# Patient Record
Sex: MALE | Race: WHITE | NOT HISPANIC OR LATINO | Employment: OTHER | URBAN - METROPOLITAN AREA
[De-identification: names, ages, dates, MRNs, and addresses within clinical notes are randomized per-mention and may not be internally consistent; named-entity substitution may affect disease eponyms.]

---

## 2017-01-26 ENCOUNTER — HOSPITAL ENCOUNTER (EMERGENCY)
Facility: MEDICAL CENTER | Age: 32
End: 2017-01-26
Attending: EMERGENCY MEDICINE
Payer: MEDICARE

## 2017-01-26 ENCOUNTER — APPOINTMENT (OUTPATIENT)
Dept: RADIOLOGY | Facility: MEDICAL CENTER | Age: 32
End: 2017-01-26
Attending: EMERGENCY MEDICINE
Payer: MEDICARE

## 2017-01-26 VITALS
TEMPERATURE: 97.3 F | WEIGHT: 150 LBS | BODY MASS INDEX: 22.81 KG/M2 | OXYGEN SATURATION: 95 % | HEART RATE: 106 BPM | SYSTOLIC BLOOD PRESSURE: 111 MMHG | DIASTOLIC BLOOD PRESSURE: 56 MMHG | RESPIRATION RATE: 14 BRPM

## 2017-01-26 DIAGNOSIS — R41.82 ALTERED MENTAL STATUS, UNSPECIFIED ALTERED MENTAL STATUS TYPE: ICD-10-CM

## 2017-01-26 DIAGNOSIS — F10.929 ALCOHOL INTOXICATION, WITH UNSPECIFIED COMPLICATION (HCC): ICD-10-CM

## 2017-01-26 DIAGNOSIS — D72.829 LEUKOCYTOSIS, UNSPECIFIED TYPE: ICD-10-CM

## 2017-01-26 LAB
ALBUMIN SERPL BCP-MCNC: 4.7 G/DL (ref 3.2–4.9)
ALBUMIN/GLOB SERPL: 1.4 G/DL
ALP SERPL-CCNC: 92 U/L (ref 30–99)
ALT SERPL-CCNC: 16 U/L (ref 2–50)
ANION GAP SERPL CALC-SCNC: 14 MMOL/L (ref 0–11.9)
APPEARANCE UR: CLEAR
APTT PPP: 30.5 SEC (ref 24.7–36)
AST SERPL-CCNC: 30 U/L (ref 12–45)
BASOPHILS # BLD AUTO: 0.2 % (ref 0–1.8)
BASOPHILS # BLD: 0.05 K/UL (ref 0–0.12)
BILIRUB SERPL-MCNC: 0.5 MG/DL (ref 0.1–1.5)
BILIRUB UR QL STRIP.AUTO: NEGATIVE
BUN SERPL-MCNC: 20 MG/DL (ref 8–22)
CALCIUM SERPL-MCNC: 9.3 MG/DL (ref 8.5–10.5)
CHLORIDE SERPL-SCNC: 105 MMOL/L (ref 96–112)
CO2 SERPL-SCNC: 21 MMOL/L (ref 20–33)
COLOR UR: ABNORMAL
CREAT SERPL-MCNC: 1.06 MG/DL (ref 0.5–1.4)
EOSINOPHIL # BLD AUTO: 0.03 K/UL (ref 0–0.51)
EOSINOPHIL NFR BLD: 0.1 % (ref 0–6.9)
ERYTHROCYTE [DISTWIDTH] IN BLOOD BY AUTOMATED COUNT: 40.7 FL (ref 35.9–50)
ETHANOL BLD-MCNC: 0.21 G/DL
GFR SERPL CREATININE-BSD FRML MDRD: >60 ML/MIN/1.73 M 2
GLOBULIN SER CALC-MCNC: 3.4 G/DL (ref 1.9–3.5)
GLUCOSE BLD-MCNC: 50 MG/DL (ref 65–99)
GLUCOSE SERPL-MCNC: 77 MG/DL (ref 65–99)
GLUCOSE UR STRIP.AUTO-MCNC: >1000 MG/DL
HCT VFR BLD AUTO: 50.8 % (ref 42–52)
HGB BLD-MCNC: 16.7 G/DL (ref 14–18)
IMM GRANULOCYTES # BLD AUTO: 0.12 K/UL (ref 0–0.11)
IMM GRANULOCYTES NFR BLD AUTO: 0.6 % (ref 0–0.9)
INR PPP: 0.99 (ref 0.87–1.13)
KETONES UR STRIP.AUTO-MCNC: 40 MG/DL
LEUKOCYTE ESTERASE UR QL STRIP.AUTO: NEGATIVE
LYMPHOCYTES # BLD AUTO: 2.07 K/UL (ref 1–4.8)
LYMPHOCYTES NFR BLD: 10 % (ref 22–41)
MCH RBC QN AUTO: 29.9 PG (ref 27–33)
MCHC RBC AUTO-ENTMCNC: 32.9 G/DL (ref 33.7–35.3)
MCV RBC AUTO: 91 FL (ref 81.4–97.8)
MICRO URNS: ABNORMAL
MONOCYTES # BLD AUTO: 0.89 K/UL (ref 0–0.85)
MONOCYTES NFR BLD AUTO: 4.3 % (ref 0–13.4)
NEUTROPHILS # BLD AUTO: 17.58 K/UL (ref 1.82–7.42)
NEUTROPHILS NFR BLD: 84.8 % (ref 44–72)
NITRITE UR QL STRIP.AUTO: NEGATIVE
NRBC # BLD AUTO: 0 K/UL
NRBC BLD AUTO-RTO: 0 /100 WBC
PH UR STRIP.AUTO: 5 [PH]
PLATELET # BLD AUTO: 291 K/UL (ref 164–446)
PMV BLD AUTO: 11.4 FL (ref 9–12.9)
POTASSIUM SERPL-SCNC: 4.4 MMOL/L (ref 3.6–5.5)
PROT SERPL-MCNC: 8.1 G/DL (ref 6–8.2)
PROT UR QL STRIP: NEGATIVE MG/DL
PROTHROMBIN TIME: 13.4 SEC (ref 12–14.6)
RBC # BLD AUTO: 5.58 M/UL (ref 4.7–6.1)
RBC UR QL AUTO: NEGATIVE
SODIUM SERPL-SCNC: 140 MMOL/L (ref 135–145)
SP GR UR STRIP.AUTO: 1.02
WBC # BLD AUTO: 20.7 K/UL (ref 4.8–10.8)

## 2017-01-26 PROCEDURE — 700101 HCHG RX REV CODE 250: Performed by: EMERGENCY MEDICINE

## 2017-01-26 PROCEDURE — 80307 DRUG TEST PRSMV CHEM ANLYZR: CPT

## 2017-01-26 PROCEDURE — 99284 EMERGENCY DEPT VISIT MOD MDM: CPT

## 2017-01-26 PROCEDURE — 85610 PROTHROMBIN TIME: CPT

## 2017-01-26 PROCEDURE — 71010 DX-CHEST-PORTABLE (1 VIEW): CPT

## 2017-01-26 PROCEDURE — 80053 COMPREHEN METABOLIC PANEL: CPT

## 2017-01-26 PROCEDURE — 96365 THER/PROPH/DIAG IV INF INIT: CPT

## 2017-01-26 PROCEDURE — 82962 GLUCOSE BLOOD TEST: CPT

## 2017-01-26 PROCEDURE — 85025 COMPLETE CBC W/AUTO DIFF WBC: CPT

## 2017-01-26 PROCEDURE — 81003 URINALYSIS AUTO W/O SCOPE: CPT | Mod: 59

## 2017-01-26 PROCEDURE — 36415 COLL VENOUS BLD VENIPUNCTURE: CPT

## 2017-01-26 PROCEDURE — 85730 THROMBOPLASTIN TIME PARTIAL: CPT

## 2017-01-26 PROCEDURE — 99285 EMERGENCY DEPT VISIT HI MDM: CPT

## 2017-01-26 PROCEDURE — 96366 THER/PROPH/DIAG IV INF ADDON: CPT

## 2017-01-26 PROCEDURE — 96375 TX/PRO/DX INJ NEW DRUG ADDON: CPT

## 2017-01-26 RX ORDER — DEXTROSE MONOHYDRATE 25 G/50ML
50 INJECTION, SOLUTION INTRAVENOUS ONCE
Status: COMPLETED | OUTPATIENT
Start: 2017-01-26 | End: 2017-01-26

## 2017-01-26 RX ADMIN — DEXTROSE MONOHYDRATE 50 ML: 500 INJECTION PARENTERAL at 08:48

## 2017-01-26 RX ADMIN — THIAMINE HYDROCHLORIDE 1000 ML: 100 INJECTION, SOLUTION INTRAMUSCULAR; INTRAVENOUS at 08:48

## 2017-01-26 ASSESSMENT — LIFESTYLE VARIABLES
HAVE PEOPLE ANNOYED YOU BY CRITICIZING YOUR DRINKING: YES
TOTAL SCORE: 4
TOTAL SCORE: 4
EVER HAD A DRINK FIRST THING IN THE MORNING TO STEADY YOUR NERVES TO GET RID OF A HANGOVER: YES
EVER FELT BAD OR GUILTY ABOUT YOUR DRINKING: YES
HAVE YOU EVER FELT YOU SHOULD CUT DOWN ON YOUR DRINKING: YES
CONSUMPTION TOTAL: INCOMPLETE
TOTAL SCORE: 4
DO YOU DRINK ALCOHOL: YES
DOES PATIENT WANT TO STOP DRINKING: NO

## 2017-01-26 NOTE — ED NOTES
All discharge instructions given to pt . Pt verbalized understanding of all discharge instructions. All lines removed prior to discharge. All questions answered. ERP aware of tachycardia prior to discharge. Per ERP ok for D/C.

## 2017-01-26 NOTE — ED NOTES
ERP at bedside.    Blood sugar= 50.  ERP aware.  IV established, blood drawn & sent to lab.  Pt medicated per Md's orders.

## 2017-01-26 NOTE — DISCHARGE INSTRUCTIONS
"Alcohol Intoxication  Alcohol intoxication occurs when the amount of alcohol that a person has consumed impairs his or her ability to mentally and physically function. Alcohol directly impairs the normal chemical activity of the brain. Drinking large amounts of alcohol can lead to changes in mental function and behavior, and it can cause many physical effects that can be harmful.   Alcohol intoxication can range in severity from mild to very severe. Various factors can affect the level of intoxication that occurs, such as the person's age, gender, weight, frequency of alcohol consumption, and the presence of other medical conditions (such as diabetes, seizures, or heart conditions). Dangerous levels of alcohol intoxication may occur when people drink large amounts of alcohol in a short period (binge drinking). Alcohol can also be especially dangerous when combined with certain prescription medicines or \"recreational\" drugs.  SIGNS AND SYMPTOMS  Some common signs and symptoms of mild alcohol intoxication include:  · Loss of coordination.  · Changes in mood and behavior.  · Impaired judgment.  · Slurred speech.  As alcohol intoxication progresses to more severe levels, other signs and symptoms will appear. These may include:  · Vomiting.  · Confusion and impaired memory.  · Slowed breathing.  · Seizures.  · Loss of consciousness.  DIAGNOSIS   Your health care provider will take a medical history and perform a physical exam. You will be asked about the amount and type of alcohol you have consumed. Blood tests will be done to measure the concentration of alcohol in your blood. In many places, your blood alcohol level must be lower than 80 mg/dL (0.08%) to legally drive. However, many dangerous effects of alcohol can occur at much lower levels.   TREATMENT   People with alcohol intoxication often do not require treatment. Most of the effects of alcohol intoxication are temporary, and they go away as the alcohol naturally " leaves the body. Your health care provider will monitor your condition until you are stable enough to go home. Fluids are sometimes given through an IV access tube to help prevent dehydration.   HOME CARE INSTRUCTIONS  · Do not drive after drinking alcohol.  · Stay hydrated. Drink enough water and fluids to keep your urine clear or pale yellow. Avoid caffeine.    · Only take over-the-counter or prescription medicines as directed by your health care provider.    SEEK MEDICAL CARE IF:   · You have persistent vomiting.    · You do not feel better after a few days.  · You have frequent alcohol intoxication. Your health care provider can help determine if you should see a substance use treatment counselor.  SEEK IMMEDIATE MEDICAL CARE IF:   · You become shaky or tremble when you try to stop drinking.    · You shake uncontrollably (seizure).    · You throw up (vomit) blood. This may be bright red or may look like black coffee grounds.    · You have blood in your stool. This may be bright red or may appear as a black, tarry, bad smelling stool.    · You become lightheaded or faint.    MAKE SURE YOU:   · Understand these instructions.  · Will watch your condition.  · Will get help right away if you are not doing well or get worse.     This information is not intended to replace advice given to you by your health care provider. Make sure you discuss any questions you have with your health care provider.     Document Released: 09/27/2006 Document Revised: 08/20/2014 Document Reviewed: 05/23/2014  [employer] Interactive Patient Education ©2016 [employer]    Alcohol Problems  Most adults who drink alcohol drink in moderation (not a lot) are at low risk for developing problems related to their drinking. However, all drinkers, including low-risk drinkers, should know about the health risks connected with drinking alcohol.  RECOMMENDATIONS FOR LOW-RISK DRINKING   Drink in moderation. Moderate drinking is defined as follows:    · Men - no more than 2 drinks per day.  · Nonpregnant women - no more than 1 drink per day.  · Over age 65 - no more than 1 drink per day.  A standard drink is 12 grams of pure alcohol, which is equal to a 12 ounce bottle of beer or wine cooler, a 5 ounce glass of wine, or 1.5 ounces of distilled spirits (such as whiskey, compa, vodka, or rum).   ABSTAIN FROM (DO NOT DRINK) ALCOHOL:  · When pregnant or considering pregnancy.  · When taking a medication that interacts with alcohol.  · If you are alcohol dependent.  · A medical condition that prohibits drinking alcohol (such as ulcer, liver disease, or heart disease).  DISCUSS WITH YOUR CAREGIVER:  · If you are at risk for coronary heart disease, discuss the potential benefits and risks of alcohol use: Light to moderate drinking is associated with lower rates of coronary heart disease in certain populations (for example, men over age 45 and postmenopausal women). Infrequent or nondrinkers are advised not to begin light to moderate drinking to reduce the risk of coronary heart disease so as to avoid creating an alcohol-related problem. Similar protective effects can likely be gained through proper diet and exercise.  · Women and the elderly have smaller amounts of body water than men. As a result women and the elderly achieve a higher blood alcohol concentration after drinking the same amount of alcohol.  · Exposing a fetus to alcohol can cause a broad range of birth defects referred to as Fetal Alcohol Syndrome (FAS) or Alcohol-Related Birth Defects (ARBD). Although FAS/ARBD is connected with excessive alcohol consumption during pregnancy, studies also have reported neurobehavioral problems in infants born to mothers reporting drinking an average of 1 drink per day during pregnancy.  · Heavier drinking (the consumption of more than 4 drinks per occasion by men and more than 3 drinks per occasion by women) impairs learning (cognitive) and psychomotor functions and  increases the risk of alcohol-related problems, including accidents and injuries.  CAGE QUESTIONS:   · Have you ever felt that you should Cut down on your drinking?  · Have people Annoyed you by criticizing your drinking?  · Have you ever felt bad or Guilty about your drinking?  · Have you ever had a drink first thing in the morning to steady your nerves or get rid of a hangover (Eye opener)?  If you answered positively to any of these questions: You may be at risk for alcohol-related problems if alcohol consumption is:   · Men: Greater than 14 drinks per week or more than 4 drinks per occasion.  · Women: Greater than 7 drinks per week or more than 3 drinks per occasion.  Do you or your family have a medical history of alcohol-related problems, such as:  · Blackouts.  · Sexual dysfunction.  · Depression.  · Trauma.  · Liver dysfunction.  · Sleep disorders.  · Hypertension.  · Chronic abdominal pain.  · Has your drinking ever caused you problems, such as problems with your family, problems with your work (or school) performance, or accidents/injuries?  · Do you have a compulsion to drink or a preoccupation with drinking?  · Do you have poor control or are you unable to stop drinking once you have started?  · Do you have to drink to avoid withdrawal symptoms?  · Do you have problems with withdrawal such as tremors, nausea, sweats, or mood disturbances?  · Does it take more alcohol than in the past to get you high?  · Do you feel a strong urge to drink?  · Do you change your plans so that you can have a drink?  · Do you ever drink in the morning to relieve the shakes or a hangover?  If you have answered a number of the previous questions positively, it may be time for you to talk to your caregivers, family, and friends and see if they think you have a problem. Alcoholism is a chemical dependency that keeps getting worse and will eventually destroy your health and relationships. Many alcoholics end up dead,  impoverished, or in group home. This is often the end result of all chemical dependency.  · Do not be discouraged if you are not ready to take action immediately.  · Decisions to change behavior often involve up and down desires to change and feeling like you cannot decide.  · Try to think more seriously about your drinking behavior.  · Think of the reasons to quit.  WHERE TO GO FOR ADDITIONAL INFORMATION   · The National [place] on Alcohol Abuse and Alcoholism (NIAAA)  www.niaaa.nih.gov  · National Alliance on Alcoholism and Drug Dependence (NCADD)  www.ncadd.org  · American Society of Addiction Medicine (ASAM)  www.asam.org   Document Released: 12/18/2006 Document Revised: 03/11/2013 Document Reviewed: 08/05/2009  [employer]® Patient Information ©2014 [employer].    Alcohol Abuse and Nutrition  Alcohol abuse is any pattern of alcohol consumption that harms your health, relationships, or work. Alcohol abuse can affect how your body breaks down and absorbs nutrients from food by causing your liver to work abnormally. Additionally, many people who abuse alcohol do not eat enough carbohydrates, protein, fat, vitamins, and minerals. This can cause poor nutrition (malnutrition) and a lack of nutrients (nutrient deficiencies), which can lead to further complications.  Nutrients that are commonly lacking (deficient) among people who abuse alcohol include:  · Vitamins.  ¨ Vitamin A. This is stored in your liver. It is important for your vision, metabolism, and ability to fight off infections (immunity).  ¨ B vitamins. These include vitamins such as folate, thiamin, and niacin. These are important in new cell growth and maintenance.  ¨ Vitamin C. This plays an important role in iron absorption, wound healing, and immunity.  ¨ Vitamin D. This is produced by your liver, but you can also get vitamin D from food. Vitamin D is necessary for your body to absorb and use calcium.  · Minerals.  ¨ Calcium. This is important for your  bones and your heart and blood vessel (cardiovascular) function.  ¨ Iron. This is important for blood, muscle, and nervous system functioning.  ¨ Magnesium. This plays an important role in muscle and nerve function, and it helps to control blood sugar and blood pressure.  ¨ Zinc. This is important for the normal function of your nervous system and digestive system (gastrointestinal tract).  Nutrition is an essential component of therapy for alcohol abuse. Your health care provider or dietitian will work with you to design a plan that can help restore nutrients to your body and prevent potential complications.  WHAT IS MY PLAN?  Your dietitian may develop a specific diet plan that is based on your condition and any other complications you may have. A diet plan will commonly include:  · A balanced diet.  ¨ Grains: 6-8 oz per day.  ¨ Vegetables: 2-3 cups per day.  ¨ Fruits: 1-2 cups per day.  ¨ Meat and other protein: 5-6 oz per day.  ¨ Dairy: 2-3 cups per day.  · Vitamin and mineral supplements.  WHAT DO I NEED TO KNOW ABOUT ALCOHOL AND NUTRITION?  · Consume foods that are high in antioxidants, such as grapes, berries, nuts, green tea, and dark green and orange vegetables. This can help to counteract some of the stress that is placed on your liver by consuming alcohol.  · Avoid food and drinks that are high in fat and sugar. Foods such as sugared soft drinks, salty snack foods, and candy contain empty calories. This means that they lack important nutrients such as protein, fiber, and vitamins.  · Eat frequent meals and snacks. Try to eat 5-6 small meals each day.  · Eat a variety of fresh fruits and vegetables each day. This will help you get plenty of water, fiber, and vitamins in your diet.  · Drink plenty of water and other clear fluids. Try to drink at least 48-64 oz (1.5-2 L) of water per day.  · If you are a vegetarian, eat a variety of protein-rich foods. Pair whole grains with plant-based proteins at meals and  snacks to obtain the greatest nutrient benefit from your food. For example, eat rice with beans, put peanut butter on whole-grain toast, or eat oatmeal with sunflower seeds.  · Soak beans and whole grains overnight before cooking. This can help your body to absorb the nutrients more easily.  · Include foods fortified with vitamins and minerals in your diet. Commonly fortified foods include milk, orange juice, cereal, and bread.  · If you are malnourished, your dietitian may recommend a high-protein, high-calorie diet. This may include:  ¨ 2,000-3,000 calories (kilocalories) per day.  ¨  grams of protein per day.  · Your health care provider may recommend a complete nutritional supplement beverage. This can help to restore calories, protein, and vitamins to your body. Depending on your condition, you may be advised to consume this instead of or in addition to meals.  · Limit your intake of caffeine. Replace drinks like coffee and black tea with decaffeinated coffee and herbal tea.  · Eat a variety of foods that are high in omega fatty acids. These include fish, nuts and seeds, and soybeans. These foods may help your liver to recover and may also stabilize your mood.  · Certain medicines may cause changes in your appetite, taste, and weight. Work with your health care provider and dietitian to make any adjustments to your medicines and diet plan.  · Include other healthy lifestyle choices in your daily routine.  ¨ Be physically active.  ¨ Get enough sleep.  ¨ Spend time doing activities that you enjoy.  · If you are unable to take in enough food and calories by mouth, your health care provider may recommend a feeding tube. This is a tube that passes through your nose and throat, directly into your stomach. Nutritional supplement beverages can be given to you through the feeding tube to help you get the nutrients you need.  · Take vitamin or mineral supplements as recommended by your health care provider.  WHAT  FOODS CAN I EAT?  Grains  Enriched pasta. Enriched rice. Fortified whole-grain bread. Fortified whole-grain cereal. Barley. Brown rice. Quinoa. Millet.  Vegetables  All fresh, frozen, and canned vegetables. Spinach. Kale. Artichoke. Carrots. Winter squash and pumpkin. Sweet potatoes. Broccoli. Cabbage. Cucumbers. Tomatoes. Sweet peppers. Green beans. Peas. Corn.  Fruits  All fresh and frozen fruits. Berries. Grapes. Harley. Papaya. Guava. Cherries. Apples. Bananas. Peaches. Plums. Pineapple. Watermelon. Cantaloupe. Oranges. Avocado.  Meats and Other Protein Sources  Beef liver. Lean beef. Pork. Fresh and canned chicken. Fresh fish. Oysters. Sardines. Canned tuna. Shrimp. Eggs with yolks. Nuts and seeds. Peanut butter. Beans and lentils. Soybeans. Tofu.  Dairy  Whole, low-fat, and nonfat milk. Whole, low-fat, and nonfat yogurt. Cottage cheese. Sour cream. Hard and soft cheeses.  Beverages  Water. Herbal tea. Decaffeinated coffee. Decaffeinated green tea. 100% fruit juice. 100% vegetable juice. Instant breakfast shakes.  Condiments  Ketchup. Mayonnaise. Mustard. Salad dressing. Barbecue sauce.  Sweets and Desserts  Sugar-free ice cream. Sugar-free pudding. Sugar-free gelatin.  Fats and Oils  Butter. Vegetable oil, flaxseed oil, olive oil, and walnut oil.  Other  Complete nutrition shakes. Protein bars. Sugar-free gum.  The items listed above may not be a complete list of recommended foods or beverages. Contact your dietitian for more options.  WHAT FOODS ARE NOT RECOMMENDED?  Grains  Sugar-sweetened breakfast cereals. Flavored instant oatmeal. Fried breads.  Vegetables  Breaded or deep-fried vegetables.  Fruits  Dried fruit with added sugar. Candied fruit. Canned fruit in syrup.  Meats and Other Protein Sources  Breaded or deep-fried meats.  Dairy  Flavored milks. Fried cheese curds or fried cheese sticks.  Beverages  Alcohol. Sugar-sweetened soft drinks. Sugar-sweetened tea. Caffeinated coffee and  tea.  Condiments  Sugar. Honey. Agave nectar. Molasses.  Sweets and Desserts  Chocolate. Cake. Cookies. Candy.  Other  Potato chips. Pretzels. Salted nuts. Candied nuts.  The items listed above may not be a complete list of foods and beverages to avoid. Contact your dietitian for more information.     This information is not intended to replace advice given to you by your health care provider. Make sure you discuss any questions you have with your health care provider.     Document Released: 10/12/2006 Document Revised: 01/08/2016 Document Reviewed: 07/21/2015  [employer] Interactive Patient Education ©2016 [employer]

## 2017-01-26 NOTE — ED AVS SNAPSHOT
After Visit Summary                                                                                                                Gopal Ceja   MRN: [id number]    Department:  [facility], Emergency Dept   Date of Visit:  1/26/2017            [facility], Emergency Dept    [address] [facility] [address]    Phone:  [phone]      You were seen by     Guy G Gansert, M.D.      Your Diagnosis Was     Altered mental status, unspecified altered mental status type     R41.82       These are the medications you received during your hospitalization from 01/26/2017 0800 to 01/26/2017 1235     Date/Time Order Dose Route Action    01/26/2017 0848 er detox iv 1000 mL (D5LR + thiamine 100 mg + folic acid 1 mg + magnesium 1 g) infusion 1,000 mL Intravenous New Bag    01/26/2017 0848 dextrose 50% (D50W) injection 50 mL 50 mL Intravenous New Bag      Follow-up Information     1. Follow up with [facility].    Contact information    [address] [place] [id number] [phone]      Medication Information     Review all of your home medications and newly ordered medications with your primary doctor and/or pharmacist as soon as possible. Follow medication instructions as directed by your doctor and/or pharmacist.     Please keep your complete medication list with you and share with your physician. Update the information when medications are discontinued, doses are changed, or new medications (including over-the-counter products) are added; and carry medication information at all times in the event of emergency situations.               Medication List      ASK your doctor about these medications        Instructions    citalopram 10 MG tablet   Commonly known as:  CELEXA    Take 30 mg by mouth every day.   Dose:  30 mg       diazepam 5 MG Tabs   Commonly known as:  VALIUM    Take 5 mg by mouth at bedtime as needed.   Dose:  5 mg       * erythromycin 5 MG/GM Oint    Place  1 Application in both eyes 2 times a day.   Dose:  1 Application       * erythromycin 5 MG/GM Oint    Apply to both eyes 3 times daily       gabapentin 300 MG Caps   Commonly known as:  NEURONTIN    Take 1 Cap by mouth 3 times a day.   Dose:  300 mg       ibuprofen 600 MG Tabs   Commonly known as:  MOTRIN    Take 1 Tab by mouth every 8 hours as needed.   Dose:  600 mg       loratadine 10 MG Tabs   Commonly known as:  CLARITIN    Take 1 Tab by mouth every day.   Dose:  10 mg       methylphenidate 5 MG Tabs   Commonly known as:  RITALIN    Take 10 mg by mouth 3 times a day.   Dose:  10 mg       * Notice:  This list has 2 medication(s) that are the same as other medications prescribed for you. Read the directions carefully, and ask your doctor or other care provider to review them with you.            Procedures and tests performed during your visit     ACCU-CHEK GLUCOSE    APTT    CBC WITH DIFFERENTIAL    COMP METABOLIC PANEL    DIAGNOSTIC ALCOHOL    DX-CHEST-PORTABLE (1 VIEW)    ESTIMATED GFR    PROTHROMBIN TIME    URINALYSIS        Discharge Instructions       Alcohol Intoxication  Alcohol intoxication occurs when the amount of alcohol that a person has consumed impairs his or her ability to mentally and physically function. Alcohol directly impairs the normal chemical activity of the brain. Drinking large amounts of alcohol can lead to changes in mental function and behavior, and it can cause many physical effects that can be harmful.   Alcohol intoxication can range in severity from mild to very severe. Various factors can affect the level of intoxication that occurs, such as the person's age, gender, weight, frequency of alcohol consumption, and the presence of other medical conditions (such as diabetes, seizures, or heart conditions). Dangerous levels of alcohol intoxication may occur when people drink large amounts of alcohol in a short period (binge drinking). Alcohol can also be especially dangerous when  "combined with certain prescription medicines or \"recreational\" drugs.  SIGNS AND SYMPTOMS  Some common signs and symptoms of mild alcohol intoxication include:  · Loss of coordination.  · Changes in mood and behavior.  · Impaired judgment.  · Slurred speech.  As alcohol intoxication progresses to more severe levels, other signs and symptoms will appear. These may include:  · Vomiting.  · Confusion and impaired memory.  · Slowed breathing.  · Seizures.  · Loss of consciousness.  DIAGNOSIS   Your health care provider will take a medical history and perform a physical exam. You will be asked about the amount and type of alcohol you have consumed. Blood tests will be done to measure the concentration of alcohol in your blood. In many places, your blood alcohol level must be lower than 80 mg/dL (0.08%) to legally drive. However, many dangerous effects of alcohol can occur at much lower levels.   TREATMENT   People with alcohol intoxication often do not require treatment. Most of the effects of alcohol intoxication are temporary, and they go away as the alcohol naturally leaves the body. Your health care provider will monitor your condition until you are stable enough to go home. Fluids are sometimes given through an IV access tube to help prevent dehydration.   HOME CARE INSTRUCTIONS  · Do not drive after drinking alcohol.  · Stay hydrated. Drink enough water and fluids to keep your urine clear or pale yellow. Avoid caffeine.    · Only take over-the-counter or prescription medicines as directed by your health care provider.    SEEK MEDICAL CARE IF:   · You have persistent vomiting.    · You do not feel better after a few days.  · You have frequent alcohol intoxication. Your health care provider can help determine if you should see a substance use treatment counselor.  SEEK IMMEDIATE MEDICAL CARE IF:   · You become shaky or tremble when you try to stop drinking.    · You shake uncontrollably (seizure).    · You throw up " (vomit) blood. This may be bright red or may look like black coffee grounds.    · You have blood in your stool. This may be bright red or may appear as a black, tarry, bad smelling stool.    · You become lightheaded or faint.    MAKE SURE YOU:   · Understand these instructions.  · Will watch your condition.  · Will get help right away if you are not doing well or get worse.     This information is not intended to replace advice given to you by your health care provider. Make sure you discuss any questions you have with your health care provider.     Document Released: 09/27/2006 Document Revised: 08/20/2014 Document Reviewed: 05/23/2014  [employer] Interactive Patient Education ©2016 [employer] Inc.    Alcohol Problems  Most adults who drink alcohol drink in moderation (not a lot) are at low risk for developing problems related to their drinking. However, all drinkers, including low-risk drinkers, should know about the health risks connected with drinking alcohol.  RECOMMENDATIONS FOR LOW-RISK DRINKING   Drink in moderation. Moderate drinking is defined as follows:   · Men - no more than 2 drinks per day.  · Nonpregnant women - no more than 1 drink per day.  · Over age 65 - no more than 1 drink per day.  A standard drink is 12 grams of pure alcohol, which is equal to a 12 ounce bottle of beer or wine cooler, a 5 ounce glass of wine, or 1.5 ounces of distilled spirits (such as whiskey, compa, vodka, or rum).   ABSTAIN FROM (DO NOT DRINK) ALCOHOL:  · When pregnant or considering pregnancy.  · When taking a medication that interacts with alcohol.  · If you are alcohol dependent.  · A medical condition that prohibits drinking alcohol (such as ulcer, liver disease, or heart disease).  DISCUSS WITH YOUR CAREGIVER:  · If you are at risk for coronary heart disease, discuss the potential benefits and risks of alcohol use: Light to moderate drinking is associated with lower rates of coronary heart disease in certain populations  (for example, men over age 45 and postmenopausal women). Infrequent or nondrinkers are advised not to begin light to moderate drinking to reduce the risk of coronary heart disease so as to avoid creating an alcohol-related problem. Similar protective effects can likely be gained through proper diet and exercise.  · Women and the elderly have smaller amounts of body water than men. As a result women and the elderly achieve a higher blood alcohol concentration after drinking the same amount of alcohol.  · Exposing a fetus to alcohol can cause a broad range of birth defects referred to as Fetal Alcohol Syndrome (FAS) or Alcohol-Related Birth Defects (ARBD). Although FAS/ARBD is connected with excessive alcohol consumption during pregnancy, studies also have reported neurobehavioral problems in infants born to mothers reporting drinking an average of 1 drink per day during pregnancy.  · Heavier drinking (the consumption of more than 4 drinks per occasion by men and more than 3 drinks per occasion by women) impairs learning (cognitive) and psychomotor functions and increases the risk of alcohol-related problems, including accidents and injuries.  CAGE QUESTIONS:   · Have you ever felt that you should Cut down on your drinking?  · Have people Annoyed you by criticizing your drinking?  · Have you ever felt bad or Guilty about your drinking?  · Have you ever had a drink first thing in the morning to steady your nerves or get rid of a hangover (Eye opener)?  If you answered positively to any of these questions: You may be at risk for alcohol-related problems if alcohol consumption is:   · Men: Greater than 14 drinks per week or more than 4 drinks per occasion.  · Women: Greater than 7 drinks per week or more than 3 drinks per occasion.  Do you or your family have a medical history of alcohol-related problems, such as:  · Blackouts.  · Sexual dysfunction.  · Depression.  · Trauma.  · Liver dysfunction.  · Sleep  disorders.  · Hypertension.  · Chronic abdominal pain.  · Has your drinking ever caused you problems, such as problems with your family, problems with your work (or school) performance, or accidents/injuries?  · Do you have a compulsion to drink or a preoccupation with drinking?  · Do you have poor control or are you unable to stop drinking once you have started?  · Do you have to drink to avoid withdrawal symptoms?  · Do you have problems with withdrawal such as tremors, nausea, sweats, or mood disturbances?  · Does it take more alcohol than in the past to get you high?  · Do you feel a strong urge to drink?  · Do you change your plans so that you can have a drink?  · Do you ever drink in the morning to relieve the shakes or a hangover?  If you have answered a number of the previous questions positively, it may be time for you to talk to your caregivers, family, and friends and see if they think you have a problem. Alcoholism is a chemical dependency that keeps getting worse and will eventually destroy your health and relationships. Many alcoholics end up dead, impoverished, or in [facility]. This is often the end result of all chemical dependency.  · Do not be discouraged if you are not ready to take action immediately.  · Decisions to change behavior often involve up and down desires to change and feeling like you cannot decide.  · Try to think more seriously about your drinking behavior.  · Think of the reasons to quit.  WHERE TO GO FOR ADDITIONAL INFORMATION   · The National [place] on Alcohol Abuse and Alcoholism (NIAAA)  www.niaaa.nih.gov  · National [place] on Alcoholism and Drug Dependence (NCADD)  www.ncadd.org  · American Society of Addiction Medicine (ASAM)  www.asam.org   Document Released: 12/18/2006 Document Revised: 03/11/2013 Document Reviewed: 08/05/2009  [employer]® Patient Information ©2014 [employer].    Alcohol Abuse and Nutrition  Alcohol abuse is any pattern of alcohol consumption that harms  your health, relationships, or work. Alcohol abuse can affect how your body breaks down and absorbs nutrients from food by causing your liver to work abnormally. Additionally, many people who abuse alcohol do not eat enough carbohydrates, protein, fat, vitamins, and minerals. This can cause poor nutrition (malnutrition) and a lack of nutrients (nutrient deficiencies), which can lead to further complications.  Nutrients that are commonly lacking (deficient) among people who abuse alcohol include:  · Vitamins.  ¨ Vitamin A. This is stored in your liver. It is important for your vision, metabolism, and ability to fight off infections (immunity).  ¨ B vitamins. These include vitamins such as folate, thiamin, and niacin. These are important in new cell growth and maintenance.  ¨ Vitamin C. This plays an important role in iron absorption, wound healing, and immunity.  ¨ Vitamin D. This is produced by your liver, but you can also get vitamin D from food. Vitamin D is necessary for your body to absorb and use calcium.  · Minerals.  ¨ Calcium. This is important for your bones and your heart and blood vessel (cardiovascular) function.  ¨ Iron. This is important for blood, muscle, and nervous system functioning.  ¨ Magnesium. This plays an important role in muscle and nerve function, and it helps to control blood sugar and blood pressure.  ¨ Zinc. This is important for the normal function of your nervous system and digestive system (gastrointestinal tract).  Nutrition is an essential component of therapy for alcohol abuse. Your health care provider or dietitian will work with you to design a plan that can help restore nutrients to your body and prevent potential complications.  WHAT IS MY PLAN?  Your dietitian may develop a specific diet plan that is based on your condition and any other complications you may have. A diet plan will commonly include:  · A balanced diet.  ¨ Grains: 6-8 oz per day.  ¨ Vegetables: 2-3 cups per  day.  ¨ Fruits: 1-2 cups per day.  ¨ Meat and other protein: 5-6 oz per day.  ¨ Dairy: 2-3 cups per day.  · Vitamin and mineral supplements.  WHAT DO I NEED TO KNOW ABOUT ALCOHOL AND NUTRITION?  · Consume foods that are high in antioxidants, such as grapes, berries, nuts, green tea, and dark green and orange vegetables. This can help to counteract some of the stress that is placed on your liver by consuming alcohol.  · Avoid food and drinks that are high in fat and sugar. Foods such as sugared soft drinks, salty snack foods, and candy contain empty calories. This means that they lack important nutrients such as protein, fiber, and vitamins.  · Eat frequent meals and snacks. Try to eat 5-6 small meals each day.  · Eat a variety of fresh fruits and vegetables each day. This will help you get plenty of water, fiber, and vitamins in your diet.  · Drink plenty of water and other clear fluids. Try to drink at least 48-64 oz (1.5-2 L) of water per day.  · If you are a vegetarian, eat a variety of protein-rich foods. Pair whole grains with plant-based proteins at meals and snacks to obtain the greatest nutrient benefit from your food. For example, eat rice with beans, put peanut butter on whole-grain toast, or eat oatmeal with sunflower seeds.  · Soak beans and whole grains overnight before cooking. This can help your body to absorb the nutrients more easily.  · Include foods fortified with vitamins and minerals in your diet. Commonly fortified foods include milk, orange juice, cereal, and bread.  · If you are malnourished, your dietitian may recommend a high-protein, high-calorie diet. This may include:  ¨ 2,000-3,000 calories (kilocalories) per day.  ¨  grams of protein per day.  · Your health care provider may recommend a complete nutritional supplement beverage. This can help to restore calories, protein, and vitamins to your body. Depending on your condition, you may be advised to consume this instead of or in  addition to meals.  · Limit your intake of caffeine. Replace drinks like coffee and black tea with decaffeinated coffee and herbal tea.  · Eat a variety of foods that are high in omega fatty acids. These include fish, nuts and seeds, and soybeans. These foods may help your liver to recover and may also stabilize your mood.  · Certain medicines may cause changes in your appetite, taste, and weight. Work with your health care provider and dietitian to make any adjustments to your medicines and diet plan.  · Include other healthy lifestyle choices in your daily routine.  ¨ Be physically active.  ¨ Get enough sleep.  ¨ Spend time doing activities that you enjoy.  · If you are unable to take in enough food and calories by mouth, your health care provider may recommend a feeding tube. This is a tube that passes through your nose and throat, directly into your stomach. Nutritional supplement beverages can be given to you through the feeding tube to help you get the nutrients you need.  · Take vitamin or mineral supplements as recommended by your health care provider.  WHAT FOODS CAN I EAT?  Grains  Enriched pasta. Enriched rice. Fortified whole-grain bread. Fortified whole-grain cereal. Barley. Brown rice. Quinoa. Millet.  Vegetables  All fresh, frozen, and canned vegetables. Spinach. Kale. Artichoke. Carrots. Winter squash and pumpkin. Sweet potatoes. Broccoli. Cabbage. Cucumbers. Tomatoes. Sweet peppers. Green beans. Peas. Corn.  Fruits  All fresh and frozen fruits. Berries. Grapes. Harley. Papaya. Guava. Cherries. Apples. Bananas. Peaches. Plums. Pineapple. Watermelon. Cantaloupe. Oranges. Avocado.  Meats and Other Protein Sources  Beef liver. Lean beef. Pork. Fresh and canned chicken. Fresh fish. Oysters. Sardines. Canned tuna. Shrimp. Eggs with yolks. Nuts and seeds. Peanut butter. Beans and lentils. Soybeans. Tofu.  Dairy  Whole, low-fat, and nonfat milk. Whole, low-fat, and nonfat yogurt. Cottage cheese. Sour cream.  Hard and soft cheeses.  Beverages  Water. Herbal tea. Decaffeinated coffee. Decaffeinated green tea. 100% fruit juice. 100% vegetable juice. Instant breakfast shakes.  Condiments  Ketchup. Mayonnaise. Mustard. Salad dressing. Barbecue sauce.  Sweets and Desserts  Sugar-free ice cream. Sugar-free pudding. Sugar-free gelatin.  Fats and Oils  Butter. Vegetable oil, flaxseed oil, olive oil, and walnut oil.  Other  Complete nutrition shakes. Protein bars. Sugar-free gum.  The items listed above may not be a complete list of recommended foods or beverages. Contact your dietitian for more options.  WHAT FOODS ARE NOT RECOMMENDED?  Grains  Sugar-sweetened breakfast cereals. Flavored instant oatmeal. Fried breads.  Vegetables  Breaded or deep-fried vegetables.  Fruits  Dried fruit with added sugar. Candied fruit. Canned fruit in syrup.  Meats and Other Protein Sources  Breaded or deep-fried meats.  Dairy  Flavored milks. Fried cheese curds or fried cheese sticks.  Beverages  Alcohol. Sugar-sweetened soft drinks. Sugar-sweetened tea. Caffeinated coffee and tea.  Condiments  Sugar. Honey. Agave nectar. Molasses.  Sweets and Desserts  Chocolate. Cake. Cookies. Candy.  Other  Potato chips. Pretzels. Salted nuts. Candied nuts.  The items listed above may not be a complete list of foods and beverages to avoid. Contact your dietitian for more information.     This information is not intended to replace advice given to you by your health care provider. Make sure you discuss any questions you have with your health care provider.     Document Released: 10/12/2006 Document Revised: 01/08/2016 Document Reviewed: 07/21/2015  [employer] Interactive Patient Education ©2016 [employer]            Patient Information     Patient Information    Following emergency treatment: all patient requiring follow-up care must return either to a private physician or a clinic if your condition worsens before you are able to obtain further medical  attention, please return to the emergency room.     Billing Information    At [facility], we work to make the billing process streamlined for our patients.  Our Representatives are here to answer any questions you may have regarding your hospital bill.  If you have insurance coverage and have supplied your insurance information to us, we will submit a claim to your insurer on your behalf.  Should you have any questions regarding your bill, we can be reached online or by phone as follows:  Online: You are able pay your bills online or live chat with our representatives about any billing questions you may have. We are here to help Monday - Friday from 8:00am to 7:30pm and 9:00am - 12:00pm on Saturdays.  Please visit [email or web address]  for more information.   Phone:  [phone] or [phone]    Please note that your emergency physician, surgeon, pathologist, radiologist, anesthesiologist, and other specialists are not employed by [facility] and will therefore bill separately for their services.  Please contact them directly for any questions concerning their bills at the numbers below:     Emergency Physician Services:  [phone]  [facility]:  [phone]  [facility]:  [phone]  [facility]:  [phone]    1. Your final bill may vary from the amount quoted upon discharge if all procedures are not complete at that time, or if your doctor has additional procedures of which we are not aware. You will receive an additional bill if you return to the Emergency Department at [facility] for suture removal regardless of the facility of which the sutures were placed.     2. Please arrange for settlement of this account at the emergency registration.    3. All self-pay accounts are due in full at the time of treatment.  If you are unable to meet this obligation then payment is expected within 4-5 days.     4. If you have had  radiology studies (CT, X-ray, Ultrasound, MRI), you have received a preliminary result during your emergency department visit. Please contact the radiology department [phone] to receive a copy of your final result. Please discuss the Final result with your primary physician or with the follow up physician provided.     Crisis Hotline:  [place] Crisis Hotline:  5-528-YOGTMSM or 1-355.107.9557  [place] Crisis Hotline:    1-435.873.2872 or 247-475-4666         ED Discharge Follow Up Questions    1. In order to provide you with very good care, we would like to follow up with a phone call in the next few days.  May we have your permission to contact you?     YES /  NO    2. What is the best phone number to call you? (       )_____-__________    3. What is the best time to call you?      Morning  /  Afternoon  /  Evening                   Patient Signature:  ____________________________________________________________    Date:  ____________________________________________________________

## 2017-01-26 NOTE — ED AVS SNAPSHOT
1/26/2017          Gopal [facility]   [place] [address]    Dear Gopal:    [facility] wants to ensure your discharge home is safe and you or your loved ones have had all your questions answered regarding your care after you leave the hospital.    You may receive a telephone call within two days of your discharge.  This call is to make certain you understand your discharge instructions as well as ensure we provided you with the best care possible during your stay with us.     The call will only last approximately 3-5 minutes and will be done by a nurse.    Once again, we want to ensure your discharge home is safe and that you have a clear understanding of any next steps in your care.  If you have any questions or concerns, please do not hesitate to contact us, we are here for you.  Thank you for choosing [facility] for your healthcare needs.    Sincerely,    Neal Lam    [facility]

## 2017-01-26 NOTE — ED NOTES
"Pt devaughn KIMBROUGH from hotel room with c/c alcohol intoxication- pt admits to drinking \"heavily\" this morning.  Pt states multiple times- \"I am hungry, you guys need to feed me now.\"  A&ox4.  Chart up for ERP evaluation.   "

## 2017-01-26 NOTE — ED PROVIDER NOTES
"ED Provider Note    CHIEF COMPLAINT  Chief Complaint   Patient presents with   • Alcohol Intoxication     admits to drinking \"heavily\" this AM   • Other     \"I am hungry\"       HPI  Gopal Ceja is a 31 y.o. male who presents for evaluation of altered mental status.  Patient was brought in by EMS from a hotel room where he was found obtunded.  The patient has been seen 36 times in the department for alcohol and alcohol related conditions.  The patient admits to imbibing alcohol.  In the prehospital setting the patient's blood sugar was 50, and he was given oral glucose and paramedics.  Femur department the patient moans but will awaken with stimuli.  He just indicates he would like something to eat.  He currently denies: Fever, cardiorespiratory symptoms, gastrointestinal symptoms.  No history of recent head trauma.  No other acute symptomatology or complaints.    REVIEW OF SYSTEMS  See HPI for further details.  No history of: Hypertension, diabetes, thyroid dysfunction, cardiopulmonary disorders, gastrointestinal disorders.  All other systems negative.    PAST MEDICAL HISTORY  Past Medical History   Diagnosis Date   • Ectrodactyly-ectodermal dysplasia-clefting syndrome    • Acute anxiety    • ETOH abuse    • Psychiatric disorder      anxiety/panic disorder   • Bipolar affective    • ADHD (attention deficit hyperactivity disorder)        FAMILY HISTORY  No family history on file.    SOCIAL HISTORY  Past history of tobacco use; positive alcohol abuse;    SURGICAL HISTORY  Past Surgical History   Procedure Laterality Date   • Other orthopedic surgery       hands bilaterally r/t EEDC syndrome       CURRENT MEDICATIONS  Home Medications     Reviewed by Gail Camara R.N. (Registered Nurse) on 01/26/17 at 0805  Med List Status: Unable to Obtain    Medication Last Dose Status    citalopram (CELEXA) 10 MG tablet few weeks ago Active    diazepam (VALIUM) 5 MG Tab few weeks ago Active    erythromycin 5 MG/GM Ointment " 6/13/2016 Active    erythromycin 5 MG/GM Ointment  Active    gabapentin (NEURONTIN) 300 MG Cap few weeks ago Active    ibuprofen (MOTRIN) 600 MG Tab few weeks ago Active    loratadine (CLARITIN) 10 MG Tab few weeks ago Active    methylphenidate (RITALIN) 5 MG Tab few weeks ago Active                ALLERGIES  No Known Allergies    PHYSICAL EXAM  VITAL SIGNS: /56 mmHg  Pulse 77  Temp(Src) 36.3 °C (97.3 °F)  Resp 20  Wt 68.04 kg (150 lb)  SpO2 98%   Constitutional: A 31-year-old male, lethargic, responds painful stimuli and will speak, oriented ×2  HENT: Normocephalic, Atraumatic, Nares:Clear, Oropharynx: moist, well hydrated, posterior pharynx:clear   Eyes: PERRL, EOMI, Conjunctiva normal, No discharge.   Neck: Normal range of motion, No tenderness, Supple, No stridor.   Lymphatic: No lymphadenopathy noted.   Cardiovascular: Regular rate and rhythm without mumurs, gallups, rubs   Thorax & Lungs: Normal Equal breath sounds, No respiratory distress, No wheezing, no stridor, no rales. No chest tenderness.   Abdomen: Soft, nontender, nondistended, no organomegaly, positive bowel sounds normal in quality. No guarding or rebound.  Skin: Good skin turgor, pink, warm, dry. No rashes, petechiae, purpura. Normal capillary refill.   Back: No tenderness, No CVA tenderness.   Extremities: Intact distal pulses, No edema, No tenderness, No cyanosis,  Vascular: Pulses are 2+, symmetric in the upper and lower extremities.  Musculoskeletal: The ptient has deformities to his hands and feet; No tenderness to palpation or major deformities noted.   Neurologic: Lethargic with slurred speech, oriented x 2,  No gross focal deficits noted.      RADIOLOGY/PROCEDURES  DX-CHEST-PORTABLE (1 VIEW)   Final Result      No acute cardiopulmonary process is seen.          COURSE & MEDICAL DECISION MAKING  Pertinent Labs & Imaging studies reviewed. (See chart for details)  1.  Monitor  2.  IV: Detox  3.  D50 W1 amp IV    Laboratory studies:  CBC shows white count 20.7, 84% neutrophils, 10% lymphs size, 4% monocytes, immature granulocytes 0.12, hemoglobin 16.7, hematocrit 50.8; coags normal; diagnostic alcohol 0.21; CMP within normal; urinalysis positive for glucose and ketones otherwise negative;    Discussion: At this time, the patient presents for evaluation of altered mental status.  Patient's findings are consistent with alcohol intoxication.  The patient was noted to have elevated white blood cell count of 20,000.  I performed evaluation looking for any occult infection and his chest x-ray and urinalysis are negative.  I reexamined several times the patient has a benign abdominal exam.  At this time, the patient meets criteria for discharge.  I have discussed the findings treatment plan with the patient.  He is not motivated to terminate his alcohol use.  He was instructed he should consider going to AA.  He was instructed to follow-up with primary care also.  She was discharged in stable condition.    FINAL IMPRESSION  1. Altered mental status, unspecified altered mental status type    2. Alcohol intoxication, with unspecified complication (CMS-HCC)    3. Leukocytosis, unspecified type        PLAN  1.  Appropriate discharge instructions given  2.  Follow up primary care  3.  Recheck if any fever change or worsening symptoms or any disconcerting symptoms he is not experiencing at this time;    Electronically signed by: Guy G Gansert, 1/26/2017 8:10 AM

## 2017-01-26 NOTE — ED AVS SNAPSHOT
Zubie Access Code: [email or web address]  Expires: 2/25/2017 12:35 PM    Zubie  A secure, online tool to manage your health information     [employer]’s Zubie® is a secure, online tool that connects you to your personalized health information from the privacy of your home -- day or night - making it very easy for you to manage your healthcare. Once the activation process is completed, you can even access your medical information using the Zubie erwin, which is available for free in the Apple Erwin store or Google Play store.     Zubie provides the following levels of access (as shown below):   My Chart Features   [facility] Primary Care Doctor [facility]  Specialists [facility]  Urgent  Care Non-[facility]  Primary Care  Doctor   Email your healthcare team securely and privately 24/7 X X X X   Manage appointments: schedule your next appointment; view details of past/upcoming appointments X      Request prescription refills. X      View recent personal medical records, including lab and immunizations X X X X   View health record, including health history, allergies, medications X X X X   Read reports about your outpatient visits, procedures, consult and ER notes X X X X   See your discharge summary, which is a recap of your hospital and/or ER visit that includes your diagnosis, lab results, and care plan. X X       How to register for Zubie:  1. Go to  [email or web address].  2. Click on the Sign Up Now box, which takes you to the New Member Sign Up page. You will need to provide the following information:  a. Enter your Zubie Access Code exactly as it appears at the top of this page. (You will not need to use this code after you’ve completed the sign-up process. If you do not sign up before the expiration date, you must request a new code.)   b. Enter your date of birth.   c. Enter your home email address.   d. Click Submit, and follow the next screen’s instructions.  3. Create a Zubie ID. This will be your [employer]  login ID and cannot be changed, so think of one that is secure and easy to remember.  4. Create a [employer] password. You can change your password at any time.  5. Enter your Password Reset Question and Answer. This can be used at a later time if you forget your password.   6. Enter your e-mail address. This allows you to receive e-mail notifications when new information is available in The Skimm.  7. Click Sign Up. You can now view your health information.    For assistance activating your [employer]imm account, call [phone]

## 2017-01-28 ENCOUNTER — HOSPITAL ENCOUNTER (EMERGENCY)
Facility: MEDICAL CENTER | Age: 32
End: 2017-01-28
Attending: EMERGENCY MEDICINE
Payer: MEDICARE

## 2017-01-28 VITALS
SYSTOLIC BLOOD PRESSURE: 127 MMHG | HEART RATE: 98 BPM | RESPIRATION RATE: 16 BRPM | HEIGHT: 68 IN | OXYGEN SATURATION: 94 % | TEMPERATURE: 97.8 F | DIASTOLIC BLOOD PRESSURE: 95 MMHG

## 2017-01-28 DIAGNOSIS — F10.10 CHRONIC ALCOHOL ABUSE: ICD-10-CM

## 2017-01-28 DIAGNOSIS — H53.9 VISUAL DISTURBANCE: ICD-10-CM

## 2017-01-28 LAB — POC BREATHALIZER: 0.02 PERCENT (ref 0–0.01)

## 2017-01-28 PROCEDURE — 99284 EMERGENCY DEPT VISIT MOD MDM: CPT

## 2017-01-28 PROCEDURE — 302970 POC BREATHALIZER

## 2017-01-28 PROCEDURE — 700102 HCHG RX REV CODE 250 W/ 637 OVERRIDE(OP): Performed by: EMERGENCY MEDICINE

## 2017-01-28 PROCEDURE — A9270 NON-COVERED ITEM OR SERVICE: HCPCS | Performed by: EMERGENCY MEDICINE

## 2017-01-28 RX ORDER — LORAZEPAM 1 MG/1
1 TABLET ORAL ONCE
Status: COMPLETED | OUTPATIENT
Start: 2017-01-28 | End: 2017-01-28

## 2017-01-28 RX ADMIN — LORAZEPAM 1 MG: 1 TABLET ORAL at 19:00

## 2017-01-28 ASSESSMENT — PAIN SCALES - GENERAL
PAINLEVEL_OUTOF10: 0
PAINLEVEL_OUTOF10: 0

## 2017-01-28 NOTE — ED AVS SNAPSHOT
After Visit Summary                                                                                                                Gopal Ceja   MRN: [id number]    Department:  [facility], Emergency Dept   Date of Visit:  1/28/2017            [facility], Emergency Dept    [address] [address]    Phone:  [phone]      You were seen by     Brayden Hurst M.D.      Your Diagnosis Was     Chronic alcohol abuse     F10.10       These are the medications you received during your hospitalization from 01/28/2017 1654 to 01/28/2017 1948     Date/Time Order Dose Route Action    01/28/2017 1900 lorazepam (ATIVAN) tablet 1 mg 1 mg Oral Given      Follow-up Information     1. Follow up with Pcp Pt States None In 2 days.    Specialty:  Family Medicine        2. Follow up with [facility], Emergency Dept.    Specialty:  Emergency Medicine    Why:  As needed, If symptoms worsen    Contact information    [address] [place] [id number] [phone]      Medication Information     Review all of your home medications and newly ordered medications with your primary doctor and/or pharmacist as soon as possible. Follow medication instructions as directed by your doctor and/or pharmacist.     Please keep your complete medication list with you and share with your physician. Update the information when medications are discontinued, doses are changed, or new medications (including over-the-counter products) are added; and carry medication information at all times in the event of emergency situations.               Medication List      ASK your doctor about these medications        Instructions    citalopram 10 MG tablet   Commonly known as:  CELEXA    Take 30 mg by mouth every day.   Dose:  30 mg       diazepam 5 MG Tabs   Commonly known as:  VALIUM    Take 5 mg by mouth at bedtime as needed.   Dose:  5 mg       * erythromycin 5 MG/GM Oint    Place 1  Application in both eyes 2 times a day.   Dose:  1 Application       * erythromycin 5 MG/GM Oint    Apply to both eyes 3 times daily       gabapentin 300 MG Caps   Commonly known as:  NEURONTIN    Take 1 Cap by mouth 3 times a day.   Dose:  300 mg       ibuprofen 600 MG Tabs   Commonly known as:  MOTRIN    Take 1 Tab by mouth every 8 hours as needed.   Dose:  600 mg       loratadine 10 MG Tabs   Commonly known as:  CLARITIN    Take 1 Tab by mouth every day.   Dose:  10 mg       methylphenidate 5 MG Tabs   Commonly known as:  RITALIN    Take 10 mg by mouth 3 times a day.   Dose:  10 mg       * Notice:  This list has 2 medication(s) that are the same as other medications prescribed for you. Read the directions carefully, and ask your doctor or other care provider to review them with you.            Procedures and tests performed during your visit     PO CHALLENGE    POC BREATHALIZER        Discharge Instructions       Alcohol Use Disorder  Alcohol use disorder is a mental disorder. It is not a one-time incident of heavy drinking. Alcohol use disorder is the excessive and uncontrollable use of alcohol over time that leads to problems with functioning in one or more areas of daily living. People with this disorder risk harming themselves and others when they drink to excess. Alcohol use disorder also can cause other mental disorders, such as mood and anxiety disorders, and serious physical problems. People with alcohol use disorder often misuse other drugs.   Alcohol use disorder is common and widespread. Some people with this disorder drink alcohol to cope with or escape from negative life events. Others drink to relieve chronic pain or symptoms of mental illness. People with a family history of alcohol use disorder are at higher risk of losing control and using alcohol to excess.   Drinking too much alcohol can cause injury, accidents, and health problems. One drink can be too much when you  are:  · Working.  · Pregnant or breastfeeding.  · Taking medicines. Ask your doctor.  · Driving or planning to drive.  SYMPTOMS   Signs and symptoms of alcohol use disorder may include the following:   · Consumption of alcohol in larger amounts or over a longer period of time than intended.  · Multiple unsuccessful attempts to cut down or control alcohol use.    · A great deal of time spent obtaining alcohol, using alcohol, or recovering from the effects of alcohol (hangover).  · A strong desire or urge to use alcohol (cravings).    · Continued use of alcohol despite problems at work, school, or home because of alcohol use.    · Continued use of alcohol despite problems in relationships because of alcohol use.  · Continued use of alcohol in situations when it is physically hazardous, such as driving a car.  · Continued use of alcohol despite awareness of a physical or psychological problem that is likely related to alcohol use. Physical problems related to alcohol use can involve the brain, heart, liver, stomach, and intestines. Psychological problems related to alcohol use include intoxication, depression, anxiety, psychosis, delirium, and dementia.    · The need for increased amounts of alcohol to achieve the same desired effect, or a decreased effect from the consumption of the same amount of alcohol (tolerance).  · Withdrawal symptoms upon reducing or stopping alcohol use, or alcohol use to reduce or avoid withdrawal symptoms. Withdrawal symptoms include:  · Racing heart.  · Hand tremor.  · Difficulty sleeping.  · Nausea.  · Vomiting.  · Hallucinations.  · Restlessness.  · Seizures.  DIAGNOSIS  Alcohol use disorder is diagnosed through an assessment by your health care provider. Your health care provider may start by asking three or four questions to screen for excessive or problematic alcohol use. To confirm a diagnosis of alcohol use disorder, at least two symptoms must be present within a 12-month period. The  severity of alcohol use disorder depends on the number of symptoms:  · Mild--two or three.  · Moderate--four or five.  · Severe--six or more.  Your health care provider may perform a physical exam or use results from lab tests to see if you have physical problems resulting from alcohol use. Your health care provider may refer you to a mental health professional for evaluation.  TREATMENT   Some people with alcohol use disorder are able to reduce their alcohol use to low-risk levels. Some people with alcohol use disorder need to quit drinking alcohol. When necessary, mental health professionals with specialized training in substance use treatment can help. Your health care provider can help you decide how severe your alcohol use disorder is and what type of treatment you need. The following forms of treatment are available:   · Detoxification. Detoxification involves the use of prescription medicines to prevent alcohol withdrawal symptoms in the first week after quitting. This is important for people with a history of symptoms of withdrawal and for heavy drinkers who are likely to have withdrawal symptoms. Alcohol withdrawal can be dangerous and, in severe cases, cause death. Detoxification is usually provided in a hospital or in-patient substance use treatment facility.  · Counseling or talk therapy. Talk therapy is provided by substance use treatment counselors. It addresses the reasons people use alcohol and ways to keep them from drinking again. The goals of talk therapy are to help people with alcohol use disorder find healthy activities and ways to cope with life stress, to identify and avoid triggers for alcohol use, and to handle cravings, which can cause relapse.  · Medicines. Different medicines can help treat alcohol use disorder through the following actions:  ¨ Decrease alcohol cravings.  ¨ Decrease the positive reward response felt from alcohol use.  ¨ Produce an uncomfortable physical reaction when  "alcohol is used (aversion therapy).  · Support groups. Support groups are run by people who have quit drinking. They provide emotional support, advice, and guidance.  These forms of treatment are often combined. Some people with alcohol use disorder benefit from intensive combination treatment provided by specialized substance use treatment centers. Both inpatient and outpatient treatment programs are available.     This information is not intended to replace advice given to you by your health care provider. Make sure you discuss any questions you have with your health care provider.     Document Released: 01/25/2006 Document Revised: 01/08/2016 Document Reviewed: 03/27/2014  [employer] Interactive Patient Education ©2016 [employer]      Visual Disturbances  You have had a disturbance in your vision. This may be caused by various conditions, such as:  · Migraines. Migraine headaches are often preceded by a disturbance in vision. Blind spots or light flashes are followed by a headache. This type of visual disturbance is temporary. It does not damage the eye.  · Glaucoma. This is caused by increased pressure in the eye. Symptoms include haziness, blurred vision, or seeing rainbow colored circles when looking at bright lights. Partial or complete visual loss can occur. You may or may not experience eye pain. Visual loss may be gradual or sudden and is irreversible. Glaucoma is the leading cause of blindness.  · Retina problems. Vision will be reduced if the retina becomes detached or if there is a circulation problem as with diabetes, high blood pressure, or a mini-stroke. Symptoms include seeing \"floaters,\" flashes of light, or shadows, as if a curtain has fallen over your eye.  · Optic nerve problems. The main nerve in your eye can be damaged by redness, soreness, and swelling (inflammation), poor circulation, drugs, and toxins.  It is very important to have a complete exam done by a specialist to determine the " exact cause of your eye problem. The specialist may recommend medicines or surgery, depending on the cause of the problem. This can help prevent further loss of vision or reduce the risk of having a stroke. Contact the caregiver to whom you have been referred and arrange for follow-up care right away.  SEEK IMMEDIATE MEDICAL CARE IF:   · Your vision gets worse.  · You develop severe headaches.  · You have any weakness or numbness in the face, arms, or legs.  · You have any trouble speaking or walking.     This information is not intended to replace advice given to you by your health care provider. Make sure you discuss any questions you have with your health care provider.     Document Released: 01/25/2006 Document Revised: 03/11/2013 Document Reviewed: 05/27/2015  [employer] Interactive Patient Education ©2016 [employer]            Patient Information     Patient Information    Following emergency treatment: all patient requiring follow-up care must return either to a private physician or a clinic if your condition worsens before you are able to obtain further medical attention, please return to the emergency room.     Billing Information    At [facility], we work to make the billing process streamlined for our patients.  Our Representatives are here to answer any questions you may have regarding your hospital bill.  If you have insurance coverage and have supplied your insurance information to us, we will submit a claim to your insurer on your behalf.  Should you have any questions regarding your bill, we can be reached online or by phone as follows:  Online: You are able pay your bills online or live chat with our representatives about any billing questions you may have. We are here to help Monday - Friday from 8:00am to 7:30pm and 9:00am - 12:00pm on Saturdays.  Please visit [email or web address]  for more information.   Phone:  [phone] or [phone]    Please note  that your emergency physician, surgeon, pathologist, radiologist, anesthesiologist, and other specialists are not employed by [facility] and will therefore bill separately for their services.  Please contact them directly for any questions concerning their bills at the numbers below:     Emergency Physician Services:  [phone]  [facility]:  [phone]  [facility]:  [phone]  [facility]:  [phone]    1. Your final bill may vary from the amount quoted upon discharge if all procedures are not complete at that time, or if your doctor has additional procedures of which we are not aware. You will receive an additional bill if you return to the Emergency Department at [facility] for suture removal regardless of the facility of which the sutures were placed.     2. Please arrange for settlement of this account at the emergency registration.    3. All self-pay accounts are due in full at the time of treatment.  If you are unable to meet this obligation then payment is expected within 4-5 days.     4. If you have had radiology studies (CT, X-ray, Ultrasound, MRI), you have received a preliminary result during your emergency department visit. Please contact the radiology department [phone] to receive a copy of your final result. Please discuss the Final result with your primary physician or with the follow up physician provided.     Crisis Hotline:  [place] Crisis Hotline:  2-785-SQWGZLP or 1-182.114.7868  [place] Crisis Hotline:    1-873.397.1989 or 622-342-3648         ED Discharge Follow Up Questions    1. In order to provide you with very good care, we would like to follow up with a phone call in the next few days.  May we have your permission to contact you?     YES /  NO    2. What is the best phone number to call you? (       )_____-__________    3. What is the best time to call you?      Morning  /  Afternoon  /  Evening                   Patient  Signature:  ____________________________________________________________    Date:  ____________________________________________________________

## 2017-01-28 NOTE — ED AVS SNAPSHOT
1/28/2017          Gopal Ceja  [address] [facility] [address]    Dear Gopal:    [facility] wants to ensure your discharge home is safe and you or your loved ones have had all your questions answered regarding your care after you leave the hospital.    You may receive a telephone call within two days of your discharge.  This call is to make certain you understand your discharge instructions as well as ensure we provided you with the best care possible during your stay with us.     The call will only last approximately 3-5 minutes and will be done by a nurse.    Once again, we want to ensure your discharge home is safe and that you have a clear understanding of any next steps in your care.  If you have any questions or concerns, please do not hesitate to contact us, we are here for you.  Thank you for choosing [facility] for your healthcare needs.    Sincerely,    Neal Lam    [facility]
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
## 2017-01-29 NOTE — ED NOTES
Discharge order received. Discharged instruction given. Discharged paperwork signed and dated and attached to chart. Sent to lobby.

## 2017-01-29 NOTE — DISCHARGE INSTRUCTIONS
Alcohol Use Disorder  Alcohol use disorder is a mental disorder. It is not a one-time incident of heavy drinking. Alcohol use disorder is the excessive and uncontrollable use of alcohol over time that leads to problems with functioning in one or more areas of daily living. People with this disorder risk harming themselves and others when they drink to excess. Alcohol use disorder also can cause other mental disorders, such as mood and anxiety disorders, and serious physical problems. People with alcohol use disorder often misuse other drugs.   Alcohol use disorder is common and widespread. Some people with this disorder drink alcohol to cope with or escape from negative life events. Others drink to relieve chronic pain or symptoms of mental illness. People with a family history of alcohol use disorder are at higher risk of losing control and using alcohol to excess.   Drinking too much alcohol can cause injury, accidents, and health problems. One drink can be too much when you are:  · Working.  · Pregnant or breastfeeding.  · Taking medicines. Ask your doctor.  · Driving or planning to drive.  SYMPTOMS   Signs and symptoms of alcohol use disorder may include the following:   · Consumption of alcohol in larger amounts or over a longer period of time than intended.  · Multiple unsuccessful attempts to cut down or control alcohol use.    · A great deal of time spent obtaining alcohol, using alcohol, or recovering from the effects of alcohol (hangover).  · A strong desire or urge to use alcohol (cravings).    · Continued use of alcohol despite problems at work, school, or home because of alcohol use.    · Continued use of alcohol despite problems in relationships because of alcohol use.  · Continued use of alcohol in situations when it is physically hazardous, such as driving a car.  · Continued use of alcohol despite awareness of a physical or psychological problem that is likely related to alcohol use. Physical  problems related to alcohol use can involve the brain, heart, liver, stomach, and intestines. Psychological problems related to alcohol use include intoxication, depression, anxiety, psychosis, delirium, and dementia.    · The need for increased amounts of alcohol to achieve the same desired effect, or a decreased effect from the consumption of the same amount of alcohol (tolerance).  · Withdrawal symptoms upon reducing or stopping alcohol use, or alcohol use to reduce or avoid withdrawal symptoms. Withdrawal symptoms include:  · Racing heart.  · Hand tremor.  · Difficulty sleeping.  · Nausea.  · Vomiting.  · Hallucinations.  · Restlessness.  · Seizures.  DIAGNOSIS  Alcohol use disorder is diagnosed through an assessment by your health care provider. Your health care provider may start by asking three or four questions to screen for excessive or problematic alcohol use. To confirm a diagnosis of alcohol use disorder, at least two symptoms must be present within a 12-month period. The severity of alcohol use disorder depends on the number of symptoms:  · Mild--two or three.  · Moderate--four or five.  · Severe--six or more.  Your health care provider may perform a physical exam or use results from lab tests to see if you have physical problems resulting from alcohol use. Your health care provider may refer you to a mental health professional for evaluation.  TREATMENT   Some people with alcohol use disorder are able to reduce their alcohol use to low-risk levels. Some people with alcohol use disorder need to quit drinking alcohol. When necessary, mental health professionals with specialized training in substance use treatment can help. Your health care provider can help you decide how severe your alcohol use disorder is and what type of treatment you need. The following forms of treatment are available:   · Detoxification. Detoxification involves the use of prescription medicines to prevent alcohol withdrawal  symptoms in the first week after quitting. This is important for people with a history of symptoms of withdrawal and for heavy drinkers who are likely to have withdrawal symptoms. Alcohol withdrawal can be dangerous and, in severe cases, cause death. Detoxification is usually provided in a hospital or in-patient substance use treatment facility.  · Counseling or talk therapy. Talk therapy is provided by substance use treatment counselors. It addresses the reasons people use alcohol and ways to keep them from drinking again. The goals of talk therapy are to help people with alcohol use disorder find healthy activities and ways to cope with life stress, to identify and avoid triggers for alcohol use, and to handle cravings, which can cause relapse.  · Medicines. Different medicines can help treat alcohol use disorder through the following actions:  ¨ Decrease alcohol cravings.  ¨ Decrease the positive reward response felt from alcohol use.  ¨ Produce an uncomfortable physical reaction when alcohol is used (aversion therapy).  · Support groups. Support groups are run by people who have quit drinking. They provide emotional support, advice, and guidance.  These forms of treatment are often combined. Some people with alcohol use disorder benefit from intensive combination treatment provided by specialized substance use treatment centers. Both inpatient and outpatient treatment programs are available.     This information is not intended to replace advice given to you by your health care provider. Make sure you discuss any questions you have with your health care provider.     Document Released: 01/25/2006 Document Revised: 01/08/2016 Document Reviewed: 03/27/2014  [employer] Interactive Patient Education ©2016 [employer]      Visual Disturbances  You have had a disturbance in your vision. This may be caused by various conditions, such as:  · Migraines. Migraine headaches are often preceded by a disturbance in vision.  "Blind spots or light flashes are followed by a headache. This type of visual disturbance is temporary. It does not damage the eye.  · Glaucoma. This is caused by increased pressure in the eye. Symptoms include haziness, blurred vision, or seeing rainbow colored circles when looking at bright lights. Partial or complete visual loss can occur. You may or may not experience eye pain. Visual loss may be gradual or sudden and is irreversible. Glaucoma is the leading cause of blindness.  · Retina problems. Vision will be reduced if the retina becomes detached or if there is a circulation problem as with diabetes, high blood pressure, or a mini-stroke. Symptoms include seeing \"floaters,\" flashes of light, or shadows, as if a curtain has fallen over your eye.  · Optic nerve problems. The main nerve in your eye can be damaged by redness, soreness, and swelling (inflammation), poor circulation, drugs, and toxins.  It is very important to have a complete exam done by a specialist to determine the exact cause of your eye problem. The specialist may recommend medicines or surgery, depending on the cause of the problem. This can help prevent further loss of vision or reduce the risk of having a stroke. Contact the caregiver to whom you have been referred and arrange for follow-up care right away.  SEEK IMMEDIATE MEDICAL CARE IF:   · Your vision gets worse.  · You develop severe headaches.  · You have any weakness or numbness in the face, arms, or legs.  · You have any trouble speaking or walking.     This information is not intended to replace advice given to you by your health care provider. Make sure you discuss any questions you have with your health care provider.     Document Released: 01/25/2006 Document Revised: 03/11/2013 Document Reviewed: 05/27/2015  [employer] Interactive Patient Education ©2016 [employer]    "

## 2017-01-29 NOTE — ED NOTES
Pt. Woke up about 1-2 hours ago and is seeing white spots in right eye. Pt. Had large amount of ETOH today. Daily drinker
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
## 2017-02-06 ENCOUNTER — HOSPITAL ENCOUNTER (EMERGENCY)
Facility: MEDICAL CENTER | Age: 32
End: 2017-02-07
Attending: EMERGENCY MEDICINE
Payer: MEDICARE

## 2017-02-06 DIAGNOSIS — F10.29 ALCOHOL DEPENDENCE WITH UNSPECIFIED ALCOHOL-INDUCED DISORDER (HCC): ICD-10-CM

## 2017-02-06 PROCEDURE — 99285 EMERGENCY DEPT VISIT HI MDM: CPT

## 2017-02-06 ASSESSMENT — LIFESTYLE VARIABLES
AVERAGE NUMBER OF DAYS PER WEEK YOU HAVE A DRINK CONTAINING ALCOHOL: 5
TOTAL SCORE: 1
DO YOU DRINK ALCOHOL: YES
TOTAL SCORE: 1
CONSUMPTION TOTAL: POSITIVE
HAVE PEOPLE ANNOYED YOU BY CRITICIZING YOUR DRINKING: NO
TOTAL SCORE: 1
EVER FELT BAD OR GUILTY ABOUT YOUR DRINKING: NO
EVER HAD A DRINK FIRST THING IN THE MORNING TO STEADY YOUR NERVES TO GET RID OF A HANGOVER: NO
HAVE YOU EVER FELT YOU SHOULD CUT DOWN ON YOUR DRINKING: YES
ON A TYPICAL DAY WHEN YOU DRINK ALCOHOL HOW MANY DRINKS DO YOU HAVE: 6
HOW MANY TIMES IN THE PAST YEAR HAVE YOU HAD 5 OR MORE DRINKS IN A DAY: 10

## 2017-02-06 ASSESSMENT — PAIN SCALES - GENERAL
PAINLEVEL_OUTOF10: 0
PAINLEVEL_OUTOF10: 0

## 2017-02-06 NOTE — ED AVS SNAPSHOT
SolarGreen Access Code: [email or web address]  Expires: 2/25/2017 12:35 PM    SolarGreen  A secure, online tool to manage your health information     [employer]’s SolarGreen® is a secure, online tool that connects you to your personalized health information from the privacy of your home -- day or night - making it very easy for you to manage your healthcare. Once the activation process is completed, you can even access your medical information using the SolarGreen erwin, which is available for free in the [employer] Erwin store or Google Play store.     SolarGreen provides the following levels of access (as shown below):   My Chart Features   [facility] Primary Care Doctor [facility]  Specialists [facility]  Urgent  Care Non-[facility]  Primary Care  Doctor   Email your healthcare team securely and privately 24/7 X X X X   Manage appointments: schedule your next appointment; view details of past/upcoming appointments X      Request prescription refills. X      View recent personal medical records, including lab and immunizations X X X X   View health record, including health history, allergies, medications X X X X   Read reports about your outpatient visits, procedures, consult and ER notes X X X X   See your discharge summary, which is a recap of your hospital and/or ER visit that includes your diagnosis, lab results, and care plan. X X       How to register for SolarGreen:  1. Go to  [email or web address].  2. Click on the Sign Up Now box, which takes you to the New Member Sign Up page. You will need to provide the following information:  a. Enter your SolarGreen Access Code exactly as it appears at the top of this page. (You will not need to use this code after you’ve completed the sign-up process. If you do not sign up before the expiration date, you must request a new code.)   b. Enter your date of birth.   c. Enter your home email address.   d. Click Submit, and follow the next screen’s instructions.  3. Create a SolarGreen ID. This will be your [employer]  login ID and cannot be changed, so think of one that is secure and easy to remember.  4. Create a [employer] password. You can change your password at any time.  5. Enter your Password Reset Question and Answer. This can be used at a later time if you forget your password.   6. Enter your e-mail address. This allows you to receive e-mail notifications when new information is available in [employer].  7. Click Sign Up. You can now view your health information.    For assistance activating your [employer] account, call [phone]

## 2017-02-06 NOTE — ED AVS SNAPSHOT
Home Care Instructions                                                                                                                Gopal Ceja   MRN: [id number]    Department:  [facility], Emergency Dept   Date of Visit:  2/6/2017            [facility], Emergency Dept    [address] [place] [id number]    Phone:  [phone]      You were seen by     1. Adiel Matthews M.D.    2. Naeem Santana M.D.    3. Tolu Crandall M.D.      Your Diagnosis Was     Alcohol dependence with unspecified alcohol-induced disorder (CMS-HCC)     F10.29       These are the medications you received during your hospitalization from 02/06/2017 2309 to 02/07/2017 1437     Date/Time Order Dose Route Action    02/07/2017 1311 diazepam (VALIUM) tablet 5 mg 5 mg Oral Given    02/07/2017 1445 chlordiazepoxide (LIBRIUM) capsule 75 mg 75 mg Oral Given      Follow-up Information     1. Follow up with [facility], Emergency Dept.    Specialty:  Emergency Medicine    Why:  As needed    Contact information    [address] [place] [id number] [phone]      Medication Information     Review all of your home medications and newly ordered medications with your primary doctor and/or pharmacist as soon as possible. Follow medication instructions as directed by your doctor and/or pharmacist.     Please keep your complete medication list with you and share with your physician. Update the information when medications are discontinued, doses are changed, or new medications (including over-the-counter products) are added; and carry medication information at all times in the event of emergency situations.               Medication List      Notice     You have not been prescribed any medications.            Procedures and tests performed during your visit     Procedure/Test Number of Times Performed    Life-Skills consult (when BA is below .08) 1    POC BREATHALIZER 5    URINE DRUG SCREEN (TRIAGE) 1        Discharge Instructions       Alcohol Use Disorder  Alcohol use disorder is a mental disorder. It is not a one-time incident of heavy drinking. Alcohol use disorder is the excessive and uncontrollable use of alcohol over time that leads to problems with functioning in one or more areas of daily living. People with this disorder risk harming themselves and others when they drink to excess. Alcohol use disorder also can cause other mental disorders, such as mood and anxiety disorders, and serious physical problems. People with alcohol use disorder often misuse other drugs.   Alcohol use disorder is common and widespread. Some people with this disorder drink alcohol to cope with or escape from negative life events. Others drink to relieve chronic pain or symptoms of mental illness. People with a family history of alcohol use disorder are at higher risk of losing control and using alcohol to excess.   Drinking too much alcohol can cause injury, accidents, and health problems. One drink can be too much when you are:  · Working.  · Pregnant or breastfeeding.  · Taking medicines. Ask your doctor.  · Driving or planning to drive.  SYMPTOMS   Signs and symptoms of alcohol use disorder may include the following:   · Consumption of alcohol in larger amounts or over a longer period of time than intended.  · Multiple unsuccessful attempts to cut down or control alcohol use.    · A great deal of time spent obtaining alcohol, using alcohol, or recovering from the effects of alcohol (hangover).  · A strong desire or urge to use alcohol (cravings).    · Continued use of alcohol despite problems at work, school, or home because of alcohol use.    · Continued use of alcohol despite problems in relationships because of alcohol use.  · Continued use of alcohol in situations when it is physically hazardous, such as driving a car.  · Continued use of alcohol despite awareness of a physical or  psychological problem that is likely related to alcohol use. Physical problems related to alcohol use can involve the brain, heart, liver, stomach, and intestines. Psychological problems related to alcohol use include intoxication, depression, anxiety, psychosis, delirium, and dementia.    · The need for increased amounts of alcohol to achieve the same desired effect, or a decreased effect from the consumption of the same amount of alcohol (tolerance).  · Withdrawal symptoms upon reducing or stopping alcohol use, or alcohol use to reduce or avoid withdrawal symptoms. Withdrawal symptoms include:  ¨ Racing heart.  ¨ Hand tremor.  ¨ Difficulty sleeping.  ¨ Nausea.  ¨ Vomiting.  ¨ Hallucinations.  ¨ Restlessness.  ¨ Seizures.  DIAGNOSIS  Alcohol use disorder is diagnosed through an assessment by your health care provider. Your health care provider may start by asking three or four questions to screen for excessive or problematic alcohol use. To confirm a diagnosis of alcohol use disorder, at least two symptoms must be present within a 12-month period. The severity of alcohol use disorder depends on the number of symptoms:  · Mild--two or three.  · Moderate--four or five.  · Severe--six or more.  Your health care provider may perform a physical exam or use results from lab tests to see if you have physical problems resulting from alcohol use. Your health care provider may refer you to a mental health professional for evaluation.  TREATMENT   Some people with alcohol use disorder are able to reduce their alcohol use to low-risk levels. Some people with alcohol use disorder need to quit drinking alcohol. When necessary, mental health professionals with specialized training in substance use treatment can help. Your health care provider can help you decide how severe your alcohol use disorder is and what type of treatment you need. The following forms of treatment are available:   · Detoxification. Detoxification involves  the use of prescription medicines to prevent alcohol withdrawal symptoms in the first week after quitting. This is important for people with a history of symptoms of withdrawal and for heavy drinkers who are likely to have withdrawal symptoms. Alcohol withdrawal can be dangerous and, in severe cases, cause death. Detoxification is usually provided in a hospital or in-patient substance use treatment facility.  · Counseling or talk therapy. Talk therapy is provided by substance use treatment counselors. It addresses the reasons people use alcohol and ways to keep them from drinking again. The goals of talk therapy are to help people with alcohol use disorder find healthy activities and ways to cope with life stress, to identify and avoid triggers for alcohol use, and to handle cravings, which can cause relapse.  · Medicines. Different medicines can help treat alcohol use disorder through the following actions:  ¨ Decrease alcohol cravings.  ¨ Decrease the positive reward response felt from alcohol use.  ¨ Produce an uncomfortable physical reaction when alcohol is used (aversion therapy).  · Support groups. Support groups are run by people who have quit drinking. They provide emotional support, advice, and guidance.  These forms of treatment are often combined. Some people with alcohol use disorder benefit from intensive combination treatment provided by specialized substance use treatment centers. Both inpatient and outpatient treatment programs are available.     This information is not intended to replace advice given to you by your health care provider. Make sure you discuss any questions you have with your health care provider.     Document Released: 01/25/2006 Document Revised: 01/08/2016 Document Reviewed: 03/27/2014  Elsevier Interactive Patient Education ©2016 [employer]            Patient Information     Patient Information    Following emergency treatment: all patient requiring follow-up care must return  either to a private physician or a clinic if your condition worsens before you are able to obtain further medical attention, please return to the emergency room.     Billing Information    At [facility], we work to make the billing process streamlined for our patients.  Our Representatives are here to answer any questions you may have regarding your hospital bill.  If you have insurance coverage and have supplied your insurance information to us, we will submit a claim to your insurer on your behalf.  Should you have any questions regarding your bill, we can be reached online or by phone as follows:  Online: You are able pay your bills online or live chat with our representatives about any billing questions you may have. We are here to help Monday - Friday from 8:00am to 7:30pm and 9:00am - 12:00pm on Saturdays.  Please visit [email or web address]  for more information.   Phone:  [phone] or [phone]    Please note that your emergency physician, surgeon, pathologist, radiologist, anesthesiologist, and other specialists are not employed by [facility] and will therefore bill separately for their services.  Please contact them directly for any questions concerning their bills at the numbers below:     Emergency Physician Services:  [phone]  [facility]:  [phone]  [facility]:  [phone]  [facility]:  [phone]    1. Your final bill may vary from the amount quoted upon discharge if all procedures are not complete at that time, or if your doctor has additional procedures of which we are not aware. You will receive an additional bill if you return to the Emergency Department at [facility] for suture removal regardless of the facility of which the sutures were placed.     2. Please arrange for settlement of this account at the emergency registration.    3. All self-pay accounts are due in full at the time of  treatment.  If you are unable to meet this obligation then payment is expected within 4-5 days.     4. If you have had radiology studies (CT, X-ray, Ultrasound, MRI), you have received a preliminary result during your emergency department visit. Please contact the radiology department [phone] to receive a copy of your final result. Please discuss the Final result with your primary physician or with the follow up physician provided.     Crisis Hotline:  Lupus Crisis Hotline:  6-995-USFNPYY or 1-339.489.4357  [place] Crisis Hotline:    1-403.599.9981 or 792-826-7642         ED Discharge Follow Up Questions    1. In order to provide you with very good care, we would like to follow up with a phone call in the next few days.  May we have your permission to contact you?     YES /  NO    2. What is the best phone number to call you? (       )_____-__________    3. What is the best time to call you?      Morning  /  Afternoon  /  Evening                   Patient Signature:  ____________________________________________________________    Date:  ____________________________________________________________

## 2017-02-06 NOTE — ED AVS SNAPSHOT
2/7/2017          Gopal Ceja  [address] [place] [address]    Dear Gopal:    [facility] wants to ensure your discharge home is safe and you or your loved ones have had all your questions answered regarding your care after you leave the hospital.    You may receive a telephone call within two days of your discharge.  This call is to make certain you understand your discharge instructions as well as ensure we provided you with the best care possible during your stay with us.     The call will only last approximately 3-5 minutes and will be done by a nurse.    Once again, we want to ensure your discharge home is safe and that you have a clear understanding of any next steps in your care.  If you have any questions or concerns, please do not hesitate to contact us, we are here for you.  Thank you for choosing [facility] for your healthcare needs.    Sincerely,    Neal Lam    [facility]

## 2017-02-07 VITALS
BODY MASS INDEX: 22.73 KG/M2 | SYSTOLIC BLOOD PRESSURE: 135 MMHG | DIASTOLIC BLOOD PRESSURE: 82 MMHG | RESPIRATION RATE: 18 BRPM | OXYGEN SATURATION: 95 % | HEIGHT: 68 IN | TEMPERATURE: 98.3 F | WEIGHT: 150 LBS | HEART RATE: 95 BPM

## 2017-02-07 LAB
AMPHET UR QL SCN: NEGATIVE
BARBITURATES UR QL SCN: NEGATIVE
BENZODIAZ UR QL SCN: NEGATIVE
BZE UR QL SCN: NEGATIVE
CANNABINOIDS UR QL SCN: NEGATIVE
MDMA UR QL SCN: NEGATIVE
METHADONE UR QL SCN: NEGATIVE
OPIATES UR QL SCN: NEGATIVE
OXYCODONE UR QL SCN: NEGATIVE
PCP UR QL SCN: NEGATIVE
POC BREATHALIZER: 0.04 PERCENT (ref 0–0.01)
POC BREATHALIZER: 0.15 PERCENT (ref 0–0.01)
POC BREATHALIZER: 0.22 PERCENT (ref 0–0.01)
POC BREATHALIZER: 0.34 PERCENT (ref 0–0.01)
POC BREATHALIZER: 0.36 PERCENT (ref 0–0.01)
PROPOXYPH UR QL SCN: NEGATIVE

## 2017-02-07 PROCEDURE — A9270 NON-COVERED ITEM OR SERVICE: HCPCS | Performed by: EMERGENCY MEDICINE

## 2017-02-07 PROCEDURE — 302970 POC BREATHALIZER: Performed by: EMERGENCY MEDICINE

## 2017-02-07 PROCEDURE — 700102 HCHG RX REV CODE 250 W/ 637 OVERRIDE(OP): Performed by: EMERGENCY MEDICINE

## 2017-02-07 PROCEDURE — 90791 PSYCH DIAGNOSTIC EVALUATION: CPT

## 2017-02-07 PROCEDURE — 80307 DRUG TEST PRSMV CHEM ANLYZR: CPT

## 2017-02-07 RX ORDER — CHLORDIAZEPOXIDE HYDROCHLORIDE 25 MG/1
75 CAPSULE, GELATIN COATED ORAL ONCE
Status: COMPLETED | OUTPATIENT
Start: 2017-02-07 | End: 2017-02-07

## 2017-02-07 RX ORDER — DIAZEPAM 5 MG/1
5 TABLET ORAL ONCE
Status: COMPLETED | OUTPATIENT
Start: 2017-02-07 | End: 2017-02-07

## 2017-02-07 RX ADMIN — CHLORDIAZEPOXIDE HYDROCHLORIDE 75 MG: 25 CAPSULE ORAL at 14:45

## 2017-02-07 RX ADMIN — DIAZEPAM 5 MG: 5 TABLET ORAL at 13:11

## 2017-02-07 ASSESSMENT — PAIN SCALES - GENERAL: PAINLEVEL_OUTOF10: 0

## 2017-02-07 NOTE — ED PROVIDER NOTES
ED Provider Note    Addendum note: Patient has sobered and has been reevaluated, he is not suicidal. Being appropriate for discharge by the life skills staff. He is concerned about alcohol withdrawal, I will give him a dose of Librium here in the emergency department which will buy him time to get home and begin drinking again.

## 2017-02-07 NOTE — ED NOTES
Report received from Alivia SPENCE, assumed care of patient.  White board updated, POC discussed. Bed in lowest position.

## 2017-02-07 NOTE — DISCHARGE INSTRUCTIONS
Alcohol Use Disorder  Alcohol use disorder is a mental disorder. It is not a one-time incident of heavy drinking. Alcohol use disorder is the excessive and uncontrollable use of alcohol over time that leads to problems with functioning in one or more areas of daily living. People with this disorder risk harming themselves and others when they drink to excess. Alcohol use disorder also can cause other mental disorders, such as mood and anxiety disorders, and serious physical problems. People with alcohol use disorder often misuse other drugs.   Alcohol use disorder is common and widespread. Some people with this disorder drink alcohol to cope with or escape from negative life events. Others drink to relieve chronic pain or symptoms of mental illness. People with a family history of alcohol use disorder are at higher risk of losing control and using alcohol to excess.   Drinking too much alcohol can cause injury, accidents, and health problems. One drink can be too much when you are:  · Working.  · Pregnant or breastfeeding.  · Taking medicines. Ask your doctor.  · Driving or planning to drive.  SYMPTOMS   Signs and symptoms of alcohol use disorder may include the following:   · Consumption of alcohol in larger amounts or over a longer period of time than intended.  · Multiple unsuccessful attempts to cut down or control alcohol use.    · A great deal of time spent obtaining alcohol, using alcohol, or recovering from the effects of alcohol (hangover).  · A strong desire or urge to use alcohol (cravings).    · Continued use of alcohol despite problems at work, school, or home because of alcohol use.    · Continued use of alcohol despite problems in relationships because of alcohol use.  · Continued use of alcohol in situations when it is physically hazardous, such as driving a car.  · Continued use of alcohol despite awareness of a physical or psychological problem that is likely related to alcohol use. Physical  problems related to alcohol use can involve the brain, heart, liver, stomach, and intestines. Psychological problems related to alcohol use include intoxication, depression, anxiety, psychosis, delirium, and dementia.    · The need for increased amounts of alcohol to achieve the same desired effect, or a decreased effect from the consumption of the same amount of alcohol (tolerance).  · Withdrawal symptoms upon reducing or stopping alcohol use, or alcohol use to reduce or avoid withdrawal symptoms. Withdrawal symptoms include:  ¨ Racing heart.  ¨ Hand tremor.  ¨ Difficulty sleeping.  ¨ Nausea.  ¨ Vomiting.  ¨ Hallucinations.  ¨ Restlessness.  ¨ Seizures.  DIAGNOSIS  Alcohol use disorder is diagnosed through an assessment by your health care provider. Your health care provider may start by asking three or four questions to screen for excessive or problematic alcohol use. To confirm a diagnosis of alcohol use disorder, at least two symptoms must be present within a 12-month period. The severity of alcohol use disorder depends on the number of symptoms:  · Mild--two or three.  · Moderate--four or five.  · Severe--six or more.  Your health care provider may perform a physical exam or use results from lab tests to see if you have physical problems resulting from alcohol use. Your health care provider may refer you to a mental health professional for evaluation.  TREATMENT   Some people with alcohol use disorder are able to reduce their alcohol use to low-risk levels. Some people with alcohol use disorder need to quit drinking alcohol. When necessary, mental health professionals with specialized training in substance use treatment can help. Your health care provider can help you decide how severe your alcohol use disorder is and what type of treatment you need. The following forms of treatment are available:   · Detoxification. Detoxification involves the use of prescription medicines to prevent alcohol withdrawal  symptoms in the first week after quitting. This is important for people with a history of symptoms of withdrawal and for heavy drinkers who are likely to have withdrawal symptoms. Alcohol withdrawal can be dangerous and, in severe cases, cause death. Detoxification is usually provided in a hospital or in-patient substance use treatment facility.  · Counseling or talk therapy. Talk therapy is provided by substance use treatment counselors. It addresses the reasons people use alcohol and ways to keep them from drinking again. The goals of talk therapy are to help people with alcohol use disorder find healthy activities and ways to cope with life stress, to identify and avoid triggers for alcohol use, and to handle cravings, which can cause relapse.  · Medicines. Different medicines can help treat alcohol use disorder through the following actions:  ¨ Decrease alcohol cravings.  ¨ Decrease the positive reward response felt from alcohol use.  ¨ Produce an uncomfortable physical reaction when alcohol is used (aversion therapy).  · Support groups. Support groups are run by people who have quit drinking. They provide emotional support, advice, and guidance.  These forms of treatment are often combined. Some people with alcohol use disorder benefit from intensive combination treatment provided by specialized substance use treatment centers. Both inpatient and outpatient treatment programs are available.     This information is not intended to replace advice given to you by your health care provider. Make sure you discuss any questions you have with your health care provider.     Document Released: 01/25/2006 Document Revised: 01/08/2016 Document Reviewed: 03/27/2014  [employer] Interactive Patient Education ©2016 [employer]

## 2017-02-07 NOTE — ED NOTES
"Pt states \" I feel like I've been hit by a train, I feel like crap.\" Pt requests medication \" to calm down.\"  BUE tremors noted. ERP notified.   "

## 2017-02-07 NOTE — ED PROVIDER NOTES
ED Provider Note    Scribed for Adiel Matthews M.D. by Jean Leigh. 2/6/2017, 11:37 PM.    Primary care provider: Pt States None  Means of arrival: Ambulance  History obtained from: Patient  History limited by: None    CHIEF COMPLAINT  Chief Complaint   Patient presents with   • Alcohol Intoxication     hx ETOH abuse, admits to drinking 6 beers today   • Suicidal Ideation     hx SI, plans to jump off a building        HPI  Gopal Ceja is a 31 y.o. Male brought in by ambulance to the Emergency Department with alcohol intoxication and suicidal ideation. Patient reports drinking 6 beers (high gravity multiple liquor)  today and has a history of alcohol abuse. He states he grandfather and daughter passed away recently and he reported to EMS he wants to jump off a building. He denies homicidal ideation or hallucinations. Patient denies chest pain, headache, weakness, or other symptoms.    REVIEW OF SYSTEMS  Pertinent positives include alcohol intoxication, suicidal ideation. Pertinent negatives include no chest pain, headache, weakness, homicidal ideation, hallucinations. Otherwise ten systems reviewed and otherwise negative.     PAST MEDICAL HISTORY   has a past medical history of Ectrodactyly-ectodermal dysplasia-clefting syndrome; Acute anxiety; ETOH abuse; Psychiatric disorder; Bipolar affective (CMS-HCC); ADHD (attention deficit hyperactivity disorder); ADHD (attention deficit hyperactivity disorder); and Depression.    SURGICAL HISTORY   has past surgical history that includes other orthopedic surgery.    SOCIAL HISTORY  Social History   Substance Use Topics   • Smoking status: Former Smoker -- 0.25 packs/day     Types: Cigarettes   • Smokeless tobacco: Never Used   • Alcohol Use: Yes      Comment: 8 earthquakes 10% 24 oz malt liquor/day      History   Drug Use No       FAMILY HISTORY  Non-Contributory    CURRENT MEDICATIONS  Home Medications     Reviewed by Alivia Hua R.N. (Registered Nurse) on  "02/06/17 at 2323  Med List Status: Complete    Medication Last Dose Status    citalopram (CELEXA) 10 MG tablet 1/6/2017 Active    diazepam (VALIUM) 5 MG Tab 1/6/2017 Active    methylphenidate (RITALIN) 5 MG Tab 1/6/2017 Active                ALLERGIES  No Known Allergies    PHYSICAL EXAM  VITAL SIGNS: /84 mmHg  Pulse 122  Temp(Src) 36.8 °C (98.2 °F)  Resp 22  Ht 1.727 m (5' 8\")  Wt 68.04 kg (150 lb)  BMI 22.81 kg/m2  Constitutional: Alert and oriented x 3, minimal distress  HEENT: Atraumatic normocephalic, pupils are equal round reactive to light extraocular movements are intact. Chronic conjunctivitis. The nares is clear, external ears are normal, mouth shows moist mucous membranes  Neck: Supple, no JVD no tracheal deviation  Cardiovascular: Regular rate and rhythm no murmur rub or gallop 2+ pulses peripherally x4  Thorax & Lungs: No respiratory distress, no wheezes rales or rhonchi, No chest tenderness.   GI: Soft nontender nondistended positive bowel sounds, no peritoneal signs  Skin: Warm dry no acute rash or lesion  Musculoskeletal: Ectrodactyly. Moving all extremities  Neurologic: Cranial nerves III through XII are grossly intact, no sensory deficit, no cerebellar dysfunction   Psychiatric: Intoxicated      DIAGNOSTIC STUDIES / PROCEDURES  LABS  Results for orders placed or performed during the hospital encounter of 02/06/17   URINE DRUG SCREEN (TRIAGE)   Result Value Ref Range    Amphetamines Urine Negative Negative    Barbiturates Negative Negative    Benzodiazepines Negative Negative    Cocaine Metabolite Negative Negative    Methadone Negative Negative    Ecstasy Negative Negative    Opiates Negative Negative    Oxycodone Negative Negative    Phencyclidine -Pcp Negative Negative    Propoxyphene Negative Negative    Cannabinoid Metab Negative Negative   POC BREATHALIZER   Result Value Ref Range    POC Breathalizer 0.36 (A) 0.00 - 0.01 Percent   POC BREATHALIZER   Result Value Ref Range    POC " "Breathalizer 0.34 (A) 0.00 - 0.01 Percent      All labs reviewed by me.    COURSE & MEDICAL DECISION MAKING  Pertinent Labs & Imaging studies reviewed. (See chart for details)    11:37 PM - Patient seen and examined at bedside. I explained to the patient I will have Life Skills consult. Ordered POC breathalizer, urine drug screen to evaluate his symptoms.     Medical Decision Making: Patient presents with suicidal ideation severely intoxicated. Patient has ongoing history of the same is very well-known to the emergency department here. Patient will be observed until more clinically sober we will re evaluate his opinion of suicidal ideation at that point. This is been discussed with oncoming physician johnie elliott his care is transferred at shift change. Oncoming physician will reevaluate and disposition appropriately    /83 mmHg  Pulse 120  Temp(Src) 36.4 °C (97.5 °F) (Temporal)  Resp 18  Ht 1.727 m (5' 8\")  Wt 68.04 kg (150 lb)  BMI 22.81 kg/m2  SpO2 94%      FINAL IMPRESSION  1. Alcohol dependence with unspecified alcohol-induced disorder (CMS-HCC)     2. Depression  3. Suicidal ideation      This dictation has been created using voice recognition software and/or scribes. The accuracy of the dictation is limited by the abilities of the software and the expertise of the scribes. I expect there may be some errors of grammar and possibly content. I made every attempt to manually correct the errors within my dictation. However, errors related to voice recognition software and/or scribes may still exist and should be interpreted within the appropriate context.     I, Jean Leigh (Scribe), am scribing for, and in the presence of, Adiel Matthews M.D..    Electronically signed by: Jean Leigh (Scribe), 2/6/2017    IAdiel M.D. personally performed the services described in this documentation, as scribed by Jean Leigh in my presence, and it is both accurate and " complete.    The note accurately reflects work and decisions made by me.  Adiel Matthews  2/7/2017  6:59 AM
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
## 2017-02-07 NOTE — ED NOTES
"Med rec updated and complete  Allergies reviewed  Pt states \"No prescription medications, OTC's, or vitamins\".  Pt states \"No antibiotics in the last 30 days\".  Pt states \"I have not taken any of my medication for over a month or longer.  Pt took:   CITALOPRAM 10MG QDAY  DIAZEPAM 5MG HS PRN  RITALIN 5MG 3 times a day  "

## 2017-02-07 NOTE — ED NOTES
Pt sleeping on gurney, respirations even and unlabored, NAD noted.     Sitter has unobstructed view of patient at all times.

## 2017-02-07 NOTE — ED NOTES
[place]:  Chief Complaint   Patient presents with   • Alcohol Intoxication     hx ETOH abuse, admits to drinking 6 beers today   • Suicidal Ideation     hx SI, plans to jump off a building      Pt is tearful and anxious.      Room stripped of all dangerous items. Pt in hospital gown and personal items removed, placed in blue bag x1, labeled and in ER holding area. Legal hold placed in chart. No self harm discussed with pt, states will not harm self here.     Sitter has unobstructed view of patient at all times.      Pt changed into gown, chart up for ERP.

## 2017-02-10 ENCOUNTER — HOSPITAL ENCOUNTER (EMERGENCY)
Facility: MEDICAL CENTER | Age: 32
End: 2017-02-10
Attending: EMERGENCY MEDICINE
Payer: MEDICARE

## 2017-02-10 VITALS
TEMPERATURE: 96.8 F | WEIGHT: 150 LBS | HEIGHT: 68 IN | DIASTOLIC BLOOD PRESSURE: 95 MMHG | BODY MASS INDEX: 22.73 KG/M2 | OXYGEN SATURATION: 97 % | SYSTOLIC BLOOD PRESSURE: 134 MMHG | RESPIRATION RATE: 14 BRPM | HEART RATE: 93 BPM

## 2017-02-10 DIAGNOSIS — F10.920 ACUTE ALCOHOL INTOXICATION, UNCOMPLICATED (HCC): ICD-10-CM

## 2017-02-10 PROCEDURE — 99284 EMERGENCY DEPT VISIT MOD MDM: CPT

## 2017-02-10 NOTE — DISCHARGE INSTRUCTIONS
"Alcohol Intoxication  Alcohol intoxication occurs when the amount of alcohol that a person has consumed impairs his or her ability to mentally and physically function. Alcohol directly impairs the normal chemical activity of the brain. Drinking large amounts of alcohol can lead to changes in mental function and behavior, and it can cause many physical effects that can be harmful.   Alcohol intoxication can range in severity from mild to very severe. Various factors can affect the level of intoxication that occurs, such as the person's age, gender, weight, frequency of alcohol consumption, and the presence of other medical conditions (such as diabetes, seizures, or heart conditions). Dangerous levels of alcohol intoxication may occur when people drink large amounts of alcohol in a short period (binge drinking). Alcohol can also be especially dangerous when combined with certain prescription medicines or \"recreational\" drugs.  SIGNS AND SYMPTOMS  Some common signs and symptoms of mild alcohol intoxication include:  · Loss of coordination.  · Changes in mood and behavior.  · Impaired judgment.  · Slurred speech.  As alcohol intoxication progresses to more severe levels, other signs and symptoms will appear. These may include:  · Vomiting.  · Confusion and impaired memory.  · Slowed breathing.  · Seizures.  · Loss of consciousness.  DIAGNOSIS   Your health care provider will take a medical history and perform a physical exam. You will be asked about the amount and type of alcohol you have consumed. Blood tests will be done to measure the concentration of alcohol in your blood. In many places, your blood alcohol level must be lower than 80 mg/dL (0.08%) to legally drive. However, many dangerous effects of alcohol can occur at much lower levels.   TREATMENT   People with alcohol intoxication often do not require treatment. Most of the effects of alcohol intoxication are temporary, and they go away as the alcohol naturally " leaves the body. Your health care provider will monitor your condition until you are stable enough to go home. Fluids are sometimes given through an IV access tube to help prevent dehydration.   HOME CARE INSTRUCTIONS  · Do not drive after drinking alcohol.  · Stay hydrated. Drink enough water and fluids to keep your urine clear or pale yellow. Avoid caffeine.    · Only take over-the-counter or prescription medicines as directed by your health care provider.    SEEK MEDICAL CARE IF:   · You have persistent vomiting.    · You do not feel better after a few days.  · You have frequent alcohol intoxication. Your health care provider can help determine if you should see a substance use treatment counselor.  SEEK IMMEDIATE MEDICAL CARE IF:   · You become shaky or tremble when you try to stop drinking.    · You shake uncontrollably (seizure).    · You throw up (vomit) blood. This may be bright red or may look like black coffee grounds.    · You have blood in your stool. This may be bright red or may appear as a black, tarry, bad smelling stool.    · You become lightheaded or faint.    MAKE SURE YOU:   · Understand these instructions.  · Will watch your condition.  · Will get help right away if you are not doing well or get worse.     This information is not intended to replace advice given to you by your health care provider. Make sure you discuss any questions you have with your health care provider.     Document Released: 09/27/2006 Document Revised: 08/20/2014 Document Reviewed: 05/23/2014  [employer] Patient Education ©2016 [employer]

## 2017-02-10 NOTE — ED NOTES
Report from JORDY Yang.  Patient resting in bed with eyes closed, visible rise and fall of chest, VSS, NAD
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
## 2017-02-10 NOTE — ED AVS SNAPSHOT
2/10/2017          Gopal Ceja  [address]  [place] [address]    Dear Gopal:    [facility] wants to ensure your discharge home is safe and you or your loved ones have had all your questions answered regarding your care after you leave the hospital.    You may receive a telephone call within two days of your discharge.  This call is to make certain you understand your discharge instructions as well as ensure we provided you with the best care possible during your stay with us.     The call will only last approximately 3-5 minutes and will be done by a nurse.    Once again, we want to ensure your discharge home is safe and that you have a clear understanding of any next steps in your care.  If you have any questions or concerns, please do not hesitate to contact us, we are here for you.  Thank you for choosing [facility] for your healthcare needs.    Sincerely,    Neal Lam    [facility]

## 2017-02-10 NOTE — ED AVS SNAPSHOT
Home Care Instructions                                                                                                                Gopal Ceja   MRN: [id number]    Department:  [facility], Emergency Dept   Date of Visit:  2/10/2017            [facility], Emergency Dept    [address]    [place] [address]    Phone:  [phone]      You were seen by     Ruslan Long M.D.      Your Diagnosis Was     Acute alcohol intoxication, uncomplicated (CMS-[facility])     F10.120       Follow-up Information     1. Go to [facility].      Medication Information     Review all of your home medications and newly ordered medications with your primary doctor and/or pharmacist as soon as possible. Follow medication instructions as directed by your doctor and/or pharmacist.     Please keep your complete medication list with you and share with your physician. Update the information when medications are discontinued, doses are changed, or new medications (including over-the-counter products) are added; and carry medication information at all times in the event of emergency situations.               Medication List      Notice     You have not been prescribed any medications.              Discharge Instructions       Alcohol Intoxication  Alcohol intoxication occurs when the amount of alcohol that a person has consumed impairs his or her ability to mentally and physically function. Alcohol directly impairs the normal chemical activity of the brain. Drinking large amounts of alcohol can lead to changes in mental function and behavior, and it can cause many physical effects that can be harmful.   Alcohol intoxication can range in severity from mild to very severe. Various factors can affect the level of intoxication that occurs, such as the person's age, gender, weight, frequency of alcohol consumption, and the presence of other medical conditions (such as diabetes, seizures, or heart conditions).  "Dangerous levels of alcohol intoxication may occur when people drink large amounts of alcohol in a short period (binge drinking). Alcohol can also be especially dangerous when combined with certain prescription medicines or \"recreational\" drugs.  SIGNS AND SYMPTOMS  Some common signs and symptoms of mild alcohol intoxication include:  · Loss of coordination.  · Changes in mood and behavior.  · Impaired judgment.  · Slurred speech.  As alcohol intoxication progresses to more severe levels, other signs and symptoms will appear. These may include:  · Vomiting.  · Confusion and impaired memory.  · Slowed breathing.  · Seizures.  · Loss of consciousness.  DIAGNOSIS   Your health care provider will take a medical history and perform a physical exam. You will be asked about the amount and type of alcohol you have consumed. Blood tests will be done to measure the concentration of alcohol in your blood. In many places, your blood alcohol level must be lower than 80 mg/dL (0.08%) to legally drive. However, many dangerous effects of alcohol can occur at much lower levels.   TREATMENT   People with alcohol intoxication often do not require treatment. Most of the effects of alcohol intoxication are temporary, and they go away as the alcohol naturally leaves the body. Your health care provider will monitor your condition until you are stable enough to go home. Fluids are sometimes given through an IV access tube to help prevent dehydration.   HOME CARE INSTRUCTIONS  · Do not drive after drinking alcohol.  · Stay hydrated. Drink enough water and fluids to keep your urine clear or pale yellow. Avoid caffeine.    · Only take over-the-counter or prescription medicines as directed by your health care provider.    SEEK MEDICAL CARE IF:   · You have persistent vomiting.    · You do not feel better after a few days.  · You have frequent alcohol intoxication. Your health care provider can help determine if you should see a substance use " treatment counselor.  SEEK IMMEDIATE MEDICAL CARE IF:   · You become shaky or tremble when you try to stop drinking.    · You shake uncontrollably (seizure).    · You throw up (vomit) blood. This may be bright red or may look like black coffee grounds.    · You have blood in your stool. This may be bright red or may appear as a black, tarry, bad smelling stool.    · You become lightheaded or faint.    MAKE SURE YOU:   · Understand these instructions.  · Will watch your condition.  · Will get help right away if you are not doing well or get worse.     This information is not intended to replace advice given to you by your health care provider. Make sure you discuss any questions you have with your health care provider.     Document Released: 09/27/2006 Document Revised: 08/20/2014 Document Reviewed: 05/23/2014  Prysm Interactive Patient Education ©2016 [employer]            Patient Information     Patient Information    Following emergency treatment: all patient requiring follow-up care must return either to a private physician or a clinic if your condition worsens before you are able to obtain further medical attention, please return to the emergency room.     Billing Information    At [facility], we work to make the billing process streamlined for our patients.  Our Representatives are here to answer any questions you may have regarding your hospital bill.  If you have insurance coverage and have supplied your insurance information to us, we will submit a claim to your insurer on your behalf.  Should you have any questions regarding your bill, we can be reached online or by phone as follows:  Online: You are able pay your bills online or live chat with our representatives about any billing questions you may have. We are here to help Monday - Friday from 8:00am to 7:30pm and 9:00am - 12:00pm on Saturdays.  Please visit [email or web address]  for more information.   Phone:   [phone] or [phone]    Please note that your emergency physician, surgeon, pathologist, radiologist, anesthesiologist, and other specialists are not employed by [facility] and will therefore bill separately for their services.  Please contact them directly for any questions concerning their bills at the numbers below:     Emergency Physician Services:  [phone]  [facility]:  [phone]  [facility]:  [phone]  [facility]:  [phone]    1. Your final bill may vary from the amount quoted upon discharge if all procedures are not complete at that time, or if your doctor has additional procedures of which we are not aware. You will receive an additional bill if you return to the Emergency Department at [facility] for suture removal regardless of the facility of which the sutures were placed.     2. Please arrange for settlement of this account at the emergency registration.    3. All self-pay accounts are due in full at the time of treatment.  If you are unable to meet this obligation then payment is expected within 4-5 days.     4. If you have had radiology studies (CT, X-ray, Ultrasound, MRI), you have received a preliminary result during your emergency department visit. Please contact the radiology department [phone] to receive a copy of your final result. Please discuss the Final result with your primary physician or with the follow up physician provided.     Crisis Hotline:  [place] Crisis Hotline:  4-684-VMYTJEX or 1-262.815.4673  [place] Crisis Hotline:    1-556.663.1545 or 853-084-3060         ED Discharge Follow Up Questions    1. In order to provide you with very good care, we would like to follow up with a phone call in the next few days.  May we have your permission to contact you?     YES /  NO    2. What is the best phone number to call you? (       )_____-__________    3. What is the best time to call you?      Morning  /   Afternoon  /  Evening                   Patient Signature:  ____________________________________________________________    Date:  ____________________________________________________________

## 2017-02-10 NOTE — ED NOTES
Patient given discharge instructions and follow up information, verbalized understanding, ambulatory out of ED w/steady gait.

## 2017-02-10 NOTE — ED AVS SNAPSHOT
Okeyko Access Code: [email or web address]  Expires: 2/25/2017 12:35 PM    Okeyko  A secure, online tool to manage your health information     [employer]’s Okeyko® is a secure, online tool that connects you to your personalized health information from the privacy of your home -- day or night - making it very easy for you to manage your healthcare. Once the activation process is completed, you can even access your medical information using the Okeyko erwin, which is available for free in the Apple Erwin store or Google Play store.     Okeyko provides the following levels of access (as shown below):   My Chart Features   [facility] Primary Care Doctor [facility]  Specialists [facility]  Urgent  Care Non-[facility]  Primary Care  Doctor   Email your healthcare team securely and privately 24/7 X X X X   Manage appointments: schedule your next appointment; view details of past/upcoming appointments X      Request prescription refills. X      View recent personal medical records, including lab and immunizations X X X X   View health record, including health history, allergies, medications X X X X   Read reports about your outpatient visits, procedures, consult and ER notes X X X X   See your discharge summary, which is a recap of your hospital and/or ER visit that includes your diagnosis, lab results, and care plan. X X       How to register for Okeyko:  1. Go to  [email or web address].  2. Click on the Sign Up Now box, which takes you to the New Member Sign Up page. You will need to provide the following information:  a. Enter your Okeyko Access Code exactly as it appears at the top of this page. (You will not need to use this code after you’ve completed the sign-up process. If you do not sign up before the expiration date, you must request a new code.)   b. Enter your date of birth.   c. Enter your home email address.   d. Click Submit, and follow the next screen’s instructions.  3. Create a Okeyko ID. This will be your allyDVMt  login ID and cannot be changed, so think of one that is secure and easy to remember.  4. Create a CheckiO password. You can change your password at any time.  5. Enter your Password Reset Question and Answer. This can be used at a later time if you forget your password.   6. Enter your e-mail address. This allows you to receive e-mail notifications when new information is available in CheckiO.  7. Click Sign Up. You can now view your health information.    For assistance activating your CheckiO account, call [phone]
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
## 2017-02-16 ENCOUNTER — HOSPITAL ENCOUNTER (EMERGENCY)
Facility: MEDICAL CENTER | Age: 32
End: 2017-02-17
Attending: EMERGENCY MEDICINE
Payer: MEDICARE

## 2017-02-16 ENCOUNTER — HOSPITAL ENCOUNTER (EMERGENCY)
Facility: MEDICAL CENTER | Age: 32
End: 2017-02-16
Attending: EMERGENCY MEDICINE
Payer: MEDICARE

## 2017-02-16 VITALS
SYSTOLIC BLOOD PRESSURE: 124 MMHG | TEMPERATURE: 99.7 F | HEIGHT: 68 IN | BODY MASS INDEX: 23.95 KG/M2 | WEIGHT: 158 LBS | HEART RATE: 130 BPM | DIASTOLIC BLOOD PRESSURE: 103 MMHG | RESPIRATION RATE: 18 BRPM

## 2017-02-16 VITALS
WEIGHT: 157.85 LBS | RESPIRATION RATE: 18 BRPM | TEMPERATURE: 100.2 F | OXYGEN SATURATION: 95 % | DIASTOLIC BLOOD PRESSURE: 96 MMHG | BODY MASS INDEX: 24.01 KG/M2 | HEART RATE: 125 BPM | SYSTOLIC BLOOD PRESSURE: 140 MMHG

## 2017-02-16 DIAGNOSIS — H10.33 ACUTE CONJUNCTIVITIS OF BOTH EYES, UNSPECIFIED ACUTE CONJUNCTIVITIS TYPE: ICD-10-CM

## 2017-02-16 DIAGNOSIS — F10.920 ALCOHOL INTOXICATION, UNCOMPLICATED (HCC): ICD-10-CM

## 2017-02-16 PROCEDURE — 302449 STATCHG TRIAGE ONLY (STATISTIC)

## 2017-02-16 PROCEDURE — 99284 EMERGENCY DEPT VISIT MOD MDM: CPT

## 2017-02-16 PROCEDURE — 700101 HCHG RX REV CODE 250: Performed by: EMERGENCY MEDICINE

## 2017-02-16 RX ORDER — ERYTHROMYCIN 5 MG/G
OINTMENT OPHTHALMIC ONCE
Status: COMPLETED | OUTPATIENT
Start: 2017-02-16 | End: 2017-02-16

## 2017-02-16 RX ADMIN — ERYTHROMYCIN: 5 OINTMENT OPHTHALMIC at 02:33

## 2017-02-16 ASSESSMENT — LIFESTYLE VARIABLES
TOTAL SCORE: 4
ON A TYPICAL DAY WHEN YOU DRINK ALCOHOL HOW MANY DRINKS DO YOU HAVE: 4
DOES PATIENT WANT TO STOP DRINKING: YES
DO YOU DRINK ALCOHOL: YES
HAVE PEOPLE ANNOYED YOU BY CRITICIZING YOUR DRINKING: YES
DOES PATIENT WANT TO TALK TO SOMEONE ABOUT QUITTING: YES
REASON UNABLE TO ASSESS: INTOXICATED
HAVE YOU EVER FELT YOU SHOULD CUT DOWN ON YOUR DRINKING: YES
DOES PATIENT WANT TO STOP DRINKING: YES
AVERAGE NUMBER OF DAYS PER WEEK YOU HAVE A DRINK CONTAINING ALCOHOL: 365
TOTAL SCORE: 4
EVER FELT BAD OR GUILTY ABOUT YOUR DRINKING: YES
DO YOU DRINK ALCOHOL: YES
CONSUMPTION TOTAL: POSITIVE
TOTAL SCORE: 4
TOTAL SCORE: 4
HAVE YOU EVER FELT YOU SHOULD CUT DOWN ON YOUR DRINKING: YES
EVER FELT BAD OR GUILTY ABOUT YOUR DRINKING: YES
HAVE PEOPLE ANNOYED YOU BY CRITICIZING YOUR DRINKING: YES
TOTAL SCORE: 4
EVER HAD A DRINK FIRST THING IN THE MORNING TO STEADY YOUR NERVES TO GET RID OF A HANGOVER: YES
CONSUMPTION TOTAL: INCOMPLETE
TOTAL SCORE: 4
HOW MANY TIMES IN THE PAST YEAR HAVE YOU HAD 5 OR MORE DRINKS IN A DAY: 100
DOES PATIENT WANT TO TALK TO SOMEONE ABOUT QUITTING: YES
EVER HAD A DRINK FIRST THING IN THE MORNING TO STEADY YOUR NERVES TO GET RID OF A HANGOVER: YES

## 2017-02-16 ASSESSMENT — PAIN SCALES - GENERAL
PAINLEVEL_OUTOF10: 0
PAINLEVEL_OUTOF10: 0

## 2017-02-16 NOTE — ED AVS SNAPSHOT
" Home Care Instructions                                                                                                                Gopal Ceja   MRN: [id number]    Department:  [facility], Emergency Dept   Date of Visit:  2/16/2017            [facility], Emergency Dept    [address]    [place] [address]    Phone:  [phone]      You were seen by     Neal Osorio M.D.      Your Diagnosis Was     Acute conjunctivitis of both eyes, unspecified acute conjunctivitis type     H10.33       Follow-up Information     1. Follow up with [facility].    Contact information    [address]  [place] [address] [phone]        Medication Information     Review all of your home medications and newly ordered medications with your primary doctor and/or pharmacist as soon as possible. Follow medication instructions as directed by your doctor and/or pharmacist.     Please keep your complete medication list with you and share with your physician. Update the information when medications are discontinued, doses are changed, or new medications (including over-the-counter products) are added; and carry medication information at all times in the event of emergency situations.               Medication List      Notice     You have not been prescribed any medications.              Discharge Instructions       Conjunctivitis  Conjunctivitis is commonly called \"pink eye.\" Conjunctivitis can be caused by bacterial or viral infection, allergies, or injuries. There is usually redness of the lining of the eye, itching, discomfort, and sometimes discharge. There may be deposits of matter along the eyelids. A viral infection usually causes a watery discharge, while a bacterial infection causes a yellowish, thick discharge. Pink eye is very contagious and spreads by direct contact.  You may be given antibiotic eyedrops as part of your treatment. Before using your eye medicine, remove all " drainage from the eye by washing gently with warm water and cotton balls. Continue to use the medication until you have awakened 2 mornings in a row without discharge from the eye. Do not rub your eye. This increases the irritation and helps spread infection. Use separate towels from other household members. Wash your hands with soap and water before and after touching your eyes. Use cold compresses to reduce pain and sunglasses to relieve irritation from light. Do not wear contact lenses or wear eye makeup until the infection is gone.  SEEK MEDICAL CARE IF:   · Your symptoms are not better after 3 days of treatment.  · You have increased pain or trouble seeing.  · The outer eyelids become very red or swollen.  Document Released: 01/25/2006 Document Revised: 03/11/2013 Document Reviewed: 12/18/2006  ExitCare® Patient Information ©2014 [employer].            Patient Information     Patient Information    Following emergency treatment: all patient requiring follow-up care must return either to a private physician or a clinic if your condition worsens before you are able to obtain further medical attention, please return to the emergency room.     Billing Information    At [facility], we work to make the billing process streamlined for our patients.  Our Representatives are here to answer any questions you may have regarding your hospital bill.  If you have insurance coverage and have supplied your insurance information to us, we will submit a claim to your insurer on your behalf.  Should you have any questions regarding your bill, we can be reached online or by phone as follows:  Online: You are able pay your bills online or live chat with our representatives about any billing questions you may have. We are here to help Monday - Friday from 8:00am to 7:30pm and 9:00am - 12:00pm on Saturdays.  Please visit [email or web address]  for more information.   Phone:  [phone] or  [phone]    Please note that your emergency physician, surgeon, pathologist, radiologist, anesthesiologist, and other specialists are not employed by [facility] and will therefore bill separately for their services.  Please contact them directly for any questions concerning their bills at the numbers below:     Emergency Physician Services:  [phone]  [facility]:  [phone]  [facility]:  [phone]  [facility]:  [phone]    1. Your final bill may vary from the amount quoted upon discharge if all procedures are not complete at that time, or if your doctor has additional procedures of which we are not aware. You will receive an additional bill if you return to the Emergency Department at [facility] for suture removal regardless of the facility of which the sutures were placed.     2. Please arrange for settlement of this account at the emergency registration.    3. All self-pay accounts are due in full at the time of treatment.  If you are unable to meet this obligation then payment is expected within 4-5 days.     4. If you have had radiology studies (CT, X-ray, Ultrasound, MRI), you have received a preliminary result during your emergency department visit. Please contact the radiology department [phone] to receive a copy of your final result. Please discuss the Final result with your primary physician or with the follow up physician provided.     Crisis Hotline:  [place] Crisis Hotline:  4-450-YNJRGDM or 1-145.665.4446  [place] Crisis Hotline:    1-325.208.3715 or 707-632-6149         ED Discharge Follow Up Questions    1. In order to provide you with very good care, we would like to follow up with a phone call in the next few days.  May we have your permission to contact you?     YES /  NO    2. What is the best phone number to call you? (       )_____-__________    3. What is the best time to call you?      Morning  /  Afternoon  /   Evening                   Patient Signature:  ____________________________________________________________    Date:  ____________________________________________________________

## 2017-02-16 NOTE — ED NOTES
Chief Complaint   Patient presents with   • Depression   • Alcohol Intoxication     Pt roomed and changed into gown. Pt states he is depressed and detoxing. Denies SI/HI, CP/SOB. VSS with exception of tachycardia. Given warm blankets, bed in low position, call light within reach. Chart up for ERP.

## 2017-02-16 NOTE — ED AVS SNAPSHOT
Med ePad Access Code: [email or web address]  Expires: 2/25/2017 12:35 PM    Med ePad  A secure, online tool to manage your health information     [employer]’s Med ePad® is a secure, online tool that connects you to your personalized health information from the privacy of your home -- day or night - making it very easy for you to manage your healthcare. Once the activation process is completed, you can even access your medical information using the Med ePad erwin, which is available for free in the Apple Erwin store or Google Play store.     Med ePad provides the following levels of access (as shown below):   My Chart Features   [facility] Primary Care Doctor [facility]  Specialists [facility]  Urgent  Care Non-[facility]  Primary Care  Doctor   Email your healthcare team securely and privately 24/7 X X X X   Manage appointments: schedule your next appointment; view details of past/upcoming appointments X      Request prescription refills. X      View recent personal medical records, including lab and immunizations X X X X   View health record, including health history, allergies, medications X X X X   Read reports about your outpatient visits, procedures, consult and ER notes X X X X   See your discharge summary, which is a recap of your hospital and/or ER visit that includes your diagnosis, lab results, and care plan. X X       How to register for Med ePad:  1. Go to  [email or web address].  2. Click on the Sign Up Now box, which takes you to the New Member Sign Up page. You will need to provide the following information:  a. Enter your Med ePad Access Code exactly as it appears at the top of this page. (You will not need to use this code after you’ve completed the sign-up process. If you do not sign up before the expiration date, you must request a new code.)   b. Enter your date of birth.   c. Enter your home email address.   d. Click Submit, and follow the next screen’s instructions.  3. Create a Med ePad ID. This will be your [employer]  login ID and cannot be changed, so think of one that is secure and easy to remember.  4. Create a Enomaly password. You can change your password at any time.  5. Enter your Password Reset Question and Answer. This can be used at a later time if you forget your password.   6. Enter your e-mail address. This allows you to receive e-mail notifications when new information is available in Enomaly.  7. Click Sign Up. You can now view your health information.    For assistance activating your Enomaly account, call [phone]

## 2017-02-16 NOTE — DISCHARGE INSTRUCTIONS
"Conjunctivitis  Conjunctivitis is commonly called \"pink eye.\" Conjunctivitis can be caused by bacterial or viral infection, allergies, or injuries. There is usually redness of the lining of the eye, itching, discomfort, and sometimes discharge. There may be deposits of matter along the eyelids. A viral infection usually causes a watery discharge, while a bacterial infection causes a yellowish, thick discharge. Pink eye is very contagious and spreads by direct contact.  You may be given antibiotic eyedrops as part of your treatment. Before using your eye medicine, remove all drainage from the eye by washing gently with warm water and cotton balls. Continue to use the medication until you have awakened 2 mornings in a row without discharge from the eye. Do not rub your eye. This increases the irritation and helps spread infection. Use separate towels from other household members. Wash your hands with soap and water before and after touching your eyes. Use cold compresses to reduce pain and sunglasses to relieve irritation from light. Do not wear contact lenses or wear eye makeup until the infection is gone.  SEEK MEDICAL CARE IF:   · Your symptoms are not better after 3 days of treatment.  · You have increased pain or trouble seeing.  · The outer eyelids become very red or swollen.  Document Released: 01/25/2006 Document Revised: 03/11/2013 Document Reviewed: 12/18/2006  [employer]® Patient Information ©2014 [employer].    "
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
## 2017-02-16 NOTE — ED AVS SNAPSHOT
2/16/2017          Gopal Ceja  [address]  [place] [address]    Dear Gopal:    [facility] wants to ensure your discharge home is safe and you or your loved ones have had all your questions answered regarding your care after you leave the hospital.    You may receive a telephone call within two days of your discharge.  This call is to make certain you understand your discharge instructions as well as ensure we provided you with the best care possible during your stay with us.     The call will only last approximately 3-5 minutes and will be done by a nurse.    Once again, we want to ensure your discharge home is safe and that you have a clear understanding of any next steps in your care.  If you have any questions or concerns, please do not hesitate to contact us, we are here for you.  Thank you for choosing [facility] for your healthcare needs.    Sincerely,    Neal Lam    [facility]

## 2017-02-17 NOTE — ED NOTES
Patient left AMA. Discussed the dangers of leaving prior to Physician clearance up to and including death. Patient ambulatory on discharge steady gait, AAOx4.

## 2017-02-17 NOTE — ED NOTES
Chief Complaint   Patient presents with   • Detox     Patient states he is here to detox from ETOH.   Patient BIB EMS for above. AAOx4

## 2017-02-17 NOTE — ED PROVIDER NOTES
Patient left AMA prior to being evaluated.     Scribed for No att. providers found by May Sequeira. 2/17/2017  12:19 AM  The note accurately reflects work and decisions made by me.  Adiel Matthews  2/17/2017  7:06 AM

## 2017-02-17 NOTE — DISCHARGE PLANNING
Pt requesting detox. Pt reported to SW that he was kicked out of [facility] and cannot go, and stated that he has no Medicare days for [facility]. SW informed pt that those are the only detox resources in the area. Pt declined all other interventions and left AMA.

## 2017-02-22 ENCOUNTER — HOSPITAL ENCOUNTER (EMERGENCY)
Facility: MEDICAL CENTER | Age: 32
End: 2017-02-22
Attending: EMERGENCY MEDICINE
Payer: MEDICARE

## 2017-02-22 VITALS
DIASTOLIC BLOOD PRESSURE: 98 MMHG | WEIGHT: 158.07 LBS | RESPIRATION RATE: 18 BRPM | OXYGEN SATURATION: 95 % | TEMPERATURE: 96.5 F | BODY MASS INDEX: 24.04 KG/M2 | SYSTOLIC BLOOD PRESSURE: 148 MMHG | HEART RATE: 96 BPM

## 2017-02-22 DIAGNOSIS — F10.288 ALCOHOL DEPENDENCE WITH OTHER ALCOHOL-INDUCED DISORDER (HCC): ICD-10-CM

## 2017-02-22 LAB
ALBUMIN SERPL BCP-MCNC: 3.9 G/DL (ref 3.2–4.9)
ALBUMIN/GLOB SERPL: 0.9 G/DL
ALP SERPL-CCNC: 141 U/L (ref 30–99)
ALT SERPL-CCNC: 57 U/L (ref 2–50)
AMPHET UR QL SCN: NEGATIVE
ANION GAP SERPL CALC-SCNC: 15 MMOL/L (ref 0–11.9)
AST SERPL-CCNC: 103 U/L (ref 12–45)
BARBITURATES UR QL SCN: NEGATIVE
BASOPHILS # BLD AUTO: 0.6 % (ref 0–1.8)
BASOPHILS # BLD: 0.05 K/UL (ref 0–0.12)
BENZODIAZ UR QL SCN: POSITIVE
BILIRUB SERPL-MCNC: 0.9 MG/DL (ref 0.1–1.5)
BUN SERPL-MCNC: 7 MG/DL (ref 8–22)
BZE UR QL SCN: NEGATIVE
CALCIUM SERPL-MCNC: 9.3 MG/DL (ref 8.5–10.5)
CANNABINOIDS UR QL SCN: NEGATIVE
CHLORIDE SERPL-SCNC: 92 MMOL/L (ref 96–112)
CO2 SERPL-SCNC: 24 MMOL/L (ref 20–33)
CREAT SERPL-MCNC: 0.79 MG/DL (ref 0.5–1.4)
EOSINOPHIL # BLD AUTO: 0.01 K/UL (ref 0–0.51)
EOSINOPHIL NFR BLD: 0.1 % (ref 0–6.9)
ERYTHROCYTE [DISTWIDTH] IN BLOOD BY AUTOMATED COUNT: 45.7 FL (ref 35.9–50)
ETHANOL BLD-MCNC: 0.06 G/DL
GFR SERPL CREATININE-BSD FRML MDRD: >60 ML/MIN/1.73 M 2
GLOBULIN SER CALC-MCNC: 4.3 G/DL (ref 1.9–3.5)
GLUCOSE BLD-MCNC: 57 MG/DL (ref 65–99)
GLUCOSE BLD-MCNC: 78 MG/DL (ref 65–99)
GLUCOSE SERPL-MCNC: 70 MG/DL (ref 65–99)
HCT VFR BLD AUTO: 41.8 % (ref 42–52)
HGB BLD-MCNC: 14.1 G/DL (ref 14–18)
IMM GRANULOCYTES # BLD AUTO: 0.03 K/UL (ref 0–0.11)
IMM GRANULOCYTES NFR BLD AUTO: 0.3 % (ref 0–0.9)
LIPASE SERPL-CCNC: 56 U/L (ref 11–82)
LYMPHOCYTES # BLD AUTO: 1.05 K/UL (ref 1–4.8)
LYMPHOCYTES NFR BLD: 11.7 % (ref 22–41)
MCH RBC QN AUTO: 30.7 PG (ref 27–33)
MCHC RBC AUTO-ENTMCNC: 33.7 G/DL (ref 33.7–35.3)
MCV RBC AUTO: 91.1 FL (ref 81.4–97.8)
MDMA UR QL SCN: NEGATIVE
METHADONE UR QL SCN: NEGATIVE
MONOCYTES # BLD AUTO: 1.14 K/UL (ref 0–0.85)
MONOCYTES NFR BLD AUTO: 12.7 % (ref 0–13.4)
NEUTROPHILS # BLD AUTO: 6.73 K/UL (ref 1.82–7.42)
NEUTROPHILS NFR BLD: 74.6 % (ref 44–72)
NRBC # BLD AUTO: 0 K/UL
NRBC BLD AUTO-RTO: 0 /100 WBC
OPIATES UR QL SCN: NEGATIVE
OXYCODONE UR QL SCN: NEGATIVE
PCP UR QL SCN: NEGATIVE
PLATELET # BLD AUTO: 115 K/UL (ref 164–446)
PMV BLD AUTO: 10 FL (ref 9–12.9)
POTASSIUM SERPL-SCNC: 3.7 MMOL/L (ref 3.6–5.5)
PROPOXYPH UR QL SCN: NEGATIVE
PROT SERPL-MCNC: 8.2 G/DL (ref 6–8.2)
RBC # BLD AUTO: 4.59 M/UL (ref 4.7–6.1)
SODIUM SERPL-SCNC: 131 MMOL/L (ref 135–145)
WBC # BLD AUTO: 9 K/UL (ref 4.8–10.8)

## 2017-02-22 PROCEDURE — 96376 TX/PRO/DX INJ SAME DRUG ADON: CPT

## 2017-02-22 PROCEDURE — 96375 TX/PRO/DX INJ NEW DRUG ADDON: CPT

## 2017-02-22 PROCEDURE — 80307 DRUG TEST PRSMV CHEM ANLYZR: CPT

## 2017-02-22 PROCEDURE — 80053 COMPREHEN METABOLIC PANEL: CPT

## 2017-02-22 PROCEDURE — 85025 COMPLETE CBC W/AUTO DIFF WBC: CPT

## 2017-02-22 PROCEDURE — 99285 EMERGENCY DEPT VISIT HI MDM: CPT

## 2017-02-22 PROCEDURE — 96374 THER/PROPH/DIAG INJ IV PUSH: CPT

## 2017-02-22 PROCEDURE — 700105 HCHG RX REV CODE 258: Performed by: EMERGENCY MEDICINE

## 2017-02-22 PROCEDURE — 82962 GLUCOSE BLOOD TEST: CPT

## 2017-02-22 PROCEDURE — 96361 HYDRATE IV INFUSION ADD-ON: CPT

## 2017-02-22 PROCEDURE — 83690 ASSAY OF LIPASE: CPT

## 2017-02-22 PROCEDURE — 700111 HCHG RX REV CODE 636 W/ 250 OVERRIDE (IP): Performed by: EMERGENCY MEDICINE

## 2017-02-22 RX ORDER — SODIUM CHLORIDE 9 MG/ML
1000 INJECTION, SOLUTION INTRAVENOUS ONCE
Status: COMPLETED | OUTPATIENT
Start: 2017-02-22 | End: 2017-02-22

## 2017-02-22 RX ORDER — ONDANSETRON 2 MG/ML
4 INJECTION INTRAMUSCULAR; INTRAVENOUS ONCE
Status: COMPLETED | OUTPATIENT
Start: 2017-02-22 | End: 2017-02-22

## 2017-02-22 RX ORDER — ONDANSETRON 2 MG/ML
4 INJECTION INTRAMUSCULAR; INTRAVENOUS ONCE
Status: DISCONTINUED | OUTPATIENT
Start: 2017-02-22 | End: 2017-02-22

## 2017-02-22 RX ORDER — ONDANSETRON 2 MG/ML
4 INJECTION INTRAMUSCULAR; INTRAVENOUS EVERY 4 HOURS PRN
Status: DISCONTINUED | OUTPATIENT
Start: 2017-02-22 | End: 2017-02-22

## 2017-02-22 RX ORDER — LORAZEPAM 2 MG/ML
1 INJECTION INTRAMUSCULAR ONCE
Status: COMPLETED | OUTPATIENT
Start: 2017-02-22 | End: 2017-02-22

## 2017-02-22 RX ORDER — ONDANSETRON 4 MG/1
4 TABLET, FILM COATED ORAL EVERY 8 HOURS PRN
Qty: 10 TAB | Refills: 0 | Status: SHIPPED | OUTPATIENT
Start: 2017-02-22 | End: 2017-04-05

## 2017-02-22 RX ORDER — SODIUM CHLORIDE 9 MG/ML
2000 INJECTION, SOLUTION INTRAVENOUS ONCE
Status: COMPLETED | OUTPATIENT
Start: 2017-02-22 | End: 2017-02-22

## 2017-02-22 RX ADMIN — ONDANSETRON 4 MG: 2 INJECTION, SOLUTION INTRAMUSCULAR; INTRAVENOUS at 10:45

## 2017-02-22 RX ADMIN — ONDANSETRON 4 MG: 2 INJECTION, SOLUTION INTRAMUSCULAR; INTRAVENOUS at 08:50

## 2017-02-22 RX ADMIN — SODIUM CHLORIDE 1000 ML: 9 INJECTION, SOLUTION INTRAVENOUS at 12:00

## 2017-02-22 RX ADMIN — LORAZEPAM 1 MG: 2 INJECTION INTRAMUSCULAR; INTRAVENOUS at 10:45

## 2017-02-22 RX ADMIN — SODIUM CHLORIDE 2000 ML: 9 INJECTION, SOLUTION INTRAVENOUS at 08:51

## 2017-02-22 RX ADMIN — LORAZEPAM 1 MG: 2 INJECTION INTRAMUSCULAR; INTRAVENOUS at 12:39

## 2017-02-22 RX ADMIN — LORAZEPAM 1 MG: 2 INJECTION INTRAMUSCULAR; INTRAVENOUS at 08:50

## 2017-02-22 ASSESSMENT — LIFESTYLE VARIABLES
DO YOU DRINK ALCOHOL: YES
HAVE YOU EVER FELT YOU SHOULD CUT DOWN ON YOUR DRINKING: YES
EVER FELT BAD OR GUILTY ABOUT YOUR DRINKING: YES
DOES PATIENT WANT TO TALK TO SOMEONE ABOUT QUITTING: YES
TOTAL SCORE: 4
EVER HAD A DRINK FIRST THING IN THE MORNING TO STEADY YOUR NERVES TO GET RID OF A HANGOVER: YES
CONSUMPTION TOTAL: INCOMPLETE
TOTAL SCORE: 4
HAVE PEOPLE ANNOYED YOU BY CRITICIZING YOUR DRINKING: YES
TOTAL SCORE: 4
DOES PATIENT WANT TO STOP DRINKING: YES

## 2017-02-22 NOTE — ED PROVIDER NOTES
"ED Provider Note    Scribed for Garrison Emerson M.D. by Val Perez. 2/22/2017  8:31 AM    Primary Care Provider: Pcp Pt States None  Means of arrival: Walk-in  History limited by: None    CHIEF COMPLAINT  Chief Complaint   Patient presents with   • Detox     Patient states that he is here to detox from ETOH.       HPI  Gopal Ceja is a 31 y.o. Male with a history of alcohol abuse who presents to the ED complaining of detox related symptoms. The patient reports that yesterday morning was his last drink, but he regularly drinks eight earthquakes a day. He has associated visual and auditory hallucinations onset yesterday. He sees \"little spirals,\" with associated blurred vision in addition to auditory hallucinations of random sirens. Patient has associated chest pain, abdominal pain, dry heaves, and painful rashes. He denies diarrhea or depression. Patient also denies IV drug use.       REVIEW OF SYSTEMS  CARDIOVASCULAR: Positive chest pain  EYES: Positive altered and blurred vision  GI:  Positive abdominal pain and dry heaves. Denies any diarrhea  SKIN:  Positive painful rashes  PSYCHIATRIC:  Denies depression.  All other systems are negative. C      PAST MEDICAL HISTORY  Past Medical History   Diagnosis Date   • Ectrodactyly-ectodermal dysplasia-clefting syndrome    • Acute anxiety    • ETOH abuse    • Psychiatric disorder      anxiety/panic disorder   • Bipolar affective (CMS-HCC)    • ADHD (attention deficit hyperactivity disorder)    • ADHD (attention deficit hyperactivity disorder)    • Depression        FAMILY HISTORY  History reviewed. No pertinent family history.    SOCIAL HISTORY   reports that he has quit smoking. His smoking use included Cigarettes. He smoked 0.25 packs per day. He has never used smokeless tobacco. He reports that he drinks alcohol. He reports that he does not use illicit drugs.    SURGICAL HISTORY  Past Surgical History   Procedure Laterality Date   • Other orthopedic surgery       " hands bilaterally r/t EEDC syndrome       CURRENT MEDICATIONS  No current facility-administered medications on file prior to encounter.     No current outpatient prescriptions on file prior to encounter.       ALLERGIES  No Known Allergies    PHYSICAL EXAM  VITAL SIGNS: /102 mmHg  Pulse 122  Temp(Src) 35.8 °C (96.5 °F)  Resp 20  Wt 71.7 kg (158 lb 1.1 oz)  SpO2 95%     Constitutional: Patient is awake and alert. No acute respiratory distress. Well developed, Well nourished  HENT: Normocephalic, Atraumatic, Bilateral external ears normal, Oropharynx pink moist with no exudates, Nose patent.  Eyes: PERRLA, EOMI, Sclera and conjunctiva injected, No discharge.   Neck:  Supple no nuchal rigidity, no thyromegaly or mass. Non-tender  Lymphatic: No supraclavicular  lymph nodes.   Cardiovascular: Heart is tachycardic with regular rhythm no murmur, rub or thrill.   Thorax & Lungs: Chest is symmetrical, with good breath sounds. No wheezing or crackles. No respiratory distress, No chest tenderness.   Abdomen: Soft, No tenderness no hepatosplenomegaly there is no guarding or rebound, No masses, No pulsatile masses. Bowel sounds are present.  Skin: Right thigh and suprapubic scabs, not infected Warm, Dry, no petechia, purpura, or rash.   Back: Non tender with palpation, No CVA tenderness.   Extremities: Scab on right thigh, not infected. Birth defect deformities to hands. No edema. Non tender.   Musculoskeletal: Good range of motion to wrists, elbows, shoulders, hips, knees, and ankles. Pulses 2+ radially and femorally. No gross deformities noted.   Neurologic: tremulous in bilateral arms and legs  Alert & oriented to person, time, and place.  Strength is 5 over 5 and symmetric in bilateral upper and lower extremities.  Sensory is intact to light touch to face, arms, and legs.  DTRs are symmetrical in biceps brachioradialis, patella and Achilles.   Psychiatric: No depression      LABS  Results for orders placed or  performed during the hospital encounter of 02/22/17   CBC WITH DIFFERENTIAL   Result Value Ref Range    WBC 9.0 4.8 - 10.8 K/uL    RBC 4.59 (L) 4.70 - 6.10 M/uL    Hemoglobin 14.1 14.0 - 18.0 g/dL    Hematocrit 41.8 (L) 42.0 - 52.0 %    MCV 91.1 81.4 - 97.8 fL    MCH 30.7 27.0 - 33.0 pg    MCHC 33.7 33.7 - 35.3 g/dL    RDW 45.7 35.9 - 50.0 fL    Platelet Count 115 (L) 164 - 446 K/uL    MPV 10.0 9.0 - 12.9 fL    Neutrophils-Polys 74.60 (H) 44.00 - 72.00 %    Lymphocytes 11.70 (L) 22.00 - 41.00 %    Monocytes 12.70 0.00 - 13.40 %    Eosinophils 0.10 0.00 - 6.90 %    Basophils 0.60 0.00 - 1.80 %    Immature Granulocytes 0.30 0.00 - 0.90 %    Nucleated RBC 0.00 /100 WBC    Neutrophils (Absolute) 6.73 1.82 - 7.42 K/uL    Lymphs (Absolute) 1.05 1.00 - 4.80 K/uL    Monos (Absolute) 1.14 (H) 0.00 - 0.85 K/uL    Eos (Absolute) 0.01 0.00 - 0.51 K/uL    Baso (Absolute) 0.05 0.00 - 0.12 K/uL    Immature Granulocytes (abs) 0.03 0.00 - 0.11 K/uL    NRBC (Absolute) 0.00 K/uL   COMP METABOLIC PANEL   Result Value Ref Range    Sodium 131 (L) 135 - 145 mmol/L    Potassium 3.7 3.6 - 5.5 mmol/L    Chloride 92 (L) 96 - 112 mmol/L    Co2 24 20 - 33 mmol/L    Anion Gap 15.0 (H) 0.0 - 11.9    Glucose 70 65 - 99 mg/dL    Bun 7 (L) 8 - 22 mg/dL    Creatinine 0.79 0.50 - 1.40 mg/dL    Calcium 9.3 8.5 - 10.5 mg/dL    AST(SGOT) 103 (H) 12 - 45 U/L    ALT(SGPT) 57 (H) 2 - 50 U/L    Alkaline Phosphatase 141 (H) 30 - 99 U/L    Total Bilirubin 0.9 0.1 - 1.5 mg/dL    Albumin 3.9 3.2 - 4.9 g/dL    Total Protein 8.2 6.0 - 8.2 g/dL    Globulin 4.3 (H) 1.9 - 3.5 g/dL    A-G Ratio 0.9 g/dL   LIPASE   Result Value Ref Range    Lipase 56 11 - 82 U/L   URINE DRUG SCREEN (TRIAGE)   Result Value Ref Range    Amphetamines Urine Negative Negative    Barbiturates Negative Negative    Benzodiazepines Positive (A) Negative    Cocaine Metabolite Negative Negative    Methadone Negative Negative    Ecstasy Negative Negative    Opiates Negative Negative    Oxycodone  Negative Negative    Phencyclidine -Pcp Negative Negative    Propoxyphene Negative Negative    Cannabinoid Metab Negative Negative   DIAGNOSTIC ALCOHOL   Result Value Ref Range    Diagnostic Alcohol 0.06 (H) 0.00 g/dL   ESTIMATED GFR   Result Value Ref Range    GFR If African American >60 >60 mL/min/1.73 m 2    GFR If Non African American >60 >60 mL/min/1.73 m 2   ACCU-CHEK GLUCOSE   Result Value Ref Range    Glucose - Accu-Ck 57 (L) 65 - 99 mg/dL   ACCU-CHEK GLUCOSE   Result Value Ref Range    Glucose - Accu-Ck 78 65 - 99 mg/dL       All labs reviewed by me.    RADIOLOGY/PROCEDURES  No orders to display     The radiologist's interpretations of all radiological studies have been reviewed by me.     COURSE & MEDICAL DECISION MAKING  Pertinent Labs & Imaging studies reviewed. (See chart for details)    8:31 AM - Patient seen and examined at bedside. Ordered Accu-check glucose, diagnostic alcohol, estimated GFR, CBC with differential, CMP, Lipase, and urine drug screen.  Patient will be medicated with Ativan injection 1 mg, NS IV 1L, and Zofran injection 4 mg for his symptoms.     10:21 AM The patient has a blood glucose of 53 and I agreed with the nurse to give the patient some fruit juice.    10:43 AM Patient was reevaluated at bedside. Patient is still shaking. I ordered additional Ativan.    12:40 AM I ordered and additional gram of Ativan for the patient.    12:40 PM 12:41 PM - Re-examined; The patient is resting in bed. I discussed his above findings and plans for discharge with a prescription for Zofran 4 mg tablet every 8 hours. He was given a referral to [facility] and instructed to return to the ED if his symptoms worsen. Patient understands and agrees. His vitals prior to discharge are: /98 mmHg  Pulse 96  Temp(Src) 35.8 °C (96.5 °F)  Resp 18  Wt 71.7 kg (158 lb 1.1 oz)  SpO2 95%        Decision Making  The patient is well-known to me. He is an alcoholic. He is drinking again. He comes  in now stating is not to drink since yesterday. He very tremulous and shaky. He said he would body saw some objects that weren't there. After we gave him IV fluids and some Ativan is resting much more comfortably. He does not want a referral to any alcohol program states they won't see him anymore. He states he would like to go home now after IV fluids and anti-medics and Ativan. I explained that I will not give any Ativan as an outpatient he understands this. Again currently offers no complaints at all but he would like to go home to follow-up as an outpatient. He is not suicidal.    I have signed into and reviewed the patient's prescription monitoring program data prior to prescribing a scheduled drug. The patient will return for new or worsening symptoms and is stable at the time of discharge.    The patient is referred to a primary physician for blood pressure management, diabetic screening, and for all other preventative health concerns.    DISPOSITION:  Patient will be discharged home in stable condition.    FOLLOW UP:  [address] [address]  [phone]  In 2 days        OUTPATIENT MEDICATIONS:  Discharge Medication List as of 2/22/2017  1:19 PM      START taking these medications    Details   ondansetron (ZOFRAN) 4 MG Tab tablet 4 mg, EVERY 8 HOURS PRN Starting 2/22/2017, Until Discontinued, Disp-10 Tab, R-0, Oral                 FINAL IMPRESSION  1. Alcohol dependence with other alcohol-induced disorder (CMS-HCC)      2. Dehydration      PLAN  1. Stop drinking alcohol  2. Follow-up with the [facility] tomorrow  3. Follow up with the [facility] tomorrow  4.. Return to the emergency department for increased pains, fevers, vomiting or change in condition.     Val FELIPE), am scribing for, and in the presence of, Garrison Emerson M.D..    Electronically signed by: Val Sandoval), 2/22/2017    Garrison FELIPE M.D. personally performed the  services described in this documentation, as scribed by Val Perez in my presence, and it is both accurate and complete.    The note accurately reflects work and decisions made by me.  Garrison Emerson  2/22/2017  4:06 PM

## 2017-02-22 NOTE — ED NOTES
Discharge instructions given to patient, prescriptions provided, a verbal understanding of all instructions was stated. IV removed, cathlon intact, site without s/s of infection. Pt preferred to walk out. VSS,  all belongings accounted for.

## 2017-02-22 NOTE — ED AVS SNAPSHOT
[employer] Access Code: [email or web address]  Expires: 2/25/2017 12:35 PM    Your email address is not on file at [facility].  Email Addresses are required for you to sign up for [employer], please contact [phone] to verify your personal information and to provide your email address prior to attempting to register for [employer].    Gopal Ceja  [address] [place] [address], [place] [address]    [employer]  A secure, online tool to manage your health information     [facility]’s [employer]® is a secure, online tool that connects you to your personalized health information from the privacy of your home -- day or night - making it very easy for you to manage your healthcare. Once the activation process is completed, you can even access your medical information using the [employer] erwin, which is available for free in the [employer] Erwin store or Google Play store.     To learn more about [employer], visit [email or web address]    There are two levels of access available (as shown below):   My Chart Features  [facility] Primary Care Doctor [facility]  Specialists [facility]  Urgent  Care Non-[facility] Primary Care Doctor   Email your healthcare team securely and privately 24/7 X X X    Manage appointments: schedule your next appointment; view details of past/upcoming appointments X      Request prescription refills. X      View recent personal medical records, including lab and immunizations X X X X   View health record, including health history, allergies, medications X X X X   Read reports about your outpatient visits, procedures, consult and ER notes X X X X   See your discharge summary, which is a recap of your hospital and/or ER visit that includes your diagnosis, lab results, and care plan X X  X     How to register for [employer]:  Once your e-mail address has been verified, follow the following steps to sign up for [employer].     1. Go to  [email or web address]  2. Click on the Sign Up Now box, which takes you to the New Member Sign Up page. You will need to  provide the following information:  a. Enter your [employer] Access Code exactly as it appears at the top of this page. (You will not need to use this code after you’ve completed the sign-up process. If you do not sign up before the expiration date, you must request a new code.)   b. Enter your date of birth.   c. Enter your home email address.   d. Click Submit, and follow the next screen’s instructions.  3. Create a [employer] ID. This will be your [employer] login ID and cannot be changed, so think of one that is secure and easy to remember.  4. Create a [employer] password. You can change your password at any time.  5. Enter your Password Reset Question and Answer. This can be used at a later time if you forget your password.   6. Enter your e-mail address. This allows you to receive e-mail notifications when new information is available in [employer].  7. Click Sign Up. You can now view your health information.    For assistance activating your [employer] account, call [phone]

## 2017-02-22 NOTE — DISCHARGE INSTRUCTIONS
Alcohol Use Disorder  Alcohol use disorder is a mental disorder. It is not a one-time incident of heavy drinking. Alcohol use disorder is the excessive and uncontrollable use of alcohol over time that leads to problems with functioning in one or more areas of daily living. People with this disorder risk harming themselves and others when they drink to excess. Alcohol use disorder also can cause other mental disorders, such as mood and anxiety disorders, and serious physical problems. People with alcohol use disorder often misuse other drugs.   Alcohol use disorder is common and widespread. Some people with this disorder drink alcohol to cope with or escape from negative life events. Others drink to relieve chronic pain or symptoms of mental illness. People with a family history of alcohol use disorder are at higher risk of losing control and using alcohol to excess.   Drinking too much alcohol can cause injury, accidents, and health problems. One drink can be too much when you are:  · Working.  · Pregnant or breastfeeding.  · Taking medicines. Ask your doctor.  · Driving or planning to drive.  SYMPTOMS   Signs and symptoms of alcohol use disorder may include the following:   · Consumption of alcohol in larger amounts or over a longer period of time than intended.  · Multiple unsuccessful attempts to cut down or control alcohol use.    · A great deal of time spent obtaining alcohol, using alcohol, or recovering from the effects of alcohol (hangover).  · A strong desire or urge to use alcohol (cravings).    · Continued use of alcohol despite problems at work, school, or home because of alcohol use.    · Continued use of alcohol despite problems in relationships because of alcohol use.  · Continued use of alcohol in situations when it is physically hazardous, such as driving a car.  · Continued use of alcohol despite awareness of a physical or psychological problem that is likely related to alcohol use. Physical  problems related to alcohol use can involve the brain, heart, liver, stomach, and intestines. Psychological problems related to alcohol use include intoxication, depression, anxiety, psychosis, delirium, and dementia.    · The need for increased amounts of alcohol to achieve the same desired effect, or a decreased effect from the consumption of the same amount of alcohol (tolerance).  · Withdrawal symptoms upon reducing or stopping alcohol use, or alcohol use to reduce or avoid withdrawal symptoms. Withdrawal symptoms include:  · Racing heart.  · Hand tremor.  · Difficulty sleeping.  · Nausea.  · Vomiting.  · Hallucinations.  · Restlessness.  · Seizures.  DIAGNOSIS  Alcohol use disorder is diagnosed through an assessment by your health care provider. Your health care provider may start by asking three or four questions to screen for excessive or problematic alcohol use. To confirm a diagnosis of alcohol use disorder, at least two symptoms must be present within a 12-month period. The severity of alcohol use disorder depends on the number of symptoms:  · Mild--two or three.  · Moderate--four or five.  · Severe--six or more.  Your health care provider may perform a physical exam or use results from lab tests to see if you have physical problems resulting from alcohol use. Your health care provider may refer you to a mental health professional for evaluation.  TREATMENT   Some people with alcohol use disorder are able to reduce their alcohol use to low-risk levels. Some people with alcohol use disorder need to quit drinking alcohol. When necessary, mental health professionals with specialized training in substance use treatment can help. Your health care provider can help you decide how severe your alcohol use disorder is and what type of treatment you need. The following forms of treatment are available:   · Detoxification. Detoxification involves the use of prescription medicines to prevent alcohol withdrawal  symptoms in the first week after quitting. This is important for people with a history of symptoms of withdrawal and for heavy drinkers who are likely to have withdrawal symptoms. Alcohol withdrawal can be dangerous and, in severe cases, cause death. Detoxification is usually provided in a hospital or in-patient substance use treatment facility.  · Counseling or talk therapy. Talk therapy is provided by substance use treatment counselors. It addresses the reasons people use alcohol and ways to keep them from drinking again. The goals of talk therapy are to help people with alcohol use disorder find healthy activities and ways to cope with life stress, to identify and avoid triggers for alcohol use, and to handle cravings, which can cause relapse.  · Medicines. Different medicines can help treat alcohol use disorder through the following actions:  · Decrease alcohol cravings.  · Decrease the positive reward response felt from alcohol use.  · Produce an uncomfortable physical reaction when alcohol is used (aversion therapy).  · Support groups. Support groups are run by people who have quit drinking. They provide emotional support, advice, and guidance.  These forms of treatment are often combined. Some people with alcohol use disorder benefit from intensive combination treatment provided by specialized substance use treatment centers. Both inpatient and outpatient treatment programs are available.     This information is not intended to replace advice given to you by your health care provider. Make sure you discuss any questions you have with your health care provider.     Document Released: 01/25/2006 Document Revised: 01/08/2016 Document Reviewed: 03/27/2014  [employer] Interactive Patient Education ©2016 [employer]    Chemical Dependency  Chemical dependency is an addiction to drugs or alcohol. It is characterized by the repeated behavior of seeking out and using drugs and alcohol despite harmful consequences to  the health and safety of ones self and others.   RISK FACTORS  There are certain situations or behaviors that increase a person's risk for chemical dependency. These include:  · A family history of chemical dependency.  · A history of mental health issues, including depression and anxiety.  · A home environment where drugs and alcohol are easily available to you.  · Drug or alcohol use at a young age.  SYMPTOMS   The following symptoms can indicate chemical dependency:  · Inability to limit the use of drugs or alcohol.  · Nausea, sweating, shakiness, and anxiety that occurs when alcohol or drugs are not being used.  · An increase in amount of drugs or alcohol that is necessary to get drunk or high.  People who experience these symptoms can assess their use of drugs and alcohol by asking themselves the following questions:  · Have you been told by friends or family that they are worried about your use of alcohol or drugs?  · Do friends and family ever tell you about things you did while drinking alcohol or using drugs that you do not remember?  · Do you lie about using alcohol or drugs or about the amounts you use?  · Do you have difficulty completing daily tasks unless you use alcohol or drugs?  · Is the level of your work or school performance lower because of your drug or alcohol use?  · Do you get sick from using drugs or alcohol but keep using anyway?  · Do you feel uncomfortable in social situations unless you use alcohol or drugs?  · Do you use drugs or alcohol to help forget problems?   An answer of yes to any of these questions may indicate chemical dependency. Professional evaluation is suggested.     This information is not intended to replace advice given to you by your health care provider. Make sure you discuss any questions you have with your health care provider.     Document Released: 12/12/2002 Document Revised: 03/11/2013 Document Reviewed: 02/23/2012  [employer] Interactive Patient Education ©2016  "[employer]  How Much is Too Much Alcohol?  Drinking too much alcohol can cause injury, accidents, and health problems. These types of problems can include:   · Car crashes.  · Falls.  · Family fighting (domestic violence).  · Drowning.  · Fights.  · Injuries.  · Burns.  · Damage to certain organs.  · Having a baby with birth defects.  ONE DRINK CAN BE TOO MUCH WHEN YOU ARE:  · Working.  · Pregnant or breastfeeding.  · Taking medicines. Ask your doctor.  · Driving or planning to drive.  WHAT IS A STANDARD DRINK?   · 1 regular beer (12 ounces or 360 milliliters).  · 1 glass of wine (5 ounces or 150 milliliters).  · 1 shot of liquor (1.5 ounces or 45 milliliters).  BLOOD ALCOHOL LEVELS   · .00 A person is sober.  · .03 A person has no trouble keeping balance, talking, or seeing right, but a \"buzz\" may be felt.  · .05 A person feels \"buzzed\" and relaxed.  · .08 or .10  A person is drunk. He or she has trouble talking, seeing right, and keeping his or her balance.  · .15 A person loses body control and may pass out (blackout).  · .20 A person has trouble walking (staggering) and throws up (vomits).  · .30 A person will pass out (unconscious).  · .40+ A person will be in a coma. Death is possible.  If you or someone you know has a drinking problem, get help from a doctor.   Document Released: 10/14/2010 Document Revised: 03/11/2013 Document Reviewed: 10/14/2010  "[employer]® Patient Information ©2014 [facility].    "

## 2017-02-22 NOTE — ED AVS SNAPSHOT
Home Care Instructions                                                                                                                Gopal Ceja   MRN: [id number]    Department:  [facility], Emergency Dept   Date of Visit:  2/22/2017            [facility], Emergency Dept    [address] [address]    Phone:  [phone]      You were seen by     Garrison Emerson M.D.      Your Diagnosis Was     Alcohol dependence with other alcohol-induced disorder (CMS-HCC)     F10.288       These are the medications you received during your hospitalization from 02/22/2017 0625 to 02/22/2017 1319     Date/Time Order Dose Route Action    02/22/2017 0851 NS infusion 2,000 mL 2,000 mL Intravenous New Bag    02/22/2017 0850 ondansetron (ZOFRAN) syringe/vial injection 4 mg 4 mg Intravenous Given    02/22/2017 0850 lorazepam (ATIVAN) injection 1 mg 1 mg Intravenous Given    02/22/2017 1045 lorazepam (ATIVAN) injection 1 mg 1 mg Intravenous Given    02/22/2017 1045 ondansetron (ZOFRAN) syringe/vial injection 4 mg 4 mg Intravenous Given    02/22/2017 1200 NS infusion 1,000 mL 1,000 mL Intravenous New Bag    02/22/2017 1239 lorazepam (ATIVAN) injection 1 mg 1 mg Intravenous Given      Follow-up Information     1. Follow up with [facility] In 2 days.    Contact information    [address] [place] [id number] [phone]      Medication Information     Review all of your home medications and newly ordered medications with your primary doctor and/or pharmacist as soon as possible. Follow medication instructions as directed by your doctor and/or pharmacist.     Please keep your complete medication list with you and share with your physician. Update the information when medications are discontinued, doses are changed, or new medications (including over-the-counter products) are added; and carry medication information at all times in the event of emergency situations.               Medication List      START taking these medications        Instructions    ondansetron 4 MG Tabs tablet   Commonly known as:  ZOFRAN    Take 1 Tab by mouth every 8 hours as needed.   Dose:  4 mg               Procedures and tests performed during your visit     Procedure/Test Number of Times Performed    ACCU-CHEK GLUCOSE 2    CBC WITH DIFFERENTIAL 1    COMP METABOLIC PANEL 1    DIAGNOSTIC ALCOHOL 1    ESTIMATED GFR 1    LIPASE 1    URINE DRUG SCREEN (TRIAGE) 1        Discharge Instructions       Alcohol Use Disorder  Alcohol use disorder is a mental disorder. It is not a one-time incident of heavy drinking. Alcohol use disorder is the excessive and uncontrollable use of alcohol over time that leads to problems with functioning in one or more areas of daily living. People with this disorder risk harming themselves and others when they drink to excess. Alcohol use disorder also can cause other mental disorders, such as mood and anxiety disorders, and serious physical problems. People with alcohol use disorder often misuse other drugs.   Alcohol use disorder is common and widespread. Some people with this disorder drink alcohol to cope with or escape from negative life events. Others drink to relieve chronic pain or symptoms of mental illness. People with a family history of alcohol use disorder are at higher risk of losing control and using alcohol to excess.   Drinking too much alcohol can cause injury, accidents, and health problems. One drink can be too much when you are:  · Working.  · Pregnant or breastfeeding.  · Taking medicines. Ask your doctor.  · Driving or planning to drive.  SYMPTOMS   Signs and symptoms of alcohol use disorder may include the following:   · Consumption of alcohol in larger amounts or over a longer period of time than intended.  · Multiple unsuccessful attempts to cut down or control alcohol use.    · A great deal of time spent obtaining alcohol, using alcohol, or recovering from  the effects of alcohol (hangover).  · A strong desire or urge to use alcohol (cravings).    · Continued use of alcohol despite problems at work, school, or home because of alcohol use.    · Continued use of alcohol despite problems in relationships because of alcohol use.  · Continued use of alcohol in situations when it is physically hazardous, such as driving a car.  · Continued use of alcohol despite awareness of a physical or psychological problem that is likely related to alcohol use. Physical problems related to alcohol use can involve the brain, heart, liver, stomach, and intestines. Psychological problems related to alcohol use include intoxication, depression, anxiety, psychosis, delirium, and dementia.    · The need for increased amounts of alcohol to achieve the same desired effect, or a decreased effect from the consumption of the same amount of alcohol (tolerance).  · Withdrawal symptoms upon reducing or stopping alcohol use, or alcohol use to reduce or avoid withdrawal symptoms. Withdrawal symptoms include:  · Racing heart.  · Hand tremor.  · Difficulty sleeping.  · Nausea.  · Vomiting.  · Hallucinations.  · Restlessness.  · Seizures.  DIAGNOSIS  Alcohol use disorder is diagnosed through an assessment by your health care provider. Your health care provider may start by asking three or four questions to screen for excessive or problematic alcohol use. To confirm a diagnosis of alcohol use disorder, at least two symptoms must be present within a 12-month period. The severity of alcohol use disorder depends on the number of symptoms:  · Mild--two or three.  · Moderate--four or five.  · Severe--six or more.  Your health care provider may perform a physical exam or use results from lab tests to see if you have physical problems resulting from alcohol use. Your health care provider may refer you to a mental health professional for evaluation.  TREATMENT   Some people with alcohol use disorder are able to  reduce their alcohol use to low-risk levels. Some people with alcohol use disorder need to quit drinking alcohol. When necessary, mental health professionals with specialized training in substance use treatment can help. Your health care provider can help you decide how severe your alcohol use disorder is and what type of treatment you need. The following forms of treatment are available:   · Detoxification. Detoxification involves the use of prescription medicines to prevent alcohol withdrawal symptoms in the first week after quitting. This is important for people with a history of symptoms of withdrawal and for heavy drinkers who are likely to have withdrawal symptoms. Alcohol withdrawal can be dangerous and, in severe cases, cause death. Detoxification is usually provided in a hospital or in-patient substance use treatment facility.  · Counseling or talk therapy. Talk therapy is provided by substance use treatment counselors. It addresses the reasons people use alcohol and ways to keep them from drinking again. The goals of talk therapy are to help people with alcohol use disorder find healthy activities and ways to cope with life stress, to identify and avoid triggers for alcohol use, and to handle cravings, which can cause relapse.  · Medicines. Different medicines can help treat alcohol use disorder through the following actions:  · Decrease alcohol cravings.  · Decrease the positive reward response felt from alcohol use.  · Produce an uncomfortable physical reaction when alcohol is used (aversion therapy).  · Support groups. Support groups are run by people who have quit drinking. They provide emotional support, advice, and guidance.  These forms of treatment are often combined. Some people with alcohol use disorder benefit from intensive combination treatment provided by specialized substance use treatment centers. Both inpatient and outpatient treatment programs are available.     This information is not  intended to replace advice given to you by your health care provider. Make sure you discuss any questions you have with your health care provider.     Document Released: 01/25/2006 Document Revised: 01/08/2016 Document Reviewed: 03/27/2014  [employer] Interactive Patient Education ©2016 [employer]    Chemical Dependency  Chemical dependency is an addiction to drugs or alcohol. It is characterized by the repeated behavior of seeking out and using drugs and alcohol despite harmful consequences to the health and safety of ones self and others.   RISK FACTORS  There are certain situations or behaviors that increase a person's risk for chemical dependency. These include:  · A family history of chemical dependency.  · A history of mental health issues, including depression and anxiety.  · A home environment where drugs and alcohol are easily available to you.  · Drug or alcohol use at a young age.  SYMPTOMS   The following symptoms can indicate chemical dependency:  · Inability to limit the use of drugs or alcohol.  · Nausea, sweating, shakiness, and anxiety that occurs when alcohol or drugs are not being used.  · An increase in amount of drugs or alcohol that is necessary to get drunk or high.  People who experience these symptoms can assess their use of drugs and alcohol by asking themselves the following questions:  · Have you been told by friends or family that they are worried about your use of alcohol or drugs?  · Do friends and family ever tell you about things you did while drinking alcohol or using drugs that you do not remember?  · Do you lie about using alcohol or drugs or about the amounts you use?  · Do you have difficulty completing daily tasks unless you use alcohol or drugs?  · Is the level of your work or school performance lower because of your drug or alcohol use?  · Do you get sick from using drugs or alcohol but keep using anyway?  · Do you feel uncomfortable in social situations unless you use  "alcohol or drugs?  · Do you use drugs or alcohol to help forget problems?   An answer of yes to any of these questions may indicate chemical dependency. Professional evaluation is suggested.     This information is not intended to replace advice given to you by your health care provider. Make sure you discuss any questions you have with your health care provider.     Document Released: 12/12/2002 Document Revised: 03/11/2013 Document Reviewed: 02/23/2012  [employer] Interactive Patient Education ©2016 [employer]  How Much is Too Much Alcohol?  Drinking too much alcohol can cause injury, accidents, and health problems. These types of problems can include:   · Car crashes.  · Falls.  · Family fighting (domestic violence).  · Drowning.  · Fights.  · Injuries.  · Burns.  · Damage to certain organs.  · Having a baby with birth defects.  ONE DRINK CAN BE TOO MUCH WHEN YOU ARE:  · Working.  · Pregnant or breastfeeding.  · Taking medicines. Ask your doctor.  · Driving or planning to drive.  WHAT IS A STANDARD DRINK?   · 1 regular beer (12 ounces or 360 milliliters).  · 1 glass of wine (5 ounces or 150 milliliters).  · 1 shot of liquor (1.5 ounces or 45 milliliters).  BLOOD ALCOHOL LEVELS   · .00 A person is sober.  · .03 A person has no trouble keeping balance, talking, or seeing right, but a \"buzz\" may be felt.  · .05 A person feels \"buzzed\" and relaxed.  · .08 or .10  A person is drunk. He or she has trouble talking, seeing right, and keeping his or her balance.  · .15 A person loses body control and may pass out (blackout).  · .20 A person has trouble walking (staggering) and throws up (vomits).  · .30 A person will pass out (unconscious).  · .40+ A person will be in a coma. Death is possible.  If you or someone you know has a drinking problem, get help from a doctor.   Document Released: 10/14/2010 Document Revised: 03/11/2013 Document Reviewed: 10/14/2010  [employer]® Patient Information ©2014 [employer].            "   Patient Information     Patient Information    Following emergency treatment: all patient requiring follow-up care must return either to a private physician or a clinic if your condition worsens before you are able to obtain further medical attention, please return to the emergency room.     Billing Information    At [facility], we work to make the billing process streamlined for our patients.  Our Representatives are here to answer any questions you may have regarding your hospital bill.  If you have insurance coverage and have supplied your insurance information to us, we will submit a claim to your insurer on your behalf.  Should you have any questions regarding your bill, we can be reached online or by phone as follows:  Online: You are able pay your bills online or live chat with our representatives about any billing questions you may have. We are here to help Monday - Friday from 8:00am to 7:30pm and 9:00am - 12:00pm on Saturdays.  Please visit [email or web address]  for more information.   Phone:  [phone] or [phone]    Please note that your emergency physician, surgeon, pathologist, radiologist, anesthesiologist, and other specialists are not employed by [facility] and will therefore bill separately for their services.  Please contact them directly for any questions concerning their bills at the numbers below:     Emergency Physician Services:  [phone]  [facility]:  [phone]  [facility]:  [phone]  [facility]:  [phone]    1. Your final bill may vary from the amount quoted upon discharge if all procedures are not complete at that time, or if your doctor has additional procedures of which we are not aware. You will receive an additional bill if you return to the Emergency Department at [facility] for suture removal regardless of the facility of which the sutures were placed.     2. Please  arrange for settlement of this account at the emergency registration.    3. All self-pay accounts are due in full at the time of treatment.  If you are unable to meet this obligation then payment is expected within 4-5 days.     4. If you have had radiology studies (CT, X-ray, Ultrasound, MRI), you have received a preliminary result during your emergency department visit. Please contact the radiology department [phone] to receive a copy of your final result. Please discuss the Final result with your primary physician or with the follow up physician provided.     Crisis Hotline:  [place] Crisis Hotline:  4-478-EAGVIUU or 1-290.962.5509  [place] Crisis Hotline:    1-674.772.9398 or 948-721-1508         ED Discharge Follow Up Questions    1. In order to provide you with very good care, we would like to follow up with a phone call in the next few days.  May we have your permission to contact you?     YES /  NO    2. What is the best phone number to call you? (       )_____-__________    3. What is the best time to call you?      Morning  /  Afternoon  /  Evening                   Patient Signature:  ____________________________________________________________    Date:  ____________________________________________________________

## 2017-02-22 NOTE — ED NOTES
Chief Complaint   Patient presents with   • Detox     Patient states that he is here to detox from ETOH.   Pt ambulatory to triage with above complaint. Pt returned to lobby, educated on triage process, and to inform staff of any changes or concerns.   Patient states that his last drink was yesterday morning.

## 2017-02-22 NOTE — ED NOTES
Pt. Ambulated to bathroom with steady gait. Pt. Instructed on and given supplies to obtain a urine sample.

## 2017-02-22 NOTE — ED NOTES
Pt assisted to restroom. Pt shaking. Patient thinks his blood sugar is really low. Rn into room to perform Accucheck

## 2017-02-22 NOTE — ED AVS SNAPSHOT
2/22/2017          Gopal Ceja  [address] [place] [id number]    Dear Gopal:    [facility] wants to ensure your discharge home is safe and you or your loved ones have had all your questions answered regarding your care after you leave the hospital.    You may receive a telephone call within two days of your discharge.  This call is to make certain you understand your discharge instructions as well as ensure we provided you with the best care possible during your stay with us.     The call will only last approximately 3-5 minutes and will be done by a nurse.    Once again, we want to ensure your discharge home is safe and that you have a clear understanding of any next steps in your care.  If you have any questions or concerns, please do not hesitate to contact us, we are here for you.  Thank you for choosing [facility] for your healthcare needs.    Sincerely,    Neal Lam    [facility]

## 2017-02-22 NOTE — ED NOTES
Pt. Provided tv dinner and additional fluids hung per MAR and Dr. Emerson. Pt. To be discharged after fluids have finished.

## 2017-03-27 ENCOUNTER — HOSPITAL ENCOUNTER (EMERGENCY)
Dept: HOSPITAL 8 - ED | Age: 32
Discharge: HOME | End: 2017-03-27
Payer: MEDICARE

## 2017-03-27 VITALS — DIASTOLIC BLOOD PRESSURE: 88 MMHG | SYSTOLIC BLOOD PRESSURE: 136 MMHG

## 2017-03-27 VITALS — HEIGHT: 68 IN | BODY MASS INDEX: 20.95 KG/M2 | WEIGHT: 138.23 LBS
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
## 2017-03-27 DIAGNOSIS — S91.112A: Primary | ICD-10-CM
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
## 2017-03-27 PROCEDURE — 12002 RPR S/N/AX/GEN/TRNK2.6-7.5CM: CPT

## 2017-03-27 PROCEDURE — 99284 EMERGENCY DEPT VISIT MOD MDM: CPT

## 2017-03-27 PROCEDURE — 90715 TDAP VACCINE 7 YRS/> IM: CPT

## 2017-03-27 PROCEDURE — 90471 IMMUNIZATION ADMIN: CPT

## 2017-03-27 PROCEDURE — 73630 X-RAY EXAM OF FOOT: CPT

## 2017-04-03 ENCOUNTER — HOSPITAL ENCOUNTER (EMERGENCY)
Dept: HOSPITAL 8 - ED | Age: 32
LOS: 1 days | Discharge: HOME | End: 2017-04-04
Payer: MEDICARE

## 2017-04-03 VITALS — HEIGHT: 66 IN | BODY MASS INDEX: 21.26 KG/M2 | WEIGHT: 132.28 LBS

## 2017-04-03 VITALS — DIASTOLIC BLOOD PRESSURE: 95 MMHG | SYSTOLIC BLOOD PRESSURE: 165 MMHG

## 2017-04-03 DIAGNOSIS — H10.33: ICD-10-CM

## 2017-04-03 DIAGNOSIS — F10.20: Primary | ICD-10-CM

## 2017-04-03 PROCEDURE — 99283 EMERGENCY DEPT VISIT LOW MDM: CPT

## 2017-04-04 ENCOUNTER — HOSPITAL ENCOUNTER (EMERGENCY)
Facility: MEDICAL CENTER | Age: 32
End: 2017-04-04
Attending: EMERGENCY MEDICINE
Payer: MEDICARE

## 2017-04-04 VITALS — BODY MASS INDEX: 22.73 KG/M2 | HEIGHT: 68 IN | WEIGHT: 150 LBS

## 2017-04-04 VITALS
HEART RATE: 124 BPM | RESPIRATION RATE: 26 BRPM | DIASTOLIC BLOOD PRESSURE: 99 MMHG | OXYGEN SATURATION: 94 % | HEIGHT: 68 IN | BODY MASS INDEX: 23.95 KG/M2 | SYSTOLIC BLOOD PRESSURE: 146 MMHG | WEIGHT: 158 LBS | TEMPERATURE: 98.9 F

## 2017-04-04 DIAGNOSIS — H10.9 CONJUNCTIVITIS OF BOTH EYES, UNSPECIFIED CONJUNCTIVITIS TYPE: ICD-10-CM

## 2017-04-04 DIAGNOSIS — R44.3 HALLUCINATION: ICD-10-CM

## 2017-04-04 DIAGNOSIS — F10.920 ALCOHOL INTOXICATION, UNCOMPLICATED (HCC): ICD-10-CM

## 2017-04-04 PROCEDURE — 99283 EMERGENCY DEPT VISIT LOW MDM: CPT

## 2017-04-04 PROCEDURE — 99284 EMERGENCY DEPT VISIT MOD MDM: CPT

## 2017-04-04 RX ORDER — THIAMINE MONONITRATE (VIT B1) 100 MG
100 TABLET ORAL ONCE
Status: DISCONTINUED | OUTPATIENT
Start: 2017-04-04 | End: 2017-04-05 | Stop reason: HOSPADM

## 2017-04-04 RX ORDER — SODIUM CHLORIDE 9 MG/ML
1000 INJECTION, SOLUTION INTRAVENOUS ONCE
Status: DISCONTINUED | OUTPATIENT
Start: 2017-04-04 | End: 2017-04-05 | Stop reason: HOSPADM

## 2017-04-04 RX ORDER — LORAZEPAM 2 MG/ML
1 INJECTION INTRAMUSCULAR ONCE
Status: DISCONTINUED | OUTPATIENT
Start: 2017-04-04 | End: 2017-04-05 | Stop reason: HOSPADM

## 2017-04-04 RX ORDER — FOLIC ACID 1 MG/1
1 TABLET ORAL
Status: DISCONTINUED | OUTPATIENT
Start: 2017-04-04 | End: 2017-04-05 | Stop reason: HOSPADM

## 2017-04-04 ASSESSMENT — LIFESTYLE VARIABLES: REASON UNABLE TO ASSESS: REFUSED TO ANSWER

## 2017-04-04 NOTE — DISCHARGE INSTRUCTIONS
"Placed the erythromycin ointment and bilateral eyes 3 times a day for 5 days.    Conjunctivitis  Conjunctivitis is commonly called \"pink eye.\" Conjunctivitis can be caused by bacterial or viral infection, allergies, or injuries. There is usually redness of the lining of the eye, itching, discomfort, and sometimes discharge. There may be deposits of matter along the eyelids. A viral infection usually causes a watery discharge, while a bacterial infection causes a yellowish, thick discharge. Pink eye is very contagious and spreads by direct contact.  You may be given antibiotic eyedrops as part of your treatment. Before using your eye medicine, remove all drainage from the eye by washing gently with warm water and cotton balls. Continue to use the medication until you have awakened 2 mornings in a row without discharge from the eye. Do not rub your eye. This increases the irritation and helps spread infection. Use separate towels from other household members. Wash your hands with soap and water before and after touching your eyes. Use cold compresses to reduce pain and sunglasses to relieve irritation from light. Do not wear contact lenses or wear eye makeup until the infection is gone.  SEEK MEDICAL CARE IF:   · Your symptoms are not better after 3 days of treatment.  · You have increased pain or trouble seeing.  · The outer eyelids become very red or swollen.  Document Released: 01/25/2006 Document Revised: 03/11/2013 Document Reviewed: 12/18/2006  ExitCare® Patient Information ©2014 [employer].      Alcohol Abuse and Nutrition  Alcohol abuse is any pattern of alcohol consumption that harms your health, relationships, or work. Alcohol abuse can affect how your body breaks down and absorbs nutrients from food by causing your liver to work abnormally. Additionally, many people who abuse alcohol do not eat enough carbohydrates, protein, fat, vitamins, and minerals. This can cause poor nutrition (malnutrition) and a " lack of nutrients (nutrient deficiencies), which can lead to further complications.  Nutrients that are commonly lacking (deficient) among people who abuse alcohol include:  · Vitamins.  · Vitamin A. This is stored in your liver. It is important for your vision, metabolism, and ability to fight off infections (immunity).  · B vitamins. These include vitamins such as folate, thiamin, and niacin. These are important in new cell growth and maintenance.  · Vitamin C. This plays an important role in iron absorption, wound healing, and immunity.  · Vitamin D. This is produced by your liver, but you can also get vitamin D from food. Vitamin D is necessary for your body to absorb and use calcium.  · Minerals.  · Calcium. This is important for your bones and your heart and blood vessel (cardiovascular) function.  · Iron. This is important for blood, muscle, and nervous system functioning.  · Magnesium. This plays an important role in muscle and nerve function, and it helps to control blood sugar and blood pressure.  · Zinc. This is important for the normal function of your nervous system and digestive system (gastrointestinal tract).  Nutrition is an essential component of therapy for alcohol abuse. Your health care provider or dietitian will work with you to design a plan that can help restore nutrients to your body and prevent potential complications.  WHAT IS MY PLAN?  Your dietitian may develop a specific diet plan that is based on your condition and any other complications you may have. A diet plan will commonly include:  · A balanced diet.  ¨ Grains: 6-8 oz per day.  ¨ Vegetables: 2-3 cups per day.  ¨ Fruits: 1-2 cups per day.  ¨ Meat and other protein: 5-6 oz per day.  ¨ Dairy: 2-3 cups per day.  · Vitamin and mineral supplements.  WHAT DO I NEED TO KNOW ABOUT ALCOHOL AND NUTRITION?  · Consume foods that are high in antioxidants, such as grapes, berries, nuts, green tea, and dark green and orange vegetables. This can  help to counteract some of the stress that is placed on your liver by consuming alcohol.  · Avoid food and drinks that are high in fat and sugar. Foods such as sugared soft drinks, salty snack foods, and candy contain empty calories. This means that they lack important nutrients such as protein, fiber, and vitamins.  · Eat frequent meals and snacks. Try to eat 5-6 small meals each day.  · Eat a variety of fresh fruits and vegetables each day. This will help you get plenty of water, fiber, and vitamins in your diet.  · Drink plenty of water and other clear fluids. Try to drink at least 48-64 oz (1.5-2 L) of water per day.  · If you are a vegetarian, eat a variety of protein-rich foods. Pair whole grains with plant-based proteins at meals and snacks to obtain the greatest nutrient benefit from your food. For example, eat rice with beans, put peanut butter on whole-grain toast, or eat oatmeal with sunflower seeds.  · Soak beans and whole grains overnight before cooking. This can help your body to absorb the nutrients more easily.  · Include foods fortified with vitamins and minerals in your diet. Commonly fortified foods include milk, orange juice, cereal, and bread.  · If you are malnourished, your dietitian may recommend a high-protein, high-calorie diet. This may include:  ¨ 2,000-3,000 calories (kilocalories) per day.  ¨  grams of protein per day.  · Your health care provider may recommend a complete nutritional supplement beverage. This can help to restore calories, protein, and vitamins to your body. Depending on your condition, you may be advised to consume this instead of or in addition to meals.  · Limit your intake of caffeine. Replace drinks like coffee and black tea with decaffeinated coffee and herbal tea.  · Eat a variety of foods that are high in omega fatty acids. These include fish, nuts and seeds, and soybeans. These foods may help your liver to recover and may also stabilize your  mood.  · Certain medicines may cause changes in your appetite, taste, and weight. Work with your health care provider and dietitian to make any adjustments to your medicines and diet plan.  · Include other healthy lifestyle choices in your daily routine.  ¨ Be physically active.  ¨ Get enough sleep.  ¨ Spend time doing activities that you enjoy.  · If you are unable to take in enough food and calories by mouth, your health care provider may recommend a feeding tube. This is a tube that passes through your nose and throat, directly into your stomach. Nutritional supplement beverages can be given to you through the feeding tube to help you get the nutrients you need.  · Take vitamin or mineral supplements as recommended by your health care provider.  WHAT FOODS CAN I EAT?  Grains  Enriched pasta. Enriched rice. Fortified whole-grain bread. Fortified whole-grain cereal. Barley. Brown rice. Quinoa. Millet.  Vegetables  All fresh, frozen, and canned vegetables. Spinach. Kale. Artichoke. Carrots. Winter squash and pumpkin. Sweet potatoes. Broccoli. Cabbage. Cucumbers. Tomatoes. Sweet peppers. Green beans. Peas. Corn.  Fruits  All fresh and frozen fruits. Berries. Grapes. [place]. Papaya. Guava. Cherries. Apples. Bananas. Peaches. Plums. Pineapple. Watermelon. Cantaloupe. Oranges. Avocado.  Meats and Other Protein Sources  Beef liver. Lean beef. Pork. Fresh and canned chicken. Fresh fish. Oysters. Sardines. Canned tuna. Shrimp. Eggs with yolks. Nuts and seeds. Peanut butter. Beans and lentils. Soybeans. Tofu.  Dairy  Whole, low-fat, and nonfat milk. Whole, low-fat, and nonfat yogurt. Cottage cheese. Sour cream. Hard and soft cheeses.  Beverages  Water. Herbal tea. Decaffeinated coffee. Decaffeinated green tea. 100% fruit juice. 100% vegetable juice. Instant breakfast shakes.  Condiments  Ketchup. Mayonnaise. Mustard. Salad dressing. Barbecue sauce.  Sweets and Desserts  Sugar-free ice cream. Sugar-free pudding. Sugar-free  gelatin.  Fats and Oils  Butter. Vegetable oil, flaxseed oil, olive oil, and walnut oil.  Other  Complete nutrition shakes. Protein bars. Sugar-free gum.  The items listed above may not be a complete list of recommended foods or beverages. Contact your dietitian for more options.  WHAT FOODS ARE NOT RECOMMENDED?  Grains  Sugar-sweetened breakfast cereals. Flavored instant oatmeal. Fried breads.  Vegetables  Breaded or deep-fried vegetables.  Fruits  Dried fruit with added sugar. Candied fruit. Canned fruit in syrup.  Meats and Other Protein Sources  Breaded or deep-fried meats.  Dairy  Flavored milks. Fried cheese curds or fried cheese sticks.  Beverages  Alcohol. Sugar-sweetened soft drinks. Sugar-sweetened tea. Caffeinated coffee and tea.  Condiments  Sugar. Honey. Agave nectar. Molasses.  Sweets and Desserts  Chocolate. Cake. Cookies. Candy.  Other  Potato chips. Pretzels. Salted nuts. Candied nuts.  The items listed above may not be a complete list of foods and beverages to avoid. Contact your dietitian for more information.     This information is not intended to replace advice given to you by your health care provider. Make sure you discuss any questions you have with your health care provider.     Document Released: 10/12/2006 Document Revised: 01/08/2016 Document Reviewed: 07/21/2015  [employer] Interactive Patient Education ©2016 [employer]

## 2017-04-04 NOTE — ED PROVIDER NOTES
ED Provider Note    Scribed for Michael Owusu D.O. by Chin Mccall. 4/4/2017  2:55 PM    Primary care provider: Pcp Pt States None  Means of arrival: Ambulance  History obtained from: Patient  History limited by: None    CHIEF COMPLAINT  Chief Complaint   Patient presents with   • Detox     Pt reports he is here for ETOH detox and that he has been having hallucinations   • Hallucinations       HPI  Gopal Ceja is a 31 y.o. male who presents to the Emergency Department stating he's been drinking alcohol earlier today and may be having hallucinations. The patient denies suicidal or homicidal ideations, didn't denies wanting to have rehab. When I saw him, he stated he wanted to leave the hospital. He denies fever, shakes, chills, sweats. He does complain of slight discharge from bilateral eyes no visual changes.    REVIEW OF SYSTEMS  Pertinent positives include discharge from bilateral eyes, slight red eyes. Pertinent negatives include no visual changes.  All other systems reviewed and negative.    PAST MEDICAL HISTORY  Past Medical History   Diagnosis Date   • Ectrodactyly-ectodermal dysplasia-clefting syndrome    • Acute anxiety    • ETOH abuse    • Psychiatric disorder      anxiety/panic disorder   • Bipolar affective (CMS-HCC)    • ADHD (attention deficit hyperactivity disorder)    • ADHD (attention deficit hyperactivity disorder)    • Depression        SURGICAL HISTORY  Past Surgical History   Procedure Laterality Date   • Other orthopedic surgery       hands bilaterally r/t EEDC syndrome        SOCIAL HISTORY  Social History   Substance Use Topics   • Smoking status: Former Smoker -- 0.25 packs/day     Types: Cigarettes   • Smokeless tobacco: Never Used   • Alcohol Use: Yes      Comment: 8 earthquakes 10% 24 oz malt liquor/day      History   Drug Use No       FAMILY HISTORY  None noted    CURRENT MEDICATIONS  Home Medications     **Home medications have not yet been reviewed for this encounter**  "         ALLERGIES  No Known Allergies    PHYSICAL EXAM  VITAL SIGNS: /99 mmHg  Pulse 124  Temp(Src) 37.2 °C (98.9 °F)  Resp 26  Ht 1.727 m (5' 8\")  Wt 71.668 kg (158 lb)  BMI 24.03 kg/m2  SpO2 94%    Nursing notes and vitals reviewed.  Constitutional: Well developed, Well nourished, No acute distress, Non-toxic appearance.   Eyes: PERRLA, EOMI, scleral injection bilaterally, slight matting in the medial canthus bilaterally, no evidence of corneal abrasion or ulceration  Cardiovascular: Slightly tachycardic Normal rhythm, No murmurs, No rubs, No gallops.   Thorax & Lungs: No respiratory distress, No rales, No rhonchi, No wheezing, No chest tenderness.   Abdomen: Bowel sounds normal, Soft, No tenderness, No guarding, No rebound, No masses, No pulsatile masses.   Skin: Warm, Dry, No erythema, No rash.   Musculoskeletal: The patient does have ectrodactyly of the bilateral upper extremities   Neurologic: Alert & oriented x 3, he can tell me the day of the week, the month, the president of [place], where he is what time it is, Normal motor function, Normal sensory function, No focal deficits noted.  Psychiatric: Slightly anxious, denies suicidal or homicidal ideation      COURSE & MEDICAL DECISION MAKING  Pertinent Labs & Imaging studies reviewed. (See chart for details)    2:55 PM - Patient seen and examined at bedside.     This is a 31-year-old male with alcohol intoxication and conjunctivitis. He has the capacity to make a decision as he is talking in full sentences. He states that he drank earlier today but now does not want to be reevaluated or helped. He does have evidence of conjunctivitis and has received erythromycin ointment. The patient left against medical advice.    The patient will return for new or worsening symptoms and is stable at the time of discharge.    The patient is referred to a primary physician for blood pressure management, diabetic screening, and for all other preventative " health concerns.    DISPOSITION:  Patient will be discharged home in stable condition.    FOLLOW UP:  [facility], Emergency Dept  [address] [id number] [phone]    for reevaluation    [address] [address]  [phone]  In 3 days      Garrison Hathaway M.D.  [address] [place] [address]  [phone]    Schedule an appointment as soon as possible for a visit        OUTPATIENT MEDICATIONS:  Discharge Medication List as of 4/4/2017  3:03 PM         The patient received erythromycin ointment and instructions to place in bilateral eyes 3 times daily for 5 days.     The patient is leaving against medical advice.  I discussed with the patient the risks of leaving without receiving appropriate care to include permanent disability or death.  I have discussed possible alternatives.  The patient is not intoxicated.  The patient is a capable decision maker and understands the risks and benefits of their decision.  While I certainly disagree with the patient's decision, I respect the patient's autonomy and will not keep them here against their will.  They understand that their decision to leave can be reversed at any time and they can return to us at any time for any reason at all.  I have given the patient discharge instructions, follow-up instructions. The patient would not wait for me to discuss his follow-up. The patient left AWOL.    The patient will return for new or worsening symptoms and is stable at the time of discharge.    The patient is referred to a primary physician for blood pressure management, diabetic screening, and for all other preventative health concerns.    DISPOSITION:  Patient will be discharged home in stable condition.    FOLLOW UP:  [facility], Emergency Dept  [address] [address]  [phone]    for reevaluation    [address]  [place] [address]  [phone]  In 3 days      Garrison Hathaway M.D.  [address] [place] [address]  [phone]    Schedule an appointment as soon as possible for a visit        OUTPATIENT MEDICATIONS:  Discharge Medication List as of 4/4/2017  3:03 PM              FINAL IMPRESSION  1. Conjunctivitis of both eyes, unspecified conjunctivitis type    2. Alcohol intoxication, uncomplicated (CMS-[facility])          IChin (Scribe), am scribing for, and in the presence of, Michael Owusu D.O    Electronically signed by: Chin Mccall (Scribe), 4/4/2017    IMichael D.O. personally performed the services described in this documentation, as scribed by Chin Mccall in my presence, and it is both accurate and complete.    The note accurately reflects work and decisions made by me.  Michael Owusu  4/4/2017  7:34 PM

## 2017-04-05 ENCOUNTER — HOSPITAL ENCOUNTER (EMERGENCY)
Facility: MEDICAL CENTER | Age: 32
End: 2017-04-05
Attending: EMERGENCY MEDICINE
Payer: MEDICARE

## 2017-04-05 ENCOUNTER — HOSPITAL ENCOUNTER (EMERGENCY)
Dept: HOSPITAL 8 - ED | Age: 32
Discharge: LEFT BEFORE DISCHARGE | End: 2017-04-05
Payer: MEDICARE

## 2017-04-05 VITALS — WEIGHT: 143.3 LBS | BODY MASS INDEX: 20.52 KG/M2 | HEIGHT: 70 IN

## 2017-04-05 VITALS
SYSTOLIC BLOOD PRESSURE: 143 MMHG | OXYGEN SATURATION: 95 % | WEIGHT: 149.91 LBS | TEMPERATURE: 98.2 F | HEART RATE: 129 BPM | BODY MASS INDEX: 22.8 KG/M2 | RESPIRATION RATE: 17 BRPM | DIASTOLIC BLOOD PRESSURE: 90 MMHG

## 2017-04-05 VITALS — DIASTOLIC BLOOD PRESSURE: 56 MMHG | SYSTOLIC BLOOD PRESSURE: 106 MMHG

## 2017-04-05 DIAGNOSIS — F10.220: Primary | ICD-10-CM

## 2017-04-05 DIAGNOSIS — F10.239 ALCOHOL DEPENDENCE WITH WITHDRAWAL WITH COMPLICATION (HCC): ICD-10-CM

## 2017-04-05 DIAGNOSIS — F32.9: ICD-10-CM

## 2017-04-05 LAB
APAP SERPL-MCNC: < 2 MCG/ML (ref 10–30)
BUN SERPL-MCNC: 12 MG/DL (ref 7–18)
HGB BLD-MCNC: 15.2 G/DL (ref 13.7–18)

## 2017-04-05 PROCEDURE — 99285 EMERGENCY DEPT VISIT HI MDM: CPT

## 2017-04-05 PROCEDURE — 96375 TX/PRO/DX INJ NEW DRUG ADDON: CPT

## 2017-04-05 PROCEDURE — 85025 COMPLETE CBC W/AUTO DIFF WBC: CPT

## 2017-04-05 PROCEDURE — 96374 THER/PROPH/DIAG INJ IV PUSH: CPT

## 2017-04-05 PROCEDURE — A9270 NON-COVERED ITEM OR SERVICE: HCPCS | Performed by: EMERGENCY MEDICINE

## 2017-04-05 PROCEDURE — 80048 BASIC METABOLIC PNL TOTAL CA: CPT

## 2017-04-05 PROCEDURE — 700102 HCHG RX REV CODE 250 W/ 637 OVERRIDE(OP): Performed by: EMERGENCY MEDICINE

## 2017-04-05 PROCEDURE — 96372 THER/PROPH/DIAG INJ SC/IM: CPT

## 2017-04-05 PROCEDURE — 80329 ANALGESICS NON-OPIOID 1 OR 2: CPT

## 2017-04-05 PROCEDURE — 99284 EMERGENCY DEPT VISIT MOD MDM: CPT

## 2017-04-05 PROCEDURE — 93005 ELECTROCARDIOGRAM TRACING: CPT

## 2017-04-05 PROCEDURE — G0480 DRUG TEST DEF 1-7 CLASSES: HCPCS

## 2017-04-05 PROCEDURE — 96361 HYDRATE IV INFUSION ADD-ON: CPT

## 2017-04-05 PROCEDURE — 36415 COLL VENOUS BLD VENIPUNCTURE: CPT

## 2017-04-05 PROCEDURE — 82040 ASSAY OF SERUM ALBUMIN: CPT

## 2017-04-05 PROCEDURE — 80307 DRUG TEST PRSMV CHEM ANLYZR: CPT

## 2017-04-05 RX ORDER — LORAZEPAM 1 MG/1
2 TABLET ORAL ONCE
Status: COMPLETED | OUTPATIENT
Start: 2017-04-05 | End: 2017-04-05

## 2017-04-05 RX ADMIN — LORAZEPAM 2 MG: 1 TABLET ORAL at 19:13

## 2017-04-05 NOTE — ED NOTES
Pt sitting outside of nurse's station.  Pt AOx4.  Pt states he wants to leave the hospital.  Pt ambulated with steady gait out of ED.  ERP notified.  AMA form placed with chart.

## 2017-04-05 NOTE — ED NOTES
Pt brought in by KAYLAN.  Per Kaylan, pt was found sitting on a curb.  Pt states he is having hallucinations.  Pt states that he is seeing random things and hearing sirens.  Pt denies SI/HI at this time.  Pt ambulatory to room with steady gait.

## 2017-04-05 NOTE — ED PROVIDER NOTES
"ED Provider Note    Scribed for Dandre Hamm M.D. by Josefa Spence. 4/4/2017, 10:21 PM.    Primary care provider: Pcp Pt States None  Means of arrival: [facility]  History obtained from: Patient  History limited by: None    CHIEF COMPLAINT  Chief Complaint   Patient presents with   • Hallucinations     Pt states \"I am seeing random things and hearing sirens\"       HPI  Gopal Ceja is a 31 y.o. male with a past medical history of bipolar disorder who presents to the Emergency Department for hallucinations. Patient reports \"I am seeing random things and hearing sirens.\" He states seeing \"mostly cloths that turn into faces and it's making me panic.\" Patient reports he last stopped drinking this morning because he ran out of money. Per patient, \"my whole body hurts.\" He states he is trying to detox and confirms that he hallucinates with withdrawals. Denies SI/HI.     REVIEW OF SYSTEMS  Pertinent positives include hallucinations. Pertinent negatives include no SI, HI.  All other systems reviewed and negative.    PAST MEDICAL HISTORY   has a past medical history of Ectrodactyly-ectodermal dysplasia-clefting syndrome; Acute anxiety; ETOH abuse; Psychiatric disorder; Bipolar affective (CMS-HCC); ADHD (attention deficit hyperactivity disorder); ADHD (attention deficit hyperactivity disorder); and Depression.    SURGICAL HISTORY   has past surgical history that includes other orthopedic surgery.    SOCIAL HISTORY  Social History   Substance Use Topics   • Smoking status: Former Smoker -- 0.25 packs/day     Types: Cigarettes   • Smokeless tobacco: Never Used   • Alcohol Use: Yes      Comment: 8 earthquakes 10% 24 oz malt liquor/day      History   Drug Use No       FAMILY HISTORY  No family history on file.    CURRENT MEDICATIONS  Home Medications     Reviewed by Glenroy Ward R.N. (Registered Nurse) on 04/04/17 at 2220  Med List Status: Not Addressed    Medication Last Dose Status    ondansetron (ZOFRAN) 4 MG Tab tablet  " "Active                ALLERGIES  No Known Allergies    PHYSICAL EXAM  VITAL SIGNS: Ht 1.727 m (5' 8\")  Wt 68.04 kg (150 lb)  BMI 22.81 kg/m2    Constitutional: Well developed, Well nourished, No acute distress, Non-toxic appearance.   HENT: Normocephalic, Atraumatic, TMs normal, mucous membranes moist, no erythema, exudates, swelling, or masses, nares patent  Eyes: Conjunctivitis bilaterally with color drainage. nonicteric  Neck: Supple, no meningismus  Lymphatic: No lymphadenopathy noted.   Cardiovascular: Regular rate and rhythm, no gallops rubs or murmurs  Lungs: Clear bilaterally   Abdomen: Bowel sounds normal, Soft, No tenderness  Skin: Warm, Dry, no rash  Back: No tenderness, No CVA tenderness.   Genitalia: Deferred  Rectal: Deferred  Extremities: No edema  Neurologic: Appears tremulous however coherent. Alert, appropriate, follows commands, moving all extremities, normal speech   Psychiatric: Affect normal       DIAGNOSTIC STUDIES / PROCEDURES    LABS    All labs reviewed by me.    COURSE & MEDICAL DECISION MAKING  Nursing notes, VS, PMSFHx reviewed in chart.     10:21 PM Patient seen and examined at bedside. Patient will be given IV fluids and Ativan and I will continue to observe. The patient presents with hallucinations and recurrent alcohol use and the differential diagnosis includes but is not limited to alcohol intoxication, withdrawals, psychosis. Ordered for CBC with differential, CMP to evaluate. Patient was treated with IV fluids for his symptoms.     10:31 PM - I reviewed the patients past medical records which show that he was last see in the ED today around 2:00PM for hallucinations. He left AMA 8 hours ago.     10:37 PM - Patient would like to be discharged AMA. The patient is leaving against medical advice.  I discussed with the patient the risks of leaving without receiving appropriate care to include permanent disability or death.  I have discussed possible alternatives.  The patient is not " intoxicated.  The patient is a capable decision maker and understands the risks and benefits of their decision.  While I certainly disagree with the patient's decision, I respect the patient's autonomy and will not keep them here against their will.  They understand that their decision to leave can be reversed at any time and they can return to us at any time for any reason at all.     Decision Making:  This is a 31 y.o. year old male who presents with hallucinations. He was intoxicated earlier today but ran out of alcohol. He was actually registered and seen in the emergency room earlier today and left against medical advice. He again left against medical advice prior to waiting for the physician to discuss this with him. He was coherent when I initially evaluated him, alert and oriented and following commands.    The patient will return for new or worsening symptoms and is stable at the time of discharge.    The patient is referred to a primary physician for blood pressure management, diabetic screening, and for all other preventative health concerns.    DISPOSITION:  Patient will be discharged home in stable condition.    FOLLOW UP:  No follow-up provider specified.-not given as the patient left prior to this    OUTPATIENT MEDICATIONS:  New Prescriptions    No medications on file           FINAL IMPRESSION  No diagnosis found.   Hallucinations   Josefa FELIPE (Janee), am scribing for, and in the presence of, Dandre Hamm M.D..    Electronically signed by: Josefa Spence (Janee), 4/4/2017    Dandre FELIPE M.D. personally performed the services described in this documentation, as scribed by Josefa Spence in my presence, and it is both accurate and complete.    The note accurately reflects work and decisions made by me.  Dandre Hamm  4/5/2017  12:39 AM

## 2017-04-06 ENCOUNTER — HOSPITAL ENCOUNTER (INPATIENT)
Dept: HOSPITAL 8 - ED | Age: 32
LOS: 3 days | Discharge: HOME | DRG: 895 | End: 2017-04-09
Payer: MEDICARE

## 2017-04-06 VITALS — BODY MASS INDEX: 16.37 KG/M2 | WEIGHT: 108.03 LBS | HEIGHT: 68 IN

## 2017-04-06 DIAGNOSIS — F90.9: ICD-10-CM

## 2017-04-06 DIAGNOSIS — E87.6: ICD-10-CM

## 2017-04-06 DIAGNOSIS — H10.9: ICD-10-CM

## 2017-04-06 DIAGNOSIS — K70.10: ICD-10-CM

## 2017-04-06 DIAGNOSIS — R74.0: ICD-10-CM

## 2017-04-06 DIAGNOSIS — F32.9: ICD-10-CM

## 2017-04-06 DIAGNOSIS — D69.59: ICD-10-CM

## 2017-04-06 DIAGNOSIS — F41.0: ICD-10-CM

## 2017-04-06 DIAGNOSIS — D72.829: ICD-10-CM

## 2017-04-06 DIAGNOSIS — Z71.41: ICD-10-CM

## 2017-04-06 DIAGNOSIS — F10.239: Primary | ICD-10-CM

## 2017-04-06 DIAGNOSIS — E87.1: ICD-10-CM

## 2017-04-06 LAB
AST SERPL-CCNC: 272 U/L (ref 15–37)
BUN SERPL-MCNC: 5 MG/DL (ref 7–18)

## 2017-04-06 PROCEDURE — 83735 ASSAY OF MAGNESIUM: CPT

## 2017-04-06 PROCEDURE — 80307 DRUG TEST PRSMV CHEM ANLYZR: CPT

## 2017-04-06 PROCEDURE — 96375 TX/PRO/DX INJ NEW DRUG ADDON: CPT

## 2017-04-06 PROCEDURE — 96374 THER/PROPH/DIAG INJ IV PUSH: CPT

## 2017-04-06 PROCEDURE — 36415 COLL VENOUS BLD VENIPUNCTURE: CPT

## 2017-04-06 PROCEDURE — 85025 COMPLETE CBC W/AUTO DIFF WBC: CPT

## 2017-04-06 PROCEDURE — 87324 CLOSTRIDIUM AG IA: CPT

## 2017-04-06 PROCEDURE — HZ2ZZZZ DETOXIFICATION SERVICES FOR SUBSTANCE ABUSE TREATMENT: ICD-10-PCS

## 2017-04-06 PROCEDURE — 80048 BASIC METABOLIC PNL TOTAL CA: CPT

## 2017-04-06 PROCEDURE — 96372 THER/PROPH/DIAG INJ SC/IM: CPT

## 2017-04-06 PROCEDURE — 93005 ELECTROCARDIOGRAM TRACING: CPT

## 2017-04-06 PROCEDURE — 84100 ASSAY OF PHOSPHORUS: CPT

## 2017-04-06 PROCEDURE — 82040 ASSAY OF SERUM ALBUMIN: CPT

## 2017-04-06 PROCEDURE — 80329 ANALGESICS NON-OPIOID 1 OR 2: CPT

## 2017-04-06 PROCEDURE — 82140 ASSAY OF AMMONIA: CPT

## 2017-04-06 PROCEDURE — 96361 HYDRATE IV INFUSION ADD-ON: CPT

## 2017-04-06 PROCEDURE — G0480 DRUG TEST DEF 1-7 CLASSES: HCPCS

## 2017-04-06 PROCEDURE — 80053 COMPREHEN METABOLIC PANEL: CPT

## 2017-04-06 PROCEDURE — HZ34ZZZ INDIVIDUAL COUNSELING FOR SUBSTANCE ABUSE TREATMENT, INTERPERSONAL: ICD-10-PCS

## 2017-04-06 RX ADMIN — LORAZEPAM PRN MG: 2 INJECTION INTRAMUSCULAR; INTRAVENOUS at 23:29

## 2017-04-06 RX ADMIN — SODIUM CHLORIDE AND POTASSIUM CHLORIDE SCH MLS/HR: .9; .15 SOLUTION INTRAVENOUS at 23:28

## 2017-04-06 RX ADMIN — POTASSIUM CHLORIDE SCH MLS/HR: 2 INJECTION, SOLUTION, CONCENTRATE INTRAVENOUS at 23:34

## 2017-04-06 RX ADMIN — LORAZEPAM PRN MG: 2 INJECTION INTRAMUSCULAR; INTRAVENOUS at 19:50

## 2017-04-06 NOTE — CONSULTS
This writer walked outside the ER entrance to look for the patient.  Apparently he decided to leave instead of waiting to hear from [facility].  He was told he would not get a taxi voucher to go home.

## 2017-04-06 NOTE — CONSULTS
Patient has been in this ER 10 since January 1, 2017.  Transfer and referred to [facility] and [facility] each time but he continues to drink.  Per ambulance staff he was discharged from [facility] with a BA of 0.50 today.  Made a call to [facility].  Due to his behavior he is no longer welcome there.  Made a call to [facility], Peter is checking to see they have a bed for him.  Waiting for a call.

## 2017-04-06 NOTE — ED PROVIDER NOTES
"ER Provider Note     Scribed for Sebas Andres, * by Phyllis Glez. 4/5/2017, 6:49 PM.    Primary Care Provider: Patient states none   Means of Arrival: Ambulance    History obtained from: Patient  History limited by: None      CHIEF COMPLAINT   Chief Complaint   Patient presents with   • Detox     patient wants to detox from ETOH.     HPI   Gopal Ceja is a 31 y.o. who presents to the emergency department by ambulance for detoxing from alcohol. Per patient he states that he has been trying to get sober for months but has been relapsing and drank \"a lot of alcohol today.\" He was discharged from [facility] for the same today and had a blood alcohol level of 0.5. He reports that he is hallucinating and  Currently \"sees ants on the floor.\" He denies any recent fevers, cough, shortness of breath, nausea, vomiting.     REVIEW OF SYSTEMS     General: Detoxing from alcohol, alcohol use. No fevers  Eyes: No eye discharge. No eye pain.  Pulmonary: No shortness of breath or cough.  GI: No nausea or vomiting.  Psychiatric: hallucinating   All other systems reviewed and are negative    PAST MEDICAL HISTORY  Past Medical History   Diagnosis Date   • Ectrodactyly-ectodermal dysplasia-clefting syndrome    • Acute anxiety    • ETOH abuse    • Psychiatric disorder      anxiety/panic disorder   • Bipolar affective (CMS-HCC)    • ADHD (attention deficit hyperactivity disorder)    • ADHD (attention deficit hyperactivity disorder)    • Depression      SURGICAL HISTORY  Past Surgical History   Procedure Laterality Date   • Other orthopedic surgery       hands bilaterally r/t EEDC syndrome     SOCIAL HISTORY  Social History   Substance Use Topics   • Smoking status: Former Smoker -- 0.25 packs/day     Types: Cigarettes   • Smokeless tobacco: Never Used   • Alcohol Use: Yes      Comment: 8 earthquakes 10% 24 oz malt liquor/day     CURRENT MEDICATIONS  No current facility-administered medications on file prior to " encounter.     No current outpatient prescriptions on file prior to encounter.     ALLERGIES   No Known Allergies    PHYSICAL EXAM   Vital Signs: /90 mmHg  Pulse 129  Temp(Src) 36.8 °C (98.2 °F)  Resp 17  Wt 68 kg (149 lb 14.6 oz)  SpO2 95%      Constitutional: Well developed, Well nourished, Non-toxic appearance.   Psychiatric: slightly upset.  HENT:  Oropharynx: no exudate no erythema  Eyes: PERRLA, EOMI,  No discharge. Injected sclera   Lymphatic: No cervical lymphadenopathy noted.   Cardiovascular: Normal heart rate, Normal rhythm, No murmurs, Negative Homans, no pedal edema, good equal pedal pulses.  Pulmonary: Lungs are clear to auscultation bilaterally. No wheezes rales or rhonchi  Abdomen: Bowel sounds normal, soft nondistended and nontender. No rebound and no guarding .  Skin: Warm, Dry, No erythema, No rash.   : No CVA tenderness.   Neurologic: Alert & oriented, moves all extremities normally. Good sensation to light touch on all extremities. No tongue fasciculations     COURSE & MEDICAL DECISION MAKING   Nursing notes, VS, PMSFSHx reviewed in chart     6:49 PM - Patient was evaluated. The patient will be medicated with 2 mg Ativan PO for his symptoms. [facility] is consulting on the patient.     7:49 PM- Before I was able to recheck on the patient he eloped.     Patient is known to me multiple times for alcohol withdrawal his alcohol levels elevated but probably somewhat low for him no tongue fasciculation no signs of seizure but he was given 2 mg of Ativan to prevent symptoms of detox while we are waiting for transfer to recovery facility. Unfortunately the patient left. He was ambulatory did not appear altered. I felt comfortable with this. Patient unfortunately has a very chronic problem despite a long discussion by me and the alert teen she decided to leave.    DISPOSITION:  Patient eloped from the emergency department.     FINAL IMPRESSION   1. Alcohol dependence with withdrawal with  complication (CMS-HCC)       I, Phyllis Glez (Scribe), am scribing for, and in the presence of, Sebas Andres, *.    Electronically signed by: Phyllis Glez (Scribe), 4/5/2017    ISebas, * personally performed the services described in this documentation, as scribed by Phyllis Glez in my presence, and it is both accurate and complete.    The note accurately reflects work and decisions made by me.  Sebas Andres  4/6/2017  12:10 AM

## 2017-04-06 NOTE — ED NOTES
.Gopal Ceja  .  Chief Complaint   Patient presents with   • Detox     patient wants to detox from ETOH.     Patient DESTINI KIMBROUGH from [facility] with above complaint. Patient recently discharge from [facility] for the same today.     Patient to triage 2.  Life skills at bedside.

## 2017-04-07 VITALS — DIASTOLIC BLOOD PRESSURE: 88 MMHG | SYSTOLIC BLOOD PRESSURE: 139 MMHG

## 2017-04-07 VITALS — SYSTOLIC BLOOD PRESSURE: 150 MMHG | DIASTOLIC BLOOD PRESSURE: 102 MMHG

## 2017-04-07 VITALS — DIASTOLIC BLOOD PRESSURE: 93 MMHG | SYSTOLIC BLOOD PRESSURE: 139 MMHG

## 2017-04-07 VITALS — DIASTOLIC BLOOD PRESSURE: 97 MMHG | SYSTOLIC BLOOD PRESSURE: 145 MMHG

## 2017-04-07 VITALS — SYSTOLIC BLOOD PRESSURE: 143 MMHG | DIASTOLIC BLOOD PRESSURE: 82 MMHG

## 2017-04-07 LAB
AST SERPL-CCNC: 211 U/L (ref 15–37)
BUN SERPL-MCNC: 4 MG/DL (ref 7–18)

## 2017-04-07 RX ADMIN — POTASSIUM CHLORIDE SCH MLS/HR: 2 INJECTION, SOLUTION, CONCENTRATE INTRAVENOUS at 23:25

## 2017-04-07 RX ADMIN — LORAZEPAM PRN MG: 2 INJECTION INTRAMUSCULAR; INTRAVENOUS at 11:19

## 2017-04-07 RX ADMIN — BACLOFEN SCH MG: 10 TABLET ORAL at 21:07

## 2017-04-07 RX ADMIN — ONDANSETRON PRN MG: 2 INJECTION, SOLUTION INTRAMUSCULAR; INTRAVENOUS at 01:33

## 2017-04-07 RX ADMIN — CIPROFLOXACIN HYDROCHLORIDE SCH DROP: 3 SOLUTION/ DROPS OPHTHALMIC at 21:08

## 2017-04-07 RX ADMIN — LORAZEPAM PRN MG: 2 INJECTION INTRAMUSCULAR; INTRAVENOUS at 01:33

## 2017-04-07 RX ADMIN — BACLOFEN SCH MG: 10 TABLET ORAL at 00:19

## 2017-04-07 RX ADMIN — CIPROFLOXACIN HYDROCHLORIDE SCH DROP: 3 SOLUTION/ DROPS OPHTHALMIC at 13:12

## 2017-04-07 RX ADMIN — CIPROFLOXACIN HYDROCHLORIDE SCH DROP: 3 SOLUTION/ DROPS OPHTHALMIC at 17:25

## 2017-04-07 RX ADMIN — SODIUM CHLORIDE AND POTASSIUM CHLORIDE SCH MLS/HR: .9; .15 SOLUTION INTRAVENOUS at 17:24

## 2017-04-07 RX ADMIN — CIPROFLOXACIN HYDROCHLORIDE SCH DROP: 3 SOLUTION/ DROPS OPHTHALMIC at 10:33

## 2017-04-07 RX ADMIN — SODIUM CHLORIDE AND POTASSIUM CHLORIDE SCH MLS/HR: .9; .15 SOLUTION INTRAVENOUS at 21:50

## 2017-04-07 RX ADMIN — BACLOFEN SCH MG: 10 TABLET ORAL at 10:33

## 2017-04-07 RX ADMIN — SODIUM CHLORIDE AND POTASSIUM CHLORIDE SCH MLS/HR: .9; .15 SOLUTION INTRAVENOUS at 11:31

## 2017-04-07 RX ADMIN — LORAZEPAM PRN MG: 2 INJECTION INTRAMUSCULAR; INTRAVENOUS at 17:25

## 2017-04-08 VITALS — SYSTOLIC BLOOD PRESSURE: 129 MMHG | DIASTOLIC BLOOD PRESSURE: 91 MMHG

## 2017-04-08 VITALS — SYSTOLIC BLOOD PRESSURE: 143 MMHG | DIASTOLIC BLOOD PRESSURE: 98 MMHG

## 2017-04-08 VITALS — DIASTOLIC BLOOD PRESSURE: 101 MMHG | SYSTOLIC BLOOD PRESSURE: 142 MMHG

## 2017-04-08 VITALS — DIASTOLIC BLOOD PRESSURE: 92 MMHG | SYSTOLIC BLOOD PRESSURE: 160 MMHG

## 2017-04-08 LAB
AST SERPL-CCNC: 180 U/L (ref 15–37)
BUN SERPL-MCNC: 5 MG/DL (ref 7–18)

## 2017-04-08 RX ADMIN — CIPROFLOXACIN HYDROCHLORIDE SCH DROP: 3 SOLUTION/ DROPS OPHTHALMIC at 16:27

## 2017-04-08 RX ADMIN — SODIUM CHLORIDE AND POTASSIUM CHLORIDE SCH MLS/HR: .9; .15 SOLUTION INTRAVENOUS at 06:02

## 2017-04-08 RX ADMIN — ONDANSETRON PRN MG: 2 INJECTION, SOLUTION INTRAMUSCULAR; INTRAVENOUS at 10:26

## 2017-04-08 RX ADMIN — CIPROFLOXACIN HYDROCHLORIDE SCH DROP: 3 SOLUTION/ DROPS OPHTHALMIC at 20:25

## 2017-04-08 RX ADMIN — CIPROFLOXACIN HYDROCHLORIDE SCH DROP: 3 SOLUTION/ DROPS OPHTHALMIC at 10:31

## 2017-04-08 RX ADMIN — SODIUM CHLORIDE AND POTASSIUM CHLORIDE SCH MLS/HR: .9; .15 SOLUTION INTRAVENOUS at 11:10

## 2017-04-08 RX ADMIN — LORAZEPAM PRN MG: 2 INJECTION INTRAMUSCULAR; INTRAVENOUS at 14:09

## 2017-04-08 RX ADMIN — CIPROFLOXACIN HYDROCHLORIDE SCH DROP: 3 SOLUTION/ DROPS OPHTHALMIC at 06:02

## 2017-04-08 RX ADMIN — BACLOFEN SCH MG: 10 TABLET ORAL at 20:25

## 2017-04-08 RX ADMIN — BACLOFEN SCH MG: 10 TABLET ORAL at 09:08

## 2017-04-09 VITALS — SYSTOLIC BLOOD PRESSURE: 134 MMHG | DIASTOLIC BLOOD PRESSURE: 89 MMHG

## 2017-04-09 VITALS — SYSTOLIC BLOOD PRESSURE: 134 MMHG | DIASTOLIC BLOOD PRESSURE: 73 MMHG

## 2017-04-09 RX ADMIN — CIPROFLOXACIN HYDROCHLORIDE SCH DROP: 3 SOLUTION/ DROPS OPHTHALMIC at 14:58

## 2017-04-09 RX ADMIN — BACLOFEN SCH MG: 10 TABLET ORAL at 09:49

## 2017-04-09 RX ADMIN — CIPROFLOXACIN HYDROCHLORIDE SCH DROP: 3 SOLUTION/ DROPS OPHTHALMIC at 06:56

## 2017-04-12 ENCOUNTER — HOSPITAL ENCOUNTER (EMERGENCY)
Facility: MEDICAL CENTER | Age: 32
End: 2017-04-12
Attending: EMERGENCY MEDICINE
Payer: MEDICARE

## 2017-04-12 VITALS
WEIGHT: 150 LBS | HEART RATE: 98 BPM | HEIGHT: 68 IN | SYSTOLIC BLOOD PRESSURE: 120 MMHG | BODY MASS INDEX: 22.73 KG/M2 | DIASTOLIC BLOOD PRESSURE: 78 MMHG | TEMPERATURE: 97.6 F | RESPIRATION RATE: 16 BRPM

## 2017-04-12 DIAGNOSIS — F10.929 ALCOHOL INTOXICATION, WITH UNSPECIFIED COMPLICATION (HCC): ICD-10-CM

## 2017-04-12 PROCEDURE — 99284 EMERGENCY DEPT VISIT MOD MDM: CPT

## 2017-04-12 ASSESSMENT — ENCOUNTER SYMPTOMS
FEVER: 0
CHILLS: 0

## 2017-04-12 ASSESSMENT — PAIN SCALES - GENERAL: PAINLEVEL_OUTOF10: 0

## 2017-04-12 ASSESSMENT — LIFESTYLE VARIABLES: SUBSTANCE_ABUSE: 1

## 2017-04-12 NOTE — DISCHARGE PLANNING
Medical Social Work    Referral: [facility] and [facility] Contact    Intervention: MSW received a call from bedside RN that ERP is requesting detox inpatient possibilities for pt.  MSW contacted Velma at [facility] who states that pt MAY NOT return to their facility.  MSW contacted Destiny at [facility] who also states that pt MAY NOT return to their facility for detox.  MSW updated bedside RN who will speak with ERP.      Plan: Pt to D/C home once medically cleared; pt also does not qualify for assistance from  due to previous assistance.

## 2017-04-12 NOTE — ED PROVIDER NOTES
ED Provider Note    Scribed for Glenroy Li M.D. by Mily Jimenez. 4/12/2017, 1:08 AM.    Primary care provider: Pcp Pt States None  Means of arrival: [facility]  History obtained from: Patient  History limited by: None    CHIEF COMPLAINT  Chief Complaint   Patient presents with   • Detox       HPI  Gopal Ceja is a 31 y.o. male who presents to the Emergency Department brought in by [facility] for detox. Per nursing notes, patient was sleeping close to where someone with a warrant was arrested, so the police called [facility]. Patient states he needs help with detox and would like to return to a program for assistance. He reports alcohol use. Patient denies drug use, fever, or chills. Patient did not plan to come to the hospital if he didn't get involved with this situation with the police.    Review of chart shows the patient has been here 3 times this month already. He was last seen on the 5th for alcohol dependence with withdrawal and signed out AMA. He also signed out AMA for detox on the 4th. In February he was seen 5 times for alcohol intoxication and detox. Twice in January. He was seen 6 times in September for alcohol related problems. 6 times in August. 18 visits in the last 8 months of 2016 for alcohol-related problems.    REVIEW OF SYSTEMS  Review of Systems   Constitutional: Negative for fever and chills.   Psychiatric/Behavioral: Positive for substance abuse (alcohol).        Positive detox.    E     PAST MEDICAL HISTORY   has a past medical history of Ectrodactyly-ectodermal dysplasia-clefting syndrome; Acute anxiety; ETOH abuse; Psychiatric disorder; Bipolar affective (CMS-HCC); ADHD (attention deficit hyperactivity disorder); ADHD (attention deficit hyperactivity disorder); and Depression.    SURGICAL HISTORY   has past surgical history that includes other orthopedic surgery.    SOCIAL HISTORY  Social History   Substance Use Topics   • Smoking status: Former Smoker -- 0.25 packs/day     Types: Cigarettes   •  "Smokeless tobacco: Never Used   • Alcohol Use: Yes      Comment: 8 earthquakes 10% 24 oz malt liquor/day      History   Drug Use No       FAMILY HISTORY  History reviewed. No pertinent family history.    CURRENT MEDICATIONS  Home Medications     Reviewed by Corry Barajas R.N. (Registered Nurse) on 04/12/17 at 0056  Med List Status: <None>    Medication Last Dose Status          Patient Torey Taking any Medications                        ALLERGIES  No Known Allergies    PHYSICAL EXAM  VITAL SIGNS: /88 mmHg  Pulse 114  Temp(Src) 36.4 °C (97.6 °F)  Resp 18  Ht 1.727 m (5' 7.99\")  Wt 68.04 kg (150 lb)  BMI 22.81 kg/m2    Constitutional: Alert and oriented x3. Non-toxic appearance. Able to pick glove up off ground.   HENT: Normocephalic, atraumatic, ears normal bilaterally, normal TMs, posterior pharynx clear with no exudate  Eyes: Conjunctiva normal, No discharge.   Neck: Supple, normal ROM, no adenopathy  Cardiovascular: Normal heart rate, Normal rhythm, No murmurs, No rubs, No gallops.   Thorax & Lungs: Normal breath sounds, No respiratory distress, No wheezing, No chest tenderness.   Abdomen: Soft, No tenderness, No masses, No pulsatile masses.   Skin: Warm, Dry, No erythema, No rash.   Extremities: Intact distal pulses, No edema, No tenderness, No cyanosis, No clubbing.   Musculoskeletal: Normal ROM. Hand deformity noted.   Neurologic: Alert & oriented x 3, Normal motor function, No focal deficits noted.    COURSE & MEDICAL DECISION MAKING  Nursing notes, VS, PMSFHx reviewed in chart.    Review of chart shows the patient has been here 3 times this month already. He was last seen on the 5th for alcohol dependence with withdrawal and signed out AMA. He also signed out AMA for detox on the 4th. In February he was seen 5 times for alcohol intoxication and detox. Twice in January. He was seen 6 times in September for alcohol related problems. 6 times in August. 18 visits in the last 8 months of 2016 for " alcohol-related problems.    1:08 AM - Patient seen and examined at bedside.    The patient will return for new or worsening symptoms and is stable at the time of discharge.    The patient is referred to a primary physician for blood pressure management, diabetic screening, and for all other preventative health concerns.    The patient is able to  a glove off the floor without difficulty. No detox center in town will taken back as he has been there multiple times and has not been able to show any progress. No need for acute care hospitalization at this time. Patient discharged.    Patient has little or no insight into his ongoing alcohol problem.    DISPOSITION:  Patient will be discharged home in stable condition.    FOLLOW UP:  No follow-up provider specified.    OUTPATIENT MEDICATIONS:  New Prescriptions    No medications on file         FINAL IMPRESSION  1. Alcohol intoxication, with unspecified complication (CMS-HCC)           Miyl FELIPE (Montezibmary kay), am scribing for, and in the presence of, Glenroy Li M.D..    Electronically signed by: Mily Jimenez (Montezibe), 4/12/2017    Glenroy FELIPE M.D. personally performed the services described in this documentation, as scribed by Mily Jimenez in my presence, and it is both accurate and complete.    The note accurately reflects work and decisions made by me.  Glenroy Li  4/12/2017  1:57 AM

## 2017-04-12 NOTE — ED AVS SNAPSHOT
4/12/2017          Gopal Ceja  [address] [place] [id number]    Dear Gopal:    [facility] wants to ensure your discharge home is safe and you or your loved ones have had all your questions answered regarding your care after you leave the hospital.    You may receive a telephone call within two days of your discharge.  This call is to make certain you understand your discharge instructions as well as ensure we provided you with the best care possible during your stay with us.     The call will only last approximately 3-5 minutes and will be done by a nurse.    Once again, we want to ensure your discharge home is safe and that you have a clear understanding of any next steps in your care.  If you have any questions or concerns, please do not hesitate to contact us, we are here for you.  Thank you for choosing [facility] for your healthcare needs.    Sincerely,    Neal Lam    [facility]

## 2017-04-12 NOTE — ED AVS SNAPSHOT
Home Care Instructions                                                                                                                Gopal Ceja   MRN: [id number]    Department:  [facility], Emergency Dept   Date of Visit:  4/12/2017            [facility], Emergency Dept    [address]    [place] [address]    Phone:  [phone]      You were seen by     Glenroy Li M.D.      Your Diagnosis Was     Alcohol intoxication, with unspecified complication (CMS-HCC)     F10.129       Medication Information     Review all of your home medications and newly ordered medications with your primary doctor and/or pharmacist as soon as possible. Follow medication instructions as directed by your doctor and/or pharmacist.     Please keep your complete medication list with you and share with your physician. Update the information when medications are discontinued, doses are changed, or new medications (including over-the-counter products) are added; and carry medication information at all times in the event of emergency situations.               Medication List      Notice     You have not been prescribed any medications.            Procedures and tests performed during your visit     NURSING COMMUNICATION        Discharge Instructions       Alcohol Abuse and Nutrition  Alcohol abuse is any pattern of alcohol consumption that harms your health, relationships, or work. Alcohol abuse can affect how your body breaks down and absorbs nutrients from food by causing your liver to work abnormally. Additionally, many people who abuse alcohol do not eat enough carbohydrates, protein, fat, vitamins, and minerals. This can cause poor nutrition (malnutrition) and a lack of nutrients (nutrient deficiencies), which can lead to further complications.  Nutrients that are commonly lacking (deficient) among people who abuse alcohol include:  · Vitamins.  ¨ Vitamin A. This is stored in your liver.  It is important for your vision, metabolism, and ability to fight off infections (immunity).  ¨ B vitamins. These include vitamins such as folate, thiamin, and niacin. These are important in new cell growth and maintenance.  ¨ Vitamin C. This plays an important role in iron absorption, wound healing, and immunity.  ¨ Vitamin D. This is produced by your liver, but you can also get vitamin D from food. Vitamin D is necessary for your body to absorb and use calcium.  · Minerals.  ¨ Calcium. This is important for your bones and your heart and blood vessel (cardiovascular) function.  ¨ Iron. This is important for blood, muscle, and nervous system functioning.  ¨ Magnesium. This plays an important role in muscle and nerve function, and it helps to control blood sugar and blood pressure.  ¨ Zinc. This is important for the normal function of your nervous system and digestive system (gastrointestinal tract).  Nutrition is an essential component of therapy for alcohol abuse. Your health care provider or dietitian will work with you to design a plan that can help restore nutrients to your body and prevent potential complications.  WHAT IS MY PLAN?  Your dietitian may develop a specific diet plan that is based on your condition and any other complications you may have. A diet plan will commonly include:  · A balanced diet.  ¨ Grains: 6-8 oz per day.  ¨ Vegetables: 2-3 cups per day.  ¨ Fruits: 1-2 cups per day.  ¨ Meat and other protein: 5-6 oz per day.  ¨ Dairy: 2-3 cups per day.  · Vitamin and mineral supplements.  WHAT DO I NEED TO KNOW ABOUT ALCOHOL AND NUTRITION?  · Consume foods that are high in antioxidants, such as grapes, berries, nuts, green tea, and dark green and orange vegetables. This can help to counteract some of the stress that is placed on your liver by consuming alcohol.  · Avoid food and drinks that are high in fat and sugar. Foods such as sugared soft drinks, salty snack foods, and candy contain empty  calories. This means that they lack important nutrients such as protein, fiber, and vitamins.  · Eat frequent meals and snacks. Try to eat 5-6 small meals each day.  · Eat a variety of fresh fruits and vegetables each day. This will help you get plenty of water, fiber, and vitamins in your diet.  · Drink plenty of water and other clear fluids. Try to drink at least 48-64 oz (1.5-2 L) of water per day.  · If you are a vegetarian, eat a variety of protein-rich foods. Pair whole grains with plant-based proteins at meals and snacks to obtain the greatest nutrient benefit from your food. For example, eat rice with beans, put peanut butter on whole-grain toast, or eat oatmeal with sunflower seeds.  · Soak beans and whole grains overnight before cooking. This can help your body to absorb the nutrients more easily.  · Include foods fortified with vitamins and minerals in your diet. Commonly fortified foods include milk, orange juice, cereal, and bread.  · If you are malnourished, your dietitian may recommend a high-protein, high-calorie diet. This may include:  ¨ 2,000-3,000 calories (kilocalories) per day.  ¨  grams of protein per day.  · Your health care provider may recommend a complete nutritional supplement beverage. This can help to restore calories, protein, and vitamins to your body. Depending on your condition, you may be advised to consume this instead of or in addition to meals.  · Limit your intake of caffeine. Replace drinks like coffee and black tea with decaffeinated coffee and herbal tea.  · Eat a variety of foods that are high in omega fatty acids. These include fish, nuts and seeds, and soybeans. These foods may help your liver to recover and may also stabilize your mood.  · Certain medicines may cause changes in your appetite, taste, and weight. Work with your health care provider and dietitian to make any adjustments to your medicines and diet plan.  · Include other healthy lifestyle choices in  your daily routine.  ¨ Be physically active.  ¨ Get enough sleep.  ¨ Spend time doing activities that you enjoy.  · If you are unable to take in enough food and calories by mouth, your health care provider may recommend a feeding tube. This is a tube that passes through your nose and throat, directly into your stomach. Nutritional supplement beverages can be given to you through the feeding tube to help you get the nutrients you need.  · Take vitamin or mineral supplements as recommended by your health care provider.  WHAT FOODS CAN I EAT?  Grains  Enriched pasta. Enriched rice. Fortified whole-grain bread. Fortified whole-grain cereal. Barley. Brown rice. Quinoa. Millet.  Vegetables  All fresh, frozen, and canned vegetables. Spinach. Kale. Artichoke. Carrots. Winter squash and pumpkin. Sweet potatoes. Broccoli. Cabbage. Cucumbers. Tomatoes. Sweet peppers. Green beans. Peas. Corn.  Fruits  All fresh and frozen fruits. Berries. Grapes. [place]. Papaya. Guava. Cherries. Apples. Bananas. Peaches. Plums. Pineapple. Watermelon. Cantaloupe. Oranges. Avocado.  Meats and Other Protein Sources  Beef liver. Lean beef. Pork. Fresh and canned chicken. Fresh fish. Oysters. Sardines. Canned tuna. Shrimp. Eggs with yolks. Nuts and seeds. Peanut butter. Beans and lentils. Soybeans. Tofu.  Dairy  Whole, low-fat, and nonfat milk. Whole, low-fat, and nonfat yogurt. Cottage cheese. Sour cream. Hard and soft cheeses.  Beverages  Water. Herbal tea. Decaffeinated coffee. Decaffeinated green tea. 100% fruit juice. 100% vegetable juice. Instant breakfast shakes.  Condiments  Ketchup. Mayonnaise. Mustard. Salad dressing. Barbecue sauce.  Sweets and Desserts  Sugar-free ice cream. Sugar-free pudding. Sugar-free gelatin.  Fats and Oils  Butter. Vegetable oil, flaxseed oil, olive oil, and walnut oil.  Other  Complete nutrition shakes. Protein bars. Sugar-free gum.  The items listed above may not be a complete list of recommended foods or  beverages. Contact your dietitian for more options.  WHAT FOODS ARE NOT RECOMMENDED?  Grains  Sugar-sweetened breakfast cereals. Flavored instant oatmeal. Fried breads.  Vegetables  Breaded or deep-fried vegetables.  Fruits  Dried fruit with added sugar. Candied fruit. Canned fruit in syrup.  Meats and Other Protein Sources  Breaded or deep-fried meats.  Dairy  Flavored milks. Fried cheese curds or fried cheese sticks.  Beverages  Alcohol. Sugar-sweetened soft drinks. Sugar-sweetened tea. Caffeinated coffee and tea.  Condiments  Sugar. Honey. Agave nectar. Molasses.  Sweets and Desserts  Chocolate. Cake. Cookies. Candy.  Other  Potato chips. Pretzels. Salted nuts. Candied nuts.  The items listed above may not be a complete list of foods and beverages to avoid. Contact your dietitian for more information.     This information is not intended to replace advice given to you by your health care provider. Make sure you discuss any questions you have with your health care provider.     Document Released: 10/12/2006 Document Revised: 01/08/2016 Document Reviewed: 07/21/2015  [employer] Interactive Patient Education ©2016 [employer]            Patient Information     Patient Information    Following emergency treatment: all patient requiring follow-up care must return either to a private physician or a clinic if your condition worsens before you are able to obtain further medical attention, please return to the emergency room.     Billing Information    At [facility], we work to make the billing process streamlined for our patients.  Our Representatives are here to answer any questions you may have regarding your hospital bill.  If you have insurance coverage and have supplied your insurance information to us, we will submit a claim to your insurer on your behalf.  Should you have any questions regarding your bill, we can be reached online or by phone as follows:  Online: You are able pay your bills online or live chat  with our representatives about any billing questions you may have. We are here to help Monday - Friday from 8:00am to 7:30pm and 9:00am - 12:00pm on Saturdays.  Please visit [email or web address]  for more information.   Phone:  [phone] or [phone]    Please note that your emergency physician, surgeon, pathologist, radiologist, anesthesiologist, and other specialists are not employed by [facility] and will therefore bill separately for their services.  Please contact them directly for any questions concerning their bills at the numbers below:     Emer[facility] Physician Services:  [phone]  [facility]:  [phone]  [facility]:  [phone]  [facility]:  [phone]    1. Your final bill may vary from the amount quoted upon discharge if all procedures are not complete at that time, or if your doctor has additional procedures of which we are not aware. You will receive an additional bill if you return to the Emergency Department at [facility] for suture removal regardless of the facility of which the sutures were placed.     2. Please arrange for settlement of this account at the emergency registration.    3. All self-pay accounts are due in full at the time of treatment.  If you are unable to meet this obligation then payment is expected within 4-5 days.     4. If you have had radiology studies (CT, X-ray, Ultrasound, MRI), you have received a preliminary result during your emergency department visit. Please contact the radiology department [phone] to receive a copy of your final result. Please discuss the Final result with your primary physician or with the follow up physician provided.     Crisis Hotline:  [place] Crisis Hotline:  0-573-BXORKRN or 1-390.149.7933  [place] Crisis Hotline:    1-986.710.1370 or 269-654-8437         ED Discharge Follow Up Questions    1. In order to provide you with very good care,  we would like to follow up with a phone call in the next few days.  May we have your permission to contact you?     YES /  NO    2. What is the best phone number to call you? (       )_____-__________    3. What is the best time to call you?      Morning  /  Afternoon  /  Evening                   Patient Signature:  ____________________________________________________________    Date:  ____________________________________________________________

## 2017-04-12 NOTE — DISCHARGE INSTRUCTIONS
Alcohol Abuse and Nutrition  Alcohol abuse is any pattern of alcohol consumption that harms your health, relationships, or work. Alcohol abuse can affect how your body breaks down and absorbs nutrients from food by causing your liver to work abnormally. Additionally, many people who abuse alcohol do not eat enough carbohydrates, protein, fat, vitamins, and minerals. This can cause poor nutrition (malnutrition) and a lack of nutrients (nutrient deficiencies), which can lead to further complications.  Nutrients that are commonly lacking (deficient) among people who abuse alcohol include:  · Vitamins.  ¨ Vitamin A. This is stored in your liver. It is important for your vision, metabolism, and ability to fight off infections (immunity).  ¨ B vitamins. These include vitamins such as folate, thiamin, and niacin. These are important in new cell growth and maintenance.  ¨ Vitamin C. This plays an important role in iron absorption, wound healing, and immunity.  ¨ Vitamin D. This is produced by your liver, but you can also get vitamin D from food. Vitamin D is necessary for your body to absorb and use calcium.  · Minerals.  ¨ Calcium. This is important for your bones and your heart and blood vessel (cardiovascular) function.  ¨ Iron. This is important for blood, muscle, and nervous system functioning.  ¨ Magnesium. This plays an important role in muscle and nerve function, and it helps to control blood sugar and blood pressure.  ¨ Zinc. This is important for the normal function of your nervous system and digestive system (gastrointestinal tract).  Nutrition is an essential component of therapy for alcohol abuse. Your health care provider or dietitian will work with you to design a plan that can help restore nutrients to your body and prevent potential complications.  WHAT IS MY PLAN?  Your dietitian may develop a specific diet plan that is based on your condition and any other complications you may have. A diet plan will  commonly include:  · A balanced diet.  ¨ Grains: 6-8 oz per day.  ¨ Vegetables: 2-3 cups per day.  ¨ Fruits: 1-2 cups per day.  ¨ Meat and other protein: 5-6 oz per day.  ¨ Dairy: 2-3 cups per day.  · Vitamin and mineral supplements.  WHAT DO I NEED TO KNOW ABOUT ALCOHOL AND NUTRITION?  · Consume foods that are high in antioxidants, such as grapes, berries, nuts, green tea, and dark green and orange vegetables. This can help to counteract some of the stress that is placed on your liver by consuming alcohol.  · Avoid food and drinks that are high in fat and sugar. Foods such as sugared soft drinks, salty snack foods, and candy contain empty calories. This means that they lack important nutrients such as protein, fiber, and vitamins.  · Eat frequent meals and snacks. Try to eat 5-6 small meals each day.  · Eat a variety of fresh fruits and vegetables each day. This will help you get plenty of water, fiber, and vitamins in your diet.  · Drink plenty of water and other clear fluids. Try to drink at least 48-64 oz (1.5-2 L) of water per day.  · If you are a vegetarian, eat a variety of protein-rich foods. Pair whole grains with plant-based proteins at meals and snacks to obtain the greatest nutrient benefit from your food. For example, eat rice with beans, put peanut butter on whole-grain toast, or eat oatmeal with sunflower seeds.  · Soak beans and whole grains overnight before cooking. This can help your body to absorb the nutrients more easily.  · Include foods fortified with vitamins and minerals in your diet. Commonly fortified foods include milk, orange juice, cereal, and bread.  · If you are malnourished, your dietitian may recommend a high-protein, high-calorie diet. This may include:  ¨ 2,000-3,000 calories (kilocalories) per day.  ¨  grams of protein per day.  · Your health care provider may recommend a complete nutritional supplement beverage. This can help to restore calories, protein, and vitamins to  your body. Depending on your condition, you may be advised to consume this instead of or in addition to meals.  · Limit your intake of caffeine. Replace drinks like coffee and black tea with decaffeinated coffee and herbal tea.  · Eat a variety of foods that are high in omega fatty acids. These include fish, nuts and seeds, and soybeans. These foods may help your liver to recover and may also stabilize your mood.  · Certain medicines may cause changes in your appetite, taste, and weight. Work with your health care provider and dietitian to make any adjustments to your medicines and diet plan.  · Include other healthy lifestyle choices in your daily routine.  ¨ Be physically active.  ¨ Get enough sleep.  ¨ Spend time doing activities that you enjoy.  · If you are unable to take in enough food and calories by mouth, your health care provider may recommend a feeding tube. This is a tube that passes through your nose and throat, directly into your stomach. Nutritional supplement beverages can be given to you through the feeding tube to help you get the nutrients you need.  · Take vitamin or mineral supplements as recommended by your health care provider.  WHAT FOODS CAN I EAT?  Grains  Enriched pasta. Enriched rice. Fortified whole-grain bread. Fortified whole-grain cereal. Barley. Brown rice. Quinoa. Millet.  Vegetables  All fresh, frozen, and canned vegetables. Spinach. Kale. Artichoke. Carrots. Winter squash and pumpkin. Sweet potatoes. Broccoli. Cabbage. Cucumbers. Tomatoes. Sweet peppers. Green beans. Peas. Corn.  Fruits  All fresh and frozen fruits. Berries. Grapes. [place]. Papaya. Guava. Cherries. Apples. Bananas. Peaches. Plums. Pineapple. Watermelon. Cantaloupe. Oranges. Avocado.  Meats and Other Protein Sources  Beef liver. Lean beef. Pork. Fresh and canned chicken. Fresh fish. Oysters. Sardines. Canned tuna. Shrimp. Eggs with yolks. Nuts and seeds. Peanut butter. Beans and lentils. Soybeans.  Tofu.  Dairy  Whole, low-fat, and nonfat milk. Whole, low-fat, and nonfat yogurt. Cottage cheese. Sour cream. Hard and soft cheeses.  Beverages  Water. Herbal tea. Decaffeinated coffee. Decaffeinated green tea. 100% fruit juice. 100% vegetable juice. Instant breakfast shakes.  Condiments  Ketchup. Mayonnaise. Mustard. Salad dressing. Barbecue sauce.  Sweets and Desserts  Sugar-free ice cream. Sugar-free pudding. Sugar-free gelatin.  Fats and Oils  Butter. Vegetable oil, flaxseed oil, olive oil, and walnut oil.  Other  Complete nutrition shakes. Protein bars. Sugar-free gum.  The items listed above may not be a complete list of recommended foods or beverages. Contact your dietitian for more options.  WHAT FOODS ARE NOT RECOMMENDED?  Grains  Sugar-sweetened breakfast cereals. Flavored instant oatmeal. Fried breads.  Vegetables  Breaded or deep-fried vegetables.  Fruits  Dried fruit with added sugar. Candied fruit. Canned fruit in syrup.  Meats and Other Protein Sources  Breaded or deep-fried meats.  Dairy  Flavored milks. Fried cheese curds or fried cheese sticks.  Beverages  Alcohol. Sugar-sweetened soft drinks. Sugar-sweetened tea. Caffeinated coffee and tea.  Condiments  Sugar. Honey. Agave nectar. Molasses.  Sweets and Desserts  Chocolate. Cake. Cookies. Candy.  Other  Potato chips. Pretzels. Salted nuts. Candied nuts.  The items listed above may not be a complete list of foods and beverages to avoid. Contact your dietitian for more information.     This information is not intended to replace advice given to you by your health care provider. Make sure you discuss any questions you have with your health care provider.     Document Released: 10/12/2006 Document Revised: 01/08/2016 Document Reviewed: 07/21/2015  [email or web address] Interactive Patient Education ©2016 [employer]

## 2017-04-12 NOTE — ED NOTES
"Pt BIB [facility] stating \"I need help with detox, I didn't do any drugs\", [facility] state that [place] PD was serving a warrant on someone and just happened to be close to where pt was sleeping and pt woke so PD called [facility] to bring pt to ER.  Pt will not stay in room, has come out 3 times before triage complete and laid down on floor in hallway.  "

## 2017-04-12 NOTE — ED AVS SNAPSHOT
[employer] Access Code: [email or web address]  Expires: 5/12/2017  1:55 AM    Your email address is not on file at [facility].  Email Addresses are required for you to sign up for [employer], please contact [phone] to verify your personal information and to provide your email address prior to attempting to register for [employer].    Gopal Ceja  [address] [place] [address]    [employer]  A secure, online tool to manage your health information     [facility]’s [employer]® is a secure, online tool that connects you to your personalized health information from the privacy of your home -- day or night - making it very easy for you to manage your healthcare. Once the activation process is completed, you can even access your medical information using the [employer] erwin, which is available for free in the [employer] Erwin store or Google Play store.     To learn more about [employer], visit [email or web address]    There are two levels of access available (as shown below):   My Chart Features  [facility] Primary Care Doctor [facility]  Specialists [facility]  Urgent  Care Non-[facility] Primary Care Doctor   Email your healthcare team securely and privately 24/7 X X X    Manage appointments: schedule your next appointment; view details of past/upcoming appointments X      Request prescription refills. X      View recent personal medical records, including lab and immunizations X X X X   View health record, including health history, allergies, medications X X X X   Read reports about your outpatient visits, procedures, consult and ER notes X X X X   See your discharge summary, which is a recap of your hospital and/or ER visit that includes your diagnosis, lab results, and care plan X X  X     How to register for [employer]:  Once your e-mail address has been verified, follow the following steps to sign up for [employer].     1. Go to  [email or web address]  2. Click on the Sign Up Now box, which takes you to the New Member Sign Up page. You will need to  provide the following information:  a. Enter your [employer] Access Code exactly as it appears at the top of this page. (You will not need to use this code after you’ve completed the sign-up process. If you do not sign up before the expiration date, you must request a new code.)   b. Enter your date of birth.   c. Enter your home email address.   d. Click Submit, and follow the next screen’s instructions.  3. Create a [employer] ID. This will be your [employer] login ID and cannot be changed, so think of one that is secure and easy to remember.  4. Create a [employer] password. You can change your password at any time.  5. Enter your Password Reset Question and Answer. This can be used at a later time if you forget your password.   6. Enter your e-mail address. This allows you to receive e-mail notifications when new information is available in [employer].  7. Click Sign Up. You can now view your health information.    For assistance activating your [employer] account, call [phone]

## 2017-04-12 NOTE — ED NOTES
SW notified regarding detox, have been advised that both [facility] and [place] [facility] decline to accept pt anymore,  notified.

## 2017-04-12 NOTE — ED NOTES
Pt refused to get up when d/c, pt refused to sign d/c, security called and pt placed in wheel chair and escorted to lobby.

## 2017-04-15 ENCOUNTER — HOSPITAL ENCOUNTER (EMERGENCY)
Dept: HOSPITAL 8 - ED | Age: 32
Discharge: HOME | End: 2017-04-15
Payer: MEDICARE

## 2017-04-15 VITALS — WEIGHT: 148.37 LBS | BODY MASS INDEX: 22.49 KG/M2 | HEIGHT: 68 IN

## 2017-04-15 VITALS — SYSTOLIC BLOOD PRESSURE: 156 MMHG | DIASTOLIC BLOOD PRESSURE: 107 MMHG

## 2017-04-15 DIAGNOSIS — F17.210: ICD-10-CM

## 2017-04-15 DIAGNOSIS — F10.229: Primary | ICD-10-CM

## 2017-04-15 DIAGNOSIS — F41.1: ICD-10-CM

## 2017-04-15 PROCEDURE — 99282 EMERGENCY DEPT VISIT SF MDM: CPT

## 2017-04-21 ENCOUNTER — HOSPITAL ENCOUNTER (EMERGENCY)
Dept: HOSPITAL 8 - ED | Age: 32
Discharge: HOME | End: 2017-04-21
Payer: MEDICARE

## 2017-04-21 VITALS — SYSTOLIC BLOOD PRESSURE: 134 MMHG | DIASTOLIC BLOOD PRESSURE: 99 MMHG

## 2017-04-21 VITALS — WEIGHT: 158.73 LBS | HEIGHT: 68 IN | BODY MASS INDEX: 24.06 KG/M2

## 2017-04-21 DIAGNOSIS — F15.10: ICD-10-CM

## 2017-04-21 DIAGNOSIS — F10.229: Primary | ICD-10-CM

## 2017-04-21 LAB — BUN SERPL-MCNC: 6 MG/DL (ref 7–18)

## 2017-04-21 PROCEDURE — 80307 DRUG TEST PRSMV CHEM ANLYZR: CPT

## 2017-04-21 PROCEDURE — 99284 EMERGENCY DEPT VISIT MOD MDM: CPT

## 2017-04-21 PROCEDURE — 80048 BASIC METABOLIC PNL TOTAL CA: CPT

## 2017-04-21 PROCEDURE — 36415 COLL VENOUS BLD VENIPUNCTURE: CPT

## 2017-04-21 PROCEDURE — 82040 ASSAY OF SERUM ALBUMIN: CPT

## 2017-04-26 ENCOUNTER — HOSPITAL ENCOUNTER (EMERGENCY)
Dept: HOSPITAL 8 - ED | Age: 32
Discharge: HOME | End: 2017-04-26
Payer: MEDICARE

## 2017-04-26 VITALS — SYSTOLIC BLOOD PRESSURE: 146 MMHG | DIASTOLIC BLOOD PRESSURE: 98 MMHG

## 2017-04-26 VITALS — WEIGHT: 142.2 LBS | BODY MASS INDEX: 21.55 KG/M2 | HEIGHT: 68 IN

## 2017-04-26 DIAGNOSIS — F41.1: ICD-10-CM

## 2017-04-26 DIAGNOSIS — F17.210: ICD-10-CM

## 2017-04-26 DIAGNOSIS — F90.9: ICD-10-CM

## 2017-04-26 DIAGNOSIS — F10.20: Primary | ICD-10-CM
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
## 2017-04-26 PROCEDURE — 99283 EMERGENCY DEPT VISIT LOW MDM: CPT

## 2017-05-01 ENCOUNTER — HOSPITAL ENCOUNTER (EMERGENCY)
Facility: MEDICAL CENTER | Age: 32
End: 2017-05-02
Attending: EMERGENCY MEDICINE
Payer: MEDICARE

## 2017-05-01 DIAGNOSIS — F10.920 ALCOHOL INTOXICATION, UNCOMPLICATED (HCC): ICD-10-CM

## 2017-05-01 PROCEDURE — 99284 EMERGENCY DEPT VISIT MOD MDM: CPT

## 2017-05-01 PROCEDURE — A9270 NON-COVERED ITEM OR SERVICE: HCPCS | Performed by: EMERGENCY MEDICINE

## 2017-05-01 PROCEDURE — 700102 HCHG RX REV CODE 250 W/ 637 OVERRIDE(OP): Performed by: EMERGENCY MEDICINE

## 2017-05-01 RX ORDER — LORAZEPAM 1 MG/1
1 TABLET ORAL ONCE
Status: COMPLETED | OUTPATIENT
Start: 2017-05-01 | End: 2017-05-01

## 2017-05-01 RX ADMIN — LORAZEPAM 1 MG: 1 TABLET ORAL at 20:30

## 2017-05-01 ASSESSMENT — LIFESTYLE VARIABLES
AVERAGE NUMBER OF DAYS PER WEEK YOU HAVE A DRINK CONTAINING ALCOHOL: 10
ON A TYPICAL DAY WHEN YOU DRINK ALCOHOL HOW MANY DRINKS DO YOU HAVE: 10
DO YOU DRINK ALCOHOL: YES
HAVE YOU EVER FELT YOU SHOULD CUT DOWN ON YOUR DRINKING: NO
EVER HAD A DRINK FIRST THING IN THE MORNING TO STEADY YOUR NERVES TO GET RID OF A HANGOVER: YES
EVER FELT BAD OR GUILTY ABOUT YOUR DRINKING: NO
TOTAL SCORE: 2
DOES PATIENT WANT TO STOP DRINKING: NO
TOTAL SCORE: 2
TOTAL SCORE: 2
HAVE PEOPLE ANNOYED YOU BY CRITICIZING YOUR DRINKING: YES
HOW MANY TIMES IN THE PAST YEAR HAVE YOU HAD 5 OR MORE DRINKS IN A DAY: 300
CONSUMPTION TOTAL: POSITIVE

## 2017-05-01 NOTE — ED AVS SNAPSHOT
Home Care Instructions                                                                                                                Gopal Ceja   MRN: [id number]    Department:  [facility], Emergency Dept   Date of Visit:  5/1/2017            [facility], Emergency Dept    [address]    [place] [address]    Phone:  [phone]      You were seen by     Sebas Andres M.D.      Your Diagnosis Was     Alcohol intoxication, uncomplicated (CMS-HCC)     F10.120       These are the medications you received during your hospitalization from 05/01/2017 2012 to 05/02/2017 0435     Date/Time Order Dose Route Action    05/01/2017 2030 lorazepam (ATIVAN) tablet 1 mg 1 mg Oral Given    05/02/2017 0017 diazepam (VALIUM) tablet 10 mg 10 mg Oral Given    05/02/2017 0018 erythromycin ophthalmic ointment   Both Eyes Given    05/02/2017 0307 diazepam (VALIUM) tablet 10 mg 10 mg Oral Given    05/02/2017 0041 diazepam (VALIUM) tablet 10 mg 10 mg Oral Given      Follow-up Information     1. Follow up with [facility].    Why:  As needed    Contact information    [facility]  [place] [id number] [phone]        Medication Information     Review all of your home medications and newly ordered medications with your primary doctor and/or pharmacist as soon as possible. Follow medication instructions as directed by your doctor and/or pharmacist.     Please keep your complete medication list with you and share with your physician. Update the information when medications are discontinued, doses are changed, or new medications (including over-the-counter products) are added; and carry medication information at all times in the event of emergency situations.               Medication List      Notice     You have not been prescribed any medications.            Procedures and tests performed during your visit     NURSING COMMUNICATION        Discharge Instructions        "  Alcohol Intoxication  Alcohol intoxication occurs when the amount of alcohol that a person has consumed impairs his or her ability to mentally and physically function. Alcohol directly impairs the normal chemical activity of the brain. Drinking large amounts of alcohol can lead to changes in mental function and behavior, and it can cause many physical effects that can be harmful.   Alcohol intoxication can range in severity from mild to very severe. Various factors can affect the level of intoxication that occurs, such as the person's age, gender, weight, frequency of alcohol consumption, and the presence of other medical conditions (such as diabetes, seizures, or heart conditions). Dangerous levels of alcohol intoxication may occur when people drink large amounts of alcohol in a short period (binge drinking). Alcohol can also be especially dangerous when combined with certain prescription medicines or \"recreational\" drugs.  SIGNS AND SYMPTOMS  Some common signs and symptoms of mild alcohol intoxication include:  · Loss of coordination.  · Changes in mood and behavior.  · Impaired judgment.  · Slurred speech.  As alcohol intoxication progresses to more severe levels, other signs and symptoms will appear. These may include:  · Vomiting.  · Confusion and impaired memory.  · Slowed breathing.  · Seizures.  · Loss of consciousness.  DIAGNOSIS   Your health care provider will take a medical history and perform a physical exam. You will be asked about the amount and type of alcohol you have consumed. Blood tests will be done to measure the concentration of alcohol in your blood. In many places, your blood alcohol level must be lower than 80 mg/dL (0.08%) to legally drive. However, many dangerous effects of alcohol can occur at much lower levels.   TREATMENT   People with alcohol intoxication often do not require treatment. Most of the effects of alcohol intoxication are temporary, and they go away as the alcohol " naturally leaves the body. Your health care provider will monitor your condition until you are stable enough to go home. Fluids are sometimes given through an IV access tube to help prevent dehydration.   HOME CARE INSTRUCTIONS  · Do not drive after drinking alcohol.  · Stay hydrated. Drink enough water and fluids to keep your urine clear or pale yellow. Avoid caffeine.    · Only take over-the-counter or prescription medicines as directed by your health care provider.    SEEK MEDICAL CARE IF:   · You have persistent vomiting.    · You do not feel better after a few days.  · You have frequent alcohol intoxication. Your health care provider can help determine if you should see a substance use treatment counselor.  SEEK IMMEDIATE MEDICAL CARE IF:   · You become shaky or tremble when you try to stop drinking.    · You shake uncontrollably (seizure).    · You throw up (vomit) blood. This may be bright red or may look like black coffee grounds.    · You have blood in your stool. This may be bright red or may appear as a black, tarry, bad smelling stool.    · You become lightheaded or faint.    MAKE SURE YOU:   · Understand these instructions.  · Will watch your condition.  · Will get help right away if you are not doing well or get worse.     This information is not intended to replace advice given to you by your health care provider. Make sure you discuss any questions you have with your health care provider.     Document Released: 09/27/2006 Document Revised: 08/20/2014 Document Reviewed: 05/23/2014  Elsevier Interactive Patient Education ©2016 [employer]            Patient Information     Patient Information    Following emergency treatment: all patient requiring follow-up care must return either to a private physician or a clinic if your condition worsens before you are able to obtain further medical attention, please return to the emergency room.     Billing Information    At [facility], we work to make the  billing process streamlined for our patients.  Our Representatives are here to answer any questions you may have regarding your hospital bill.  If you have insurance coverage and have supplied your insurance information to us, we will submit a claim to your insurer on your behalf.  Should you have any questions regarding your bill, we can be reached online or by phone as follows:  Online: You are able pay your bills online or live chat with our representatives about any billing questions you may have. We are here to help Monday - Friday from 8:00am to 7:30pm and 9:00am - 12:00pm on Saturdays.  Please visit [email or web address]  for more information.   Phone:  [phone] or [phone]    Please note that your emergency physician, surgeon, pathologist, radiologist, anesthesiologist, and other specialists are not employed by [facility] and will therefore bill separately for their services.  Please contact them directly for any questions concerning their bills at the numbers below:     Emergency Physician Services:  [phone]  [facility]:  [phone]  [facility]:  [phone]  [facility]:  [phone]    1. Your final bill may vary from the amount quoted upon discharge if all procedures are not complete at that time, or if your doctor has additional procedures of which we are not aware. You will receive an additional bill if you return to the Emergency Department at [facility] for suture removal regardless of the facility of which the sutures were placed.     2. Please arrange for settlement of this account at the emergency registration.    3. All self-pay accounts are due in full at the time of treatment.  If you are unable to meet this obligation then payment is expected within 4-5 days.     4. If you have had radiology studies (CT, X-ray, Ultrasound, MRI), you have received a preliminary result during your emergency  department visit. Please contact the radiology department [phone] to receive a copy of your final result. Please discuss the Final result with your primary physician or with the follow up physician provided.     Crisis Hotline:  [place] Crisis Hotline:  1-217-JZUPVSL or 1-567.483.7750  [place] Crisis Hotline:    1-649.477.1090 or 213-801-1902         ED Discharge Follow Up Questions    1. In order to provide you with very good care, we would like to follow up with a phone call in the next few days.  May we have your permission to contact you?     YES /  NO    2. What is the best phone number to call you? (       )_____-__________    3. What is the best time to call you?      Morning  /  Afternoon  /  Evening                   Patient Signature:  ____________________________________________________________    Date:  ____________________________________________________________

## 2017-05-01 NOTE — ED AVS SNAPSHOT
5/2/2017    Gopal Ceja  [address] [place] [id number]    Dear Gopal:    [facility] wants to ensure your discharge home is safe and you or your loved ones have had all of your questions answered regarding your care after you leave the hospital.    Below is a list of resources and contact information should you have any questions regarding your hospital stay, follow-up instructions, or active medical symptoms.    Questions or Concerns Regarding… Contact   Medical Questions Related to Your Discharge  (7 days a week, 8am-5pm) Contact a Nurse Care Coordinator   [phone]   Medical Questions Not Related to Your Discharge  (24 hours a day / 7 days a week)  Contact the Nurse Health Line   [phone]    Medications or Discharge Instructions Refer to your discharge packet   or contact your [facility] Primary Care Provider   [phone]   Follow-up Appointment(s) Schedule your appointment via [employer]   or contact Scheduling [phone]   Billing Review your statement via [employer]  or contact Billing [phone]   Medical Records Review your records via [employer]   or contact Medical Records [phone]     You may receive a telephone call within two days of discharge. This call is to make certain you understand your discharge instructions and have the opportunity to have any questions answered. You can also easily access your medical information, test results and upcoming appointments via the [employer] free online health management tool. You can learn more and sign up at [employer]. For assistance setting up your [employer] account, please call [phone].    Once again, we want to ensure your discharge home is safe and that you have a clear understanding of any next steps in your care. If you have any questions or concerns, please do not hesitate to contact us, we are here for you. Thank you for choosing [facility] for your healthcare needs.    Sincerely,    Your [facility] Healthcare Team

## 2017-05-01 NOTE — ED AVS SNAPSHOT
emids Access Code: [email or web address]  Expires: 5/12/2017  1:55 AM    Your email address is not on file at [facility].  Email Addresses are required for you to sign up for emids, please contact [phone] to verify your personal information and to provide your email address prior to attempting to register for emids.    Gopal Ceja  [address] [place] [address]    emids  A secure, online tool to manage your health information     [facility]’s emids® is a secure, online tool that connects you to your personalized health information from the privacy of your home -- day or night - making it very easy for you to manage your healthcare. Once the activation process is completed, you can even access your medical information using the emids erwin, which is available for free in the [employer] Erwin store or Google Play store.     To learn more about emids, visit [email or web address]    There are two levels of access available (as shown below):   My Chart Features  [facility] Primary Care Doctor [facility]  Specialists [facility]  Urgent  Care Non-[facility] Primary Care Doctor   Email your healthcare team securely and privately 24/7 X X X    Manage appointments: schedule your next appointment; view details of past/upcoming appointments X      Request prescription refills. X      View recent personal medical records, including lab and immunizations X X X X   View health record, including health history, allergies, medications X X X X   Read reports about your outpatient visits, procedures, consult and ER notes X X X X   See your discharge summary, which is a recap of your hospital and/or ER visit that includes your diagnosis, lab results, and care plan X X  X     How to register for [employer]:  Once your e-mail address has been verified, follow the following steps to sign up for emids.     1. Go to  [email or web address]  2. Click on the Sign Up Now box, which takes you to the New Member Sign Up page. You will need to  provide the following information:  a. Enter your [employer] Access Code exactly as it appears at the top of this page. (You will not need to use this code after you’ve completed the sign-up process. If you do not sign up before the expiration date, you must request a new code.)   b. Enter your date of birth.   c. Enter your home email address.   d. Click Submit, and follow the next screen’s instructions.  3. Create a MAYKOR ID. This will be your MAYKOR login ID and cannot be changed, so think of one that is secure and easy to remember.  4. Create a MAYKOR password. You can change your password at any time.  5. Enter your Password Reset Question and Answer. This can be used at a later time if you forget your password.   6. Enter your e-mail address. This allows you to receive e-mail notifications when new information is available in MAYKOR.  7. Click Sign Up. You can now view your health information.    For assistance activating your [employer] account, call [phone]

## 2017-05-02 VITALS
OXYGEN SATURATION: 94 % | TEMPERATURE: 99 F | RESPIRATION RATE: 18 BRPM | SYSTOLIC BLOOD PRESSURE: 115 MMHG | WEIGHT: 150 LBS | HEART RATE: 118 BPM | BODY MASS INDEX: 22.81 KG/M2 | DIASTOLIC BLOOD PRESSURE: 80 MMHG

## 2017-05-02 PROCEDURE — A9270 NON-COVERED ITEM OR SERVICE: HCPCS | Performed by: EMERGENCY MEDICINE

## 2017-05-02 PROCEDURE — 700101 HCHG RX REV CODE 250: Performed by: EMERGENCY MEDICINE

## 2017-05-02 PROCEDURE — 700102 HCHG RX REV CODE 250 W/ 637 OVERRIDE(OP): Performed by: EMERGENCY MEDICINE

## 2017-05-02 RX ORDER — DIAZEPAM 5 MG/1
10 TABLET ORAL
Status: DISCONTINUED | OUTPATIENT
Start: 2017-05-02 | End: 2017-05-02 | Stop reason: HOSPADM

## 2017-05-02 RX ORDER — DIAZEPAM 5 MG/1
10 TABLET ORAL ONCE
Status: COMPLETED | OUTPATIENT
Start: 2017-05-02 | End: 2017-05-02

## 2017-05-02 RX ORDER — ERYTHROMYCIN 5 MG/G
OINTMENT OPHTHALMIC ONCE
Status: COMPLETED | OUTPATIENT
Start: 2017-05-02 | End: 2017-05-02

## 2017-05-02 RX ADMIN — ERYTHROMYCIN: 5 OINTMENT OPHTHALMIC at 00:18

## 2017-05-02 RX ADMIN — DIAZEPAM 10 MG: 5 TABLET ORAL at 03:07

## 2017-05-02 RX ADMIN — DIAZEPAM 10 MG: 5 TABLET ORAL at 00:17

## 2017-05-02 RX ADMIN — DIAZEPAM 10 MG: 5 TABLET ORAL at 00:41

## 2017-05-02 NOTE — ED PROVIDER NOTES
ED Provider Note    CHIEF COMPLAINT  Chief Complaint   Patient presents with   • Alcohol Intoxication   • Hallucinations       HPI  Gopal Ceja is a 31 y.o. male who presents with a chief complaint of alcohol intoxication. In addition complaining of hallucinations. This all started today. He states that he drank somewhat she started hallucinating. He gives no specific hallucinations. Nothing makes it better apparently drinking the worse he has no suicidal or homicidal ideation.    Patient is known to the staff very well and noted to me comes by EMS. Vital alcohol intoxication and recovery patient states that he is no longer welcome at [facility].    REVIEW OF SYSTEMS  Reason of the review of systems are in my opinion not reliable as a patient is intoxicated in addition has malingering as he would like to come to the ER.    PAST MEDICAL HISTORY  Past Medical History   Diagnosis Date   • Ectrodactyly-ectodermal dysplasia-clefting syndrome    • Acute anxiety    • ETOH abuse    • Psychiatric disorder      anxiety/panic disorder   • Bipolar affective (CMS-[facility])    • ADHD (attention deficit hyperactivity disorder)    • ADHD (attention deficit hyperactivity disorder)    • Depression        FAMILY HISTORY  No family history on file.    SOCIAL HISTORY  Social History     Social History   • Marital Status: Single     Spouse Name: N/A   • Number of Children: N/A   • Years of Education: N/A     Social History Main Topics   • Smoking status: Former Smoker -- 0.25 packs/day     Types: Cigarettes   • Smokeless tobacco: Never Used   • Alcohol Use: Yes      Comment: 8 earthquakes 10% 24 oz malt liquor/day   • Drug Use: No   • Sexual Activity: Not on file     Other Topics Concern   • Not on file     Social History Narrative    ** Merged History Encounter **            SURGICAL HISTORY  Past Surgical History   Procedure Laterality Date   • Other orthopedic surgery       hands bilaterally r/t EEDC syndrome       CURRENT  MEDICATIONS  Home Medications     **Home medications have not yet been reviewed for this encounter**           ALLERGIES  No Known Allergies    PHYSICAL EXAM  VITAL SIGNS: /94 mmHg  Pulse 131  Temp(Src) 37.6 °C (99.7 °F)  Resp 18  Wt 68.04 kg (150 lb)  SpO2 91%  Constitutional: Disheveled slightly intoxicated  HENT: Normocephalic, Atraumatic, Bilateral external ears normal, Oropharynx moist, No oral exudates, Nose normal.   Eyes: Slightly swollen left eyelid minimal discharge   Musculoskeletal: Neck has normal range of motion, No tenderness, Supple.   Lymphatic: No cervical lymphadenopathy noted.   Cardiovascular: Normal heart rate, Normal rhythm, No murmurs, No rubs, No gallops.   Thorax & Lungs: Normal breath sounds, No respiratory distress, No wheezing, No chest tenderness.   Abdomen: Nondistended nontender  Skin: Warm, Dry, No erythema, No rash.   : No CVA tenderness.   Psychiatric: Calm, not anxious  Neurologic: Alert & oriented, moves all extremities equally    RADIOLOGY/PROCEDURES  Patient received Ativan by mouth in edition sandwich and a blanket he's been sleeping comfortably    COURSE & MEDICAL DECISION MAKING  Pertinent Labs & Imaging studies reviewed. (See chart for details)  None to me for frequent ER visits. At this point, I do not believe the patient is hallucinations of so it secondary to alcohol intoxication and he will require time to detox. He received Ativan he's been sleeping comfortably. Rechecked him at approximately 11:00 and 11:54 PM. Sinus R for further evaluation. Therefore, we didn't have transportation back to his home therefore wait till the morning of discharge when the buses are running.    12:38 AM  he was given erythromycin for his conjunctivitis in addition, is now giving telemetry grams of Valium every 2 hours as needed for withdrawal. This patient has very bad alcoholism we'll go ahead and give him aggressive Valium treatment. At this point we'll wait and see and we  does not morning.    FINAL IMPRESSION  1. Alcohol intoxication  2.   3.      Electronically signed by: Sebas Andres, 5/1/2017 8:26 PM

## 2017-05-02 NOTE — ED NOTES
Pt brought in by EMS, states that he has been drinking today and is now hallucinating. Per EMS, pt has changed his chief complaint several times since they picked him up. Pt able to walk in under own power and able to move onto stretcher under own power. Pt asking for sandwich and drink, not given at this time.

## 2017-05-02 NOTE — ED NOTES
Pt given verbal and written discharge instructions with two bus passes. Pt was given a bus schedule and shown where the bus stop is. Ambulated out on his own with steady gait.

## 2017-05-02 NOTE — DISCHARGE INSTRUCTIONS
"Alcohol Intoxication  Alcohol intoxication occurs when the amount of alcohol that a person has consumed impairs his or her ability to mentally and physically function. Alcohol directly impairs the normal chemical activity of the brain. Drinking large amounts of alcohol can lead to changes in mental function and behavior, and it can cause many physical effects that can be harmful.   Alcohol intoxication can range in severity from mild to very severe. Various factors can affect the level of intoxication that occurs, such as the person's age, gender, weight, frequency of alcohol consumption, and the presence of other medical conditions (such as diabetes, seizures, or heart conditions). Dangerous levels of alcohol intoxication may occur when people drink large amounts of alcohol in a short period (binge drinking). Alcohol can also be especially dangerous when combined with certain prescription medicines or \"recreational\" drugs.  SIGNS AND SYMPTOMS  Some common signs and symptoms of mild alcohol intoxication include:  · Loss of coordination.  · Changes in mood and behavior.  · Impaired judgment.  · Slurred speech.  As alcohol intoxication progresses to more severe levels, other signs and symptoms will appear. These may include:  · Vomiting.  · Confusion and impaired memory.  · Slowed breathing.  · Seizures.  · Loss of consciousness.  DIAGNOSIS   Your health care provider will take a medical history and perform a physical exam. You will be asked about the amount and type of alcohol you have consumed. Blood tests will be done to measure the concentration of alcohol in your blood. In many places, your blood alcohol level must be lower than 80 mg/dL (0.08%) to legally drive. However, many dangerous effects of alcohol can occur at much lower levels.   TREATMENT   People with alcohol intoxication often do not require treatment. Most of the effects of alcohol intoxication are temporary, and they go away as the alcohol naturally " leaves the body. Your health care provider will monitor your condition until you are stable enough to go home. Fluids are sometimes given through an IV access tube to help prevent dehydration.   HOME CARE INSTRUCTIONS  · Do not drive after drinking alcohol.  · Stay hydrated. Drink enough water and fluids to keep your urine clear or pale yellow. Avoid caffeine.    · Only take over-the-counter or prescription medicines as directed by your health care provider.    SEEK MEDICAL CARE IF:   · You have persistent vomiting.    · You do not feel better after a few days.  · You have frequent alcohol intoxication. Your health care provider can help determine if you should see a substance use treatment counselor.  SEEK IMMEDIATE MEDICAL CARE IF:   · You become shaky or tremble when you try to stop drinking.    · You shake uncontrollably (seizure).    · You throw up (vomit) blood. This may be bright red or may look like black coffee grounds.    · You have blood in your stool. This may be bright red or may appear as a black, tarry, bad smelling stool.    · You become lightheaded or faint.    MAKE SURE YOU:   · Understand these instructions.  · Will watch your condition.  · Will get help right away if you are not doing well or get worse.     This information is not intended to replace advice given to you by your health care provider. Make sure you discuss any questions you have with your health care provider.     Document Released: 09/27/2006 Document Revised: 08/20/2014 Document Reviewed: 05/23/2014  [employer] Interactive Patient Education ©2016 [employer]

## 2017-05-04 ENCOUNTER — HOSPITAL ENCOUNTER (EMERGENCY)
Dept: HOSPITAL 8 - ED | Age: 32
Discharge: LEFT BEFORE DISCHARGE | End: 2017-05-04
Payer: MEDICARE

## 2017-05-04 VITALS — BODY MASS INDEX: 21.22 KG/M2 | HEIGHT: 68 IN | WEIGHT: 139.99 LBS

## 2017-05-04 VITALS — SYSTOLIC BLOOD PRESSURE: 139 MMHG | DIASTOLIC BLOOD PRESSURE: 98 MMHG

## 2017-05-04 DIAGNOSIS — F17.210: ICD-10-CM

## 2017-05-04 DIAGNOSIS — F10.120: ICD-10-CM

## 2017-05-04 DIAGNOSIS — R07.89: Primary | ICD-10-CM

## 2017-05-04 PROCEDURE — 71020: CPT

## 2017-05-04 PROCEDURE — 84484 ASSAY OF TROPONIN QUANT: CPT

## 2017-05-04 PROCEDURE — 36415 COLL VENOUS BLD VENIPUNCTURE: CPT

## 2017-05-04 PROCEDURE — 99285 EMERGENCY DEPT VISIT HI MDM: CPT

## 2017-05-04 PROCEDURE — 93005 ELECTROCARDIOGRAM TRACING: CPT

## 2017-05-07 ENCOUNTER — HOSPITAL ENCOUNTER (INPATIENT)
Facility: MEDICAL CENTER | Age: 32
LOS: 3 days | DRG: 897 | End: 2017-05-10
Attending: EMERGENCY MEDICINE | Admitting: INTERNAL MEDICINE
Payer: MEDICARE

## 2017-05-07 ENCOUNTER — RESOLUTE PROFESSIONAL BILLING HOSPITAL PROF FEE (OUTPATIENT)
Dept: HOSPITALIST | Facility: MEDICAL CENTER | Age: 32
End: 2017-05-07
Payer: MEDICARE

## 2017-05-07 DIAGNOSIS — E83.51 HYPOCALCEMIA: ICD-10-CM

## 2017-05-07 DIAGNOSIS — R00.0 TACHYCARDIA: ICD-10-CM

## 2017-05-07 DIAGNOSIS — E83.42 HYPOMAGNESEMIA: ICD-10-CM

## 2017-05-07 DIAGNOSIS — F10.239 ALCOHOL DEPENDENCE WITH WITHDRAWAL WITH COMPLICATION (HCC): ICD-10-CM

## 2017-05-07 DIAGNOSIS — F19.10 POLYSUBSTANCE ABUSE (HCC): ICD-10-CM

## 2017-05-07 DIAGNOSIS — E87.6 HYPOKALEMIA: ICD-10-CM

## 2017-05-07 PROBLEM — F15.20 METHAMPHETAMINE ADDICTION (HCC): Status: ACTIVE | Noted: 2017-05-07

## 2017-05-07 LAB
ALBUMIN SERPL BCP-MCNC: 3.3 G/DL (ref 3.2–4.9)
ALBUMIN/GLOB SERPL: 1.1 G/DL
ALP SERPL-CCNC: 75 U/L (ref 30–99)
ALT SERPL-CCNC: 46 U/L (ref 2–50)
AMMONIA PLAS-SCNC: 27 UMOL/L (ref 11–45)
AMPHET UR QL SCN: POSITIVE
ANION GAP SERPL CALC-SCNC: 10 MMOL/L (ref 0–11.9)
APTT PPP: 26.7 SEC (ref 24.7–36)
AST SERPL-CCNC: 74 U/L (ref 12–45)
BARBITURATES UR QL SCN: NEGATIVE
BASOPHILS # BLD AUTO: 0.4 % (ref 0–1.8)
BASOPHILS # BLD: 0.02 K/UL (ref 0–0.12)
BENZODIAZ UR QL SCN: NEGATIVE
BILIRUB SERPL-MCNC: 0.7 MG/DL (ref 0.1–1.5)
BUN SERPL-MCNC: 4 MG/DL (ref 8–22)
BZE UR QL SCN: NEGATIVE
CALCIUM SERPL-MCNC: 6.7 MG/DL (ref 8.5–10.5)
CANNABINOIDS UR QL SCN: NEGATIVE
CHLORIDE SERPL-SCNC: 105 MMOL/L (ref 96–112)
CO2 SERPL-SCNC: 21 MMOL/L (ref 20–33)
CREAT SERPL-MCNC: 0.47 MG/DL (ref 0.5–1.4)
EKG IMPRESSION: NORMAL
EOSINOPHIL # BLD AUTO: 0.01 K/UL (ref 0–0.51)
EOSINOPHIL NFR BLD: 0.2 % (ref 0–6.9)
ERYTHROCYTE [DISTWIDTH] IN BLOOD BY AUTOMATED COUNT: 47.8 FL (ref 35.9–50)
ETHANOL BLD-MCNC: 0.14 G/DL
GFR SERPL CREATININE-BSD FRML MDRD: >60 ML/MIN/1.73 M 2
GLOBULIN SER CALC-MCNC: 2.9 G/DL (ref 1.9–3.5)
GLUCOSE SERPL-MCNC: 76 MG/DL (ref 65–99)
HCT VFR BLD AUTO: 33.6 % (ref 42–52)
HGB BLD-MCNC: 11.3 G/DL (ref 14–18)
IMM GRANULOCYTES # BLD AUTO: 0.01 K/UL (ref 0–0.11)
IMM GRANULOCYTES NFR BLD AUTO: 0.2 % (ref 0–0.9)
INR PPP: 0.95 (ref 0.87–1.13)
LYMPHOCYTES # BLD AUTO: 0.93 K/UL (ref 1–4.8)
LYMPHOCYTES NFR BLD: 16.7 % (ref 22–41)
MAGNESIUM SERPL-MCNC: 1.3 MG/DL (ref 1.5–2.5)
MCH RBC QN AUTO: 32.1 PG (ref 27–33)
MCHC RBC AUTO-ENTMCNC: 33.6 G/DL (ref 33.7–35.3)
MCV RBC AUTO: 95.5 FL (ref 81.4–97.8)
MDMA UR QL SCN: NEGATIVE
METHADONE UR QL SCN: NEGATIVE
MONOCYTES # BLD AUTO: 0.7 K/UL (ref 0–0.85)
MONOCYTES NFR BLD AUTO: 12.5 % (ref 0–13.4)
NEUTROPHILS # BLD AUTO: 3.91 K/UL (ref 1.82–7.42)
NEUTROPHILS NFR BLD: 70 % (ref 44–72)
NRBC # BLD AUTO: 0 K/UL
NRBC BLD AUTO-RTO: 0 /100 WBC
OPIATES UR QL SCN: NEGATIVE
OXYCODONE UR QL SCN: NEGATIVE
PCP UR QL SCN: NEGATIVE
PHOSPHATE SERPL-MCNC: 2.1 MG/DL (ref 2.5–4.5)
PLATELET # BLD AUTO: 97 K/UL (ref 164–446)
PMV BLD AUTO: 11.3 FL (ref 9–12.9)
POC BREATHALIZER: 0.15 PERCENT (ref 0–0.01)
POTASSIUM SERPL-SCNC: 3.2 MMOL/L (ref 3.6–5.5)
PROPOXYPH UR QL SCN: NEGATIVE
PROT SERPL-MCNC: 6.2 G/DL (ref 6–8.2)
PROTHROMBIN TIME: 13 SEC (ref 12–14.6)
RBC # BLD AUTO: 3.52 M/UL (ref 4.7–6.1)
SODIUM SERPL-SCNC: 136 MMOL/L (ref 135–145)
WBC # BLD AUTO: 5.6 K/UL (ref 4.8–10.8)

## 2017-05-07 PROCEDURE — 84100 ASSAY OF PHOSPHORUS: CPT

## 2017-05-07 PROCEDURE — A9270 NON-COVERED ITEM OR SERVICE: HCPCS | Performed by: EMERGENCY MEDICINE

## 2017-05-07 PROCEDURE — 80307 DRUG TEST PRSMV CHEM ANLYZR: CPT

## 2017-05-07 PROCEDURE — 85025 COMPLETE CBC W/AUTO DIFF WBC: CPT

## 2017-05-07 PROCEDURE — A9270 NON-COVERED ITEM OR SERVICE: HCPCS | Performed by: HOSPITALIST

## 2017-05-07 PROCEDURE — 94760 N-INVAS EAR/PLS OXIMETRY 1: CPT

## 2017-05-07 PROCEDURE — 96365 THER/PROPH/DIAG IV INF INIT: CPT

## 2017-05-07 PROCEDURE — 700101 HCHG RX REV CODE 250: Performed by: INTERNAL MEDICINE

## 2017-05-07 PROCEDURE — 96375 TX/PRO/DX INJ NEW DRUG ADDON: CPT

## 2017-05-07 PROCEDURE — 700111 HCHG RX REV CODE 636 W/ 250 OVERRIDE (IP): Performed by: INTERNAL MEDICINE

## 2017-05-07 PROCEDURE — 93005 ELECTROCARDIOGRAM TRACING: CPT

## 2017-05-07 PROCEDURE — 93010 ELECTROCARDIOGRAM REPORT: CPT | Performed by: INTERNAL MEDICINE

## 2017-05-07 PROCEDURE — 93005 ELECTROCARDIOGRAM TRACING: CPT | Performed by: INTERNAL MEDICINE

## 2017-05-07 PROCEDURE — 770022 HCHG ROOM/CARE - ICU (200)

## 2017-05-07 PROCEDURE — 85730 THROMBOPLASTIN TIME PARTIAL: CPT

## 2017-05-07 PROCEDURE — 83735 ASSAY OF MAGNESIUM: CPT

## 2017-05-07 PROCEDURE — 80053 COMPREHEN METABOLIC PANEL: CPT

## 2017-05-07 PROCEDURE — 700111 HCHG RX REV CODE 636 W/ 250 OVERRIDE (IP): Performed by: HOSPITALIST

## 2017-05-07 PROCEDURE — 700102 HCHG RX REV CODE 250 W/ 637 OVERRIDE(OP): Performed by: HOSPITALIST

## 2017-05-07 PROCEDURE — 85610 PROTHROMBIN TIME: CPT

## 2017-05-07 PROCEDURE — 700102 HCHG RX REV CODE 250 W/ 637 OVERRIDE(OP): Performed by: INTERNAL MEDICINE

## 2017-05-07 PROCEDURE — 99285 EMERGENCY DEPT VISIT HI MDM: CPT

## 2017-05-07 PROCEDURE — 99223 1ST HOSP IP/OBS HIGH 75: CPT | Mod: AI | Performed by: INTERNAL MEDICINE

## 2017-05-07 PROCEDURE — A9270 NON-COVERED ITEM OR SERVICE: HCPCS | Performed by: INTERNAL MEDICINE

## 2017-05-07 PROCEDURE — 96361 HYDRATE IV INFUSION ADD-ON: CPT

## 2017-05-07 PROCEDURE — 700102 HCHG RX REV CODE 250 W/ 637 OVERRIDE(OP): Performed by: EMERGENCY MEDICINE

## 2017-05-07 PROCEDURE — 96367 TX/PROPH/DG ADDL SEQ IV INF: CPT

## 2017-05-07 PROCEDURE — 700101 HCHG RX REV CODE 250: Performed by: HOSPITALIST

## 2017-05-07 PROCEDURE — 700105 HCHG RX REV CODE 258: Performed by: EMERGENCY MEDICINE

## 2017-05-07 PROCEDURE — 96366 THER/PROPH/DIAG IV INF ADDON: CPT

## 2017-05-07 PROCEDURE — 82140 ASSAY OF AMMONIA: CPT

## 2017-05-07 PROCEDURE — 302970 POC BREATHALIZER: Performed by: EMERGENCY MEDICINE

## 2017-05-07 PROCEDURE — 700105 HCHG RX REV CODE 258: Performed by: INTERNAL MEDICINE

## 2017-05-07 PROCEDURE — 700105 HCHG RX REV CODE 258: Performed by: HOSPITALIST

## 2017-05-07 PROCEDURE — 700101 HCHG RX REV CODE 250: Performed by: EMERGENCY MEDICINE

## 2017-05-07 PROCEDURE — 700111 HCHG RX REV CODE 636 W/ 250 OVERRIDE (IP): Performed by: EMERGENCY MEDICINE

## 2017-05-07 PROCEDURE — HZ2ZZZZ DETOXIFICATION SERVICES FOR SUBSTANCE ABUSE TREATMENT: ICD-10-PCS | Performed by: INTERNAL MEDICINE

## 2017-05-07 RX ORDER — FOLIC ACID 1 MG/1
1 TABLET ORAL DAILY
Status: DISCONTINUED | OUTPATIENT
Start: 2017-05-07 | End: 2017-05-07

## 2017-05-07 RX ORDER — THIAMINE MONONITRATE (VIT B1) 100 MG
100 TABLET ORAL DAILY
Status: DISCONTINUED | OUTPATIENT
Start: 2017-05-08 | End: 2017-05-10 | Stop reason: HOSPADM

## 2017-05-07 RX ORDER — QUETIAPINE FUMARATE 25 MG/1
50 TABLET, FILM COATED ORAL 3 TIMES DAILY PRN
Status: DISCONTINUED | OUTPATIENT
Start: 2017-05-07 | End: 2017-05-09

## 2017-05-07 RX ORDER — DIAZEPAM 5 MG/ML
5 INJECTION, SOLUTION INTRAMUSCULAR; INTRAVENOUS ONCE
Status: COMPLETED | OUTPATIENT
Start: 2017-05-07 | End: 2017-05-07

## 2017-05-07 RX ORDER — CALCIUM CHLORIDE 100 MG/ML
1 INJECTION INTRAVENOUS; INTRAVENTRICULAR ONCE
Status: DISCONTINUED | OUTPATIENT
Start: 2017-05-07 | End: 2017-05-07

## 2017-05-07 RX ORDER — LORAZEPAM 1 MG/1
4 TABLET ORAL
Status: DISCONTINUED | OUTPATIENT
Start: 2017-05-07 | End: 2017-05-10 | Stop reason: HOSPADM

## 2017-05-07 RX ORDER — SULFACETAMIDE SODIUM 100 MG/ML
1 SOLUTION/ DROPS OPHTHALMIC EVERY 4 HOURS
Status: DISCONTINUED | OUTPATIENT
Start: 2017-05-07 | End: 2017-05-10 | Stop reason: HOSPADM

## 2017-05-07 RX ORDER — CLONIDINE HYDROCHLORIDE 0.1 MG/1
0.1 TABLET ORAL EVERY 6 HOURS PRN
Status: DISCONTINUED | OUTPATIENT
Start: 2017-05-07 | End: 2017-05-10 | Stop reason: HOSPADM

## 2017-05-07 RX ORDER — LABETALOL HYDROCHLORIDE 5 MG/ML
20 INJECTION, SOLUTION INTRAVENOUS EVERY 4 HOURS PRN
Status: DISCONTINUED | OUTPATIENT
Start: 2017-05-07 | End: 2017-05-10 | Stop reason: HOSPADM

## 2017-05-07 RX ORDER — LORAZEPAM 1 MG/1
3 TABLET ORAL
Status: DISCONTINUED | OUTPATIENT
Start: 2017-05-07 | End: 2017-05-10 | Stop reason: HOSPADM

## 2017-05-07 RX ORDER — LORAZEPAM 1 MG/1
1 TABLET ORAL EVERY 4 HOURS PRN
Status: DISCONTINUED | OUTPATIENT
Start: 2017-05-07 | End: 2017-05-10 | Stop reason: HOSPADM

## 2017-05-07 RX ORDER — LORAZEPAM 2 MG/ML
1 INJECTION INTRAMUSCULAR ONCE
Status: COMPLETED | OUTPATIENT
Start: 2017-05-07 | End: 2017-05-07

## 2017-05-07 RX ORDER — VALPROIC ACID 250 MG/1
250 CAPSULE, LIQUID FILLED ORAL EVERY 8 HOURS
Status: DISCONTINUED | OUTPATIENT
Start: 2017-05-07 | End: 2017-05-09

## 2017-05-07 RX ORDER — POTASSIUM CHLORIDE 750 MG/1
20 TABLET, FILM COATED, EXTENDED RELEASE ORAL ONCE
Status: COMPLETED | OUTPATIENT
Start: 2017-05-07 | End: 2017-05-07

## 2017-05-07 RX ORDER — PROMETHAZINE HYDROCHLORIDE 25 MG/1
12.5-25 SUPPOSITORY RECTAL EVERY 4 HOURS PRN
Status: DISCONTINUED | OUTPATIENT
Start: 2017-05-07 | End: 2017-05-10 | Stop reason: HOSPADM

## 2017-05-07 RX ORDER — ONDANSETRON 2 MG/ML
4 INJECTION INTRAMUSCULAR; INTRAVENOUS EVERY 4 HOURS PRN
Status: DISCONTINUED | OUTPATIENT
Start: 2017-05-07 | End: 2017-05-10 | Stop reason: HOSPADM

## 2017-05-07 RX ORDER — LORAZEPAM 1 MG/1
0.5 TABLET ORAL EVERY 4 HOURS PRN
Status: DISCONTINUED | OUTPATIENT
Start: 2017-05-07 | End: 2017-05-10 | Stop reason: HOSPADM

## 2017-05-07 RX ORDER — LORAZEPAM 1 MG/1
2 TABLET ORAL
Status: DISCONTINUED | OUTPATIENT
Start: 2017-05-07 | End: 2017-05-10 | Stop reason: HOSPADM

## 2017-05-07 RX ORDER — SODIUM CHLORIDE AND POTASSIUM CHLORIDE 150; 900 MG/100ML; MG/100ML
INJECTION, SOLUTION INTRAVENOUS CONTINUOUS
Status: DISCONTINUED | OUTPATIENT
Start: 2017-05-07 | End: 2017-05-09

## 2017-05-07 RX ORDER — ONDANSETRON 4 MG/1
4 TABLET, ORALLY DISINTEGRATING ORAL EVERY 4 HOURS PRN
Status: DISCONTINUED | OUTPATIENT
Start: 2017-05-07 | End: 2017-05-10 | Stop reason: HOSPADM

## 2017-05-07 RX ORDER — LORAZEPAM 2 MG/ML
1 INJECTION INTRAMUSCULAR
Status: DISCONTINUED | OUTPATIENT
Start: 2017-05-07 | End: 2017-05-10 | Stop reason: HOSPADM

## 2017-05-07 RX ORDER — CHLORDIAZEPOXIDE HYDROCHLORIDE 5 MG/1
10 CAPSULE, GELATIN COATED ORAL EVERY 6 HOURS
Status: DISCONTINUED | OUTPATIENT
Start: 2017-05-07 | End: 2017-05-08

## 2017-05-07 RX ORDER — FOLIC ACID 1 MG/1
1 TABLET ORAL DAILY
Status: DISCONTINUED | OUTPATIENT
Start: 2017-05-08 | End: 2017-05-10 | Stop reason: HOSPADM

## 2017-05-07 RX ORDER — PROMETHAZINE HYDROCHLORIDE 25 MG/1
12.5-25 TABLET ORAL EVERY 4 HOURS PRN
Status: DISCONTINUED | OUTPATIENT
Start: 2017-05-07 | End: 2017-05-10 | Stop reason: HOSPADM

## 2017-05-07 RX ORDER — MAGNESIUM SULFATE HEPTAHYDRATE 40 MG/ML
2 INJECTION, SOLUTION INTRAVENOUS ONCE
Status: COMPLETED | OUTPATIENT
Start: 2017-05-07 | End: 2017-05-07

## 2017-05-07 RX ORDER — THIAMINE MONONITRATE (VIT B1) 100 MG
100 TABLET ORAL DAILY
Status: DISCONTINUED | OUTPATIENT
Start: 2017-05-07 | End: 2017-05-07

## 2017-05-07 RX ORDER — GABAPENTIN 100 MG/1
100 CAPSULE ORAL 3 TIMES DAILY
Status: DISCONTINUED | OUTPATIENT
Start: 2017-05-07 | End: 2017-05-09

## 2017-05-07 RX ORDER — ACETAMINOPHEN 325 MG/1
650 TABLET ORAL EVERY 6 HOURS PRN
Status: DISCONTINUED | OUTPATIENT
Start: 2017-05-07 | End: 2017-05-10 | Stop reason: HOSPADM

## 2017-05-07 RX ORDER — MAGNESIUM SULFATE HEPTAHYDRATE 40 MG/ML
4 INJECTION, SOLUTION INTRAVENOUS ONCE
Status: COMPLETED | OUTPATIENT
Start: 2017-05-07 | End: 2017-05-07

## 2017-05-07 RX ORDER — LORAZEPAM 2 MG/ML
2 INJECTION INTRAMUSCULAR
Status: DISCONTINUED | OUTPATIENT
Start: 2017-05-07 | End: 2017-05-10 | Stop reason: HOSPADM

## 2017-05-07 RX ORDER — LORAZEPAM 2 MG/ML
0.5 INJECTION INTRAMUSCULAR EVERY 4 HOURS PRN
Status: DISCONTINUED | OUTPATIENT
Start: 2017-05-07 | End: 2017-05-10 | Stop reason: HOSPADM

## 2017-05-07 RX ORDER — CLONIDINE HYDROCHLORIDE 0.1 MG/1
0.1 TABLET ORAL TWICE DAILY
Status: DISCONTINUED | OUTPATIENT
Start: 2017-05-07 | End: 2017-05-09

## 2017-05-07 RX ORDER — LORAZEPAM 2 MG/ML
1.5 INJECTION INTRAMUSCULAR
Status: DISCONTINUED | OUTPATIENT
Start: 2017-05-07 | End: 2017-05-10 | Stop reason: HOSPADM

## 2017-05-07 RX ADMIN — CALCIUM GLUCONATE 3 G: 94 INJECTION, SOLUTION INTRAVENOUS at 06:44

## 2017-05-07 RX ADMIN — ENOXAPARIN SODIUM 40 MG: 100 INJECTION SUBCUTANEOUS at 08:23

## 2017-05-07 RX ADMIN — CHLORDIAZEPOXIDE HYDROCHLORIDE 10 MG: 5 CAPSULE ORAL at 17:58

## 2017-05-07 RX ADMIN — VALPROIC ACID 250 MG: 250 CAPSULE, LIQUID FILLED ORAL at 20:23

## 2017-05-07 RX ADMIN — LORAZEPAM 3 MG: 1 TABLET ORAL at 08:34

## 2017-05-07 RX ADMIN — LORAZEPAM 1 MG: 1 TABLET ORAL at 20:23

## 2017-05-07 RX ADMIN — GABAPENTIN 100 MG: 100 CAPSULE ORAL at 11:18

## 2017-05-07 RX ADMIN — DIAZEPAM 5 MG: 5 INJECTION, SOLUTION INTRAMUSCULAR; INTRAVENOUS at 02:50

## 2017-05-07 RX ADMIN — LORAZEPAM 4 MG: 1 TABLET ORAL at 08:15

## 2017-05-07 RX ADMIN — CHLORDIAZEPOXIDE HYDROCHLORIDE 10 MG: 5 CAPSULE ORAL at 11:28

## 2017-05-07 RX ADMIN — POTASSIUM PHOSPHATE, MONOBASIC AND POTASSIUM PHOSPHATE, DIBASIC 30 MMOL: 224; 236 INJECTION, SOLUTION INTRAVENOUS at 15:29

## 2017-05-07 RX ADMIN — LORAZEPAM 2 MG: 2 INJECTION INTRAMUSCULAR; INTRAVENOUS at 07:28

## 2017-05-07 RX ADMIN — MAGNESIUM SULFATE HEPTAHYDRATE 4 G: 40 INJECTION, SOLUTION INTRAVENOUS at 15:30

## 2017-05-07 RX ADMIN — LORAZEPAM 1 MG: 1 TABLET ORAL at 10:05

## 2017-05-07 RX ADMIN — THIAMINE HYDROCHLORIDE 1000 ML: 100 INJECTION, SOLUTION INTRAMUSCULAR; INTRAVENOUS at 02:58

## 2017-05-07 RX ADMIN — GABAPENTIN 100 MG: 100 CAPSULE ORAL at 20:23

## 2017-05-07 RX ADMIN — LORAZEPAM 2 MG: 2 INJECTION INTRAMUSCULAR; INTRAVENOUS at 06:48

## 2017-05-07 RX ADMIN — MAGNESIUM SULFATE IN WATER 2 G: 40 INJECTION, SOLUTION INTRAVENOUS at 05:22

## 2017-05-07 RX ADMIN — POTASSIUM CHLORIDE AND SODIUM CHLORIDE: 900; 150 INJECTION, SOLUTION INTRAVENOUS at 06:21

## 2017-05-07 RX ADMIN — POTASSIUM CHLORIDE 20 MEQ: 750 TABLET, FILM COATED, EXTENDED RELEASE ORAL at 03:54

## 2017-05-07 RX ADMIN — LORAZEPAM 1 MG: 2 INJECTION INTRAMUSCULAR at 04:00

## 2017-05-07 RX ADMIN — MINERAL OIL AND WHITE PETROLATUM 1 APPLICATION: 150; 830 OINTMENT OPHTHALMIC at 14:04

## 2017-05-07 RX ADMIN — SULFACETAMIDE SODIUM 1 DROP: 100 SOLUTION/ DROPS OPHTHALMIC at 11:18

## 2017-05-07 RX ADMIN — CLONIDINE HYDROCHLORIDE 0.1 MG: 0.1 TABLET ORAL at 11:18

## 2017-05-07 RX ADMIN — SULFACETAMIDE SODIUM 1 DROP: 100 SOLUTION/ DROPS OPHTHALMIC at 20:32

## 2017-05-07 RX ADMIN — POTASSIUM CHLORIDE AND SODIUM CHLORIDE: 900; 150 INJECTION, SOLUTION INTRAVENOUS at 06:44

## 2017-05-07 RX ADMIN — DEXMEDETOMIDINE HYDROCHLORIDE 0.1 MCG/KG/HR: 100 INJECTION, SOLUTION, CONCENTRATE INTRAVENOUS at 14:04

## 2017-05-07 RX ADMIN — VALPROIC ACID 250 MG: 250 CAPSULE, LIQUID FILLED ORAL at 14:04

## 2017-05-07 RX ADMIN — GABAPENTIN 100 MG: 100 CAPSULE ORAL at 14:05

## 2017-05-07 RX ADMIN — CHLORDIAZEPOXIDE HYDROCHLORIDE 10 MG: 5 CAPSULE ORAL at 23:49

## 2017-05-07 RX ADMIN — ONDANSETRON 4 MG: 2 INJECTION INTRAMUSCULAR; INTRAVENOUS at 19:37

## 2017-05-07 RX ADMIN — CLONIDINE HYDROCHLORIDE 0.1 MG: 0.1 TABLET ORAL at 20:23

## 2017-05-07 RX ADMIN — SULFACETAMIDE SODIUM 1 DROP: 100 SOLUTION/ DROPS OPHTHALMIC at 17:58

## 2017-05-07 RX ADMIN — SULFACETAMIDE SODIUM 1 DROP: 100 SOLUTION/ DROPS OPHTHALMIC at 14:04

## 2017-05-07 RX ADMIN — FOLIC ACID: 5 INJECTION, SOLUTION INTRAMUSCULAR; INTRAVENOUS; SUBCUTANEOUS at 11:17

## 2017-05-07 ASSESSMENT — PAIN SCALES - GENERAL
PAINLEVEL_OUTOF10: 0
PAINLEVEL_OUTOF10: 3
PAINLEVEL_OUTOF10: 5
PAINLEVEL_OUTOF10: 0
PAINLEVEL_OUTOF10: 4
PAINLEVEL_OUTOF10: 0

## 2017-05-07 ASSESSMENT — PATIENT HEALTH QUESTIONNAIRE - PHQ9
SUM OF ALL RESPONSES TO PHQ9 QUESTIONS 1 AND 2: 2
5. POOR APPETITE OR OVEREATING: NEARLY EVERY DAY
2. FEELING DOWN, DEPRESSED, IRRITABLE, OR HOPELESS: SEVERAL DAYS
4. FEELING TIRED OR HAVING LITTLE ENERGY: NEARLY EVERY DAY
9. THOUGHTS THAT YOU WOULD BE BETTER OFF DEAD, OR OF HURTING YOURSELF: SEVERAL DAYS
3. TROUBLE FALLING OR STAYING ASLEEP OR SLEEPING TOO MUCH: SEVERAL DAYS
6. FEELING BAD ABOUT YOURSELF - OR THAT YOU ARE A FAILURE OR HAVE LET YOURSELF OR YOUR FAMILY DOWN: NEARLY EVERY DAY
6. FEELING BAD ABOUT YOURSELF - OR THAT YOU ARE A FAILURE OR HAVE LET YOURSELF OR YOUR FAMILY DOWN: NEARLY EVERY DAY
SUM OF ALL RESPONSES TO PHQ9 QUESTIONS 1 AND 2: 2
4. FEELING TIRED OR HAVING LITTLE ENERGY: NEARLY EVERY DAY
7. TROUBLE CONCENTRATING ON THINGS, SUCH AS READING THE NEWSPAPER OR WATCHING TELEVISION: NEARLY EVERY DAY
SUM OF ALL RESPONSES TO PHQ QUESTIONS 1-9: 17
9. THOUGHTS THAT YOU WOULD BE BETTER OFF DEAD, OR OF HURTING YOURSELF: SEVERAL DAYS
1. LITTLE INTEREST OR PLEASURE IN DOING THINGS: SEVERAL DAYS
6. FEELING BAD ABOUT YOURSELF - OR THAT YOU ARE A FAILURE OR HAVE LET YOURSELF OR YOUR FAMILY DOWN: NEARLY EVERY DAY
7. TROUBLE CONCENTRATING ON THINGS, SUCH AS READING THE NEWSPAPER OR WATCHING TELEVISION: NEARLY EVERY DAY
1. LITTLE INTEREST OR PLEASURE IN DOING THINGS: SEVERAL DAYS
5. POOR APPETITE OR OVEREATING: NEARLY EVERY DAY
2. FEELING DOWN, DEPRESSED, IRRITABLE, OR HOPELESS: SEVERAL DAYS
7. TROUBLE CONCENTRATING ON THINGS, SUCH AS READING THE NEWSPAPER OR WATCHING TELEVISION: NEARLY EVERY DAY
3. TROUBLE FALLING OR STAYING ASLEEP OR SLEEPING TOO MUCH: SEVERAL DAYS
4. FEELING TIRED OR HAVING LITTLE ENERGY: NEARLY EVERY DAY
SUM OF ALL RESPONSES TO PHQ QUESTIONS 1-9: 17
5. POOR APPETITE OR OVEREATING: NEARLY EVERY DAY
1. LITTLE INTEREST OR PLEASURE IN DOING THINGS: SEVERAL DAYS
8. MOVING OR SPEAKING SO SLOWLY THAT OTHER PEOPLE COULD HAVE NOTICED. OR THE OPPOSITE, BEING SO FIGETY OR RESTLESS THAT YOU HAVE BEEN MOVING AROUND A LOT MORE THAN USUAL: SEVERAL DAYS
3. TROUBLE FALLING OR STAYING ASLEEP OR SLEEPING TOO MUCH: SEVERAL DAYS
SUM OF ALL RESPONSES TO PHQ QUESTIONS 1-9: 17
9. THOUGHTS THAT YOU WOULD BE BETTER OFF DEAD, OR OF HURTING YOURSELF: SEVERAL DAYS
2. FEELING DOWN, DEPRESSED, IRRITABLE, OR HOPELESS: SEVERAL DAYS
8. MOVING OR SPEAKING SO SLOWLY THAT OTHER PEOPLE COULD HAVE NOTICED. OR THE OPPOSITE, BEING SO FIGETY OR RESTLESS THAT YOU HAVE BEEN MOVING AROUND A LOT MORE THAN USUAL: SEVERAL DAYS
SUM OF ALL RESPONSES TO PHQ9 QUESTIONS 1 AND 2: 2
8. MOVING OR SPEAKING SO SLOWLY THAT OTHER PEOPLE COULD HAVE NOTICED. OR THE OPPOSITE, BEING SO FIGETY OR RESTLESS THAT YOU HAVE BEEN MOVING AROUND A LOT MORE THAN USUAL: SEVERAL DAYS

## 2017-05-07 ASSESSMENT — LIFESTYLE VARIABLES
TREMOR: *
VISUAL DISTURBANCES: MODERATE SENSITIVITY
PAROXYSMAL SWEATS: NO SWEAT VISIBLE
AUDITORY DISTURBANCES: NOT PRESENT
AVERAGE NUMBER OF DAYS PER WEEK YOU HAVE A DRINK CONTAINING ALCOHOL: 8
EVER FELT BAD OR GUILTY ABOUT YOUR DRINKING: NO
HOW MANY TIMES IN THE PAST YEAR HAVE YOU HAD 5 OR MORE DRINKS IN A DAY: 5
ANXIETY: NO ANXIETY (AT EASE)
ON A TYPICAL DAY WHEN YOU DRINK ALCOHOL HOW MANY DRINKS DO YOU HAVE: 8
AUDITORY DISTURBANCES: MODERATELY SEVERE HALLUCINATIONS
EVER FELT BAD OR GUILTY ABOUT YOUR DRINKING: YES
ANXIETY: *
AGITATION: NORMAL ACTIVITY
TOTAL SCORE: 3
VISUAL DISTURBANCES: MODERATE SENSITIVITY
DOES PATIENT WANT TO STOP DRINKING: YES
TOTAL SCORE: 36
DO YOU DRINK ALCOHOL: YES
EVER HAD A DRINK FIRST THING IN THE MORNING TO STEADY YOUR NERVES TO GET RID OF A HANGOVER: NO
TOTAL SCORE: 9
TREMOR: MODERATE TREMOR WITH ARMS EXTENDED
VISUAL DISTURBANCES: NOT PRESENT
TOTAL SCORE: 4
ORIENTATION AND CLOUDING OF SENSORIUM: ORIENTED AND CAN DO SERIAL ADDITIONS
TREMOR: SEVERE TREMOR, EVEN WITH ARMS NOT EXTENDED
HOW MANY TIMES IN THE PAST YEAR HAVE YOU HAD 5 OR MORE DRINKS IN A DAY: 360
TOTAL SCORE: 3
PAROXYSMAL SWEATS: NO SWEAT VISIBLE
TOTAL SCORE: 0
AGITATION: NORMAL ACTIVITY
EVER_SMOKED: YES
AUDITORY DISTURBANCES: NOT PRESENT
TREMOR: *
NAUSEA AND VOMITING: MILD NAUSEA WITH NO VOMITING
AGITATION: *
NAUSEA AND VOMITING: MILD NAUSEA WITH NO VOMITING
CONSUMPTION TOTAL: POSITIVE
TREMOR: *
DOES PATIENT WANT TO STOP DRINKING: YES
TOTAL SCORE: 4
AGITATION: MODERATELY FIDGETY AND RESTLESS
EVER FELT BAD OR GUILTY ABOUT YOUR DRINKING: YES
HAVE PEOPLE ANNOYED YOU BY CRITICIZING YOUR DRINKING: YES
PAROXYSMAL SWEATS: BARELY PERCEPTIBLE SWEATING. PALMS MOIST
EVER HAD A DRINK FIRST THING IN THE MORNING TO STEADY YOUR NERVES TO GET RID OF A HANGOVER: YES
TOTAL SCORE: 0
AGITATION: *
DO YOU DRINK ALCOHOL: YES
VISUAL DISTURBANCES: MODERATELY SEVERE HALLUCINATIONS
HEADACHE, FULLNESS IN HEAD: NOT PRESENT
ANXIETY: *
HAVE YOU EVER FELT YOU SHOULD CUT DOWN ON YOUR DRINKING: YES
VISUAL DISTURBANCES: MILD SENSITIVITY
TOTAL SCORE: 5
DOES PATIENT WANT TO TALK TO SOMEONE ABOUT QUITTING: YES
PAROXYSMAL SWEATS: BARELY PERCEPTIBLE SWEATING. PALMS MOIST
VISUAL DISTURBANCES: NOT PRESENT
AUDITORY DISTURBANCES: MODERATE HARSHNESS OR ABILITY TO FRIGHTEN
EVER HAD A DRINK FIRST THING IN THE MORNING TO STEADY YOUR NERVES TO GET RID OF A HANGOVER: NO
VISUAL DISTURBANCES: NOT PRESENT
ORIENTATION AND CLOUDING OF SENSORIUM: DATE DISORIENTATION BY NO MORE THAN TWO CALENDAR DAYS
AVERAGE NUMBER OF DAYS PER WEEK YOU HAVE A DRINK CONTAINING ALCOHOL: 8
HEADACHE, FULLNESS IN HEAD: NOT PRESENT
TOTAL SCORE: 23
TOTAL SCORE: 22
ON A TYPICAL DAY WHEN YOU DRINK ALCOHOL HOW MANY DRINKS DO YOU HAVE: 8
TOTAL SCORE: 4
TOTAL SCORE: 34
AUDITORY DISTURBANCES: NOT PRESENT
HAVE YOU EVER FELT YOU SHOULD CUT DOWN ON YOUR DRINKING: YES
DOES PATIENT WANT TO TALK TO SOMEONE ABOUT QUITTING: YES
ORIENTATION AND CLOUDING OF SENSORIUM: ORIENTED AND CAN DO SERIAL ADDITIONS
NAUSEA AND VOMITING: *
DOES PATIENT WANT TO STOP DRINKING: YES
TOTAL SCORE: 4
AGITATION: SOMEWHAT MORE THAN NORMAL ACTIVITY
AGITATION: SOMEWHAT MORE THAN NORMAL ACTIVITY
VISUAL DISTURBANCES: NOT PRESENT
HAVE YOU EVER FELT YOU SHOULD CUT DOWN ON YOUR DRINKING: NO
ORIENTATION AND CLOUDING OF SENSORIUM: DATE DISORIENTATION BY NO MORE THAN TWO CALENDAR DAYS
ORIENTATION AND CLOUDING OF SENSORIUM: ORIENTED AND CAN DO SERIAL ADDITIONS
AGITATION: MODERATELY FIDGETY AND RESTLESS
TREMOR: *
TREMOR: SEVERE TREMOR, EVEN WITH ARMS NOT EXTENDED
AUDITORY DISTURBANCES: MODERATELY SEVERE HALLUCINATIONS
TOTAL SCORE: 4
DO YOU DRINK ALCOHOL: YES
TREMOR: MODERATE TREMOR WITH ARMS EXTENDED
HEADACHE, FULLNESS IN HEAD: MILD
TOTAL SCORE: 0
TACTILE DISTURBANCES: VERY MILD ITCHING, PINS AND NEEDLES SENSATION, BURNING OR NUMBNESS
PAROXYSMAL SWEATS: NO SWEAT VISIBLE
ANXIETY: MODERATELY ANXIOUS OR GUARDED, SO ANXIETY IS INFERRED
ANXIETY: MILDLY ANXIOUS
TOTAL SCORE: MODERATELY SEVERE HALLUCINATIONS
HAVE PEOPLE ANNOYED YOU BY CRITICIZING YOUR DRINKING: YES
HEADACHE, FULLNESS IN HEAD: MODERATE
CONSUMPTION TOTAL: INCOMPLETE
ANXIETY: *
TOTAL SCORE: 26
NAUSEA AND VOMITING: *
TACTILE DISTURBANCES: MODERATE ITCHING, PINS AND NEEDLES SENSATION, BURNING OR NUMBNESS
NAUSEA AND VOMITING: NO NAUSEA AND NO VOMITING
AVERAGE NUMBER OF DAYS PER WEEK YOU HAVE A DRINK CONTAINING ALCOHOL: 7
ORIENTATION AND CLOUDING OF SENSORIUM: ORIENTED AND CAN DO SERIAL ADDITIONS
HEADACHE, FULLNESS IN HEAD: MILD
NAUSEA AND VOMITING: *
EVER HAD A DRINK FIRST THING IN THE MORNING TO STEADY YOUR NERVES TO GET RID OF A HANGOVER: YES
PAROXYSMAL SWEATS: *
TREMOR: TREMOR NOT VISIBLE BUT CAN BE FELT, FINGERTIP TO FINGERTIP
ON A TYPICAL DAY WHEN YOU DRINK ALCOHOL HOW MANY DRINKS DO YOU HAVE: 8
NAUSEA AND VOMITING: *
NAUSEA AND VOMITING: NO NAUSEA AND NO VOMITING
TOTAL SCORE: 4
TOTAL SCORE: MILD ITCHING, PINS AND NEEDLES SENSATION, BURNING OR NUMBNESS
HEADACHE, FULLNESS IN HEAD: VERY MILD
EVER FELT BAD OR GUILTY ABOUT YOUR DRINKING: YES
AUDITORY DISTURBANCES: MODERATE HARSHNESS OR ABILITY TO FRIGHTEN
ALCOHOL_USE: YES
HEADACHE, FULLNESS IN HEAD: MILD
EVER HAD A DRINK FIRST THING IN THE MORNING TO STEADY YOUR NERVES TO GET RID OF A HANGOVER: YES
EVER_SMOKED: YES
TACTILE DISTURBANCES: VERY MILD ITCHING, PINS AND NEEDLES SENSATION, BURNING OR NUMBNESS
AUDITORY DISTURBANCES: MODERATELY SEVERE HALLUCINATIONS
HEADACHE, FULLNESS IN HEAD: VERY MILD
HAVE PEOPLE ANNOYED YOU BY CRITICIZING YOUR DRINKING: YES
ANXIETY: MILDLY ANXIOUS
HAVE YOU EVER FELT YOU SHOULD CUT DOWN ON YOUR DRINKING: YES
PAROXYSMAL SWEATS: BARELY PERCEPTIBLE SWEATING. PALMS MOIST
AGITATION: *
HOW MANY TIMES IN THE PAST YEAR HAVE YOU HAD 5 OR MORE DRINKS IN A DAY: 5
DO YOU DRINK ALCOHOL: YES
TOTAL SCORE: 10
DOES PATIENT WANT TO TALK TO SOMEONE ABOUT QUITTING: YES
ORIENTATION AND CLOUDING OF SENSORIUM: DATE DISORIENTATION BY NO MORE THAN TWO CALENDAR DAYS
HAVE PEOPLE ANNOYED YOU BY CRITICIZING YOUR DRINKING: NO
ANXIETY: *
PAROXYSMAL SWEATS: *
NAUSEA AND VOMITING: MILD NAUSEA WITH NO VOMITING
CONSUMPTION TOTAL: INCOMPLETE
PAROXYSMAL SWEATS: BARELY PERCEPTIBLE SWEATING. PALMS MOIST
ORIENTATION AND CLOUDING OF SENSORIUM: ORIENTED AND CAN DO SERIAL ADDITIONS
TOTAL SCORE: 3
TOTAL SCORE: MODERATELY SEVERE HALLUCINATIONS
ANXIETY: *
ORIENTATION AND CLOUDING OF SENSORIUM: ORIENTED AND CAN DO SERIAL ADDITIONS
CONSUMPTION TOTAL: POSITIVE
HEADACHE, FULLNESS IN HEAD: MILD
TACTILE DISTURBANCES: VERY MILD ITCHING, PINS AND NEEDLES SENSATION, BURNING OR NUMBNESS
ON A TYPICAL DAY WHEN YOU DRINK ALCOHOL HOW MANY DRINKS DO YOU HAVE: 8
CONSUMPTION TOTAL: POSITIVE
HAVE PEOPLE ANNOYED YOU BY CRITICIZING YOUR DRINKING: YES
TOTAL SCORE: 7
HAVE YOU EVER FELT YOU SHOULD CUT DOWN ON YOUR DRINKING: YES
VISUAL DISTURBANCES: MODERATELY SEVERE HALLUCINATIONS
TOTAL SCORE: VERY MILD ITCHING, PINS AND NEEDLES SENSATION, BURNING OR NUMBNESS
AUDITORY DISTURBANCES: NOT PRESENT
TACTILE DISTURBANCES: VERY MILD ITCHING, PINS AND NEEDLES SENSATION, BURNING OR NUMBNESS

## 2017-05-07 ASSESSMENT — COPD QUESTIONNAIRES
COPD SCREENING SCORE: 0
HAVE YOU SMOKED AT LEAST 100 CIGARETTES IN YOUR ENTIRE LIFE: NO/DON'T KNOW
DURING THE PAST 4 WEEKS HOW MUCH DID YOU FEEL SHORT OF BREATH: NONE/LITTLE OF THE TIME
HAVE YOU SMOKED AT LEAST 100 CIGARETTES IN YOUR ENTIRE LIFE: NO/DON'T KNOW
DO YOU EVER COUGH UP ANY MUCUS OR PHLEGM?: NO/ONLY WITH OCCASIONAL COLDS OR INFECTIONS
DURING THE PAST 4 WEEKS HOW MUCH DID YOU FEEL SHORT OF BREATH: NONE/LITTLE OF THE TIME
DO YOU EVER COUGH UP ANY MUCUS OR PHLEGM?: NO/ONLY WITH OCCASIONAL COLDS OR INFECTIONS
COPD SCREENING SCORE: 0

## 2017-05-07 NOTE — IP AVS SNAPSHOT
" Home Care Instructions                                                                                                                  Name:Gopal Ceja  Medical Record Number:[id number]  CSN: [id number]    YOB: 1985   Age: 32 y.o.  Sex: male  HT:1.727 m (5' 8\") WT: 63 kg (138 lb 14.2 oz)          Admit Date: 5/7/2017     Discharge Date:   Today's Date: 5/10/2017  Attending Doctor:  Bob Franco M.D.                  Allergies:  Review of patient's allergies indicates no known allergies.            Discharge Instructions       Discharge Instructions    Discharged to home by taxi with self. Discharged via wheelchair, hospital escort: Refused.  Special equipment needed: Not Applicable    Be sure to schedule a follow-up appointment with your primary care doctor or any specialists as instructed.     Discharge Plan:   Influenza Vaccine Indication: Patient Refuses    I understand that a diet low in cholesterol, fat, and sodium is recommended for good health. Unless I have been given specific instructions below for another diet, I accept this instruction as my diet prescription.   Other diet: Regular    Special Instructions: None    · Is patient discharged on Warfarin / Coumadin?   No     · Is patient Post Blood Transfusion?  No    Depression / Suicide Risk    As you are discharged from this [facility] facility, it is important to learn how to keep safe from harming yourself.    Recognize the warning signs:  · Abrupt changes in personality, positive or negative- including increase in energy   · Giving away possessions  · Change in eating patterns- significant weight changes-  positive or negative  · Change in sleeping patterns- unable to sleep or sleeping all the time   · Unwillingness or inability to communicate  · Depression  · Unusual sadness, discouragement and loneliness  · Talk of wanting to die  · Neglect of personal appearance   · Rebelliousness- reckless behavior  · Withdrawal from people/activities " they love  · Confusion- inability to concentrate     If you or a loved one observes any of these behaviors or has concerns about self-harm, here's what you can do:  · Talk about it- your feelings and reasons for harming yourself  · Remove any means that you might use to hurt yourself (examples: pills, rope, extension cords, firearm)  · Get professional help from the community (Mental Health, Substance Abuse, psychological counseling)  · Do not be alone:Call your Safe Contact- someone whom you trust who will be there for you.  · Call your local CRISIS HOTLINE 129-0465 or 939-111-7936  · Call your local Children's Mobile Crisis Response Team [place] (150) 325-8148 or www.[employer]  · Call the toll free National Suicide Prevention Hotlines   · National Suicide Prevention Lifeline 283-614-MUQQ (1873)  · [place] Line Network 800-SUICIDE (892-9983)          Follow-up Information     1. Schedule an appointment as soon as possible for a visit with [facility] ([facility]).    Specialties:  Psychiatry, Behavioral Health    Contact information    [facility] [place] [address]  [phone]         Discharge Medication Instructions:    Below are the medications your physician expects you to take upon discharge:    Review all your home medications and newly ordered medications with your doctor and/or pharmacist. Follow medication instructions as directed by your doctor and/or pharmacist.    Please keep your medication list with you and share with your physician.               Medication List      Notice     You have not been prescribed any medications.            Instructions           Diet / Nutrition:    Follow any diet instructions given to you by your doctor or the dietician, including how much salt (sodium) you are allowed each day.    If you are overweight, talk to your doctor about a weight reduction plan.    Activity:    Remain physically active following your doctor's  instructions about exercise and activity.    Rest often.     Any time you become even a little tired or short of breath, SIT DOWN and rest.    Worsening Symptoms:    Report any of the following signs and symptoms to the doctor's office immediately:    *Pain of jaw, arm, or neck  *Chest pain not relieved by medication                               *Dizziness or loss of consciousness  *Difficulty breathing even when at rest   *More tired than usual                                       *Bleeding drainage or swelling of surgical site  *Swelling of feet, ankles, legs or stomach                 *Fever (>100ºF)  *Pink or blood tinged sputum  *Weight gain (3lbs/day or 5lbs /week)           *Shock from internal defibrillator (if applicable)  *Palpitations or irregular heartbeats                *Cool and/or numb extremities    Stroke Awareness    Common Risk Factors for Stroke include:    Age  Atrial Fibrillation  Carotid Artery Stenosis  Diabetes Mellitus  Excessive alcohol consumption  High blood pressure  Overweight   Physical inactivity  Smoking    Warning signs and symptoms of a stroke include:    *Sudden numbness or weakness of the face, arm or leg (especially on one side of the body).  *Sudden confusion, trouble speaking or understanding.  *Sudden trouble seeing in one or both eyes.  *Sudden trouble walking, dizziness, loss of balance or coordination.Sudden severe headache with no known cause.    It is very important to get treatment quickly when a stroke occurs. If you experience any of the above warning signs, call 911 immediately.                   Disclaimer         Quit Smoking / Tobacco Use:    I understand the use of any tobacco products increases my chance of suffering from future heart disease or stroke and could cause other illnesses which may shorten my life. Quitting the use of tobacco products is the single most important thing I can do to improve my health. For further information on smoking / tobacco  cessation call a Toll Free Quit Line at 1-501.342.3023 (*[employer]) or 1-613.702.3990 (American Lung Association) or you can access the web based program at www.lungusa.org.    [place] Tobacco Users Help Line:  (350) 295-4458       Toll Free: 1-857.420.9615  Quit Tobacco Program [facility] (630)194-1283    Crisis Hotline:    [place] Crisis Hotline:  9-147-LQAPPFU or 1-257.955.6042    [place] Crisis Hotline:    1-949.466.1165 or 721-934-5382    Discharge Survey:   Thank you for choosing [facility]. We hope we did everything we could to make your hospital stay a pleasant one. You may be receiving a phone survey and we would appreciate your time and participation in answering the questions. Your input is very valuable to us in our efforts to improve our service to our patients and their families.        My signature on this form indicates that:    1. I have reviewed and understand the above information.  2. My questions regarding this information have been answered to my satisfaction.  3. I have formulated a plan with my discharge nurse to obtain my prescribed medications for home.                  Disclaimer         __________________________________                     __________       ________                       Patient Signature                                                 Date                    Time

## 2017-05-07 NOTE — PSYCHIATRY
"PSYCHIATRIC CONSULTATION  Reason for Admission: Alcohol withdrawal, Methamphetamine addiction, Hypocalcemia, Hypomagnesemia, Hypokalemia  Reason for Consult: anxiety, etoh/ meth abuse. wants help  Requesting Provider: Cristy Berrios M.D.  Psychiatric Supervising Attending: Luisa Ansari MD  Legal Hold Status: NA  Chief Complaint   Patient presents with   • Drug Withdrawal       History of Present Illness  Patient is a 32 y.o. Male with history of depression, SI, ADHD, anxiety disorder, alcohol / methamphetamine use disorder, on SSDI for congenital ectodermal dysplasia, who presented today with alcohol withdrawal and visual hallucinations.  BAL 0.14, UDS +ve for amphetamine.  Patient was admitted for alcohol withdrawal and placed on CIWA protocol.  Psychiatry was consulted because patient wanted help for alcohol and meth abuse.  Patient was drowsy and somewhat difficult to interview as he had just received versed prior to encounter.  Primary team stated that he'll be on versed for 3-7 days.  Patient reported that he suddenly stopped drinking alcohol because he was \"tired of it\".  He was drinking 8 24 oz cans of 10% etoh daily prior to this.  Patient began feeling sick and experienced visual hallucinations.  He tried to drink lots of water to help with his withdrawal symptoms but stated that nothing helped.  His friend who at his house at that time called 911.  Patient stated that he would like help for his alcohol and methamphetamine abuse.  He was on Celexa before but stopped taking 2 years ago due to financial difficulties.  Patient has been to various residential rehabs in the past but did not find them helpful.  He would like enroll into an [facility].  Patient denied having any current thoughts of death stating that he needs to be there for his daughter.      Psychiatric Review of Current Symptoms  Depression   Endorses: sadness, worthlessness, decreased appetite, changes in sleeping pattern   Denies: anhedonia, " fatigue / loss of energy, diminished ability to think or concentrate / indecisiveness, recurrent thoughts of death / recurrent suicidal ideation with or without specific plan / suicide attempt  Anca   Unable to obtain due to patient's current mental status  Anxiety   Unable to obtain due to patient's current mental status  Psychosis   Endorses: visual hallucinations      Medical Review of Symptoms (as reported by the patient). All systems reviewed. Those not listed below were found to be negative.   Neurological: Unable to obtain history of TBI, loss of consciousness, seizure, and stroke due to patient's current mental status      Medical History as documented.  All the vitals, labs, notes, records, and the MAR.  Findings of interest to psychiatry include:  Allergy: Review of patient's allergies indicates no known allergies.  Past Medical History   Diagnosis Date   • Ectrodactyly-ectodermal dysplasia-clefting syndrome    • Acute anxiety    • ETOH abuse    • Psychiatric disorder      anxiety/panic disorder   • Bipolar affective (CMS-HCC)    • ADHD (attention deficit hyperactivity disorder)    • ADHD (attention deficit hyperactivity disorder)    • Depression      Prior to Admission medications    Not on File     Current Facility-Administered Medications   Medication Dose Route Frequency Provider Last Rate Last Dose   • 0.9 % NaCl with KCl 20 mEq infusion   Intravenous Continuous Jessika Purcell M.D. 100 mL/hr at 05/07/17 0644     • lorazepam (ATIVAN) tablet 0.5 mg  0.5 mg Oral Q4HRS PRN Jessika Purcell M.D.       • lorazepam (ATIVAN) tablet 1 mg  1 mg Oral Q4HRS PRN Jessika Purcell M.D.   1 mg at 05/07/17 1005    Or   • lorazepam (ATIVAN) injection 0.5 mg  0.5 mg Intravenous Q4HRS PRN Jessika Purcell M.D.       • lorazepam (ATIVAN) tablet 2 mg  2 mg Oral Q2HRS PRN Jessika Purcell M.D.        Or   • lorazepam (ATIVAN) injection 1 mg  1 mg Intravenous Q2HRS PRN Jessika Purcell M.D.        • lorazepam (ATIVAN) tablet 3 mg  3 mg Oral Q HOUR PRN Jessika Purcell M.D.   3 mg at 05/07/17 0834    Or   • lorazepam (ATIVAN) injection 1.5 mg  1.5 mg Intravenous Q HOUR PRN Jessika Purcell M.D.       • lorazepam (ATIVAN) tablet 4 mg  4 mg Oral Q15 MIN PRDEEPALI Purcell M.D.   4 mg at 05/07/17 0815    Or   • lorazepam (ATIVAN) injection 2 mg  2 mg Intravenous Q15 MIN PRN Jessika Purcell M.D.   2 mg at 05/07/17 0728   • acetaminophen (TYLENOL) tablet 650 mg  650 mg Oral Q6HRS PRN Jessika Purcell M.D.       • enoxaparin (LOVENOX) inj 40 mg  40 mg Subcutaneous DAILY Jessika Purcell M.D.   40 mg at 05/07/17 0823   • clonidine (CATAPRES) tablet 0.1 mg  0.1 mg Oral Q6HRS PRN Jessika Purcell M.D.       • labetalol (NORMODYNE,TRANDATE) injection 20 mg  20 mg Intravenous Q4HRS PRN Jessika Purcell M.D.       • ondansetron (ZOFRAN) syringe/vial injection 4 mg  4 mg Intravenous Q4HRS PRDEEPALI Purcell M.D.       • ondansetron (ZOFRAN ODT) dispertab 4 mg  4 mg Oral Q4HRS PRN Jessika Purcell M.D.       • promethazine (PHENERGAN) tablet 12.5-25 mg  12.5-25 mg Oral Q4HRS PRN Jessika Purcell M.D.       • promethazine (PHENERGAN) suppository 12.5-25 mg  12.5-25 mg Rectal Q4HRS PRDEEPALI Purcell M.D.       • prochlorperazine (COMPAZINE) injection 5-10 mg  5-10 mg Intravenous Q4HRS PRN Jessika Purcell M.D.       • sulfacetamide (SULAMYD) 10 % ophthalmic solution 1 Drop  1 Drop Both Eyes Q4HRS Jessika Purcell M.D.   1 Drop at 05/07/17 1404   • quetiapine (SEROQUEL) tablet 50 mg  50 mg Oral TID PRN Jessika Purcell M.D.       • [START ON 5/8/2017] thiamine (THIAMINE) tablet 100 mg  100 mg Oral DAILY Cristy Berrios M.D.        And   • [START ON 5/8/2017] folic acid (FOLVITE) tablet 1 mg  1 mg Oral DAILY Cristy Berrios M.D.        And   • [START ON 5/8/2017] multivitamin (THERAGRAN) tablet 1 Tab  1 Tab Oral DAILY Cristy Berrios M.D.       • clonidine  (CATAPRES) tablet 0.1 mg  0.1 mg Oral TWICE DAILY Jaden Colon M.D.   0.1 mg at 05/07/17 1118   • gabapentin (NEURONTIN) capsule 100 mg  100 mg Oral TID Jaden Colon M.D.   100 mg at 05/07/17 1405   • valproic acid (DEPAKENE) capsule 250 mg  250 mg Oral Q8HRS Jaden Colon M.D.   250 mg at 05/07/17 1404   • chlordiazepoxide (LIBRIUM) capsule 10 mg  10 mg Oral Q6HRS Jaden Colon M.D.   10 mg at 05/07/17 1128   • dexmedetomidine (PRECEDEX) 200 mcg in NS 50 mL infusion  0.1-0.7 mcg/kg/hr Intravenous Continuous Jaden Colon M.D. 1.6 mL/hr at 05/07/17 1404 0.1 mcg/kg/hr at 05/07/17 1404   • artificial tear ointment (REFRESH,LACRI-LUBE) 1 Application  1 Application Both Eyes Q8HRS Jaden Colon M.D.   1 Application at 05/07/17 1404     Past Surgical History   Procedure Laterality Date   • Other orthopedic surgery       hands bilaterally r/t EEDC syndrome     Laboratory:   Recent Labs      05/07/17   0235   SODIUM  136   POTASSIUM  3.2*   CHLORIDE  105   CO2  21   BUN  4*   CREATININE  0.47*   MAGNESIUM  1.3*   PHOSPHORUS  2.1*   CALCIUM  6.7*       Recent Labs      05/07/17   0235   ALTSGPT  46   ASTSGOT  74*   ALKPHOSPHAT  75   TBILIRUBIN  0.7   GLUCOSE  76       Recent Labs      05/07/17   0235   RBC  3.52*   HEMOGLOBIN  11.3*   HEMATOCRIT  33.6*   PLATELETCT  97*   PROTHROMBTM  13.0   APTT  26.7   INR  0.95       Recent Labs      05/07/17   0235   WBC  5.6   NEUTSPOLYS  70.00   LYMPHOCYTES  16.70*   MONOCYTES  12.50   EOSINOPHILS  0.20   BASOPHILS  0.40   ASTSGOT  74*   ALTSGPT  46   ALKPHOSPHAT  75   TBILIRUBIN  0.7     Imaging: no head imaging on current admission  EEG / Tests: none  EKG: QTc 488       Past Psychiatric History  Diagnosis: depression, anxiety, ADHD, alcohol use disorder  Psychotropic Medication: celexa, zoloft, prozac, wellbutrin, ritalin, diazepam  Mental Health Care Provider: none currently, last seen 2 years ago  Therapy: denies  Hospitalization: multiple  "times at [facility], [facility], and [facility]  Previous Suicidal Ideation: yes but unable to obtain specifics      Family History  Father with alcohol use  Grandmother with bipolar disorder      Psychosocial History  Living Situation: [place]  Relationship: single  Children: 1 son, 1 daughter  Financial: SSDI for ectodermal dysplasia  Education: not graduate HS, dropped out in 12th grade, +GED  Support: [employer],  at [employer]; [place],  at [employer]      Substance Use  History   Smoking status   • Former Smoker -- 0.25 packs/day   • Types: Cigarettes   Smokeless tobacco   • Never Used     History   Alcohol Use   • Yes     Comment: 8 earthquakes 10% 24 oz malt liquor/day       Mental Status Examination  Vitals: Blood Pressure: 152/101 mmHg, NIBP: 142/102 mmHg, NIBP MAP (Calculated): 117, Heart Rate (Monitored): 98, Pulse: 100, Respiration: 16, Temperature: 37.3 °C (99.2 °F), Height: 172.7 cm (5' 8\"), Weight: 63 kg (138 lb 14.2 oz), BMI (Calculated): 21.12, BSA (Calculated): 1.7, Pulse Oximetry: 95 %, O2 (LPM): 0, O2 Delivery: None (Room Air)  Appearance / Behavior: male, appears as stated age, fair grooming/hygiene, 3 fingers on each hand, sleepy but cooperative, eyelids closing at times but otherwise appropriate eye contact  Musculoskeletal (abnormal movements, gait, etc): moderate psychomotor retardation  Language: fluent in English  Speech: spontaneous, regular rate/rhythm/volume, mild increased latency  Mood: \"I'm feeling pretty shitty\"  Affect: stable, dysphoric, congruent  Thought Process: logical, linear, goal-directed, future-oriented  Abnormal Thoughts / Psychosis: visual hallucination  Suicidal / Homicidal Ideation: denies  Alertness / Attention: drowsy  Orientation: to person, place, and event, not to time  Memory: immediate, short, and long-term grossly intact  Fund of Knowledge: average based on vocabulary and syntax  Neurological Testing (MOCA, MMSE, " clock): patient unable to perform at this time due to mental state  Insight into psychiatric symptoms: limited  Judgment: limited      Assessment  Psychiatric:  - Alcohol withdrawal with perceptual disturbances  - Alcohol use disorder, severe  - Amphetamine use disorder  - Rule out substance induced depressive disorder    Medical:  Active Problems:    Alcohol withdrawal (CMS-HCC)    Hypocalcemia    Hypokalemia    Hypomagnesemia    Methamphetamine addiction (CMS-HCC)      Plan  - Legal Hold Status: NA  - Capacity: not formally evaluated  - Records reviewed: yes  - Medications: no new psychotropic medications at this time, will wait until detox completed  - Orders: none  - Collateral: obtained with permission  - Will follow  - Thank you for consult    ______________________________________________________________________  Anthony Lyons M.D., D.C.  [facility]  PGY-2 Psychiatry Resident

## 2017-05-07 NOTE — ED NOTES
Pt brought from [facility] where he lives with anxiety and withdrawing from drug use. Pt states using meth 9 hours ago. Also has been drinking. Pt states feeling out of control with himself.

## 2017-05-07 NOTE — IP AVS SNAPSHOT
5/10/2017    Gopal Ceja  [address] [place] [id number]    Dear Gopal:    [facility] wants to ensure your discharge home is safe and you or your loved ones have had all of your questions answered regarding your care after you leave the hospital.    Below is a list of resources and contact information should you have any questions regarding your hospital stay, follow-up instructions, or active medical symptoms.    Questions or Concerns Regarding… Contact   Medical Questions Related to Your Discharge  (7 days a week, 8am-5pm) Contact a Nurse Care Coordinator   [phone]   Medical Questions Not Related to Your Discharge  (24 hours a day / 7 days a week)  Contact the Nurse Health Line   [phone]    Medications or Discharge Instructions Refer to your discharge packet   or contact your [facility] Primary Care Provider   [phone]   Follow-up Appointment(s) Schedule your appointment via [employer]   or contact Scheduling [phone]   Billing Review your statement via [employer]  or contact Billing [phone]   Medical Records Review your records via [employer]   or contact Medical Records [phone]     You may receive a telephone call within two days of discharge. This call is to make certain you understand your discharge instructions and have the opportunity to have any questions answered. You can also easily access your medical information, test results and upcoming appointments via the [employer] free online health management tool. You can learn more and sign up at [employer]. For assistance setting up your [employer] account, please call [phone].    Once again, we want to ensure your discharge home is safe and that you have a clear understanding of any next steps in your care. If you have any questions or concerns, please do not hesitate to contact us, we are here for you. Thank you for choosing [facility] for your healthcare needs.    Sincerely,    Your [facility] Healthcare Team

## 2017-05-07 NOTE — PROGRESS NOTES
Patient refusing to use urinal in bed. Advised patient that for safety reasons RN must be present when standing up out of bed to use urinal. Patient refuses to void with RN present. MD notified. Bed in lowest locked position, call light within reach, bed alarm set. Will continue to monitor.

## 2017-05-07 NOTE — IP AVS SNAPSHOT
Fancy Access Code: [email or web address]  Expires: 5/12/2017  1:55 AM    Your email address is not on file at [facility].  Email Addresses are required for you to sign up for Fancy, please contact [phone] to verify your personal information and to provide your email address prior to attempting to register for Fancy.    Gopal Ceja  [address] [place] [address]    Fancy  A secure, online tool to manage your health information     [facility]’s Fancy® is a secure, online tool that connects you to your personalized health information from the privacy of your home -- day or night - making it very easy for you to manage your healthcare. Once the activation process is completed, you can even access your medical information using the Fancy erwin, which is available for free in the Apple Erwin store or Google Play store.     To learn more about Fancy, visit [email or web address]    There are two levels of access available (as shown below):   My Chart Features  [facility] Primary Care Doctor [facility]  Specialists [facility]  Urgent  Care [facility] Primary Care Doctor   Email your healthcare team securely and privately 24/7 X X X    Manage appointments: schedule your next appointment; view details of past/upcoming appointments X      Request prescription refills. X      View recent personal medical records, including lab and immunizations X X X X   View health record, including health history, allergies, medications X X X X   Read reports about your outpatient visits, procedures, consult and ER notes X X X X   See your discharge summary, which is a recap of your hospital and/or ER visit that includes your diagnosis, lab results, and care plan X X  X     How to register for [employer]:  Once your e-mail address has been verified, follow the following steps to sign up for Fancy.     1. Go to  [email or web address]  2. Click on the Sign Up Now box, which takes you to the New Member Sign Up page. You will need to  provide the following information:  a. Enter your [employer] Payment Access Code exactly as it appears at the top of this page. (You will not need to use this code after you’ve completed the sign-up process. If you do not sign up before the expiration date, you must request a new code.)   b. Enter your date of birth.   c. Enter your home email address.   d. Click Submit, and follow the next screen’s instructions.  3. Create a [employer]an Payment ID. This will be your [employer] Payment login ID and cannot be changed, so think of one that is secure and easy to remember.  4. Create a [employer] Payment password. You can change your password at any time.  5. Enter your Password Reset Question and Answer. This can be used at a later time if you forget your password.   6. Enter your e-mail address. This allows you to receive e-mail notifications when new information is available in [employer] Payment.  7. Click Sign Up. You can now view your health information.    For assistance activating your [employer] Payment account, call [phone]

## 2017-05-07 NOTE — ED PROVIDER NOTES
ED Provider Note    Scribed for Sonja Gallego M.D. by Marc Martinez. 5/7/2017, 2:19 AM.    Primary care provider: Pcp Pt States None  Means of arrival: ambulance  History obtained from: patient  History limited by: patient is altered     CHIEF COMPLAINT  Chief Complaint   Patient presents with   • Drug Withdrawal       HPI  Gopal Ceja is a 32 y.o. male who presents to the Emergency Department for evaluation of alcohol withdrawal. Per patient, his last drink was 10 hours ago. He is experiencing hallucinations, dizziness, nausea, vomiting, chest pain, and palpitations. Patient reports drinking a large amount of water to lower his heart rate, but he does not recall his last meal. The patient states the last time he was sober was a couple of months ago. Patient states this is the worst his symptoms have ever been. He states he wants to stop drinking. The patient admits to smoking methamphetamine, and he last used it 9 hours ago. He denies abdominal pain or suicidal ideas.     The history is limited because the patient is altered.     REVIEW OF SYSTEMS  Pertinent positives include withdrawal, hallucinations, dizziness, nausea, vomiting, chest pain, palpitations, alcohol abuse, methamphetamine abuse. Pertinent negatives include no abdominal pain or suicidal ideas.    See HPI for further details.     The ROS is limited because the patient is altered.     PAST MEDICAL HISTORY  Past Medical History   Diagnosis Date   • Ectrodactyly-ectodermal dysplasia-clefting syndrome    • Acute anxiety    • ETOH abuse    • Psychiatric disorder      anxiety/panic disorder   • Bipolar affective (CMS-HCC)    • ADHD (attention deficit hyperactivity disorder)    • ADHD (attention deficit hyperactivity disorder)    • Depression        SURGICAL HISTORY  Past Surgical History   Procedure Laterality Date   • Other orthopedic surgery       hands bilaterally r/t EEDC syndrome       SOCIAL HISTORY  Social History   Substance Use Topics   •  "Smoking status: Former Smoker -- 0.25 packs/day     Types: Cigarettes   • Smokeless tobacco: Never Used   • Alcohol Use: Yes      Comment: 8 earthquakes 10% 24 oz malt liquor/day      History   Drug Use No       FAMILY HISTORY  No family history on file.    CURRENT MEDICATIONS  No current facility-administered medications on file prior to encounter.     No current outpatient prescriptions on file prior to encounter.      ALLERGIES  No Known Allergies    PHYSICAL EXAM  VITAL SIGNS: Ht 1.7 m (5' 6.93\")  Wt 63.504 kg (140 lb)  BMI 21.97 kg/m2  Vitals reviewed.    Consitutional: Well-developed, thin and disheveled. Emotionally distressed.   HENT: Normocephalic, right external ear normal, left external ear normal, oropharynx clear and dry.  Eyes: Conjunctivae normal, extraocular movements normal. PERRLA at 5 mm. Crusting and erythema around left eye. Negative for: discharge in right, icterus.  Neck: Range of motion normal, supple. Negative for cervical adenopathy.  Cardiovascular: Tachycardic, regular rhythm, heart sounds normal, intact distal pulses. Negative for: murmur, rub, gallop.  Pulmonary/Chest Wall: Effort normal, breath sounds normal. Negative for: respiratory distress, wheezes, rales, rhonchi.   Abdominal: Soft, bowel sounds normal. Negative for: distention, tenderness, rebound, guarding.  Musculoskeletal: Normal range of motion. Negative for edema.   Extremities: Deformed hands and feet chronically.   Neurological: Alert and oriented x3. No focal deficits.  Skin: Warm, dry. Negative for rash.  Psych: Anxious, agitated, reports visual hallucinations and substance dependence.     DIAGNOSTIC STUDIES / PROCEDURES    LABS  Results for orders placed or performed during the hospital encounter of 05/07/17   CBC WITH DIFFERENTIAL   Result Value Ref Range    WBC 5.6 4.8 - 10.8 K/uL    RBC 3.52 (L) 4.70 - 6.10 M/uL    Hemoglobin 11.3 (L) 14.0 - 18.0 g/dL    Hematocrit 33.6 (L) 42.0 - 52.0 %    MCV 95.5 81.4 - 97.8 fL "    MCH 32.1 27.0 - 33.0 pg    MCHC 33.6 (L) 33.7 - 35.3 g/dL    RDW 47.8 35.9 - 50.0 fL    Platelet Count 97 (L) 164 - 446 K/uL    MPV 11.3 9.0 - 12.9 fL    Neutrophils-Polys 70.00 44.00 - 72.00 %    Lymphocytes 16.70 (L) 22.00 - 41.00 %    Monocytes 12.50 0.00 - 13.40 %    Eosinophils 0.20 0.00 - 6.90 %    Basophils 0.40 0.00 - 1.80 %    Immature Granulocytes 0.20 0.00 - 0.90 %    Nucleated RBC 0.00 /100 WBC    Neutrophils (Absolute) 3.91 1.82 - 7.42 K/uL    Lymphs (Absolute) 0.93 (L) 1.00 - 4.80 K/uL    Monos (Absolute) 0.70 0.00 - 0.85 K/uL    Eos (Absolute) 0.01 0.00 - 0.51 K/uL    Baso (Absolute) 0.02 0.00 - 0.12 K/uL    Immature Granulocytes (abs) 0.01 0.00 - 0.11 K/uL    NRBC (Absolute) 0.00 K/uL   COMP METABOLIC PANEL   Result Value Ref Range    Sodium 136 135 - 145 mmol/L    Potassium 3.2 (L) 3.6 - 5.5 mmol/L    Chloride 105 96 - 112 mmol/L    Co2 21 20 - 33 mmol/L    Anion Gap 10.0 0.0 - 11.9    Glucose 76 65 - 99 mg/dL    Bun 4 (L) 8 - 22 mg/dL    Creatinine 0.47 (L) 0.50 - 1.40 mg/dL    Calcium 6.7 (LL) 8.5 - 10.5 mg/dL    AST(SGOT) 74 (H) 12 - 45 U/L    ALT(SGPT) 46 2 - 50 U/L    Alkaline Phosphatase 75 30 - 99 U/L    Total Bilirubin 0.7 0.1 - 1.5 mg/dL    Albumin 3.3 3.2 - 4.9 g/dL    Total Protein 6.2 6.0 - 8.2 g/dL    Globulin 2.9 1.9 - 3.5 g/dL    A-G Ratio 1.1 g/dL   MAGNESIUM   Result Value Ref Range    Magnesium 1.3 (L) 1.5 - 2.5 mg/dL   PROTHROMBIN TIME (INR)   Result Value Ref Range    PT 13.0 12.0 - 14.6 sec    INR 0.95 0.87 - 1.13   APTT   Result Value Ref Range    APTT 26.7 24.7 - 36.0 sec   AMMONIA   Result Value Ref Range    Ammonia 27 11 - 45 umol/L   DIAGNOSTIC ALCOHOL   Result Value Ref Range    Diagnostic Alcohol 0.14 (H) 0.00 g/dL   ESTIMATED GFR   Result Value Ref Range    GFR If African American >60 >60 mL/min/1.73 m 2    GFR If Non African American >60 >60 mL/min/1.73 m 2   POC BREATHALIZER   Result Value Ref Range    POC Breathalizer 0.155 (A) 0.00 - 0.01 Percent      All labs  reviewed by me.    COURSE & MEDICAL DECISION MAKING  Nursing notes, VS, PMSFHx reviewed in chart.    Obtained and reviewed past medical records which show multiple monthly visits to the ED for similar complaints, other than the month of March.     2:19 AM Patient seen and examined at bedside. The patient presents with polysubstance abuse and withdrawal hallucinations. Ordered POC Breathalizer, Diagnostic alcohol, CBC with differential, CMP, Magnesium, PTT/INR, APTT, Ammonia, Urine Drug Screen. Patient will be treated with ER Detox IV 1000 mL,  for his symptoms.     3:52 AM patient continues to have tremulousness. He has been to the bathroom multiple times without assistance. He has several electrolyte abnormalities, so I have ordered calcium gluconate, K-Dur 20. Patient is alert he gotten IV magnesium. I spoke with Dr. Purcell, hospitalist, and she has agreed to admit the patient for further observation and treatment.     Disposition  Admit to Dr. Purcell, hospitalist, in guarded condition      FINAL IMPRESSION  1. Alcohol dependence with withdrawal with complication (CMS-HCC)    2. Hypocalcemia    3. Hypomagnesemia    4. Hypokalemia    5. Polysubstance abuse    6. Tachycardia          Marc FELIPE (Scribe), am scribing for, and in the presence of, Sonja Gallego M.D..    Electronically signed by: Marc Martinez ([place]), 5/7/2017    Sonja FELIPE M.D. personally performed the services described in this documentation, as scribed by Marc Martinez in my presence, and it is both accurate and complete.    The note accurately reflects work and decisions made by me.  Sonja Gallego  5/7/2017  3:54 AM

## 2017-05-07 NOTE — IP AVS SNAPSHOT
" <p align=\"LEFT\"><IMG SRC=\"[email or web address]\" alt=\"Image\" WIDTH=\"50%\" HEIGHT=\"200\" BORDER=\"\"></p>                   Name:Gopal Ceja  Medical Record Number:[id number]  CSN: [id number]    YOB: 1985   Age: 32 y.o.  Sex: male  HT:1.727 m (5' 8\") WT: 63 kg (138 lb 14.2 oz)          Admit Date: 5/7/2017     Discharge Date:   Today's Date: 5/10/2017  Attending Doctor:  Bob Franco M.D.                  Allergies:  Review of patient's allergies indicates no known allergies.          Follow-up Information     1. Schedule an appointment as soon as possible for a visit with [facility] ([facility]).    Specialties:  Psychiatry, Behavioral Health    Contact information    [facility] [facility] [address] [phone]         Medication List      Notice     You have not been prescribed any medications.      "

## 2017-05-07 NOTE — PROGRESS NOTES
Bedside report received. Patient CIWA above 30. New orders entered to transfer patient to ICU. Patient is alert and oriented x4. Patient is cooperative, but very anxious and agitated. Patient seems paranoid.Patient up to bathroom, very unsteady on his feet. Patient states he has had seizures in the past d/t ETOH.  Patient on RA, lungs clear t/o. No cough noted. MP shows -130's. NIBP elevated. Abd soft/nontender. Bowel sounds x4. IV fluids running per orders. See EMAR. Patient has no c/o or concerns at this time. Bed alarm on. Call light in reach. Safety maintained.

## 2017-05-07 NOTE — PROGRESS NOTES
Skin cool to touch, pt diaphoretic. Coccyx without redness and intact. Bruise noted to left mid back. Orders to transfer to intensive care.

## 2017-05-07 NOTE — PROGRESS NOTES
Patient CIWA 31 and need to be transferred to ICU for close monitor/    Patient is examed and agreed with H&P assessment and plan.  Discussed with RN about care plan.    Can consider use precedex gtt.

## 2017-05-07 NOTE — PROGRESS NOTES
Med rec complete per patient.  Pt said he hasn't had any meds since 2015.  Pt states he is supposed to be taking:  Methylphenidate 10mg  Citalopram 30mg  Diazepam 5mg at bedtime  Allergies reviewed - NKDA.

## 2017-05-07 NOTE — PROGRESS NOTES
Patient c/o chest pain. Tells this RN that he gets it when he feels extremely anxious. Dr. Berrios aware. No new orders. Will continue to monitor. NO changes to telemetry monito. Patient continues to be ST .

## 2017-05-07 NOTE — H&P
DATE OF ADMISSION:  2017      CHIEF COMPLAINT:  Alcohol withdrawal and hallucinations.       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  He is unable to tell me the last time he drank alcohol   or used meth, but says he is going through withdrawal and is having   hallucinations.  The patient is very fidgety in the emergency room and cannot   sit still.  He has had chest pain, no shortness of breath.  He feels dizzy.    He has had no nausea, vomiting.  He is not sure if he is constipated, but he   says he has not really been eating.  He says he hurts all over.  He has an   intermittent headache.  He has questionable dysuria.  He has palpitations.  He   denies any fever.      PAST SURGICAL HISTORY:  He had both of his thumb straightened when he was an   infant.      PAST MEDICAL HISTORY:  Other than the substance abuse, he says he has seizures   when he goes through detox.        FAMILY MEDICAL HISTORY:  Positive for heart disease and diabetes as well as   his chromosomal abnormality.  He had a 6-month-old daughter that  of   kidney failure, which he says is related to that.      SOCIAL HISTORY:  Currently, he lives alone, I believe he is homeless.  When   patient is drinking, he drinks about 8 earthquakes per day.      REVIEW OF SYSTEMS:  As covered in the HPI.    CODE STATUS:  Full code.      PHYSICAL EXAMINATION:    GENERAL:  This is a disheveled young  male.    VITAL SIGNS:  He is afebrile, heart rate initially 131, respirations 20,   initial blood pressure 162/106 and saturating 97% on room air.    HEENT:  His pupils are equal and reactive.  His conjunctivae are red and he   has crusty drainage below both eyes, actually erythema on the lower lids   outside the eye bilaterally.  His pupils  are rather large, but reactive.    Extraocular movements appear to be intact.  His oropharynx is clear and moist.    He has a classic meth mouth, but claims that his teeth look that way from   his congenital abnormality.    NECK:  Supple.    LUNGS:  Clear.    HEART:  Mildly tachycardic, but regular.  He has no chest wall tenderness.    ABDOMEN:  Benign.    EXTREMITIES:  Show no clubbing, cyanosis or edema.  He has fused digits and   absent digits on both hands, he is missing what appears to be the second and   third digits on his hands and feet, but it also appears that his thumb and   great toes are fused.    NEUROLOGIC:  He is quite fidgety and agitated, but overall he is grossly   intact.      EKG was reviewed by me shows a QTC of 485.      LABORATORY STUDIES:  CBC shows a mild anemia of 11.3, hematocrit 33.6, and   platelet count of 97.  All of these are worse than when last checked in   February.  He has no left shift, no bandemia, no immature granulocytosis.    Chemistry profile, sodium is 136, potassium 3.2, chloride 105, bicarbonate 21,   glucose 76, BUN 4 and creatinine 0.47.  His calcium is 6.7 with a normal   albumin.  Transaminases, SGOT is modestly elevated at 74, SGPT is normal.  His   phosphorus is 2.1.  His magnesium is 1.3.  Ammonia is normal.  Initial   diagnostic alcohol level was 0.14.  Tox screen is positive for   methamphetamine.      ASSESSMENT AND PLAN:    1.  Alcohol withdrawal and active methamphetamine user.  Patient will be   placed on CIWA protocol.    2.  Multiple electrolyte abnormalities, calcium, magnesium and phosphorus have   all been ordered for replacement.  We will put him on potassium-containing IV   fluids and continue to monitor these.  Patient wishes his code status to be   full code.       ____________________________________     MD TRINITY COTA / RONEY    DD:  05/07/2017 08:05:01  DT:  05/07/2017 10:46:10    D#:  [id number]  Job#:  [id number]

## 2017-05-07 NOTE — CARE PLAN
Problem: Communication  Goal: The ability to communicate needs accurately and effectively will improve  Intervention: Reorient patient to environment as needed  Frequent re-orientation, explained full plan of care.       Problem: Psychosocial Needs:  Goal: Level of anxiety will decrease  Intervention: Collaborate with Interdisciplinary Team including Psychologist/Behavioral Health Team  Frequent CIWA assessment, patient encouraged to use call light when feeling anxious

## 2017-05-08 LAB
ALBUMIN SERPL BCP-MCNC: 3.1 G/DL (ref 3.2–4.9)
ALBUMIN/GLOB SERPL: 1.1 G/DL
ALP SERPL-CCNC: 71 U/L (ref 30–99)
ALT SERPL-CCNC: 39 U/L (ref 2–50)
ANION GAP SERPL CALC-SCNC: 4 MMOL/L (ref 0–11.9)
AST SERPL-CCNC: 44 U/L (ref 12–45)
BILIRUB SERPL-MCNC: 0.6 MG/DL (ref 0.1–1.5)
BUN SERPL-MCNC: 3 MG/DL (ref 8–22)
CALCIUM SERPL-MCNC: 7.5 MG/DL (ref 8.5–10.5)
CHLORIDE SERPL-SCNC: 109 MMOL/L (ref 96–112)
CO2 SERPL-SCNC: 24 MMOL/L (ref 20–33)
CREAT SERPL-MCNC: 0.6 MG/DL (ref 0.5–1.4)
EKG IMPRESSION: NORMAL
EKG IMPRESSION: NORMAL
GFR SERPL CREATININE-BSD FRML MDRD: >60 ML/MIN/1.73 M 2
GLOBULIN SER CALC-MCNC: 2.7 G/DL (ref 1.9–3.5)
GLUCOSE SERPL-MCNC: 97 MG/DL (ref 65–99)
MAGNESIUM SERPL-MCNC: 2.1 MG/DL (ref 1.5–2.5)
PHOSPHATE SERPL-MCNC: 2.7 MG/DL (ref 2.5–4.5)
POTASSIUM SERPL-SCNC: 3.5 MMOL/L (ref 3.6–5.5)
PROT SERPL-MCNC: 5.8 G/DL (ref 6–8.2)
SODIUM SERPL-SCNC: 137 MMOL/L (ref 135–145)

## 2017-05-08 PROCEDURE — 93005 ELECTROCARDIOGRAM TRACING: CPT | Performed by: INTERNAL MEDICINE

## 2017-05-08 PROCEDURE — 93010 ELECTROCARDIOGRAM REPORT: CPT | Performed by: INTERNAL MEDICINE

## 2017-05-08 PROCEDURE — A9270 NON-COVERED ITEM OR SERVICE: HCPCS | Performed by: INTERNAL MEDICINE

## 2017-05-08 PROCEDURE — 770001 HCHG ROOM/CARE - MED/SURG/GYN PRIV*

## 2017-05-08 PROCEDURE — A9270 NON-COVERED ITEM OR SERVICE: HCPCS | Performed by: HOSPITALIST

## 2017-05-08 PROCEDURE — 84100 ASSAY OF PHOSPHORUS: CPT

## 2017-05-08 PROCEDURE — 99233 SBSQ HOSP IP/OBS HIGH 50: CPT | Performed by: HOSPITALIST

## 2017-05-08 PROCEDURE — 700102 HCHG RX REV CODE 250 W/ 637 OVERRIDE(OP): Performed by: INTERNAL MEDICINE

## 2017-05-08 PROCEDURE — 80053 COMPREHEN METABOLIC PANEL: CPT

## 2017-05-08 PROCEDURE — 83735 ASSAY OF MAGNESIUM: CPT

## 2017-05-08 PROCEDURE — 700102 HCHG RX REV CODE 250 W/ 637 OVERRIDE(OP): Performed by: HOSPITALIST

## 2017-05-08 PROCEDURE — 87493 C DIFF AMPLIFIED PROBE: CPT

## 2017-05-08 PROCEDURE — 770021 HCHG ROOM/CARE - ISO PRIVATE

## 2017-05-08 PROCEDURE — 700101 HCHG RX REV CODE 250: Performed by: INTERNAL MEDICINE

## 2017-05-08 RX ORDER — POTASSIUM CHLORIDE 20 MEQ/1
20 TABLET, EXTENDED RELEASE ORAL DAILY
Status: DISCONTINUED | OUTPATIENT
Start: 2017-05-08 | End: 2017-05-09

## 2017-05-08 RX ORDER — CALCIUM CARBONATE 500 MG/1
1000 TABLET, CHEWABLE ORAL
Status: DISCONTINUED | OUTPATIENT
Start: 2017-05-08 | End: 2017-05-09

## 2017-05-08 RX ORDER — FAMOTIDINE 20 MG/1
20 TABLET, FILM COATED ORAL 2 TIMES DAILY
Status: DISCONTINUED | OUTPATIENT
Start: 2017-05-08 | End: 2017-05-09

## 2017-05-08 RX ORDER — CHLORDIAZEPOXIDE HYDROCHLORIDE 5 MG/1
5 CAPSULE, GELATIN COATED ORAL EVERY 6 HOURS
Status: DISCONTINUED | OUTPATIENT
Start: 2017-05-08 | End: 2017-05-10

## 2017-05-08 RX ORDER — POTASSIUM CHLORIDE 750 MG/1
40 TABLET, FILM COATED, EXTENDED RELEASE ORAL ONCE
Status: COMPLETED | OUTPATIENT
Start: 2017-05-08 | End: 2017-05-08

## 2017-05-08 RX ADMIN — FAMOTIDINE 20 MG: 20 TABLET, FILM COATED ORAL at 20:25

## 2017-05-08 RX ADMIN — MINERAL OIL AND WHITE PETROLATUM 1 APPLICATION: 150; 830 OINTMENT OPHTHALMIC at 20:25

## 2017-05-08 RX ADMIN — MINERAL OIL AND WHITE PETROLATUM 1 APPLICATION: 150; 830 OINTMENT OPHTHALMIC at 06:05

## 2017-05-08 RX ADMIN — POTASSIUM CHLORIDE 40 MEQ: 750 TABLET, FILM COATED, EXTENDED RELEASE ORAL at 11:40

## 2017-05-08 RX ADMIN — VALPROIC ACID 250 MG: 250 CAPSULE, LIQUID FILLED ORAL at 20:25

## 2017-05-08 RX ADMIN — POTASSIUM CHLORIDE AND SODIUM CHLORIDE: 900; 150 INJECTION, SOLUTION INTRAVENOUS at 12:52

## 2017-05-08 RX ADMIN — GABAPENTIN 100 MG: 100 CAPSULE ORAL at 14:42

## 2017-05-08 RX ADMIN — MINERAL OIL AND WHITE PETROLATUM 1 APPLICATION: 150; 830 OINTMENT OPHTHALMIC at 12:52

## 2017-05-08 RX ADMIN — VALPROIC ACID 250 MG: 250 CAPSULE, LIQUID FILLED ORAL at 06:05

## 2017-05-08 RX ADMIN — VALPROIC ACID 250 MG: 250 CAPSULE, LIQUID FILLED ORAL at 12:53

## 2017-05-08 RX ADMIN — POTASSIUM CHLORIDE AND SODIUM CHLORIDE: 900; 150 INJECTION, SOLUTION INTRAVENOUS at 01:00

## 2017-05-08 RX ADMIN — CLONIDINE HYDROCHLORIDE 0.1 MG: 0.1 TABLET ORAL at 20:25

## 2017-05-08 RX ADMIN — DIBASIC SODIUM PHOSPHATE, MONOBASIC POTASSIUM PHOSPHATE AND MONOBASIC SODIUM PHOSPHATE 1 TABLET: 852; 155; 130 TABLET ORAL at 17:04

## 2017-05-08 RX ADMIN — GABAPENTIN 100 MG: 100 CAPSULE ORAL at 08:34

## 2017-05-08 RX ADMIN — Medication 100 MG: at 07:35

## 2017-05-08 RX ADMIN — THERA TABS 1 TABLET: TAB at 07:35

## 2017-05-08 RX ADMIN — CHLORDIAZEPOXIDE HYDROCHLORIDE 10 MG: 5 CAPSULE ORAL at 07:34

## 2017-05-08 RX ADMIN — LORAZEPAM 3 MG: 1 TABLET ORAL at 02:03

## 2017-05-08 RX ADMIN — SULFACETAMIDE SODIUM 1 DROP: 100 SOLUTION/ DROPS OPHTHALMIC at 12:53

## 2017-05-08 RX ADMIN — SULFACETAMIDE SODIUM 1 DROP: 100 SOLUTION/ DROPS OPHTHALMIC at 20:26

## 2017-05-08 RX ADMIN — CHLORDIAZEPOXIDE HYDROCHLORIDE 5 MG: 5 CAPSULE ORAL at 17:03

## 2017-05-08 RX ADMIN — SULFACETAMIDE SODIUM 1 DROP: 100 SOLUTION/ DROPS OPHTHALMIC at 08:35

## 2017-05-08 RX ADMIN — ANTACID TABLETS 1000 MG: 500 TABLET, CHEWABLE ORAL at 17:06

## 2017-05-08 RX ADMIN — GABAPENTIN 100 MG: 100 CAPSULE ORAL at 20:25

## 2017-05-08 RX ADMIN — SULFACETAMIDE SODIUM 1 DROP: 100 SOLUTION/ DROPS OPHTHALMIC at 02:31

## 2017-05-08 RX ADMIN — FOLIC ACID 1 MG: 1 TABLET ORAL at 07:35

## 2017-05-08 RX ADMIN — CHLORDIAZEPOXIDE HYDROCHLORIDE 5 MG: 5 CAPSULE ORAL at 11:40

## 2017-05-08 RX ADMIN — SULFACETAMIDE SODIUM 1 DROP: 100 SOLUTION/ DROPS OPHTHALMIC at 17:05

## 2017-05-08 RX ADMIN — LORAZEPAM 2 MG: 1 TABLET ORAL at 06:48

## 2017-05-08 RX ADMIN — POTASSIUM CHLORIDE 20 MEQ: 1500 TABLET, EXTENDED RELEASE ORAL at 17:03

## 2017-05-08 RX ADMIN — LORAZEPAM 1 MG: 1 TABLET ORAL at 19:13

## 2017-05-08 RX ADMIN — CLONIDINE HYDROCHLORIDE 0.1 MG: 0.1 TABLET ORAL at 08:34

## 2017-05-08 RX ADMIN — SULFACETAMIDE SODIUM 1 DROP: 100 SOLUTION/ DROPS OPHTHALMIC at 06:05

## 2017-05-08 ASSESSMENT — LIFESTYLE VARIABLES
ANXIETY: MILDLY ANXIOUS
AUDITORY DISTURBANCES: NOT PRESENT
TOTAL SCORE: 2
NAUSEA AND VOMITING: MILD NAUSEA WITH NO VOMITING
HEADACHE, FULLNESS IN HEAD: NOT PRESENT
ANXIETY: *
TREMOR: TREMOR NOT VISIBLE BUT CAN BE FELT, FINGERTIP TO FINGERTIP
HOW MANY TIMES IN THE PAST YEAR HAVE YOU HAD 5 OR MORE DRINKS IN A DAY: 360
VISUAL DISTURBANCES: NOT PRESENT
ORIENTATION AND CLOUDING OF SENSORIUM: ORIENTED AND CAN DO SERIAL ADDITIONS
TOTAL SCORE: 3
AVERAGE NUMBER OF DAYS PER WEEK YOU HAVE A DRINK CONTAINING ALCOHOL: 7
ANXIETY: NO ANXIETY (AT EASE)
VISUAL DISTURBANCES: NOT PRESENT
TOTAL SCORE: 3
TOTAL SCORE: MODERATE ITCHING, PINS AND NEEDLES SENSATION, BURNING OR NUMBNESS
HEADACHE, FULLNESS IN HEAD: NOT PRESENT
AUDITORY DISTURBANCES: NOT PRESENT
PAROXYSMAL SWEATS: NO SWEAT VISIBLE
AGITATION: NORMAL ACTIVITY
ANXIETY: NO ANXIETY (AT EASE)
HEADACHE, FULLNESS IN HEAD: NOT PRESENT
ANXIETY: NO ANXIETY (AT EASE)
AUDITORY DISTURBANCES: NOT PRESENT
TOTAL SCORE: 4
AGITATION: NORMAL ACTIVITY
VISUAL DISTURBANCES: MODERATE SENSITIVITY
TREMOR: *
EVER FELT BAD OR GUILTY ABOUT YOUR DRINKING: YES
AUDITORY DISTURBANCES: NOT PRESENT
NAUSEA AND VOMITING: NO NAUSEA AND NO VOMITING
AGITATION: NORMAL ACTIVITY
PAROXYSMAL SWEATS: *
VISUAL DISTURBANCES: NOT PRESENT
ORIENTATION AND CLOUDING OF SENSORIUM: ORIENTED AND CAN DO SERIAL ADDITIONS
TACTILE DISTURBANCES: VERY MILD ITCHING, PINS AND NEEDLES SENSATION, BURNING OR NUMBNESS
TREMOR: TREMOR NOT VISIBLE BUT CAN BE FELT, FINGERTIP TO FINGERTIP
ORIENTATION AND CLOUDING OF SENSORIUM: ORIENTED AND CAN DO SERIAL ADDITIONS
ANXIETY: NO ANXIETY (AT EASE)
AUDITORY DISTURBANCES: NOT PRESENT
HEADACHE, FULLNESS IN HEAD: NOT PRESENT
DO YOU DRINK ALCOHOL: YES
DOES PATIENT WANT TO STOP DRINKING: YES
AUDITORY DISTURBANCES: NOT PRESENT
VISUAL DISTURBANCES: NOT PRESENT
VISUAL DISTURBANCES: NOT PRESENT
HEADACHE, FULLNESS IN HEAD: VERY MILD
TACTILE DISTURBANCES: VERY MILD ITCHING, PINS AND NEEDLES SENSATION, BURNING OR NUMBNESS
ANXIETY: MODERATELY ANXIOUS OR GUARDED, SO ANXIETY IS INFERRED
NAUSEA AND VOMITING: NO NAUSEA AND NO VOMITING
ANXIETY: NO ANXIETY (AT EASE)
NAUSEA AND VOMITING: NO NAUSEA AND NO VOMITING
EVER HAD A DRINK FIRST THING IN THE MORNING TO STEADY YOUR NERVES TO GET RID OF A HANGOVER: YES
NAUSEA AND VOMITING: NO NAUSEA AND NO VOMITING
VISUAL DISTURBANCES: NOT PRESENT
TOTAL SCORE: 4
ORIENTATION AND CLOUDING OF SENSORIUM: ORIENTED AND CAN DO SERIAL ADDITIONS
ORIENTATION AND CLOUDING OF SENSORIUM: ORIENTED AND CAN DO SERIAL ADDITIONS
NAUSEA AND VOMITING: MILD NAUSEA WITH NO VOMITING
NAUSEA AND VOMITING: NO NAUSEA AND NO VOMITING
ORIENTATION AND CLOUDING OF SENSORIUM: ORIENTED AND CAN DO SERIAL ADDITIONS
HAVE PEOPLE ANNOYED YOU BY CRITICIZING YOUR DRINKING: YES
TOTAL SCORE: 2
TOTAL SCORE: 4
PAROXYSMAL SWEATS: NO SWEAT VISIBLE
DOES PATIENT WANT TO TALK TO SOMEONE ABOUT QUITTING: YES
TOTAL SCORE: 2
AUDITORY DISTURBANCES: NOT PRESENT
TOTAL SCORE: 11
TREMOR: TREMOR NOT VISIBLE BUT CAN BE FELT, FINGERTIP TO FINGERTIP
ORIENTATION AND CLOUDING OF SENSORIUM: ORIENTED AND CAN DO SERIAL ADDITIONS
PAROXYSMAL SWEATS: NO SWEAT VISIBLE
AUDITORY DISTURBANCES: NOT PRESENT
TOTAL SCORE: 19
HEADACHE, FULLNESS IN HEAD: VERY MILD
NAUSEA AND VOMITING: NO NAUSEA AND NO VOMITING
HEADACHE, FULLNESS IN HEAD: MILD
PAROXYSMAL SWEATS: NO SWEAT VISIBLE
NAUSEA AND VOMITING: NO NAUSEA AND NO VOMITING
HEADACHE, FULLNESS IN HEAD: NOT PRESENT
TREMOR: *
HEADACHE, FULLNESS IN HEAD: NOT PRESENT
TREMOR: *
PAROXYSMAL SWEATS: NO SWEAT VISIBLE
PAROXYSMAL SWEATS: NO SWEAT VISIBLE
NAUSEA AND VOMITING: NO NAUSEA AND NO VOMITING
AGITATION: MODERATELY FIDGETY AND RESTLESS
TREMOR: MODERATE TREMOR WITH ARMS EXTENDED
VISUAL DISTURBANCES: NOT PRESENT
HEADACHE, FULLNESS IN HEAD: NOT PRESENT
TOTAL SCORE: 8
AUDITORY DISTURBANCES: NOT PRESENT
TREMOR: TREMOR NOT VISIBLE BUT CAN BE FELT, FINGERTIP TO FINGERTIP
AGITATION: NORMAL ACTIVITY
VISUAL DISTURBANCES: NOT PRESENT
ORIENTATION AND CLOUDING OF SENSORIUM: ORIENTED AND CAN DO SERIAL ADDITIONS
TREMOR: *
AGITATION: NORMAL ACTIVITY
AGITATION: NORMAL ACTIVITY
PAROXYSMAL SWEATS: NO SWEAT VISIBLE
AGITATION: NORMAL ACTIVITY
ON A TYPICAL DAY WHEN YOU DRINK ALCOHOL HOW MANY DRINKS DO YOU HAVE: 8
CONSUMPTION TOTAL: POSITIVE
PAROXYSMAL SWEATS: NO SWEAT VISIBLE
TOTAL SCORE: 4
TREMOR: MODERATE TREMOR WITH ARMS EXTENDED
ORIENTATION AND CLOUDING OF SENSORIUM: ORIENTED AND CAN DO SERIAL ADDITIONS
AGITATION: *
ANXIETY: NO ANXIETY (AT EASE)
TOTAL SCORE: 3
AUDITORY DISTURBANCES: NOT PRESENT
AGITATION: NORMAL ACTIVITY
VISUAL DISTURBANCES: NOT PRESENT
HAVE YOU EVER FELT YOU SHOULD CUT DOWN ON YOUR DRINKING: YES
ANXIETY: MODERATELY ANXIOUS OR GUARDED, SO ANXIETY IS INFERRED
PAROXYSMAL SWEATS: NO SWEAT VISIBLE
ORIENTATION AND CLOUDING OF SENSORIUM: ORIENTED AND CAN DO SERIAL ADDITIONS

## 2017-05-08 ASSESSMENT — PAIN SCALES - GENERAL
PAINLEVEL_OUTOF10: 0
PAINLEVEL_OUTOF10: 1
PAINLEVEL_OUTOF10: 3

## 2017-05-08 ASSESSMENT — COPD QUESTIONNAIRES
COPD SCREENING SCORE: 0
DO YOU EVER COUGH UP ANY MUCUS OR PHLEGM?: NO/ONLY WITH OCCASIONAL COLDS OR INFECTIONS
HAVE YOU SMOKED AT LEAST 100 CIGARETTES IN YOUR ENTIRE LIFE: NO/DON'T KNOW
DURING THE PAST 4 WEEKS HOW MUCH DID YOU FEEL SHORT OF BREATH: NONE/LITTLE OF THE TIME

## 2017-05-08 NOTE — PROGRESS NOTES
Pt calm and sleeping. Seemed irritable when awoken for assessment. CIWA 4 d/t tremors.   According to nightshift RN, pt needed to use the bathroom and when told he could not void alone, he became impulsive, very angry and threatened to leave AMA. Options provided to pt including a bedpan so RN could leave, or the presence of a male RN. Pt agreed to use the bathroom only with a male RN. According to male RN, pt weak and somewhat steady on feet; refused to sit on commode and would only hover over it. Pt transferred safely back to bed 1-assist.

## 2017-05-08 NOTE — CARE PLAN
Problem: Medication  Goal: Compliance with prescribed medication will improve  Intervention: Assess and address patient’s barriers to compliance with prescribed medication regimen  Pt states he is unable to take  Psych medications outside the hospital at this time because he doesn't have them and doesn't have the money for a copay for a doctors office visit.  He has informed this RN that he is able to stay away from drinking and using drugs if he is taking his medications regularly.

## 2017-05-08 NOTE — CARE PLAN
Problem: Safety  Goal: Will remain free from injury  Intervention: Educate patient and significant other/support system about adaptive mobility strategies and safe transfers  RN has educated patient regarding falls risks while in the hospital.  Pt is hesitant to use the bathroom because he doesn't want anyone in the room with him.  RN has educated patient on policy and has encouraged pt that being in the room is for his safety.  Pt has also been informed that he would be able to use a bedpan without the RN in the room if he would like.  Pt refuses to get up to the commode or use a bedpan at this time.

## 2017-05-08 NOTE — PROGRESS NOTES
Pt refusing SCDs and Lovenox. Pt educated on purpose of both, but still refusing. Demonstrates ability to move self in bed and up 1-assist.

## 2017-05-08 NOTE — DISCHARGE PLANNING
Received  Consult due to pt depression. Eft msg for physician, Dr. Colon requesting that he place a Psych Consult.

## 2017-05-08 NOTE — CARE PLAN
Problem: Venous Thromboembolism (VTW)/Deep Vein Thrombosis (DVT) Prevention:  Goal: Patient will participate in Venous Thrombosis (VTE)/Deep Vein Thrombosis (DVT)Prevention Measures  Pt refusing pharmacologic DVT prophylaxis and SCDs. Pt demonstrates ability to move self in bed. Up out of bed x 1.     Problem: Psychosocial Needs:  Goal: Level of anxiety will decrease  Intervention: Identify and develop with patient strategies to cope with anxiety triggers  CIWA assessed; no ativan needed at this time. Pt is calm and sleeping. Irritable when woken. Non-pharmacologic interventions include decreased stimuli, dim lights, and limited interruptions.

## 2017-05-09 LAB
ALBUMIN SERPL BCP-MCNC: 3.8 G/DL (ref 3.2–4.9)
ALBUMIN/GLOB SERPL: 1.1 G/DL
ALP SERPL-CCNC: 81 U/L (ref 30–99)
ALT SERPL-CCNC: 92 U/L (ref 2–50)
ANION GAP SERPL CALC-SCNC: 6 MMOL/L (ref 0–11.9)
AST SERPL-CCNC: 137 U/L (ref 12–45)
BASOPHILS # BLD AUTO: 0.5 % (ref 0–1.8)
BASOPHILS # BLD: 0.03 K/UL (ref 0–0.12)
BILIRUB SERPL-MCNC: 0.5 MG/DL (ref 0.1–1.5)
BUN SERPL-MCNC: 7 MG/DL (ref 8–22)
C DIFF DNA SPEC QL NAA+PROBE: NEGATIVE
C DIFF TOX GENS STL QL NAA+PROBE: NEGATIVE
CALCIUM SERPL-MCNC: 9.6 MG/DL (ref 8.5–10.5)
CHLORIDE SERPL-SCNC: 105 MMOL/L (ref 96–112)
CO2 SERPL-SCNC: 25 MMOL/L (ref 20–33)
CREAT SERPL-MCNC: 0.62 MG/DL (ref 0.5–1.4)
EKG IMPRESSION: NORMAL
EOSINOPHIL # BLD AUTO: 0.09 K/UL (ref 0–0.51)
EOSINOPHIL NFR BLD: 1.4 % (ref 0–6.9)
ERYTHROCYTE [DISTWIDTH] IN BLOOD BY AUTOMATED COUNT: 50.3 FL (ref 35.9–50)
GFR SERPL CREATININE-BSD FRML MDRD: >60 ML/MIN/1.73 M 2
GLOBULIN SER CALC-MCNC: 3.4 G/DL (ref 1.9–3.5)
GLUCOSE SERPL-MCNC: 101 MG/DL (ref 65–99)
HCT VFR BLD AUTO: 41.3 % (ref 42–52)
HGB BLD-MCNC: 13.4 G/DL (ref 14–18)
IMM GRANULOCYTES # BLD AUTO: 0.01 K/UL (ref 0–0.11)
IMM GRANULOCYTES NFR BLD AUTO: 0.2 % (ref 0–0.9)
LYMPHOCYTES # BLD AUTO: 1.78 K/UL (ref 1–4.8)
LYMPHOCYTES NFR BLD: 27.3 % (ref 22–41)
MAGNESIUM SERPL-MCNC: 2.1 MG/DL (ref 1.5–2.5)
MCH RBC QN AUTO: 31.9 PG (ref 27–33)
MCHC RBC AUTO-ENTMCNC: 32.4 G/DL (ref 33.7–35.3)
MCV RBC AUTO: 98.3 FL (ref 81.4–97.8)
MONOCYTES # BLD AUTO: 0.86 K/UL (ref 0–0.85)
MONOCYTES NFR BLD AUTO: 13.2 % (ref 0–13.4)
NEUTROPHILS # BLD AUTO: 3.74 K/UL (ref 1.82–7.42)
NEUTROPHILS NFR BLD: 57.4 % (ref 44–72)
NRBC # BLD AUTO: 0 K/UL
NRBC BLD AUTO-RTO: 0 /100 WBC
PHOSPHATE SERPL-MCNC: 4 MG/DL (ref 2.5–4.5)
PLATELET # BLD AUTO: 125 K/UL (ref 164–446)
PMV BLD AUTO: 12.3 FL (ref 9–12.9)
POTASSIUM SERPL-SCNC: 4.8 MMOL/L (ref 3.6–5.5)
PROT SERPL-MCNC: 7.2 G/DL (ref 6–8.2)
RBC # BLD AUTO: 4.2 M/UL (ref 4.7–6.1)
SODIUM SERPL-SCNC: 136 MMOL/L (ref 135–145)
WBC # BLD AUTO: 6.5 K/UL (ref 4.8–10.8)

## 2017-05-09 PROCEDURE — 700101 HCHG RX REV CODE 250: Performed by: INTERNAL MEDICINE

## 2017-05-09 PROCEDURE — 700102 HCHG RX REV CODE 250 W/ 637 OVERRIDE(OP): Performed by: HOSPITALIST

## 2017-05-09 PROCEDURE — 700111 HCHG RX REV CODE 636 W/ 250 OVERRIDE (IP): Performed by: INTERNAL MEDICINE

## 2017-05-09 PROCEDURE — 700102 HCHG RX REV CODE 250 W/ 637 OVERRIDE(OP): Performed by: INTERNAL MEDICINE

## 2017-05-09 PROCEDURE — 80053 COMPREHEN METABOLIC PANEL: CPT

## 2017-05-09 PROCEDURE — A9270 NON-COVERED ITEM OR SERVICE: HCPCS | Performed by: INTERNAL MEDICINE

## 2017-05-09 PROCEDURE — 85025 COMPLETE CBC W/AUTO DIFF WBC: CPT

## 2017-05-09 PROCEDURE — 84100 ASSAY OF PHOSPHORUS: CPT

## 2017-05-09 PROCEDURE — 83735 ASSAY OF MAGNESIUM: CPT

## 2017-05-09 PROCEDURE — 700111 HCHG RX REV CODE 636 W/ 250 OVERRIDE (IP): Performed by: HOSPITALIST

## 2017-05-09 PROCEDURE — 93005 ELECTROCARDIOGRAM TRACING: CPT | Performed by: INTERNAL MEDICINE

## 2017-05-09 PROCEDURE — A9270 NON-COVERED ITEM OR SERVICE: HCPCS | Performed by: HOSPITALIST

## 2017-05-09 PROCEDURE — 93010 ELECTROCARDIOGRAM REPORT: CPT | Performed by: INTERNAL MEDICINE

## 2017-05-09 PROCEDURE — 99232 SBSQ HOSP IP/OBS MODERATE 35: CPT | Performed by: HOSPITALIST

## 2017-05-09 PROCEDURE — 770001 HCHG ROOM/CARE - MED/SURG/GYN PRIV*

## 2017-05-09 RX ORDER — CITALOPRAM 20 MG/1
20 TABLET ORAL DAILY
Status: DISCONTINUED | OUTPATIENT
Start: 2017-05-09 | End: 2017-05-10

## 2017-05-09 RX ORDER — FAMOTIDINE 20 MG/1
20 TABLET, FILM COATED ORAL 2 TIMES DAILY
Status: DISCONTINUED | OUTPATIENT
Start: 2017-05-10 | End: 2017-05-10 | Stop reason: HOSPADM

## 2017-05-09 RX ADMIN — MINERAL OIL AND WHITE PETROLATUM 1 APPLICATION: 150; 830 OINTMENT OPHTHALMIC at 20:29

## 2017-05-09 RX ADMIN — LORAZEPAM 1 MG: 1 TABLET ORAL at 05:41

## 2017-05-09 RX ADMIN — POTASSIUM CHLORIDE AND SODIUM CHLORIDE: 900; 150 INJECTION, SOLUTION INTRAVENOUS at 00:05

## 2017-05-09 RX ADMIN — POTASSIUM CHLORIDE 20 MEQ: 1500 TABLET, EXTENDED RELEASE ORAL at 08:30

## 2017-05-09 RX ADMIN — SULFACETAMIDE SODIUM 1 DROP: 100 SOLUTION/ DROPS OPHTHALMIC at 01:00

## 2017-05-09 RX ADMIN — SULFACETAMIDE SODIUM 1 DROP: 100 SOLUTION/ DROPS OPHTHALMIC at 09:01

## 2017-05-09 RX ADMIN — FOLIC ACID 1 MG: 1 TABLET ORAL at 08:31

## 2017-05-09 RX ADMIN — SULFACETAMIDE SODIUM 1 DROP: 100 SOLUTION/ DROPS OPHTHALMIC at 20:29

## 2017-05-09 RX ADMIN — Medication 100 MG: at 08:30

## 2017-05-09 RX ADMIN — SULFACETAMIDE SODIUM 1 DROP: 100 SOLUTION/ DROPS OPHTHALMIC at 15:18

## 2017-05-09 RX ADMIN — MINERAL OIL AND WHITE PETROLATUM 1 APPLICATION: 150; 830 OINTMENT OPHTHALMIC at 05:13

## 2017-05-09 RX ADMIN — THERA TABS 1 TABLET: TAB at 08:30

## 2017-05-09 RX ADMIN — CHLORDIAZEPOXIDE HYDROCHLORIDE 5 MG: 5 CAPSULE ORAL at 05:13

## 2017-05-09 RX ADMIN — SULFACETAMIDE SODIUM 1 DROP: 100 SOLUTION/ DROPS OPHTHALMIC at 05:13

## 2017-05-09 RX ADMIN — CLONIDINE HYDROCHLORIDE 0.1 MG: 0.1 TABLET ORAL at 08:30

## 2017-05-09 RX ADMIN — FAMOTIDINE 20 MG: 10 INJECTION, SOLUTION INTRAVENOUS at 08:30

## 2017-05-09 RX ADMIN — MINERAL OIL AND WHITE PETROLATUM 1 APPLICATION: 150; 830 OINTMENT OPHTHALMIC at 15:19

## 2017-05-09 RX ADMIN — ANTACID TABLETS 1000 MG: 500 TABLET, CHEWABLE ORAL at 08:30

## 2017-05-09 RX ADMIN — CHLORDIAZEPOXIDE HYDROCHLORIDE 5 MG: 5 CAPSULE ORAL at 15:18

## 2017-05-09 RX ADMIN — GABAPENTIN 100 MG: 100 CAPSULE ORAL at 08:30

## 2017-05-09 RX ADMIN — CHLORDIAZEPOXIDE HYDROCHLORIDE 5 MG: 5 CAPSULE ORAL at 18:20

## 2017-05-09 RX ADMIN — VALPROIC ACID 250 MG: 250 CAPSULE, LIQUID FILLED ORAL at 05:13

## 2017-05-09 RX ADMIN — LORAZEPAM 2 MG: 1 TABLET ORAL at 19:30

## 2017-05-09 RX ADMIN — DIBASIC SODIUM PHOSPHATE, MONOBASIC POTASSIUM PHOSPHATE AND MONOBASIC SODIUM PHOSPHATE 1 TABLET: 852; 155; 130 TABLET ORAL at 00:39

## 2017-05-09 RX ADMIN — LORAZEPAM 2 MG: 1 TABLET ORAL at 00:42

## 2017-05-09 RX ADMIN — DIBASIC SODIUM PHOSPHATE, MONOBASIC POTASSIUM PHOSPHATE AND MONOBASIC SODIUM PHOSPHATE 1 TABLET: 852; 155; 130 TABLET ORAL at 05:13

## 2017-05-09 RX ADMIN — SULFACETAMIDE SODIUM 1 DROP: 100 SOLUTION/ DROPS OPHTHALMIC at 17:49

## 2017-05-09 RX ADMIN — CHLORDIAZEPOXIDE HYDROCHLORIDE 5 MG: 5 CAPSULE ORAL at 00:39

## 2017-05-09 ASSESSMENT — LIFESTYLE VARIABLES
NAUSEA AND VOMITING: NO NAUSEA AND NO VOMITING
HEADACHE, FULLNESS IN HEAD: NOT PRESENT
DO YOU DRINK ALCOHOL: YES
TREMOR: NO TREMOR
ORIENTATION AND CLOUDING OF SENSORIUM: ORIENTED AND CAN DO SERIAL ADDITIONS
PAROXYSMAL SWEATS: NO SWEAT VISIBLE
HAVE PEOPLE ANNOYED YOU BY CRITICIZING YOUR DRINKING: YES
TOTAL SCORE: 1
AGITATION: *
TOTAL SCORE: VERY MILD ITCHING, PINS AND NEEDLES SENSATION, BURNING OR NUMBNESS
NAUSEA AND VOMITING: NO NAUSEA AND NO VOMITING
PAROXYSMAL SWEATS: NO SWEAT VISIBLE
TREMOR: *
ORIENTATION AND CLOUDING OF SENSORIUM: ORIENTED AND CAN DO SERIAL ADDITIONS
TOTAL SCORE: 1
EVER FELT BAD OR GUILTY ABOUT YOUR DRINKING: YES
ORIENTATION AND CLOUDING OF SENSORIUM: ORIENTED AND CAN DO SERIAL ADDITIONS
TOTAL SCORE: 11
VISUAL DISTURBANCES: NOT PRESENT
ANXIETY: NO ANXIETY (AT EASE)
HEADACHE, FULLNESS IN HEAD: NOT PRESENT
ORIENTATION AND CLOUDING OF SENSORIUM: ORIENTED AND CAN DO SERIAL ADDITIONS
ANXIETY: *
DOES PATIENT WANT TO TALK TO SOMEONE ABOUT QUITTING: YES
AGITATION: NORMAL ACTIVITY
AGITATION: *
TREMOR: TREMOR NOT VISIBLE BUT CAN BE FELT, FINGERTIP TO FINGERTIP
TOTAL SCORE: 4
AUDITORY DISTURBANCES: NOT PRESENT
NAUSEA AND VOMITING: MILD NAUSEA WITH NO VOMITING
TREMOR: MODERATE TREMOR WITH ARMS EXTENDED
TOTAL SCORE: VERY MILD ITCHING, PINS AND NEEDLES SENSATION, BURNING OR NUMBNESS
TOTAL SCORE: 1
TREMOR: NO TREMOR
ORIENTATION AND CLOUDING OF SENSORIUM: ORIENTED AND CAN DO SERIAL ADDITIONS
AGITATION: NORMAL ACTIVITY
AUDITORY DISTURBANCES: NOT PRESENT
AGITATION: NORMAL ACTIVITY
PAROXYSMAL SWEATS: NO SWEAT VISIBLE
TOTAL SCORE: 0
TOTAL SCORE: 10
AUDITORY DISTURBANCES: NOT PRESENT
VISUAL DISTURBANCES: NOT PRESENT
TREMOR: TREMOR NOT VISIBLE BUT CAN BE FELT, FINGERTIP TO FINGERTIP
VISUAL DISTURBANCES: MILD SENSITIVITY
PAROXYSMAL SWEATS: *
PAROXYSMAL SWEATS: NO SWEAT VISIBLE
HOW MANY TIMES IN THE PAST YEAR HAVE YOU HAD 5 OR MORE DRINKS IN A DAY: 360
CONSUMPTION TOTAL: POSITIVE
HEADACHE, FULLNESS IN HEAD: NOT PRESENT
AUDITORY DISTURBANCES: NOT PRESENT
AUDITORY DISTURBANCES: NOT PRESENT
AGITATION: *
ANXIETY: *
VISUAL DISTURBANCES: NOT PRESENT
AUDITORY DISTURBANCES: NOT PRESENT
ANXIETY: NO ANXIETY (AT EASE)
HEADACHE, FULLNESS IN HEAD: MILD
TREMOR: *
ORIENTATION AND CLOUDING OF SENSORIUM: ORIENTED AND CAN DO SERIAL ADDITIONS
TOTAL SCORE: 14
VISUAL DISTURBANCES: NOT PRESENT
ORIENTATION AND CLOUDING OF SENSORIUM: ORIENTED AND CAN DO SERIAL ADDITIONS
VISUAL DISTURBANCES: NOT PRESENT
TOTAL SCORE: 4
DOES PATIENT WANT TO STOP DRINKING: YES
AUDITORY DISTURBANCES: NOT PRESENT
EVER HAD A DRINK FIRST THING IN THE MORNING TO STEADY YOUR NERVES TO GET RID OF A HANGOVER: YES
PAROXYSMAL SWEATS: BARELY PERCEPTIBLE SWEATING. PALMS MOIST
NAUSEA AND VOMITING: NO NAUSEA AND NO VOMITING
ANXIETY: NO ANXIETY (AT EASE)
HEADACHE, FULLNESS IN HEAD: NOT PRESENT
PAROXYSMAL SWEATS: NO SWEAT VISIBLE
AVERAGE NUMBER OF DAYS PER WEEK YOU HAVE A DRINK CONTAINING ALCOHOL: 7
AGITATION: NORMAL ACTIVITY
TREMOR: TREMOR NOT VISIBLE BUT CAN BE FELT, FINGERTIP TO FINGERTIP
HAVE YOU EVER FELT YOU SHOULD CUT DOWN ON YOUR DRINKING: YES
ANXIETY: *
AUDITORY DISTURBANCES: NOT PRESENT
TOTAL SCORE: 4
NAUSEA AND VOMITING: NO NAUSEA AND NO VOMITING
HEADACHE, FULLNESS IN HEAD: NOT PRESENT
VISUAL DISTURBANCES: NOT PRESENT
AGITATION: NORMAL ACTIVITY
VISUAL DISTURBANCES: NOT PRESENT
HEADACHE, FULLNESS IN HEAD: NOT PRESENT
ANXIETY: NO ANXIETY (AT EASE)
TOTAL SCORE: 2
NAUSEA AND VOMITING: *
HEADACHE, FULLNESS IN HEAD: NOT PRESENT
PAROXYSMAL SWEATS: NO SWEAT VISIBLE
ANXIETY: MILDLY ANXIOUS
NAUSEA AND VOMITING: NO NAUSEA AND NO VOMITING
ORIENTATION AND CLOUDING OF SENSORIUM: ORIENTED AND CAN DO SERIAL ADDITIONS
NAUSEA AND VOMITING: MILD NAUSEA WITH NO VOMITING
ON A TYPICAL DAY WHEN YOU DRINK ALCOHOL HOW MANY DRINKS DO YOU HAVE: 8

## 2017-05-09 ASSESSMENT — COPD QUESTIONNAIRES
DURING THE PAST 4 WEEKS HOW MUCH DID YOU FEEL SHORT OF BREATH: NONE/LITTLE OF THE TIME
DO YOU EVER COUGH UP ANY MUCUS OR PHLEGM?: NO/ONLY WITH OCCASIONAL COLDS OR INFECTIONS
HAVE YOU SMOKED AT LEAST 100 CIGARETTES IN YOUR ENTIRE LIFE: NO/DON'T KNOW
COPD SCREENING SCORE: 0

## 2017-05-09 ASSESSMENT — ENCOUNTER SYMPTOMS
NERVOUS/ANXIOUS: 1
ROS GI COMMENTS: POOR APPETITE.
HALLUCINATIONS: 0
CHILLS: 0
COUGH: 0
DIZZINESS: 1
INSOMNIA: 0
FEVER: 0

## 2017-05-09 ASSESSMENT — PAIN SCALES - GENERAL
PAINLEVEL_OUTOF10: 0
PAINLEVEL_OUTOF10: 3
PAINLEVEL_OUTOF10: 0
PAINLEVEL_OUTOF10: 3
PAINLEVEL_OUTOF10: 3
PAINLEVEL_OUTOF10: 0
PAINLEVEL_OUTOF10: 0

## 2017-05-09 NOTE — CARE PLAN
Problem: Safety  Goal: Will remain free from falls  Intervention: Assess risk factors for falls  PT at moderate risk for falls.  Bed alarm is on, call light is within reach, pt has been instructed to call nurse if he needs to get out of bed.        Problem: Psychosocial Needs:  Goal: Level of anxiety will decrease  Intervention: Identify and develop with patient strategies to cope with anxiety triggers  Pt has been encouraged to communicate with RN if he is feeling anxious.  [facility] protocol is in place.

## 2017-05-09 NOTE — CARE PLAN
Problem: Safety  Goal: Free from accidental injury  Intervention: Initiate Safety Measures  Bed in low, locked position, near nursing station, call light within reach.      Problem: Nutrition Deficit  Goal: Adequate Food and Fluid Intake  Regular diet, adequate food intake. See Flowsheets.

## 2017-05-09 NOTE — PROGRESS NOTES
Hospital Medicine Progress Note, Adult, Complex               Author: Bob Franco Date & Time created: 5/9/2017  10:49 AM     Interval History:  Pt seen and evaluated in the ICU. Mr. Ceja has a hx of EtOH and methamphetamine abuse that presented to the ER on 5/7 with alcohol withdrawal symptoms. His symptoms were severe and he required ICU admission.   This morning Mr. Ceja is improved. He denies hallucinations this morning. We discussed EtOH cessation. He states that he lives in a motel.   Review of Systems:  Review of Systems   Constitutional: Negative for fever and chills.   Respiratory: Negative for cough.    Cardiovascular: Negative for chest pain and leg swelling.   Gastrointestinal:        Poor appetite.    Skin: Negative.    Neurological: Positive for dizziness.   Psychiatric/Behavioral: Negative for suicidal ideas and hallucinations. The patient is nervous/anxious. The patient does not have insomnia.         Slept well       Physical Exam:  Physical Exam   Constitutional: He is oriented to person, place, and time. No distress.   Neck: Neck supple.   Cardiovascular: Normal rate.    No murmur heard.  Pulmonary/Chest: Effort normal and breath sounds normal. No respiratory distress. He has no rales.   Abdominal: Soft. He exhibits no distension.   Musculoskeletal: He exhibits no edema or tenderness.   Missing toes and fingers bilat   Neurological: He is alert and oriented to person, place, and time.   No tremor   Skin: Skin is warm. There is pallor.       Labs:        Invalid input(s): [id number]      Recent Labs      05/07/17 0235 05/08/17   0230  05/09/17   0530   SODIUM  136  137  136   POTASSIUM  3.2*  3.5*  4.8   CHLORIDE  105  109  105   CO2  21  24  25   BUN  4*  3*  7*   CREATININE  0.47*  0.60  0.62   MAGNESIUM  1.3*  2.1  2.1   PHOSPHORUS  2.1*  2.7  4.0   CALCIUM  6.7*  7.5*  9.6     Recent Labs      05/07/17   0235 05/08/17   0230  05/09/17   0530   ALTSGPT  46  39  92*   ASTSGOT  74*   44  137*   ALKPHOSPHAT  75  71  81   TBILIRUBIN  0.7  0.6  0.5   GLUCOSE  76  97  101*     Recent Labs      17   0530   RBC  3.52*  4.20*   HEMOGLOBIN  11.3*  13.4*   HEMATOCRIT  33.6*  41.3*   PLATELETCT  97*  125*   PROTHROMBTM  13.0   --    APTT  26.7   --    INR  0.95   --      Recent Labs      17   02317   0530   WBC  5.6   --   6.5   NEUTSPOLYS  70.00   --   57.40   LYMPHOCYTES  16.70*   --   27.30   MONOCYTES  12.50   --   13.20   EOSINOPHILS  0.20   --   1.40   BASOPHILS  0.40   --   0.50   ASTSGOT  74*  44  137*   ALTSGPT  46  39  92*   ALKPHOSPHAT  75  71  81   TBILIRUBIN  0.7  0.6  0.5           Hemodynamics:  Temp (24hrs), Av.1 °C (98.7 °F), Min:36.8 °C (98.2 °F), Max:37.2 °C (99 °F)  Temperature: 37.1 °C (98.7 °F)  Pulse  Av.4  Min: 75  Max: 131Heart Rate (Monitored): 66  NIBP: 123/83 mmHg     Respiratory:    Respiration: 20, Pulse Oximetry: 98 %        RUL Breath Sounds: Clear, RML Breath Sounds: Diminished, RLL Breath Sounds: Diminished, EMELY Breath Sounds: Clear, LLL Breath Sounds: Diminished  Fluids:    Intake/Output Summary (Last 24 hours) at 17 1049  Last data filed at 17 0800   Gross per 24 hour   Intake   3292 ml   Output   3700 ml   Net   -408 ml        GI/Nutrition:  Orders Placed This Encounter   Procedures   • Diet Order     Standing Status: Standing      Number of Occurrences: 1      Standing Expiration Date:      Order Specific Question:  Diet:     Answer:  Regular [1]     Medical Decision Making, by Problem:  Active Hospital Problems    Diagnosis   • Alcohol withdrawal (CMS-HCC) [F10.239]CIWA protocol. [facility] has consulted. Decrease scheduled meds. Okay off tele. Stop K replacement. Stop clonidine.    • Methamphetamine addiction (CMS-[facility]) [F15.20]   • Hypocalcemia [E83.51]   • Hypokalemia [E87.6]   • Hypomagnesemia [E83.42]   • Depression [F32.9][facility] consulted.    Ford out of ICU.   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  Labs reviewed and Medications reviewed        DVT Prophylaxis: Enoxaparin (Lovenox)

## 2017-05-10 VITALS
BODY MASS INDEX: 21.05 KG/M2 | OXYGEN SATURATION: 98 % | DIASTOLIC BLOOD PRESSURE: 101 MMHG | TEMPERATURE: 98.6 F | WEIGHT: 138.89 LBS | SYSTOLIC BLOOD PRESSURE: 152 MMHG | HEART RATE: 93 BPM | RESPIRATION RATE: 19 BRPM | HEIGHT: 68 IN

## 2017-05-10 PROCEDURE — A9270 NON-COVERED ITEM OR SERVICE: HCPCS | Performed by: HOSPITALIST

## 2017-05-10 PROCEDURE — 700102 HCHG RX REV CODE 250 W/ 637 OVERRIDE(OP): Performed by: INTERNAL MEDICINE

## 2017-05-10 PROCEDURE — 99239 HOSP IP/OBS DSCHRG MGMT >30: CPT | Performed by: HOSPITALIST

## 2017-05-10 PROCEDURE — A9270 NON-COVERED ITEM OR SERVICE: HCPCS | Performed by: INTERNAL MEDICINE

## 2017-05-10 PROCEDURE — 700102 HCHG RX REV CODE 250 W/ 637 OVERRIDE(OP): Performed by: HOSPITALIST

## 2017-05-10 RX ORDER — FLUOXETINE HYDROCHLORIDE 20 MG/1
20 CAPSULE ORAL DAILY
Status: DISCONTINUED | OUTPATIENT
Start: 2017-05-10 | End: 2017-05-10 | Stop reason: HOSPADM

## 2017-05-10 RX ADMIN — LORAZEPAM 2 MG: 1 TABLET ORAL at 02:37

## 2017-05-10 RX ADMIN — Medication 100 MG: at 09:01

## 2017-05-10 RX ADMIN — FAMOTIDINE 20 MG: 20 TABLET, FILM COATED ORAL at 09:01

## 2017-05-10 RX ADMIN — SULFACETAMIDE SODIUM 1 DROP: 100 SOLUTION/ DROPS OPHTHALMIC at 00:08

## 2017-05-10 RX ADMIN — MINERAL OIL AND WHITE PETROLATUM 1 APPLICATION: 150; 830 OINTMENT OPHTHALMIC at 14:00

## 2017-05-10 RX ADMIN — SULFACETAMIDE SODIUM 1 DROP: 100 SOLUTION/ DROPS OPHTHALMIC at 14:00

## 2017-05-10 RX ADMIN — THERA TABS 1 TABLET: TAB at 09:01

## 2017-05-10 RX ADMIN — SULFACETAMIDE SODIUM 1 DROP: 100 SOLUTION/ DROPS OPHTHALMIC at 06:12

## 2017-05-10 RX ADMIN — FOLIC ACID 1 MG: 1 TABLET ORAL at 09:01

## 2017-05-10 RX ADMIN — LORAZEPAM 0.5 MG: 1 TABLET ORAL at 13:59

## 2017-05-10 RX ADMIN — SULFACETAMIDE SODIUM 1 DROP: 100 SOLUTION/ DROPS OPHTHALMIC at 09:02

## 2017-05-10 RX ADMIN — CHLORDIAZEPOXIDE HYDROCHLORIDE 5 MG: 5 CAPSULE ORAL at 06:11

## 2017-05-10 RX ADMIN — MINERAL OIL AND WHITE PETROLATUM 1 APPLICATION: 150; 830 OINTMENT OPHTHALMIC at 06:12

## 2017-05-10 RX ADMIN — CHLORDIAZEPOXIDE HYDROCHLORIDE 5 MG: 5 CAPSULE ORAL at 00:08

## 2017-05-10 ASSESSMENT — LIFESTYLE VARIABLES
AUDITORY DISTURBANCES: NOT PRESENT
NAUSEA AND VOMITING: NO NAUSEA AND NO VOMITING
TREMOR: *
TOTAL SCORE: 14
PAROXYSMAL SWEATS: NO SWEAT VISIBLE
ANXIETY: NO ANXIETY (AT EASE)
HEADACHE, FULLNESS IN HEAD: NOT PRESENT
NAUSEA AND VOMITING: NO NAUSEA AND NO VOMITING
NAUSEA AND VOMITING: *
PAROXYSMAL SWEATS: BARELY PERCEPTIBLE SWEATING. PALMS MOIST
ORIENTATION AND CLOUDING OF SENSORIUM: ORIENTED AND CAN DO SERIAL ADDITIONS
VISUAL DISTURBANCES: NOT PRESENT
AGITATION: NORMAL ACTIVITY
TOTAL SCORE: 3
ORIENTATION AND CLOUDING OF SENSORIUM: ORIENTED AND CAN DO SERIAL ADDITIONS
ANXIETY: NO ANXIETY (AT EASE)
TREMOR: *
PAROXYSMAL SWEATS: NO SWEAT VISIBLE
NAUSEA AND VOMITING: NO NAUSEA AND NO VOMITING
AGITATION: NORMAL ACTIVITY
TOTAL SCORE: 5
HEADACHE, FULLNESS IN HEAD: NOT PRESENT
TREMOR: *
AUDITORY DISTURBANCES: NOT PRESENT
ORIENTATION AND CLOUDING OF SENSORIUM: ORIENTED AND CAN DO SERIAL ADDITIONS
VISUAL DISTURBANCES: NOT PRESENT
VISUAL DISTURBANCES: MILD SENSITIVITY
ORIENTATION AND CLOUDING OF SENSORIUM: ORIENTED AND CAN DO SERIAL ADDITIONS
ANXIETY: *
ORIENTATION AND CLOUDING OF SENSORIUM: ORIENTED AND CAN DO SERIAL ADDITIONS
NAUSEA AND VOMITING: NO NAUSEA AND NO VOMITING
PAROXYSMAL SWEATS: NO SWEAT VISIBLE
VISUAL DISTURBANCES: NOT PRESENT
PAROXYSMAL SWEATS: NO SWEAT VISIBLE
AUDITORY DISTURBANCES: NOT PRESENT
TOTAL SCORE: 3
VISUAL DISTURBANCES: NOT PRESENT
HEADACHE, FULLNESS IN HEAD: NOT PRESENT
AGITATION: NORMAL ACTIVITY
AGITATION: *
TREMOR: TREMOR NOT VISIBLE BUT CAN BE FELT, FINGERTIP TO FINGERTIP
AUDITORY DISTURBANCES: NOT PRESENT
NAUSEA AND VOMITING: NO NAUSEA AND NO VOMITING
PAROXYSMAL SWEATS: NO SWEAT VISIBLE
TREMOR: *
HEADACHE, FULLNESS IN HEAD: VERY MILD
ANXIETY: MILDLY ANXIOUS
HEADACHE, FULLNESS IN HEAD: VERY MILD
TOTAL SCORE: 2
ANXIETY: *
AGITATION: NORMAL ACTIVITY
VISUAL DISTURBANCES: NOT PRESENT
AUDITORY DISTURBANCES: NOT PRESENT
AGITATION: NORMAL ACTIVITY
ANXIETY: MILDLY ANXIOUS
TOTAL SCORE: 2
TREMOR: TREMOR NOT VISIBLE BUT CAN BE FELT, FINGERTIP TO FINGERTIP
HEADACHE, FULLNESS IN HEAD: VERY MILD
AUDITORY DISTURBANCES: NOT PRESENT
ORIENTATION AND CLOUDING OF SENSORIUM: ORIENTED AND CAN DO SERIAL ADDITIONS

## 2017-05-10 ASSESSMENT — PAIN SCALES - GENERAL
PAINLEVEL_OUTOF10: 0

## 2017-05-10 ASSESSMENT — ENCOUNTER SYMPTOMS
HALLUCINATIONS: 0
ROS GI COMMENTS: POOR APPETITE.
INSOMNIA: 1
DIZZINESS: 1
NERVOUS/ANXIOUS: 1
COUGH: 0
FEVER: 0
CHILLS: 0

## 2017-05-10 NOTE — DISCHARGE PLANNING
Received request from treating physician, Dr. Franco to meet with pt regarding access to medications upon follow-up. Met with pt Pt states he has insurance and is able to get his medications. The pt stated he only wanted too be prescribed, Riddilin. Dr. Franco states that he does not want to prescribe the pt that particular medication due to possible pt abuse. The pt stated that he has previously been both in-patient and an out-patient at [facility]. After calling Tony, this SW was informed that the pt was an OP in 2014 and had an IP stay in 2015. He was VA'ed with instructions to follow-up with [facility]. Chart records indicate that the pt was seen for an initial screening but never followed up with treatment. Records also show at least 69 ER encounters since the initial screening and no follow-up with MH supports. Update Dr. Franco on pt.  Confirmed with pt that he dis not want any medication other than Riddilin. Provided pt with a bus pass and treatment resources.

## 2017-05-10 NOTE — CARE PLAN
Problem: Discharge Barriers/Planning  Goal: Patient’s Continuum of care needs are met  Intervention: Identify potential discharge barriers on admission and throughout hospital stay  Obtaining psychiatric medications on discharge: SW to follow-up.      Problem: Elimination  Goal: Establishment and Maintenance of regular bowel elimination  Intervention: Encourage hydration  Hydration encouraged. See I/Os.

## 2017-05-10 NOTE — CARE PLAN
Problem: Psychosocial Needs:  Goal: Level of anxiety will decrease  Intervention: Identify and develop with patient strategies to cope with anxiety triggers  Pt has been administered Ativan per CIWA scale results.  Pt wishes to speak with social work again regarding discharge plan (will pass along to day shift RN or floor RN if he is transferred).

## 2017-05-10 NOTE — PROGRESS NOTES
Discharge education and papers provided. Pt is to follow-up at [facility]. All belongings with patient.

## 2017-05-10 NOTE — DISCHARGE INSTRUCTIONS
Discharge Instructions    Discharged to home by taxi with self. Discharged via wheelchair, hospital escort: Refused.  Special equipment needed: Not Applicable    Be sure to schedule a follow-up appointment with your primary care doctor or any specialists as instructed.     Discharge Plan:   Influenza Vaccine Indication: Patient Refuses    I understand that a diet low in cholesterol, fat, and sodium is recommended for good health. Unless I have been given specific instructions below for another diet, I accept this instruction as my diet prescription.   Other diet: Regular    Special Instructions: None    · Is patient discharged on Warfarin / Coumadin?   No     · Is patient Post Blood Transfusion?  No    Depression / Suicide Risk    As you are discharged from this [facility] facility, it is important to learn how to keep safe from harming yourself.    Recognize the warning signs:  · Abrupt changes in personality, positive or negative- including increase in energy   · Giving away possessions  · Change in eating patterns- significant weight changes-  positive or negative  · Change in sleeping patterns- unable to sleep or sleeping all the time   · Unwillingness or inability to communicate  · Depression  · Unusual sadness, discouragement and loneliness  · Talk of wanting to die  · Neglect of personal appearance   · Rebelliousness- reckless behavior  · Withdrawal from people/activities they love  · Confusion- inability to concentrate     If you or a loved one observes any of these behaviors or has concerns about self-harm, here's what you can do:  · Talk about it- your feelings and reasons for harming yourself  · Remove any means that you might use to hurt yourself (examples: pills, rope, extension cords, firearm)  · Get professional help from the community (Mental Health, Substance Abuse, psychological counseling)  · Do not be alone:Call your Safe Contact- someone whom you trust who will be there for you.  · Call your  local CRISIS HOTLINE 932-4120 or 406-296-0143  · Call your local Children's Mobile Crisis Response Team [place] (724) 209-1027 or www.Bnooki  · Call the toll free National Suicide Prevention Hotlines   · National Suicide Prevention Lifeline 364-748-SVXQ (7472)  · National [employer] Line Network 800-SUICIDE (763-7555)

## 2017-05-10 NOTE — CARE PLAN
Problem: Bowel/Gastric:  Goal: Will not experience complications related to bowel motility  Intervention: Assess baseline bowel pattern  Pt reports that his stomach is feeling better.  No BM's since the beginning of shift.

## 2017-05-10 NOTE — PROGRESS NOTES
Hospital Medicine Progress Note, Adult, Complex               Author: Bob Franco Date & Time created: 5/10/2017  7:35 AM     Interval History:  Pt seen and evaluated in the ICU. Mr. Ceja has a hx of EtOH and methamphetamine abuse that presented to the ER on 5/7 with alcohol withdrawal symptoms. His symptoms were severe and he required ICU admission.   He denies hallucinations again this morning. We discussed EtOH cessation again today. He states that he lives in a motel and it is funded by [employer].   Mr. Ceja states that he does not drink when he has his Celexa (he cannot afford it thus is okay with prozac) and ritalin (which will be a challenge to get a prescription for as an outpatient).   Review of Systems:  Review of Systems   Constitutional: Negative for fever and chills.   Respiratory: Negative for cough.    Cardiovascular: Negative for chest pain.   Gastrointestinal:        Poor appetite.    Neurological: Positive for dizziness.        Generally weak   Psychiatric/Behavioral: Negative for suicidal ideas and hallucinations. The patient is nervous/anxious and has insomnia.         Slept poorly       Physical Exam:  Physical Exam   Constitutional: He is oriented to person, place, and time. No distress.   thin   Cardiovascular: Normal rate.    No murmur heard.  Pulmonary/Chest: Effort normal and breath sounds normal. No respiratory distress. He has no rales.   Abdominal: Soft. He exhibits no distension. There is no tenderness.   Musculoskeletal: He exhibits no edema.   Missing toes and fingers bilat   Neurological: He is alert and oriented to person, place, and time.   No tremor   Skin: Skin is warm. There is pallor.       Labs:        Invalid input(s): [id number]      Recent Labs      05/08/17   0230  05/09/17   0530   SODIUM  137  136   POTASSIUM  3.5*  4.8   CHLORIDE  109  105   CO2  24  25   BUN  3*  7*   CREATININE  0.60  0.62   MAGNESIUM  2.1  2.1   PHOSPHORUS  2.7  4.0   CALCIUM  7.5*   9.6     Recent Labs      17   0230  17   0530   ALTSGPT  39  92*   ASTSGOT  44  137*   ALKPHOSPHAT  71  81   TBILIRUBIN  0.6  0.5   GLUCOSE  97  101*     Recent Labs      17   0530   RBC  4.20*   HEMOGLOBIN  13.4*   HEMATOCRIT  41.3*   PLATELETCT  125*     Recent Labs      17   0230  17   0530   WBC   --   6.5   NEUTSPOLYS   --   57.40   LYMPHOCYTES   --   27.30   MONOCYTES   --   13.20   EOSINOPHILS   --   1.40   BASOPHILS   --   0.50   ASTSGOT  44  137*   ALTSGPT  39  92*   ALKPHOSPHAT  71  81   TBILIRUBIN  0.6  0.5           Hemodynamics:  Temp (24hrs), Av.9 °C (98.5 °F), Min:36.6 °C (97.9 °F), Max:37.1 °C (98.8 °F)  Temperature: 37.1 °C (98.7 °F)  Pulse  Av.4  Min: 75  Max: 131Heart Rate (Monitored): 74  NIBP: 128/84 mmHg     Respiratory:    Respiration: 14, Pulse Oximetry: 98 %        RUL Breath Sounds: Clear, RML Breath Sounds: Diminished, RLL Breath Sounds: Diminished, EMELY Breath Sounds: Clear, LLL Breath Sounds: Diminished  Fluids:    Intake/Output Summary (Last 24 hours) at 05/10/17 0735  Last data filed at 05/10/17 0600   Gross per 24 hour   Intake   1750 ml   Output   1475 ml   Net    275 ml        GI/Nutrition:  Orders Placed This Encounter   Procedures   • Diet Order     Standing Status: Standing      Number of Occurrences: 1      Standing Expiration Date:      Order Specific Question:  Diet:     Answer:  Regular [1]     Medical Decision Making, by Problem:  Active Hospital Problems    Diagnosis   • Alcohol withdrawal (CMS-[facility]) [F10.239]CIWA protocol. [employer] consulted. Decreased scheduled meds yesterday and stop librium today . Okay off tele.  to assist in outpatient resources for meds.     • Methamphetamine addiction (CMS-[facility]) [F15.20]   • Hypocalcemia [E83.51]   • Hypokalemia [E87.6]   • Hypomagnesemia [E83.42]   • Depression [F32.9][employer] consulted.    Okay out of ICU.   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  Labs reviewed and  Medications reviewed        DVT Prophylaxis: Enoxaparin (Lovenox)

## 2017-05-11 ENCOUNTER — HOSPITAL ENCOUNTER (EMERGENCY)
Dept: HOSPITAL 8 - ED | Age: 32
Discharge: HOME | End: 2017-05-11
Payer: MEDICARE

## 2017-05-11 VITALS — WEIGHT: 142.64 LBS | HEIGHT: 68 IN | BODY MASS INDEX: 21.62 KG/M2

## 2017-05-11 VITALS — DIASTOLIC BLOOD PRESSURE: 93 MMHG | SYSTOLIC BLOOD PRESSURE: 153 MMHG

## 2017-05-11 DIAGNOSIS — R07.89: Primary | ICD-10-CM

## 2017-05-11 DIAGNOSIS — F32.9: ICD-10-CM

## 2017-05-11 DIAGNOSIS — R00.0: ICD-10-CM

## 2017-05-11 DIAGNOSIS — F41.1: ICD-10-CM

## 2017-05-11 LAB
BUN SERPL-MCNC: 9 MG/DL (ref 7–18)
DAU SCREEN: (no result)

## 2017-05-11 PROCEDURE — 85025 COMPLETE CBC W/AUTO DIFF WBC: CPT

## 2017-05-11 PROCEDURE — 82040 ASSAY OF SERUM ALBUMIN: CPT

## 2017-05-11 PROCEDURE — 93005 ELECTROCARDIOGRAM TRACING: CPT

## 2017-05-11 PROCEDURE — 96361 HYDRATE IV INFUSION ADD-ON: CPT

## 2017-05-11 PROCEDURE — 36415 COLL VENOUS BLD VENIPUNCTURE: CPT

## 2017-05-11 PROCEDURE — 80307 DRUG TEST PRSMV CHEM ANLYZR: CPT

## 2017-05-11 PROCEDURE — 80048 BASIC METABOLIC PNL TOTAL CA: CPT

## 2017-05-11 PROCEDURE — 96375 TX/PRO/DX INJ NEW DRUG ADDON: CPT

## 2017-05-11 PROCEDURE — 96374 THER/PROPH/DIAG INJ IV PUSH: CPT

## 2017-05-11 PROCEDURE — 71010: CPT

## 2017-05-11 PROCEDURE — 99285 EMERGENCY DEPT VISIT HI MDM: CPT

## 2017-05-11 NOTE — DISCHARGE SUMMARY
DATE OF ADMISSION:  05/07/2017    DATE OF DISCHARGE:  05/10/2017    DISCHARGE DIAGNOSES:  1.  Alcohol withdrawal.  2.  Methamphetamine use.  3.  Hypokalemia.  4.  Hypomagnesemia.  5.  Hypocalcemia.  6.  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  7.  Possible attention deficit hyperactivity disorder.    LABORATORY DATA:  Ammonia level was normal.  Magnesium was low 1.3, calcium   6.7, platelet count of 97,000, on admission came up 125,000.  AST is little   elevated at 137, ALT of 92.    HOSPITAL COURSE:  On 05/07/2017, this 32-year-old male with no primary care   provider, though is followed at [facility] presented   with alcohol withdrawal, hallucinations who was admitted to the intensive care   unit, placed on CIWA protocol, initiated on clonidine, Librium, Neurontin,   Depakote, given the appropriate multivitamins and mineral replacements.  He   has now undergone withdrawal today.  He is awake, alert and oriented x4, able   to walk unassisted, able to tolerate diet, and is stable for discharge home.    I had a long discussion with the patient.  He states that if he can get a   prescription for his Celexa and Ritalin, he states he would not drink alcohol.    I informed him I do not prescribe Ritalin as an outpatient.  I will be happy   to prescribe his Celexa though he states he refuses the prescription for   Celexa _____ therefore our  Sebas has met with him and is trying   to get him an outpatient appointment at [place] where he was prescribed   the Ritalin apparently as an inpatient when he was there for 40 days.  If   indeed he is not able to get from there, we have recommended [facility] or perhaps one of the clinics in [facility] such as [facility], [facility]inic, or [facility] [place] clinic.       ____________________________________     MD ZACHARIAH HUGGINS / RONEY    DD:  05/10/2017 13:25:45  DT:  05/11/2017 04:14:00    D#:  [id number]  Job#:  [id number]

## 2017-05-12 ENCOUNTER — HOSPITAL ENCOUNTER (EMERGENCY)
Facility: MEDICAL CENTER | Age: 32
End: 2017-05-12
Attending: EMERGENCY MEDICINE
Payer: MEDICARE

## 2017-05-12 ENCOUNTER — OFFICE VISIT (OUTPATIENT)
Dept: MEDICAL GROUP | Facility: MEDICAL CENTER | Age: 32
End: 2017-05-12
Payer: MEDICARE

## 2017-05-12 VITALS
SYSTOLIC BLOOD PRESSURE: 122 MMHG | OXYGEN SATURATION: 97 % | WEIGHT: 140 LBS | TEMPERATURE: 99.8 F | HEART RATE: 121 BPM | DIASTOLIC BLOOD PRESSURE: 76 MMHG | BODY MASS INDEX: 21.22 KG/M2 | HEIGHT: 68 IN

## 2017-05-12 VITALS
RESPIRATION RATE: 18 BRPM | HEIGHT: 68 IN | WEIGHT: 140 LBS | BODY MASS INDEX: 21.22 KG/M2 | HEART RATE: 105 BPM | OXYGEN SATURATION: 95 % | TEMPERATURE: 98.4 F

## 2017-05-12 VITALS
WEIGHT: 140 LBS | OXYGEN SATURATION: 98 % | DIASTOLIC BLOOD PRESSURE: 74 MMHG | SYSTOLIC BLOOD PRESSURE: 130 MMHG | BODY MASS INDEX: 21.29 KG/M2 | TEMPERATURE: 98.6 F | RESPIRATION RATE: 16 BRPM | HEART RATE: 115 BPM

## 2017-05-12 DIAGNOSIS — Z86.59 HISTORY OF SUICIDAL IDEATION: ICD-10-CM

## 2017-05-12 DIAGNOSIS — E83.42 HYPOMAGNESEMIA: ICD-10-CM

## 2017-05-12 DIAGNOSIS — Z09 HOSPITAL DISCHARGE FOLLOW-UP: ICD-10-CM

## 2017-05-12 DIAGNOSIS — E83.51 HYPOCALCEMIA: ICD-10-CM

## 2017-05-12 DIAGNOSIS — F15.20 METHAMPHETAMINE ADDICTION (HCC): ICD-10-CM

## 2017-05-12 DIAGNOSIS — F10.20 ALCOHOLISM (HCC): ICD-10-CM

## 2017-05-12 DIAGNOSIS — D53.9 MACROCYTIC ANEMIA: ICD-10-CM

## 2017-05-12 DIAGNOSIS — F32.2 MAJOR DEPRESSIVE DISORDER, SEVERE (HCC): ICD-10-CM

## 2017-05-12 DIAGNOSIS — F19.10 POLYSUBSTANCE ABUSE (HCC): ICD-10-CM

## 2017-05-12 DIAGNOSIS — E87.6 HYPOKALEMIA: ICD-10-CM

## 2017-05-12 DIAGNOSIS — F90.2 ADHD (ATTENTION DEFICIT HYPERACTIVITY DISORDER), COMBINED TYPE: ICD-10-CM

## 2017-05-12 DIAGNOSIS — F41.1 GAD (GENERALIZED ANXIETY DISORDER): ICD-10-CM

## 2017-05-12 DIAGNOSIS — F10.939 ALCOHOL WITHDRAWAL, WITH UNSPECIFIED COMPLICATION (HCC): ICD-10-CM

## 2017-05-12 PROCEDURE — 700102 HCHG RX REV CODE 250 W/ 637 OVERRIDE(OP): Performed by: EMERGENCY MEDICINE

## 2017-05-12 PROCEDURE — 99284 EMERGENCY DEPT VISIT MOD MDM: CPT

## 2017-05-12 PROCEDURE — G8420 CALC BMI NORM PARAMETERS: HCPCS | Performed by: INTERNAL MEDICINE

## 2017-05-12 PROCEDURE — 99205 OFFICE O/P NEW HI 60 MIN: CPT | Performed by: INTERNAL MEDICINE

## 2017-05-12 PROCEDURE — 1036F TOBACCO NON-USER: CPT | Performed by: INTERNAL MEDICINE

## 2017-05-12 PROCEDURE — 302449 STATCHG TRIAGE ONLY (STATISTIC)

## 2017-05-12 PROCEDURE — A9270 NON-COVERED ITEM OR SERVICE: HCPCS | Performed by: EMERGENCY MEDICINE

## 2017-05-12 RX ORDER — LORAZEPAM 0.5 MG/1
0.5 TABLET ORAL 2 TIMES DAILY PRN
Qty: 60 TAB | Refills: 0 | Status: SHIPPED | OUTPATIENT
Start: 2017-05-12 | End: 2017-05-18

## 2017-05-12 RX ORDER — CITALOPRAM 20 MG/1
20 TABLET ORAL DAILY
Qty: 90 TAB | Refills: 1 | Status: SHIPPED | OUTPATIENT
Start: 2017-05-12 | End: 2018-10-22

## 2017-05-12 RX ORDER — LORAZEPAM 1 MG/1
1 TABLET ORAL ONCE
Status: COMPLETED | OUTPATIENT
Start: 2017-05-12 | End: 2017-05-12

## 2017-05-12 RX ADMIN — LORAZEPAM 1 MG: 1 TABLET ORAL at 18:32

## 2017-05-12 ASSESSMENT — PAIN SCALES - GENERAL: PAINLEVEL_OUTOF10: 0

## 2017-05-12 ASSESSMENT — PATIENT HEALTH QUESTIONNAIRE - PHQ9
4. FEELING TIRED OR HAVING LITTLE ENERGY: 3
SUM OF ALL RESPONSES TO PHQ9 QUESTIONS 1 AND 2: 6
2. FEELING DOWN, DEPRESSED, IRRITABLE, OR HOPELESS: 3
3. TROUBLE FALLING OR STAYING ASLEEP OR SLEEPING TOO MUCH: 3
1. LITTLE INTEREST OR PLEASURE IN DOING THINGS: 3
7. TROUBLE CONCENTRATING ON THINGS, SUCH AS READING THE NEWSPAPER OR WATCHING TELEVISION: 3
SUM OF ALL RESPONSES TO PHQ QUESTIONS 1-9: 20
5. POOR APPETITE OR OVEREATING: 2
9. THOUGHTS THAT YOU WOULD BE BETTER OFF DEAD, OR OF HURTING YOURSELF: 0
8. MOVING OR SPEAKING SO SLOWLY THAT OTHER PEOPLE COULD HAVE NOTICED. OR THE OPPOSITE, BEING SO FIGETY OR RESTLESS THAT YOU HAVE BEEN MOVING AROUND A LOT MORE THAN USUAL: 0
6. FEELING BAD ABOUT YOURSELF - OR THAT YOU ARE A FAILURE OR HAVE LET YOURSELF OR YOUR FAMILY DOWN: 3

## 2017-05-12 NOTE — PROGRESS NOTES
CHIEF COMPLAINT  Chief Complaint   Patient presents with   • Establish Care     follow up from ER     HPI  Patient is a 32 y.o. male patient who presents today for the following     Hospital discharge follow-up, alcohol withdrawal, methamphetamine addiction, hypocalcemia  Hypomagnesemia, polysubstance abuse, ADHD, combined type, JENNIFER, major depression, history of suicidal ideation  This is a patient who has very complex past psychiatric medical history, followed at [facility],  who had recent alcohol withdrawal for which he was hospitalized from 5/7/17 to 5/10/17, discharge summary was reviewed, with the following:  DISCHARGE DIAGNOSES:  1.  Alcohol withdrawal.  2.  Methamphetamine use.  3.  Hypokalemia.  4.  Hypomagnesemia.  5.  Hypocalcemia.  6.  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  7.  Possible attention deficit hyperactivity disorder.    Hospitalization course  The patient presented with alcohol withdrawal, hallucinations who was admitted to the intensive care   unit, placed on CIWA protocol, initiated on clonidine, Librium, Neurontin,    Depakote, given the appropriate multivitamins and mineral replacements.  He    has now undergone withdrawal.  He is awake, alert and oriented x4, able    to walk unassisted, able to tolerate diet, and is stable for discharge home.     No medication were prescribed: He was on Celexa and retelling.    Sebas is trying  to get him an outpatient appointment at [place] where he was prescribed the Ritalin apparently as an inpatient when he was there for 40 days.   If indeed he is not able to get from there, we have recommended [facility] or perhaps one of the clinics in [facility] such as [facility], [facility]    Clinic, or hospitals clinic.    Reviewed the labs from hospitalization:  · CMP: Slightly higher liver enzymes  · CBC: Slight microcyte anemia    JENNIFER/depression  He history of anxiety and depression.   He has been on Celexa in the past, not  recently.    JENNIFER-7 score:  Points   1. Feeling nervous, anxious, or on edge  3   2. Not being able to stop or control worrying 3   3. Worrying too much about different things 3   4. Trouble relaxing 3   5. Being so restless that it is hard to sit still 3   6. Becoming easily annoyed or irritable 3   7. Feeling afraid as if something awful might happen 3   Total score 21     Depression Screen (PHQ-2/PHQ-9) 5/12/2017   PHQ-2 Total Score 6   PHQ-9 Total Score 20       ADD  Had been on Ritalin.  Organization, all positive:  - poor organizational skills (home, office, desk, or car is extremely messy and cluttered)   - trouble starting and finishing projects  - chronic lateness  - frequently forgetting appointments, commitments, and deadlines  - constantly losing or misplacing things (keys, wallet, phone, documents, bills)  - underestimating the time it will take you to complete tasks    Impulse control, all positive:  - frequently interrupt others or talk over them  - have poor self-control  - blurt out thoughts that are rude or inappropriate without thinking  - have addictive tendencies  - act recklessly or spontaneously without regard for consequences, calmer  - trouble behaving in socially appropriate ways (such as sitting still during a long meeting)    Emotional difficulties, negative for:  - sense of underachievement  - doesn’t deal well with frustration  - easily flustered and stressed out  - irritability or mood swings        (-)  - trouble staying motivated  - hypersensitivity to criticism      (-)  - short, often explosive, temper   (-)  - low self-esteem and sense of insecurity    Reviewed PMH, PSH, FH, SH, ALL, HCM/IMM  Reviewed MEDS    Patient Active Problem List    Diagnosis Date Noted   • Alcohol withdrawal (CMS-[facility]) 10/05/2014     Priority: High   • Methamphetamine addiction (CMS-[facility]) 05/07/2017     Priority: Medium   • Hypocalcemia 05/07/2017   • Hypokalemia 05/07/2017   • Hypomagnesemia 05/07/2017   •  Tachycardia 12/15/2015   • Alcohol dependence with withdrawal (CMS-H[facility]) 12/26/2014   • Suicide ideation 12/08/2014   • Depression 11/12/2014   • Suicidal ideation 10/11/2014   • Conjunctivitis 10/11/2014   • Alcohol intoxication 01/12/2014   • Alcohol abuse 04/17/2013   • Ectodermal dysplasia, 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 90 Tab 1   • lorazepam (ATIVAN) 0.5 MG Tab Take 1 Tab by mouth 2 times a day as needed for Anxiety. 60 Tab 0     No current facility-administered medications for this visit.     ALLERGIES  Allergies: Review of patient's allergies indicates no known allergies.  PAST MEDICAL HISTORY  Past Medical History   Diagnosis Date   • Ectrodactyly-ectodermal dysplasia-clefting syndrome    • Acute anxiety    • ETOH abuse    • Psychiatric disorder      anxiety/panic disorder   • Bipolar affective (CMS-[facility])    • ADHD (attention deficit hyperactivity disorder)    • ADHD (attention deficit hyperactivity disorder)    • Depression      SURGICAL HISTORY  He  has past surgical history that includes other orthopedic surgery.  SOCIAL HISTORY  Social History   Substance Use Topics   • Smoking status: Former Smoker -- 0.25 packs/day     Types: Cigarettes   • Smokeless tobacco: Never Used   • Alcohol Use: No      Comment: 8 earthquakes 10% 24 oz malt liquor/day     Social History     Social History Narrative    ** Merged History Encounter **          FAMILY HISTORY  Family History   Problem Relation Age of Onset   • Heart Disease Neg Hx    • Hypertension Neg Hx    • Hyperlipidemia Neg Hx      No family status information on file.     ROS   Constitutional: Negative  "for fever, chills.  HENT: Negative for congestion, sore throat.  Eyes: Negative for blurred vision.   Respiratory: Negative for cough, shortness of breath.  Cardiovascular: Negative for chest pain, palpitations.   Gastrointestinal: Negative for heartburn, nausea, abdominal pain.   Genitourinary: Negative for dysuria.  Musculoskeletal: Negative for significant myalgias, back pain and joint pain.   Skin: Negative for rash and itching.   Neuro: Negative for dizziness, weakness and headaches.   Endo/Heme/Allergies: Does not bruise/bleed easily.   Psychiatric/Behavioral: as above.     PHYSICAL EXAM   Blood pressure 122/76, pulse 121, temperature 37.7 °C (99.8 °F), height 1.727 m (5' 7.99\"), weight 63.504 kg (140 lb), SpO2 97 %. Body mass index is 21.29 kg/(m^2).  General:  NAD, well appearing  HEENT:   NC/AT, PERRLA, EOMI, TMs are clear. Oropharyngeal mucosa is pink,  without lesions;  no cervical / supraclavicular  lymphadenopathy, no thyromegaly.    Cardiovascular: RRR.   No m/r/g. No carotid bruits .      Lungs:   CTAB, no w/r/r, no respiratory distress.  Abdomen: Soft, NT/ND + BS; no suprapubic tenderness; no masses or hepatosplenomegaly.  Extremities:  2+ DP and radial pulses bilaterally.  No c/c/e.   Skin:  Warm, dry.  No erythema. No rash.   Neurologic: Alert & oriented x 3. No focal deficits.  Psychiatric:  Affect normal, mood normal, judgment normal.    LABS     Labs are reviewed and discussed with a patient    No results found for: CHOLSTRLTOT, LDL, HDL, TRIGLYCERIDE    Lab Results   Component Value Date/Time    SODIUM 136 05/09/2017 05:30 AM    POTASSIUM 4.8 05/09/2017 05:30 AM    CHLORIDE 105 05/09/2017 05:30 AM    CO2 25 05/09/2017 05:30 AM    GLUCOSE 101* 05/09/2017 05:30 AM    BUN 7* 05/09/2017 05:30 AM    CREATININE 0.62 05/09/2017 05:30 AM     Lab Results   Component Value Date/Time    ALKALINE PHOSPHATASE 81 05/09/2017 05:30 AM    AST(SGOT) 137* 05/09/2017 05:30 AM    ALT(SGPT) 92* 05/09/2017 05:30 AM "    TOTAL BILIRUBIN 0.5 05/09/2017 05:30 AM      No results found for: HBA1C  No results found for: TSH  No results found for: FREET4    Lab Results   Component Value Date/Time    WBC 6.5 05/09/2017 05:30 AM    RBC 4.20* 05/09/2017 05:30 AM    HEMOGLOBIN 13.4* 05/09/2017 05:30 AM    HEMATOCRIT 41.3* 05/09/2017 05:30 AM    MCV 98.3* 05/09/2017 05:30 AM    MCH 31.9 05/09/2017 05:30 AM    MCHC 32.4* 05/09/2017 05:30 AM    MPV 12.3 05/09/2017 05:30 AM    NEUTROPHILS-POLYS 57.40 05/09/2017 05:30 AM    LYMPHOCYTES 27.30 05/09/2017 05:30 AM    MONOCYTES 13.20 05/09/2017 05:30 AM    EOSINOPHILS 1.40 05/09/2017 05:30 AM    BASOPHILS 0.50 05/09/2017 05:30 AM    HYPOCHROMIA 1+ 01/15/2014 09:32 PM      IMAGING     None    ASSESMENT AND PLAN        1. Hospital discharge follow-up    There were multiple attempts from [facility] and [facility]  to contact patient with psychiatry;   - So far, it did not work out.     · The patient is given small dose of Celexa and lorazepam for alcohol withdrawal.  Will anterior.  He sees psychiatrist.   · He is strongly advised to go to the ER if worsening anxiety, mood change, suicidal ideation.    2. Alcohol withdrawal, with unspecified complication (CMS-[facility])  - lorazepam (ATIVAN) 0.5 MG Tab; Take 1 Tab by mouth 2 times a day as needed for Anxiety.  Dispense: 60 Tab; Refill: 0    3. Methamphetamine addiction (CMS-[facility])  Need to psychiatry for evaluation and follow-up    4. Hypocalcemia  Resolved    5. Hypokalemia  Mild, advised potassium rich foods    6. Hypomagnesemia  Resolved    7. Polysubstance abuse  Need psychiatry evaluation and follow-up, counseling  - REFERRAL TO PSYCHIATRY  - REFERRAL TO BEHAVIORAL HEALTH    8. ADHD (attention deficit hyperactivity disorder), combined type  Need psychiatry evaluation and follow-up would be, counseling  - REFERRAL TO PSYCHIATRY  - REFERRAL TO BEHAVIORAL HEALTH    9. JENNIFER (generalized anxiety disorder)  As above  - REFERRAL TO  PSYCHIATRY  - REFERRAL TO BEHAVIORAL HEALTH  - citalopram (CELEXA) 20 MG Tab; Take 1 Tab by mouth every day.  Dispense: 90 Tab; Refill: 1    10. History of suicidal ideation  Advised to go to the ER if recurrent suicidal ideation  - REFERRAL TO PSYCHIATRY  - REFERRAL TO BEHAVIORAL HEALTH    11. Major depressive disorder, severe (CMS-HCC)  He is given small dose of Celexa  Need anxiety, evaluation and follow-up follow-up, counseling  - REFERRAL TO PSYCHIATRY  - REFERRAL TO BEHAVIORAL HEALTH  - citalopram (CELEXA) 20 MG Tab; Take 1 Tab by mouth every day.  Dispense: 90 Tab; Refill: 1    12. Macrocytic anemia  Advise multivitamins.    Counseling:   - Smoking:  Nonsmoker    Followup: Return if symptoms worsen or fail to improve, for Short.    All questions are answered.    Time spent 60 minutes face to face, with > 50% spent counseling and coordinating care.      Please note that this dictation was created using voice recognition software, and that there might be errors of radha and possibly content.

## 2017-05-12 NOTE — ED AVS SNAPSHOT
Home Care Instructions                                                                                                                Gopal Ceja   MRN: [id number]    Department:  [facility], Emergency Dept   Date of Visit:  5/12/2017            [facility] Emergency Dept    [address] [facility] [address]    Phone:  [phone]      You were seen by     Lacie Tristan M.D.      Your Diagnosis Was     Alcoholism ([facility])     F10.20       These are the medications you received during your hospitalization from 05/12/2017 1815 to 05/12/2017 1851     Date/Time Order Dose Route Action    05/12/2017 1832 lorazepam (ATIVAN) tablet 1 mg 1 mg Oral Given      Follow-up Information     1. Follow up with Ceci Caicedo M.D..    Specialty:  Family Medicine    Why:  As needed    Contact information    [address]  [address]  [facility] [address] [phone]          2. Follow up with [facility], Emergency Dept.    Specialty:  Emergency Medicine    Why:  If symptoms worsen    Contact information    [address] [place] [id number] [phone]      Medication Information     Review all of your home medications and newly ordered medications with your primary doctor and/or pharmacist as soon as possible. Follow medication instructions as directed by your doctor and/or pharmacist.     Please keep your complete medication list with you and share with your physician. Update the information when medications are discontinued, doses are changed, or new medications (including over-the-counter products) are added; and carry medication information at all times in the event of emergency situations.               Medication List      ASK your doctor about these medications        Instructions    Morning Afternoon Evening Bedtime    citalopram 20 MG Tabs   Commonly known as:  CELEXA        Take 1 Tab by mouth every day.   Dose:  20 mg                        lorazepam 0.5 MG Tabs   Last time this was given:  1 mg on 5/12/2017  6:32 PM   Commonly known as:  ATIVAN        Take 1 Tab by mouth 2 times a day as needed for Anxiety.   Dose:  0.5 mg                                  Discharge Instructions       Alcohol Problems  Most adults who drink alcohol drink in moderation (not a lot) are at low risk for developing problems related to their drinking. However, all drinkers, including low-risk drinkers, should know about the health risks connected with drinking alcohol.  RECOMMENDATIONS FOR LOW-RISK DRINKING   Drink in moderation. Moderate drinking is defined as follows:   · Men - no more than 2 drinks per day.  · Nonpregnant women - no more than 1 drink per day.  · Over age 65 - no more than 1 drink per day.  A standard drink is 12 grams of pure alcohol, which is equal to a 12 ounce bottle of beer or wine cooler, a 5 ounce glass of wine, or 1.5 ounces of distilled spirits (such as whiskey, compa, vodka, or rum).   ABSTAIN FROM (DO NOT DRINK) ALCOHOL:  · When pregnant or considering pregnancy.  · When taking a medication that interacts with alcohol.  · If you are alcohol dependent.  · A medical condition that prohibits drinking alcohol (such as ulcer, liver disease, or heart disease).  DISCUSS WITH YOUR CAREGIVER:  · If you are at risk for coronary heart disease, discuss the potential benefits and risks of alcohol use: Light to moderate drinking is associated with lower rates of coronary heart disease in certain populations (for example, men over age 45 and postmenopausal women). Infrequent or nondrinkers are advised not to begin light to moderate drinking to reduce the risk of coronary heart disease so as to avoid creating an alcohol-related problem. Similar protective effects can likely be gained through proper diet and exercise.  · Women and the elderly have smaller amounts of body water than men. As a result women and the elderly achieve a higher blood alcohol  concentration after drinking the same amount of alcohol.  · Exposing a fetus to alcohol can cause a broad range of birth defects referred to as Fetal Alcohol Syndrome (FAS) or Alcohol-Related Birth Defects (ARBD). Although FAS/ARBD is connected with excessive alcohol consumption during pregnancy, studies also have reported neurobehavioral problems in infants born to mothers reporting drinking an average of 1 drink per day during pregnancy.  · Heavier drinking (the consumption of more than 4 drinks per occasion by men and more than 3 drinks per occasion by women) impairs learning (cognitive) and psychomotor functions and increases the risk of alcohol-related problems, including accidents and injuries.  CAGE QUESTIONS:   · Have you ever felt that you should Cut down on your drinking?  · Have people Annoyed you by criticizing your drinking?  · Have you ever felt bad or Guilty about your drinking?  · Have you ever had a drink first thing in the morning to steady your nerves or get rid of a hangover (Eye opener)?  If you answered positively to any of these questions: You may be at risk for alcohol-related problems if alcohol consumption is:   · Men: Greater than 14 drinks per week or more than 4 drinks per occasion.  · Women: Greater than 7 drinks per week or more than 3 drinks per occasion.  Do you or your family have a medical history of alcohol-related problems, such as:  · Blackouts.  · Sexual dysfunction.  · Depression.  · Trauma.  · Liver dysfunction.  · Sleep disorders.  · Hypertension.  · Chronic abdominal pain.  · Has your drinking ever caused you problems, such as problems with your family, problems with your work (or school) performance, or accidents/injuries?  · Do you have a compulsion to drink or a preoccupation with drinking?  · Do you have poor control or are you unable to stop drinking once you have started?  · Do you have to drink to avoid withdrawal symptoms?  · Do you have problems with withdrawal  such as tremors, nausea, sweats, or mood disturbances?  · Does it take more alcohol than in the past to get you high?  · Do you feel a strong urge to drink?  · Do you change your plans so that you can have a drink?  · Do you ever drink in the morning to relieve the shakes or a hangover?  If you have answered a number of the previous questions positively, it may be time for you to talk to your caregivers, family, and friends and see if they think you have a problem. Alcoholism is a chemical dependency that keeps getting worse and will eventually destroy your health and relationships. Many alcoholics end up dead, impoverished, or in [facility]CI. This is often the end result of all chemical dependency.  · Do not be discouraged if you are not ready to take action immediately.  · Decisions to change behavior often involve up and down desires to change and feeling like you cannot decide.  · Try to think more seriously about your drinking behavior.  · Think of the reasons to quit.  WHERE TO GO FOR ADDITIONAL INFORMATION   · The National [place] on Alcohol Abuse and Alcoholism (NIAAA)  www.niaaa.nih.gov  · National [place] on Alcoholism and Drug Dependence (NCADD)  www.ncadd.org  · American Society of Addiction Medicine (ASAM)  www.asam.org   Document Released: 12/18/2006 Document Revised: 03/11/2013 Document Reviewed: 08/05/2009  ExitCare® Patient Information ©2014 [employer].            Patient Information     Patient Information    Following emergency treatment: all patient requiring follow-up care must return either to a private physician or a clinic if your condition worsens before you are able to obtain further medical attention, please return to the emergency room.     Billing Information    At [facility], we work to make the billing process streamlined for our patients.  Our Representatives are here to answer any questions you may have regarding your hospital bill.  If you have insurance coverage and have  supplied your insurance information to us, we will submit a claim to your insurer on your behalf.  Should you have any questions regarding your bill, we can be reached online or by phone as follows:  Online: You are able pay your bills online or live chat with our representatives about any billing questions you may have. We are here to help Monday - Friday from 8:00am to 7:30pm and 9:00am - 12:00pm on Saturdays.  Please visit [email or web address]  for more information.   Phone:  [phone] or [phone]    Please note that your emergency physician, surgeon, pathologist, radiologist, anesthesiologist, and other specialists are not employed by [facility] and will therefore bill separately for their services.  Please contact them directly for any questions concerning their bills at the numbers below:     Emergency Physician Services:  [phone]  [facility]:  [phone]  [facility]:  [phone]  [facility]:  [phone]    1. Your final bill may vary from the amount quoted upon discharge if all procedures are not complete at that time, or if your doctor has additional procedures of which we are not aware. You will receive an additional bill if you return to the Emergency Department at [facility] for suture removal regardless of the facility of which the sutures were placed.     2. Please arrange for settlement of this account at the emergency registration.    3. All self-pay accounts are due in full at the time of treatment.  If you are unable to meet this obligation then payment is expected within 4-5 days.     4. If you have had radiology studies (CT, X-ray, Ultrasound, MRI), you have received a preliminary result during your emergency department visit. Please contact the radiology department [phone] to receive a copy of your final result. Please discuss the Final result with your primary physician or with the  follow up physician provided.     Crisis Hotline:  [place] Crisis Hotline:  8-283-RZSOFVM or 1-556.650.6058  [place] Crisis Hotline:    1-709.535.9930 or 635-309-2919         ED Discharge Follow Up Questions    1. In order to provide you with very good care, we would like to follow up with a phone call in the next few days.  May we have your permission to contact you?     YES /  NO    2. What is the best phone number to call you? (       )_____-__________    3. What is the best time to call you?      Morning  /  Afternoon  /  Evening                   Patient Signature:  ____________________________________________________________    Date:  ____________________________________________________________

## 2017-05-12 NOTE — ED AVS SNAPSHOT
xPeerient Access Code: [email or web address]  Expires: 6/10/2017 12:13 PM    Your email address is not on file at [facility].  Email Addresses are required for you to sign up for [employer]Peerient, please contact [phone] to verify your personal information and to provide your email address prior to attempting to register for xPeerient.    Gopal Ceja  [address]  [place] [address]    xPeerient  A secure, online tool to manage your health information     [employer]’s xPeerient® is a secure, online tool that connects you to your personalized health information from the privacy of your home -- day or night - making it very easy for you to manage your healthcare. Once the activation process is completed, you can even access your medical information using the xPeerient erwin, which is available for free in the Apple Erwin store or Google Play store.     To learn more about xPeerient, visit [email or web address]    There are two levels of access available (as shown below):   My Chart Features  [facility] Primary Care Doctor [facility]  Specialists [facility]  Urgent  Care Non-[facility] Primary Care Doctor   Email your healthcare team securely and privately 24/7 X X X    Manage appointments: schedule your next appointment; view details of past/upcoming appointments X      Request prescription refills. X      View recent personal medical records, including lab and immunizations X X X X   View health record, including health history, allergies, medications X X X X   Read reports about your outpatient visits, procedures, consult and ER notes X X X X   See your discharge summary, which is a recap of your hospital and/or ER visit that includes your diagnosis, lab results, and care plan X X  X     How to register for [employer]:  Once your e-mail address has been verified, follow the following steps to sign up for [employer].     1. Go to  [email or web address]  2. Click on the Sign Up Now box, which takes you to the New Member Sign Up page. You will need  to provide the following information:  a. Enter your [employer] Access Code exactly as it appears at the top of this page. (You will not need to use this code after you’ve completed the sign-up process. If you do not sign up before the expiration date, you must request a new code.)   b. Enter your date of birth.   c. Enter your home email address.   d. Click Submit, and follow the next screen’s instructions.  3. Create a [employer] ID. This will be your [employer] login ID and cannot be changed, so think of one that is secure and easy to remember.  4. Create a [employer] password. You can change your password at any time.  5. Enter your Password Reset Question and Answer. This can be used at a later time if you forget your password.   6. Enter your e-mail address. This allows you to receive e-mail notifications when new information is available in [employer].  7. Click Sign Up. You can now view your health information.    For assistance activating your [employer] account, call [phone]

## 2017-05-12 NOTE — ED AVS SNAPSHOT
5/12/2017    Gopal Ceja  [address]  [place] [address]    Dear Gopal:    [facility] wants to ensure your discharge home is safe and you or your loved ones have had all of your questions answered regarding your care after you leave the hospital.    Below is a list of resources and contact information should you have any questions regarding your hospital stay, follow-up instructions, or active medical symptoms.    Questions or Concerns Regarding… Contact   Medical Questions Related to Your Discharge  (7 days a week, 8am-5pm) Contact a Nurse Care Coordinator   [phone]   Medical Questions Not Related to Your Discharge  (24 hours a day / 7 days a week)  Contact the Nurse Health Line   [phone]    Medications or Discharge Instructions Refer to your discharge packet   or contact your [facility] Primary Care Provider   [phone]   Follow-up Appointment(s) Schedule your appointment via [employer]   or contact Scheduling [phone]   Billing Review your statement via [employer]  or contact Billing [phone]   Medical Records Review your records via [employer]   or contact Medical Records [phone]     You may receive a telephone call within two days of discharge. This call is to make certain you understand your discharge instructions and have the opportunity to have any questions answered. You can also easily access your medical information, test results and upcoming appointments via the [employer] free online health management tool. You can learn more and sign up at [email or web address]. For assistance setting up your [employer] account, please call [phone].    Once again, we want to ensure your discharge home is safe and that you have a clear understanding of any next steps in your care. If you have any questions or concerns, please do not hesitate to contact us, we are here for you. Thank you for choosing [facility] for your healthcare needs.    Sincerely,    Your [facility] Healthcare Team

## 2017-05-12 NOTE — ED NOTES
"Pt requested to sign out AMA before seeing physician. Refused waiting for d.c instructions, refused d./c vitals , reported \" its over, I am feeling better\". Paperwork signed. Pt will walk to upcoming PCP appt. Directions provided.   "

## 2017-05-12 NOTE — ED NOTES
"Pt BIB [facility] for ETOH withdrawal. Pt reports he stopped drinking 7 days ago but is not aware of what day it is. D/c from [facility] 5/10 for same. Pt was on bus to PCP follow up appt today but got off due to anxiety and called [facility]. Pt reports hallucinations of \"wierd stuff\" and anxiety, not diaphoretic or tremulous.   "

## 2017-05-12 NOTE — MR AVS SNAPSHOT
"        Gopal Ceja   2017 3:20 PM   Office Visit   MRN: [id number]    Department:  [place] [address]   Dept Phone:  [phone]    Description:  Male : 1985   Provider:  Ceci Caicedo M.D.           Reason for Visit     Establish Care follow up from ER      Allergies as of 2017     No Known Allergies      You were diagnosed with     Hospital discharge follow-up   [852360]       Alcohol withdrawal, with unspecified complication (CMS-[facility])   [4170806]       Methamphetamine addiction (CMS-[facility])   [141906]       Hypocalcemia   [275.41.ICD-9-CM]       Hypokalemia   [518526]       Hypomagnesemia   [952276]       Polysubstance abuse   [544859]       ADHD (attention deficit hyperactivity disorder), combined type   [693064]       JENNIFER (generalized anxiety disorder)   [124264]       History of suicidal ideation   [6034084]       Major depressive disorder, severe (CMS-[facility])   [833662]         Vital Signs     Blood Pressure Pulse Temperature Height Weight Body Mass Index    122/76 mmHg 121 37.7 °C (99.8 °F) 1.727 m (5' 7.99\") 63.504 kg (140 lb) 21.29 kg/m2    Oxygen Saturation Smoking Status                97% Former Smoker          Basic Information     Date Of Birth Sex Race Ethnicity Preferred Language    1985 Male White Non- English      Problem List              ICD-10-CM Priority Class Noted - Resolved    OD (overdose of drug) T50.901A   2012 - Present    Overdose T50.901A   2012 - Present    Psychiatric disorder F99   2012 - Present    ADHD (attention deficit hyperactivity disorder) F90.9   2012 - Present    Anxiety F41.9   2012 - Present    Polysubstance abuse F19.10   2012 - Present    Ectodermal dysplasia, 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   2012 - Present    Alcohol abuse F10.10   2013 - Present    Alcohol intoxication F10.129   2014 - Present    Alcohol withdrawal (CMS-[facility]) F10.239 High  10/5/2014 - Present    Suicidal ideation R45.851   10/11/2014 - " Present    Conjunctivitis H10.9   10/11/2014 - Present    Depression F32.9   11/12/2014 - Present    Suicide ideation R45.851   12/8/2014 - Present    Alcohol dependence with withdrawal (CMS-HCC) F10.239   12/26/2014 - Present    Tachycardia R00.0   12/15/2015 - Present    Hypocalcemia E83.51   5/7/2017 - Present    Hypokalemia E87.6   5/7/2017 - Present    Hypomagnesemia E83.42   5/7/2017 - Present    Methamphetamine addiction (CMS-HCC) F15.20 Medium  5/7/2017 - Present      Health Maintenance        Date Due Completion Dates    IMM DTaP/Tdap/Td Vaccine (2 - Td) 9/30/2026 9/30/2016, 11/20/2015, 9/1/2015            Current Immunizations     Tdap Vaccine 9/30/2016  2:22 AM, 11/20/2015  7:07 PM, 9/1/2015 10:59 PM      Below and/or attached are the medications your provider expects you to take. Review all of your home medications and newly ordered medications with your provider and/or pharmacist. Follow medication instructions as directed by your provider and/or pharmacist. Please keep your medication list with you and share with your provider. Update the information when medications are discontinued, doses are changed, or new medications (including over-the-counter products) are added; and carry medication information at all times in the event of emergency situations     Allergies:  No Known Allergies          Medications  Valid as of: May 12, 2017 -  3:46 PM    Generic Name Brand Name Tablet Size Instructions for use    Citalopram Hydrobromide (Tab) CELEXA 20 MG Take 1 Tab by mouth every day.        LORazepam (Tab) ATIVAN 0.5 MG Take 1 Tab by mouth 2 times a day as needed for Anxiety.        .                 Medicines prescribed today were sent to:     [facility] [address] [address]    Phone: [phone] Fax: [phone]    Open 24 Hours?: No      Medication refill instructions:       If your prescription bottle indicates you have medication refills left, it is not  necessary to call your provider’s office. Please contact your pharmacy and they will refill your medication.    If your prescription bottle indicates you do not have any refills left, you may request refills at any time through one of the following ways: The online EyeSee360 system (except Urgent Care), by calling your provider’s office, or by asking your pharmacy to contact your provider’s office with a refill request. Medication refills are processed only during regular business hours and may not be available until the next business day. Your provider may request additional information or to have a follow-up visit with you prior to refilling your medication.   *Please Note: Medication refills are assigned a new Rx number when refilled electronically. Your pharmacy may indicate that no refills were authorized even though a new prescription for the same medication is available at the pharmacy. Please request the medicine by name with the pharmacy before contacting your provider for a refill.        Referral     A referral request has been sent to our patient care coordination department. Please allow 3-5 business days for us to process this request and contact you either by phone or mail. If you do not hear from us by the 5th business day, please call us at [phone].           EyeSee360 Access Code: [email or web address]  Expires: 6/10/2017 12:13 PM    EyeSee360  A secure, online tool to manage your health information     [employer]’s EyeSee360® is a secure, online tool that connects you to your personalized health information from the privacy of your home -- day or night - making it very easy for you to manage your healthcare. Once the activation process is completed, you can even access your medical information using the EyeSee360 erwin, which is available for free in the Apple Erwin store or Google Play store.     EyeSee360 provides the following levels of access (as shown below):   My Chart Features   [facility] Care  Doctor [facility]  Specialists [facility]  Urgent  Care Non-[facility]  Primary Care  Doctor   Email your healthcare team securely and privately 24/7 X X X    Manage appointments: schedule your next appointment; view details of past/upcoming appointments X      Request prescription refills. X      View recent personal medical records, including lab and immunizations X X X X   View health record, including health history, allergies, medications X X X X   Read reports about your outpatient visits, procedures, consult and ER notes X X X X   See your discharge summary, which is a recap of your hospital and/or ER visit that includes your diagnosis, lab results, and care plan. X X       How to register for [employer]:  1. Go to  [email or web address].  2. Click on the Sign Up Now box, which takes you to the New Member Sign Up page. You will need to provide the following information:  a. Enter your [employer] Access Code exactly as it appears at the top of this page. (You will not need to use this code after you’ve completed the sign-up process. If you do not sign up before the expiration date, you must request a new code.)   b. Enter your date of birth.   c. Enter your home email address.   d. Click Submit, and follow the next screen’s instructions.  3. Create a [employer] ID. This will be your [employer] login ID and cannot be changed, so think of one that is secure and easy to remember.  4. Create a [employer] password. You can change your password at any time.  5. Enter your Password Reset Question and Answer. This can be used at a later time if you forget your password.   6. Enter your e-mail address. This allows you to receive e-mail notifications when new information is available in [employer].  7. Click Sign Up. You can now view your health information.    For assistance activating your [employer] account, call [phone]

## 2017-05-12 NOTE — ED PROVIDER NOTES
"ED Provider Note    CHIEF COMPLAINT  Chief Complaint   Patient presents with   • ETOH Withdrawal       HPI  Gopal Ceja is a 32 y.o. male who presents ***    REVIEW OF SYSTEMS  See HPI for further details.     PAST MEDICAL HISTORY  Past Medical History   Diagnosis Date   • Ectrodactyly-ectodermal dysplasia-clefting syndrome    • Acute anxiety    • ETOH abuse    • Psychiatric disorder      anxiety/panic disorder   • Bipolar affective (CMS-HCC)    • ADHD (attention deficit hyperactivity disorder)    • ADHD (attention deficit hyperactivity disorder)    • Depression        FAMILY HISTORY  No family history on file.    SOCIAL HISTORY  Social History     Social History   • Marital Status: Single     Spouse Name: N/A   • Number of Children: N/A   • Years of Education: N/A     Social History Main Topics   • Smoking status: Former Smoker -- 0.25 packs/day     Types: Cigarettes   • Smokeless tobacco: Never Used   • Alcohol Use: Yes      Comment: 8 earthquakes 10% 24 oz malt liquor/day   • Drug Use: No   • Sexual Activity: Not on file     Other Topics Concern   • Not on file     Social History Narrative    ** Merged History Encounter **            SURGICAL HISTORY  Past Surgical History   Procedure Laterality Date   • Other orthopedic surgery       hands bilaterally r/t EEDC syndrome       CURRENT MEDICATIONS  Home Medications     Reviewed by Yuki Harrison (Pharmacy Tech) on 05/12/17 at 1415  Med List Status: Complete    Medication Last Dose Status          Patient Torey Taking any Medications                         ALLERGIES  No Known Allergies    PHYSICAL EXAM  VITAL SIGNS: Pulse 105  Temp(Src) 36.9 °C (98.4 °F)  Resp 18  Ht 1.727 m (5' 8\")  Wt 63.504 kg (140 lb)  BMI 21.29 kg/m2  SpO2 95%  Constitutional :  Well developed, Well nourished, No acute distress, Non-toxic appearance.   HENT: ***   Eyes: ***  Neck: Normal range of motion, No tenderness, Supple, No stridor.   Lymphatic: ***.   Cardiovascular: Normal " heart rate, Normal rhythm, No murmurs, No rubs, No gallops.   Thorax & Lungs: ***  Skin: Warm, Dry, No erythema, No rash.               COURSE & MEDICAL DECISION MAKING  Pertinent Labs & Imaging studies reviewed. (See chart for details)  ***    FINAL IMPRESSION  1. ***  2.   3.      Electronically signed by: Rosendo Morales, 5/12/2017

## 2017-05-13 NOTE — ED NOTES
Pt bib ems. Pt wanting to detox from alcohol.  Pt just seen in [facility] ED this AM.

## 2017-05-13 NOTE — ED PROVIDER NOTES
"ED Provider Note    CHIEF COMPLAINT  Chief Complaint   Patient presents with   • Detox       HPI  Gopal Ceja is a 32 y.o. male who presents with alcohol detox. He has been seen already at the [facility] for this. He also saw his primary care doctor today and has a prescription for Ativan. He is well known to myself and staff here is here multiple times. He is not suicidal. He apparently signed out of [facility] AMA. He cannot comment on the last time he drank because he says \"I don't remember\".      REVIEW OF SYSTEMS  positive for chronic alcohol use, negative for vomiting    PAST MEDICAL HISTORY   has a past medical history of Ectrodactyly-ectodermal dysplasia-clefting syndrome; Acute anxiety; ETOH abuse; Psychiatric disorder; Bipolar affective (CMS-HCC); ADHD (attention deficit hyperactivity disorder); ADHD (attention deficit hyperactivity disorder); and Depression.    SOCIAL HISTORY  Social History     Social History Main Topics   • Smoking status: Former Smoker -- 0.25 packs/day     Types: Cigarettes   • Smokeless tobacco: Never Used   • Alcohol Use: No      Comment: 8 earthquakes 10% 24 oz malt liquor/day   • Drug Use: No   • Sexual Activity: Not on file       SURGICAL HISTORY   has past surgical history that includes other orthopedic surgery.    CURRENT MEDICATIONS  Home Medications     **Home medications have not yet been reviewed for this encounter**          ALLERGIES  No Known Allergies    PHYSICAL EXAM  VITAL SIGNS: /74 mmHg  Pulse 115  Temp(Src) 37 °C (98.6 °F)  Resp 16  Wt 63.504 kg (140 lb)  SpO2 98%.  Constitutional: Alert in no apparent distress.  HENT: No signs of trauma, Bilateral external ears normal, Nose normal.   Eyes: Pupils are equal and reactive, Conjunctiva normal, Non-icteric.   Cardiovascular: Tachycardic no murmurs.   Thorax & Lungs: Normal breath sounds, No respiratory distress, No wheezing, No chest tenderness.   Skin: Warm, Dry, No erythema, No rash.   Musculoskeletal:  " no major deformities noted.   Neurologic: Alert,  No focal deficits noted.   Psychiatric: appropriate      DIAGNOSTIC STUDIES / PROCEDURES      COURSE & MEDICAL DECISION MAKING  Pertinent Labs & Imaging studies reviewed. (See chart for details)  This is a 32-year-old well-known to myself who presents often for alcohol abuse and withdrawal. He was recently admitted here for alcohol withdrawal from the 7th of the 10th of this month. He did see his primary care doctor and has been given a prescription for Ativan. He is not suicidal at this time. He has alert he is slightly tachycardic but he is not hallucinating or having any significant hallucinations. He was given 1 mg of oral Ativan here for his elevated heart rate. He is ambulatory without any difficulty. He is not actively hallucinating. He will bring himself to [facility] for possible detox. He ambulated out of the department without difficulty.     The patient will return for new or worsening symptoms and is stable at the time of discharge. Patient was given return precautions. Patient and/or family member verbalizes understanding and will comply.    DISPOSITION:  Patient will be discharged home in stable condition.    FOLLOW UP:  Ceci Caicedo M.D.  [address]  [address]  [facility] [id number] [phone]      As needed    [facility], Emergency Dept  [address] [facility] [address] [phone]    If symptoms worsen      OUTPATIENT MEDICATIONS:  Discharge Medication List as of 5/12/2017  6:51 PM              FINAL IMPRESSION  1. Alcoholism (CMS-[facility])    2. Methamphetamine addiction (CMS-HCC)        2.   3.    This dictation has been creating using voice recognition software. The accuracy of the dictation is limited the abilities of the software.  I expect there may be some errors of grammar and possibly content. I made every attempt to manually correct the errors within my dictation. However errors related to this voice  recognition software may still exist and should be interpreted within the appropriate context.      The note accurately reflects work and decisions made by me.  Lacie Tristan  5/12/2017  7:03 PM

## 2017-05-13 NOTE — DISCHARGE INSTRUCTIONS
Alcohol Problems  Most adults who drink alcohol drink in moderation (not a lot) are at low risk for developing problems related to their drinking. However, all drinkers, including low-risk drinkers, should know about the health risks connected with drinking alcohol.  RECOMMENDATIONS FOR LOW-RISK DRINKING   Drink in moderation. Moderate drinking is defined as follows:   · Men - no more than 2 drinks per day.  · Nonpregnant women - no more than 1 drink per day.  · Over age 65 - no more than 1 drink per day.  A standard drink is 12 grams of pure alcohol, which is equal to a 12 ounce bottle of beer or wine cooler, a 5 ounce glass of wine, or 1.5 ounces of distilled spirits (such as whiskey, compa, vodka, or rum).   ABSTAIN FROM (DO NOT DRINK) ALCOHOL:  · When pregnant or considering pregnancy.  · When taking a medication that interacts with alcohol.  · If you are alcohol dependent.  · A medical condition that prohibits drinking alcohol (such as ulcer, liver disease, or heart disease).  DISCUSS WITH YOUR CAREGIVER:  · If you are at risk for coronary heart disease, discuss the potential benefits and risks of alcohol use: Light to moderate drinking is associated with lower rates of coronary heart disease in certain populations (for example, men over age 45 and postmenopausal women). Infrequent or nondrinkers are advised not to begin light to moderate drinking to reduce the risk of coronary heart disease so as to avoid creating an alcohol-related problem. Similar protective effects can likely be gained through proper diet and exercise.  · Women and the elderly have smaller amounts of body water than men. As a result women and the elderly achieve a higher blood alcohol concentration after drinking the same amount of alcohol.  · Exposing a fetus to alcohol can cause a broad range of birth defects referred to as Fetal Alcohol Syndrome (FAS) or Alcohol-Related Birth Defects (ARBD). Although FAS/ARBD is connected with excessive  alcohol consumption during pregnancy, studies also have reported neurobehavioral problems in infants born to mothers reporting drinking an average of 1 drink per day during pregnancy.  · Heavier drinking (the consumption of more than 4 drinks per occasion by men and more than 3 drinks per occasion by women) impairs learning (cognitive) and psychomotor functions and increases the risk of alcohol-related problems, including accidents and injuries.  CAGE QUESTIONS:   · Have you ever felt that you should Cut down on your drinking?  · Have people Annoyed you by criticizing your drinking?  · Have you ever felt bad or Guilty about your drinking?  · Have you ever had a drink first thing in the morning to steady your nerves or get rid of a hangover (Eye opener)?  If you answered positively to any of these questions: You may be at risk for alcohol-related problems if alcohol consumption is:   · Men: Greater than 14 drinks per week or more than 4 drinks per occasion.  · Women: Greater than 7 drinks per week or more than 3 drinks per occasion.  Do you or your family have a medical history of alcohol-related problems, such as:  · Blackouts.  · Sexual dysfunction.  · Depression.  · Trauma.  · Liver dysfunction.  · Sleep disorders.  · Hypertension.  · Chronic abdominal pain.  · Has your drinking ever caused you problems, such as problems with your family, problems with your work (or school) performance, or accidents/injuries?  · Do you have a compulsion to drink or a preoccupation with drinking?  · Do you have poor control or are you unable to stop drinking once you have started?  · Do you have to drink to avoid withdrawal symptoms?  · Do you have problems with withdrawal such as tremors, nausea, sweats, or mood disturbances?  · Does it take more alcohol than in the past to get you high?  · Do you feel a strong urge to drink?  · Do you change your plans so that you can have a drink?  · Do you ever drink in the morning to relieve  the shakes or a hangover?  If you have answered a number of the previous questions positively, it may be time for you to talk to your caregivers, family, and friends and see if they think you have a problem. Alcoholism is a chemical dependency that keeps getting worse and will eventually destroy your health and relationships. Many alcoholics end up dead, impoverished, or in halfway. This is often the end result of all chemical dependency.  · Do not be discouraged if you are not ready to take action immediately.  · Decisions to change behavior often involve up and down desires to change and feeling like you cannot decide.  · Try to think more seriously about your drinking behavior.  · Think of the reasons to quit.  WHERE TO GO FOR ADDITIONAL INFORMATION   · The National [place] on Alcohol Abuse and Alcoholism (NIAAA)  www.niaaa.nih.gov  · National [place] on Alcoholism and Drug Dependence (NCADD)  www.ncadd.org  · American Society of Addiction Medicine (ASAM)  www.asam.org   Document Released: 12/18/2006 Document Revised: 03/11/2013 Document Reviewed: 08/05/2009  ExitCare® Patient Information ©2014 [employer]

## 2017-05-13 NOTE — ED NOTES
"Pt decided to leave without seeing . Pt already had bus pass. Pt dressed self, steady on feet. No distress noted. Pt states \"im going to [facility].\".   "

## 2017-05-13 NOTE — ED PROVIDER NOTES
ED Provider Note    CHIEF COMPLAINT  Chief Complaint   Patient presents with   • Detox       HPI  Gopal Ceja is a 32 y.o. male who presents to the triage area reported detox. He has recently been seen in the emergency department for detox I understand the patient has left emergency department without my involvement I was informed by nursing staff that he was planning to leave as he no longer wanted to be seen. He has signed a form indicating he has not seen a physician and wants to be discharged    REVIEW OF SYSTEMS  See HPI for further details.     PAST MEDICAL HISTORY  Past Medical History   Diagnosis Date   • Ectrodactyly-ectodermal dysplasia-clefting syndrome    • Acute anxiety    • ETOH abuse    • Psychiatric disorder      anxiety/panic disorder   • Bipolar affective (CMS-HCC)    • ADHD (attention deficit hyperactivity disorder)    • ADHD (attention deficit hyperactivity disorder)    • Depression        FAMILY HISTORY  Family History   Problem Relation Age of Onset   • Heart Disease Neg Hx    • Hypertension Neg Hx    • Hyperlipidemia Neg Hx        SOCIAL HISTORY  Social History     Social History   • Marital Status: Single     Spouse Name: N/A   • Number of Children: N/A   • Years of Education: N/A     Social History Main Topics   • Smoking status: Former Smoker -- 0.25 packs/day     Types: Cigarettes   • Smokeless tobacco: Never Used   • Alcohol Use: No      Comment: 8 earthquakes 10% 24 oz malt liquor/day   • Drug Use: No   • Sexual Activity: Not on file     Other Topics Concern   • Not on file     Social History Narrative    ** Merged History Encounter **            SURGICAL HISTORY  Past Surgical History   Procedure Laterality Date   • Other orthopedic surgery       hands bilaterally r/t EEDC syndrome       CURRENT MEDICATIONS  Home Medications     **Home medications have not yet been reviewed for this encounter**           ALLERGIES  No Known Allergies    PHYSICAL EXAM physical exam performed as patient  has eloped the emergency department  VITAL SIGNS: /74 mmHg  Pulse 115  Temp(Src) 37 °C (98.6 °F)  Resp 16  Wt 63.504 kg (140 lb)  SpO2 98%  No erythema, No rash.               COURSE & MEDICAL DECISION MAKING  Pertinent Labs & Imaging studies reviewed. (See chart for details)  Patient left without being seen, prior to seeing the patient I ordered baseline labs which have not been performed since he left    FINAL IMPRESSION  1. Patient left without being seen  2.   3.      Electronically signed by: Rosendo Morales, 5/12/2017

## 2017-05-18 ENCOUNTER — OFFICE VISIT (OUTPATIENT)
Dept: BEHAVIORAL HEALTH | Facility: PHYSICIAN GROUP | Age: 32
End: 2017-05-18
Payer: MEDICARE

## 2017-05-18 VITALS
WEIGHT: 145 LBS | HEART RATE: 101 BPM | BODY MASS INDEX: 21.98 KG/M2 | SYSTOLIC BLOOD PRESSURE: 141 MMHG | HEIGHT: 68 IN | DIASTOLIC BLOOD PRESSURE: 97 MMHG

## 2017-05-18 DIAGNOSIS — F10.20 ALCOHOL USE DISORDER, SEVERE, DEPENDENCE (HCC): Primary | ICD-10-CM

## 2017-05-18 DIAGNOSIS — Z86.59 HISTORY OF ADHD: ICD-10-CM

## 2017-05-18 DIAGNOSIS — F32.A DEPRESSIVE DISORDER: ICD-10-CM

## 2017-05-18 PROCEDURE — 99204 OFFICE O/P NEW MOD 45 MIN: CPT | Performed by: PSYCHIATRY & NEUROLOGY

## 2017-05-18 PROCEDURE — G8432 DEP SCR NOT DOC, RNG: HCPCS | Performed by: PSYCHIATRY & NEUROLOGY

## 2017-05-18 PROCEDURE — G8420 CALC BMI NORM PARAMETERS: HCPCS | Performed by: PSYCHIATRY & NEUROLOGY

## 2017-05-18 PROCEDURE — 1036F TOBACCO NON-USER: CPT | Performed by: PSYCHIATRY & NEUROLOGY

## 2017-05-18 PROCEDURE — 1111F DSCHRG MED/CURRENT MED MERGE: CPT | Performed by: PSYCHIATRY & NEUROLOGY

## 2017-05-18 NOTE — PROGRESS NOTES
INITIAL PSYCHIATRY EVALUATION      Chief Complaint   Patient presents with   • Establish Care   • ADHD   • Depression   • Drug / Alcohol Assessment         History Of Present Illness:  Gopal Ceja is a 32 y.o. old male with history of Ectrodactyly-ectodermal dysplasia-clefting syndrome, alcohol use disorder, amphetamine use disorder, depressive disorder, ADHD referred by Ceci Caicedo M.D for evaluation of addiction, ADHD, anxiety and depression. He is here wanting medications for his ADHD. He describes his ADHD symptoms as getting bored easily in life, memory problems, problems staying focused. He has been treated on and off with Ritalin which he feels helps his ADHD and depressive symptoms. He has a history of being noncompliant with follow-up and medications. He endorses having problems with alcohol for the last 7 or 8 years and has been seen multiple times in the ER and hospitalized several times for detox symptoms. He was recently hospitalized about 2 weeks ago for severe alcohol withdrawal symptoms. He has been sober from alcohol since May 6 and is starting an intensive outpatient program over at [facility] tomorrow. He was prescribed 60 tablets of Ativan less than a week ago and he took his last tablet this morning. 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 His longest period of sobriety has been for a 6 months when he ended a relationship with his son's mother and was in another healthy relationship. [facility] and plans on working with them and maintaining his sobriety. He has been arrested several times for assault secondary to intoxication but currently denies any legal problems. Denies a history of DUIs and is currently not on probation or parole. His UDS on his last hospitalization was positive for amphetamines but he denies having current problems with stimulant medications. He states  that in the past he has used methamphetamines often but denies doing that in the last several years. He states that he might have done some amphetamines while he was intoxicated. Currently denies any illicit drug use. He endorses on and off periods of depression but unable to separate them when he is sober or not. He has been on several different antidepressants and feels that Wellbutrin and Celexa have been good for him. He wants to take both Celexa and Ritalin for his depression. Denies current or past symptoms of hypomania/yin. Denies current psychotic symptoms.    Current psychiatric medications - None    Past Psychiatric History:  Prior psychiatric hospitalization - Reports then 15 admissions at [facility] for alcohol detox  Self harm/suicide attempt - Denies having a suicide attempt. He endorses a history of cutting behavior.  Previous medication trials - Wellbutrin, Effexor, Valium, Ativan, Ritalin (effective). He states that he has been tried on almost all antidepressants but none of them were effective.    Past Medical/Surgical History:  Past Medical History   Diagnosis Date   • Ectrodactyly-ectodermal dysplasia-clefting syndrome    • Acute anxiety    • ETOH abuse    • Psychiatric disorder      anxiety/panic disorder   • Bipolar affective (CMS-HCC)    • ADHD (attention deficit hyperactivity disorder)    • ADHD (attention deficit hyperactivity disorder)    • Depression    • Alcohol abuse      Past Surgical History   Procedure Laterality Date   • Other orthopedic surgery       hands bilaterally r/t EEDC syndrome       Family Psychiatric History:  Maternal grand father - alcohol addicition    Substance Use/Addiction History:  Alcohol - See HPI  Nicotine - Denies  Illicit drugs - See HPI    Social History:  He is currently single, and dated on and off relationship with the mother of his 6-year-old son in October 2016. He states that his girlfriend had borderline personality  "disorder and had a difficult relationship with her. He has not had any contact with his son since 2016. He had another daughter from that relationship but she  at the age of 2 months from some congenital kidney problems. He has minimal contact with his mother who he states has borderline personality disorder as well. He has no contact with his father who he states has narcissistic personality disorder. He is on disability for his limb deformity and gets about $800 a month from that. He is unemployed and has been living at a motel for the last several months. He dropped out of 12th grade but later got his GED. He plans on contacting [facility] to see if he can get into an associates program there. He denies any current legal problems.    Allergies:  Review of patient's allergies indicates no known allergies.    Medications:  Current Outpatient Prescriptions   Medication Sig Dispense Refill   • citalopram (CELEXA) 20 MG Tab Take 1 Tab by mouth every day. 90 Tab 1     No current facility-administered medications for this visit.       Review of Symptoms:  Constitutional - Negative for fatigue  Eyes - Negative for blurry vision  HEENT - Negative for sore throat  Respiratory - Negative for shortness of breath, cough  CVS - Negative for chest pain, palpitations  GI - Negative for nausea, vomiting, abdominal pain, diarrhea, constipation  Skin - Negative for rash  Musculoskeletal - Negative for back pain  Neurological - Negative for headaches  Psychiatric - Positive for depression, alcohol use     Physical Examination:  Vital signs: /97 mmHg  Pulse 101  Ht 1.727 m (5' 8\")  Wt 65.772 kg (145 lb)  BMI 22.05 kg/m2    Musculoskeletal: Normal gait. No abnormal movements.     Mental Status Evaluation:   General: Young white male, b/l hand deformity, dressed in formal attire, good grooming and hygiene, in no apparent distress, calm and cooperative, fair eye contact, no psychomotor agitation or " "retardation  Orientation: Alert and oriented to person, place and time  Recent and remote memory: Intact  Attention span and concentration: Intact  Speech: Spontaneous, normal rate, rhythm and tone  Thought Process: Linear, logical and goal directed  Thought Content: Denies suicidal or homicidal ideations, intent or plan  Perception: Denies auditory or visual hallucinations. No delusions noted  Associations: Intact  Language: Appropriate  Fund of knowledge and vocabulary: Adequate  Mood: \"fine\"  Affect: Euthymic, mood congruent  Insight: Good  Judgment: Good    Impression:  1. Alcohol use disorder, severe  2. ADHD by history  3. Unspecified depressive disorder  4. Methamphetamines use disorder per chart review  5. Cluster B personality disorder traits per chart review     Medical Records/Labs/Diagnostic Tests Reviewed:  [facility] records - several controlled medications prescribed by different providers in the last 3 years  Reviewed medical records from [facility] in May 2015 - admitted for detox and treated for withdrawal symptoms    Plan:  Discussed diagnosis and further management. His biggest problem at this time is alcohol addiction and he is planning on starting the intensive outpatient program at [facility] tomorrow which will be for 4-6 weeks. He does endorse some symptoms of ADHD but his symptoms are not causing any functional impairment in his life currently. He is unemployed, is not in school and is not involved in any activity which would require control of his ADHD symptoms. Discussed that at this time he should focus on maintaining his sobriety from alcohol. Discussed restarting Celexa for some mild depressive symptoms that he reported but he is not interested. He is interested in taking Wellbutrin but given his history of alcohol withdrawal seizures and high risk of relapse on alcohol, will not be prescribing him Wellbutrin.     Return to clinic in 2 months or sooner if symptoms " worsen    The proposed treatment plan was discussed with the patient who was provided the opportunity to ask questions and make suggestions regarding alternative treatment. Patient verbalized understanding and expressed agreement with the plan.    Thank you for allowing me to participate in the care of this patient.    Bella Ga M.D.  05/18/2017    CC:   Ceci Caicedo M.D.    This note was created using voice recognition software (Dragon). The accuracy of the dictation is limited by the abilities of the software. I have reviewed the note prior to signing, however some errors in grammar and context are still possible. If you have any questions related to this note please do not hesitate to contact our office.

## 2017-05-22 ENCOUNTER — HOSPITAL ENCOUNTER (OUTPATIENT)
Dept: LAB | Facility: MEDICAL CENTER | Age: 32
End: 2017-05-22
Attending: NURSE PRACTITIONER
Payer: MEDICARE

## 2017-05-22 LAB
25(OH)D3 SERPL-MCNC: 11 NG/ML (ref 30–100)
ALBUMIN SERPL BCP-MCNC: 4.3 G/DL (ref 3.2–4.9)
ALBUMIN/GLOB SERPL: 1.2 G/DL
ALP SERPL-CCNC: 61 U/L (ref 30–99)
ALT SERPL-CCNC: 27 U/L (ref 2–50)
ANION GAP SERPL CALC-SCNC: 9 MMOL/L (ref 0–11.9)
AST SERPL-CCNC: 28 U/L (ref 12–45)
BASOPHILS # BLD AUTO: 0.9 % (ref 0–1.8)
BASOPHILS # BLD: 0.08 K/UL (ref 0–0.12)
BILIRUB SERPL-MCNC: 0.3 MG/DL (ref 0.1–1.5)
BUN SERPL-MCNC: 13 MG/DL (ref 8–22)
CALCIUM SERPL-MCNC: 9.5 MG/DL (ref 8.5–10.5)
CHLORIDE SERPL-SCNC: 104 MMOL/L (ref 96–112)
CHOLEST SERPL-MCNC: 162 MG/DL (ref 100–199)
CO2 SERPL-SCNC: 27 MMOL/L (ref 20–33)
CREAT SERPL-MCNC: 0.79 MG/DL (ref 0.5–1.4)
EOSINOPHIL # BLD AUTO: 0.15 K/UL (ref 0–0.51)
EOSINOPHIL NFR BLD: 1.7 % (ref 0–6.9)
ERYTHROCYTE [DISTWIDTH] IN BLOOD BY AUTOMATED COUNT: 50.1 FL (ref 35.9–50)
EST. AVERAGE GLUCOSE BLD GHB EST-MCNC: 100 MG/DL
FOLATE SERPL-MCNC: 21.8 NG/ML
GFR SERPL CREATININE-BSD FRML MDRD: >60 ML/MIN/1.73 M 2
GLOBULIN SER CALC-MCNC: 3.5 G/DL (ref 1.9–3.5)
GLUCOSE SERPL-MCNC: 83 MG/DL (ref 65–99)
HBA1C MFR BLD: 5.1 % (ref 0–5.6)
HCT VFR BLD AUTO: 46.5 % (ref 42–52)
HDLC SERPL-MCNC: 51 MG/DL
HGB BLD-MCNC: 15.2 G/DL (ref 14–18)
IMM GRANULOCYTES # BLD AUTO: 0.02 K/UL (ref 0–0.11)
IMM GRANULOCYTES NFR BLD AUTO: 0.2 % (ref 0–0.9)
LDLC SERPL CALC-MCNC: 81 MG/DL
LYMPHOCYTES # BLD AUTO: 2.15 K/UL (ref 1–4.8)
LYMPHOCYTES NFR BLD: 24.4 % (ref 22–41)
MCH RBC QN AUTO: 32.3 PG (ref 27–33)
MCHC RBC AUTO-ENTMCNC: 32.7 G/DL (ref 33.7–35.3)
MCV RBC AUTO: 98.9 FL (ref 81.4–97.8)
MONOCYTES # BLD AUTO: 0.84 K/UL (ref 0–0.85)
MONOCYTES NFR BLD AUTO: 9.5 % (ref 0–13.4)
NEUTROPHILS # BLD AUTO: 5.57 K/UL (ref 1.82–7.42)
NEUTROPHILS NFR BLD: 63.3 % (ref 44–72)
NRBC # BLD AUTO: 0 K/UL
NRBC BLD AUTO-RTO: 0 /100 WBC
PLATELET # BLD AUTO: 374 K/UL (ref 164–446)
PMV BLD AUTO: 11.8 FL (ref 9–12.9)
POTASSIUM SERPL-SCNC: 4.1 MMOL/L (ref 3.6–5.5)
PROT SERPL-MCNC: 7.8 G/DL (ref 6–8.2)
RBC # BLD AUTO: 4.7 M/UL (ref 4.7–6.1)
SODIUM SERPL-SCNC: 140 MMOL/L (ref 135–145)
TRIGL SERPL-MCNC: 148 MG/DL (ref 0–149)
TSH SERPL DL<=0.005 MIU/L-ACNC: 2.32 UIU/ML (ref 0.3–3.7)
VIT B12 SERPL-MCNC: 683 PG/ML (ref 211–911)
WBC # BLD AUTO: 8.8 K/UL (ref 4.8–10.8)

## 2017-05-22 PROCEDURE — 84443 ASSAY THYROID STIM HORMONE: CPT

## 2017-05-22 PROCEDURE — 83036 HEMOGLOBIN GLYCOSYLATED A1C: CPT | Mod: GA

## 2017-05-22 PROCEDURE — 36415 COLL VENOUS BLD VENIPUNCTURE: CPT

## 2017-05-22 PROCEDURE — 82306 VITAMIN D 25 HYDROXY: CPT | Mod: GA

## 2017-05-22 PROCEDURE — 80053 COMPREHEN METABOLIC PANEL: CPT

## 2017-05-22 PROCEDURE — 80061 LIPID PANEL: CPT | Mod: GA

## 2017-05-22 PROCEDURE — 82746 ASSAY OF FOLIC ACID SERUM: CPT

## 2017-05-22 PROCEDURE — 82607 VITAMIN B-12: CPT

## 2017-05-22 PROCEDURE — 85025 COMPLETE CBC W/AUTO DIFF WBC: CPT

## 2017-05-25 ENCOUNTER — TELEPHONE (OUTPATIENT)
Dept: MEDICAL GROUP | Facility: MEDICAL CENTER | Age: 32
End: 2017-05-25

## 2017-05-25 NOTE — TELEPHONE ENCOUNTER
Future Appointments       Provider Department Center    6/2/2017 8:40 AM RUSS Goddard [facility] [address]    6/22/2017 1:00 PM [facility] [address]    7/19/2017 8:30 AM Bella Ga M.D. [facility] [address]        ESTABLISHED PATIENT PRE-VISIT PLANNING     Note: Patient will not be contacted if there is no indication to call.     1.  Reviewed note from last office visit with PCP and/or other med group provider: Yes    2.  If any orders were placed at last visit, do we have Results/Consult Notes?        •  Labs - Labs were not ordered at last office visit.       •  Imaging - Imaging was not ordered at last office visit.       •  Referrals - No referrals were ordered at last office visit.    3.  Immunizations were updated in [facility] using WebIZ?: Yes       •  Web Iz Recommendations: HEPATITIS A  MMR  VARICELLA (Chicken Pox)     4.  Patient is due for the following Health Maintenance Topics:   Health Maintenance Due   Topic Date Due   • Annual Wellness Visit  1985           5.  Patient was not informed to arrive 15 min prior to their scheduled appointment and bring in their medication bottles.

## 2017-06-02 ENCOUNTER — OFFICE VISIT (OUTPATIENT)
Dept: MEDICAL GROUP | Facility: MEDICAL CENTER | Age: 32
End: 2017-06-02
Payer: MEDICARE

## 2017-06-02 VITALS
SYSTOLIC BLOOD PRESSURE: 128 MMHG | BODY MASS INDEX: 22.73 KG/M2 | TEMPERATURE: 97.4 F | WEIGHT: 150 LBS | RESPIRATION RATE: 16 BRPM | DIASTOLIC BLOOD PRESSURE: 70 MMHG | HEART RATE: 85 BPM | OXYGEN SATURATION: 98 % | HEIGHT: 68 IN

## 2017-06-02 DIAGNOSIS — F90.9 ATTENTION DEFICIT HYPERACTIVITY DISORDER (ADHD), UNSPECIFIED ADHD TYPE: ICD-10-CM

## 2017-06-02 DIAGNOSIS — F10.20 ALCOHOL USE DISORDER, SEVERE, DEPENDENCE (HCC): ICD-10-CM

## 2017-06-02 DIAGNOSIS — F32.A DEPRESSIVE DISORDER: ICD-10-CM

## 2017-06-02 DIAGNOSIS — F19.10 POLYSUBSTANCE ABUSE (HCC): ICD-10-CM

## 2017-06-02 DIAGNOSIS — H54.3 DECREASED VISION IN BOTH EYES: ICD-10-CM

## 2017-06-02 PROBLEM — E83.51 HYPOCALCEMIA: Status: RESOLVED | Noted: 2017-05-07 | Resolved: 2017-06-02

## 2017-06-02 PROBLEM — E87.6 HYPOKALEMIA: Status: RESOLVED | Noted: 2017-05-07 | Resolved: 2017-06-02

## 2017-06-02 PROBLEM — E83.42 HYPOMAGNESEMIA: Status: RESOLVED | Noted: 2017-05-07 | Resolved: 2017-06-02

## 2017-06-02 PROCEDURE — 1111F DSCHRG MED/CURRENT MED MERGE: CPT | Performed by: NURSE PRACTITIONER

## 2017-06-02 PROCEDURE — G8432 DEP SCR NOT DOC, RNG: HCPCS | Performed by: NURSE PRACTITIONER

## 2017-06-02 PROCEDURE — 99214 OFFICE O/P EST MOD 30 MIN: CPT | Performed by: NURSE PRACTITIONER

## 2017-06-02 PROCEDURE — 1036F TOBACCO NON-USER: CPT | Performed by: NURSE PRACTITIONER

## 2017-06-02 PROCEDURE — G8420 CALC BMI NORM PARAMETERS: HCPCS | Performed by: NURSE PRACTITIONER

## 2017-06-02 RX ORDER — LORAZEPAM 1 MG/1
1 TABLET ORAL EVERY 4 HOURS PRN
COMMUNITY
End: 2017-07-19

## 2017-06-02 RX ORDER — METHYLPHENIDATE HYDROCHLORIDE 5 MG/1
5 TABLET ORAL 2 TIMES DAILY
Qty: 60 TAB | Refills: 0 | Status: SHIPPED | DISCHARGE
Start: 2017-06-02 | End: 2017-07-19

## 2017-06-02 ASSESSMENT — ENCOUNTER SYMPTOMS: DEPRESSION: 1

## 2017-06-02 ASSESSMENT — LIFESTYLE VARIABLES: SUBSTANCE_ABUSE: 1

## 2017-06-02 ASSESSMENT — PATIENT HEALTH QUESTIONNAIRE - PHQ9: CLINICAL INTERPRETATION OF PHQ2 SCORE: 0

## 2017-06-02 NOTE — PROGRESS NOTES
"Subjective:      Gopal Ceja is a 32 y.o. male who presents with Establish Care            HPI Gopal Ceja is here today to establish care. He reports he does not drive and it was too difficult and took too long to take a bus to his previous PCPs office.      1. Attention deficit hyperactivity disorder (ADHD), unspecified ADHD type  Patient reports he has ADHD for which she is now on Ritalin. He was recently seen by [facility] psychiatry and review of note shows that he does have some symptoms of this but it was not felt he needed the medications now and it would be better if he worked towards sobriety at this time. Patient states that he was prescribed a medicine through [facility] but needs to find another psychiatrist and would like to continue on the medicine.    2. Polysubstance abuse  Review of patient's chart shows he has had at least 14 emergency room visits in the last year and multiple visits over the last few years to the emergency room. They are mostly for alcohol abuse. There are diagnoses in his chart for polysubstance abuse and methamphetamine abuse. Patient states he does not abuse methamphetamines although he has used other illegal substances in the past. There is one urine drug screen in the past that shows amphetamines.    3. Alcohol use disorder, severe, dependence (CMS-HCC)  Patient reports he is now in the [facility] outpatient alcohol abuse program and states he has been sober since 5/7/17 and plans on staying sober. He states he is currently on lorazepam daily.    4. Decreased vision in both eyes  Patient would like to be referred to an ophthalmologist because of vision issues. It appears he is often seen at urgent care for conjunctivitis and he states that his vision is not as good as it was would like to have this evaluated.    5. Depressive disorder  Patient was given prescription for Celexa by his previous PCP and it was offered by psychiatry but he never started on the medicine but thinks " "he would now like to start on this.    Current Outpatient Prescriptions   Medication Sig Dispense Refill   • lorazepam (ATIVAN) 1 MG Tab Take 1 mg by mouth every four hours as needed for Anxiety.     • methylphenidate (RITALIN) 5 MG Tab Take 1 Tab by mouth 2 times a day. 60 Tab 0   • citalopram (CELEXA) 20 MG Tab Take 1 Tab by mouth every day. 90 Tab 1     No current facility-administered medications for this visit.     Social History   Substance Use Topics   • Smoking status: Former Smoker -- 0.25 packs/day     Types: Cigarettes   • Smokeless tobacco: Never Used   • Alcohol Use: No      Comment: 8 earthquakes 10% 24 oz malt liquor/day     Family History   Problem Relation Age of Onset   • Heart Disease Neg Hx    • Hypertension Neg Hx    • Hyperlipidemia Neg Hx      Past Medical History   Diagnosis Date   • Ectrodactyly-ectodermal dysplasia-clefting syndrome    • Acute anxiety    • ETOH abuse    • Psychiatric disorder      anxiety/panic disorder   • Bipolar affective (CMS-HCC)    • ADHD (attention deficit hyperactivity disorder)    • ADHD (attention deficit hyperactivity disorder)    • Depression    • Alcohol abuse        Review of Systems   Psychiatric/Behavioral: Positive for depression and substance abuse.   All other systems reviewed and are negative.         Objective:     /70 mmHg  Pulse 85  Temp(Src) 36.3 °C (97.4 °F)  Resp 16  Ht 1.727 m (5' 7.99\")  Wt 68.04 kg (150 lb)  BMI 22.81 kg/m2  SpO2 98%     Physical Exam   Constitutional: He is oriented to person, place, and time. He appears well-developed and well-nourished. No distress.   HENT:   Head: Normocephalic and atraumatic.   Right Ear: External ear normal.   Left Ear: External ear normal.   Nose: Nose normal.   Mouth/Throat: Oropharynx is clear and moist.   Eyes: Conjunctivae are normal. Right eye exhibits no discharge. Left eye exhibits no discharge.   Neck: Normal range of motion. Neck supple. No tracheal deviation present. No thyromegaly " present.   Cardiovascular: Normal rate, regular rhythm and normal heart sounds.    No murmur heard.  Pulmonary/Chest: Effort normal and breath sounds normal. No respiratory distress. He has no wheezes. He has no rales.   Lymphadenopathy:     He has no cervical adenopathy.   Neurological: He is alert and oriented to person, place, and time. Coordination normal.   Skin: Skin is warm and dry. No rash noted. He is not diaphoretic. No erythema.   Psychiatric: He has a normal mood and affect. His behavior is normal. Judgment and thought content normal.   Nursing note and vitals reviewed.              Assessment/Plan:     1. Attention deficit hyperactivity disorder (ADHD), unspecified ADHD type  Patient currently feels that the Ritalin helps him but I explained I cannot refill this medicine and if he feels he needs to stay on the medicine he will need to go to psychiatry. He would like to try a different psychiatrist and the referral placed.  - REFERRAL TO PSYCHIATRY    2. Polysubstance abuse  Patient reports he is clean and sober and is not using any illegal substances at this time. I told him that I also would not refill his lorazepam with his history of polysubstance abuse.    3. Alcohol use disorder, severe, dependence (CMS-HCC)  Patient is now in an outpatient program and states he is staying clean and sober.    4. Decreased vision in both eyes  Referral placed because of complaints of decreased vision as well as recurring issues with eye irritation.  - REFERRAL TO OPHTHALMOLOGY    5. Depressive disorder  I advised patient that he has a prescription for citalopram waiting at the pharmacy for him. He will follow up with psychiatry.

## 2017-06-02 NOTE — MR AVS SNAPSHOT
"        Gopal Ceja   2017 8:40 AM   Office Visit   MRN: [id number]    Department:  [address] [place] [address]   Dept Phone:  [phone]    Description:  Male : 1985   Provider:  RUSS Goddard           Reason for Visit     Establish Care           Allergies as of 2017     No Known Allergies      You were diagnosed with     Attention deficit hyperactivity disorder (ADHD), unspecified ADHD type   [1455990]       Polysubstance abuse   [859067]       Alcohol use disorder, severe, dependence (CMS-HCC)   [1942485]       Decreased vision in both eyes   [030228]         Vital Signs     Blood Pressure Pulse Temperature Respirations Height Weight    128/70 mmHg 85 36.3 °C (97.4 °F) 16 1.727 m (5' 7.99\") 68.04 kg (150 lb)    Body Mass Index Oxygen Saturation Smoking Status             22.81 kg/m2 98% Former Smoker         Basic Information     Date Of Birth Sex Race Ethnicity Preferred Language    1985 Male White Non- English      Your appointments     2017  1:00 PM   Initial Behavioral Health Eval with Esther Hernandez L.C.S.W.   BEHAVIORAL HE[facility])    [address] [place] [id number]   [phone]            2017  8:30 AM   Follow Up Med Management with Bella Ga M.D.   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   2012 - Present    Alcohol intoxication F10.129   2014 - Present    Suicidal ideation R45.851   10/11/2014 - Present    Alcohol dependence with withdrawal (CMS-HCC) F10.239   " 12/26/2014 - Present    Tachycardia R00.0   12/15/2015 - Present    Hypocalcemia E83.51   5/7/2017 - Present    Hypokalemia E87.6   5/7/2017 - Present    Hypomagnesemia E83.42   5/7/2017 - Present    Methamphetamine addiction (CMS-HCC) F15.20 Medium  5/7/2017 - Present    Alcohol use disorder, severe, dependence (CMS-HCC) F10.20   5/18/2017 - Present    Depressive disorder F32.9   5/18/2017 - Present      Health Maintenance        Date Due Completion Dates    IMM DTaP/Tdap/Td Vaccine (2 - Td) 9/30/2026 9/30/2016, 11/20/2015, 9/1/2015            Current Immunizations     Tdap Vaccine 9/30/2016  2:22 AM, 11/20/2015  7:07 PM, 9/1/2015 10:59 PM      Below and/or attached are the medications your provider expects you to take. Review all of your home medications and newly ordered medications with your provider and/or pharmacist. Follow medication instructions as directed by your provider and/or pharmacist. Please keep your medication list with you and share with your provider. Update the information when medications are discontinued, doses are changed, or new medications (including over-the-counter products) are added; and carry medication information at all times in the event of emergency situations     Allergies:  No Known Allergies          Medications  Valid as of: June 02, 2017 -  8:51 AM    Generic Name Brand Name Tablet Size Instructions for use    Citalopram Hydrobromide (Tab) CELEXA 20 MG Take 1 Tab by mouth every day.        LORazepam (Tab) ATIVAN 1 MG Take 1 mg by mouth every four hours as needed for Anxiety.        Methylphenidate HCl (Tab) RITALIN 5 MG Take 1 Tab by mouth 2 times a day.        .                 Medicines prescribed today were sent to:     [facility]  ANICETO JACKOSN [address] [address]    Phone: [phone] Fax: [phone]    Open 24 Hours?: No      Medication refill instructions:       If your prescription bottle indicates you have medication refills left, it is  not necessary to call your provider’s office. Please contact your pharmacy and they will refill your medication.    If your prescription bottle indicates you do not have any refills left, you may request refills at any time through one of the following ways: The online [employer] system (except Urgent Care), by calling your provider’s office, or by asking your pharmacy to contact your provider’s office with a refill request. Medication refills are processed only during regular business hours and may not be available until the next business day. Your provider may request additional information or to have a follow-up visit with you prior to refilling your medication.   *Please Note: Medication refills are assigned a new Rx number when refilled electronically. Your pharmacy may indicate that no refills were authorized even though a new prescription for the same medication is available at the pharmacy. Please request the medicine by name with the pharmacy before contacting your provider for a refill.        Referral     A referral request has been sent to our patient care coordination department. Please allow 3-5 business days for us to process this request and contact you either by phone or mail. If you do not hear from us by the 5th business day, please call us at [phone].           [employer] Access Code: [email or web address]  Expires: 6/10/2017 12:13 PM    [employer]  A secure, online tool to manage your health information     [employer]® is a secure, online tool that connects you to your personalized health information from the privacy of your home -- day or night - making it very easy for you to manage your healthcare. Once the activation process is completed, you can even access your medical information using the [employer] erwin, which is available for free in the Apple Erwin store or Google Play store.     [employer] provides the following levels of access (as shown below):   My Chart Features   [facility]  Care Doctor [facility]  Specialists [facility]  Urgent  Care Non-[facility]  Primary Care  Doctor   Email your healthcare team securely and privately 24/7 X X X    Manage appointments: schedule your next appointment; view details of past/upcoming appointments X      Request prescription refills. X      View recent personal medical records, including lab and immunizations X X X X   View health record, including health history, allergies, medications X X X X   Read reports about your outpatient visits, procedures, consult and ER notes X X X X   See your discharge summary, which is a recap of your hospital and/or ER visit that includes your diagnosis, lab results, and care plan. X X       How to register for [employer]:  1. Go to  [email or web address].  2. Click on the Sign Up Now box, which takes you to the New Member Sign Up page. You will need to provide the following information:  a. Enter your [employer] Access Code exactly as it appears at the top of this page. (You will not need to use this code after you’ve completed the sign-up process. If you do not sign up before the expiration date, you must request a new code.)   b. Enter your date of birth.   c. Enter your home email address.   d. Click Submit, and follow the next screen’s instructions.  3. Create a [employer] ID. This will be your [employer] login ID and cannot be changed, so think of one that is secure and easy to remember.  4. Create a [employer] password. You can change your password at any time.  5. Enter your Password Reset Question and Answer. This can be used at a later time if you forget your password.   6. Enter your e-mail address. This allows you to receive e-mail notifications when new information is available in [employer].  7. Click Sign Up. You can now view your health information.    For assistance activating your [employer] account, call [phone]

## 2017-06-20 ENCOUNTER — HOSPITAL ENCOUNTER (EMERGENCY)
Facility: MEDICAL CENTER | Age: 32
End: 2017-06-20
Payer: MEDICARE

## 2017-06-20 VITALS
WEIGHT: 150 LBS | OXYGEN SATURATION: 90 % | DIASTOLIC BLOOD PRESSURE: 117 MMHG | TEMPERATURE: 98.4 F | BODY MASS INDEX: 22.81 KG/M2 | SYSTOLIC BLOOD PRESSURE: 133 MMHG | RESPIRATION RATE: 24 BRPM | HEART RATE: 129 BPM

## 2017-06-20 PROCEDURE — 302449 STATCHG TRIAGE ONLY (STATISTIC)

## 2017-06-21 ENCOUNTER — HOSPITAL ENCOUNTER (EMERGENCY)
Dept: HOSPITAL 8 - ED | Age: 32
Discharge: HOME | End: 2017-06-21
Payer: MEDICARE

## 2017-06-21 VITALS — HEIGHT: 68 IN | BODY MASS INDEX: 19.71 KG/M2 | WEIGHT: 130.07 LBS

## 2017-06-21 VITALS — SYSTOLIC BLOOD PRESSURE: 126 MMHG | DIASTOLIC BLOOD PRESSURE: 95 MMHG

## 2017-06-21 DIAGNOSIS — F10.220: Primary | ICD-10-CM

## 2017-06-21 PROCEDURE — 99283 EMERGENCY DEPT VISIT LOW MDM: CPT

## 2017-06-21 NOTE — DISCHARGE PLANNING
Alert team note:  Patient is in the ER pulse is tachy, very intoxicated saying he wants to go to detox.  Called [facility]. He has never stayed to complete even one day of the detox.  Other places are full such as [facility] and [facility] for detox.  For a rehab he would need to get on a waiting list.

## 2017-06-21 NOTE — ED NOTES
PT BIB EMS  Chief Complaint   Patient presents with   • Detox     can't remember last drink   • Eye Drainage     bilateral drainage   Pt tachy 130's, pt sweaty and curled up in the wheelchair.  Denies SI.  Answering questions with full sentences.  Has no idea how he got here today.  Wants to detox.  [facility] reports that [facility] won't take pt back.  Charge aware of pt.

## 2017-06-23 ENCOUNTER — HOSPITAL ENCOUNTER (EMERGENCY)
Dept: HOSPITAL 8 - ED | Age: 32
Discharge: HOME | End: 2017-06-23
Payer: MEDICARE

## 2017-06-23 VITALS — DIASTOLIC BLOOD PRESSURE: 86 MMHG | SYSTOLIC BLOOD PRESSURE: 139 MMHG

## 2017-06-23 VITALS — WEIGHT: 144.18 LBS | HEIGHT: 68 IN | BODY MASS INDEX: 21.85 KG/M2

## 2017-06-23 DIAGNOSIS — F15.10: ICD-10-CM

## 2017-06-23 DIAGNOSIS — H10.023: Primary | ICD-10-CM

## 2017-06-23 PROCEDURE — 99283 EMERGENCY DEPT VISIT LOW MDM: CPT

## 2017-06-26 ENCOUNTER — HOSPITAL ENCOUNTER (EMERGENCY)
Dept: HOSPITAL 8 - ED | Age: 32
Discharge: HOME | End: 2017-06-26
Payer: MEDICARE

## 2017-06-26 VITALS — SYSTOLIC BLOOD PRESSURE: 138 MMHG | DIASTOLIC BLOOD PRESSURE: 90 MMHG

## 2017-06-26 VITALS — HEIGHT: 68 IN | BODY MASS INDEX: 22.05 KG/M2 | WEIGHT: 145.51 LBS

## 2017-06-26 DIAGNOSIS — F90.9: ICD-10-CM

## 2017-06-26 DIAGNOSIS — F19.10: ICD-10-CM

## 2017-06-26 DIAGNOSIS — F10.232: Primary | ICD-10-CM

## 2017-06-26 PROCEDURE — 99284 EMERGENCY DEPT VISIT MOD MDM: CPT

## 2017-06-26 PROCEDURE — 96374 THER/PROPH/DIAG INJ IV PUSH: CPT

## 2017-06-26 PROCEDURE — 96361 HYDRATE IV INFUSION ADD-ON: CPT

## 2017-06-26 PROCEDURE — 96375 TX/PRO/DX INJ NEW DRUG ADDON: CPT

## 2017-07-07 ENCOUNTER — NON-PROVIDER VISIT (OUTPATIENT)
Dept: OCCUPATIONAL MEDICINE | Facility: CLINIC | Age: 32
End: 2017-07-07

## 2017-07-07 DIAGNOSIS — Z02.1 PRE-EMPLOYMENT DRUG SCREENING: ICD-10-CM

## 2017-07-07 LAB
AMP AMPHETAMINE: NORMAL
COC COCAINE: NORMAL
INT CON NEG: NORMAL
INT CON POS: NORMAL
MET METHAMPHETAMINES: NORMAL
OPI OPIATES: NORMAL
PCP PHENCYCLIDINE: NORMAL
POC DRUG COMMENT 753798-OCCUPATIONAL HEALTH: NORMAL
THC: NORMAL

## 2017-07-07 PROCEDURE — 80305 DRUG TEST PRSMV DIR OPT OBS: CPT | Performed by: PREVENTIVE MEDICINE

## 2017-07-07 NOTE — MR AVS SNAPSHOT
Gopal Ceja   2017 1:40 PM   Non-Provider Visit   MRN: [id number]    Department:  [facility]   Dept Phone:  [phone]    Description:  Male : 1985   Provider:  DAMIEN CAMPOS MA           Reason for Visit     Other [email or web address]      Allergies as of 2017     No Known Allergies      You were diagnosed with     Pre-employment drug screening   [622222]         Vital Signs     Smoking Status                   Former Smoker           Basic Information     Date Of Birth Sex Race Ethnicity Preferred Language    1985 Male White Non- English      Your appointments     2017  8:30 AM   Follow Up Med Management with Bella Ga M.D.   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   2012 - Present    Methamphetamine addiction (CMS-[facility]) F15.20 Medium  2017 - Present    Alcohol use disorder, severe, dependence (CMS-[facility]) F10.20   2017 - Present    Depressive disorder F32.9   2017 - Present      Health Maintenance        Date Due Completion Dates    IMM INFLUENZA (1) 2017 ---    IMM DTaP/Tdap/Td Vaccine (2 - Td) 2026, 2015, 2015            Results     POCT 6 Panel Urine Drug Screen      Component    AMPHETAMINE    POC THC    COCAINE    OPIATES    PHENCYCLIDINE    METHAMPHETAMINES    POC Urine Drug Screen Comment    NON CON    Internal Control Positive    Valid    Internal Control Negative    Valid                        Current Immunizations     Tdap Vaccine 2016  2:22 AM, 2015  7:07 PM, 2015 10:59 PM      Below and/or attached are the medications your provider expects you to take. Review all of your home medications  and newly ordered medications with your provider and/or pharmacist. Follow medication instructions as directed by your provider and/or pharmacist. Please keep your medication list with you and share with your provider. Update the information when medications are discontinued, doses are changed, or new medications (including over-the-counter products) are added; and carry medication information at all times in the event of emergency situations     Allergies:  No Known Allergies          Medications  Valid as of: July 07, 2017 -  3:06 PM    Generic Name Brand Name Tablet Size Instructions for use    Citalopram Hydrobromide (Tab) CELEXA 20 MG Take 1 Tab by mouth every day.        LORazepam (Tab) ATIVAN 1 MG Take 1 mg by mouth every four hours as needed for Anxiety.        Methylphenidate HCl (Tab) RITALIN 5 MG Take 1 Tab by mouth 2 times a day.        .                 Medicines prescribed today were sent to:     [address] [address]    Phone: [phone] Fax: [phone]    Open 24 Hours?: No      Medication refill instructions:       If your prescription bottle indicates you have medication refills left, it is not necessary to call your provider’s office. Please contact your pharmacy and they will refill your medication.    If your prescription bottle indicates you do not have any refills left, you may request refills at any time through one of the following ways: The online Sk[employer] system (except Urgent Care), by calling your provider’s office, or by asking your pharmacy to contact your provider’s office with a refill request. Medication refills are processed only during regular business hours and may not be available until the next business day. Your provider may request additional information or to have a follow-up visit with you prior to refilling your medication.   *Please Note: Medication refills are assigned a new Rx number when refilled electronically.  Your pharmacy may indicate that no refills were authorized even though a new prescription for the same medication is available at the pharmacy. Please request the medicine by name with the pharmacy before contacting your provider for a refill.           [employer] Access Code: [email or web address]  Expires: 7/9/2017  4:15 AM    [employer]  A secure, online tool to manage your health information     [employer]’s [employer]® is a secure, online tool that connects you to your personalized health information from the privacy of your home -- day or night - making it very easy for you to manage your healthcare. Once the activation process is completed, you can even access your medical information using the [employer] erwin, which is available for free in the Apple Erwin store or Google Play store.     [employer] provides the following levels of access (as shown below):   My Chart Features   [facility]  Specialists [facility]  Urgent  Care Non-[facility]  Primary Care  Doctor   Email your healthcare team securely and privately 24/7 X X X    Manage appointments: schedule your next appointment; view details of past/upcoming appointments X      Request prescription refills. X      View recent personal medical records, including lab and immunizations X X X X   View health record, including health history, allergies, medications X X X X   Read reports about your outpatient visits, procedures, consult and ER notes X X X X   See your discharge summary, which is a recap of your hospital and/or ER visit that includes your diagnosis, lab results, and care plan. X X       How to register for [employer]:  1. Go to  [email or web address].  2. Click on the Sign Up Now box, which takes you to the New Member Sign Up page. You will need to provide the following information:  a. Enter your [employer] Access Code exactly as it appears at the top of this page. (You will not need to use this code after you’ve completed the sign-up process. If you  do not sign up before the expiration date, you must request a new code.)   b. Enter your date of birth.   c. Enter your home email address.   d. Click Submit, and follow the next screen’s instructions.  3. Create a LegalZoom ID. This will be your LegalZoom login ID and cannot be changed, so think of one that is secure and easy to remember.  4. Create a LegalZoom password. You can change your password at any time.  5. Enter your Password Reset Question and Answer. This can be used at a later time if you forget your password.   6. Enter your e-mail address. This allows you to receive e-mail notifications when new information is available in LegalZoom.  7. Click Sign Up. You can now view your health information.    For assistance activating your LegalZoom account, call [phone]

## 2017-07-13 ENCOUNTER — APPOINTMENT (OUTPATIENT)
Dept: MEDICAL GROUP | Facility: MEDICAL CENTER | Age: 32
End: 2017-07-13
Payer: MEDICARE

## 2017-07-19 ENCOUNTER — OFFICE VISIT (OUTPATIENT)
Dept: BEHAVIORAL HEALTH | Facility: PHYSICIAN GROUP | Age: 32
End: 2017-07-19
Payer: MEDICARE

## 2017-07-19 VITALS
DIASTOLIC BLOOD PRESSURE: 84 MMHG | HEART RATE: 102 BPM | SYSTOLIC BLOOD PRESSURE: 125 MMHG | WEIGHT: 150 LBS | HEIGHT: 68 IN | BODY MASS INDEX: 22.73 KG/M2

## 2017-07-19 DIAGNOSIS — F90.0 ATTENTION DEFICIT HYPERACTIVITY DISORDER (ADHD), PREDOMINANTLY INATTENTIVE TYPE: Primary | ICD-10-CM

## 2017-07-19 DIAGNOSIS — F10.20 ALCOHOL USE DISORDER, SEVERE, DEPENDENCE (HCC): ICD-10-CM

## 2017-07-19 DIAGNOSIS — E55.9 HYPOVITAMINOSIS D: ICD-10-CM

## 2017-07-19 DIAGNOSIS — F32.A DEPRESSIVE DISORDER: ICD-10-CM

## 2017-07-19 PROCEDURE — 99214 OFFICE O/P EST MOD 30 MIN: CPT | Performed by: PSYCHIATRY & NEUROLOGY

## 2017-07-19 RX ORDER — DEXTROAMPHETAMINE SACCHARATE, AMPHETAMINE ASPARTATE MONOHYDRATE, DEXTROAMPHETAMINE SULFATE AND AMPHETAMINE SULFATE 5; 5; 5; 5 MG/1; MG/1; MG/1; MG/1
20 CAPSULE, EXTENDED RELEASE ORAL EVERY MORNING
COMMUNITY
End: 2017-07-19 | Stop reason: SDUPTHER

## 2017-07-19 RX ORDER — NALTREXONE HYDROCHLORIDE 50 MG/1
50 TABLET, FILM COATED ORAL DAILY
Refills: 0 | COMMUNITY
Start: 2017-07-01 | End: 2017-07-19 | Stop reason: SDUPTHER

## 2017-07-19 RX ORDER — DEXTROAMPHETAMINE SACCHARATE, AMPHETAMINE ASPARTATE MONOHYDRATE, DEXTROAMPHETAMINE SULFATE AND AMPHETAMINE SULFATE 5; 5; 5; 5 MG/1; MG/1; MG/1; MG/1
20 CAPSULE, EXTENDED RELEASE ORAL EVERY MORNING
Qty: 30 CAP | Refills: 0 | Status: SHIPPED | OUTPATIENT
Start: 2017-08-04 | End: 2017-08-04 | Stop reason: SDUPTHER

## 2017-07-19 RX ORDER — NALTREXONE HYDROCHLORIDE 50 MG/1
50 TABLET, FILM COATED ORAL DAILY
Qty: 30 TAB | Refills: 1 | Status: SHIPPED | OUTPATIENT
Start: 2017-07-19 | End: 2018-10-22

## 2017-07-19 RX ORDER — ERGOCALCIFEROL 1.25 MG/1
50000 CAPSULE ORAL
Qty: 12 CAP | Refills: 0 | Status: SHIPPED | OUTPATIENT
Start: 2017-07-19 | End: 2018-10-22

## 2017-07-19 RX ORDER — DIAZEPAM 5 MG/1
5 TABLET ORAL NIGHTLY PRN
COMMUNITY
End: 2018-10-22

## 2017-07-19 NOTE — PROGRESS NOTES
PSYCHIATRY FOLLOW-UP NOTE      Chief Complaint   Patient presents with   • Follow-Up     depression, ADHD, alcohol addiction         History Of Present Illness:  Gopal Ceja is a 32 y.o. old male with ectodermal dysplasia-clefting syndrome, alcohol use disorder, amphetamine use disorder, depressive disorder, anxiety disorder, Cluster B personality disorder traits, ADHD  comes in today for follow up, was last seen for an initial evaluation 2 months ago. He reports doing good since his last visit here. Since his visit he completed an intensive outpatient program at [facility] but relapsed back on alcohol soon after and came to the [facility] ER with withdrawal symptoms but left without being seen by her physician due to long wait type. He states that he has been sober since that ER visit and has been going to a few AA meetings every week. He also saw a nurse practitioner through [facility] and was started on Celexa, Naltrexone and Adderall for his psychiatric illness. He has been compliant with all of his psychiatric medications and denies any side effects. He cannot follow with [employer] [facility] anymore due to funding issues. He is feeling good about his sobriety and feels that Naltrexone helps him with cravings. He has been motivated and focused to get a job and has been applying at a few places. He interviewed at 2 places recently that was denied one of her because of a misdemeanor charge on his background check. He is still awaiting to hear from his other interview and is hopeful. He also has another  from [facility] on starting vocational rehabilitation sometimes alone. He continues to live in a motel and denies any new legal problems. He denies any illicit drug use since his last visit here. He feels that since he has been taking Adderall he is much more focused and is not having memory problems than before. He is hoping to continue stimulants as he thinks that once he gets a job he would  really benefit from staying focused at his job. He denies any problems with his focus or concentration anymore. He denies feeling depressed at this time but feels anxious infrequently because of financial issues. Denies any gambling problems. Denies any impulsive behaviors. Denies having thoughts of wanting to hurt himself or others.    Social History:   He is currently single and has a 6 year old son from a prior relationship. He has not had contact with his son since October 2016 and states that he is currently in foster care. He is on disability for his physical deformity and gets approximately $800/month. He is currently homeless and unemployed and has been living at a local motel for the last several months. He has 3 different  from [facility] [employer] [facility] [facility] [employer] (department of employment, training and rehabilitation).     Substance Use:  Alcohol - Sober since 6/20/2017  Nicotine - Smokes couple of times in a month  Illicit drugs - Denies recent use    Past Medication Trials:  Wellbutrin, Effexor, Trazodone, Remeron, Hydroxyzine, Valium (effective for sleep), Ativan, Ritalin (effective). He states that he has been tried on almost all antidepressants but none of them were effective.    Medications:  Current Outpatient Prescriptions   Medication Sig Dispense Refill   • diazepam (VALIUM) 5 MG Tab Take 5 mg by mouth at bedtime as needed for Anxiety.     • vitamin D, Ergocalciferol, (DRISDOL) 17635 UNITS Cap capsule Take 1 Cap by mouth every 7 days. 12 Cap 0   • [START ON 8/4/2017] amphetamine-dextroamphetamine (ADDERALL XR) 20 MG per XR capsule Take 1 Cap by mouth every morning. 30 Cap 0   • naltrexone (DEPADE) 50 MG Tab Take 1 Tab by mouth every day. 30 Tab 1   • citalopram (CELEXA) 20 MG Tab Take 1 Tab by mouth every day. 90 Tab 1     No current facility-administered medications for this visit.       Review Of Systems:    Constitutional - Negative for fatigue  HEENT - Positive for b/l decreased  "vision and itching in eyes, dental pain  Respiratory - Negative for shortness of breath, cough  CVS - Negative for chest pain, palpitations  GI - Negative for nausea, vomiting, abdominal pain, diarrhea, constipation  Musculoskeletal - Negative for back pain  Neurological - Negative for headaches  Psychiatric - Positive for occasional anxiety, poor sleep    Physical Examination:  Vital signs: /84 mmHg  Pulse 102  Ht 1.727 m (5' 8\")  Wt 68.04 kg (150 lb)  BMI 22.81 kg/m2    Musculoskeletal: Normal gait. No abnormal movements.     Mental Status Evaluation:   General: Young white male with b/l hand deformity, poor dental hygiene, dressed in casual attire, fair grooming and hygiene, in no apparent distress, calm and cooperative, fair eye contact, no psychomotor agitation or retardation  Orientation: Alert and oriented to person, place and time  Recent and remote memory: Intact  Attention span and concentration: Intact  Speech: Spontaneous, normal rate, rhythm and tone  Thought Process: Linear, logical and goal directed  Thought Content: Denies suicidal or homicidal ideations, intent or plan  Perception: Denies auditory or visual hallucinations. No delusions noted  Associations: Intact  Language: Appropriate  Fund of knowledge and vocabulary: Adequate  Mood: \"am doing good\"  Affect: Euthymic, mood congruent  Insight: Good  Judgment: Good      Impression:  1. Alcohol use disorder, severe (sober since 6/20/2017)  2. ADHD, in attentive type  3. Unspecified depressive disorder  4. Unspecified anxiety disorder  5. Methamphetamines use disorder per chart review  6. Cluster B personality disorder traits per chart review     Medical Records/Labs/Diagnostic Tests Reviewed:  [facility] [place] [facility] records - appropriate refills. Unable to find records of Valium prescription which he states he is taking currently.    Plan:  1. Continue Adderall XR 20 mg in the morning for ADHD. Provided him with one prescription for 30 tabs. He reviewed " and signed the controlled medication agreement policy.  2. Continue Celexa 20 mg daily for depression  3. Continue Naltrexone 50 mg daily for alcohol use disorder.   - LFTs within normal limits (5/2017).    - Will discuss repeating LFTs on his follow-up appointment  4. Continue Valium 5 mg as needed for anxiety/sleep. Not refilled today. Will discuss alternative sleep aid medications on his follow-up visit. Would like to avoid benzodiazepines if possible given history of severe alcohol problems.  5. Encouraged him to continue to maintain his sobriety from alcohol and illicit drugs and to continue his involvement with AA meetings as much as he can.    Return to clinic in 2 months or sooner if symptoms worsen    The proposed treatment plan was discussed with the patient who was provided the opportunity to ask questions and make suggestions regarding alternative treatment. Patient verbalized understanding and expressed agreement with the plan.     Bella Ga M.D.  07/19/2017    This note was created using voice recognition software (Dragon). The accuracy of the dictation is limited by the abilities of the software. I have reviewed the note prior to signing, however some errors in grammar and context are still possible. If you have any questions related to this note please do not hesitate to contact our office.

## 2017-07-19 NOTE — MR AVS SNAPSHOT
"        Gopal Ceja   2017 8:30 AM   Office Visit   MRN: [id number]    Department:  Behavioral [facility]    Dept Phone:  [phone]    Description:  Male : 1985   Provider:  Bella Ga M.D.           Allergies as of 2017     No Known Allergies      You were diagnosed with     Attention deficit hyperactivity disorder (ADHD), predominantly inattentive type   [0551325]  -  Primary     Alcohol use disorder, severe, dependence (CMS-HCC)   [5329796]       Depressive disorder   [831239]       Hypovitaminosis D   [562585]         Vital Signs     Blood Pressure Pulse Height Weight Body Mass Index Smoking Status    125/84 mmHg 102 1.727 m (5' 8\") 68.04 kg (150 lb) 22.81 kg/m2 Current Some Day Smoker      Basic Information     Date Of Birth Sex Race Ethnicity Preferred Language    1985 Male White Non- English      Your appointments     Sep 05, 2017  9:30 AM   Follow Up Med Management with Bella Ga M.D.   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 Review all of " your home medications and newly ordered medications with your provider and/or pharmacist. Follow medication instructions as directed by your provider and/or pharmacist. Please keep your medication list with you and share with your provider. Update the information when medications are discontinued, doses are changed, or new medications (including over-the-counter products) are added; and carry medication information at all times in the event of emergency situations     Allergies:  No Known Allergies          Medications  Valid as of: July 19, 2017 -  9:06 AM    Generic Name Brand Name Tablet Size Instructions for use    Amphetamine-Dextroamphetamine (CAPSULE SR 24 HR) ADDERALL XR 20 MG Take 1 Cap by mouth every morning.        Citalopram Hydrobromide (Tab) CELEXA 20 MG Take 1 Tab by mouth every day.        DiazePAM (Tab) VALIUM 5 MG Take 5 mg by mouth at bedtime as needed for Anxiety.        Ergocalciferol (Cap) DRISDOL 23751 UNITS Take 1 Cap by mouth every 7 days.        Naltrexone HCl (Tab) DEPADE 50 MG Take 1 Tab by mouth every day.        .                 Medicines prescribed today were sent to:     [address] [address]    Phone: [phone] Fax: [phone]    Open 24 Hours?: No      Medication refill instructions:       If your prescription bottle indicates you have medication refills left, it is not necessary to call your provider’s office. Please contact your pharmacy and they will refill your medication.    If your prescription bottle indicates you do not have any refills left, you may request refills at any time through one of the following ways: The online [employer] system (except Urgent Care), by calling your provider’s office, or by asking your pharmacy to contact your provider’s office with a refill request. Medication refills are processed only during regular business hours and may not be available until the next business day. Your provider may  request additional information or to have a follow-up visit with you prior to refilling your medication.   *Please Note: Medication refills are assigned a new Rx number when refilled electronically. Your pharmacy may indicate that no refills were authorized even though a new prescription for the same medication is available at the pharmacy. Please request the medicine by name with the pharmacy before contacting your provider for a refill.           Posmetrics Access Code: [email or web address]  Expires: 8/7/2017  4:11 AM    Posmetrics  A secure, online tool to manage your health information     [employer]’s Posmetrics® is a secure, online tool that connects you to your personalized health information from the privacy of your home -- day or night - making it very easy for you to manage your healthcare. Once the activation process is completed, you can even access your medical information using the Posmetrics erwin, which is available for free in the Apple Erwin store or Google Play store.     Posmetrics provides the following levels of access (as shown below):   My Chart Features   [facility] Primary Care Doctor [facility]  Specialists [facility]  Urgent  Care [facility]  Primary Care  Doctor   Email your healthcare team securely and privately 24/7 X X X    Manage appointments: schedule your next appointment; view details of past/upcoming appointments X      Request prescription refills. X      View recent personal medical records, including lab and immunizations X X X X   View health record, including health history, allergies, medications X X X X   Read reports about your outpatient visits, procedures, consult and ER notes X X X X   See your discharge summary, which is a recap of your hospital and/or ER visit that includes your diagnosis, lab results, and care plan. X X       How to register for Posmetrics:  1. Go to  [email or web address].  2. Click on the Sign Up Now box, which takes you to the New Member Sign Up page. You will need to  provide the following information:  a. Enter your Judobaby Access Code exactly as it appears at the top of this page. (You will not need to use this code after you’ve completed the sign-up process. If you do not sign up before the expiration date, you must request a new code.)   b. Enter your date of birth.   c. Enter your home email address.   d. Click Submit, and follow the next screen’s instructions.  3. Create a Judobaby ID. This will be your Judobaby login ID and cannot be changed, so think of one that is secure and easy to remember.  4. Create a [employer] password. You can change your password at any time.  5. Enter your Password Reset Question and Answer. This can be used at a later time if you forget your password.   6. Enter your e-mail address. This allows you to receive e-mail notifications when new information is available in Judobaby.  7. Click Sign Up. You can now view your health information.    For assistance activating your Judobaby account, call [phone]        Quit Tobacco Information     Do you want to quit using tobacco?    Quitting tobacco decreases risks of cancer, heart and lung disease, increases life expectancy, improves sense of taste and smell, and increases spending money, among other benefits.    If you are thinking about quitting, we can help.  • [facility] Quit Tobacco Program: 787.100.1853  o Program occurs weekly for four weeks and includes pharmacist consultation on products to support quitting smoking or chewing tobacco. A provider referral is needed for pharmacist consultation.  • Tobacco Users Help Hotline: 6-800-QUIT-NOW (978-6697) or https://[place].quitlogix.org/  o Free, confidential telephone and online coaching for [place] residents. Sessions are designed on a schedule that is convenient for you. Eligible clients receive free nicotine replacement therapy.  • Nationally: www.smokefree.gov  o Information and professional assistance to support both immediate and long-term needs as  you become, and remain, a non-smoker. Smokefree.gov allows you to choose the help that best fits your needs.

## 2017-08-04 ENCOUNTER — HOSPITAL ENCOUNTER (EMERGENCY)
Dept: HOSPITAL 8 - ED | Age: 32
Discharge: HOME | End: 2017-08-04
Payer: MEDICARE

## 2017-08-04 VITALS — BODY MASS INDEX: 22.79 KG/M2 | HEIGHT: 68 IN | WEIGHT: 150.36 LBS

## 2017-08-04 VITALS — DIASTOLIC BLOOD PRESSURE: 91 MMHG | SYSTOLIC BLOOD PRESSURE: 138 MMHG

## 2017-08-04 DIAGNOSIS — H10.021: Primary | ICD-10-CM

## 2017-08-04 PROCEDURE — 99283 EMERGENCY DEPT VISIT LOW MDM: CPT

## 2017-08-04 RX ORDER — DEXTROAMPHETAMINE SACCHARATE, AMPHETAMINE ASPARTATE MONOHYDRATE, DEXTROAMPHETAMINE SULFATE AND AMPHETAMINE SULFATE 5; 5; 5; 5 MG/1; MG/1; MG/1; MG/1
20 CAPSULE, EXTENDED RELEASE ORAL EVERY MORNING
Qty: 30 CAP | Refills: 0 | Status: SHIPPED | OUTPATIENT
Start: 2017-08-04 | End: 2018-10-22

## 2017-08-04 NOTE — TELEPHONE ENCOUNTER
Gopal came to the clinic today with his last stimulant prescription which had food stains on it. He stated that pharmacy refused to take it as some parts were difficult to read. Discarded his prescription and provided him with a new one.

## 2017-08-12 ENCOUNTER — HOSPITAL ENCOUNTER (EMERGENCY)
Dept: HOSPITAL 8 - ED | Age: 32
LOS: 1 days | Discharge: HOME | End: 2017-08-13
Payer: MEDICARE

## 2017-08-12 VITALS — WEIGHT: 144.84 LBS | BODY MASS INDEX: 22.73 KG/M2 | HEIGHT: 67 IN

## 2017-08-12 DIAGNOSIS — Z72.89: ICD-10-CM

## 2017-08-12 DIAGNOSIS — G92: Primary | ICD-10-CM

## 2017-08-12 DIAGNOSIS — T42.4X1A: ICD-10-CM

## 2017-08-12 DIAGNOSIS — F10.20: ICD-10-CM
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
## 2017-08-12 DIAGNOSIS — G31.2: ICD-10-CM

## 2017-08-12 LAB
APAP SERPL-MCNC: < 2 MCG/ML (ref 10–30)
AST SERPL-CCNC: 27 U/L (ref 15–37)
BUN SERPL-MCNC: 5 MG/DL (ref 7–18)
DAU SCREEN: (no result)
HCT VFR BLD CALC: 42.6 % (ref 39.2–51.8)
HGB BLD-MCNC: 14.3 G/DL (ref 13.7–18)
WBC # BLD AUTO: 6.1 X10^3/UL (ref 3.4–10)

## 2017-08-12 PROCEDURE — 36415 COLL VENOUS BLD VENIPUNCTURE: CPT

## 2017-08-12 PROCEDURE — 93005 ELECTROCARDIOGRAM TRACING: CPT

## 2017-08-12 PROCEDURE — 80329 ANALGESICS NON-OPIOID 1 OR 2: CPT

## 2017-08-12 PROCEDURE — 85025 COMPLETE CBC W/AUTO DIFF WBC: CPT

## 2017-08-12 PROCEDURE — 80053 COMPREHEN METABOLIC PANEL: CPT

## 2017-08-12 PROCEDURE — G0480 DRUG TEST DEF 1-7 CLASSES: HCPCS

## 2017-08-12 PROCEDURE — 99291 CRITICAL CARE FIRST HOUR: CPT

## 2017-08-12 PROCEDURE — 80307 DRUG TEST PRSMV CHEM ANLYZR: CPT

## 2017-08-13 VITALS — DIASTOLIC BLOOD PRESSURE: 68 MMHG | SYSTOLIC BLOOD PRESSURE: 115 MMHG

## 2017-08-15 ENCOUNTER — HOSPITAL ENCOUNTER (EMERGENCY)
Facility: MEDICAL CENTER | Age: 32
End: 2017-08-16
Attending: EMERGENCY MEDICINE
Payer: MEDICARE

## 2017-08-15 ENCOUNTER — HOSPITAL ENCOUNTER (EMERGENCY)
Dept: HOSPITAL 8 - ED | Age: 32
Discharge: HOME | End: 2017-08-15
Payer: MEDICARE

## 2017-08-15 VITALS — HEIGHT: 68 IN | BODY MASS INDEX: 22.72 KG/M2 | WEIGHT: 149.91 LBS

## 2017-08-15 VITALS — DIASTOLIC BLOOD PRESSURE: 72 MMHG | SYSTOLIC BLOOD PRESSURE: 110 MMHG

## 2017-08-15 DIAGNOSIS — F10.120: Primary | ICD-10-CM

## 2017-08-15 DIAGNOSIS — F10.920 ALCOHOL INTOXICATION, UNCOMPLICATED (HCC): ICD-10-CM

## 2017-08-15 LAB — POC BREATHALIZER: 0.42 PERCENT (ref 0–0.01)

## 2017-08-15 PROCEDURE — 99285 EMERGENCY DEPT VISIT HI MDM: CPT

## 2017-08-15 PROCEDURE — 304562 HCHG STAT O2 MASK/CANNULA

## 2017-08-15 PROCEDURE — 96360 HYDRATION IV INFUSION INIT: CPT

## 2017-08-15 PROCEDURE — 304561 HCHG STAT O2

## 2017-08-15 PROCEDURE — 96372 THER/PROPH/DIAG INJ SC/IM: CPT

## 2017-08-15 PROCEDURE — 96361 HYDRATE IV INFUSION ADD-ON: CPT

## 2017-08-15 PROCEDURE — 302970 POC BREATHALIZER: Performed by: EMERGENCY MEDICINE

## 2017-08-15 ASSESSMENT — LIFESTYLE VARIABLES
EVER FELT BAD OR GUILTY ABOUT YOUR DRINKING: NO
ON A TYPICAL DAY WHEN YOU DRINK ALCOHOL HOW MANY DRINKS DO YOU HAVE: 2
AVERAGE NUMBER OF DAYS PER WEEK YOU HAVE A DRINK CONTAINING ALCOHOL: 5
CONSUMPTION TOTAL: POSITIVE
HAVE YOU EVER FELT YOU SHOULD CUT DOWN ON YOUR DRINKING: NO
HOW MANY TIMES IN THE PAST YEAR HAVE YOU HAD 5 OR MORE DRINKS IN A DAY: 2
EVER HAD A DRINK FIRST THING IN THE MORNING TO STEADY YOUR NERVES TO GET RID OF A HANGOVER: NO
TOTAL SCORE: 0
DO YOU DRINK ALCOHOL: YES
HAVE PEOPLE ANNOYED YOU BY CRITICIZING YOUR DRINKING: NO

## 2017-08-15 ASSESSMENT — ENCOUNTER SYMPTOMS: MYALGIAS: 0

## 2017-08-15 ASSESSMENT — PAIN SCALES - GENERAL: PAINLEVEL_OUTOF10: 0

## 2017-08-15 NOTE — ED AVS SNAPSHOT
Home Care Instructions                                                                                                                Gopal Ceja   MRN: [id number]    Department:  [facility], Emergency Dept   Date of Visit:  8/15/2017            [facility], Emergency Dept    [address]    [place] [address]    Phone:  [phone]      You were seen by     1. Charles Cope M.D.    2. Adiel Matthews M.D.    3. Eric Cabrera D.O.      Your Diagnosis Was     Alcohol intoxication, uncomplicated     F10.120       These are the medications you received during your hospitalization from 08/15/2017 1952 to 08/16/2017 1245     Date/Time Order Dose Route Action    08/16/2017 0555 lorazepam (ATIVAN) tablet 2 mg 2 mg Oral Given    08/16/2017 1156 lorazepam (ATIVAN) tablet 1 mg 1 mg Oral Given      Follow-up Information     1. Follow up with RUSS Goddard.    Specialty:  Family Medicine    Contact information    [address]  [place] [address] [phone]        Medication Information     Review all of your home medications and newly ordered medications with your primary doctor and/or pharmacist as soon as possible. Follow medication instructions as directed by your doctor and/or pharmacist.     Please keep your complete medication list with you and share with your physician. Update the information when medications are discontinued, doses are changed, or new medications (including over-the-counter products) are added; and carry medication information at all times in the event of emergency situations.               Medication List      START taking these medications        Instructions    Morning Afternoon Evening Bedtime    lorazepam 1 MG Tabs   Last time this was given:  1 mg on 8/16/2017 11:55 AM   Commonly known as:  ATIVAN        Take 1 Tab by mouth every 8 hours as needed for Anxiety.   Dose:  1 mg                          ASK your doctor  about these medications        Instructions    Morning Afternoon Evening Bedtime    amphetamine-dextroamphetamine 20 MG per XR capsule   Commonly known as:  ADDERALL XR        Take 1 Cap by mouth every morning.   Dose:  20 mg                        citalopram 20 MG Tabs   Commonly known as:  CELEXA        Take 1 Tab by mouth every day.   Dose:  20 mg                        diazepam 5 MG Tabs   Commonly known as:  VALIUM        Take 5 mg by mouth at bedtime as needed for Anxiety.   Dose:  5 mg                        naltrexone 50 MG Tabs   Commonly known as:  DEPADE        Take 1 Tab by mouth every day.   Dose:  50 mg                        vitamin D (Ergocalciferol) 54058 UNITS Caps capsule   Commonly known as:  DRISDOL        Take 1 Cap by mouth every 7 days.   Dose:  57653 Units                             Where to Get Your Medications      You can get these medications from any pharmacy     Bring a paper prescription for each of these medications    - lorazepam 1 MG Tabs            Procedures and tests performed during your visit     Procedure/Test Number of Times Performed    POC BREATHALIZER 3            Patient Information     Patient Information    Following emergency treatment: all patient requiring follow-up care must return either to a private physician or a clinic if your condition worsens before you are able to obtain further medical attention, please return to the emergency room.     Billing Information    At [facility], we work to make the billing process streamlined for our patients.  Our Representatives are here to answer any questions you may have regarding your hospital bill.  If you have insurance coverage and have supplied your insurance information to us, we will submit a claim to your insurer on your behalf.  Should you have any questions regarding your bill, we can be reached online or by phone as follows:  Online: You are able pay your bills online or live chat with our representatives  about any billing questions you may have. We are here to help Monday - Friday from 8:00am to 7:30pm and 9:00am - 12:00pm on Saturdays.  Please visit [email or web address]  for more information.   Phone:  [phone] or [phone]    Please note that your emergency physician, surgeon, pathologist, radiologist, anesthesiologist, and other specialists are not employed by [facility] and will therefore bill separately for their services.  Please contact them directly for any questions concerning their bills at the numbers below:     Emergency Physician Services:  [phone]  [facility]:  [phone]  [facility]:  [phone]  [facility]:  [phone]    1. Your final bill may vary from the amount quoted upon discharge if all procedures are not complete at that time, or if your doctor has additional procedures of which we are not aware. You will receive an additional bill if you return to the Emergency Department at [facility] for suture removal regardless of the facility of which the sutures were placed.     2. Please arrange for settlement of this account at the emergency registration.    3. All self-pay accounts are due in full at the time of treatment.  If you are unable to meet this obligation then payment is expected within 4-5 days.     4. If you have had radiology studies (CT, X-ray, Ultrasound, MRI), you have received a preliminary result during your emergency department visit. Please contact the radiology department [phone] to receive a copy of your final result. Please discuss the Final result with your primary physician or with the follow up physician provided.     Crisis Hotline:  [place] Crisis Hotline:  3-446-SNULLWR or 1-437.571.2209  [place] Crisis Hotline:    1-642.339.1135 or 059-953-8635         ED Discharge Follow Up Questions    1. In order to provide you with very good care, we would like to follow up  with a phone call in the next few days.  May we have your permission to contact you?     YES /  NO    2. What is the best phone number to call you? (       )_____-__________    3. What is the best time to call you?      Morning  /  Afternoon  /  Evening                   Patient Signature:  ____________________________________________________________    Date:  ____________________________________________________________      Your appointments     Sep 05, 2017  9:30 AM   Follow Up Med Management with Bella Ga M.D.   BE[facility]AVIORAL [facility] [address] [place] [address])    [address] [place] [address]   [phone]

## 2017-08-15 NOTE — ED AVS SNAPSHOT
8/16/2017    Gopal Ceja  [address]  [place] [address]    Dear Gopal:    [facility] wants to ensure your discharge home is safe and you or your loved ones have had all of your questions answered regarding your care after you leave the hospital.    Below is a list of resources and contact information should you have any questions regarding your hospital stay, follow-up instructions, or active medical symptoms.    Questions or Concerns Regarding… Contact   Medical Questions Related to Your Discharge  (7 days a week, 8am-5pm) Contact a Nurse Care Coordinator   [phone]   Medical Questions Not Related to Your Discharge  (24 hours a day / 7 days a week)  Contact the Nurse Health Line   [phone]    Medications or Discharge Instructions Refer to your discharge packet   or contact your [facility] Primary Care Provider   [phone]   Follow-up Appointment(s) Schedule your appointment via [employer]   or contact Scheduling [phone]   Billing Review your statement via [employer]  or contact Billing [phone]   Medical Records Review your records via Digi[employer]   or contact Medical Records [phone]     You may receive a telephone call within two days of discharge. This call is to make certain you understand your discharge instructions and have the opportunity to have any questions answered. You can also easily access your medical information, test results and upcoming appointments via the DigiFit free online health management tool. You can learn more and sign up at [employer]/DigiFit. For assistance setting up your [employer] account, please call [phone].    Once again, we want to ensure your discharge home is safe and that you have a clear understanding of any next steps in your care. If you have any questions or concerns, please do not hesitate to contact us, we are here for you. Thank you for choosing [facility] for your healthcare needs.    Sincerely,    Your [facility] Healthcare Team

## 2017-08-15 NOTE — ED AVS SNAPSHOT
HyperWeek Access Code: [email or web address]  Expires: 9/5/2017  4:04 AM    HyperWeek  A secure, online tool to manage your health information     [employer]’s HyperWeek® is a secure, online tool that connects you to your personalized health information from the privacy of your home -- day or night - making it very easy for you to manage your healthcare. Once the activation process is completed, you can even access your medical information using the HyperWeek erwin, which is available for free in the Apple Erwin store or Google Play store.     HyperWeek provides the following levels of access (as shown below):   My Chart Features   [facility] Primary Care Doctor [facility]  Specialists [facility]  Urgent  Care Non-[facility]  Primary Care  Doctor   Email your healthcare team securely and privately 24/7 X X X X   Manage appointments: schedule your next appointment; view details of past/upcoming appointments X      Request prescription refills. X      View recent personal medical records, including lab and immunizations X X X X   View health record, including health history, allergies, medications X X X X   Read reports about your outpatient visits, procedures, consult and ER notes X X X X   See your discharge summary, which is a recap of your hospital and/or ER visit that includes your diagnosis, lab results, and care plan. X X       How to register for HyperWeek:  1. Go to  [email or web address].  2. Click on the Sign Up Now box, which takes you to the New Member Sign Up page. You will need to provide the following information:  a. Enter your HyperWeek Access Code exactly as it appears at the top of this page. (You will not need to use this code after you’ve completed the sign-up process. If you do not sign up before the expiration date, you must request a new code.)   b. Enter your date of birth.   c. Enter your home email address.   d. Click Submit, and follow the next screen’s instructions.  3. Create a HyperWeek ID. This will be your HyperWeek  login ID and cannot be changed, so think of one that is secure and easy to remember.  4. Create a [employer] password. You can change your password at any time.  5. Enter your Password Reset Question and Answer. This can be used at a later time if you forget your password.   6. Enter your e-mail address. This allows you to receive e-mail notifications when new information is available in [employer].  7. Click Sign Up. You can now view your health information.    For assistance activating your [employer] account, call [phone]

## 2017-08-16 VITALS
TEMPERATURE: 98.8 F | SYSTOLIC BLOOD PRESSURE: 112 MMHG | WEIGHT: 150 LBS | DIASTOLIC BLOOD PRESSURE: 76 MMHG | HEART RATE: 88 BPM | RESPIRATION RATE: 18 BRPM | OXYGEN SATURATION: 98 % | BODY MASS INDEX: 22.73 KG/M2 | HEIGHT: 68 IN

## 2017-08-16 LAB
POC BREATHALIZER: 0.15 PERCENT (ref 0–0.01)
POC BREATHALIZER: 0.2 PERCENT (ref 0–0.01)

## 2017-08-16 PROCEDURE — 304562 HCHG STAT O2 MASK/CANNULA

## 2017-08-16 PROCEDURE — A9270 NON-COVERED ITEM OR SERVICE: HCPCS | Performed by: EMERGENCY MEDICINE

## 2017-08-16 PROCEDURE — 302970 POC BREATHALIZER: Performed by: EMERGENCY MEDICINE

## 2017-08-16 PROCEDURE — 700102 HCHG RX REV CODE 250 W/ 637 OVERRIDE(OP): Performed by: EMERGENCY MEDICINE

## 2017-08-16 RX ORDER — LORAZEPAM 1 MG/1
1 TABLET ORAL ONCE
Status: COMPLETED | OUTPATIENT
Start: 2017-08-16 | End: 2017-08-16

## 2017-08-16 RX ORDER — LORAZEPAM 1 MG/1
2 TABLET ORAL ONCE
Status: COMPLETED | OUTPATIENT
Start: 2017-08-16 | End: 2017-08-16

## 2017-08-16 RX ORDER — LORAZEPAM 1 MG/1
1 TABLET ORAL EVERY 8 HOURS PRN
Qty: 10 TAB | Refills: 0 | Status: SHIPPED | OUTPATIENT
Start: 2017-08-16 | End: 2018-10-22

## 2017-08-16 RX ADMIN — LORAZEPAM 1 MG: 1 TABLET ORAL at 11:55

## 2017-08-16 RX ADMIN — LORAZEPAM 2 MG: 1 TABLET ORAL at 05:55

## 2017-08-16 NOTE — ED NOTES
Patient incontinent to urine, sheets and linen changed, patient cleaned.  Patient resting on gurney.

## 2017-08-16 NOTE — ED NOTES
Patient up to EOB, RN to room assisting patient back to bed, patient uncooperative when getting back into bed, security assisted patient.  Patient placed in 2 point restraints due to fall risk.    ERP at bedside.

## 2017-08-16 NOTE — ED NOTES
Patient incontinent to urine, sheets changed, patient cleaned.  3 person assist up to bsc, patient voided, clear yellow urine.  Patient back to [facility], resting at this time.

## 2017-08-16 NOTE — ED PROVIDER NOTES
ED Provider Note    Pt seen by Jordyn.  [facility] is here to take the pt to their treatment facility.  He is comfortable, afebrile, and with a prescription for ativan as well.    Electronically signed by Eric Cabrera D.O.

## 2017-08-16 NOTE — ED NOTES
Patient A&O, answers questions appropriately.  Patient given water, tolerating well.  Explained restraint DC criteria, patient verbalized understanding and verbalized need to ambulate with assist only.  Bilateral wrist restraints removed. Patient resting on gurney, NAD noted.

## 2017-08-16 NOTE — ED NOTES
Despite urinal at bedside with previous education, patient incontinent to urine, linens changed, patient cleaned.

## 2017-08-16 NOTE — ED PROVIDER NOTES
"ED Provider Note    Scribed for Charles Cope M.D. by Jimmy Schuler. 8/15/2017, 8:08 PM.    Primary care provider: RUSS Goddard  Means of arrival: EMS  History obtained from: Patient  History limited by: Intoxication    CHIEF COMPLAINT  Chief Complaint   Patient presents with   • Alcohol Intoxication     hx of ETOH abuse; admits to 8 \"Earthquakes\" today   • Detox       HPI  Gopal Ceja is a 32 y.o. male who presents to the Emergency Department for evaluation of alcohol intoxication. The patient reports associated room-spinning. He denies any pain, vomiting, or diarrhea. He drank eight earthquakes today and is incapable of walking. Patient denies any methamphetamine or other drug use. HPI limited by patient's status as a poor historian secondary to alcohol intoxication.    REVIEW OF SYSTEMS  Review of Systems   Musculoskeletal: Negative for myalgias.        Positive for inability to walk.   Endo/Heme/Allergies:        Positive for alcohol intoxication   ROS limited by patient's status as a poor historian secondary to alcohol intoxication.    PAST MEDICAL HISTORY   has a past medical history of Ectrodactyly-ectodermal dysplasia-clefting syndrome; Acute anxiety; ETOH abuse; Psychiatric disorder; Bipolar affective (CMS-HCC); ADHD (attention deficit hyperactivity disorder); ADHD (attention deficit hyperactivity disorder); Depression; and Alcohol abuse.    SURGICAL HISTORY   has past surgical history that includes other orthopedic surgery.    SOCIAL HISTORY  Social History   Substance Use Topics   • Smoking status: Current Some Day Smoker -- 0.25 packs/day     Types: Cigarettes   • Smokeless tobacco: Never Used      Comment: Smokes couple of times a month   • Alcohol Use: No      Comment: Sober since 6/20/2017      History   Drug Use No       FAMILY HISTORY  Family History   Problem Relation Age of Onset   • Heart Disease Neg Hx    • Hypertension Neg Hx    • Hyperlipidemia Neg Hx        CURRENT " "MEDICATIONS  Home Medications     Reviewed by Alivia Hua R.N. (Registered Nurse) on 08/15/17 at 2003  Med List Status: Complete    Medication Last Dose Status    amphetamine-dextroamphetamine (ADDERALL XR) 20 MG per XR capsule 8/14/2017 Active    citalopram (CELEXA) 20 MG Tab 8/14/2017 Active    diazepam (VALIUM) 5 MG Tab prn Active    naltrexone (DEPADE) 50 MG Tab 8/14/2017 Active    vitamin D, Ergocalciferol, (DRISDOL) 08760 UNITS Cap capsule 8/14/2017 Active                ALLERGIES  No Known Allergies    PHYSICAL EXAM  VITAL SIGNS: /79 mmHg  Pulse 102  Temp(Src) 37.1 °C (98.8 °F)  Resp 16  Ht 1.727 m (5' 8\")  Wt 68.04 kg (150 lb)  BMI 22.81 kg/m2  SpO2 97%    Constitutional: Well developed, Well nourished, Severely intoxicated.  HENT: Normocephalic, Atraumatic,    Eyes: Conjunctiva normal, No discharge.   Cardiovascular: Normal heart rate, Normal rhythm, No murmurs, equal pulses.   Pulmonary: Normal breath sounds, No respiratory distress, No wheezing, No rales, No rhonchi.  Abdomen:Soft, No tenderness, No masses, no rebound, no guarding.   Back: No CVA tenderness.   Musculoskeletal: No major deformities noted, No tenderness.   Skin: Warm, Dry, No erythema, No rash.   Neurologic: Alert & oriented x 3, Normal motor function,  No focal deficits noted. Severely Intoxicated.  Psychiatric: Patient seems severely intoxicated,    LABS  Results for orders placed or performed during the hospital encounter of 08/15/17   POC BREATHALIZER   Result Value Ref Range    POC Breathalizer 0.42 (A) 0.00 - 0.01 Percent       All labs reviewed by me.      COURSE & MEDICAL DECISION MAKING  Pertinent Labs & Imaging studies reviewed. (See chart for details)    8:08 PM - I was called acutely to bedside. Patient seen and examined at bedside. Patient will be treated with time. Ordered POC breathalizer to evaluate his symptoms. The differential diagnoses include but are not limited to: Alcohol intoxication "     Patient is well-known to myself in this department for multiple visits for alcohol intoxication. At this point in time patient complains that he is intoxicated. We will wait for sobriety and then reassess the patient.     Patient was turned over at 2 AM pending sobriety and reevaluation     FINAL IMPRESSION  1. Alcohol intoxication, uncomplicated          Jimmy FELIPE (Scribe), am scribing for, and in the presence of, Charles Cope M.D.    Electronically signed by: Jimmy Schuler (Scribe), 8/15/2017    Charles FELIPE M.D. personally performed the services described in this documentation, as scribed by Jimmy Schuler in my presence, and it is both accurate and complete.    The note accurately reflects work and decisions made by me.  Charles Cope  8/16/2017  3:19 AM

## 2017-08-16 NOTE — ED NOTES
Patient agitated and has visible tremors in bilateral hands, ERP notified, new order received, medicated per MAR.

## 2017-08-16 NOTE — DISCHARGE PLANNING
S) I feel awful,   O) patient still intoxicated but eager to go to [facility], denies SI HI.  Interviewed [facility] and transported to detox  A) severe alcoholic addiction, beginnings of detox.  P) discharge to [facility]

## 2017-08-16 NOTE — ED NOTES
Pt sleeping at this time, NAD, maintaining spO2, respiratory rate unlabored and regular, will continue to monitor

## 2017-08-16 NOTE — ED NOTES
[facility] at bedside, pt to be discharged to [facility], ride provided by [facility], pt understands dc instructions

## 2017-08-16 NOTE — DISCHARGE PLANNING
Patient experiencing high alcohol level, assessment delayed until patient is able to participate.  Roz Ramirez, Ph.D.

## 2017-08-16 NOTE — ED NOTES
"Patient bib EMS from [facility]:  Chief Complaint   Patient presents with   • Alcohol Intoxication     hx of ETOH abuse; admits to 8 \"Earthquakes\" today   • Detox     Patient has slurred speech, non-ambulatory with out assist, unsteady.  Patient follows directions and answers questions appropriately.    Chart up for ERP.    "

## 2018-01-19 ENCOUNTER — HOSPITAL ENCOUNTER (EMERGENCY)
Facility: MEDICAL CENTER | Age: 33
End: 2018-01-20
Attending: EMERGENCY MEDICINE
Payer: MEDICARE

## 2018-01-19 DIAGNOSIS — F10.920 ALCOHOLIC INTOXICATION WITHOUT COMPLICATION (HCC): ICD-10-CM

## 2018-01-19 LAB
POC BREATHALIZER: 0.26 PERCENT (ref 0–0.01)
POC BREATHALIZER: 0.31 PERCENT (ref 0–0.01)

## 2018-01-19 PROCEDURE — 302970 POC BREATHALIZER: Performed by: EMERGENCY MEDICINE

## 2018-01-19 PROCEDURE — 99285 EMERGENCY DEPT VISIT HI MDM: CPT

## 2018-01-19 ASSESSMENT — PAIN SCALES - GENERAL: PAINLEVEL_OUTOF10: 0

## 2018-01-19 NOTE — ED PROVIDER NOTES
"ED Provider Note    CHIEF COMPLAINT  Chief Complaint   Patient presents with   • Suicidal Ideation   • Alcohol Intoxication       HPI  Gopal Ceja is a 32 y.o. male who presents via EMS for intoxication. He expressed suicidality with no plan at the time he presented to the ER.    Patient denies any physical complaints. He admits to drinking alcohol today and denies current drug use.      ALLERGIES  No Known Allergies    CURRENT MEDICATIONS  Home Medications     Reviewed by Amira Polanco R.N. (Registered Nurse) on 01/19/18 at 1452  Med List Status: Unable to Obtain   Medication Last Dose Status   amphetamine-dextroamphetamine (ADDERALL XR) 20 MG per XR capsule 8/14/2017 Active   citalopram (CELEXA) 20 MG Tab 8/14/2017 Active   diazepam (VALIUM) 5 MG Tab prn Active   lorazepam (ATIVAN) 1 MG Tab  Active   naltrexone (DEPADE) 50 MG Tab 8/14/2017 Active   vitamin D, Ergocalciferol, (DRISDOL) 51005 UNITS Cap capsule 8/14/2017 Active                PAST MEDICAL HISTORY   has a past medical history of Acute anxiety; ADHD (attention deficit hyperactivity disorder); ADHD (attention deficit hyperactivity disorder); Alcohol abuse; Bipolar affective (CMS-[facility]); Depression; Ectrodactyly-ectodermal dysplasia-clefting syndrome; ETOH abuse; and Psychiatric disorder.    SURGICAL HISTORY   has a past surgical history that includes other orthopedic surgery.    SOCIAL HISTORY  Social History     Social History Main Topics   • Smoking status: Current Some Day Smoker     Packs/day: 0.25     Types: Cigarettes   • Smokeless tobacco: Never Used      Comment: Smokes couple of times a month   • Alcohol use No      Comment: Sober since 6/20/2017   • Drug use: No   • Sexual activity: Not on file         REVIEW OF SYSTEMS  See HPI for further details.  All other systems are negative except as above in HPI.      PHYSICAL EXAM  VITAL SIGNS: /74   Pulse 100   Temp 36.1 °C (97 °F)   Resp 14   Ht 1.727 m (5' 8\")   Wt 68 kg (150 lb)   " BMI 22.81 kg/m²     General:  WDWN, nontoxic appearing in NAD; verbal, smells of alcohol, V/S as above   Skin: warm and dry; good color; no rash  HEENT: NCAT; EOMs intact; PERRL; no scleral icterus   Neck: FROM; no meningismus  Cardiovascular: Regular heart rate and rhythm.  No murmurs, rubs, or gallops; pulses 2+ bilaterally radially  Lungs: Clear to auscultation with good air movement bilaterally.  No wheezes, rhonchi, or rales.   Abdomen: BS present; soft; NTND; no rebound, guarding, or rigidity.  No organomegaly or pulsatile mass  Extremities: HERNADEZ x 4; syndactyly noted; no e/o trauma  Neurologic: CNs III-XII grossly intact; speech slurred; distal sensation intact; strength 5/5 UE/LEs  Psychiatric: Appropriate affect, normal mood    LABS  Results for orders placed or performed during the hospital encounter of 01/19/18   POC BREATHALIZER   Result Value Ref Range    POC Breathalizer 0.311 (A) 0.00 - 0.01 Percent       MEDICAL RECORD  I have reviewed patient's medical record and pertinent results are listed below.      COURSE & MEDICAL DECISION MAKING  I have reviewed any medical record information, laboratory studies and radiographic results as noted.    Gopal Ceja is a 32 y.o. male who presents intoxicated and smelling of alcohol. No obvious injuries are noted on exam and patient denies any physical pain or injuries today.    Breathalyzed was elevated to 0.311. Patient has slurred speech. No evidence of head trauma.    Patient was signed out to Dr. Mariano for further sobering and reevaluation of suicidality that the patient reported earlier.    FINAL IMPRESSION  1. Alcoholic intoxication without complication (CMS-[facility])             Electronically signed by: Steph Gutierrez, 1/19/2018 3:49 PM

## 2018-01-19 NOTE — ED NOTES
"Pt BIB EMS after pt found intoxicated. Pt admits to drinking several Earthquakes\" today. SI, with no plan   Personal belongings taken out of room.   "

## 2018-01-20 ENCOUNTER — HOSPITAL ENCOUNTER (EMERGENCY)
Dept: HOSPITAL 8 - ED | Age: 33
Discharge: LEFT BEFORE DISCHARGE | End: 2018-01-20
Payer: MEDICARE

## 2018-01-20 VITALS
HEIGHT: 68 IN | WEIGHT: 150 LBS | OXYGEN SATURATION: 96 % | SYSTOLIC BLOOD PRESSURE: 108 MMHG | BODY MASS INDEX: 22.73 KG/M2 | HEART RATE: 99 BPM | RESPIRATION RATE: 16 BRPM | DIASTOLIC BLOOD PRESSURE: 71 MMHG | TEMPERATURE: 98.2 F

## 2018-01-20 VITALS — SYSTOLIC BLOOD PRESSURE: 127 MMHG | DIASTOLIC BLOOD PRESSURE: 93 MMHG

## 2018-01-20 VITALS — HEIGHT: 66 IN | WEIGHT: 154.32 LBS | BODY MASS INDEX: 24.8 KG/M2

## 2018-01-20 DIAGNOSIS — F10.20: Primary | ICD-10-CM

## 2018-01-20 DIAGNOSIS — F41.1: ICD-10-CM

## 2018-01-20 DIAGNOSIS — G62.9: ICD-10-CM

## 2018-01-20 DIAGNOSIS — F32.9: ICD-10-CM

## 2018-01-20 LAB
AMPHET UR QL SCN: NEGATIVE
BARBITURATES UR QL SCN: NEGATIVE
BENZODIAZ UR QL SCN: POSITIVE
BZE UR QL SCN: NEGATIVE
CANNABINOIDS UR QL SCN: POSITIVE
METHADONE UR QL SCN: NEGATIVE
OPIATES UR QL SCN: NEGATIVE
OXYCODONE UR QL SCN: NEGATIVE
PCP UR QL SCN: NEGATIVE
POC BREATHALIZER: 0.06 PERCENT (ref 0–0.01)
POC BREATHALIZER: 0.17 PERCENT (ref 0–0.01)
PROPOXYPH UR QL SCN: NEGATIVE

## 2018-01-20 PROCEDURE — 700102 HCHG RX REV CODE 250 W/ 637 OVERRIDE(OP): Performed by: EMERGENCY MEDICINE

## 2018-01-20 PROCEDURE — 302970 POC BREATHALIZER: Performed by: EMERGENCY MEDICINE

## 2018-01-20 PROCEDURE — 80307 DRUG TEST PRSMV CHEM ANLYZR: CPT

## 2018-01-20 PROCEDURE — A9270 NON-COVERED ITEM OR SERVICE: HCPCS | Performed by: EMERGENCY MEDICINE

## 2018-01-20 PROCEDURE — 99283 EMERGENCY DEPT VISIT LOW MDM: CPT

## 2018-01-20 RX ORDER — LORAZEPAM 1 MG/1
1 TABLET ORAL ONCE
Status: COMPLETED | OUTPATIENT
Start: 2018-01-20 | End: 2018-01-20

## 2018-01-20 RX ADMIN — LORAZEPAM 1 MG: 1 TABLET ORAL at 06:36

## 2018-01-20 NOTE — ED NOTES
Pt AOx4.  Pt given discharge instructions and pt verbalized understanding.  Pt ambulated to [facility].

## 2018-01-20 NOTE — DISCHARGE INSTRUCTIONS
"Alcohol Intoxication  Alcohol intoxication occurs when the amount of alcohol that a person has consumed impairs his or her ability to mentally and physically function. Alcohol directly impairs the normal chemical activity of the brain. Drinking large amounts of alcohol can lead to changes in mental function and behavior, and it can cause many physical effects that can be harmful.   Alcohol intoxication can range in severity from mild to very severe. Various factors can affect the level of intoxication that occurs, such as the person's age, gender, weight, frequency of alcohol consumption, and the presence of other medical conditions (such as diabetes, seizures, or heart conditions). Dangerous levels of alcohol intoxication may occur when people drink large amounts of alcohol in a short period (binge drinking). Alcohol can also be especially dangerous when combined with certain prescription medicines or \"recreational\" drugs.  SIGNS AND SYMPTOMS  Some common signs and symptoms of mild alcohol intoxication include:  · Loss of coordination.  · Changes in mood and behavior.  · Impaired judgment.  · Slurred speech.  As alcohol intoxication progresses to more severe levels, other signs and symptoms will appear. These may include:  · Vomiting.  · Confusion and impaired memory.  · Slowed breathing.  · Seizures.  · Loss of consciousness.  DIAGNOSIS   Your health care provider will take a medical history and perform a physical exam. You will be asked about the amount and type of alcohol you have consumed. Blood tests will be done to measure the concentration of alcohol in your blood. In many places, your blood alcohol level must be lower than 80 mg/dL (0.08%) to legally drive. However, many dangerous effects of alcohol can occur at much lower levels.   TREATMENT   People with alcohol intoxication often do not require treatment. Most of the effects of alcohol intoxication are temporary, and they go away as the alcohol naturally " leaves the body. Your health care provider will monitor your condition until you are stable enough to go home. Fluids are sometimes given through an IV access tube to help prevent dehydration.   HOME CARE INSTRUCTIONS  · Do not drive after drinking alcohol.  · Stay hydrated. Drink enough water and fluids to keep your urine clear or pale yellow. Avoid caffeine.    · Only take over-the-counter or prescription medicines as directed by your health care provider.    SEEK MEDICAL CARE IF:   · You have persistent vomiting.    · You do not feel better after a few days.  · You have frequent alcohol intoxication. Your health care provider can help determine if you should see a substance use treatment counselor.  SEEK IMMEDIATE MEDICAL CARE IF:   · You become shaky or tremble when you try to stop drinking.    · You shake uncontrollably (seizure).    · You throw up (vomit) blood. This may be bright red or may look like black coffee grounds.    · You have blood in your stool. This may be bright red or may appear as a black, tarry, bad smelling stool.    · You become lightheaded or faint.    MAKE SURE YOU:   · Understand these instructions.  · Will watch your condition.  · Will get help right away if you are not doing well or get worse.     This information is not intended to replace advice given to you by your health care provider. Make sure you discuss any questions you have with your health care provider.     Document Released: 09/27/2006 Document Revised: 08/20/2014 Document Reviewed: 05/23/2014  [employer] Interactive Patient Education ©2016 [employer]

## 2018-01-20 NOTE — ED NOTES
Pt asking for medication for tremors from alcohol withdrawal, ERP notified, orders received, pt medicated per MAR.  Alert team at bedside.

## 2018-01-20 NOTE — ED NOTES
Pt ambulated to restroom with steady gait.  Urine collected and sent to lab.  Pt asking for something for withdrawal, ERP notified.

## 2018-01-20 NOTE — ED PROVIDER NOTES
"ED Provider Note Addendum    Scribed for Angeles Henderson M.D. by Jimmy Schuler on 1/20/2018 at 6:50 AM.     This is an addendum to the note on Gopal Ceja.  For further details and full chart information, see patient's initial note.       6:00 AM - I discussed the patient's case with Dr. Gutierrez ([facility]) who will transfer care of the patient to me at this time.        6:50 AM  - Nursing reports that the patient is sober and no longer suicidal. I ordered lorazepam tablet 1 mg prior to discharge. He was given a referral to [facility] and instructed to return to the ED if his symptoms worsen. Patient understands and agrees. His vitals prior to discharge are: /71   Pulse 99   Temp 36.8 °C (98.2 °F)   Resp 16   Ht 1.727 m (5' 8\")   Wt 68 kg (150 lb)   SpO2 96%   BMI 22.81 kg/m²     The patient will return for new or worsening symptoms and is stable at the time of discharge.    DISPOSITION:  Patient will be discharged home in stable condition.    FOLLOW UP:  [facility], Emergency Dept  [address] [address] [phone]    As needed, If symptoms worsen    [facility]  [address]  [facility] [address]  [facility] [id number] [phone]    Call in 1 day        FINAL IMPRESSION  1. Alcoholic intoxication without complication (CMS-HCC)         Jimmy FELIPE (Scribe), am scribing for, and in the presence of, Angeles Henderson M.D..    Electronically signed by: Jimmy Schuler (Scribe), 1/20/2018    Angeles FELIPE M.D. personally performed the services described in this documentation, as scribed by Jimmy Schuler in my presence, and it is both accurate and complete.    The note accurately reflects work and decisions made by me.  Angeles Henderson  1/20/2018  1:15 PM      "

## 2018-01-20 NOTE — DISCHARGE PLANNING
Alert Team Note: Patient currently above the legal alcohol limit and is not ready for psychiatric assessment. Will continue to monitor.  Roz Ramirez, Ph.D.

## 2018-01-20 NOTE — DISCHARGE PLANNING
"Alert Team Note: Patient seen seated on hospital bed, visibly experiencing tremors. Patient states he has no idea how he came to the hospital for for what reason. He reports no suicidal, homicidal, or hallucinations and is requesting discharge. Patient states he resides in [place] and was here only to see his 7 year old son. Patient states he has been sober for 2 months until his \"ex gave him some THC gummy bears and he became paranoid and started drinking\".    Will consult with physician and continue to follow.    Roz Ramirez, Ph.D.  "

## 2018-01-21 ENCOUNTER — HOSPITAL ENCOUNTER (EMERGENCY)
Facility: MEDICAL CENTER | Age: 33
End: 2018-01-21
Attending: EMERGENCY MEDICINE
Payer: MEDICARE

## 2018-01-21 VITALS
BODY MASS INDEX: 23.49 KG/M2 | TEMPERATURE: 97.2 F | OXYGEN SATURATION: 92 % | DIASTOLIC BLOOD PRESSURE: 90 MMHG | RESPIRATION RATE: 18 BRPM | HEIGHT: 68 IN | SYSTOLIC BLOOD PRESSURE: 124 MMHG | WEIGHT: 155 LBS | HEART RATE: 91 BPM

## 2018-01-21 DIAGNOSIS — F10.920 ACUTE ALCOHOLIC INTOXICATION WITHOUT COMPLICATION (HCC): ICD-10-CM

## 2018-01-21 PROCEDURE — 99284 EMERGENCY DEPT VISIT MOD MDM: CPT

## 2018-01-21 ASSESSMENT — LIFESTYLE VARIABLES: DO YOU DRINK ALCOHOL: YES

## 2018-01-21 NOTE — DISCHARGE INSTRUCTIONS
"Alcohol Intoxication  Alcohol intoxication occurs when the amount of alcohol that a person has consumed impairs his or her ability to mentally and physically function. Alcohol directly impairs the normal chemical activity of the brain. Drinking large amounts of alcohol can lead to changes in mental function and behavior, and it can cause many physical effects that can be harmful.   Alcohol intoxication can range in severity from mild to very severe. Various factors can affect the level of intoxication that occurs, such as the person's age, gender, weight, frequency of alcohol consumption, and the presence of other medical conditions (such as diabetes, seizures, or heart conditions). Dangerous levels of alcohol intoxication may occur when people drink large amounts of alcohol in a short period (binge drinking). Alcohol can also be especially dangerous when combined with certain prescription medicines or \"recreational\" drugs.  SIGNS AND SYMPTOMS  Some common signs and symptoms of mild alcohol intoxication include:  · Loss of coordination.  · Changes in mood and behavior.  · Impaired judgment.  · Slurred speech.  As alcohol intoxication progresses to more severe levels, other signs and symptoms will appear. These may include:  · Vomiting.  · Confusion and impaired memory.  · Slowed breathing.  · Seizures.  · Loss of consciousness.  DIAGNOSIS   Your health care provider will take a medical history and perform a physical exam. You will be asked about the amount and type of alcohol you have consumed. Blood tests will be done to measure the concentration of alcohol in your blood. In many places, your blood alcohol level must be lower than 80 mg/dL (0.08%) to legally drive. However, many dangerous effects of alcohol can occur at much lower levels.   TREATMENT   People with alcohol intoxication often do not require treatment. Most of the effects of alcohol intoxication are temporary, and they go away as the alcohol naturally " leaves the body. Your health care provider will monitor your condition until you are stable enough to go home. Fluids are sometimes given through an IV access tube to help prevent dehydration.   HOME CARE INSTRUCTIONS  · Do not drive after drinking alcohol.  · Stay hydrated. Drink enough water and fluids to keep your urine clear or pale yellow. Avoid caffeine.    · Only take over-the-counter or prescription medicines as directed by your health care provider.    SEEK MEDICAL CARE IF:   · You have persistent vomiting.    · You do not feel better after a few days.  · You have frequent alcohol intoxication. Your health care provider can help determine if you should see a substance use treatment counselor.  SEEK IMMEDIATE MEDICAL CARE IF:   · You become shaky or tremble when you try to stop drinking.    · You shake uncontrollably (seizure).    · You throw up (vomit) blood. This may be bright red or may look like black coffee grounds.    · You have blood in your stool. This may be bright red or may appear as a black, tarry, bad smelling stool.    · You become lightheaded or faint.    MAKE SURE YOU:   · Understand these instructions.  · Will watch your condition.  · Will get help right away if you are not doing well or get worse.     This information is not intended to replace advice given to you by your health care provider. Make sure you discuss any questions you have with your health care provider.     Document Released: 09/27/2006 Document Revised: 08/20/2014 Document Reviewed: 05/23/2014  [employer] Interactive Patient Education ©2016 [employer]

## 2018-01-21 NOTE — ED NOTES
All lines and monitors disconnected.  Discharge instructions reviewed, questions answered.  Pt to gael, escorted by RN.  Pt states all belongings in possession.

## 2018-01-21 NOTE — ED NOTES
".  Chief Complaint   Patient presents with   • Alcohol Intoxication   [place]  Pt states \" I've been drinking Earthquakes - lots of them. \"  Pt also states \" I want to detox, I don't want to hallucinate. \"  + bilateral eye irritation.  + OS/OD injection noted.  Pt requests \" something to eat or drink.\"  Pt calm and cooperative.      "

## 2018-01-21 NOTE — ED PROVIDER NOTES
ED Provider Note    Scribed for Naeem Santana M.D. by Val Perez. 1/21/2018, 10:44 AM.    Primary care provider: [facility]  Means of arrival: [facility]   History obtained from: Patient  History limited by: None    CHIEF COMPLAINT  Chief Complaint   Patient presents with   • Alcohol Intoxication       HPI  Gopal Ceja is a 32 y.o. male who presents to the Emergency Department for evaluation of alcohol intoxication onset 5 days ago. Patient reports being sober for two months prior to 5 days ago while living in [place].  He came back to [place] to visit his son and started drinking again to relieve his anxiety. He has no other complaints and confirms he hopes to stop drinking.       REVIEW OF SYSTEMS  Pertinent positives include alcohol abuse, anxiety.   Pertinent negatives include no other complaints.    E.       PAST MEDICAL HISTORY   has a past medical history of Acute anxiety; ADHD (attention deficit hyperactivity disorder); ADHD (attention deficit hyperactivity disorder); Alcohol abuse; Bipolar affective (CMS-HCC); Depression; Ectrodactyly-ectodermal dysplasia-clefting syndrome; ETOH abuse; and Psychiatric disorder.      SURGICAL HISTORY   has a past surgical history that includes other orthopedic surgery.      SOCIAL HISTORY  Social History   Substance Use Topics   • Smoking status: Current Some Day Smoker     Packs/day: 0.25     Types: Cigarettes   • Smokeless tobacco: Never Used      Comment: Smokes couple of times a month   • Alcohol use No      Comment: Sober since 6/20/2017      History   Drug Use No       FAMILY HISTORY  Family History   Problem Relation Age of Onset   • Heart Disease Neg Hx    • Hypertension Neg Hx    • Hyperlipidemia Neg Hx        CURRENT MEDICATIONS  Home Medications    **Home medications have not yet been reviewed for this encounter**         ALLERGIES  No Known Allergies        PHYSICAL EXAM  VITAL SIGNS: /90   Pulse 89   Temp 36.2 °C (97.2 °F)   Resp 20   " Ht 1.727 m (5' 8\")   Wt 70.3 kg (155 lb)   BMI 23.57 kg/m²   Nursing note and vitals reviewed.  Constitutional: No distress.   HENT: Head is atraumatic. Oropharynx is moist.   Eyes: Conjunctivae are normal. Pupils are equal, round, and reactive to light.   Cardiovascular: Normal peripheral perfusion  Respiratory: No respiratory distress.   Musculoskeletal: Chronic dysmorphic hands. Normal range of motion.   Neurological: Alert. Slightly intoxicated speech. No focal deficits noted.    Skin: No rash.   Psych: Appropriate for clinical situation         COURSE & MEDICAL DECISION MAKING  Nursing notes, VS, PMSFHx reviewed in chart.    10:44 AM - Patient seen and examined at bedside. Patient agrees to discharge home. He was strongly encouraged to seek detox and follow up with primary care.     Patient's blood pressure was elevated, I believe it is likely secondary to medical condition. However I have advised home monitoring and if it continues to be 120/80 or higher, advised to followup with primary care physician for blood pressure management.     The patient will be observed in the emergency department toys clinically stable and he'll be discharged.    DISPOSITION:  Patient will be discharged home in stable condition.      FOLLOW UP:  [facility], Emergency Dept  [address] [facility] [address] [phone]    If symptoms worsen    [facility]  [address] [place] [address] [address] [place] [id number] [phone]    Schedule an appointment as soon as possible for a visit          OUTPATIENT MEDICATIONS:  New Prescriptions    No medications on file       The patient was discharged home with an information sheet on alcohol intoxication and told to return immediately for any signs or symptoms listed.  The patient agreed to the discharge precautions and follow-up plan which is documented in EPIC.      FINAL IMPRESSION  1. Acute alcoholic intoxication without complication (CMS-HCC)     "       Val FELIPE (Scribe), am scribing for, and in the presence of, Naeem Santana M.D..  Electronically signed by: Val Perez (Janee), 1/21/2018  Naeem FELIPE M.D. personally performed the services described in this documentation, as scribed by Val Perez in my presence, and it is both accurate and complete.    The note accurately reflects work and decisions made by me.  Naeem Santana  1/21/2018  11:24 AM

## 2018-01-24 ENCOUNTER — HOSPITAL ENCOUNTER (EMERGENCY)
Dept: HOSPITAL 8 - ED | Age: 33
Discharge: HOME | End: 2018-01-24
Payer: MEDICARE

## 2018-01-24 VITALS — WEIGHT: 159.84 LBS | HEIGHT: 68 IN | BODY MASS INDEX: 24.22 KG/M2

## 2018-01-24 VITALS — SYSTOLIC BLOOD PRESSURE: 150 MMHG | DIASTOLIC BLOOD PRESSURE: 96 MMHG

## 2018-01-24 DIAGNOSIS — Z59.0: ICD-10-CM

## 2018-01-24 DIAGNOSIS — F41.1: ICD-10-CM

## 2018-01-24 DIAGNOSIS — F32.9: ICD-10-CM

## 2018-01-24 DIAGNOSIS — F17.210: ICD-10-CM

## 2018-01-24 DIAGNOSIS — H10.022: Primary | ICD-10-CM

## 2018-01-24 DIAGNOSIS — F10.220: ICD-10-CM

## 2018-01-24 DIAGNOSIS — G62.9: ICD-10-CM

## 2018-01-24 DIAGNOSIS — R00.0: ICD-10-CM

## 2018-01-24 PROCEDURE — 99283 EMERGENCY DEPT VISIT LOW MDM: CPT

## 2018-01-24 PROCEDURE — 82962 GLUCOSE BLOOD TEST: CPT

## 2018-01-24 SDOH — ECONOMIC STABILITY - HOUSING INSECURITY: HOMELESSNESS: Z59.0

## 2018-08-13 ENCOUNTER — HOSPITAL ENCOUNTER (EMERGENCY)
Dept: HOSPITAL 8 - ED | Age: 33
Discharge: HOME | End: 2018-08-13
Payer: MEDICARE

## 2018-08-13 VITALS — BODY MASS INDEX: 23.51 KG/M2 | WEIGHT: 158.73 LBS | HEIGHT: 69 IN

## 2018-08-13 VITALS — DIASTOLIC BLOOD PRESSURE: 95 MMHG | SYSTOLIC BLOOD PRESSURE: 138 MMHG

## 2018-08-13 DIAGNOSIS — F10.120: Primary | ICD-10-CM

## 2018-08-13 DIAGNOSIS — F17.200: ICD-10-CM

## 2018-08-13 DIAGNOSIS — Z79.899: ICD-10-CM

## 2018-08-13 PROCEDURE — 80307 DRUG TEST PRSMV CHEM ANLYZR: CPT

## 2018-08-13 PROCEDURE — 99283 EMERGENCY DEPT VISIT LOW MDM: CPT

## 2018-08-13 PROCEDURE — 36415 COLL VENOUS BLD VENIPUNCTURE: CPT

## 2018-08-16 ENCOUNTER — HOSPITAL ENCOUNTER (EMERGENCY)
Dept: HOSPITAL 8 - ED | Age: 33
End: 2018-08-16
Payer: MEDICARE

## 2018-08-16 VITALS — WEIGHT: 160.94 LBS | HEIGHT: 68 IN | BODY MASS INDEX: 24.39 KG/M2

## 2018-08-16 DIAGNOSIS — F17.200: ICD-10-CM

## 2018-08-16 DIAGNOSIS — F90.9: ICD-10-CM

## 2018-08-16 DIAGNOSIS — F10.220: Primary | ICD-10-CM

## 2018-08-16 PROCEDURE — 99283 EMERGENCY DEPT VISIT LOW MDM: CPT

## 2018-08-17 ENCOUNTER — HOSPITAL ENCOUNTER (EMERGENCY)
Facility: MEDICAL CENTER | Age: 33
End: 2018-08-17
Attending: EMERGENCY MEDICINE
Payer: MEDICARE

## 2018-08-17 VITALS
SYSTOLIC BLOOD PRESSURE: 125 MMHG | HEIGHT: 68 IN | TEMPERATURE: 98.4 F | RESPIRATION RATE: 16 BRPM | DIASTOLIC BLOOD PRESSURE: 89 MMHG | BODY MASS INDEX: 21.22 KG/M2 | WEIGHT: 140 LBS | OXYGEN SATURATION: 95 % | HEART RATE: 112 BPM

## 2018-08-17 VITALS
TEMPERATURE: 97.7 F | BODY MASS INDEX: 21.22 KG/M2 | SYSTOLIC BLOOD PRESSURE: 117 MMHG | HEIGHT: 68 IN | WEIGHT: 140 LBS | HEART RATE: 93 BPM | OXYGEN SATURATION: 94 % | DIASTOLIC BLOOD PRESSURE: 77 MMHG | RESPIRATION RATE: 16 BRPM

## 2018-08-17 DIAGNOSIS — H10.9 CONJUNCTIVITIS OF BOTH EYES, UNSPECIFIED CONJUNCTIVITIS TYPE: ICD-10-CM

## 2018-08-17 DIAGNOSIS — F10.920 ALCOHOLIC INTOXICATION WITHOUT COMPLICATION (HCC): ICD-10-CM

## 2018-08-17 DIAGNOSIS — F10.929 ACUTE ALCOHOLIC INTOXICATION WITH COMPLICATION (HCC): ICD-10-CM

## 2018-08-17 DIAGNOSIS — R46.89 AGGRESSIVE BEHAVIOR OF ADULT: ICD-10-CM

## 2018-08-17 DIAGNOSIS — F10.20 ALCOHOL USE DISORDER, SEVERE, DEPENDENCE (HCC): ICD-10-CM

## 2018-08-17 DIAGNOSIS — Z59.00 HOMELESSNESS: ICD-10-CM

## 2018-08-17 LAB — ETHANOL BLD-MCNC: 0.27 G/DL

## 2018-08-17 PROCEDURE — 99284 EMERGENCY DEPT VISIT MOD MDM: CPT

## 2018-08-17 PROCEDURE — 700101 HCHG RX REV CODE 250: Performed by: EMERGENCY MEDICINE

## 2018-08-17 PROCEDURE — 80307 DRUG TEST PRSMV CHEM ANLYZR: CPT

## 2018-08-17 RX ORDER — POLYMYXIN B SULFATE AND TRIMETHOPRIM 1; 10000 MG/ML; [USP'U]/ML
1 SOLUTION OPHTHALMIC ONCE
Status: COMPLETED | OUTPATIENT
Start: 2018-08-17 | End: 2018-08-17

## 2018-08-17 RX ADMIN — POLYMYXIN B SULFATE AND TRIMETHOPRIM SULFATE 1 DROP: 10000; 1 SOLUTION/ DROPS OPHTHALMIC at 10:55

## 2018-08-17 SDOH — ECONOMIC STABILITY - HOUSING INSECURITY: HOMELESSNESS UNSPECIFIED: Z59.00

## 2018-08-17 ASSESSMENT — ENCOUNTER SYMPTOMS
SHORTNESS OF BREATH: 0
FEVER: 0
NAUSEA: 0
EYE PAIN: 0
COUGH: 0
BACK PAIN: 0
SORE THROAT: 0
PHOTOPHOBIA: 0
DIARRHEA: 0
EYE REDNESS: 1
SEIZURES: 0
VOMITING: 0
BLURRED VISION: 1
HEADACHES: 0
EYE DISCHARGE: 1
DOUBLE VISION: 0

## 2018-08-17 ASSESSMENT — LIFESTYLE VARIABLES: DO YOU DRINK ALCOHOL: YES

## 2018-08-17 ASSESSMENT — PAIN SCALES - GENERAL: PAINLEVEL_OUTOF10: 0
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
## 2018-08-17 NOTE — ED PROVIDER NOTES
CHIEF COMPLAINT  Chief Complaint   Patient presents with   • Alcohol Intoxication   • Generalized Body Aches       HPI  Gopal Ceja is a 33 y.o. male who presents to reschedule for evaluation of alcohol intoxication and reported body aches.  Patient was quite sleepy on initial evaluation but was able to tell me that he routinely drinks too much.  He had no complaints of pain, specifically chest or abdomen, and had not had any recent vomiting or diarrhea.  He had no recent injuries and did not remember striking his head    REVIEW OF SYSTEMS  Review of Systems   Constitutional: Negative for fever.   HENT: Negative for congestion, ear pain and sore throat.    Eyes: Positive for blurred vision, discharge and redness. Negative for double vision, photophobia and pain.   Respiratory: Negative for cough and shortness of breath.    Cardiovascular: Negative for chest pain and leg swelling.   Gastrointestinal: Negative for diarrhea, nausea and vomiting.   Musculoskeletal: Negative for back pain.   Skin: Negative for rash.   Neurological: Negative for seizures and headaches.   Psychiatric/Behavioral: Negative for suicidal ideas.       PAST MEDICAL HISTORY   has a past medical history of Acute anxiety; ADHD (attention deficit hyperactivity disorder); ADHD (attention deficit hyperactivity disorder); Alcohol abuse; Bipolar affective (CMS-HCC) (HCC); Depression; Ectrodactyly-ectodermal dysplasia-clefting syndrome; ETOH abuse; and Psychiatric disorder.    SOCIAL HISTORY  Social History     Social History Main Topics   • Smoking status: Current Some Day Smoker     Packs/day: 0.25     Types: Cigarettes   • Smokeless tobacco: Never Used      Comment: Smokes couple of times a month   • Alcohol use No      Comment: Sober since 6/20/2017   • Drug use: No   • Sexual activity: Not on file       SURGICAL HISTORY   has a past surgical history that includes other orthopedic surgery.    CURRENT MEDICATIONS  Home Medications    **Home  "medications have not yet been reviewed for this encounter**         ALLERGIES  No Known Allergies    PHYSICAL EXAM  VITAL SIGNS: /77   Pulse 82   Temp 36.5 °C (97.7 °F)   Resp 14   Ht 1.727 m (5' 8\")   Wt 63.5 kg (140 lb)   SpO2 95%   BMI 21.29 kg/m²    Gen: Sleeping, disheveled, poor hygiene  HEENT: No signs of trauma, Bilateral external ears normal, Nose normal. Conjunctiva injected bilaterally, bilateral eyelid mattering, Non-icteric. Pupils equal.  Cardiovascular: Regular rate and rhythm, no murmurs.   Thorax & Lungs: Normal breath sounds, No respiratory distress, No wheezing bilateral chest rise  Abdomen: Bowel sounds normal, Soft, no masses.No Guarding   Skin: Warm, Dry, No erythema, No rash.   Back: No bony tenderness, No CVA tenderness.   Extremities: Intact distal pulses, No edema,  No tenderness to palpation or major deformities noted.   Neurologic: wakes to voice, moves all extremities spontaneously        INITIAL IMPRESSION  Patient appears quite intoxicated and has no convincing findings to suggest an emergent medical or traumatic issue.  Patient will need to sober a bit before his discharge will that I did not feel any further laboratory evaluation aside from a diagnostic alcohol would benefit the patient.    Results for orders placed or performed during the hospital encounter of 08/17/18   DIAGNOSTIC ALCOHOL   Result Value Ref Range    Diagnostic Alcohol 0.27 (H) 0.00 g/dL       COURSE & MEDICAL DECISION MAKING  Pertinent Labs & Imaging studies reviewed. (See chart for details)  Patient arrives for evaluation of what appears to be intoxication which is a chronic state for the patient according to his medical record.  He has no convincing findings to suggest an emergent medical or traumatic issue but does appear to have conjunctivitis.  Patient will be given a bottle of Polytrim eyedrops for use at home however it is also likely if this is allergic or viral.  Either way, I feel the patient " is safe for discharge despite his continued use of alcohol.  He will be encouraged to follow-up with substance abuse treatment.      FINAL IMPRESSION  1. Alcoholic intoxication without complication (HCC)    2. Conjunctivitis of both eyes, unspecified conjunctivitis type        Electronically signed by: Barry Leger, 8/17/2018 5:10 AM

## 2018-08-17 NOTE — ED NOTES
Attempted to get pt up and walking, pt is unsteady at the moment. Will wait for discharge when pt is stable when ambulating.

## 2018-08-17 NOTE — ED TRIAGE NOTES
[facility] brought to ER [facility] Pt d/c from ER this AM. Awake and alert, but intoxicated at present. Respirations even and unlabored. Skin is warm and dry to touch. VSS. NAD. Pt in hallway bed 37H. Will continue to monitor.

## 2018-08-17 NOTE — DISCHARGE INSTRUCTIONS
Bacterial Conjunctivitis  Introduction  Bacterial conjunctivitis is an infection of your conjunctiva. This is the clear membrane that covers the white part of your eye and the inner surface of your eyelid. This condition can make your eye:  · Red or pink.  · Itchy.  This condition is caused by bacteria. This condition spreads very easily from person to person (is contagious) and from one eye to the other eye.  Follow these instructions at home:  Medicines  · Take or apply your antibiotic medicine as told by your doctor. Do not stop taking or applying the antibiotic even if you start to feel better.  · Take or apply over-the-counter and prescription medicines only as told by your doctor.  · Do not touch your eyelid with the eye drop bottle or the ointment tube.  Managing discomfort  · Wipe any fluid from your eye with a warm, wet washcloth or a cotton ball.  · Place a cool, clean washcloth on your eye. Do this for 10-20 minutes, 3-4 times per day.  General instructions  · Do not wear contact lenses until the irritation is gone. Wear glasses until your doctor says it is okay to wear contacts.  · Do not wear eye makeup until your symptoms are gone. Throw away any old makeup.  · Change or wash your pillowcase every day.  · Do not share towels or washcloths with anyone.  · Wash your hands often with soap and water. Use paper towels to dry your hands.  · Do not touch or rub your eyes.  · Do not drive or use heavy machinery if your vision is blurry.  Contact a doctor if:  · You have a fever.  · Your symptoms do not get better after 10 days.  Get help right away if:  · You have a fever and your symptoms suddenly get worse.  · You have very bad pain when you move your eye.  · Your face:  ¨ Hurts.  ¨ Is red.  ¨ Is swollen.  · You have sudden loss of vision.  This information is not intended to replace advice given to you by your health care provider. Make sure you discuss any questions you have with your health care  provider.  Document Released: 09/26/2009 Document Revised: 05/25/2017 Document Reviewed: 09/29/2016  © 2017 [employer]vier    Alcohol Problems  Most adults who drink alcohol drink in moderation (not a lot) are at low risk for developing problems related to their drinking. However, all drinkers, including low-risk drinkers, should know about the health risks connected with drinking alcohol.  RECOMMENDATIONS FOR LOW-RISK DRINKING   Drink in moderation. Moderate drinking is defined as follows:   · Men - no more than 2 drinks per day.  · Nonpregnant women - no more than 1 drink per day.  · Over age 65 - no more than 1 drink per day.  A standard drink is 12 grams of pure alcohol, which is equal to a 12 ounce bottle of beer or wine cooler, a 5 ounce glass of wine, or 1.5 ounces of distilled spirits (such as whiskey, compa, vodka, or rum).   ABSTAIN FROM (DO NOT DRINK) ALCOHOL:  · When pregnant or considering pregnancy.  · When taking a medication that interacts with alcohol.  · If you are alcohol dependent.  · A medical condition that prohibits drinking alcohol (such as ulcer, liver disease, or heart disease).  DISCUSS WITH YOUR CAREGIVER:  · If you are at risk for coronary heart disease, discuss the potential benefits and risks of alcohol use: Light to moderate drinking is associated with lower rates of coronary heart disease in certain populations (for example, men over age 45 and postmenopausal women). Infrequent or nondrinkers are advised not to begin light to moderate drinking to reduce the risk of coronary heart disease so as to avoid creating an alcohol-related problem. Similar protective effects can likely be gained through proper diet and exercise.  · Women and the elderly have smaller amounts of body water than men. As a result women and the elderly achieve a higher blood alcohol concentration after drinking the same amount of alcohol.  · Exposing a fetus to alcohol can cause a broad range of birth defects referred  to as Fetal Alcohol Syndrome (FAS) or Alcohol-Related Birth Defects (ARBD). Although FAS/ARBD is connected with excessive alcohol consumption during pregnancy, studies also have reported neurobehavioral problems in infants born to mothers reporting drinking an average of 1 drink per day during pregnancy.  · Heavier drinking (the consumption of more than 4 drinks per occasion by men and more than 3 drinks per occasion by women) impairs learning (cognitive) and psychomotor functions and increases the risk of alcohol-related problems, including accidents and injuries.  CAGE QUESTIONS:   · Have you ever felt that you should Cut down on your drinking?  · Have people Annoyed you by criticizing your drinking?  · Have you ever felt bad or Guilty about your drinking?  · Have you ever had a drink first thing in the morning to steady your nerves or get rid of a hangover (Eye opener)?  If you answered positively to any of these questions: You may be at risk for alcohol-related problems if alcohol consumption is:   · Men: Greater than 14 drinks per week or more than 4 drinks per occasion.  · Women: Greater than 7 drinks per week or more than 3 drinks per occasion.  Do you or your family have a medical history of alcohol-related problems, such as:  · Blackouts.  · Sexual dysfunction.  · Depression.  · Trauma.  · Liver dysfunction.  · Sleep disorders.  · Hypertension.  · Chronic abdominal pain.  · Has your drinking ever caused you problems, such as problems with your family, problems with your work (or school) performance, or accidents/injuries?  · Do you have a compulsion to drink or a preoccupation with drinking?  · Do you have poor control or are you unable to stop drinking once you have started?  · Do you have to drink to avoid withdrawal symptoms?  · Do you have problems with withdrawal such as tremors, nausea, sweats, or mood disturbances?  · Does it take more alcohol than in the past to get you high?  · Do you feel a strong  urge to drink?  · Do you change your plans so that you can have a drink?  · Do you ever drink in the morning to relieve the shakes or a hangover?  If you have answered a number of the previous questions positively, it may be time for you to talk to your caregivers, family, and friends and see if they think you have a problem. Alcoholism is a chemical dependency that keeps getting worse and will eventually destroy your health and relationships. Many alcoholics end up dead, impoverished, or in [facility]. This is often the end result of all chemical dependency.  · Do not be discouraged if you are not ready to take action immediately.  · Decisions to change behavior often involve up and down desires to change and feeling like you cannot decide.  · Try to think more seriously about your drinking behavior.  · Think of the reasons to quit.  WHERE TO GO FOR ADDITIONAL INFORMATION   · The National Leslie on Alcohol Abuse and Alcoholism (NIAAA)  www.niaaa.nih.gov  · National [place] on Alcoholism and Drug Dependence (NCADD)  www.ncadd.org  · American Society of Addiction Medicine (ASAM)  www.asam.org   Document Released: 12/18/2006 Document Revised: 03/11/2013 Document Reviewed: 08/05/2009  [employer]® Patient Information ©2014 [employer].

## 2018-08-17 NOTE — ED NOTES
WC to room.  Agree with triage assessment.  Chart placed for ERP eval.    Briana fall assessment completed. Pt is High risk for fall. Interventions complete. Pt placed in yellow non slip socks, wrist band placed, green sign on door. Bed locked in low position, call light in place. Personal possessions in place.  Personal needs assessed. Charge nurse notified to move pt to a more visible room if available. Safety assessed. Will monitor frequently.

## 2018-08-17 NOTE — ED TRIAGE NOTES
Gopal Ceja  33 y.o.  male  Chief Complaint   Patient presents with   • Alcohol Intoxication   • Generalized Body Aches     Brought in by roque picked up from [facility] bus station in [facility] c/o not feeling well due to drinking too much alcohol. Alert and oriented. Bilateral eye redness noted.

## 2018-08-17 NOTE — ED NOTES
Present to ED because of Fall? (syncope, seizure, ALOC)  NO    Age > 70? NO    Altered Mental Status? NO    Impaired Mobility? YES    Nursing Judgement (weakness, dizziness)? YES    Interventions:  Move patient closer to nurses' station  Familiarize the patient with environment  Place call light within reach and demonstrate call light use  Keep patient's personal possessions within patient safe reach  Place stretcher in low position and brakes locked  Place yellow socks and armband on patient  Place green triangle on patient's door  Give patient urinal if applicable  Keep floor surfaces clean and dry  Keep patient care areas uncluttered  Assess patient hourly for pain, personal needs, position change, and call light access.

## 2018-08-17 NOTE — ED NOTES
Pt provided with discharge instructions, instructions for follow up appointment, s/s of when to seek emergency care.  Pt verbalizes understanding.  Pt discharged in good condition.
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
## 2018-08-17 NOTE — ED NOTES
Pt now awake and would like to leave.  Pt ambulatory with steady gait.  Pt reports he has an appointment with his .

## 2018-10-21 ENCOUNTER — HOSPITAL ENCOUNTER (EMERGENCY)
Facility: MEDICAL CENTER | Age: 33
End: 2018-10-22
Attending: EMERGENCY MEDICINE
Payer: MEDICARE

## 2018-10-21 ENCOUNTER — HOSPITAL ENCOUNTER (EMERGENCY)
Dept: HOSPITAL 8 - ED | Age: 33
Discharge: HOME | End: 2018-10-21
Payer: MEDICARE

## 2018-10-21 VITALS — DIASTOLIC BLOOD PRESSURE: 72 MMHG | SYSTOLIC BLOOD PRESSURE: 119 MMHG

## 2018-10-21 VITALS — HEIGHT: 67 IN | BODY MASS INDEX: 24.22 KG/M2 | WEIGHT: 154.32 LBS

## 2018-10-21 VITALS — DIASTOLIC BLOOD PRESSURE: 64 MMHG | SYSTOLIC BLOOD PRESSURE: 128 MMHG

## 2018-10-21 VITALS — WEIGHT: 149.91 LBS | HEIGHT: 68 IN | BODY MASS INDEX: 22.72 KG/M2

## 2018-10-21 DIAGNOSIS — Z72.9: ICD-10-CM

## 2018-10-21 DIAGNOSIS — H10.023: ICD-10-CM

## 2018-10-21 DIAGNOSIS — F10.929 ALCOHOLIC INTOXICATION WITH COMPLICATION (HCC): ICD-10-CM

## 2018-10-21 DIAGNOSIS — F10.220: Primary | ICD-10-CM

## 2018-10-21 DIAGNOSIS — F90.9: ICD-10-CM

## 2018-10-21 DIAGNOSIS — R10.13: ICD-10-CM

## 2018-10-21 DIAGNOSIS — F41.1: ICD-10-CM

## 2018-10-21 DIAGNOSIS — F32.9: ICD-10-CM

## 2018-10-21 DIAGNOSIS — R10.84: ICD-10-CM

## 2018-10-21 DIAGNOSIS — F10.120: Primary | ICD-10-CM

## 2018-10-21 DIAGNOSIS — F10.10 ETOH ABUSE: ICD-10-CM

## 2018-10-21 DIAGNOSIS — F17.210: ICD-10-CM

## 2018-10-21 LAB
ALBUMIN SERPL-MCNC: 3.9 G/DL (ref 3.4–5)
ALP SERPL-CCNC: 125 U/L (ref 45–117)
ALT SERPL-CCNC: 135 U/L (ref 12–78)
ANION GAP SERPL CALC-SCNC: 8 MMOL/L (ref 5–15)
BASOPHILS # BLD AUTO: 0.05 X10^3/UL (ref 0–0.1)
BASOPHILS NFR BLD AUTO: 1 % (ref 0–1)
BILIRUB SERPL-MCNC: 0.5 MG/DL (ref 0.2–1)
CALCIUM SERPL-MCNC: 8.3 MG/DL (ref 8.5–10.1)
CHLORIDE SERPL-SCNC: 103 MMOL/L (ref 98–107)
CREAT SERPL-MCNC: 0.93 MG/DL (ref 0.7–1.3)
EOSINOPHIL # BLD AUTO: 0.02 X10^3/UL (ref 0–0.4)
EOSINOPHIL NFR BLD AUTO: 0 % (ref 1–7)
ERYTHROCYTE [DISTWIDTH] IN BLOOD BY AUTOMATED COUNT: 14.1 % (ref 9.4–14.8)
LYMPHOCYTES # BLD AUTO: 2.72 X10^3/UL (ref 1–3.4)
LYMPHOCYTES NFR BLD AUTO: 33 % (ref 22–44)
MCH RBC QN AUTO: 30.1 PG (ref 27.5–34.5)
MCHC RBC AUTO-ENTMCNC: 33.5 G/DL (ref 33.2–36.2)
MCV RBC AUTO: 90.1 FL (ref 81–97)
MD: NO
MONOCYTES # BLD AUTO: 0.72 X10^3/UL (ref 0.2–0.8)
MONOCYTES NFR BLD AUTO: 9 % (ref 2–9)
NEUTROPHILS # BLD AUTO: 4.63 X10^3/UL (ref 1.8–6.8)
NEUTROPHILS NFR BLD AUTO: 57 % (ref 42–75)
PLATELET # BLD AUTO: 281 X10^3/UL (ref 130–400)
PMV BLD AUTO: 9.5 FL (ref 7.4–10.4)
PROT SERPL-MCNC: 8.4 G/DL (ref 6.4–8.2)
RBC # BLD AUTO: 5.77 X10^6/UL (ref 4.38–5.82)

## 2018-10-21 PROCEDURE — 81001 URINALYSIS AUTO W/SCOPE: CPT

## 2018-10-21 PROCEDURE — 36415 COLL VENOUS BLD VENIPUNCTURE: CPT

## 2018-10-21 PROCEDURE — 80053 COMPREHEN METABOLIC PANEL: CPT

## 2018-10-21 PROCEDURE — 99284 EMERGENCY DEPT VISIT MOD MDM: CPT

## 2018-10-21 PROCEDURE — 99283 EMERGENCY DEPT VISIT LOW MDM: CPT

## 2018-10-21 PROCEDURE — 304561 HCHG STAT O2

## 2018-10-21 PROCEDURE — 80307 DRUG TEST PRSMV CHEM ANLYZR: CPT

## 2018-10-21 PROCEDURE — 85025 COMPLETE CBC W/AUTO DIFF WBC: CPT

## 2018-10-21 ASSESSMENT — PAIN SCALES - GENERAL: PAINLEVEL_OUTOF10: 7

## 2018-10-22 ENCOUNTER — HOSPITAL ENCOUNTER (EMERGENCY)
Facility: MEDICAL CENTER | Age: 33
End: 2018-10-22
Attending: EMERGENCY MEDICINE
Payer: MEDICARE

## 2018-10-22 VITALS
BODY MASS INDEX: 22.42 KG/M2 | TEMPERATURE: 97.2 F | DIASTOLIC BLOOD PRESSURE: 97 MMHG | SYSTOLIC BLOOD PRESSURE: 128 MMHG | WEIGHT: 147.93 LBS | RESPIRATION RATE: 18 BRPM | OXYGEN SATURATION: 97 % | HEIGHT: 68 IN | HEART RATE: 108 BPM

## 2018-10-22 VITALS
TEMPERATURE: 96.8 F | HEIGHT: 70 IN | OXYGEN SATURATION: 93 % | BODY MASS INDEX: 25.05 KG/M2 | SYSTOLIC BLOOD PRESSURE: 125 MMHG | DIASTOLIC BLOOD PRESSURE: 67 MMHG | WEIGHT: 175 LBS | RESPIRATION RATE: 20 BRPM | HEART RATE: 106 BPM

## 2018-10-22 DIAGNOSIS — F32.A DEPRESSION, UNSPECIFIED DEPRESSION TYPE: ICD-10-CM

## 2018-10-22 DIAGNOSIS — F10.20 ALCOHOLISM (HCC): ICD-10-CM

## 2018-10-22 LAB
APPEARANCE UR: CLEAR
BACTERIA #/AREA URNS HPF: NEGATIVE /HPF
BILIRUB UR QL STRIP.AUTO: NEGATIVE
COLOR UR: YELLOW
EPI CELLS #/AREA URNS HPF: NEGATIVE /HPF
GLUCOSE UR STRIP.AUTO-MCNC: NEGATIVE MG/DL
HYALINE CASTS #/AREA URNS LPF: ABNORMAL /LPF
KETONES UR STRIP.AUTO-MCNC: ABNORMAL MG/DL
LEUKOCYTE ESTERASE UR QL STRIP.AUTO: NEGATIVE
MICRO URNS: ABNORMAL
NITRITE UR QL STRIP.AUTO: NEGATIVE
PH UR STRIP.AUTO: 5.5 [PH]
PROT UR QL STRIP: 30 MG/DL
RBC # URNS HPF: ABNORMAL /HPF
RBC UR QL AUTO: ABNORMAL
SP GR UR STRIP.AUTO: 1.01
UROBILINOGEN UR STRIP.AUTO-MCNC: 0.2 MG/DL
WBC #/AREA URNS HPF: ABNORMAL /HPF

## 2018-10-22 PROCEDURE — 700102 HCHG RX REV CODE 250 W/ 637 OVERRIDE(OP): Performed by: EMERGENCY MEDICINE

## 2018-10-22 PROCEDURE — A9270 NON-COVERED ITEM OR SERVICE: HCPCS | Performed by: EMERGENCY MEDICINE

## 2018-10-22 PROCEDURE — 99285 EMERGENCY DEPT VISIT HI MDM: CPT

## 2018-10-22 RX ORDER — CHLORDIAZEPOXIDE HYDROCHLORIDE 25 MG/1
25 CAPSULE, GELATIN COATED ORAL ONCE
Status: COMPLETED | OUTPATIENT
Start: 2018-10-22 | End: 2018-10-22

## 2018-10-22 RX ADMIN — CHLORDIAZEPOXIDE HYDROCHLORIDE 25 MG: 25 CAPSULE ORAL at 00:27

## 2018-10-22 ASSESSMENT — PAIN SCALES - GENERAL: PAINLEVEL_OUTOF10: 0

## 2018-10-22 NOTE — ED TRIAGE NOTES
"Pt comes to ED from [facility] via EMS for alcohol withdrawal. Pt drank earlier this evening.  Pt went into casino and told them he wasn't feeling well.  Pt also states \"I am really depressed.  I don't want to answer your questions honestly because I don't want to be stuck here but I am so depressed and hopeless.\"   Pt drinks daily.    "

## 2018-10-22 NOTE — ED PROVIDER NOTES
"ED Provider Note    ER PROVIDER NOTE        CHIEF COMPLAINT  Chief Complaint   Patient presents with   • Alcohol Intoxication   • Depression       HPI  Gopal Ceja is a 33 y.o. male who presents to the emergency department complaining of depression and alcohol problems.  Patient states he was in the casino earlier tonight and feeling \"funny\" he states he needs something for his alcohol withdrawal.  No seizures, patient states he feels anxious and shaky.  No focal complaints of pain headache, neck pain, chest pain abdominal pain nausea vomiting.  Patient states he has no intention of stopping drinking.  He also states he feels depressed but denies any suicidal thoughts    REVIEW OF SYSTEMS  Pertinent positives include anxiety shaking. Pertinent negatives include no abdominal pain. See HPI for details. All other systems reviewed and are negative.    PAST MEDICAL HISTORY   has a past medical history of Acute anxiety; ADHD (attention deficit hyperactivity disorder); ADHD (attention deficit hyperactivity disorder); Alcohol abuse; Bipolar affective (HCC); Depression; Ectrodactyly-ectodermal dysplasia-clefting syndrome; ETOH abuse; and Psychiatric disorder.    SURGICAL HISTORY   has a past surgical history that includes other orthopedic surgery.    FAMILY HISTORY  Family History   Problem Relation Age of Onset   • Heart Disease Neg Hx    • Hypertension Neg Hx    • Hyperlipidemia Neg Hx        SOCIAL HISTORY  Social History     Social History   • Marital status: Single     Spouse name: N/A   • Number of children: N/A   • Years of education: N/A     Social History Main Topics   • Smoking status: Current Some Day Smoker     Packs/day: 0.25     Types: Cigarettes   • Smokeless tobacco: Never Used      Comment: Smokes couple of times a month   • Alcohol use 0.0 oz/week      Comment: drinks arash    • Drug use: No   • Sexual activity: Not on file     Other Topics Concern   • Not on file     Social History Narrative    ** Merged " "History Encounter **           History   Drug Use No       CURRENT MEDICATIONS  Home Medications     Reviewed by Yuki Amin (Pharmacy Tech) on 10/22/18 at 0008  Med List Status: Complete   Medication Last Dose Status        Patient Torye Taking any Medications                       ALLERGIES  No Known Allergies    PHYSICAL EXAM  VITAL SIGNS: /106   Pulse (!) 106   Temp 36 °C (96.8 °F)   Resp 18   Ht 1.778 m (5' 10\")   Wt 79.4 kg (175 lb)   SpO2 93%   BMI 25.11 kg/m²   Pulse ox interpretation: I interpret this pulse ox as normal.    Constitutional: Alert in no apparent distress.  HENT: No signs of trauma, Bilateral external ears normal, Nose normal.    Eyes: Pupils are equal and reactive, inflamed conjunctiva with drainage, chronic, Non-icteric.   Neck: Normal range of motion, No tenderness, Supple, No stridor.   Cardiovascular: Tachycardic, no murmurs.   Thorax & Lungs: Normal breath sounds, No respiratory distress, No wheezing, No chest tenderness.   Abdomen: Bowel sounds normal, Soft, No tenderness, No masses, No pulsatile masses. No peritoneal signs.  Skin: Warm, Dry, No erythema, No rash.   Back: No bony tenderness, No CVA tenderness.   Extremities: Chronic deformity to bilateral hands and feet intact distal pulses, No edema, No tenderness, No cyanosis,   Musculoskeletal: Good range of motion in all major joints. No tenderness to palpation or major deformities noted.   Neurologic: Alert , no fasciculations, as I walked by the room the patient was resting comfortably, when I returned to enter the room he began shaking his feet normal motor function, Normal sensory function, No focal deficits noted.   Psychiatric: Nonsuicidal    DIAGNOSTIC STUDIES / PROCEDURES        COURSE & MEDICAL DECISION MAKING  Nursing notes, VS, PMSFHx reviewed in chart.    11:58 PM Patient seen and examined at bedside. Patient will be treated with librium.   Decision Making:  This is a 33 y.o. male well-known to this " hospital presenting with continued alcohol abuse.  Patient states he feels like he is withdrawing however at this time when not observed he is resting comfortably with no tremors, he has no fasciculations on exam and no desire to stop drinking.  I do not feel that he needs emergent treatment or admission for alcohol withdrawal as he plans to just go start drinking again.  I have discussed this with the patient as well as advised on outpatient resources for alcohol cessation should he change his mind.  He does have some baseline depression surrounding his alcohol abuse but no acute decompensation or suicidal thoughts     The patient will return for new or worsening symptoms and is stable at the time of discharge.    The patient is referred to a primary physician for blood pressure management, diabetic screening, and for all other preventative health concerns.        DISPOSITION:  Patient will be discharged home in stable condition.    FOLLOW UP:  Dalton Pagan, RUSS  [address] [place] [id number]  [phone]    In 1 week  As needed      OUTPATIENT MEDICATIONS:  New Prescriptions    No medications on file         FINAL IMPRESSION  1. Alcoholic intoxication with complication (HCC)    2. ETOH abuse         The note accurately reflects work and decisions made by me.  Timbo Pierre  10/22/2018  12:18 AM

## 2018-10-22 NOTE — ED TRIAGE NOTES
"Chief Complaint   Patient presents with   • Suicidal Ideation     Pt reports he is suicidal and that he is going to \"jump off of a high building\"   • Detox     \"I\"m having DT's\", reports last drink yesterday.      /97   Pulse (!) 108   Temp 36.2 °C (97.2 °F)   Resp 18   Ht 1.727 m (5' 8\")   Wt 67.1 kg (147 lb 14.9 oz)   SpO2 97%   BMI 22.49 kg/m²     Pt ambulatory to triage for above. Pt seen earlier tonight in ED as well. Charge RN notified of pt, pt to [id number].   "

## 2018-10-22 NOTE — ED NOTES
"MD and RN in to see patient.  Patient informed he would not be able to stay in the emergency room and that he would need to leave.  He is escorted out to [facility].  \"This is horrible care.\" \"I have been getting treated like this all day.\" \"I need to stay here, I am suicidal.\"  Pt asking repeatedly to use a phone.  Informed he can use a phone when he leaves the hospital.  Ambulated to [facility].  "

## 2018-10-22 NOTE — ED NOTES
"Pt refusing to leave.  Stating \"I will come back in an ambulance if you make me leave and say that I am suicidal.\" Security called to escort patient out.  "

## 2018-10-22 NOTE — ED PROVIDER NOTES
"ED Provider Note    ER PROVIDER NOTE        CHIEF COMPLAINT  Chief Complaint   Patient presents with   • Suicidal Ideation     Pt reports he is suicidal and that he is going to \"jump off of a high building\"   • Detox     \"I\"m having DT's\", reports last drink yesterday.        HPI  Gopal Ceja is a 33 y.o. male who presents to the emergency department complaining of depression and suicidal thoughts.  Patient is well-known to this department as well as throughout the region, multiple presentations for similar.  In fact I just saw him earlier tonight.  Patient also states he needs medication for his withdrawal.  He has had no seizure, no vomiting and no other complaints.     REVIEW OF SYSTEMS  Pertinent positives include depression. Pertinent negatives include no chest pain. See HPI for details. All other systems reviewed and are negative.    PAST MEDICAL HISTORY   has a past medical history of Acute anxiety; ADHD (attention deficit hyperactivity disorder); ADHD (attention deficit hyperactivity disorder); Alcohol abuse; Bipolar affective (HCC); Depression; Ectrodactyly-ectodermal dysplasia-clefting syndrome; ETOH abuse; and Psychiatric disorder.    SURGICAL HISTORY   has a past surgical history that includes other orthopedic surgery.    FAMILY HISTORY  Family History   Problem Relation Age of Onset   • Heart Disease Neg Hx    • Hypertension Neg Hx    • Hyperlipidemia Neg Hx        SOCIAL HISTORY  Social History     Social History   • Marital status: Single     Spouse name: N/A   • Number of children: N/A   • Years of education: N/A     Social History Main Topics   • Smoking status: Current Some Day Smoker     Packs/day: 0.25     Types: Cigarettes   • Smokeless tobacco: Never Used      Comment: Smokes couple of times a month   • Alcohol use 0.0 oz/week      Comment: drinks arash    • Drug use: No   • Sexual activity: Not on file     Other Topics Concern   • Not on file     Social History Narrative    ** Merged History " "Encounter **           History   Drug Use No       CURRENT MEDICATIONS  Home Medications    **Home medications have not yet been reviewed for this encounter**         ALLERGIES  No Known Allergies    PHYSICAL EXAM  VITAL SIGNS: /97   Pulse (!) 108   Temp 36.2 °C (97.2 °F)   Resp 18   Ht 1.727 m (5' 8\")   Wt 67.1 kg (147 lb 14.9 oz)   SpO2 97%   BMI 22.49 kg/m²   Pulse ox interpretation: I interpret this pulse ox as normal.    Constitutional: Alert in no apparent distress.  HENT: No signs of trauma, Bilateral external ears normal, Nose normal.  No tongue fasciculations  Eyes: Pupils are equal and reactive, inflamed conjunctiva bilaterally with discharge, chronic, Non-icteric.   Neck: Normal range of motion, No tenderness, Supple, No stridor.   Cardiovascular: Regular rate and rhythm, no murmurs.   Thorax & Lungs: Normal breath sounds, No respiratory distress, No wheezing, No chest tenderness.   Abdomen: Bowel sounds normal, Soft, No tenderness, No masses, No pulsatile masses. No peritoneal signs.  Skin: Warm, Dry, No erythema, No rash.   Back: No bony tenderness, No CVA tenderness.     Musculoskeletal: Chronic deformity to bilateral hands and feet good range of motion in all major joints. No tenderness to palpation or major deformities noted.   Neurologic: Alert , Normal motor function, Normal sensory function, No focal deficits noted.   Psychiatric: Affect normal, Judgment normal, Mood normal.     DIAGNOSTIC STUDIES / PROCEDURES        COURSE & MEDICAL DECISION MAKING  Nursing notes, VS, PMSFHx reviewed in chart.    2:43 AM Patient seen and examined at bedside.     Decision Making:  This is a 33 y.o. male presenting with concerns of depression and substance abuse.  Patient does not appear to be in significant withdrawal at this time, additionally states he will to start drinking again so I do not see an indication for any additional medication at this time.  Regarding his depression, patient has had " multiple prior presentations for similar and I do suspect there is some secondary gain for these complaints such as a place to stay and I do not think he is high risk for  actual suicide.     The patient will return for new or worsening symptoms and is stable at the time of discharge.    The patient is referred to a primary physician for blood pressure management, diabetic screening, and for all other preventative health concerns.    DISPOSITION:  Patient will be discharged home in stable condition.    FOLLOW UP:  RUSS Goddard  [address] [place] [id number]  [phone]            OUTPATIENT MEDICATIONS:  There are no discharge medications for this patient.          FINAL IMPRESSION  1. Depression, unspecified depression type    2. Alcoholism (HCC)          The note accurately reflects work and decisions made by me.  Timbo Pierre  10/22/2018  2:55 AM

## 2018-10-23 ENCOUNTER — HOSPITAL ENCOUNTER (EMERGENCY)
Facility: MEDICAL CENTER | Age: 33
End: 2018-10-23
Payer: MEDICARE

## 2018-10-23 VITALS
HEIGHT: 68 IN | OXYGEN SATURATION: 90 % | WEIGHT: 151.24 LBS | SYSTOLIC BLOOD PRESSURE: 137 MMHG | DIASTOLIC BLOOD PRESSURE: 96 MMHG | RESPIRATION RATE: 20 BRPM | HEART RATE: 130 BPM | TEMPERATURE: 98.9 F | BODY MASS INDEX: 22.92 KG/M2

## 2018-10-23 PROCEDURE — 302449 STATCHG TRIAGE ONLY (STATISTIC)

## 2018-10-23 NOTE — ED TRIAGE NOTES
Chief Complaint   Patient presents with   • Body Aches       BIB by ems. Patient flagged down the ambulance when he was on the street to be brought into the ER. Patient states that he has generalized body aches for the last day. Patient states he has been drinking today. Patient sleeping in triage and answering triage RN with one word sentences.     Placed out in triage and updated on triage process.

## 2018-10-24 ENCOUNTER — HOSPITAL ENCOUNTER (EMERGENCY)
Dept: HOSPITAL 8 - ED | Age: 33
LOS: 1 days | Discharge: HOME | End: 2018-10-25
Payer: MEDICARE

## 2018-10-24 ENCOUNTER — HOSPITAL ENCOUNTER (EMERGENCY)
Dept: HOSPITAL 8 - ED | Age: 33
Discharge: HOME | End: 2018-10-24
Payer: MEDICARE

## 2018-10-24 VITALS — DIASTOLIC BLOOD PRESSURE: 92 MMHG | SYSTOLIC BLOOD PRESSURE: 141 MMHG

## 2018-10-24 VITALS — SYSTOLIC BLOOD PRESSURE: 110 MMHG | DIASTOLIC BLOOD PRESSURE: 65 MMHG

## 2018-10-24 VITALS — WEIGHT: 154.32 LBS | BODY MASS INDEX: 23.39 KG/M2 | HEIGHT: 68 IN

## 2018-10-24 VITALS — DIASTOLIC BLOOD PRESSURE: 99 MMHG | SYSTOLIC BLOOD PRESSURE: 144 MMHG

## 2018-10-24 VITALS — BODY MASS INDEX: 23.05 KG/M2 | WEIGHT: 152.12 LBS | HEIGHT: 68 IN

## 2018-10-24 DIAGNOSIS — Z72.9: ICD-10-CM

## 2018-10-24 DIAGNOSIS — F41.1: ICD-10-CM

## 2018-10-24 DIAGNOSIS — F32.9: ICD-10-CM

## 2018-10-24 DIAGNOSIS — F10.229: Primary | ICD-10-CM

## 2018-10-24 DIAGNOSIS — F10.120: Primary | ICD-10-CM

## 2018-10-24 PROCEDURE — 99283 EMERGENCY DEPT VISIT LOW MDM: CPT

## 2018-10-25 ENCOUNTER — HOSPITAL ENCOUNTER (INPATIENT)
Facility: MEDICAL CENTER | Age: 33
LOS: 3 days | DRG: 897 | End: 2018-10-29
Attending: EMERGENCY MEDICINE | Admitting: HOSPITALIST
Payer: MEDICARE

## 2018-10-25 ENCOUNTER — HOSPITAL ENCOUNTER (EMERGENCY)
Dept: HOSPITAL 8 - ED | Age: 33
Discharge: HOME | End: 2018-10-25
Payer: MEDICARE

## 2018-10-25 ENCOUNTER — HOSPITAL ENCOUNTER (EMERGENCY)
Dept: HOSPITAL 8 - ED | Age: 33
Discharge: LEFT BEFORE DISCHARGE | End: 2018-10-25
Payer: MEDICARE

## 2018-10-25 VITALS — WEIGHT: 143.3 LBS | BODY MASS INDEX: 22.49 KG/M2 | HEIGHT: 67 IN

## 2018-10-25 VITALS — DIASTOLIC BLOOD PRESSURE: 84 MMHG | SYSTOLIC BLOOD PRESSURE: 130 MMHG

## 2018-10-25 VITALS — SYSTOLIC BLOOD PRESSURE: 138 MMHG | DIASTOLIC BLOOD PRESSURE: 88 MMHG

## 2018-10-25 VITALS — BODY MASS INDEX: 22.78 KG/M2 | HEIGHT: 68 IN | WEIGHT: 150.31 LBS

## 2018-10-25 VITALS — BODY MASS INDEX: 22.49 KG/M2 | WEIGHT: 143.3 LBS | HEIGHT: 67 IN

## 2018-10-25 VITALS — DIASTOLIC BLOOD PRESSURE: 90 MMHG | SYSTOLIC BLOOD PRESSURE: 142 MMHG

## 2018-10-25 DIAGNOSIS — F10.120: Primary | ICD-10-CM

## 2018-10-25 DIAGNOSIS — F32.9: ICD-10-CM

## 2018-10-25 DIAGNOSIS — F90.9: ICD-10-CM

## 2018-10-25 DIAGNOSIS — Z72.9: ICD-10-CM

## 2018-10-25 DIAGNOSIS — S00.83XA: ICD-10-CM
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
## 2018-10-25 DIAGNOSIS — S00.531A: ICD-10-CM

## 2018-10-25 DIAGNOSIS — F41.1: ICD-10-CM

## 2018-10-25 DIAGNOSIS — F10.20: ICD-10-CM

## 2018-10-25 DIAGNOSIS — F10.939 ALCOHOL WITHDRAWAL SYNDROME WITH COMPLICATION (HCC): ICD-10-CM
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
## 2018-10-25 DIAGNOSIS — F10.229: Primary | ICD-10-CM
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
## 2018-10-25 DIAGNOSIS — F17.200: ICD-10-CM

## 2018-10-25 DIAGNOSIS — R00.0 SINUS TACHYCARDIA: ICD-10-CM

## 2018-10-25 DIAGNOSIS — S00.33XA: Primary | ICD-10-CM

## 2018-10-25 LAB
ALBUMIN SERPL BCP-MCNC: 3.7 G/DL (ref 3.2–4.9)
ALBUMIN/GLOB SERPL: 1.1 G/DL
ALP SERPL-CCNC: 115 U/L (ref 30–99)
ALT SERPL-CCNC: 55 U/L (ref 2–50)
ANION GAP SERPL CALC-SCNC: 13 MMOL/L (ref 0–11.9)
APPEARANCE UR: CLEAR
AST SERPL-CCNC: 49 U/L (ref 12–45)
BASOPHILS # BLD AUTO: 0.3 % (ref 0–1.8)
BASOPHILS # BLD: 0.04 K/UL (ref 0–0.12)
BILIRUB SERPL-MCNC: 0.5 MG/DL (ref 0.1–1.5)
BILIRUB UR QL STRIP.AUTO: NEGATIVE
BUN SERPL-MCNC: 10 MG/DL (ref 8–22)
CALCIUM SERPL-MCNC: 8.4 MG/DL (ref 8.5–10.5)
CHLORIDE SERPL-SCNC: 101 MMOL/L (ref 96–112)
CO2 SERPL-SCNC: 25 MMOL/L (ref 20–33)
COLOR UR: YELLOW
CREAT SERPL-MCNC: 0.82 MG/DL (ref 0.5–1.4)
EOSINOPHIL # BLD AUTO: 0.01 K/UL (ref 0–0.51)
EOSINOPHIL NFR BLD: 0.1 % (ref 0–6.9)
ERYTHROCYTE [DISTWIDTH] IN BLOOD BY AUTOMATED COUNT: 43.2 FL (ref 35.9–50)
ETHANOL BLD-MCNC: 0.37 G/DL
GLOBULIN SER CALC-MCNC: 3.4 G/DL (ref 1.9–3.5)
GLUCOSE SERPL-MCNC: 97 MG/DL (ref 65–99)
GLUCOSE UR STRIP.AUTO-MCNC: NEGATIVE MG/DL
HCT VFR BLD AUTO: 43.8 % (ref 42–52)
HGB BLD-MCNC: 14.7 G/DL (ref 14–18)
IMM GRANULOCYTES # BLD AUTO: 0.04 K/UL (ref 0–0.11)
IMM GRANULOCYTES NFR BLD AUTO: 0.3 % (ref 0–0.9)
KETONES UR STRIP.AUTO-MCNC: ABNORMAL MG/DL
LEUKOCYTE ESTERASE UR QL STRIP.AUTO: NEGATIVE
LYMPHOCYTES # BLD AUTO: 1.87 K/UL (ref 1–4.8)
LYMPHOCYTES NFR BLD: 14.6 % (ref 22–41)
MCH RBC QN AUTO: 30.1 PG (ref 27–33)
MCHC RBC AUTO-ENTMCNC: 33.6 G/DL (ref 33.7–35.3)
MCV RBC AUTO: 89.6 FL (ref 81.4–97.8)
MICRO URNS: ABNORMAL
MONOCYTES # BLD AUTO: 0.78 K/UL (ref 0–0.85)
MONOCYTES NFR BLD AUTO: 6.1 % (ref 0–13.4)
NEUTROPHILS # BLD AUTO: 10.08 K/UL (ref 1.82–7.42)
NEUTROPHILS NFR BLD: 78.6 % (ref 44–72)
NITRITE UR QL STRIP.AUTO: NEGATIVE
NRBC # BLD AUTO: 0 K/UL
NRBC BLD-RTO: 0 /100 WBC
PH UR STRIP.AUTO: 6 [PH]
PLATELET # BLD AUTO: 227 K/UL (ref 164–446)
PMV BLD AUTO: 10.7 FL (ref 9–12.9)
POTASSIUM SERPL-SCNC: 3.7 MMOL/L (ref 3.6–5.5)
PROT SERPL-MCNC: 7.1 G/DL (ref 6–8.2)
PROT UR QL STRIP: NEGATIVE MG/DL
RBC # BLD AUTO: 4.89 M/UL (ref 4.7–6.1)
RBC UR QL AUTO: NEGATIVE
SODIUM SERPL-SCNC: 139 MMOL/L (ref 135–145)
SP GR UR STRIP.AUTO: 1.01
UROBILINOGEN UR STRIP.AUTO-MCNC: 0.2 MG/DL
WBC # BLD AUTO: 12.8 K/UL (ref 4.8–10.8)

## 2018-10-25 PROCEDURE — 96375 TX/PRO/DX INJ NEW DRUG ADDON: CPT

## 2018-10-25 PROCEDURE — 700105 HCHG RX REV CODE 258: Performed by: EMERGENCY MEDICINE

## 2018-10-25 PROCEDURE — 94760 N-INVAS EAR/PLS OXIMETRY 1: CPT

## 2018-10-25 PROCEDURE — 96361 HYDRATE IV INFUSION ADD-ON: CPT

## 2018-10-25 PROCEDURE — 99285 EMERGENCY DEPT VISIT HI MDM: CPT

## 2018-10-25 PROCEDURE — 80307 DRUG TEST PRSMV CHEM ANLYZR: CPT

## 2018-10-25 PROCEDURE — 36415 COLL VENOUS BLD VENIPUNCTURE: CPT

## 2018-10-25 PROCEDURE — 71045 X-RAY EXAM CHEST 1 VIEW: CPT

## 2018-10-25 PROCEDURE — 81003 URINALYSIS AUTO W/O SCOPE: CPT

## 2018-10-25 PROCEDURE — 72125 CT NECK SPINE W/O DYE: CPT

## 2018-10-25 PROCEDURE — 80053 COMPREHEN METABOLIC PANEL: CPT

## 2018-10-25 PROCEDURE — 99283 EMERGENCY DEPT VISIT LOW MDM: CPT

## 2018-10-25 PROCEDURE — 70486 CT MAXILLOFACIAL W/O DYE: CPT

## 2018-10-25 PROCEDURE — 70450 CT HEAD/BRAIN W/O DYE: CPT

## 2018-10-25 PROCEDURE — 3E0234Z INTRODUCTION OF SERUM, TOXOID AND VACCINE INTO MUSCLE, PERCUTANEOUS APPROACH: ICD-10-PCS | Performed by: EMERGENCY MEDICINE

## 2018-10-25 PROCEDURE — 85025 COMPLETE CBC W/AUTO DIFF WBC: CPT

## 2018-10-25 PROCEDURE — 83036 HEMOGLOBIN GLYCOSYLATED A1C: CPT

## 2018-10-25 PROCEDURE — 90471 IMMUNIZATION ADMIN: CPT

## 2018-10-25 PROCEDURE — 700111 HCHG RX REV CODE 636 W/ 250 OVERRIDE (IP): Performed by: EMERGENCY MEDICINE

## 2018-10-25 PROCEDURE — 90715 TDAP VACCINE 7 YRS/> IM: CPT

## 2018-10-25 RX ORDER — LORAZEPAM 2 MG/ML
1 INJECTION INTRAMUSCULAR ONCE
Status: COMPLETED | OUTPATIENT
Start: 2018-10-26 | End: 2018-10-25

## 2018-10-25 RX ORDER — SODIUM CHLORIDE 9 MG/ML
1000 INJECTION, SOLUTION INTRAVENOUS ONCE
Status: COMPLETED | OUTPATIENT
Start: 2018-10-25 | End: 2018-10-25

## 2018-10-25 RX ORDER — SODIUM CHLORIDE 9 MG/ML
1000 INJECTION, SOLUTION INTRAVENOUS ONCE
Status: COMPLETED | OUTPATIENT
Start: 2018-10-26 | End: 2018-10-26

## 2018-10-25 RX ADMIN — SODIUM CHLORIDE 1000 ML: 9 INJECTION, SOLUTION INTRAVENOUS at 20:41

## 2018-10-25 RX ADMIN — SODIUM CHLORIDE 1000 ML: 9 INJECTION, SOLUTION INTRAVENOUS at 23:36

## 2018-10-25 RX ADMIN — LORAZEPAM 1 MG: 2 INJECTION INTRAMUSCULAR; INTRAVENOUS at 23:36

## 2018-10-25 RX ADMIN — SODIUM CHLORIDE 1000 ML: 9 INJECTION, SOLUTION INTRAVENOUS at 21:34

## 2018-10-25 ASSESSMENT — LIFESTYLE VARIABLES: DO YOU DRINK ALCOHOL: YES

## 2018-10-25 ASSESSMENT — PAIN SCALES - GENERAL: PAINLEVEL_OUTOF10: 0

## 2018-10-26 PROBLEM — Z72.0 TOBACCO ABUSE: Status: ACTIVE | Noted: 2018-10-26

## 2018-10-26 PROBLEM — F10.20 ALCOHOL DEPENDENCE (HCC): Status: ACTIVE | Noted: 2018-10-26

## 2018-10-26 PROBLEM — R79.89 ABNORMAL LFTS: Status: ACTIVE | Noted: 2018-10-26

## 2018-10-26 PROBLEM — D72.829 LEUKOCYTOSIS: Status: ACTIVE | Noted: 2018-10-26

## 2018-10-26 PROBLEM — F10.939 ALCOHOL WITHDRAWAL (HCC): Status: ACTIVE | Noted: 2018-10-26

## 2018-10-26 PROBLEM — R00.0 TACHYCARDIA: Status: ACTIVE | Noted: 2018-10-26

## 2018-10-26 PROBLEM — F10.929 ALCOHOL INTOXICATION (HCC): Status: ACTIVE | Noted: 2018-10-26

## 2018-10-26 LAB
AMPHET UR QL SCN: NEGATIVE
BARBITURATES UR QL SCN: NEGATIVE
BENZODIAZ UR QL SCN: POSITIVE
BZE UR QL SCN: NEGATIVE
CANNABINOIDS UR QL SCN: NEGATIVE
EKG IMPRESSION: NORMAL
EKG IMPRESSION: NORMAL
EST. AVERAGE GLUCOSE BLD GHB EST-MCNC: 126 MG/DL
HBA1C MFR BLD: 6 % (ref 0–5.6)
METHADONE UR QL SCN: NEGATIVE
OPIATES UR QL SCN: NEGATIVE
OXYCODONE UR QL SCN: NEGATIVE
PCP UR QL SCN: NEGATIVE
PROPOXYPH UR QL SCN: NEGATIVE
TSH SERPL DL<=0.005 MIU/L-ACNC: 1.39 UIU/ML (ref 0.38–5.33)

## 2018-10-26 PROCEDURE — 93010 ELECTROCARDIOGRAM REPORT: CPT | Performed by: INTERNAL MEDICINE

## 2018-10-26 PROCEDURE — 84443 ASSAY THYROID STIM HORMONE: CPT

## 2018-10-26 PROCEDURE — 96366 THER/PROPH/DIAG IV INF ADDON: CPT

## 2018-10-26 PROCEDURE — 93005 ELECTROCARDIOGRAM TRACING: CPT | Performed by: HOSPITALIST

## 2018-10-26 PROCEDURE — 99407 BEHAV CHNG SMOKING > 10 MIN: CPT | Performed by: HOSPITALIST

## 2018-10-26 PROCEDURE — 96365 THER/PROPH/DIAG IV INF INIT: CPT

## 2018-10-26 PROCEDURE — HZ2ZZZZ DETOXIFICATION SERVICES FOR SUBSTANCE ABUSE TREATMENT: ICD-10-PCS | Performed by: HOSPITALIST

## 2018-10-26 PROCEDURE — 770020 HCHG ROOM/CARE - TELE (206)

## 2018-10-26 PROCEDURE — 700111 HCHG RX REV CODE 636 W/ 250 OVERRIDE (IP): Performed by: EMERGENCY MEDICINE

## 2018-10-26 PROCEDURE — 700101 HCHG RX REV CODE 250: Performed by: HOSPITALIST

## 2018-10-26 PROCEDURE — 99220 PR INITIAL OBSERVATION CARE,LEVL III: CPT | Mod: 25 | Performed by: HOSPITALIST

## 2018-10-26 PROCEDURE — 700102 HCHG RX REV CODE 250 W/ 637 OVERRIDE(OP): Performed by: HOSPITALIST

## 2018-10-26 PROCEDURE — 700111 HCHG RX REV CODE 636 W/ 250 OVERRIDE (IP): Performed by: HOSPITALIST

## 2018-10-26 PROCEDURE — 700105 HCHG RX REV CODE 258: Performed by: HOSPITALIST

## 2018-10-26 PROCEDURE — 96375 TX/PRO/DX INJ NEW DRUG ADDON: CPT

## 2018-10-26 PROCEDURE — 93005 ELECTROCARDIOGRAM TRACING: CPT | Performed by: EMERGENCY MEDICINE

## 2018-10-26 PROCEDURE — A9270 NON-COVERED ITEM OR SERVICE: HCPCS | Performed by: HOSPITALIST

## 2018-10-26 PROCEDURE — 96372 THER/PROPH/DIAG INJ SC/IM: CPT

## 2018-10-26 RX ORDER — LORAZEPAM 2 MG/ML
1.5 INJECTION INTRAMUSCULAR
Status: DISCONTINUED | OUTPATIENT
Start: 2018-10-26 | End: 2018-10-29 | Stop reason: HOSPADM

## 2018-10-26 RX ORDER — BISACODYL 10 MG
10 SUPPOSITORY, RECTAL RECTAL
Status: DISCONTINUED | OUTPATIENT
Start: 2018-10-26 | End: 2018-10-29 | Stop reason: HOSPADM

## 2018-10-26 RX ORDER — LORAZEPAM 0.5 MG/1
0.5 TABLET ORAL EVERY 4 HOURS PRN
Status: DISCONTINUED | OUTPATIENT
Start: 2018-10-26 | End: 2018-10-29 | Stop reason: HOSPADM

## 2018-10-26 RX ORDER — CHLORDIAZEPOXIDE HYDROCHLORIDE 25 MG/1
25 CAPSULE, GELATIN COATED ORAL EVERY 6 HOURS
Status: DISCONTINUED | OUTPATIENT
Start: 2018-10-26 | End: 2018-10-29 | Stop reason: HOSPADM

## 2018-10-26 RX ORDER — PROMETHAZINE HYDROCHLORIDE 25 MG/1
12.5-25 TABLET ORAL EVERY 4 HOURS PRN
Status: DISCONTINUED | OUTPATIENT
Start: 2018-10-26 | End: 2018-10-29 | Stop reason: HOSPADM

## 2018-10-26 RX ORDER — PHENOBARBITAL SODIUM 130 MG/ML
260 INJECTION, SOLUTION INTRAMUSCULAR; INTRAVENOUS ONCE
Status: COMPLETED | OUTPATIENT
Start: 2018-10-26 | End: 2018-10-26

## 2018-10-26 RX ORDER — FOLIC ACID 1 MG/1
1 TABLET ORAL DAILY
Status: DISCONTINUED | OUTPATIENT
Start: 2018-10-27 | End: 2018-10-29 | Stop reason: HOSPADM

## 2018-10-26 RX ORDER — SODIUM CHLORIDE 9 MG/ML
INJECTION, SOLUTION INTRAVENOUS CONTINUOUS
Status: DISCONTINUED | OUTPATIENT
Start: 2018-10-26 | End: 2018-10-29 | Stop reason: HOSPADM

## 2018-10-26 RX ORDER — LORAZEPAM 1 MG/1
4 TABLET ORAL
Status: DISCONTINUED | OUTPATIENT
Start: 2018-10-26 | End: 2018-10-29 | Stop reason: HOSPADM

## 2018-10-26 RX ORDER — ONDANSETRON 4 MG/1
4 TABLET, ORALLY DISINTEGRATING ORAL EVERY 4 HOURS PRN
Status: DISCONTINUED | OUTPATIENT
Start: 2018-10-26 | End: 2018-10-29 | Stop reason: HOSPADM

## 2018-10-26 RX ORDER — LORAZEPAM 1 MG/1
2 TABLET ORAL
Status: DISCONTINUED | OUTPATIENT
Start: 2018-10-26 | End: 2018-10-29 | Stop reason: HOSPADM

## 2018-10-26 RX ORDER — LORAZEPAM 2 MG/ML
1 INJECTION INTRAMUSCULAR
Status: DISCONTINUED | OUTPATIENT
Start: 2018-10-26 | End: 2018-10-29 | Stop reason: HOSPADM

## 2018-10-26 RX ORDER — LORAZEPAM 2 MG/ML
0.5 INJECTION INTRAMUSCULAR EVERY 4 HOURS PRN
Status: DISCONTINUED | OUTPATIENT
Start: 2018-10-26 | End: 2018-10-29 | Stop reason: HOSPADM

## 2018-10-26 RX ORDER — LORAZEPAM 2 MG/ML
2 INJECTION INTRAMUSCULAR
Status: DISCONTINUED | OUTPATIENT
Start: 2018-10-26 | End: 2018-10-29 | Stop reason: HOSPADM

## 2018-10-26 RX ORDER — LORAZEPAM 1 MG/1
3 TABLET ORAL
Status: DISCONTINUED | OUTPATIENT
Start: 2018-10-26 | End: 2018-10-29 | Stop reason: HOSPADM

## 2018-10-26 RX ORDER — HEPARIN SODIUM 5000 [USP'U]/ML
5000 INJECTION, SOLUTION INTRAVENOUS; SUBCUTANEOUS EVERY 8 HOURS
Status: DISCONTINUED | OUTPATIENT
Start: 2018-10-26 | End: 2018-10-29 | Stop reason: HOSPADM

## 2018-10-26 RX ORDER — POLYETHYLENE GLYCOL 3350 17 G/17G
1 POWDER, FOR SOLUTION ORAL
Status: DISCONTINUED | OUTPATIENT
Start: 2018-10-26 | End: 2018-10-29 | Stop reason: HOSPADM

## 2018-10-26 RX ORDER — LORAZEPAM 1 MG/1
1 TABLET ORAL EVERY 4 HOURS PRN
Status: DISCONTINUED | OUTPATIENT
Start: 2018-10-26 | End: 2018-10-29 | Stop reason: HOSPADM

## 2018-10-26 RX ORDER — PROMETHAZINE HYDROCHLORIDE 25 MG/1
12.5-25 SUPPOSITORY RECTAL EVERY 4 HOURS PRN
Status: DISCONTINUED | OUTPATIENT
Start: 2018-10-26 | End: 2018-10-29 | Stop reason: HOSPADM

## 2018-10-26 RX ORDER — THIAMINE MONONITRATE (VIT B1) 100 MG
100 TABLET ORAL DAILY
Status: DISCONTINUED | OUTPATIENT
Start: 2018-10-27 | End: 2018-10-29 | Stop reason: HOSPADM

## 2018-10-26 RX ORDER — ONDANSETRON 2 MG/ML
4 INJECTION INTRAMUSCULAR; INTRAVENOUS EVERY 4 HOURS PRN
Status: DISCONTINUED | OUTPATIENT
Start: 2018-10-26 | End: 2018-10-29 | Stop reason: HOSPADM

## 2018-10-26 RX ORDER — AMOXICILLIN 250 MG
2 CAPSULE ORAL 2 TIMES DAILY
Status: DISCONTINUED | OUTPATIENT
Start: 2018-10-26 | End: 2018-10-29 | Stop reason: HOSPADM

## 2018-10-26 RX ADMIN — SODIUM CHLORIDE: 9 INJECTION, SOLUTION INTRAVENOUS at 21:08

## 2018-10-26 RX ADMIN — PHENOBARBITAL SODIUM 260 MG: 130 INJECTION INTRAMUSCULAR; INTRAVENOUS at 05:11

## 2018-10-26 RX ADMIN — LORAZEPAM 1 MG: 1 TABLET ORAL at 13:13

## 2018-10-26 RX ADMIN — HEPARIN SODIUM 5000 UNITS: 5000 INJECTION, SOLUTION INTRAVENOUS; SUBCUTANEOUS at 13:16

## 2018-10-26 RX ADMIN — LORAZEPAM 1 MG: 1 TABLET ORAL at 09:32

## 2018-10-26 RX ADMIN — POTASSIUM CHLORIDE: 2 INJECTION, SOLUTION, CONCENTRATE INTRAVENOUS at 07:25

## 2018-10-26 RX ADMIN — SODIUM CHLORIDE: 9 INJECTION, SOLUTION INTRAVENOUS at 05:39

## 2018-10-26 RX ADMIN — LORAZEPAM 3 MG: 1 TABLET ORAL at 18:06

## 2018-10-26 RX ADMIN — HEPARIN SODIUM 5000 UNITS: 5000 INJECTION, SOLUTION INTRAVENOUS; SUBCUTANEOUS at 21:08

## 2018-10-26 RX ADMIN — ONDANSETRON 4 MG: 4 TABLET, ORALLY DISINTEGRATING ORAL at 07:49

## 2018-10-26 RX ADMIN — LORAZEPAM 3 MG: 1 TABLET ORAL at 21:08

## 2018-10-26 RX ADMIN — SODIUM CHLORIDE: 9 INJECTION, SOLUTION INTRAVENOUS at 13:13

## 2018-10-26 RX ADMIN — LORAZEPAM 3 MG: 1 TABLET ORAL at 19:52

## 2018-10-26 ASSESSMENT — LIFESTYLE VARIABLES
TREMOR: TREMOR NOT VISIBLE BUT CAN BE FELT, FINGERTIP TO FINGERTIP
HEADACHE, FULLNESS IN HEAD: MILD
AVERAGE NUMBER OF DAYS PER WEEK YOU HAVE A DRINK CONTAINING ALCOHOL: 7
TOTAL SCORE: 2
NAUSEA AND VOMITING: *
VISUAL DISTURBANCES: NOT PRESENT
ANXIETY: MILDLY ANXIOUS
TOTAL SCORE: 4
DOES PATIENT WANT TO STOP DRINKING: YES
AGITATION: SOMEWHAT MORE THAN NORMAL ACTIVITY
VISUAL DISTURBANCES: NOT PRESENT
NAUSEA AND VOMITING: NO NAUSEA AND NO VOMITING
ANXIETY: MILDLY ANXIOUS
TOTAL SCORE: VERY MILD ITCHING, PINS AND NEEDLES SENSATION, BURNING OR NUMBNESS
ANXIETY: MODERATELY ANXIOUS OR GUARDED, SO ANXIETY IS INFERRED
TOTAL SCORE: MILD ITCHING, PINS AND NEEDLES SENSATION, BURNING OR NUMBNESS
TOTAL SCORE: 3
AGITATION: MODERATELY FIDGETY AND RESTLESS
PAROXYSMAL SWEATS: BARELY PERCEPTIBLE SWEATING. PALMS MOIST
AUDITORY DISTURBANCES: VERY MILD HARSHNESS OR ABILITY TO FRIGHTEN
TREMOR: NO TREMOR
PAROXYSMAL SWEATS: NO SWEAT VISIBLE
PAROXYSMAL SWEATS: BARELY PERCEPTIBLE SWEATING. PALMS MOIST
HEADACHE, FULLNESS IN HEAD: MODERATE
NAUSEA AND VOMITING: MILD NAUSEA WITH NO VOMITING
AGITATION: NORMAL ACTIVITY
HEADACHE, FULLNESS IN HEAD: NOT PRESENT
TOTAL SCORE: 4
TACTILE DISTURBANCES: VERY MILD ITCHING, PINS AND NEEDLES SENSATION, BURNING OR NUMBNESS
PAROXYSMAL SWEATS: NO SWEAT VISIBLE
ORIENTATION AND CLOUDING OF SENSORIUM: ORIENTED AND CAN DO SERIAL ADDITIONS
TREMOR: NO TREMOR
AUDITORY DISTURBANCES: VERY MILD HARSHNESS OR ABILITY TO FRIGHTEN
ORIENTATION AND CLOUDING OF SENSORIUM: CANNOT DO SERIAL ADDITIONS OR IS UNCERTAIN ABOUT DATE
AUDITORY DISTURBANCES: VERY MILD HARSHNESS OR ABILITY TO FRIGHTEN
ON A TYPICAL DAY WHEN YOU DRINK ALCOHOL HOW MANY DRINKS DO YOU HAVE: 20
CONSUMPTION TOTAL: POSITIVE
HEADACHE, FULLNESS IN HEAD: MODERATELY SEVERE
HAVE PEOPLE ANNOYED YOU BY CRITICIZING YOUR DRINKING: YES
ALCOHOL_USE: YES
TREMOR: TREMOR NOT VISIBLE BUT CAN BE FELT, FINGERTIP TO FINGERTIP
ANXIETY: NO ANXIETY (AT EASE)
TREMOR: TREMOR NOT VISIBLE BUT CAN BE FELT, FINGERTIP TO FINGERTIP
ORIENTATION AND CLOUDING OF SENSORIUM: ORIENTED AND CAN DO SERIAL ADDITIONS
HOW MANY TIMES IN THE PAST YEAR HAVE YOU HAD 5 OR MORE DRINKS IN A DAY: 60
TOTAL SCORE: 17
TREMOR: TREMOR NOT VISIBLE BUT CAN BE FELT, FINGERTIP TO FINGERTIP
ANXIETY: NO ANXIETY (AT EASE)
AUDITORY DISTURBANCES: NOT PRESENT
HEADACHE, FULLNESS IN HEAD: MODERATE
ORIENTATION AND CLOUDING OF SENSORIUM: CANNOT DO SERIAL ADDITIONS OR IS UNCERTAIN ABOUT DATE
NAUSEA AND VOMITING: NO NAUSEA AND NO VOMITING
TOTAL SCORE: MILD ITCHING, PINS AND NEEDLES SENSATION, BURNING OR NUMBNESS
ORIENTATION AND CLOUDING OF SENSORIUM: DATE DISORIENTATION BY NO MORE THAN TWO CALENDAR DAYS
VISUAL DISTURBANCES: NOT PRESENT
VISUAL DISTURBANCES: MILD SENSITIVITY
NAUSEA AND VOMITING: *
PAROXYSMAL SWEATS: BARELY PERCEPTIBLE SWEATING. PALMS MOIST
AGITATION: NORMAL ACTIVITY
SUBSTANCE_ABUSE: 0
EVER_SMOKED: YES
NAUSEA AND VOMITING: MILD NAUSEA WITH NO VOMITING
AUDITORY DISTURBANCES: VERY MILD HARSHNESS OR ABILITY TO FRIGHTEN
AGITATION: NORMAL ACTIVITY
ORIENTATION AND CLOUDING OF SENSORIUM: ORIENTED AND CAN DO SERIAL ADDITIONS
ANXIETY: MODERATELY ANXIOUS OR GUARDED, SO ANXIETY IS INFERRED
AGITATION: MODERATELY FIDGETY AND RESTLESS
TACTILE DISTURBANCES: VERY MILD ITCHING, PINS AND NEEDLES SENSATION, BURNING OR NUMBNESS
PAROXYSMAL SWEATS: BARELY PERCEPTIBLE SWEATING. PALMS MOIST
EVER FELT BAD OR GUILTY ABOUT YOUR DRINKING: YES
VISUAL DISTURBANCES: VERY MILD SENSITIVITY
TOTAL SCORE: 10
NAUSEA AND VOMITING: MILD NAUSEA WITH NO VOMITING
EVER HAD A DRINK FIRST THING IN THE MORNING TO STEADY YOUR NERVES TO GET RID OF A HANGOVER: YES
AUDITORY DISTURBANCES: NOT PRESENT
AGITATION: *
TOTAL SCORE: 4
VISUAL DISTURBANCES: NOT PRESENT
AUDITORY DISTURBANCES: NOT PRESENT
HEADACHE, FULLNESS IN HEAD: NOT PRESENT
HEADACHE, FULLNESS IN HEAD: MODERATELY SEVERE
TOTAL SCORE: MILD ITCHING, PINS AND NEEDLES SENSATION, BURNING OR NUMBNESS
HAVE YOU EVER FELT YOU SHOULD CUT DOWN ON YOUR DRINKING: YES
TOTAL SCORE: 10
ORIENTATION AND CLOUDING OF SENSORIUM: CANNOT DO SERIAL ADDITIONS OR IS UNCERTAIN ABOUT DATE
TREMOR: NO TREMOR
TOTAL SCORE: 20
ANXIETY: MODERATELY ANXIOUS OR GUARDED, SO ANXIETY IS INFERRED
DOES PATIENT WANT TO TALK TO SOMEONE ABOUT QUITTING: YES
VISUAL DISTURBANCES: NOT PRESENT
PAROXYSMAL SWEATS: NO SWEAT VISIBLE
TOTAL SCORE: 16

## 2018-10-26 ASSESSMENT — PATIENT HEALTH QUESTIONNAIRE - PHQ9
8. MOVING OR SPEAKING SO SLOWLY THAT OTHER PEOPLE COULD HAVE NOTICED. OR THE OPPOSITE, BEING SO FIGETY OR RESTLESS THAT YOU HAVE BEEN MOVING AROUND A LOT MORE THAN USUAL: NOT AT ALL
3. TROUBLE FALLING OR STAYING ASLEEP OR SLEEPING TOO MUCH: NEARLY EVERY DAY
9. THOUGHTS THAT YOU WOULD BE BETTER OFF DEAD, OR OF HURTING YOURSELF: NOT AT ALL
2. FEELING DOWN, DEPRESSED, IRRITABLE, OR HOPELESS: NEARLY EVERY DAY
SUM OF ALL RESPONSES TO PHQ QUESTIONS 1-9: 19
7. TROUBLE CONCENTRATING ON THINGS, SUCH AS READING THE NEWSPAPER OR WATCHING TELEVISION: NEARLY EVERY DAY
SUM OF ALL RESPONSES TO PHQ9 QUESTIONS 1 AND 2: 6
5. POOR APPETITE OR OVEREATING: SEVERAL DAYS
1. LITTLE INTEREST OR PLEASURE IN DOING THINGS: NEARLY EVERY DAY
4. FEELING TIRED OR HAVING LITTLE ENERGY: NEARLY EVERY DAY
6. FEELING BAD ABOUT YOURSELF - OR THAT YOU ARE A FAILURE OR HAVE LET YOURSELF OR YOUR FAMILY DOWN: NEARLY EVERY DAY

## 2018-10-26 ASSESSMENT — ENCOUNTER SYMPTOMS
BRUISES/BLEEDS EASILY: 0
SHORTNESS OF BREATH: 0
HALLUCINATIONS: 0
PALPITATIONS: 0
ABDOMINAL PAIN: 0
VOMITING: 0
TINGLING: 1
SPEECH CHANGE: 0
BLURRED VISION: 0
DIZZINESS: 0
SENSORY CHANGE: 0
NAUSEA: 0
FLANK PAIN: 0
COUGH: 0
CHILLS: 0
FEVER: 0
HEARTBURN: 0
HEMOPTYSIS: 0
FOCAL WEAKNESS: 0
DEPRESSION: 0
MYALGIAS: 0
DOUBLE VISION: 0

## 2018-10-26 ASSESSMENT — COPD QUESTIONNAIRES
DO YOU EVER COUGH UP ANY MUCUS OR PHLEGM?: NO/ONLY WITH OCCASIONAL COLDS OR INFECTIONS
COPD SCREENING SCORE: 2
DURING THE PAST 4 WEEKS HOW MUCH DID YOU FEEL SHORT OF BREATH: SOME OF THE TIME
IN THE PAST 12 MONTHS DO YOU DO LESS THAN YOU USED TO BECAUSE OF YOUR BREATHING PROBLEMS: AGREE
HAVE YOU SMOKED AT LEAST 100 CIGARETTES IN YOUR ENTIRE LIFE: NO/DON'T KNOW

## 2018-10-26 ASSESSMENT — PAIN SCALES - GENERAL
PAINLEVEL_OUTOF10: 5
PAINLEVEL_OUTOF10: 5

## 2018-10-26 NOTE — ED PROVIDER NOTES
ED Provider Note    Patient signed out from Dr. Cabrera to continue monitoring in the ED while patient sobered up.  Please see his note for the initial evaluation and management.  Patient is alert this morning but continues to be in sinus tachycardia despite the IV fluids that he has received.  He has taken oral fluids well and also ate without problems.  Phenobarbital was ordered and given.  Patient reports feeling slightly better but continues to be tachycardic.  He is agreeable to admission.  Discussed with Dr. Padilla who graciously agreed to admit patient for further care.

## 2018-10-26 NOTE — ED NOTES
"Pt tremulous. States \"I need something for my withdrawals.\" ERP made aware. Medicated per MAR. HR remains elevated in 120s. Will CTM.   "

## 2018-10-26 NOTE — ED NOTES
"Care assumed. Pt was sleeping. When woken by RN states \"I feel like crap. My body hurts.\" No tremors or shaking  "

## 2018-10-26 NOTE — ASSESSMENT & PLAN NOTE
Multiple 24 oz beers daily, has required multiple doses ativan overnight.    Encourage cessation.   Consider rehab   Social work consultation

## 2018-10-26 NOTE — ED NOTES
Pt awake, ambulatory to bathroom and back without issue. HR remains elevated. MD Guadarrama aware. Meds ordered.

## 2018-10-26 NOTE — ED NOTES
Pt ambulatory to bathroom with steady gait. Water and juice provided per request. No needs, will continue to monitor.

## 2018-10-26 NOTE — ED NOTES
Fluids started, labs drawn and sent per orders. Patient sleeping, rr even and unlabored. Will CTM.

## 2018-10-26 NOTE — ASSESSMENT & PLAN NOTE
Greater than 10 minutes spent with patient smoking cessation counseling, discussed cardiovascular risk factors of smoking. Nicotine patch ordered

## 2018-10-26 NOTE — ED NOTES
Patient remains sleeping, rr even and unlabored on room air. Appears comfortable in NAD, will continue to monitor.

## 2018-10-26 NOTE — PROGRESS NOTES
-Patient seen and examined/admitted by my partner Dr. Padilla this morning.   -33/M with alcohol dependence, chronic panic disorder, and ADHD, admitted with alcohol intoxication, and withdrawal.  Initial blood workup showed WBC count of 12,800, with unimpressive BMP.  LFTs slightly elevated with ALT of 55, AST 49, and alkaline phosphatase 115, with normal total bilirubin.  Patient started on CIWA protocol.  Patient given IV fluids due to tachycardia.  -will start him on librium 25mg q6H. continue monitoring for alcohol withdrawal.  -rest of A&P per Dr. Padilla's H&P note.

## 2018-10-26 NOTE — ED NOTES
Patient resting in bed.  Given personal belongings case, all personal belongings placed in two (2) clear belongings bags.  Continue to wait room assignment.

## 2018-10-26 NOTE — ED NOTES
Patient arrives with EMS from [facility]. Per report, patient was arrested for open container, was deemed too intoxicated and brought to ED. Upon arrival, patient awake and alert, oriented x3 with + slurred speech. Placed on monitor with elevated HR to 120s. Received 500cc NS from EMS. RR even and unlabored on room air, maintaining airway. Insisting on walking to bathroom upon arrival. Ambulatory with one assist. Back to stretcher without issue. Call bell within reach, bed in lowest pos, side rails x2. All safety maintained, will CTM.

## 2018-10-26 NOTE — ASSESSMENT & PLAN NOTE
Patient wishes to withdrawal in hospital hx of seizures and hallucinations   CIWA ordered with PRN ativan   Rally bag to oral on 10/27.   Cardiac monitoring   Monitor electrolytes replace as needed

## 2018-10-26 NOTE — ED NOTES
Patient ambulatory to BR w/steady gait.  Report from JORDY Hamilton.  Maintenance IVF and Rally Bag infusing.  Patient updated on POC.  Awaiting room assignment.

## 2018-10-26 NOTE — ED NOTES
First liter complete. Remains tachy to 120s. MD Espinalda aware, second bolus started per orders.
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
## 2018-10-26 NOTE — ED PROVIDER NOTES
ED Provider Note    CHIEF COMPLAINT  Chief Complaint   Patient presents with   • Alcohol Intoxication       HPI  Gopal Ceja is a 33 y.o. male with a history of chronic alcohol abuse, panic disorder, ADHD, who presents for acute alcohol intoxication.  The patient apparently was arrested for having an open container in his possession, and was taken to the [facility] alf.  He was felt to be too intoxicated for alf, and was sent to the ER for further evaluation.  On arrival, the patient is very difficult to arouse.  He was noted to initially be awake and alert with slurred speech.  The patient has no complaints at this time.  He denies any headache, chest pain, back pain, abdominal pain.  This is the patient's third visit to the ER this week.    REVIEW OF SYSTEMS  See HPI for further details. All other systems are negative.     PAST MEDICAL HISTORY  Past Medical History:   Diagnosis Date   • Acute anxiety    • ADHD (attention deficit hyperactivity disorder)    • ADHD (attention deficit hyperactivity disorder)    • Alcohol abuse    • Bipolar affective (HCC)    • Depression    • Ectrodactyly-ectodermal dysplasia-clefting syndrome    • ETOH abuse    • Psychiatric disorder     anxiety/panic disorder       FAMILY HISTORY  Family History   Problem Relation Age of Onset   • Heart Disease Neg Hx    • Hypertension Neg Hx    • Hyperlipidemia Neg Hx        SOCIAL HISTORY  Social History     Social History   • Marital status: Single     Spouse name: N/A   • Number of children: N/A   • Years of education: N/A     Social History Main Topics   • Smoking status: Current Some Day Smoker     Packs/day: 0.25     Types: Cigarettes   • Smokeless tobacco: Never Used      Comment: Smokes couple of times a month   • Alcohol use 0.0 oz/week      Comment: drinks arash    • Drug use: No   • Sexual activity: Not on file     Other Topics Concern   • Not on file     Social History Narrative    ** Merged History Encounter **            SURGICAL  "HISTORY  Past Surgical History:   Procedure Laterality Date   • OTHER ORTHOPEDIC SURGERY      hands bilaterally r/t EEDC syndrome       CURRENT MEDICATIONS  Home Medications    **Home medications have not yet been reviewed for this encounter**         ALLERGIES  No Known Allergies    PHYSICAL EXAM  VITAL SIGNS: /96   Pulse (!) 117   Temp 36.6 °C (97.9 °F)   Resp 18   Ht 1.727 m (5' 8\")   Wt 68 kg (149 lb 14.6 oz)   SpO2 90%   BMI 22.79 kg/m²   Constitutional: Asleep, is difficult to arouse, in no acute distress, Non-toxic appearance.   HENT: Atraumatic. Bilateral external ears normal, mucous membranes mildly dry, throat nonerythematous without exudates, nose is normal.  Eyes: PERRL, EOMI, conjunctiva moist, noninjected.  Neck: Nontender, Normal range of motion, No nuchal rigidity, No stridor.   Lymphatic: No lymphadenopathy noted.   Cardiovascular: Regular rhythm, tachycardic, rate in the 120s, no murmurs, rubs, gallops.  Thorax & Lungs:  Good breath sounds bilaterally, no wheezes, rales, or retractions.  No chest tenderness.  Abdomen: Bowel sounds normal, Soft, nontender, nondistended, no rebound, guarding, masses.  Back: No CVA or spinal tenderness.  Extremities: Intact distal pulses, No edema, No tenderness.   Skin: Warm, Dry, No rashes.   Musculoskeletal: No joint swelling or tenderness.  Neurologic: Asleep, arousable, easily falls back asleep, is oriented to person only, cranial nerves II through XII shows no facial asymmetry, speech is slurred, patient sensory and motor function normal. No focal deficits.   Psychiatric: Affect normal, Judgment normal, Mood normal.     EKG  Twelve-lead EKG shows a sinus tachycardia, rate of 119, normal intervals, normal axis, no acute ST wave elevations or ST depressions, no pathologic Q waves, no evidence of an acute injury or ischemic pattern on my reading, in comparison to previous EKG from May, 2017, there are no acute changes other than the " rate.    RADIOLOGY/PROCEDURES  No orders to display         COURSE & MEDICAL DECISION MAKING  Pertinent Labs & Imaging studies reviewed. (See chart for details)  The patient presents with the above complaints.  He appears to be quite intoxicated, appears dehydrated with dry mucous membranes and tachycardia.  He is unable to take any oral fluids as he can barely stay awake.  IV was placed he was given a bolus of normal saline 1 L.  CBC showed a white count 12,800, 79% polys, hemoglobin 14.7, chemistry shows normal electrolytes, anion gap slightly elevated at 13, BUN 10, creatinine 0.82, glucose 97, SGOT 49, SGPT 55, total bilirubin 0.5.  I suspect the elevated liver function tests are likely secondary to his chronic alcohol abuse.  Diagnosed alcohol was 0.37.  On reexamination, the patient was more arousable.  However he remained tachycardic and was given a second liter of normal saline.  The patient says he is feeling very shaky and was given Ativan 1 mg IV.  After receiving 2 L of normal saline, patient remained chronic, was started on 1/3 L of normal saline.    He remained quite intoxicated. The patient will be further observed and care was turned over to Dr. Guadarrama.    FINAL IMPRESSION  1.  Acute alcohol intoxication  2.  Sinus Tachycardia  3.         Electronically signed by: Chapin Cabrera, 10/25/2018 10:11 PM

## 2018-10-27 LAB
ALBUMIN SERPL BCP-MCNC: 3.4 G/DL (ref 3.2–4.9)
ALBUMIN/GLOB SERPL: 1.1 G/DL
ALP SERPL-CCNC: 104 U/L (ref 30–99)
ALT SERPL-CCNC: 37 U/L (ref 2–50)
ANION GAP SERPL CALC-SCNC: 7 MMOL/L (ref 0–11.9)
AST SERPL-CCNC: 30 U/L (ref 12–45)
BASOPHILS # BLD AUTO: 0.3 % (ref 0–1.8)
BASOPHILS # BLD: 0.03 K/UL (ref 0–0.12)
BILIRUB SERPL-MCNC: 1.1 MG/DL (ref 0.1–1.5)
BUN SERPL-MCNC: 4 MG/DL (ref 8–22)
CALCIUM SERPL-MCNC: 9 MG/DL (ref 8.5–10.5)
CHLORIDE SERPL-SCNC: 101 MMOL/L (ref 96–112)
CHOLEST SERPL-MCNC: 151 MG/DL (ref 100–199)
CO2 SERPL-SCNC: 27 MMOL/L (ref 20–33)
CREAT SERPL-MCNC: 0.69 MG/DL (ref 0.5–1.4)
EKG IMPRESSION: NORMAL
EOSINOPHIL # BLD AUTO: 0.15 K/UL (ref 0–0.51)
EOSINOPHIL NFR BLD: 1.7 % (ref 0–6.9)
ERYTHROCYTE [DISTWIDTH] IN BLOOD BY AUTOMATED COUNT: 42 FL (ref 35.9–50)
GLOBULIN SER CALC-MCNC: 3 G/DL (ref 1.9–3.5)
GLUCOSE SERPL-MCNC: 110 MG/DL (ref 65–99)
HCT VFR BLD AUTO: 41.7 % (ref 42–52)
HDLC SERPL-MCNC: 79 MG/DL
HGB BLD-MCNC: 14.3 G/DL (ref 14–18)
IMM GRANULOCYTES # BLD AUTO: 0.05 K/UL (ref 0–0.11)
IMM GRANULOCYTES NFR BLD AUTO: 0.6 % (ref 0–0.9)
LDLC SERPL CALC-MCNC: 48 MG/DL
LYMPHOCYTES # BLD AUTO: 1.9 K/UL (ref 1–4.8)
LYMPHOCYTES NFR BLD: 21 % (ref 22–41)
MAGNESIUM SERPL-MCNC: 1.9 MG/DL (ref 1.5–2.5)
MCH RBC QN AUTO: 30.8 PG (ref 27–33)
MCHC RBC AUTO-ENTMCNC: 34.3 G/DL (ref 33.7–35.3)
MCV RBC AUTO: 89.9 FL (ref 81.4–97.8)
MONOCYTES # BLD AUTO: 0.76 K/UL (ref 0–0.85)
MONOCYTES NFR BLD AUTO: 8.4 % (ref 0–13.4)
NEUTROPHILS # BLD AUTO: 6.14 K/UL (ref 1.82–7.42)
NEUTROPHILS NFR BLD: 68 % (ref 44–72)
NRBC # BLD AUTO: 0 K/UL
NRBC BLD-RTO: 0 /100 WBC
PHOSPHATE SERPL-MCNC: 3.8 MG/DL (ref 2.5–4.5)
PLATELET # BLD AUTO: 160 K/UL (ref 164–446)
PMV BLD AUTO: 11.6 FL (ref 9–12.9)
POTASSIUM SERPL-SCNC: 3.3 MMOL/L (ref 3.6–5.5)
PROT SERPL-MCNC: 6.4 G/DL (ref 6–8.2)
RBC # BLD AUTO: 4.64 M/UL (ref 4.7–6.1)
SODIUM SERPL-SCNC: 135 MMOL/L (ref 135–145)
TRIGL SERPL-MCNC: 118 MG/DL (ref 0–149)
WBC # BLD AUTO: 9 K/UL (ref 4.8–10.8)

## 2018-10-27 PROCEDURE — 93005 ELECTROCARDIOGRAM TRACING: CPT | Performed by: HOSPITALIST

## 2018-10-27 PROCEDURE — 700101 HCHG RX REV CODE 250: Performed by: HOSPITALIST

## 2018-10-27 PROCEDURE — 85025 COMPLETE CBC W/AUTO DIFF WBC: CPT

## 2018-10-27 PROCEDURE — 700102 HCHG RX REV CODE 250 W/ 637 OVERRIDE(OP): Performed by: INTERNAL MEDICINE

## 2018-10-27 PROCEDURE — 700102 HCHG RX REV CODE 250 W/ 637 OVERRIDE(OP): Performed by: HOSPITALIST

## 2018-10-27 PROCEDURE — 700111 HCHG RX REV CODE 636 W/ 250 OVERRIDE (IP): Performed by: HOSPITALIST

## 2018-10-27 PROCEDURE — 36415 COLL VENOUS BLD VENIPUNCTURE: CPT

## 2018-10-27 PROCEDURE — A9270 NON-COVERED ITEM OR SERVICE: HCPCS | Performed by: INTERNAL MEDICINE

## 2018-10-27 PROCEDURE — A9270 NON-COVERED ITEM OR SERVICE: HCPCS | Performed by: HOSPITALIST

## 2018-10-27 PROCEDURE — 770020 HCHG ROOM/CARE - TELE (206)

## 2018-10-27 PROCEDURE — 83735 ASSAY OF MAGNESIUM: CPT

## 2018-10-27 PROCEDURE — 80061 LIPID PANEL: CPT

## 2018-10-27 PROCEDURE — 80053 COMPREHEN METABOLIC PANEL: CPT

## 2018-10-27 PROCEDURE — 700105 HCHG RX REV CODE 258: Performed by: HOSPITALIST

## 2018-10-27 PROCEDURE — 99233 SBSQ HOSP IP/OBS HIGH 50: CPT | Performed by: HOSPITALIST

## 2018-10-27 PROCEDURE — 93010 ELECTROCARDIOGRAM REPORT: CPT | Performed by: INTERNAL MEDICINE

## 2018-10-27 PROCEDURE — 84100 ASSAY OF PHOSPHORUS: CPT

## 2018-10-27 RX ORDER — POTASSIUM CHLORIDE 20 MEQ/1
40 TABLET, EXTENDED RELEASE ORAL ONCE
Status: COMPLETED | OUTPATIENT
Start: 2018-10-27 | End: 2018-10-27

## 2018-10-27 RX ADMIN — SODIUM CHLORIDE: 9 INJECTION, SOLUTION INTRAVENOUS at 19:43

## 2018-10-27 RX ADMIN — HEPARIN SODIUM 5000 UNITS: 5000 INJECTION, SOLUTION INTRAVENOUS; SUBCUTANEOUS at 13:44

## 2018-10-27 RX ADMIN — CHLORDIAZEPOXIDE HYDROCHLORIDE 25 MG: 25 CAPSULE ORAL at 17:07

## 2018-10-27 RX ADMIN — TOBRAMYCIN AND DEXAMETHASONE: 3; 1 OINTMENT OPHTHALMIC at 12:07

## 2018-10-27 RX ADMIN — CHLORDIAZEPOXIDE HYDROCHLORIDE 25 MG: 25 CAPSULE ORAL at 05:21

## 2018-10-27 RX ADMIN — ONDANSETRON 4 MG: 4 TABLET, ORALLY DISINTEGRATING ORAL at 19:40

## 2018-10-27 RX ADMIN — THERA TABS 1 TABLET: TAB at 05:21

## 2018-10-27 RX ADMIN — LORAZEPAM 1 MG: 1 TABLET ORAL at 17:07

## 2018-10-27 RX ADMIN — LORAZEPAM 2 MG: 1 TABLET ORAL at 10:47

## 2018-10-27 RX ADMIN — THIAMINE HCL TAB 100 MG 100 MG: 100 TAB at 05:21

## 2018-10-27 RX ADMIN — TOBRAMYCIN AND DEXAMETHASONE: 3; 1 OINTMENT OPHTHALMIC at 20:55

## 2018-10-27 RX ADMIN — LORAZEPAM 2 MG: 1 TABLET ORAL at 22:30

## 2018-10-27 RX ADMIN — HEPARIN SODIUM 5000 UNITS: 5000 INJECTION, SOLUTION INTRAVENOUS; SUBCUTANEOUS at 05:19

## 2018-10-27 RX ADMIN — CHLORDIAZEPOXIDE HYDROCHLORIDE 25 MG: 25 CAPSULE ORAL at 10:47

## 2018-10-27 RX ADMIN — LORAZEPAM 2 MG: 1 TABLET ORAL at 19:40

## 2018-10-27 RX ADMIN — TOBRAMYCIN AND DEXAMETHASONE: 3; 1 OINTMENT OPHTHALMIC at 17:07

## 2018-10-27 RX ADMIN — SODIUM CHLORIDE: 9 INJECTION, SOLUTION INTRAVENOUS at 12:10

## 2018-10-27 RX ADMIN — FOLIC ACID 1 MG: 1 TABLET ORAL at 05:21

## 2018-10-27 RX ADMIN — HEPARIN SODIUM 5000 UNITS: 5000 INJECTION, SOLUTION INTRAVENOUS; SUBCUTANEOUS at 20:54

## 2018-10-27 RX ADMIN — CHLORDIAZEPOXIDE HYDROCHLORIDE 25 MG: 25 CAPSULE ORAL at 00:33

## 2018-10-27 RX ADMIN — POTASSIUM CHLORIDE 40 MEQ: 1500 TABLET, EXTENDED RELEASE ORAL at 17:07

## 2018-10-27 RX ADMIN — SODIUM CHLORIDE: 9 INJECTION, SOLUTION INTRAVENOUS at 05:11

## 2018-10-27 ASSESSMENT — LIFESTYLE VARIABLES
VISUAL DISTURBANCES: NOT PRESENT
ANXIETY: MILDLY ANXIOUS
AUDITORY DISTURBANCES: NOT PRESENT
AGITATION: SOMEWHAT MORE THAN NORMAL ACTIVITY
ANXIETY: NO ANXIETY (AT EASE)
TOTAL SCORE: 12
TOTAL SCORE: 3
NAUSEA AND VOMITING: NO NAUSEA AND NO VOMITING
NAUSEA AND VOMITING: NO NAUSEA AND NO VOMITING
AGITATION: NORMAL ACTIVITY
ANXIETY: *
PAROXYSMAL SWEATS: BARELY PERCEPTIBLE SWEATING. PALMS MOIST
ANXIETY: MILDLY ANXIOUS
HEADACHE, FULLNESS IN HEAD: VERY MILD
TOTAL SCORE: 3
AUDITORY DISTURBANCES: NOT PRESENT
TOTAL SCORE: 3
TOTAL SCORE: 3
NAUSEA AND VOMITING: NO NAUSEA AND NO VOMITING
TOTAL SCORE: 4
TREMOR: *
PAROXYSMAL SWEATS: BARELY PERCEPTIBLE SWEATING. PALMS MOIST
ORIENTATION AND CLOUDING OF SENSORIUM: ORIENTED AND CAN DO SERIAL ADDITIONS
ANXIETY: MILDLY ANXIOUS
TOTAL SCORE: MODERATE ITCHING, PINS AND NEEDLES SENSATION, BURNING OR NUMBNESS
HEADACHE, FULLNESS IN HEAD: MILD
TREMOR: TREMOR NOT VISIBLE BUT CAN BE FELT, FINGERTIP TO FINGERTIP
VISUAL DISTURBANCES: NOT PRESENT
TREMOR: *
VISUAL DISTURBANCES: NOT PRESENT
NAUSEA AND VOMITING: NO NAUSEA AND NO VOMITING
ANXIETY: MILDLY ANXIOUS
AUDITORY DISTURBANCES: NOT PRESENT
NAUSEA AND VOMITING: NO NAUSEA AND NO VOMITING
ORIENTATION AND CLOUDING OF SENSORIUM: ORIENTED AND CAN DO SERIAL ADDITIONS
PAROXYSMAL SWEATS: NO SWEAT VISIBLE
VISUAL DISTURBANCES: NOT PRESENT
AGITATION: NORMAL ACTIVITY
HEADACHE, FULLNESS IN HEAD: MODERATE
ORIENTATION AND CLOUDING OF SENSORIUM: ORIENTED AND CAN DO SERIAL ADDITIONS
TOTAL SCORE: 10
HEADACHE, FULLNESS IN HEAD: VERY MILD
TREMOR: *
TREMOR: *
AUDITORY DISTURBANCES: NOT PRESENT
PAROXYSMAL SWEATS: NO SWEAT VISIBLE
PAROXYSMAL SWEATS: NO SWEAT VISIBLE
ORIENTATION AND CLOUDING OF SENSORIUM: ORIENTED AND CAN DO SERIAL ADDITIONS
TREMOR: TREMOR NOT VISIBLE BUT CAN BE FELT, FINGERTIP TO FINGERTIP
AGITATION: SOMEWHAT MORE THAN NORMAL ACTIVITY
VISUAL DISTURBANCES: NOT PRESENT
VISUAL DISTURBANCES: NOT PRESENT
NAUSEA AND VOMITING: MILD NAUSEA WITH NO VOMITING
VISUAL DISTURBANCES: NOT PRESENT
ORIENTATION AND CLOUDING OF SENSORIUM: ORIENTED AND CAN DO SERIAL ADDITIONS
NAUSEA AND VOMITING: NO NAUSEA AND NO VOMITING
VISUAL DISTURBANCES: MODERATELY SEVERE HALLUCINATIONS
HEADACHE, FULLNESS IN HEAD: VERY MILD
AGITATION: SOMEWHAT MORE THAN NORMAL ACTIVITY
NAUSEA AND VOMITING: NO NAUSEA AND NO VOMITING
HEADACHE, FULLNESS IN HEAD: MILD
HEADACHE, FULLNESS IN HEAD: NOT PRESENT
ORIENTATION AND CLOUDING OF SENSORIUM: ORIENTED AND CAN DO SERIAL ADDITIONS
ANXIETY: MILDLY ANXIOUS
AUDITORY DISTURBANCES: NOT PRESENT
AUDITORY DISTURBANCES: NOT PRESENT
TOTAL SCORE: 6
AGITATION: SOMEWHAT MORE THAN NORMAL ACTIVITY
ORIENTATION AND CLOUDING OF SENSORIUM: ORIENTED AND CAN DO SERIAL ADDITIONS
ANXIETY: NO ANXIETY (AT EASE)
NAUSEA AND VOMITING: NO NAUSEA AND NO VOMITING
AUDITORY DISTURBANCES: NOT PRESENT
ORIENTATION AND CLOUDING OF SENSORIUM: ORIENTED AND CAN DO SERIAL ADDITIONS
PAROXYSMAL SWEATS: NO SWEAT VISIBLE
HEADACHE, FULLNESS IN HEAD: MILD
TOTAL SCORE: 11
HEADACHE, FULLNESS IN HEAD: SEVERE
ANXIETY: MILDLY ANXIOUS
ANXIETY: *
AUDITORY DISTURBANCES: NOT PRESENT
PAROXYSMAL SWEATS: NO SWEAT VISIBLE
ORIENTATION AND CLOUDING OF SENSORIUM: ORIENTED AND CAN DO SERIAL ADDITIONS
PAROXYSMAL SWEATS: NO SWEAT VISIBLE
ANXIETY: *
VISUAL DISTURBANCES: NOT PRESENT
NAUSEA AND VOMITING: MILD NAUSEA WITH NO VOMITING
VISUAL DISTURBANCES: NOT PRESENT
AGITATION: *
TREMOR: NO TREMOR
HEADACHE, FULLNESS IN HEAD: VERY MILD
PAROXYSMAL SWEATS: NO SWEAT VISIBLE
VISUAL DISTURBANCES: NOT PRESENT
AGITATION: NORMAL ACTIVITY
HEADACHE, FULLNESS IN HEAD: VERY MILD
AUDITORY DISTURBANCES: NOT PRESENT
TOTAL SCORE: 4
NAUSEA AND VOMITING: NO NAUSEA AND NO VOMITING
TREMOR: TREMOR NOT VISIBLE BUT CAN BE FELT, FINGERTIP TO FINGERTIP
TREMOR: TREMOR NOT VISIBLE BUT CAN BE FELT, FINGERTIP TO FINGERTIP
PAROXYSMAL SWEATS: *
AGITATION: SOMEWHAT MORE THAN NORMAL ACTIVITY
AUDITORY DISTURBANCES: NOT PRESENT
ORIENTATION AND CLOUDING OF SENSORIUM: ORIENTED AND CAN DO SERIAL ADDITIONS
ORIENTATION AND CLOUDING OF SENSORIUM: ORIENTED AND CAN DO SERIAL ADDITIONS
TOTAL SCORE: 12
AGITATION: NORMAL ACTIVITY
PAROXYSMAL SWEATS: BARELY PERCEPTIBLE SWEATING. PALMS MOIST
TREMOR: TREMOR NOT VISIBLE BUT CAN BE FELT, FINGERTIP TO FINGERTIP
AGITATION: SOMEWHAT MORE THAN NORMAL ACTIVITY
TREMOR: TREMOR NOT VISIBLE BUT CAN BE FELT, FINGERTIP TO FINGERTIP
AUDITORY DISTURBANCES: NOT PRESENT

## 2018-10-27 ASSESSMENT — COGNITIVE AND FUNCTIONAL STATUS - GENERAL
TURNING FROM BACK TO SIDE WHILE IN FLAT BAD: A LITTLE
SUGGESTED CMS G CODE MODIFIER MOBILITY: CK
MOBILITY SCORE: 19
DAILY ACTIVITIY SCORE: 19
WALKING IN HOSPITAL ROOM: A LITTLE
DRESSING REGULAR UPPER BODY CLOTHING: A LITTLE
SUGGESTED CMS G CODE MODIFIER DAILY ACTIVITY: CK
TOILETING: A LITTLE
HELP NEEDED FOR BATHING: A LITTLE
STANDING UP FROM CHAIR USING ARMS: A LITTLE
MOVING FROM LYING ON BACK TO SITTING ON SIDE OF FLAT BED: A LITTLE
CLIMB 3 TO 5 STEPS WITH RAILING: A LITTLE
DRESSING REGULAR LOWER BODY CLOTHING: A LITTLE
PERSONAL GROOMING: A LITTLE

## 2018-10-27 ASSESSMENT — ENCOUNTER SYMPTOMS
EYE REDNESS: 1
DOUBLE VISION: 0
BLURRED VISION: 0
TREMORS: 1
SHORTNESS OF BREATH: 0
NERVOUS/ANXIOUS: 1
EYE DISCHARGE: 1

## 2018-10-27 ASSESSMENT — PAIN SCALES - GENERAL
PAINLEVEL_OUTOF10: 0
PAINLEVEL_OUTOF10: 5

## 2018-10-27 NOTE — PROGRESS NOTES
Hospital Medicine Daily Progress Note    Date of Service  10/27/2018    Chief Complaint  33 y.o. male admitted 10/25/2018 with actue alcohol intoxication with impending withdrawal.     Hospital Course   **  no acute overnight events.    Interval Problem Update  Patient's alcohol withdrawal has been well controlled thus far patient has mild tremors but no hallucinations or seizures at this juncture.    Consultants/Specialty      Code Status  Full    Disposition  Anticipate back to self-care in 24-48 hours after patient clears with physical therapy.    Review of Systems  Review of Systems   Eyes: Positive for discharge and redness. Negative for blurred vision and double vision.   Respiratory: Negative for shortness of breath.    Cardiovascular: Negative for chest pain.   Neurological: Positive for tremors.   Psychiatric/Behavioral: The patient is nervous/anxious.    All other systems reviewed and are negative.       Physical Exam  Temp:  [36.4 °C (97.5 °F)-36.9 °C (98.5 °F)] 36.4 °C (97.5 °F)  Pulse:  [] 104  Resp:  [16-20] 20  BP: (140-149)/(89-97) 149/97    Physical Exam   Constitutional: No distress.   HENT:   Head: Normocephalic.   Bruising to left lateral eye.     Eyes: Right eye exhibits no discharge. Left eye exhibits discharge.   Neck: No tracheal deviation present. No thyromegaly present.   Cardiovascular: Normal heart sounds.    tachycardic   Pulmonary/Chest: Effort normal and breath sounds normal. No stridor. No respiratory distress. He has no wheezes.   Abdominal: Soft. Bowel sounds are normal. He exhibits no distension. There is no tenderness.       Fluids    Intake/Output Summary (Last 24 hours) at 10/27/18 1030  Last data filed at 10/27/18 0511   Gross per 24 hour   Intake          1242.92 ml   Output             2620 ml   Net         -1377.08 ml       Laboratory  Recent Labs      10/25/18   2040  10/27/18   0343   WBC  12.8*  9.0   RBC  4.89  4.64*   HEMOGLOBIN  14.7  14.3   HEMATOCRIT  43.8   41.7*   MCV  89.6  89.9   MCH  30.1  30.8   MCHC  33.6*  34.3   RDW  43.2  42.0   PLATELETCT  227  160*   MPV  10.7  11.6     Recent Labs      10/25/18   2040  10/27/18   0343   SODIUM  139  135   POTASSIUM  3.7  3.3*   CHLORIDE  101  101   CO2  25  27   GLUCOSE  97  110*   BUN  10  4*   CREATININE  0.82  0.69   CALCIUM  8.4*  9.0             Recent Labs      10/27/18   0343   TRIGLYCERIDE  118   HDL  79   LDL  48       Current Facility-Administered Medications:   •  tobramycin-dexamethamethasone (TOBRADEX) ophthalmic ointment, , Left Eye, 4X/DAY, Sebas Magdaleno M.D.  •  LORazepam (ATIVAN) tablet 0.5 mg, 0.5 mg, Oral, Q4HRS PRN, Prerna Padilla M.D.  •  LORazepam (ATIVAN) tablet 1 mg, 1 mg, Oral, Q4HRS PRN, 1 mg at 10/26/18 1313 **OR** LORazepam (ATIVAN) injection 0.5 mg, 0.5 mg, Intravenous, Q4HRS PRN, Prerna Padilla M.D.  •  LORazepam (ATIVAN) tablet 2 mg, 2 mg, Oral, Q2HRS PRN **OR** LORazepam (ATIVAN) injection 1 mg, 1 mg, Intravenous, Q2HRS PRN, Prerna Padilla M.D.  •  LORazepam (ATIVAN) tablet 3 mg, 3 mg, Oral, Q HOUR PRN, 3 mg at 10/26/18 2108 **OR** LORazepam (ATIVAN) injection 1.5 mg, 1.5 mg, Intravenous, Q HOUR PRN, Prerna Padilla M.D.  •  LORazepam (ATIVAN) tablet 4 mg, 4 mg, Oral, Q15 MIN PRN **OR** LORazepam (ATIVAN) injection 2 mg, 2 mg, Intravenous, Q15 MIN PRN, Prerna Padilla M.D.  •  [COMPLETED] potassium chloride 20 mEq, thiamine (B-1) 100 mg, M.V.I. ADULT 10 mL, folic acid 1 mg in D5 NS 1,000 mL infusion, , Intravenous, Once, Last Rate: 100 mL/hr at 10/26/18 0725 **AND** thiamine tablet 100 mg, 100 mg, Oral, DAILY, 100 mg at 10/27/18 0521 **AND** multivitamin (THERAGRAN) tablet 1 Tab, 1 Tab, Oral, DAILY, 1 Tab at 10/27/18 0521 **AND** folic acid (FOLVITE) tablet 1 mg, 1 mg, Oral, DAILY, Prerna Padilla M.D., 1 mg at 10/27/18 0521  •  senna-docusate (PERICOLACE or SENOKOT S) 8.6-50 MG per tablet 2 Tab, 2 Tab, Oral, BID **AND** polyethylene glycol/lytes (MIRALAX) PACKET 1 Packet, 1  Packet, Oral, QDAY PRN **AND** magnesium hydroxide (MILK OF MAGNESIA) suspension 30 mL, 30 mL, Oral, QDAY PRN **AND** bisacodyl (DULCOLAX) suppository 10 mg, 10 mg, Rectal, QDAY PRN, Prerna Padilla M.D.  •  NS infusion, , Intravenous, Continuous, Prerna Padilla M.D., Last Rate: 125 mL/hr at 10/27/18 0511  •  heparin injection 5,000 Units, 5,000 Units, Subcutaneous, Q8HRS, Prerna Padilla M.D., 5,000 Units at 10/27/18 0519  •  ondansetron (ZOFRAN) syringe/vial injection 4 mg, 4 mg, Intravenous, Q4HRS PRN, Prerna Padilla M.D.  •  ondansetron (ZOFRAN ODT) dispertab 4 mg, 4 mg, Oral, Q4HRS PRN, Prerna Padilla M.D., 4 mg at 10/26/18 0749  •  promethazine (PHENERGAN) tablet 12.5-25 mg, 12.5-25 mg, Oral, Q4HRS PRN, Prerna Padilla M.D.  •  promethazine (PHENERGAN) suppository 12.5-25 mg, 12.5-25 mg, Rectal, Q4HRS PRN, Prerna Padilla M.D.  •  prochlorperazine (COMPAZINE) injection 5-10 mg, 5-10 mg, Intravenous, Q4HRS PRN, Prerna Padilla M.D.  •  nicotine (NICODERM) 7 MG/24HR 7 mg, 7 mg, Transdermal, Daily-0600, Prerna Padilla M.D., Stopped at 10/26/18 0615  •  chlordiazePOXIDE (LIBRIUM) capsule 25 mg, 25 mg, Oral, Q6HRS, Mike Lowry M.D., 25 mg at 10/27/18 0521  \  Imaging  No orders to display        Assessment/Plan  * Alcohol withdrawal (HCC)- (present on admission)   Assessment & Plan    Patient wishes to withdrawal in hospital hx of seizures and hallucinations   CIWA ordered with PRN ativan   Rally bag to oral on 10/27.   Cardiac monitoring   Monitor electrolytes replace as needed        Abnormal LFTs due to alcohol abuse- (present on admission)   Assessment & Plan    Consistent with chronic alcohol use, CTM         Tachycardia- (present on admission)   Assessment & Plan    Due to dehydration and withdrawal   Cont to monitor on IVF        Alcohol dependence (HCC)- (present on admission)   Assessment & Plan    Greater than 10 minutes spent with patient smoking cessation counseling, discussed  cardiovascular risk factors of smoking. Nicotine patch ordered        Leukocytosis- (present on admission)   Assessment & Plan    Likely reactive, no signs of acute infection   Cont to monitor, repeat cbc in am         Alcohol intoxication (HCC)- (present on admission)   Assessment & Plan    Initial presentation with alcohol level of .4, detoxing in ER now withdrawals   Multivitamins           Alcohol use disorder, severe, dependence (HCC)- (present on admission)   Assessment & Plan    Multiple 24 oz beers daily, has required multiple doses ativan overnight.    Encourage cessation.   Consider rehab   Social work consultation         Conjunctivitis- (present on admission)   Assessment & Plan    Tobradex drops, monitor.         ADHD (attention deficit hyperactivity disorder)- (present on admission)   Assessment & Plan    No acute interventions at this time.              VTE prophylaxis: Heparins as ordered.

## 2018-10-27 NOTE — PROGRESS NOTES
Received bedside report from RN Deaquang, pt care assumed, VSS, pt assessment complete. Alert oriented x4, no c/o pain at this time. No signs of acute distress noted at this time. POC discussed with questions encouraged. Denies any additional needs at this time. Bed in lowest position, bed alarm on, pt educated on fall risk and verbalized understanding, call light within reach, will monitor.

## 2018-10-27 NOTE — PROGRESS NOTES
Assumed care from Chandana RN. Pt sleeping on his right side in bed. Seizure precautions maintained. Bed low and locked. Call light within reach. Will continue to monitor.

## 2018-10-27 NOTE — CARE PLAN
Problem: Safety  Goal: Will remain free from injury  Outcome: PROGRESSING AS EXPECTED  Assessed fall risk, fall precautions initiated. Educated patient on use of call light, no slip socks on, bed lowest position. All needs attended to. Patient verbalized understanding.     Problem: Fluid Volume:  Goal: Will maintain balanced intake and output  Outcome: PROGRESSING AS EXPECTED  Continuous IV fluids. Intermittent complaints of nausea. PO intake encouraged, will continue to monitor.    Problem: Pain Management  Goal: Pain level will decrease to patient's comfort goal  Outcome: PROGRESSING AS EXPECTED  Patient complaining of mild headaches overnight. Pain relieved with AtivanCLEMENTINA monitored. Ativan per MAR.

## 2018-10-27 NOTE — PROGRESS NOTES
Pt continuing to withdraw, requiring a lot of ativan overnight. Pt left eye redenned with increased drainage noted, Pt declined any itching or pain. Ordered for Tobradex eye gtt. Dr. Magdaleno at bedside. Will continue to monitor.

## 2018-10-27 NOTE — PROGRESS NOTES
Bedside report received from JORDY Jung. Pt transferred to tele per ACLS RN. Pt alert and oriented x4. Tele monitoring initiated. Fall precautions and seizures precautions in place per protocol.

## 2018-10-27 NOTE — CARE PLAN
Problem: Safety  Goal: Will remain free from injury  Encourage Pt to call before OOB. Bed low and locked. Call light within reach.    Problem: Medication  Goal: Compliance with prescribed medication will improve  Encourage Pt to adhere to medication regimen

## 2018-10-27 NOTE — PROGRESS NOTES
2 RN Skin Check w/ Eliecer:    Excoriation, redness over left eye, yellow drainage also noted.   Excoriation over rt knee.  Bilat excoriation behind ankles.

## 2018-10-28 LAB
ALBUMIN SERPL BCP-MCNC: 3.7 G/DL (ref 3.2–4.9)
ALBUMIN/GLOB SERPL: 1.1 G/DL
ALP SERPL-CCNC: 109 U/L (ref 30–99)
ALT SERPL-CCNC: 34 U/L (ref 2–50)
ANION GAP SERPL CALC-SCNC: 7 MMOL/L (ref 0–11.9)
AST SERPL-CCNC: 30 U/L (ref 12–45)
BASOPHILS # BLD AUTO: 0.4 % (ref 0–1.8)
BASOPHILS # BLD: 0.03 K/UL (ref 0–0.12)
BILIRUB SERPL-MCNC: 0.5 MG/DL (ref 0.1–1.5)
BUN SERPL-MCNC: 4 MG/DL (ref 8–22)
CALCIUM SERPL-MCNC: 9.5 MG/DL (ref 8.5–10.5)
CHLORIDE SERPL-SCNC: 102 MMOL/L (ref 96–112)
CO2 SERPL-SCNC: 27 MMOL/L (ref 20–33)
CREAT SERPL-MCNC: 0.7 MG/DL (ref 0.5–1.4)
EKG IMPRESSION: NORMAL
EOSINOPHIL # BLD AUTO: 0.13 K/UL (ref 0–0.51)
EOSINOPHIL NFR BLD: 1.7 % (ref 0–6.9)
ERYTHROCYTE [DISTWIDTH] IN BLOOD BY AUTOMATED COUNT: 42.6 FL (ref 35.9–50)
GLOBULIN SER CALC-MCNC: 3.3 G/DL (ref 1.9–3.5)
GLUCOSE SERPL-MCNC: 101 MG/DL (ref 65–99)
HCT VFR BLD AUTO: 42.4 % (ref 42–52)
HGB BLD-MCNC: 14.3 G/DL (ref 14–18)
IMM GRANULOCYTES # BLD AUTO: 0.03 K/UL (ref 0–0.11)
IMM GRANULOCYTES NFR BLD AUTO: 0.4 % (ref 0–0.9)
LYMPHOCYTES # BLD AUTO: 1.43 K/UL (ref 1–4.8)
LYMPHOCYTES NFR BLD: 19 % (ref 22–41)
MAGNESIUM SERPL-MCNC: 2 MG/DL (ref 1.5–2.5)
MCH RBC QN AUTO: 30.7 PG (ref 27–33)
MCHC RBC AUTO-ENTMCNC: 33.7 G/DL (ref 33.7–35.3)
MCV RBC AUTO: 91 FL (ref 81.4–97.8)
MONOCYTES # BLD AUTO: 0.66 K/UL (ref 0–0.85)
MONOCYTES NFR BLD AUTO: 8.8 % (ref 0–13.4)
NEUTROPHILS # BLD AUTO: 5.26 K/UL (ref 1.82–7.42)
NEUTROPHILS NFR BLD: 69.7 % (ref 44–72)
NRBC # BLD AUTO: 0 K/UL
NRBC BLD-RTO: 0 /100 WBC
PHOSPHATE SERPL-MCNC: 3.6 MG/DL (ref 2.5–4.5)
PLATELET # BLD AUTO: 151 K/UL (ref 164–446)
PMV BLD AUTO: 11.8 FL (ref 9–12.9)
POTASSIUM SERPL-SCNC: 4.3 MMOL/L (ref 3.6–5.5)
PROT SERPL-MCNC: 7 G/DL (ref 6–8.2)
RBC # BLD AUTO: 4.66 M/UL (ref 4.7–6.1)
SODIUM SERPL-SCNC: 136 MMOL/L (ref 135–145)
WBC # BLD AUTO: 7.5 K/UL (ref 4.8–10.8)

## 2018-10-28 PROCEDURE — A9270 NON-COVERED ITEM OR SERVICE: HCPCS | Performed by: HOSPITALIST

## 2018-10-28 PROCEDURE — 83735 ASSAY OF MAGNESIUM: CPT

## 2018-10-28 PROCEDURE — 84100 ASSAY OF PHOSPHORUS: CPT

## 2018-10-28 PROCEDURE — 85025 COMPLETE CBC W/AUTO DIFF WBC: CPT

## 2018-10-28 PROCEDURE — 700102 HCHG RX REV CODE 250 W/ 637 OVERRIDE(OP): Performed by: INTERNAL MEDICINE

## 2018-10-28 PROCEDURE — 700105 HCHG RX REV CODE 258: Performed by: HOSPITALIST

## 2018-10-28 PROCEDURE — 700111 HCHG RX REV CODE 636 W/ 250 OVERRIDE (IP): Performed by: HOSPITALIST

## 2018-10-28 PROCEDURE — 99233 SBSQ HOSP IP/OBS HIGH 50: CPT | Performed by: HOSPITALIST

## 2018-10-28 PROCEDURE — 700102 HCHG RX REV CODE 250 W/ 637 OVERRIDE(OP): Performed by: HOSPITALIST

## 2018-10-28 PROCEDURE — 93005 ELECTROCARDIOGRAM TRACING: CPT | Performed by: HOSPITALIST

## 2018-10-28 PROCEDURE — 36415 COLL VENOUS BLD VENIPUNCTURE: CPT

## 2018-10-28 PROCEDURE — 80053 COMPREHEN METABOLIC PANEL: CPT

## 2018-10-28 PROCEDURE — 93010 ELECTROCARDIOGRAM REPORT: CPT | Performed by: INTERNAL MEDICINE

## 2018-10-28 PROCEDURE — A9270 NON-COVERED ITEM OR SERVICE: HCPCS | Performed by: INTERNAL MEDICINE

## 2018-10-28 PROCEDURE — 770020 HCHG ROOM/CARE - TELE (206)

## 2018-10-28 RX ADMIN — TOBRAMYCIN AND DEXAMETHASONE: 3; 1 OINTMENT OPHTHALMIC at 08:06

## 2018-10-28 RX ADMIN — CHLORDIAZEPOXIDE HYDROCHLORIDE 25 MG: 25 CAPSULE ORAL at 00:34

## 2018-10-28 RX ADMIN — LORAZEPAM 2 MG: 1 TABLET ORAL at 22:13

## 2018-10-28 RX ADMIN — LORAZEPAM 3 MG: 1 TABLET ORAL at 12:49

## 2018-10-28 RX ADMIN — HEPARIN SODIUM 5000 UNITS: 5000 INJECTION, SOLUTION INTRAVENOUS; SUBCUTANEOUS at 12:49

## 2018-10-28 RX ADMIN — SODIUM CHLORIDE: 9 INJECTION, SOLUTION INTRAVENOUS at 03:24

## 2018-10-28 RX ADMIN — HEPARIN SODIUM 5000 UNITS: 5000 INJECTION, SOLUTION INTRAVENOUS; SUBCUTANEOUS at 22:14

## 2018-10-28 RX ADMIN — SODIUM CHLORIDE: 9 INJECTION, SOLUTION INTRAVENOUS at 17:23

## 2018-10-28 RX ADMIN — LORAZEPAM 2 MG: 1 TABLET ORAL at 05:03

## 2018-10-28 RX ADMIN — SODIUM CHLORIDE: 9 INJECTION, SOLUTION INTRAVENOUS at 10:42

## 2018-10-28 RX ADMIN — TOBRAMYCIN AND DEXAMETHASONE: 3; 1 OINTMENT OPHTHALMIC at 17:21

## 2018-10-28 RX ADMIN — CHLORDIAZEPOXIDE HYDROCHLORIDE 25 MG: 25 CAPSULE ORAL at 10:43

## 2018-10-28 RX ADMIN — LORAZEPAM 3 MG: 1 TABLET ORAL at 19:42

## 2018-10-28 RX ADMIN — LORAZEPAM 2 MG: 1 TABLET ORAL at 00:41

## 2018-10-28 RX ADMIN — LORAZEPAM 2 MG: 1 TABLET ORAL at 15:49

## 2018-10-28 RX ADMIN — LORAZEPAM 2 MG: 1 TABLET ORAL at 17:21

## 2018-10-28 RX ADMIN — THIAMINE HCL TAB 100 MG 100 MG: 100 TAB at 05:03

## 2018-10-28 RX ADMIN — CHLORDIAZEPOXIDE HYDROCHLORIDE 25 MG: 25 CAPSULE ORAL at 17:17

## 2018-10-28 RX ADMIN — THERA TABS 1 TABLET: TAB at 05:03

## 2018-10-28 RX ADMIN — LORAZEPAM 3 MG: 1 TABLET ORAL at 21:01

## 2018-10-28 RX ADMIN — CHLORDIAZEPOXIDE HYDROCHLORIDE 25 MG: 25 CAPSULE ORAL at 05:03

## 2018-10-28 RX ADMIN — TOBRAMYCIN AND DEXAMETHASONE: 3; 1 OINTMENT OPHTHALMIC at 12:50

## 2018-10-28 RX ADMIN — FOLIC ACID 1 MG: 1 TABLET ORAL at 05:03

## 2018-10-28 RX ADMIN — TOBRAMYCIN AND DEXAMETHASONE: 3; 1 OINTMENT OPHTHALMIC at 22:15

## 2018-10-28 RX ADMIN — HEPARIN SODIUM 5000 UNITS: 5000 INJECTION, SOLUTION INTRAVENOUS; SUBCUTANEOUS at 05:01

## 2018-10-28 RX ADMIN — LORAZEPAM 2 MG: 1 TABLET ORAL at 02:27

## 2018-10-28 RX ADMIN — LORAZEPAM 1 MG: 1 TABLET ORAL at 10:46

## 2018-10-28 ASSESSMENT — LIFESTYLE VARIABLES
HEADACHE, FULLNESS IN HEAD: NOT PRESENT
ORIENTATION AND CLOUDING OF SENSORIUM: ORIENTED AND CAN DO SERIAL ADDITIONS
ORIENTATION AND CLOUDING OF SENSORIUM: ORIENTED AND CAN DO SERIAL ADDITIONS
TREMOR: MODERATE TREMOR WITH ARMS EXTENDED
AUDITORY DISTURBANCES: NOT PRESENT
AUDITORY DISTURBANCES: NOT PRESENT
ANXIETY: MILDLY ANXIOUS
AGITATION: SOMEWHAT MORE THAN NORMAL ACTIVITY
NAUSEA AND VOMITING: NO NAUSEA AND NO VOMITING
TOTAL SCORE: 17
TOTAL SCORE: 13
PAROXYSMAL SWEATS: NO SWEAT VISIBLE
TREMOR: *
AGITATION: *
AGITATION: *
TOTAL SCORE: VERY MILD ITCHING, PINS AND NEEDLES SENSATION, BURNING OR NUMBNESS
ANXIETY: MILDLY ANXIOUS
TOTAL SCORE: VERY MILD ITCHING, PINS AND NEEDLES SENSATION, BURNING OR NUMBNESS
NAUSEA AND VOMITING: NO NAUSEA AND NO VOMITING
VISUAL DISTURBANCES: NOT PRESENT
AUDITORY DISTURBANCES: NOT PRESENT
AGITATION: *
ANXIETY: NO ANXIETY (AT EASE)
VISUAL DISTURBANCES: NOT PRESENT
NAUSEA AND VOMITING: NO NAUSEA AND NO VOMITING
NAUSEA AND VOMITING: NO NAUSEA AND NO VOMITING
TREMOR: *
VISUAL DISTURBANCES: NOT PRESENT
ORIENTATION AND CLOUDING OF SENSORIUM: ORIENTED AND CAN DO SERIAL ADDITIONS
NAUSEA AND VOMITING: MILD NAUSEA WITH NO VOMITING
AGITATION: NORMAL ACTIVITY
AUDITORY DISTURBANCES: NOT PRESENT
TOTAL SCORE: VERY MILD ITCHING, PINS AND NEEDLES SENSATION, BURNING OR NUMBNESS
PAROXYSMAL SWEATS: NO SWEAT VISIBLE
VISUAL DISTURBANCES: NOT PRESENT
TOTAL SCORE: VERY MILD ITCHING, PINS AND NEEDLES SENSATION, BURNING OR NUMBNESS
TOTAL SCORE: 12
TREMOR: *
PAROXYSMAL SWEATS: NO SWEAT VISIBLE
HEADACHE, FULLNESS IN HEAD: MILD
NAUSEA AND VOMITING: *
ANXIETY: *
VISUAL DISTURBANCES: NOT PRESENT
ORIENTATION AND CLOUDING OF SENSORIUM: ORIENTED AND CAN DO SERIAL ADDITIONS
ORIENTATION AND CLOUDING OF SENSORIUM: ORIENTED AND CAN DO SERIAL ADDITIONS
HEADACHE, FULLNESS IN HEAD: MILD
VISUAL DISTURBANCES: NOT PRESENT
TREMOR: MODERATE TREMOR WITH ARMS EXTENDED
TOTAL SCORE: VERY MILD ITCHING, PINS AND NEEDLES SENSATION, BURNING OR NUMBNESS
AUDITORY DISTURBANCES: NOT PRESENT
ORIENTATION AND CLOUDING OF SENSORIUM: ORIENTED AND CAN DO SERIAL ADDITIONS
ORIENTATION AND CLOUDING OF SENSORIUM: ORIENTED AND CAN DO SERIAL ADDITIONS
TOTAL SCORE: 16
VISUAL DISTURBANCES: NOT PRESENT
AGITATION: *
ANXIETY: *
NAUSEA AND VOMITING: NO NAUSEA AND NO VOMITING
VISUAL DISTURBANCES: VERY MILD SENSITIVITY
ORIENTATION AND CLOUDING OF SENSORIUM: ORIENTED AND CAN DO SERIAL ADDITIONS
ORIENTATION AND CLOUDING OF SENSORIUM: ORIENTED AND CAN DO SERIAL ADDITIONS
HEADACHE, FULLNESS IN HEAD: MODERATE
TOTAL SCORE: 14
ANXIETY: MILDLY ANXIOUS
ORIENTATION AND CLOUDING OF SENSORIUM: ORIENTED AND CAN DO SERIAL ADDITIONS
AUDITORY DISTURBANCES: NOT PRESENT
ANXIETY: *
AGITATION: SOMEWHAT MORE THAN NORMAL ACTIVITY
PAROXYSMAL SWEATS: NO SWEAT VISIBLE
HEADACHE, FULLNESS IN HEAD: MODERATE
TOTAL SCORE: 4
VISUAL DISTURBANCES: MODERATE SENSITIVITY
TOTAL SCORE: 9
ANXIETY: *
TOTAL SCORE: VERY MILD ITCHING, PINS AND NEEDLES SENSATION, BURNING OR NUMBNESS
PAROXYSMAL SWEATS: *
PAROXYSMAL SWEATS: NO SWEAT VISIBLE
PAROXYSMAL SWEATS: NO SWEAT VISIBLE
NAUSEA AND VOMITING: NO NAUSEA AND NO VOMITING
ORIENTATION AND CLOUDING OF SENSORIUM: ORIENTED AND CAN DO SERIAL ADDITIONS
HEADACHE, FULLNESS IN HEAD: MILD
AUDITORY DISTURBANCES: NOT PRESENT
AGITATION: *
AGITATION: NORMAL ACTIVITY
TREMOR: *
TOTAL SCORE: 16
ANXIETY: MODERATELY ANXIOUS OR GUARDED, SO ANXIETY IS INFERRED
PAROXYSMAL SWEATS: NO SWEAT VISIBLE
HEADACHE, FULLNESS IN HEAD: MODERATE
NAUSEA AND VOMITING: NO NAUSEA AND NO VOMITING
HEADACHE, FULLNESS IN HEAD: VERY MILD
NAUSEA AND VOMITING: NO NAUSEA AND NO VOMITING
TREMOR: MODERATE TREMOR WITH ARMS EXTENDED
HEADACHE, FULLNESS IN HEAD: MILD
AUDITORY DISTURBANCES: NOT PRESENT
ANXIETY: *
VISUAL DISTURBANCES: MODERATE SENSITIVITY
TOTAL SCORE: 13
HEADACHE, FULLNESS IN HEAD: MILD
VISUAL DISTURBANCES: NOT PRESENT
TOTAL SCORE: 3
AGITATION: *
HEADACHE, FULLNESS IN HEAD: VERY MILD
TREMOR: TREMOR NOT VISIBLE BUT CAN BE FELT, FINGERTIP TO FINGERTIP
TREMOR: *
NAUSEA AND VOMITING: NO NAUSEA AND NO VOMITING
AUDITORY DISTURBANCES: NOT PRESENT
ANXIETY: *
TOTAL SCORE: 1
ANXIETY: MILDLY ANXIOUS
PAROXYSMAL SWEATS: NO SWEAT VISIBLE
TOTAL SCORE: VERY MILD ITCHING, PINS AND NEEDLES SENSATION, BURNING OR NUMBNESS
AGITATION: *
TREMOR: *
TREMOR: *
AUDITORY DISTURBANCES: NOT PRESENT
TREMOR: TREMOR NOT VISIBLE BUT CAN BE FELT, FINGERTIP TO FINGERTIP
ANXIETY: MODERATELY ANXIOUS OR GUARDED, SO ANXIETY IS INFERRED
ORIENTATION AND CLOUDING OF SENSORIUM: ORIENTED AND CAN DO SERIAL ADDITIONS
PAROXYSMAL SWEATS: BARELY PERCEPTIBLE SWEATING. PALMS MOIST
AGITATION: SOMEWHAT MORE THAN NORMAL ACTIVITY
PAROXYSMAL SWEATS: NO SWEAT VISIBLE
NAUSEA AND VOMITING: NO NAUSEA AND NO VOMITING
TOTAL SCORE: VERY MILD ITCHING, PINS AND NEEDLES SENSATION, BURNING OR NUMBNESS
VISUAL DISTURBANCES: NOT PRESENT
PAROXYSMAL SWEATS: *
TREMOR: TREMOR NOT VISIBLE BUT CAN BE FELT, FINGERTIP TO FINGERTIP
TOTAL SCORE: 14
AGITATION: SOMEWHAT MORE THAN NORMAL ACTIVITY
ORIENTATION AND CLOUDING OF SENSORIUM: ORIENTED AND CAN DO SERIAL ADDITIONS
VISUAL DISTURBANCES: NOT PRESENT
HEADACHE, FULLNESS IN HEAD: MODERATE
AUDITORY DISTURBANCES: NOT PRESENT
TOTAL SCORE: 11
HEADACHE, FULLNESS IN HEAD: MODERATE
AUDITORY DISTURBANCES: NOT PRESENT
AUDITORY DISTURBANCES: NOT PRESENT
PAROXYSMAL SWEATS: BEADS OF SWEAT OBVIOUS ON FOREHEAD
NAUSEA AND VOMITING: NO NAUSEA AND NO VOMITING

## 2018-10-28 ASSESSMENT — PAIN SCALES - GENERAL
PAINLEVEL_OUTOF10: 0
PAINLEVEL_OUTOF10: 2
PAINLEVEL_OUTOF10: 0
PAINLEVEL_OUTOF10: 2

## 2018-10-28 ASSESSMENT — ENCOUNTER SYMPTOMS
DOUBLE VISION: 0
TREMORS: 1
EYE REDNESS: 0
NERVOUS/ANXIOUS: 0
EYE DISCHARGE: 0
BLURRED VISION: 0
SHORTNESS OF BREATH: 0

## 2018-10-28 NOTE — PROGRESS NOTES
Pt threatening to leave AMA and wanted to leave now since things won't change from today to tomorrow. Dr. Magdaleno aware/came to assess Pt. Pt agreeable to stay in hospital.

## 2018-10-28 NOTE — CARE PLAN
Problem: Safety  Goal: Will remain free from injury  Encourage Pt to call before OOB. Bed low and locked. Call light within reach.    Problem: Discharge Barriers/Planning  Goal: Patient's continuum of care needs will be met  Awaiting social work consult and PT/OT consult. Pt stated the way he is living now needs to change and he can't keep doing this to his body.

## 2018-10-28 NOTE — PROGRESS NOTES
Pt called RN to room 3x. Pt increasingly anxious, tremorous, and figity. PRN ativan given. Will continue to monitor. Emotional support provided. Social work aware.

## 2018-10-28 NOTE — PROGRESS NOTES
Hospital Medicine Daily Progress Note    Date of Service  10/28/2018    Chief Complaint  33 y.o. male admitted 10/25/2018 with actue alcohol intoxication with impending withdrawal.     Hospital Course   **  no acute overnight events.    Interval Problem Update  Patient's alcohol withdrawal has been well controlled thus far patient has mild tremors but no hallucinations or seizures at this juncture, on hospital day 3 patient continues to require high doses of as needed benzodiazepines to maintain his withdrawal symptomatology from escalating into juan antonio seizures or hallucinations.    Consultants/Specialty      Code Status  Full    Disposition  Anticipate back to self-care in 24-48 hours after patient clears with physical therapy.    Review of Systems  Review of Systems   Eyes: Negative for blurred vision, double vision, discharge and redness.   Respiratory: Negative for shortness of breath.    Cardiovascular: Negative for chest pain.   Neurological: Positive for tremors.   Psychiatric/Behavioral: The patient is not nervous/anxious.    All other systems reviewed and are negative.       Physical Exam  Temp:  [36.2 °C (97.2 °F)-37 °C (98.6 °F)] 36.6 °C (97.9 °F)  Pulse:  [] 91  Resp:  [16-18] 16  BP: (124-155)/(93-98) 128/95    Physical Exam   Constitutional: No distress.   HENT:   Head: Normocephalic.   Bruising to left lateral eye.     Eyes: Right eye exhibits no discharge. Left eye exhibits no discharge.   Neck: No tracheal deviation present. No thyromegaly present.   Cardiovascular: Normal heart sounds.    Tachycardic but improved from yesterday.    Pulmonary/Chest: Effort normal and breath sounds normal. No stridor. No respiratory distress. He has no wheezes.   Abdominal: Soft. Bowel sounds are normal. He exhibits no distension. There is no tenderness.       Fluids    Intake/Output Summary (Last 24 hours) at 10/28/18 1309  Last data filed at 10/28/18 1040   Gross per 24 hour   Intake          2142.08 ml    Output             3310 ml   Net         -1167.92 ml       Laboratory  Recent Labs      10/25/18   2040  10/27/18   0343  10/28/18   0214   WBC  12.8*  9.0  7.5   RBC  4.89  4.64*  4.66*   HEMOGLOBIN  14.7  14.3  14.3   HEMATOCRIT  43.8  41.7*  42.4   MCV  89.6  89.9  91.0   MCH  30.1  30.8  30.7   MCHC  33.6*  34.3  33.7   RDW  43.2  42.0  42.6   PLATELETCT  227  160*  151*   MPV  10.7  11.6  11.8     Recent Labs      10/25/18   2040  10/27/18   0343  10/28/18   0214   SODIUM  139  135  136   POTASSIUM  3.7  3.3*  4.3   CHLORIDE  101  101  102   CO2  25 27 27   GLUCOSE  97  110*  101*   BUN  10  4*  4*   CREATININE  0.82  0.69  0.70   CALCIUM  8.4*  9.0  9.5             Recent Labs      10/27/18   0343   TRIGLYCERIDE  118   HDL  79   LDL  48       Current Facility-Administered Medications:   •  tobramycin-dexamethamethasone (TOBRADEX) ophthalmic ointment, , Left Eye, 4X/DAY, Sebas Magdaleno M.D.  •  LORazepam (ATIVAN) tablet 0.5 mg, 0.5 mg, Oral, Q4HRS PRN, Prerna Padilla M.D.  •  LORazepam (ATIVAN) tablet 1 mg, 1 mg, Oral, Q4HRS PRN, 1 mg at 10/28/18 1046 **OR** LORazepam (ATIVAN) injection 0.5 mg, 0.5 mg, Intravenous, Q4HRS PRN, Prerna Padilla M.D.  •  LORazepam (ATIVAN) tablet 2 mg, 2 mg, Oral, Q2HRS PRN, 2 mg at 10/28/18 0503 **OR** LORazepam (ATIVAN) injection 1 mg, 1 mg, Intravenous, Q2HRS PRN, Prerna Padilla M.D.  •  LORazepam (ATIVAN) tablet 3 mg, 3 mg, Oral, Q HOUR PRN, 3 mg at 10/28/18 1249 **OR** LORazepam (ATIVAN) injection 1.5 mg, 1.5 mg, Intravenous, Q HOUR PRN, Prerna Padilla M.D.  •  LORazepam (ATIVAN) tablet 4 mg, 4 mg, Oral, Q15 MIN PRN **OR** LORazepam (ATIVAN) injection 2 mg, 2 mg, Intravenous, Q15 MIN PRN, Prerna Padilla M.D.  •  [COMPLETED] potassium chloride 20 mEq, thiamine (B-1) 100 mg, M.V.I. ADULT 10 mL, folic acid 1 mg in D5 NS 1,000 mL infusion, , Intravenous, Once, Last Rate: 100 mL/hr at 10/26/18 0725 **AND** thiamine tablet 100 mg, 100 mg, Oral, DAILY, 100 mg at  10/28/18 0503 **AND** multivitamin (THERAGRAN) tablet 1 Tab, 1 Tab, Oral, DAILY, 1 Tab at 10/28/18 0503 **AND** folic acid (FOLVITE) tablet 1 mg, 1 mg, Oral, DAILY, Prerna Padilla M.D., 1 mg at 10/28/18 0503  •  senna-docusate (PERICOLACE or SENOKOT S) 8.6-50 MG per tablet 2 Tab, 2 Tab, Oral, BID **AND** polyethylene glycol/lytes (MIRALAX) PACKET 1 Packet, 1 Packet, Oral, QDAY PRN **AND** magnesium hydroxide (MILK OF MAGNESIA) suspension 30 mL, 30 mL, Oral, QDAY PRN **AND** bisacodyl (DULCOLAX) suppository 10 mg, 10 mg, Rectal, QDAY PRN, Prerna Padilla M.D.  •  NS infusion, , Intravenous, Continuous, Prerna Padilla M.D., Last Rate: 125 mL/hr at 10/28/18 1042  •  heparin injection 5,000 Units, 5,000 Units, Subcutaneous, Q8HRS, Prerna Padilla M.D., 5,000 Units at 10/28/18 1249  •  ondansetron (ZOFRAN) syringe/vial injection 4 mg, 4 mg, Intravenous, Q4HRS PRN, Prerna Padilla M.D.  •  ondansetron (ZOFRAN ODT) dispertab 4 mg, 4 mg, Oral, Q4HRS PRN, Prerna Padilla M.D., 4 mg at 10/27/18 1940  •  promethazine (PHENERGAN) tablet 12.5-25 mg, 12.5-25 mg, Oral, Q4HRS PRN, Prerna Padilla M.D.  •  promethazine (PHENERGAN) suppository 12.5-25 mg, 12.5-25 mg, Rectal, Q4HRS PRN, Prerna Padilla M.D.  •  prochlorperazine (COMPAZINE) injection 5-10 mg, 5-10 mg, Intravenous, Q4HRS PRN, Prerna Padilla M.D.  •  nicotine (NICODERM) 7 MG/24HR 7 mg, 7 mg, Transdermal, Daily-0600, Prerna Padilla M.D., Stopped at 10/26/18 0615  •  chlordiazePOXIDE (LIBRIUM) capsule 25 mg, 25 mg, Oral, Q6HRS, Mike Lowry M.D., 25 mg at 10/28/18 1043  \  Imaging  No orders to display        Assessment/Plan  * Alcohol withdrawal (HCC)- (present on admission)   Assessment & Plan    Patient wishes to withdrawal in hospital hx of seizures and hallucinations   CIWA ordered with PRN ativan   Rally bag to oral on 10/27.   Cardiac monitoring   Monitor electrolytes replace as needed        Abnormal LFTs due to alcohol abuse- (present on  admission)   Assessment & Plan    Consistent with chronic alcohol use, CTM         Tachycardia- (present on admission)   Assessment & Plan    Due to dehydration and withdrawal   Cont to monitor on IVF        Alcohol dependence (HCC)- (present on admission)   Assessment & Plan    Greater than 10 minutes spent with patient smoking cessation counseling, discussed cardiovascular risk factors of smoking. Nicotine patch ordered        Leukocytosis- (present on admission)   Assessment & Plan    Likely reactive, no signs of acute infection   Cont to monitor, repeat cbc in am         Alcohol intoxication (HCC)- (present on admission)   Assessment & Plan    Initial presentation with alcohol level of .4, detoxing in ER now withdrawals   Multivitamins           Alcohol use disorder, severe, dependence (HCC)- (present on admission)   Assessment & Plan    Multiple 24 oz beers daily, has required multiple doses ativan overnight.    Encourage cessation.   Consider rehab   Social work consultation         Conjunctivitis- (present on admission)   Assessment & Plan    Tobradex drops, monitor.         ADHD (attention deficit hyperactivity disorder)- (present on admission)   Assessment & Plan    No acute interventions at this time.              VTE prophylaxis: Heparins as ordered.

## 2018-10-28 NOTE — PROGRESS NOTES
Assumed care from JORDY Barron. Pt resting in bed. NS infusing at 125 mL/hr. Bed low and locked. Call light within reach. Seizure precautions maintained. Will continue to monitor.

## 2018-10-28 NOTE — PROGRESS NOTES
Patient refuses morning EKG at this time. Asked tech to come back later because he just wants to sleep now.

## 2018-10-29 ENCOUNTER — HOSPITAL ENCOUNTER (INPATIENT)
Facility: MEDICAL CENTER | Age: 33
LOS: 7 days | DRG: 918 | End: 2018-11-06
Attending: EMERGENCY MEDICINE | Admitting: HOSPITALIST
Payer: MEDICARE

## 2018-10-29 VITALS
HEIGHT: 68 IN | OXYGEN SATURATION: 98 % | BODY MASS INDEX: 23.32 KG/M2 | DIASTOLIC BLOOD PRESSURE: 102 MMHG | TEMPERATURE: 97.6 F | WEIGHT: 153.88 LBS | RESPIRATION RATE: 19 BRPM | SYSTOLIC BLOOD PRESSURE: 136 MMHG | HEART RATE: 105 BPM

## 2018-10-29 DIAGNOSIS — R79.89 ELEVATED LFTS: ICD-10-CM

## 2018-10-29 DIAGNOSIS — F10.939 ALCOHOL WITHDRAWAL SYNDROME WITH COMPLICATION (HCC): ICD-10-CM

## 2018-10-29 DIAGNOSIS — R45.1 AGITATION: ICD-10-CM

## 2018-10-29 DIAGNOSIS — T50.901A ACCIDENTAL DRUG OVERDOSE, INITIAL ENCOUNTER: ICD-10-CM

## 2018-10-29 PROBLEM — D72.829 LEUKOCYTOSIS: Status: RESOLVED | Noted: 2018-10-26 | Resolved: 2018-10-29

## 2018-10-29 PROBLEM — F10.929 ALCOHOL INTOXICATION (HCC): Status: RESOLVED | Noted: 2018-10-26 | Resolved: 2018-10-29

## 2018-10-29 PROBLEM — R00.0 TACHYCARDIA: Status: RESOLVED | Noted: 2018-10-26 | Resolved: 2018-10-29

## 2018-10-29 LAB
ALBUMIN SERPL BCP-MCNC: 4.3 G/DL (ref 3.2–4.9)
ALBUMIN/GLOB SERPL: 1.1 G/DL
ALP SERPL-CCNC: 107 U/L (ref 30–99)
ALT SERPL-CCNC: 63 U/L (ref 2–50)
AMPHET UR QL SCN: NEGATIVE
ANION GAP SERPL CALC-SCNC: 12 MMOL/L (ref 0–11.9)
APAP SERPL-MCNC: <10 UG/ML (ref 10–30)
APAP SERPL-MCNC: <10 UG/ML (ref 10–30)
AST SERPL-CCNC: 73 U/L (ref 12–45)
BARBITURATES UR QL SCN: POSITIVE
BASOPHILS # BLD AUTO: 0.2 % (ref 0–1.8)
BASOPHILS # BLD: 0.03 K/UL (ref 0–0.12)
BENZODIAZ UR QL SCN: POSITIVE
BILIRUB SERPL-MCNC: 0.4 MG/DL (ref 0.1–1.5)
BUN SERPL-MCNC: 4 MG/DL (ref 8–22)
BZE UR QL SCN: NEGATIVE
CALCIUM SERPL-MCNC: 10.1 MG/DL (ref 8.5–10.5)
CANNABINOIDS UR QL SCN: NEGATIVE
CHLORIDE SERPL-SCNC: 104 MMOL/L (ref 96–112)
CO2 SERPL-SCNC: 22 MMOL/L (ref 20–33)
CREAT SERPL-MCNC: 0.76 MG/DL (ref 0.5–1.4)
EKG IMPRESSION: NORMAL
EOSINOPHIL # BLD AUTO: 0.01 K/UL (ref 0–0.51)
EOSINOPHIL NFR BLD: 0.1 % (ref 0–6.9)
ERYTHROCYTE [DISTWIDTH] IN BLOOD BY AUTOMATED COUNT: 41.8 FL (ref 35.9–50)
ETHANOL BLD-MCNC: 0.04 G/DL
ETHANOL BLD-MCNC: 0.11 G/DL
GLOBULIN SER CALC-MCNC: 3.9 G/DL (ref 1.9–3.5)
GLUCOSE SERPL-MCNC: 103 MG/DL (ref 65–99)
HCT VFR BLD AUTO: 43.4 % (ref 42–52)
HGB BLD-MCNC: 14.9 G/DL (ref 14–18)
IMM GRANULOCYTES # BLD AUTO: 0.07 K/UL (ref 0–0.11)
IMM GRANULOCYTES NFR BLD AUTO: 0.5 % (ref 0–0.9)
LIPASE SERPL-CCNC: 28 U/L (ref 11–82)
LYMPHOCYTES # BLD AUTO: 1.61 K/UL (ref 1–4.8)
LYMPHOCYTES NFR BLD: 11.5 % (ref 22–41)
MAGNESIUM SERPL-MCNC: 1.7 MG/DL (ref 1.5–2.5)
MCH RBC QN AUTO: 30.7 PG (ref 27–33)
MCHC RBC AUTO-ENTMCNC: 34.3 G/DL (ref 33.7–35.3)
MCV RBC AUTO: 89.3 FL (ref 81.4–97.8)
METHADONE UR QL SCN: NEGATIVE
MONOCYTES # BLD AUTO: 1.17 K/UL (ref 0–0.85)
MONOCYTES NFR BLD AUTO: 8.3 % (ref 0–13.4)
NEUTROPHILS # BLD AUTO: 11.14 K/UL (ref 1.82–7.42)
NEUTROPHILS NFR BLD: 79.4 % (ref 44–72)
NRBC # BLD AUTO: 0 K/UL
NRBC BLD-RTO: 0 /100 WBC
OPIATES UR QL SCN: NEGATIVE
OXYCODONE UR QL SCN: NEGATIVE
PCP UR QL SCN: NEGATIVE
PHOSPHATE SERPL-MCNC: 3.4 MG/DL (ref 2.5–4.5)
PLATELET # BLD AUTO: 189 K/UL (ref 164–446)
PMV BLD AUTO: 11.3 FL (ref 9–12.9)
POTASSIUM SERPL-SCNC: 3.7 MMOL/L (ref 3.6–5.5)
PROPOXYPH UR QL SCN: NEGATIVE
PROT SERPL-MCNC: 8.2 G/DL (ref 6–8.2)
RBC # BLD AUTO: 4.86 M/UL (ref 4.7–6.1)
SALICYLATES SERPL-MCNC: 0 MG/DL (ref 15–25)
SODIUM SERPL-SCNC: 138 MMOL/L (ref 135–145)
WBC # BLD AUTO: 14 K/UL (ref 4.8–10.8)

## 2018-10-29 PROCEDURE — 700111 HCHG RX REV CODE 636 W/ 250 OVERRIDE (IP): Performed by: HOSPITALIST

## 2018-10-29 PROCEDURE — 84100 ASSAY OF PHOSPHORUS: CPT

## 2018-10-29 PROCEDURE — 83036 HEMOGLOBIN GLYCOSYLATED A1C: CPT

## 2018-10-29 PROCEDURE — 80307 DRUG TEST PRSMV CHEM ANLYZR: CPT

## 2018-10-29 PROCEDURE — HZ2ZZZZ DETOXIFICATION SERVICES FOR SUBSTANCE ABUSE TREATMENT: ICD-10-PCS | Performed by: HOSPITALIST

## 2018-10-29 PROCEDURE — G8979 MOBILITY GOAL STATUS: HCPCS | Mod: CI

## 2018-10-29 PROCEDURE — 99239 HOSP IP/OBS DSCHRG MGMT >30: CPT | Performed by: HOSPITALIST

## 2018-10-29 PROCEDURE — 700101 HCHG RX REV CODE 250: Performed by: EMERGENCY MEDICINE

## 2018-10-29 PROCEDURE — A9270 NON-COVERED ITEM OR SERVICE: HCPCS | Performed by: HOSPITALIST

## 2018-10-29 PROCEDURE — 700105 HCHG RX REV CODE 258: Performed by: EMERGENCY MEDICINE

## 2018-10-29 PROCEDURE — 80053 COMPREHEN METABOLIC PANEL: CPT

## 2018-10-29 PROCEDURE — 700102 HCHG RX REV CODE 250 W/ 637 OVERRIDE(OP): Performed by: INTERNAL MEDICINE

## 2018-10-29 PROCEDURE — G8987 SELF CARE CURRENT STATUS: HCPCS | Mod: CI

## 2018-10-29 PROCEDURE — A9270 NON-COVERED ITEM OR SERVICE: HCPCS | Performed by: INTERNAL MEDICINE

## 2018-10-29 PROCEDURE — 84100 ASSAY OF PHOSPHORUS: CPT | Mod: 91

## 2018-10-29 PROCEDURE — 93005 ELECTROCARDIOGRAM TRACING: CPT | Performed by: EMERGENCY MEDICINE

## 2018-10-29 PROCEDURE — G8988 SELF CARE GOAL STATUS: HCPCS | Mod: CI

## 2018-10-29 PROCEDURE — 97161 PT EVAL LOW COMPLEX 20 MIN: CPT

## 2018-10-29 PROCEDURE — 83690 ASSAY OF LIPASE: CPT

## 2018-10-29 PROCEDURE — 94760 N-INVAS EAR/PLS OXIMETRY 1: CPT

## 2018-10-29 PROCEDURE — 700105 HCHG RX REV CODE 258: Performed by: HOSPITALIST

## 2018-10-29 PROCEDURE — 83735 ASSAY OF MAGNESIUM: CPT | Mod: 91

## 2018-10-29 PROCEDURE — 96365 THER/PROPH/DIAG IV INF INIT: CPT

## 2018-10-29 PROCEDURE — 97165 OT EVAL LOW COMPLEX 30 MIN: CPT

## 2018-10-29 PROCEDURE — 700102 HCHG RX REV CODE 250 W/ 637 OVERRIDE(OP): Performed by: HOSPITALIST

## 2018-10-29 PROCEDURE — 99285 EMERGENCY DEPT VISIT HI MDM: CPT

## 2018-10-29 PROCEDURE — 83735 ASSAY OF MAGNESIUM: CPT

## 2018-10-29 PROCEDURE — G8989 SELF CARE D/C STATUS: HCPCS | Mod: CI

## 2018-10-29 PROCEDURE — 36415 COLL VENOUS BLD VENIPUNCTURE: CPT

## 2018-10-29 PROCEDURE — 85025 COMPLETE CBC W/AUTO DIFF WBC: CPT

## 2018-10-29 PROCEDURE — G8978 MOBILITY CURRENT STATUS: HCPCS | Mod: CI

## 2018-10-29 PROCEDURE — 84443 ASSAY THYROID STIM HORMONE: CPT

## 2018-10-29 RX ORDER — LANOLIN ALCOHOL/MO/W.PET/CERES
100 CREAM (GRAM) TOPICAL DAILY
Qty: 30 TAB | Refills: 0 | Status: ON HOLD | OUTPATIENT
Start: 2018-10-30 | End: 2018-11-27

## 2018-10-29 RX ORDER — CHLORDIAZEPOXIDE HYDROCHLORIDE 25 MG/1
25 CAPSULE, GELATIN COATED ORAL 3 TIMES DAILY PRN
Qty: 12 CAP | Refills: 0 | Status: ON HOLD | OUTPATIENT
Start: 2018-10-29 | End: 2018-11-06

## 2018-10-29 RX ORDER — SODIUM CHLORIDE 9 MG/ML
1000 INJECTION, SOLUTION INTRAVENOUS ONCE
Status: COMPLETED | OUTPATIENT
Start: 2018-10-29 | End: 2018-10-29

## 2018-10-29 RX ADMIN — HEPARIN SODIUM 5000 UNITS: 5000 INJECTION, SOLUTION INTRAVENOUS; SUBCUTANEOUS at 05:02

## 2018-10-29 RX ADMIN — THIAMINE HCL TAB 100 MG 100 MG: 100 TAB at 05:00

## 2018-10-29 RX ADMIN — THIAMINE HYDROCHLORIDE: 100 INJECTION, SOLUTION INTRAMUSCULAR; INTRAVENOUS at 20:52

## 2018-10-29 RX ADMIN — LORAZEPAM 2 MG: 1 TABLET ORAL at 02:33

## 2018-10-29 RX ADMIN — LORAZEPAM 3 MG: 1 TABLET ORAL at 08:27

## 2018-10-29 RX ADMIN — SODIUM CHLORIDE 1000 ML: 9 INJECTION, SOLUTION INTRAVENOUS at 19:19

## 2018-10-29 RX ADMIN — CHLORDIAZEPOXIDE HYDROCHLORIDE 25 MG: 25 CAPSULE ORAL at 00:28

## 2018-10-29 RX ADMIN — LORAZEPAM 1.5 MG: 2 INJECTION INTRAMUSCULAR; INTRAVENOUS at 01:36

## 2018-10-29 RX ADMIN — SODIUM CHLORIDE: 9 INJECTION, SOLUTION INTRAVENOUS at 01:55

## 2018-10-29 RX ADMIN — THERA TABS 1 TABLET: TAB at 05:01

## 2018-10-29 RX ADMIN — SODIUM CHLORIDE: 9 INJECTION, SOLUTION INTRAVENOUS at 08:31

## 2018-10-29 RX ADMIN — LORAZEPAM 2 MG: 1 TABLET ORAL at 05:03

## 2018-10-29 RX ADMIN — TOBRAMYCIN AND DEXAMETHASONE: 3; 1 OINTMENT OPHTHALMIC at 08:27

## 2018-10-29 RX ADMIN — LORAZEPAM 2 MG: 1 TABLET ORAL at 07:05

## 2018-10-29 RX ADMIN — CHLORDIAZEPOXIDE HYDROCHLORIDE 25 MG: 25 CAPSULE ORAL at 05:01

## 2018-10-29 RX ADMIN — LORAZEPAM 3 MG: 1 TABLET ORAL at 00:28

## 2018-10-29 RX ADMIN — FOLIC ACID 1 MG: 1 TABLET ORAL at 05:00

## 2018-10-29 ASSESSMENT — LIFESTYLE VARIABLES
ANXIETY: *
VISUAL DISTURBANCES: MODERATE SENSITIVITY
TOTAL SCORE: 19
VISUAL DISTURBANCES: MILD SENSITIVITY
PAROXYSMAL SWEATS: NO SWEAT VISIBLE
ANXIETY: *
PAROXYSMAL SWEATS: NO SWEAT VISIBLE
AUDITORY DISTURBANCES: NOT PRESENT
ANXIETY: *
TREMOR: *
TOTAL SCORE: 14
HEADACHE, FULLNESS IN HEAD: MILD
ANXIETY: MODERATELY ANXIOUS OR GUARDED, SO ANXIETY IS INFERRED
PAROXYSMAL SWEATS: NO SWEAT VISIBLE
ORIENTATION AND CLOUDING OF SENSORIUM: ORIENTED AND CAN DO SERIAL ADDITIONS
VISUAL DISTURBANCES: MILD SENSITIVITY
HEADACHE, FULLNESS IN HEAD: MODERATE
VISUAL DISTURBANCES: MODERATE SENSITIVITY
TOTAL SCORE: 14
TOTAL SCORE: VERY MILD ITCHING, PINS AND NEEDLES SENSATION, BURNING OR NUMBNESS
PAROXYSMAL SWEATS: NO SWEAT VISIBLE
AUDITORY DISTURBANCES: NOT PRESENT
TOTAL SCORE: 13
TOTAL SCORE: VERY MILD ITCHING, PINS AND NEEDLES SENSATION, BURNING OR NUMBNESS
NAUSEA AND VOMITING: NO NAUSEA AND NO VOMITING
TOTAL SCORE: 16
NAUSEA AND VOMITING: MILD NAUSEA WITH NO VOMITING
NAUSEA AND VOMITING: MILD NAUSEA WITH NO VOMITING
ANXIETY: *
AUDITORY DISTURBANCES: NOT PRESENT
ORIENTATION AND CLOUDING OF SENSORIUM: ORIENTED AND CAN DO SERIAL ADDITIONS
TOTAL SCORE: VERY MILD ITCHING, PINS AND NEEDLES SENSATION, BURNING OR NUMBNESS
TREMOR: *
HEADACHE, FULLNESS IN HEAD: MILD
AUDITORY DISTURBANCES: NOT PRESENT
TREMOR: *
NAUSEA AND VOMITING: *
TREMOR: *
ORIENTATION AND CLOUDING OF SENSORIUM: ORIENTED AND CAN DO SERIAL ADDITIONS
HEADACHE, FULLNESS IN HEAD: MODERATE
AUDITORY DISTURBANCES: NOT PRESENT
VISUAL DISTURBANCES: MILD SENSITIVITY
VISUAL DISTURBANCES: MODERATE SENSITIVITY
TOTAL SCORE: VERY MILD ITCHING, PINS AND NEEDLES SENSATION, BURNING OR NUMBNESS
TOTAL SCORE: 18
ORIENTATION AND CLOUDING OF SENSORIUM: ORIENTED AND CAN DO SERIAL ADDITIONS
TREMOR: *
AGITATION: *
HEADACHE, FULLNESS IN HEAD: MILD
TREMOR: *
PAROXYSMAL SWEATS: NO SWEAT VISIBLE
PAROXYSMAL SWEATS: NO SWEAT VISIBLE
ANXIETY: *
AGITATION: *
AGITATION: *
ORIENTATION AND CLOUDING OF SENSORIUM: ORIENTED AND CAN DO SERIAL ADDITIONS
HEADACHE, FULLNESS IN HEAD: MODERATE
AGITATION: *
NAUSEA AND VOMITING: NO NAUSEA AND NO VOMITING
ORIENTATION AND CLOUDING OF SENSORIUM: ORIENTED AND CAN DO SERIAL ADDITIONS
NAUSEA AND VOMITING: NO NAUSEA AND NO VOMITING
AGITATION: *
AGITATION: *
AUDITORY DISTURBANCES: NOT PRESENT

## 2018-10-29 ASSESSMENT — COGNITIVE AND FUNCTIONAL STATUS - GENERAL
SUGGESTED CMS G CODE MODIFIER DAILY ACTIVITY: CH
SUGGESTED CMS G CODE MODIFIER MOBILITY: CH
MOBILITY SCORE: 24
DAILY ACTIVITIY SCORE: 24

## 2018-10-29 ASSESSMENT — PAIN SCALES - GENERAL
PAINLEVEL_OUTOF10: 5
PAINLEVEL_OUTOF10: 0

## 2018-10-29 ASSESSMENT — GAIT ASSESSMENTS
DISTANCE (FEET): 400
GAIT LEVEL OF ASSIST: SUPERVISED

## 2018-10-29 ASSESSMENT — ACTIVITIES OF DAILY LIVING (ADL): TOILETING: INDEPENDENT

## 2018-10-29 NOTE — PROGRESS NOTES
"Pt attempting to remove tele monitor because he \"needs time to relieve his stress\" in the bathroom. Pt educated on why he needs to wear monitor at all times. Pt A&Ox4. Threatening to leave AMA if we don't give him privacy. Pt said \"I know my rights as an american and will leave without signing any paper.\" Pt educated on the fact that we are concerned about falls, and about alcohol withdrawal related seizures. Pt verbalizes understanding. Continues to refuse assistance in the BR. Pt says \"he has to relieve his anxiety and feels like a [age] year old boy having to get permission to relieve his stress in BR.\" Pt in BR with staff outside door. HR in 160s at this time. Knocked on door to check on pt and he said \"I'm okay.\" Patient told he needs to get back in bed.   "

## 2018-10-29 NOTE — PROGRESS NOTES
Assumed care from Imani RN. Pt in bed. NS infusing at 150 mL/hr. Bed low and locked. Call light within reach. Bed alarm on.

## 2018-10-29 NOTE — THERAPY
"Occupational Therapy Evaluation completed.   Functional Status:  Supervised/independent with ADLs and txfs  Plan of Care: Patient with no further skilled OT needs in the acute care setting at this time  Discharge Recommendations: Post-acute therapy Currently anticipate no further skilled therapy needs once patient is discharged from the inpatient setting.    See \"Rehab Therapy-Acute\" Patient Summary Report for complete documentation.    "

## 2018-10-29 NOTE — THERAPY
"Physical Therapy Evaluation completed.   Bed Mobility:  Supine to Sit: Supervised  Transfers: Sit to Stand: Supervised  Gait: Level Of Assist: Supervised with No Equipment Needed       Plan of Care: Patient with no further skilled PT needs in the acute care setting at this time  Discharge Recommendations: Equipment: No Equipment Needed.  Pt presents with alcohol intoxication, now able to ambulate x 400 feet with no AD and showing no balance or strength issues when up. Pt is homeless, will need assist for housing options. No further inpt PT needs.     See \"Rehab Therapy-Acute\" Patient Summary Report for complete documentation.     "

## 2018-10-29 NOTE — DISCHARGE PLANNING
Per bedside RN pt wanting to leave AMA.  States he has no ID.  Copy of licence found in  given to bedside RN.

## 2018-10-29 NOTE — PROGRESS NOTES
Prescription given to Pt for librium. Pt left unit without receiving and signing d/c paperwork. IV d/c. Tele d/c.

## 2018-10-29 NOTE — PROGRESS NOTES
Pt requesting to take off tele box for some privacy in the bathroom, educated pt that tele box has to stay for continuous monitoring. Pt threatened to take tele box off in bathroom. Pt is wearing non skid socks, bed is locked and lowest position, bed alarm on.

## 2018-10-29 NOTE — DISCHARGE INSTRUCTIONS
Discharge Instructions    Be sure to schedule a follow-up appointment with your primary care doctor or any specialists as instructed.     Discharge Plan:   Diet Plan: Discussed  Activity Level: Discussed  Confirmed Follow up Appointment: Patient to Call and Schedule Appointment  Confirmed Symptoms Management: Discussed  Medication Reconciliation Updated: Yes  Influenza Vaccine Indication: Patient Refuses    I understand that a diet low in cholesterol, fat, and sodium is recommended for good health. Unless I have been given specific instructions below for another diet, I accept this instruction as my diet prescription.     Depression / Suicide Risk    As you are discharged from this [facility] facility, it is important to learn how to keep safe from harming yourself.    Recognize the warning signs:  · Abrupt changes in personality, positive or negative- including increase in energy   · Giving away possessions  · Change in eating patterns- significant weight changes-  positive or negative  · Change in sleeping patterns- unable to sleep or sleeping all the time   · Unwillingness or inability to communicate  · Depression  · Unusual sadness, discouragement and loneliness  · Talk of wanting to die  · Neglect of personal appearance   · Rebelliousness- reckless behavior  · Withdrawal from people/activities they love  · Confusion- inability to concentrate     If you or a loved one observes any of these behaviors or has concerns about self-harm, here's what you can do:  · Talk about it- your feelings and reasons for harming yourself  · Remove any means that you might use to hurt yourself (examples: pills, rope, extension cords, firearm)  · Get professional help from the community (Mental Health, Substance Abuse, psychological counseling)  · Do not be alone:Call your Safe Contact- someone whom you trust who will be there for you.  · Call your local CRISIS HOTLINE 078-8395 or 087-783-9165  · Call your local Children's Mobile  Crisis Response Team [place] (664) 011-7337 or www.[employer].HIGH MOBILITY  · Call the toll free National Suicide Prevention Hotlines   · National Suicide Prevention Lifeline 634-372-QANC (0278)  · National Hope Line Network 800-SUICIDE (902-2521)

## 2018-10-29 NOTE — CARE PLAN
Problem: Safety  Goal: Will remain free from falls  Outcome: PROGRESSING AS EXPECTED  Fall risk assessed, fall precautions in place, bed alarm on, pt educated on fall risk and on the importance of using the call light, will continue to educate.       Problem: Venous Thromboembolism (VTW)/Deep Vein Thrombosis (DVT) Prevention:  Goal: Patient will participate in Venous Thrombosis (VTE)/Deep Vein Thrombosis (DVT)Prevention Measures  Outcome: PROGRESSING AS EXPECTED  VTE prophylaxis in place, pt educated on use and importance, pt agrees to comply.

## 2018-10-29 NOTE — DIETARY
Nutrition Services:    Poor PO intake on Nutrition Admit Screen. Pt is currently on a regular diet and per chart pt PO >50% consistently.  Pt eating well.   Ht: 172.7 cm, Wt: 69.8 kg, BMI 23.4 (WNL).  Consult RD as needed. RD will re-screen weekly.      RD available prn

## 2018-10-29 NOTE — PROGRESS NOTES
Assumed care of patient, received bedside report from day shift RN, Pt A&Ox4, on RA no SOB noted. Call light within reach, personal belongings within reach.  Bed is locked and in lowest position, bed Strip alarm on. Tele monitor on. Educated to use call light when wanting to use BR. Hourly rounding in place.

## 2018-10-29 NOTE — PROGRESS NOTES
Pt called RN to room. Stated wants prescriptions for adderall and buspirone. Dr. Magdaleno at bedside and aware. Pt stated he does not have an ID to get prescriptions from pharmacy. Awaiting PT/OT to clear patient for d/c. Will continue to monitor.

## 2018-10-29 NOTE — PROGRESS NOTES
RN called to bedside. Pt getting very agitated and wants to leave the hospital. Stated we are not doing anything for him except feeding him benzos. Stated he wants to leave to go play ball with his son at 4 pm. Educated Pt about importance of staying in hospital as he is still withdrawing pretty heavily. Will continue to monitor.

## 2018-10-30 PROBLEM — T50.901A DRUG OVERDOSE: Status: ACTIVE | Noted: 2018-10-30

## 2018-10-30 PROBLEM — D72.829 LEUKOCYTOSIS: Status: ACTIVE | Noted: 2018-10-30

## 2018-10-30 PROBLEM — R00.0 TACHYCARDIA: Status: ACTIVE | Noted: 2018-10-30

## 2018-10-30 PROBLEM — F10.939 ALCOHOL WITHDRAWAL (HCC): Status: ACTIVE | Noted: 2018-10-30

## 2018-10-30 LAB
EST. AVERAGE GLUCOSE BLD GHB EST-MCNC: 123 MG/DL
HBA1C MFR BLD: 5.9 % (ref 0–5.6)
MAGNESIUM SERPL-MCNC: 2.3 MG/DL (ref 1.5–2.5)
PHOSPHATE SERPL-MCNC: 2.2 MG/DL (ref 2.5–4.5)
TSH SERPL DL<=0.005 MIU/L-ACNC: 1.04 UIU/ML (ref 0.38–5.33)

## 2018-10-30 PROCEDURE — 302128 INFUSION PUMP: Performed by: HOSPITALIST

## 2018-10-30 PROCEDURE — 99223 1ST HOSP IP/OBS HIGH 75: CPT | Performed by: HOSPITALIST

## 2018-10-30 PROCEDURE — 700102 HCHG RX REV CODE 250 W/ 637 OVERRIDE(OP): Performed by: HOSPITALIST

## 2018-10-30 PROCEDURE — 96376 TX/PRO/DX INJ SAME DRUG ADON: CPT

## 2018-10-30 PROCEDURE — 700105 HCHG RX REV CODE 258: Performed by: HOSPITALIST

## 2018-10-30 PROCEDURE — 700111 HCHG RX REV CODE 636 W/ 250 OVERRIDE (IP): Performed by: HOSPITALIST

## 2018-10-30 PROCEDURE — 96367 TX/PROPH/DG ADDL SEQ IV INF: CPT

## 2018-10-30 PROCEDURE — 770020 HCHG ROOM/CARE - TELE (206)

## 2018-10-30 PROCEDURE — 700102 HCHG RX REV CODE 250 W/ 637 OVERRIDE(OP): Performed by: STUDENT IN AN ORGANIZED HEALTH CARE EDUCATION/TRAINING PROGRAM

## 2018-10-30 PROCEDURE — A9270 NON-COVERED ITEM OR SERVICE: HCPCS | Performed by: HOSPITALIST

## 2018-10-30 PROCEDURE — 302128 INFUSION PUMP W/POLE: Performed by: HOSPITALIST

## 2018-10-30 PROCEDURE — 96375 TX/PRO/DX INJ NEW DRUG ADDON: CPT

## 2018-10-30 PROCEDURE — A9270 NON-COVERED ITEM OR SERVICE: HCPCS | Performed by: STUDENT IN AN ORGANIZED HEALTH CARE EDUCATION/TRAINING PROGRAM

## 2018-10-30 PROCEDURE — 700101 HCHG RX REV CODE 250: Performed by: HOSPITALIST

## 2018-10-30 PROCEDURE — 700105 HCHG RX REV CODE 258: Performed by: EMERGENCY MEDICINE

## 2018-10-30 PROCEDURE — 99222 1ST HOSP IP/OBS MODERATE 55: CPT | Performed by: PSYCHIATRY & NEUROLOGY

## 2018-10-30 RX ORDER — LORAZEPAM 2 MG/ML
1 INJECTION INTRAMUSCULAR
Status: DISCONTINUED | OUTPATIENT
Start: 2018-10-30 | End: 2018-10-30

## 2018-10-30 RX ORDER — PROMETHAZINE HYDROCHLORIDE 25 MG/1
12.5-25 SUPPOSITORY RECTAL EVERY 4 HOURS PRN
Status: DISCONTINUED | OUTPATIENT
Start: 2018-10-30 | End: 2018-11-06 | Stop reason: HOSPADM

## 2018-10-30 RX ORDER — DIPHENHYDRAMINE HCL 25 MG
50 TABLET ORAL
Status: DISCONTINUED | OUTPATIENT
Start: 2018-10-30 | End: 2018-11-06 | Stop reason: HOSPADM

## 2018-10-30 RX ORDER — LORAZEPAM 1 MG/1
1 TABLET ORAL EVERY 4 HOURS PRN
Status: DISCONTINUED | OUTPATIENT
Start: 2018-10-30 | End: 2018-10-30

## 2018-10-30 RX ORDER — SODIUM CHLORIDE 9 MG/ML
INJECTION, SOLUTION INTRAVENOUS CONTINUOUS
Status: DISCONTINUED | OUTPATIENT
Start: 2018-10-30 | End: 2018-10-31

## 2018-10-30 RX ORDER — HEPARIN SODIUM 5000 [USP'U]/ML
5000 INJECTION, SOLUTION INTRAVENOUS; SUBCUTANEOUS EVERY 8 HOURS
Status: DISCONTINUED | OUTPATIENT
Start: 2018-10-30 | End: 2018-11-06 | Stop reason: HOSPADM

## 2018-10-30 RX ORDER — ONDANSETRON 4 MG/1
4 TABLET, ORALLY DISINTEGRATING ORAL EVERY 4 HOURS PRN
Status: DISCONTINUED | OUTPATIENT
Start: 2018-10-30 | End: 2018-11-06 | Stop reason: HOSPADM

## 2018-10-30 RX ORDER — LORAZEPAM 2 MG/ML
1.5 INJECTION INTRAMUSCULAR
Status: DISCONTINUED | OUTPATIENT
Start: 2018-10-30 | End: 2018-10-30

## 2018-10-30 RX ORDER — HALOPERIDOL 5 MG/ML
5 INJECTION INTRAMUSCULAR
Status: DISCONTINUED | OUTPATIENT
Start: 2018-10-30 | End: 2018-11-06 | Stop reason: HOSPADM

## 2018-10-30 RX ORDER — LORAZEPAM 2 MG/ML
0.5 INJECTION INTRAMUSCULAR EVERY 4 HOURS PRN
Status: DISCONTINUED | OUTPATIENT
Start: 2018-10-30 | End: 2018-10-30

## 2018-10-30 RX ORDER — LORAZEPAM 1 MG/1
2 TABLET ORAL
Status: DISCONTINUED | OUTPATIENT
Start: 2018-10-30 | End: 2018-10-30

## 2018-10-30 RX ORDER — BISACODYL 10 MG
10 SUPPOSITORY, RECTAL RECTAL
Status: DISCONTINUED | OUTPATIENT
Start: 2018-10-30 | End: 2018-11-06 | Stop reason: HOSPADM

## 2018-10-30 RX ORDER — POLYETHYLENE GLYCOL 3350 17 G/17G
1 POWDER, FOR SOLUTION ORAL
Status: DISCONTINUED | OUTPATIENT
Start: 2018-10-30 | End: 2018-11-06 | Stop reason: HOSPADM

## 2018-10-30 RX ORDER — AMOXICILLIN 250 MG
2 CAPSULE ORAL 2 TIMES DAILY
Status: DISCONTINUED | OUTPATIENT
Start: 2018-10-30 | End: 2018-11-06 | Stop reason: HOSPADM

## 2018-10-30 RX ORDER — SODIUM CHLORIDE 9 MG/ML
1000 INJECTION, SOLUTION INTRAVENOUS ONCE
Status: COMPLETED | OUTPATIENT
Start: 2018-10-30 | End: 2018-10-30

## 2018-10-30 RX ORDER — DIPHENHYDRAMINE HYDROCHLORIDE 50 MG/ML
50 INJECTION INTRAMUSCULAR; INTRAVENOUS
Status: DISCONTINUED | OUTPATIENT
Start: 2018-10-30 | End: 2018-11-06 | Stop reason: HOSPADM

## 2018-10-30 RX ORDER — LORAZEPAM 1 MG/1
0.5 TABLET ORAL EVERY 6 HOURS PRN
Status: DISCONTINUED | OUTPATIENT
Start: 2018-10-30 | End: 2018-11-06 | Stop reason: HOSPADM

## 2018-10-30 RX ORDER — LORAZEPAM 1 MG/1
4 TABLET ORAL
Status: DISCONTINUED | OUTPATIENT
Start: 2018-10-30 | End: 2018-10-30

## 2018-10-30 RX ORDER — THIAMINE MONONITRATE (VIT B1) 100 MG
100 TABLET ORAL DAILY
Status: COMPLETED | OUTPATIENT
Start: 2018-10-31 | End: 2018-11-03

## 2018-10-30 RX ORDER — FOLIC ACID 1 MG/1
1 TABLET ORAL DAILY
Status: COMPLETED | OUTPATIENT
Start: 2018-10-31 | End: 2018-11-03

## 2018-10-30 RX ORDER — ONDANSETRON 2 MG/ML
4 INJECTION INTRAMUSCULAR; INTRAVENOUS EVERY 4 HOURS PRN
Status: DISCONTINUED | OUTPATIENT
Start: 2018-10-30 | End: 2018-11-06 | Stop reason: HOSPADM

## 2018-10-30 RX ORDER — HALOPERIDOL 5 MG/1
5 TABLET ORAL
Status: DISCONTINUED | OUTPATIENT
Start: 2018-10-30 | End: 2018-11-06 | Stop reason: HOSPADM

## 2018-10-30 RX ORDER — PROMETHAZINE HYDROCHLORIDE 25 MG/1
12.5-25 TABLET ORAL EVERY 4 HOURS PRN
Status: DISCONTINUED | OUTPATIENT
Start: 2018-10-30 | End: 2018-11-06 | Stop reason: HOSPADM

## 2018-10-30 RX ORDER — LORAZEPAM 1 MG/1
3 TABLET ORAL
Status: DISCONTINUED | OUTPATIENT
Start: 2018-10-30 | End: 2018-10-30

## 2018-10-30 RX ORDER — ACETAMINOPHEN 325 MG/1
650 TABLET ORAL EVERY 6 HOURS PRN
Status: DISCONTINUED | OUTPATIENT
Start: 2018-10-30 | End: 2018-11-06 | Stop reason: HOSPADM

## 2018-10-30 RX ORDER — LORAZEPAM 2 MG/ML
2 INJECTION INTRAMUSCULAR
Status: DISCONTINUED | OUTPATIENT
Start: 2018-10-30 | End: 2018-10-30

## 2018-10-30 RX ORDER — LORAZEPAM 1 MG/1
0.5 TABLET ORAL EVERY 4 HOURS PRN
Status: DISCONTINUED | OUTPATIENT
Start: 2018-10-30 | End: 2018-10-30

## 2018-10-30 RX ADMIN — ONDANSETRON 4 MG: 4 TABLET, ORALLY DISINTEGRATING ORAL at 23:32

## 2018-10-30 RX ADMIN — LORAZEPAM 3 MG: 1 TABLET ORAL at 08:02

## 2018-10-30 RX ADMIN — LORAZEPAM 3 MG: 1 TABLET ORAL at 06:43

## 2018-10-30 RX ADMIN — ACETAMINOPHEN 650 MG: 325 TABLET, FILM COATED ORAL at 20:49

## 2018-10-30 RX ADMIN — LORAZEPAM 0.5 MG: 1 TABLET ORAL at 23:32

## 2018-10-30 RX ADMIN — LORAZEPAM 1.5 MG: 2 INJECTION INTRAMUSCULAR; INTRAVENOUS at 05:34

## 2018-10-30 RX ADMIN — SODIUM CHLORIDE 1000 ML: 9 INJECTION, SOLUTION INTRAVENOUS at 03:05

## 2018-10-30 RX ADMIN — DIPHENHYDRAMINE HCL 50 MG: 25 TABLET ORAL at 15:41

## 2018-10-30 RX ADMIN — LORAZEPAM 1 MG: 2 INJECTION INTRAMUSCULAR; INTRAVENOUS at 04:12

## 2018-10-30 RX ADMIN — DIPHENHYDRAMINE HCL 50 MG: 25 TABLET ORAL at 21:09

## 2018-10-30 RX ADMIN — SODIUM CHLORIDE: 9 INJECTION, SOLUTION INTRAVENOUS at 18:09

## 2018-10-30 RX ADMIN — LORAZEPAM 0.5 MG: 1 TABLET ORAL at 16:34

## 2018-10-30 RX ADMIN — POTASSIUM CHLORIDE: 2 INJECTION, SOLUTION, CONCENTRATE INTRAVENOUS at 04:09

## 2018-10-30 RX ADMIN — LORAZEPAM 1.5 MG: 2 INJECTION INTRAMUSCULAR; INTRAVENOUS at 03:05

## 2018-10-30 RX ADMIN — LORAZEPAM 0.5 MG: 1 TABLET ORAL at 10:24

## 2018-10-30 RX ADMIN — SODIUM CHLORIDE: 9 INJECTION, SOLUTION INTRAVENOUS at 08:04

## 2018-10-30 RX ADMIN — HEPARIN SODIUM 5000 UNITS: 5000 INJECTION, SOLUTION INTRAVENOUS; SUBCUTANEOUS at 05:54

## 2018-10-30 RX ADMIN — LORAZEPAM 1.5 MG: 2 INJECTION INTRAMUSCULAR; INTRAVENOUS at 08:55

## 2018-10-30 ASSESSMENT — LIFESTYLE VARIABLES
NAUSEA AND VOMITING: MILD NAUSEA WITH NO VOMITING
ANXIETY: *
TOTAL SCORE: 18
AUDITORY DISTURBANCES: MILD HARSHNESS OR ABILITY TO FRIGHTEN
TOTAL SCORE: 16
TOTAL SCORE: MODERATE ITCHING, PINS AND NEEDLES SENSATION, BURNING OR NUMBNESS
HEADACHE, FULLNESS IN HEAD: MILD
TACTILE DISTURBANCES: VERY MILD ITCHING, PINS AND NEEDLES SENSATION, BURNING OR NUMBNESS
ORIENTATION AND CLOUDING OF SENSORIUM: ORIENTED AND CAN DO SERIAL ADDITIONS
ORIENTATION AND CLOUDING OF SENSORIUM: ORIENTED AND CAN DO SERIAL ADDITIONS
NAUSEA AND VOMITING: MILD NAUSEA WITH NO VOMITING
PAROXYSMAL SWEATS: NO SWEAT VISIBLE
AGITATION: *
AUDITORY DISTURBANCES: NOT PRESENT
TREMOR: TREMOR NOT VISIBLE BUT CAN BE FELT, FINGERTIP TO FINGERTIP
TOTAL SCORE: 22
AUDITORY DISTURBANCES: NOT PRESENT
NAUSEA AND VOMITING: MILD NAUSEA WITH NO VOMITING
AUDITORY DISTURBANCES: MILD HARSHNESS OR ABILITY TO FRIGHTEN
ANXIETY: MODERATELY ANXIOUS OR GUARDED, SO ANXIETY IS INFERRED
AGITATION: MODERATELY FIDGETY AND RESTLESS
TOTAL SCORE: MILD ITCHING, PINS AND NEEDLES SENSATION, BURNING OR NUMBNESS
AUDITORY DISTURBANCES: NOT PRESENT
NAUSEA AND VOMITING: MILD NAUSEA WITH NO VOMITING
NAUSEA AND VOMITING: MILD NAUSEA WITH NO VOMITING
ANXIETY: MODERATELY ANXIOUS OR GUARDED, SO ANXIETY IS INFERRED
VISUAL DISTURBANCES: VERY MILD SENSITIVITY
PAROXYSMAL SWEATS: NO SWEAT VISIBLE
HEADACHE, FULLNESS IN HEAD: MODERATELY SEVERE
ANXIETY: *
HEADACHE, FULLNESS IN HEAD: MODERATE
PAROXYSMAL SWEATS: NO SWEAT VISIBLE
ANXIETY: MODERATELY ANXIOUS OR GUARDED, SO ANXIETY IS INFERRED
ORIENTATION AND CLOUDING OF SENSORIUM: ORIENTED AND CAN DO SERIAL ADDITIONS
AUDITORY DISTURBANCES: MILD HARSHNESS OR ABILITY TO FRIGHTEN
VISUAL DISTURBANCES: MODERATE SENSITIVITY
AGITATION: MODERATELY FIDGETY AND RESTLESS
TREMOR: *
TOTAL SCORE: MODERATE ITCHING, PINS AND NEEDLES SENSATION, BURNING OR NUMBNESS
TOTAL SCORE: 23
AGITATION: *
HEADACHE, FULLNESS IN HEAD: MODERATELY SEVERE
AGITATION: *
AUDITORY DISTURBANCES: NOT PRESENT
TACTILE DISTURBANCES: VERY MILD ITCHING, PINS AND NEEDLES SENSATION, BURNING OR NUMBNESS
AGITATION: *
ORIENTATION AND CLOUDING OF SENSORIUM: ORIENTED AND CAN DO SERIAL ADDITIONS
ORIENTATION AND CLOUDING OF SENSORIUM: ORIENTED AND CAN DO SERIAL ADDITIONS
PAROXYSMAL SWEATS: BARELY PERCEPTIBLE SWEATING. PALMS MOIST
TREMOR: TREMOR NOT VISIBLE BUT CAN BE FELT, FINGERTIP TO FINGERTIP
VISUAL DISTURBANCES: MODERATE SENSITIVITY
ANXIETY: MODERATELY ANXIOUS OR GUARDED, SO ANXIETY IS INFERRED
HEADACHE, FULLNESS IN HEAD: MODERATE
VISUAL DISTURBANCES: MODERATELY SEVERE HALLUCINATIONS
VISUAL DISTURBANCES: MODERATE SENSITIVITY
ORIENTATION AND CLOUDING OF SENSORIUM: ORIENTED AND CAN DO SERIAL ADDITIONS
PAROXYSMAL SWEATS: NO SWEAT VISIBLE
SUBSTANCE_ABUSE: 0
TREMOR: *
TOTAL SCORE: 14
PAROXYSMAL SWEATS: NO SWEAT VISIBLE
TOTAL SCORE: MODERATE ITCHING, PINS AND NEEDLES SENSATION, BURNING OR NUMBNESS
TOTAL SCORE: MODERATE ITCHING, PINS AND NEEDLES SENSATION, BURNING OR NUMBNESS
TOTAL SCORE: 21
TREMOR: *
TREMOR: *
VISUAL DISTURBANCES: VERY MILD SENSITIVITY
NAUSEA AND VOMITING: MILD NAUSEA WITH NO VOMITING
PAROXYSMAL SWEATS: NO SWEAT VISIBLE
ORIENTATION AND CLOUDING OF SENSORIUM: ORIENTED AND CAN DO SERIAL ADDITIONS
HEADACHE, FULLNESS IN HEAD: MODERATELY SEVERE
ANXIETY: MODERATELY ANXIOUS OR GUARDED, SO ANXIETY IS INFERRED
VISUAL DISTURBANCES: MODERATELY SEVERE HALLUCINATIONS
HEADACHE, FULLNESS IN HEAD: MODERATE
TOTAL SCORE: 18
AGITATION: MODERATELY FIDGETY AND RESTLESS
TREMOR: *
NAUSEA AND VOMITING: MILD NAUSEA WITH NO VOMITING

## 2018-10-30 ASSESSMENT — ENCOUNTER SYMPTOMS
NAUSEA: 0
ABDOMINAL PAIN: 0
MYALGIAS: 0
SENSORY CHANGE: 0
HEARTBURN: 0
DEPRESSION: 0
BLURRED VISION: 0
SHORTNESS OF BREATH: 0
SPEECH CHANGE: 0
BRUISES/BLEEDS EASILY: 0
EYE DISCHARGE: 0
DIZZINESS: 0
COUGH: 0
PALPITATIONS: 0
CHILLS: 0
FEVER: 0
WEAKNESS: 0
DOUBLE VISION: 0
HALLUCINATIONS: 0
HEMOPTYSIS: 0
FOCAL WEAKNESS: 0
VOMITING: 0
FLANK PAIN: 0

## 2018-10-30 ASSESSMENT — COGNITIVE AND FUNCTIONAL STATUS - GENERAL
DAILY ACTIVITIY SCORE: 24
SUGGESTED CMS G CODE MODIFIER DAILY ACTIVITY: CH
SUGGESTED CMS G CODE MODIFIER MOBILITY: CH
MOBILITY SCORE: 24

## 2018-10-30 ASSESSMENT — PAIN SCALES - GENERAL
PAINLEVEL_OUTOF10: 0
PAINLEVEL_OUTOF10: 6
PAINLEVEL_OUTOF10: 6

## 2018-10-30 NOTE — ASSESSMENT & PLAN NOTE
Cont to monitor LFT's   Reports unsafe sexual practices concern for STD,   HIV hepatitis B, C Neg.     Counseled to stop drinking

## 2018-10-30 NOTE — ASSESSMENT & PLAN NOTE
Overdose on librium,   Status post 24 hours telemetry, no respiratory depression  Can be transferred to medical

## 2018-10-30 NOTE — ED TRIAGE NOTES
Pt BIB [facility] with c/c of alcohol intoxication and overdose. Pt reporting he took approx 300mg of Chlordiazepoxide. Pt found out he was going to triage on arrival and called 911 from the lobby. PD showed up to educated on 911 abuse. Pt then reporting he was suicidal and placed on a legal hold. Pt tachycardiac and charge RN notified for a room

## 2018-10-30 NOTE — PROGRESS NOTES
2 RN skin assessment completed with JORDY Gonzalez. Pt has generalized bruising,scabbing and scratches predominately to bilateral arms, buttocks, right knee and bilateral posterior ankles. 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 Blanching red brittany to sacrum. No signs of pressure injuries noted.

## 2018-10-30 NOTE — ED NOTES
Provider aware of suicide risk. Room stripped of all medically unnecessary and dangerous equpiment. Pt in hospital gown and personal items removed, placed in bag x1, labeled and in ER holding area. Legal hold placed in chart. Pt educated about legal hold. No self harm discussed with pt, states will not harm self here.

## 2018-10-30 NOTE — CONSULTS
"[facility]   TRIAGE ASSESSMENT    Name: Gopal Ceja  MRN: [id number]  : 1985  Age: 33 y.o.  Date of assessment: 10/30/2018  PCP: ISAIAH Goddard.  Persons in attendance: Patient    CHIEF COMPLAINT/PRESENTING ISSUE (as stated by patient): Patient laying in bed.  Calm and cooperative.  Denies suicidal ideation Stated he took too many librium because he was in an argument with his girlfriend and wanted to \"get loaded.\"  Patient admits to daily alcohol use and is requesting to be medically monitored while detoxing.  Patient future oriented.  Stated, \"I wouldn't kill myself.  I have a seven year old son who needs me. That is why I want help getting off alcohol because I need to be a better father.\"  Patient is adamantly denying suicidal ideation at this time (POC 0.04). Patient to be released from legal hold.  Patient to be admitted to the hospital for alcohol detox.    Chief Complaint   Patient presents with   • Drug Overdose        CURRENT LIVING SITUATION/SOCIAL SUPPORT: Patient recently moved back to [place] from [place]. Wanted to be closer to his son.  Currently homeless and living at the shelter.  No friends in the area at this time.    BEHAVIORAL HEALTH TREATMENT HISTORY  Does patient/parent report a history of prior behavioral health treatment for patient?   Yes:  Patient reports long history of mental health treatment.  Reports being hospitalized at [facility] on multiple occassions.  Vague about reason for admission.  \"For my PTSD, ADD and alcohol use.\"  Patient admits he will say he is suicidal when he is intoxicated, \"But I would never do it.  I just say dumb things when Im drunk.\"      Dates Level of Care Facilty/Provider Diagnosis/Problem Medications    inpatient Detox facility in [place] Alcohol withdrawl unknown                                                                        SAFETY ASSESSMENT - SELF  Does patient acknowledge current or past symptoms of dangerousness " "to self? no  Does parent/significant other report patient has current or past symptoms of dangerousness to self? N\A  Does presenting problem suggest symptoms of dangerousness to self? No    SAFETY ASSESSMENT - OTHERS  Does patient acknowledge current or past symptoms of aggressive behavior or risk to others? no  Does parent/significant other report patient has current or past symptoms of aggressive behavior or risk to others?  N\A  Does presenting problem suggest symptoms of dangerousness to others? No    Crisis Safety Plan completed and copy given to patient? yes    ABUSE/NEGLECT SCREENING  Does patient report feeling “unsafe” in his/her home, or afraid of anyone?  no  Does patient report any history of physical, sexual, or emotional abuse?  no  Does parent or significant other report any of the above? N\A  Is there evidence of neglect by self?  no  Is there evidence of neglect by a caregiver? no  Does the patient/parent report any history of CPS/APS/police involvement related to suspected abuse/neglect or domestic violence? no  Based on the information provided during the current assessment, is a mandated report of suspected abuse/neglect being made?  No    SUBSTANCE USE SCREENING  Yes:  Evan all substances used in the past 30 days:      Last Use Amount   [x]   Alcohol 10/30/18 3 \"earthquakes\" 10% alcohol 24 ounce beers   []   Marijuana     []   Heroin     []   Prescription Opioids  (used without prescription, for    recreation, or in excess of prescribed amount)     []   Other Prescription  (used without prescription, for    recreation, or in excess of prescribed amount)     []   Cocaine      []   Methamphetamine     []   \"\" drugs (ectasy, MDMA)     []   Other substances        UDS results: Pending  Breathalyzer results:     What consequences does the patient associate with any of the above substance use and or addictive behaviors? Other: \"Alcohol has ruined every party of my life.\"     Risk factors for " detox (check all that apply):  [x]  Seizures   [x]  Diaphoretic (sweating)   [x]  Tremors   []  Hallucinations   [x]  Increased blood pressure   []  Decreased blood pressure   [x]  Other increase HR   []  None      [x] Patient education on risk factors for detoxification and instructed to return to ER as needed (if released).      MENTAL STATUS   Participation: Active verbal participation  Grooming: Disheveled  Orientation: Alert and Fully Oriented  Behavior: Calm  Eye contact: Limited  Mood: Irritable  Affect: Constricted and Anxious  Thought process: Goal-directed-patient expressing desire for inpatient detox program   Thought content: Preoccupation- wanting to transfer to inpatient detox program  Speech: Rate within normal limits and Volume within normal limits  Perception: Within normal limits  Memory:  Poor memory for chronology of events  Insight: Limited  Judgment:  Adequate  Other:    Collateral information:   Source:  [] Significant other present in person:   [] Significant other by telephone  [] [facility]   [] [facility] Nursing Staff  [x] [facility]  [] Other:     [] Unable to complete full assessment due to:  [] Acute intoxication  [] Patient declined to participate/engage  [] Patient verbally unresponsive  [] Significant cognitive deficits  [] Significant perceptual distortions or behavioral disorganization  [] Other:      CLINICAL IMPRESSIONS:  Primary:  Alcohol dependent   Secondary:         IDENTIFIED NEEDS/PLAN:  [Trigger DISPOSITION list for any items marked]    []  Imminent safety risk - self [] Imminent safety risk - others   [x]  Acute substance withdrawal []  Psychosis/Impaired reality testing   []  Mood/anxiety [x]  Substance use/Addictive behavior   []  Maladaptive behaviro []  Parent/child conflict   []  Family/Couples conflict []  Biomedical   []  Housing []  Financial   []   Legal  Occupational/Educational   []  Domestic violence []  Other:     Disposition: [facility] to [facility], [facility] and [facility]    Does patient express agreement with the above plan? yes    Referral appointment(s) scheduled? no    Alert team only:   I have discussed findings and recommendations with Dr. Latham who is in agreement with these recommendations.     Referral information sent to the following community providers :        Vargas Garnett R.N.  10/30/2018

## 2018-10-30 NOTE — ED PROVIDER NOTES
"ED Provider Note    Scribed for Thalia Latham D.O. by Renetta Bolanos. 10/29/2018, 7:00 PM.    Primary care provider: RUSS Goddard  Means of arrival: EMS  History obtained from: Patient and nurse  History limited by: the patient's alcohol intoxication    CHIEF COMPLAINT  Chief Complaint   Patient presents with   • Drug Overdose       HPI  Gopal Ceja is a 33 y.o. male with a history of anxiety, ADHD, alcohol abuse, bipolar affective disorder, Ectrodactyly dysplasia-clefting, and depression who presents to the Emergency Department via EMS for evaluation of a drug overdose today.  Per nurse, the patient was brought in by EMS and was taken to triage, however, he was not happy with this so he took approximately 300 mg of Chlordiazepoxide one hour prior to exam while in the ED waiting room. He then called 911 and was placed on a legal hold by [employer] secondary to reporting SI. Additionally, patient endorses to drinking 16 oz of beer and malt liquor prior to arrival. Patient reports associated burning sensations to his face and confusion. Patient explains \"I just don't feel well and my brain is not working right\". Patient was discharged from the hospital today and believes \"they set him up for failure\" by doing so.     Further HPI is limited secondary to the patient's alcohol intoxication.     REVIEW OF SYSTEMS  See HPI for further details.     Further ROS is limited secondary to the patient's alcohol intoxication.     PAST MEDICAL HISTORY   has a past medical history of Acute anxiety; ADHD (attention deficit hyperactivity disorder); ADHD (attention deficit hyperactivity disorder); Alcohol abuse; Bipolar affective (HCC); Depression; Ectrodactyly-ectodermal dysplasia-clefting syndrome; ETOH abuse; and Psychiatric disorder.    SURGICAL HISTORY   has a past surgical history that includes other orthopedic surgery.    SOCIAL HISTORY  Social History   Substance Use Topics   • Smoking status: Current Some Day Smoker     " Packs/day: 0.25     Types: Cigarettes   • Smokeless tobacco: Never Used      Comment: Smokes couple of times a month   • Alcohol use 0.0 oz/week      Comment: drinks arash       History   Drug Use No       FAMILY HISTORY  Family History   Problem Relation Age of Onset   • Heart Disease Neg Hx    • Hypertension Neg Hx    • Hyperlipidemia Neg Hx        CURRENT MEDICATIONS  Reviewed.  See Encounter Summary.     ALLERGIES  No Known Allergies    PHYSICAL EXAM  VITAL SIGNS: /82   Pulse (!) 150   Temp 36.8 °C (98.3 °F)   Resp 18   SpO2 95%   Constitutional: Appears intoxicated in mild distress. Patient does have poor hygiene.   HENT: Normocephalic atraumatic. Bilateral external ears normal. Nose normal. Mucous membranes are moist.  Eyes: Pupils are equal and reactive. Conjunctiva normal. Non-icteric sclera.   Neck: Normal range of motion without tenderness. Supple. No meningeal signs.  Cardiovascular: Tachycardic. Regular rhythm. No murmurs, gallops or rubs.  Thorax & Lungs: Breath sounds are clear to auscultation bilaterally. No wheezing, rhonchi or rales.  Abdomen: Soft, nontender and nondistended. No peritoneal signs noted.  Skin: Warm and dry. No rashes are noted.  Back: No bony tenderness, No CVA tenderness.   Extremities: 2+ peripheral pulses. Cap refill is less than 2 seconds. No edema, cyanosis, or clubbing.  Musculoskeletal: Good range of motion in all major joints. No tenderness to palpation. 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   Neurologic: Alert and oriented ×3. The patient moves all 4 extremities and follows commands.  Psychiatric: Appears intoxicated and is agitated.    DIAGNOSTIC STUDIES / PROCEDURES     LABS  Results for orders placed or performed during the hospital encounter of 10/29/18   URINE DRUG SCREEN (TRIAGE)   Result Value Ref Range    Amphetamines Urine Negative Negative    Barbiturates Positive (A) Negative    Benzodiazepines Positive (A) Negative     Cocaine Metabolite Negative Negative    Methadone Negative Negative    Opiates Negative Negative    Oxycodone Negative Negative    Phencyclidine -Pcp Negative Negative    Propoxyphene Negative Negative    Cannabinoid Metab Negative Negative   CBC WITH DIFFERENTIAL   Result Value Ref Range    WBC 14.0 (H) 4.8 - 10.8 K/uL    RBC 4.86 4.70 - 6.10 M/uL    Hemoglobin 14.9 14.0 - 18.0 g/dL    Hematocrit 43.4 42.0 - 52.0 %    MCV 89.3 81.4 - 97.8 fL    MCH 30.7 27.0 - 33.0 pg    MCHC 34.3 33.7 - 35.3 g/dL    RDW 41.8 35.9 - 50.0 fL    Platelet Count 189 164 - 446 K/uL    MPV 11.3 9.0 - 12.9 fL    Neutrophils-Polys 79.40 (H) 44.00 - 72.00 %    Lymphocytes 11.50 (L) 22.00 - 41.00 %    Monocytes 8.30 0.00 - 13.40 %    Eosinophils 0.10 0.00 - 6.90 %    Basophils 0.20 0.00 - 1.80 %    Immature Granulocytes 0.50 0.00 - 0.90 %    Nucleated RBC 0.00 /100 WBC    Neutrophils (Absolute) 11.14 (H) 1.82 - 7.42 K/uL    Lymphs (Absolute) 1.61 1.00 - 4.80 K/uL    Monos (Absolute) 1.17 (H) 0.00 - 0.85 K/uL    Eos (Absolute) 0.01 0.00 - 0.51 K/uL    Baso (Absolute) 0.03 0.00 - 0.12 K/uL    Immature Granulocytes (abs) 0.07 0.00 - 0.11 K/uL    NRBC (Absolute) 0.00 K/uL   Blood Alcohol   Result Value Ref Range    Diagnostic Alcohol 0.11 (H) 0.00 g/dL   COMP METABOLIC PANEL   Result Value Ref Range    Sodium 138 135 - 145 mmol/L    Potassium 3.7 3.6 - 5.5 mmol/L    Chloride 104 96 - 112 mmol/L    Co2 22 20 - 33 mmol/L    Anion Gap 12.0 (H) 0.0 - 11.9    Glucose 103 (H) 65 - 99 mg/dL    Bun 4 (L) 8 - 22 mg/dL    Creatinine 0.76 0.50 - 1.40 mg/dL    Calcium 10.1 8.5 - 10.5 mg/dL    AST(SGOT) 73 (H) 12 - 45 U/L    ALT(SGPT) 63 (H) 2 - 50 U/L    Alkaline Phosphatase 107 (H) 30 - 99 U/L    Total Bilirubin 0.4 0.1 - 1.5 mg/dL    Albumin 4.3 3.2 - 4.9 g/dL    Total Protein 8.2 6.0 - 8.2 g/dL    Globulin 3.9 (H) 1.9 - 3.5 g/dL    A-G Ratio 1.1 g/dL   LIPASE   Result Value Ref Range    Lipase 28 11 - 82 U/L   Acetaminophen Level   Result Value Ref Range     Acetaminophen -Tylenol <10 10 - 30 ug/mL   SALICYLATE LEVEL   Result Value Ref Range    Salicylates, Quant. 0 (L) 15 - 25 mg/dL   ESTIMATED GFR   Result Value Ref Range    GFR If African American >60 >60 mL/min/1.73 m 2    GFR If Non African American >60 >60 mL/min/1.73 m 2   ACETAMINOPHEN   Result Value Ref Range    Acetaminophen -Tylenol <10 10 - 30 ug/mL   DIAGNOSTIC ALCOHOL   Result Value Ref Range    Diagnostic Alcohol 0.04 (H) 0.00 g/dL   EKG (NOW)   Result Value Ref Range    Report       [facility] Emergency Dept.    Test Date:  2018-10-29  Pt Name:    CHANCE DIAZ                 Department: ER  MRN:        [id number]                      Room:       RD 08  Gender:     Male                         Technician: [id number]  :        1985                   Requested By:THALIA BERG  Order #:    [id number]                    Reading MD: Thalia Berg    Measurements  Intervals                                Axis  Rate:       119                          P:          0  LA:         144                          QRS:        -11  QRSD:       80                           T:          4  QT:         312  QTc:        439    Interpretive Statements  SINUS TACHYCARDIA  RSR' IN V1 OR V2, PROBABLY NORMAL VARIANT  CONSIDER LEFT VENTRICULAR HYPERTROPHY  Compared to ECG 10/28/2018 08:33:14  RSR' in V1 or V2 now present  Sinus rhythm no longer present  ST (T wave) deviation no longer present    Electronically Signed On 10- 19:54:31 PDT by Thalia Berg       All labs were reviewed by me.    EKG  EKG was performed at 1923 shows sinus tachycardia with heart rate of 119. LA interval is 144. QT/QTc are 312/439. Axis appears normal. Inverted T waves in V2. No acute ST elevations or depressions are noted. Compared to previous EKG performed yesterday, today's EKG has inverted T waves in V2. Impression: Abnormal EKG.     COURSE & MEDICAL DECISION MAKING  Pertinent Labs & Imaging studies reviewed. (See chart for  details)    Review of past medical records shows the patient was recently discharged from the hospital today for alcohol intoxication and tachycardia. He was discharged home with a prescription for Chlordiazepoxide.     7:00 PM - Patient seen and examined at bedside. Patient will be treated with ER detox IV 1000mL. The patient will be resuscitated with 1L NS IV for his tachycardia. Ordered CMP, lipase, acetaminophen level, salicylate level, urine drug screen, blood alcohol, EKG to evaluate his symptoms.     7:09 PM- Called poison control and discussed the case with them. They recommended supportive care and observation for at least 6-8 hours. His case number is [id number].     7:47 PM- Called acutely to the patient's room as he was becoming increasingly agitated. He will be placed in 4 point restraints as he is agitated and uncooperative.      7:54 PM- Reviewed the patient's lab and imaging results.     9:18 PM- Patient was reevaluated at bedside. There was a good response to IV fluids. Patient now reports he will be cooperative and wound like to be removed from his restraints.     9:50 PM- Updated by [facility] the patient was denying SI on their exam.     10:31 PM- Ordered repeat acetaminophen level.     10:33 PM- Patient was reassessed. He is yelling at staff and being inappropriate, therefore, he is still in 4 point restraints.    12:10 AM- Patient was removed from restraints and is eating a meal.     2:07 AM - I discussed the case with Dr Padilla, hospitalist, who agreed with the plan and accepted the patient.     HYDRATION: Based on the patient's presentation of Tachycardia the patient was given IV fluids. IV Hydration was used because oral hydration was not adequate alone. Upon recheck following hydration, the patient was improved.    Decision Making:  This is a 33 y.o. year old male who presents with an overdose of Librium as well as acute alcohol intoxication.  On initial evaluation, the patient was noted to be  tachycardic and agitated.  The remainder of his vital signs were stable.  He did require restraints as he kept taking off the leads to his monitors and was being aggressive with staff.  He initially denied suicidal ideations but was obviously intoxicated.  An IV was established and he was given an IV fluid bolus as well as a banana bag.  I did call and discuss the case with poison control who recommended supportive care and observation for 6-8 hours.  His initial alcohol level was 0.11 and his UDS was positive for barbiturates and benzodiazepines.  His CBC was notable for leukocytosis of 14.  However, he did not have a fever or obvious evidence of infection.  I am less concerned for sepsis and believe that his tachycardia may be more likely secondary to agitation.  He was treated with an IV fluid bolus and a banana bag.  His tachycardia did improve.  Acetaminophen and salicylate levels were negative.  He was noted to have elevated LFTs but did not have any abdominal tenderness.  He was observed in the emergency department for 6 hours and had a repeat blood alcohol level of 0.04.  He was clinically sober at this time.  [facility] evaluated him, and I questioned him further.  He denied suicidal ideations and stated that he took the Librium in order to black out thoughts of his problems with his son.  [facility] agreed that this is what he had said to them as well.  He again denied any suicidal ideations.  Unfortunately, while waiting in the emergency department to be cleared from his overdose, he seemed to be going into alcohol withdrawal with tremulousness and worsening tachycardia.  He was treated with additional fluids and Ativan.  He was admitted for close observation and further treatment as needed.    FINAL IMPRESSION  1. Elevated LFTs    2. Alcohol withdrawal syndrome with complication (HCC)    3. Accidental drug overdose, initial encounter      -ADMIT-      I, Renetta Bolanos (Scribmary kay), am scribing for, and in  the presence of, Thalia Latham D.O..    Electronically signed by: Renetta Bolanos (Scribe), 10/29/2018    I, Thalia Latham D.O. personally performed the services described in this documentation, as scribed by Renetta Bolanos in my presence, and it is both accurate and complete. C.     The note accurately reflects work and decisions made by me.  Thalia Latham  10/30/2018  2:10 AM

## 2018-10-30 NOTE — PROGRESS NOTES
Received report from previous shift. Plan of care discussed with patient. all concerns voiced at this time. Patient is A&O 4, bed alarm on.  Patient educated on the importance of calling if in need of assistance.  Patient verbalized understanding.  Bed controls on, bed locked and in lowest position, call light with patient. Patient without pain at this time. Will continue to monitor for safety and comfort.  Legal 2000 explained to patient, patient verbalized understanding, 1:1 sitter in room with patient.

## 2018-10-30 NOTE — DISCHARGE PLANNING
Alert Team  Of note, from chart review:  Pt is a frequent utilizer of this ER.  He has had over 150 ER visits since first seen in 2012.

## 2018-10-30 NOTE — DISCHARGE SUMMARY
Discharge Summary    CHIEF COMPLAINT ON ADMISSION  Chief Complaint   Patient presents with   • Alcohol Intoxication       Reason for Admission  EMS     Admission Date  10/25/2018    CODE STATUS  Prior    HPI & HOSPITAL COURSE  This is a 33 y.o. male here with acute alcohol withdrawal.    Patient is a 33-year-old gentleman well-known to this hospital for his multiple emergency room encounters for chronic intoxication and withdrawal, who presents in somewhat more stream than usual alcohol withdrawal with perceptual disturbances and delirium.  This necessitated inpatient admission, where he received IV hydration, replacement of multivitamins, thiamine and folate.  Patient also received around-the-clock and as needed benzodiazepines for his withdrawal symptomatology of delirium tremors and impending seizure.  On 10/29/18 patient had only minimal tremors, was able to ambulate steadily with physical therapy, and desired to go home  Therefore, he is discharged in fair and stable condition to home with close outpatient follow-up.  Patient was counseled on the importance of sobriety at the time of discharge, however he has poor insight into his condition, believing that sobriety is unachievable without the aid of multiple psychiatric medications such as Wellbutrin and Abilify.     The patient recovered much more quickly than anticipated on admission.    Discharge Date  10/29/2018    FOLLOW UP ITEMS POST DISCHARGE  None    DISCHARGE DIAGNOSES  Principal Problem (Resolved):    Alcohol withdrawal (HCC) POA: Yes  Active Problems:    Abnormal LFTs due to alcohol abuse POA: Yes    ADHD (attention deficit hyperactivity disorder) POA: Yes    Alcohol use disorder, severe, dependence (HCC) POA: Yes    Alcohol dependence (HCC) POA: Yes  Resolved Problems:    Conjunctivitis POA: Yes    Alcohol intoxication (HCC) POA: Yes    Leukocytosis POA: Yes    Tachycardia POA: Yes      FOLLOW UP  No future appointments.  Dalton Pagan, A.P.N.  [address]  [facility] [address]  [place] [address]  [phone]            MEDICATIONS ON DISCHARGE     Medication List      START taking these medications      Instructions   chlordiazePOXIDE 25 MG Caps  Commonly known as:  LIBRIUM   Take 1 Cap by mouth 3 times a day as needed for Anxiety for up to 12 doses.  Dose:  25 mg     thiamine 100 MG tablet  Commonly known as:  THIAMINE  Notes to patient:  10/30 9 am   Take 1 Tab by mouth every day.  Dose:  100 mg            Allergies  No Known Allergies    DIET  No orders of the defined types were placed in this encounter.      ACTIVITY  As tolerated.  Weight bearing as tolerated    CONSULTATIONS  None    PROCEDURES  None    LABORATORY  Lab Results   Component Value Date    SODIUM 136 10/28/2018    POTASSIUM 4.3 10/28/2018    CHLORIDE 102 10/28/2018    CO2 27 10/28/2018    GLUCOSE 101 (H) 10/28/2018    BUN 4 (L) 10/28/2018    CREATININE 0.70 10/28/2018        Lab Results   Component Value Date    WBC 7.5 10/28/2018    HEMOGLOBIN 14.3 10/28/2018    HEMATOCRIT 42.4 10/28/2018    PLATELETCT 151 (L) 10/28/2018        Total time of the discharge process exceeds 31 minutes.

## 2018-10-30 NOTE — ED NOTES
Pt yelling out of room inappropriately. This RN went into room and informed pt that is not appropriate behavior and he cannot continue to yell out. Pt continues to yell.

## 2018-10-30 NOTE — PROGRESS NOTES
Pt transferred to room via gurney. Pt belongings removed and all potentially dangerous items removed from pt room upon arrival to unit until clarification of legal hold discontinuation. Telemetry monitor placed and monitor room notified of pt arrival.

## 2018-10-30 NOTE — PSYCHIATRY
"PSYCHIATRIC CONSULTATION:  Reason for admission: Alcohol withdrawal (HCC)   Reason for consult:suicidal   Requesting Physician: Neris Becerra M.D.  Supervising Physician:Luisa Ansari MD         Legal status:  extended    Chief Complaint: \"I was with the wrong person for 10 years.\"    HPI: 34yo male with history of polysubstance use, ADHD, MDD and cluster B personality traits, presented to [facility] for EtOH withdrawal after leaving [facility] AMA the day prior, for a similar admission. Prior to this admission, the pt reports ingesting 26 tabs of librium as well as consuming an unknown amount of malt liquor. Pt was very evasive on answering direct questions about his recent hospitalization, why he left AMA and his overdose attempt, usually speaking at length about his tumultuous relationship with his girlfriend and how she \"destroyed my life.\" Pt reports taking the librium following an argument that they had. He states that there are current ongoing custody issues with his 7 year old child, who he states is staying with his girlfriend and his sister.   Pt reports chronic history of alcohol use and denies other recent substance use, despite recent history per chart review of amphetamine use. Pt reports history of alcohol withdrawal seizures 2 in his life along with alcoholic hallucinosis. He reports 6 months of sobriety following inpatient substance use treatment and AA meetings and reports wanting to pursue treatment for sobriety. Pt however also stated, \"I'm just going to leave here and go straight to the liquor store\" when told that it would be unlikely that we would be writing him \"a script for ativan and adderall for like a month\" which he kept requesting throughout the interview. Pt also repeatedly asked that his PRN ativan dose (for EtOH and benzo withdrawal) be increased in volume and frequency, despite a lengthy conversation about the safety concerns about this. Pt denies SI and says that his overdose had no " intent, though he did report to intentionally taking 26 tabs of librium at once, which he acknowledged how dangerous that was.        Psychiatric Review of Systems:current symptoms as reported by pt.  Depression: Reports depressed mood but denies lack of interest, changes in weight, insomnia, psychomotor agitation/retardation, decreased energy, feelings of worthlessness/guilt, difficulty eating, or SI.         Anca: denies irritability, decreased need for sleep, increased energy, talkativeness, grandiose thoughts or behaviors, racing thoughts, or increased goal-directed behavior  Anxiety/Panic Attacks: denies feelings of anxiety, feeling 'keyed up', excessive worrying, shortness of breath, feelings of impending doom or death, sweating, trembling, shaking,, chest pain, chills, dizziness, or racing heart .  PTSD symptom: denies trauma, nightmares, flashbacks, hypervigilance, or avoidance behaviors.   Psychosis: denies AH, VH, paranoia, or delusions        Medical Review of Systems: as reported by pt. All systems reviewed. Only those found to be + are noted below. All others are negative.   Neurological:     TBIs: Denies   SZs:Denies   Strokes:Denies  Other medical symptoms:     Thyroid: Denies   Diabetes:Denies   Cardiovascular disease:Denies    Psychiatric Examination: observed phenomenon:  Vitals: /94   Pulse (!) 107   Temp 37.6 °C (99.6 °F)   Resp 16   Wt 72.2 kg (159 lb 2.8 oz)   SpO2 97%   BMI 24.20 kg/m²   Musculoskeletal: no psychomotor agitation or retardation; no tremors  Appearance: WDWN, appears stated age, fair hygiene and grooming, wearing hospital attire, ectrodactyly of bilateral hands and feet  Behavior: calm, cooperative initially but became irritable at time, fair eye contact  Thought Process: very tangential, goal-directed  Abnormal Thoughts/Psychosis: no evidence of AH, VH, paranoia, and/or delusions  Associations: no loose associations  Speech: spontaneous, regular rate, rhythm, tone,  "and volume; no stuttering or slurring of words  Language: fluent in English  Mood/Affect:\"Fine\"; affect is congruent to stated mood, appropriate to content  SI/HI: Denies SI and HI  Attention: intact  Memory: no gross impairment in immediate, recent, or remote memory  Orientation: A&Ox4  Fund of Knowledge: adequate  Insight and Judgment: poor/poor     Past Psychiatric Hx: From Dr. Ga's note on 07/19/2017:  1. Alcohol use disorder, severe (sober since 6/20/2017)  2. ADHD, in attentive type  3. Unspecified depressive disorder  4. Unspecified anxiety disorder  5. Methamphetamines use disorder per chart review  6. Cluster B personality disorder traits per chart review    Past Medication Trials:  Wellbutrin, Effexor, Trazodone, Remeron, Hydroxyzine, Valium (effective for sleep), Ativan, Ritalin (effective). He states that he has been tried on almost all antidepressants but none of them were effective.    Family Psychiatric Hx:  Denies    Social Hx:  Homeless  Unemployed  Has 7 year old son who lives with girlfriend  Single - recent break up with girlfriend     Drug/Alcohol/Tobacco Hx:   Drugs: Denies history of use - despite recent history of meth use per chart   Alcohol: Reports drinking 3 32oz cans of malt liquor a day \"just to maintain\"   Tobacco: Denies    Medical Hx: labs, MARS, medications, etc were reviewed. Only those findings of potential interest to psychiatry are noted below:  Medical Conditions:   Past Medical History:   Diagnosis Date   • Acute anxiety    • ADHD (attention deficit hyperactivity disorder)    • ADHD (attention deficit hyperactivity disorder)    • Alcohol abuse    • Bipolar affective (HCC)    • Depression    • Ectrodactyly-ectodermal dysplasia-clefting syndrome    • ETOH abuse    • Psychiatric disorder     anxiety/panic disorder     Allergies:   No Known Allergies  Medications (currently prescribed at [facility]):    Current Facility-Administered Medications:   •  [COMPLETED] potassium chloride " 20 mEq, thiamine (B-1) 100 mg, M.V.I. ADULT 10 mL, folic acid 1 mg in D5 NS 1,000 mL infusion, , Intravenous, Once, Last Rate: 100 mL/hr at 10/30/18 0409 **AND** [START ON 10/31/2018] thiamine tablet 100 mg, 100 mg, Oral, DAILY **AND** [START ON 10/31/2018] multivitamin (THERAGRAN) tablet 1 Tab, 1 Tab, Oral, DAILY **AND** [START ON 10/31/2018] folic acid (FOLVITE) tablet 1 mg, 1 mg, Oral, DAILY, Prerna Padilla M.D.  •  senna-docusate (PERICOLACE or SENOKOT S) 8.6-50 MG per tablet 2 Tab, 2 Tab, Oral, BID **AND** polyethylene glycol/lytes (MIRALAX) PACKET 1 Packet, 1 Packet, Oral, QDAY PRN **AND** magnesium hydroxide (MILK OF MAGNESIA) suspension 30 mL, 30 mL, Oral, QDAY PRN **AND** bisacodyl (DULCOLAX) suppository 10 mg, 10 mg, Rectal, QDAY PRN, Prerna Padilla M.D.  •  NS infusion, , Intravenous, Continuous, Neris Becerra M.D., Last Rate: 83 mL/hr at 10/30/18 0927  •  heparin injection 5,000 Units, 5,000 Units, Subcutaneous, Q8HRS, Prerna Padilla M.D., 5,000 Units at 10/30/18 0554  •  ondansetron (ZOFRAN) syringe/vial injection 4 mg, 4 mg, Intravenous, Q4HRS PRN, Prerna Padilla M.D.  •  ondansetron (ZOFRAN ODT) dispertab 4 mg, 4 mg, Oral, Q4HRS PRN, Prerna Padilla M.D.  •  promethazine (PHENERGAN) tablet 12.5-25 mg, 12.5-25 mg, Oral, Q4HRS PRN, Prerna Padilla M.D.  •  promethazine (PHENERGAN) suppository 12.5-25 mg, 12.5-25 mg, Rectal, Q4HRS PRN, Prerna Padilla M.D.  •  prochlorperazine (COMPAZINE) injection 5-10 mg, 5-10 mg, Intravenous, Q4HRS PRN, Prerna Padilla M.D.  •  LORazepam (ATIVAN) tablet 0.5 mg, 0.5 mg, Oral, Q6HRS PRN, Neris Becerra M.D., 0.5 mg at 10/30/18 1024  Labs:  Recent Labs      10/28/18   0214  10/29/18   0512  10/29/18   1902  10/29/18   1817   SODIUM  136   --   138   --    POTASSIUM  4.3   --   3.7   --    CHLORIDE  102   --   104   --    CO2  27   --   22   --    BUN  4*   --   4*   --    CREATININE  0.70   --   0.76   --    MAGNESIUM  2.0  1.7   --   2.3    PHOSPHORUS  3.6  3.4   --   2.2*   CALCIUM  9.5   --   10.1   --        Recent Labs      10/28/18   0214  10/29/18   1902   ALTSGPT  34  63*   ASTSGOT  30  73*   ALKPHOSPHAT  109*  107*   TBILIRUBIN  0.5  0.4   LIPASE   --   28   GLUCOSE  101*  103*       Recent Labs      10/28/18   0214  10/29/18   1902   RBC  4.66*  4.86   HEMOGLOBIN  14.3  14.9   HEMATOCRIT  42.4  43.4   PLATELETCT  151*  189       Recent Labs      10/28/18   0214  10/29/18   1902   WBC  7.5  14.0*   NEUTSPOLYS  69.70  79.40*   LYMPHOCYTES  19.00*  11.50*   MONOCYTES  8.80  8.30   EOSINOPHILS  1.70  0.10   BASOPHILS  0.40  0.20   ASTSGOT  30  73*   ALTSGPT  34  63*   ALKPHOSPHAT  109*  107*   TBILIRUBIN  0.5  0.4       ECG: QTc = 439 on 10/29/2018    Cranial Imaging: reviewed  No orders to display       ASSESSMENT: (new dx, acuity level)  Adjustment disorder with depressed mood  -It appears that recent overdose was in the setting of an acute stressor (major relationship fight).  Alcohol use disorder, severe  R/O substance induced mood disorder  -pt currently withdrawing from alcohol and benzodiazepines    By history:  -stimulant (methamphetamine) use disorder  -ADHD     PLAN:  - PRN haldol + benadryl in IM and PO forms on board, as pt became agitated shortly after speaking with him.  -Will hold on other psychiatric medications at this time, as pt reports not taking any recently and dismisses every medication suggested as ineffective.  -Recommend inpatient substance use treatment post discharge if pt is agreeable  -Recommend pt establish with a health care provider post discharge, as pt reports not having a PCP or psychiatrist   -Will continue to monitor and reassess    Legal status: extended  Records reviewed  Case discussed with Dr. Ansari   Will follow  Thank you for the consult.

## 2018-10-30 NOTE — ASSESSMENT & PLAN NOTE
Since being in the ER several hours patient is now in withdrawals   Continue withdrawal protocol   Rally bag   Monitor

## 2018-10-30 NOTE — ED NOTES
Pt resting in room with even unlabored respirations noted. Pt medicated per mar with ativan. Banana bag hanging

## 2018-10-30 NOTE — PSYCHIATRY
BRIEF PSYCHIATRIC CONSULT NOTE: patient seen, may have call light with sitter present - full note to follow.  -Legal Hold:extended  -Sitter:yes  -Restrictions:   -phone: no   -visitors:no   -personal items: no   -finger foods required: no   -personal clothes: no  -Orders Placed: routine  -Plan:continue to follow

## 2018-10-30 NOTE — CARE PLAN
Problem: Communication  Goal: The ability to communicate needs accurately and effectively will improve    Intervention: Educate patient and significant other/support system about the plan of care, procedures, treatments, medications and allow for questions  White board updated on POC upon arrival to unit.       Problem: Safety  Goal: Will remain free from injury  Hazardous items removed for pt safety.

## 2018-10-30 NOTE — PROGRESS NOTES
"Pt refusing assessment due to agitation. Per pt, \"I am not answering any of your questions, taking medications, or letting you touch me right now. I'm hallucinating\". Pt educated regarding importance of compliance with the CIWA score in order to medicate the pt appropriately for withdrawal symptoms. No evidence of learning at this time.   "

## 2018-10-30 NOTE — ED NOTES
Spoke with poison control regarding pt. Per poison control they are closing the case. Case number if needed is:  [id number]

## 2018-10-30 NOTE — ED NOTES
"Pt continuing to yell out and scoot to the end of the bed. This RN and multiple other RN's continue to tell pt he needs to stay in bed. Pt states \"if you don't get a doctor in here right now, you better fucking four point restrain me now\".   ERP updated and notified.  ERP at bedside now.   "

## 2018-10-30 NOTE — ED NOTES
Security at bedside placing pt in 4 point restraints. ERP aware and order placed. Pt educated on process. Pt continues to be uncooperative with this RN and staff.

## 2018-10-30 NOTE — H&P
Hospital Medicine History & Physical Note    Date of Service  10/30/2018    Primary Care Physician  ISAIAH Goddard.    Consultants  none    Code Status  full    Chief Complaint  Alcohol withdrawal, overdose    History of Presenting Illness  33 y.o. male who presented 10/29/2018 with ADHD anxiety bipolar disorder alcohol abuse who presents with drug overdose of Librium.  Patient left the hospital AGAINST MEDICAL ADVICE earlier today.  He was admitted for alcohol withdrawals.  He denies any suicidal ideations at this time.  He states that when he was leaving A he got an argument with his girlfriend about his son.  At that time he took multiple doses of his Librium 12 capsules that he was discharged with to forget about the argument.  He also drank alcohol afterwards.  And now he presents to the emergency department because he wants to go through alcohol withdrawals.  He has no known alleviating or exacerbating factors to his symptoms.    Review of Systems  Review of Systems   Constitutional: Negative for chills and fever.   HENT: Negative for congestion, hearing loss and tinnitus.    Eyes: Negative for blurred vision, double vision and discharge.   Respiratory: Negative for cough, hemoptysis and shortness of breath.    Cardiovascular: Negative for chest pain, palpitations and leg swelling.   Gastrointestinal: Negative for abdominal pain, heartburn, nausea and vomiting.   Genitourinary: Negative for dysuria and flank pain.   Musculoskeletal: Negative for joint pain and myalgias.   Skin: Negative for rash.   Neurological: Negative for dizziness, sensory change, speech change, focal weakness and weakness.   Endo/Heme/Allergies: Negative for environmental allergies. Does not bruise/bleed easily.   Psychiatric/Behavioral: Negative for depression, hallucinations and substance abuse.       Past Medical History   has a past medical history of Acute anxiety; ADHD (attention deficit hyperactivity disorder); ADHD  (attention deficit hyperactivity disorder); Alcohol abuse; Bipolar affective (HCC); Depression; Ectrodactyly-ectodermal dysplasia-clefting syndrome; ETOH abuse; and Psychiatric disorder.    Surgical History   has a past surgical history that includes other orthopedic surgery.     Family History  Reviewed and noncontributory    Social History   reports that he has been smoking Cigarettes.  He has been smoking about 0.25 packs per day. He has never used smokeless tobacco. He reports that he drinks alcohol. He reports that he does not use drugs.    Allergies  No Known Allergies    Medications  Prior to Admission Medications   Prescriptions Last Dose Informant Patient Reported? Taking?   chlordiazePOXIDE (LIBRIUM) 25 MG Cap   No No   Sig: Take 1 Cap by mouth 3 times a day as needed for Anxiety for up to 12 doses.   thiamine (THIAMINE) 100 MG tablet   No No   Sig: Take 1 Tab by mouth every day.      Facility-Administered Medications: None       Physical Exam  Temp:  [36.8 °C (98.3 °F)] 36.8 °C (98.3 °F)  Pulse:  [103-150] 113  Resp:  [12-21] 20  BP: (138-156)/(82-86) 138/86    Physical Exam   Constitutional: He is oriented to person, place, and time. He appears well-developed and well-nourished. He appears distressed.   HENT:   Head: Normocephalic and atraumatic.   Eyes: Pupils are equal, round, and reactive to light. Conjunctivae and EOM are normal.   Neck: Normal range of motion. Neck supple. No JVD present.   Cardiovascular: Regular rhythm, normal heart sounds and intact distal pulses.    No murmur heard.  tachycardic   Pulmonary/Chest: Effort normal and breath sounds normal. No respiratory distress. He exhibits no tenderness.   Abdominal: Soft. Bowel sounds are normal. He exhibits no distension. There is no tenderness.   Musculoskeletal: Normal range of motion. He exhibits no edema.   Cleft deformity bilateral    Neurological: He is alert and oriented to person, place, and time. No cranial nerve deficit. He exhibits  normal muscle tone.   tremors   Skin: Skin is warm and dry. No erythema.   Psychiatric:   Tearful, intoxicated   Nursing note and vitals reviewed.      Laboratory:  Recent Labs      10/27/18   0343  10/28/18   0214  10/29/18   1902   WBC  9.0  7.5  14.0*   RBC  4.64*  4.66*  4.86   HEMOGLOBIN  14.3  14.3  14.9   HEMATOCRIT  41.7*  42.4  43.4   MCV  89.9  91.0  89.3   MCH  30.8  30.7  30.7   MCHC  34.3  33.7  34.3   RDW  42.0  42.6  41.8   PLATELETCT  160*  151*  189   MPV  11.6  11.8  11.3     Recent Labs      10/27/18   0343  10/28/18   0214  10/29/18   1902   SODIUM  135  136  138   POTASSIUM  3.3*  4.3  3.7   CHLORIDE  101  102  104   CO2  27  27  22   GLUCOSE  110*  101*  103*   BUN  4*  4*  4*   CREATININE  0.69  0.70  0.76   CALCIUM  9.0  9.5  10.1     Recent Labs      10/27/18   0343  10/28/18   0214  10/29/18   1902   ALTSGPT  37  34  63*   ASTSGOT  30  30  73*   ALKPHOSPHAT  104*  109*  107*   TBILIRUBIN  1.1  0.5  0.4   LIPASE   --    --   28   GLUCOSE  110*  101*  103*             Recent Labs      10/27/18   0343   TRIGLYCERIDE  118   HDL  79   LDL  48     No results for input(s): TROPONINI in the last 72 hours.    Urinalysis:    No results found     Imaging:  No orders to display         Assessment/Plan:  I anticipate this patient will require at least two midnights for appropriate medical management, necessitating inpatient admission.    * Drug overdose   Assessment & Plan    Mr. Ceja was here with a confirmed overdose of T42 - Antiepileptic, sedative-hypnotic, or anti-Parkinsonism drugs; he has no other known overdoses.    Overdose on librium, per posion control monitor on telemetry for 8 hours   Mental status improving in the ER          Alcohol withdrawal (HCC)   Assessment & Plan    Since being in the ER several hours patient is now in withdrawals   Continue withdrawal protocol   Rally bag   Monitor         Abnormal LFTs due to alcohol abuse- (present on admission)   Assessment & Plan    Cont to  monitor LFT's           Tachycardia   Assessment & Plan    Cont to monitor         Leukocytosis   Assessment & Plan    Likely reactive with no evidence of infection   Cont to monitor         Ectodermal dysplasia, 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

## 2018-10-30 NOTE — ED NOTES
Rally bag hanging. Pt in restraints. Pt continues to be agitated. Pt updated on POC and requirements to be taken out of restraints.

## 2018-10-31 LAB
ALBUMIN SERPL BCP-MCNC: 3.8 G/DL (ref 3.2–4.9)
ALBUMIN/GLOB SERPL: 1.1 G/DL
ALP SERPL-CCNC: 93 U/L (ref 30–99)
ALT SERPL-CCNC: 63 U/L (ref 2–50)
ANION GAP SERPL CALC-SCNC: 7 MMOL/L (ref 0–11.9)
AST SERPL-CCNC: 54 U/L (ref 12–45)
BASOPHILS # BLD AUTO: 0.2 % (ref 0–1.8)
BASOPHILS # BLD: 0.02 K/UL (ref 0–0.12)
BILIRUB SERPL-MCNC: 0.3 MG/DL (ref 0.1–1.5)
BUN SERPL-MCNC: 8 MG/DL (ref 8–22)
CALCIUM SERPL-MCNC: 9.9 MG/DL (ref 8.5–10.5)
CHLORIDE SERPL-SCNC: 103 MMOL/L (ref 96–112)
CHOLEST SERPL-MCNC: 164 MG/DL (ref 100–199)
CO2 SERPL-SCNC: 27 MMOL/L (ref 20–33)
CREAT SERPL-MCNC: 0.82 MG/DL (ref 0.5–1.4)
EOSINOPHIL # BLD AUTO: 0.12 K/UL (ref 0–0.51)
EOSINOPHIL NFR BLD: 1.3 % (ref 0–6.9)
ERYTHROCYTE [DISTWIDTH] IN BLOOD BY AUTOMATED COUNT: 43.7 FL (ref 35.9–50)
GLOBULIN SER CALC-MCNC: 3.4 G/DL (ref 1.9–3.5)
GLUCOSE SERPL-MCNC: 102 MG/DL (ref 65–99)
HCT VFR BLD AUTO: 41.5 % (ref 42–52)
HDLC SERPL-MCNC: 69 MG/DL
HGB BLD-MCNC: 14.1 G/DL (ref 14–18)
IMM GRANULOCYTES # BLD AUTO: 0.02 K/UL (ref 0–0.11)
IMM GRANULOCYTES NFR BLD AUTO: 0.2 % (ref 0–0.9)
LDLC SERPL CALC-MCNC: 68 MG/DL
LYMPHOCYTES # BLD AUTO: 2.3 K/UL (ref 1–4.8)
LYMPHOCYTES NFR BLD: 24.5 % (ref 22–41)
MAGNESIUM SERPL-MCNC: 2.1 MG/DL (ref 1.5–2.5)
MCH RBC QN AUTO: 31 PG (ref 27–33)
MCHC RBC AUTO-ENTMCNC: 34 G/DL (ref 33.7–35.3)
MCV RBC AUTO: 91.2 FL (ref 81.4–97.8)
MONOCYTES # BLD AUTO: 1.35 K/UL (ref 0–0.85)
MONOCYTES NFR BLD AUTO: 14.4 % (ref 0–13.4)
NEUTROPHILS # BLD AUTO: 5.58 K/UL (ref 1.82–7.42)
NEUTROPHILS NFR BLD: 59.4 % (ref 44–72)
NRBC # BLD AUTO: 0 K/UL
NRBC BLD-RTO: 0 /100 WBC
PHOSPHATE SERPL-MCNC: 5.4 MG/DL (ref 2.5–4.5)
PLATELET # BLD AUTO: 163 K/UL (ref 164–446)
PMV BLD AUTO: 11.5 FL (ref 9–12.9)
POTASSIUM SERPL-SCNC: 4.1 MMOL/L (ref 3.6–5.5)
PROT SERPL-MCNC: 7.2 G/DL (ref 6–8.2)
RBC # BLD AUTO: 4.55 M/UL (ref 4.7–6.1)
SODIUM SERPL-SCNC: 137 MMOL/L (ref 135–145)
TRIGL SERPL-MCNC: 134 MG/DL (ref 0–149)
WBC # BLD AUTO: 9.4 K/UL (ref 4.8–10.8)

## 2018-10-31 PROCEDURE — 700102 HCHG RX REV CODE 250 W/ 637 OVERRIDE(OP): Performed by: STUDENT IN AN ORGANIZED HEALTH CARE EDUCATION/TRAINING PROGRAM

## 2018-10-31 PROCEDURE — 80053 COMPREHEN METABOLIC PANEL: CPT

## 2018-10-31 PROCEDURE — 770020 HCHG ROOM/CARE - TELE (206)

## 2018-10-31 PROCEDURE — A9270 NON-COVERED ITEM OR SERVICE: HCPCS | Performed by: HOSPITALIST

## 2018-10-31 PROCEDURE — 700102 HCHG RX REV CODE 250 W/ 637 OVERRIDE(OP): Performed by: HOSPITALIST

## 2018-10-31 PROCEDURE — 770001 HCHG ROOM/CARE - MED/SURG/GYN PRIV*

## 2018-10-31 PROCEDURE — 85025 COMPLETE CBC W/AUTO DIFF WBC: CPT

## 2018-10-31 PROCEDURE — 700111 HCHG RX REV CODE 636 W/ 250 OVERRIDE (IP): Performed by: STUDENT IN AN ORGANIZED HEALTH CARE EDUCATION/TRAINING PROGRAM

## 2018-10-31 PROCEDURE — 83735 ASSAY OF MAGNESIUM: CPT

## 2018-10-31 PROCEDURE — 80061 LIPID PANEL: CPT

## 2018-10-31 PROCEDURE — 36415 COLL VENOUS BLD VENIPUNCTURE: CPT

## 2018-10-31 PROCEDURE — A9270 NON-COVERED ITEM OR SERVICE: HCPCS | Performed by: STUDENT IN AN ORGANIZED HEALTH CARE EDUCATION/TRAINING PROGRAM

## 2018-10-31 PROCEDURE — 84100 ASSAY OF PHOSPHORUS: CPT

## 2018-10-31 PROCEDURE — 99232 SBSQ HOSP IP/OBS MODERATE 35: CPT | Performed by: HOSPITALIST

## 2018-10-31 RX ADMIN — LORAZEPAM 0.5 MG: 1 TABLET ORAL at 06:36

## 2018-10-31 RX ADMIN — THIAMINE HCL TAB 100 MG 100 MG: 100 TAB at 06:36

## 2018-10-31 RX ADMIN — HALOPERIDOL 5 MG: 5 TABLET ORAL at 01:10

## 2018-10-31 RX ADMIN — THERA TABS 1 TABLET: TAB at 06:36

## 2018-10-31 RX ADMIN — FOLIC ACID 1 MG: 1 TABLET ORAL at 06:36

## 2018-10-31 RX ADMIN — DIPHENHYDRAMINE HCL 50 MG: 25 TABLET ORAL at 03:27

## 2018-10-31 RX ADMIN — DIPHENHYDRAMINE HYDROCHLORIDE 50 MG: 50 INJECTION INTRAMUSCULAR; INTRAVENOUS at 00:29

## 2018-10-31 RX ADMIN — ACETAMINOPHEN 650 MG: 325 TABLET, FILM COATED ORAL at 03:27

## 2018-10-31 RX ADMIN — HALOPERIDOL 5 MG: 5 TABLET ORAL at 03:27

## 2018-10-31 ASSESSMENT — ENCOUNTER SYMPTOMS
EYES NEGATIVE: 1
BLOOD IN STOOL: 0
CHILLS: 0
RESPIRATORY NEGATIVE: 1
LOSS OF CONSCIOUSNESS: 0
FOCAL WEAKNESS: 0
VOMITING: 0
DEPRESSION: 1
FEVER: 0
HALLUCINATIONS: 1
NERVOUS/ANXIOUS: 1
SEIZURES: 0
NAUSEA: 0
PALPITATIONS: 0

## 2018-10-31 ASSESSMENT — PAIN SCALES - GENERAL
PAINLEVEL_OUTOF10: 0

## 2018-10-31 ASSESSMENT — LIFESTYLE VARIABLES
ANXIETY: *
REASON UNABLE TO ASSESS: PT SLEEPING
NAUSEA AND VOMITING: NO NAUSEA AND NO VOMITING
TREMOR: NO TREMOR
AGITATION: SOMEWHAT MORE THAN NORMAL ACTIVITY
PAROXYSMAL SWEATS: NO SWEAT VISIBLE
AGITATION: *
NAUSEA AND VOMITING: NO NAUSEA AND NO VOMITING
PAROXYSMAL SWEATS: NO SWEAT VISIBLE
ANXIETY: MILDLY ANXIOUS

## 2018-10-31 NOTE — PROGRESS NOTES
Assumed care at 0700. Pt A&O x4. Pt sinus rhythm on monitor, verified by Barbara SPENCE. First assessment completed, call light within reach, bed locked and in low position. All questions asked. Will continue to monitor. Pt on legal hold. 1:1 sitter at bedside.

## 2018-10-31 NOTE — PROGRESS NOTES
33-year-old male past medical history of alcoholism, polysubstance use, personality disorders admitted for intentional drug overdose.  Started on a legal hold psychiatry consulted.  Patient examined, and evaluated at bedside.  Agree with the current plan.

## 2018-10-31 NOTE — CARE PLAN
Problem: Pain Management  Goal: Pain level will decrease to patient's comfort goal  Outcome: PROGRESSING AS EXPECTED  Pt has been sleeping. No c/o pain. No facial grimacing or other indicators of pain noted.

## 2018-10-31 NOTE — DISCHARGE PLANNING
Agency/Facility Name: [facility], [facility], [facility], [facility]  Plan or Request: Referral faxed with medical clearance to the above facilities.

## 2018-10-31 NOTE — CARE PLAN
Problem: Safety  Goal: Will remain free from falls  Outcome: PROGRESSING AS EXPECTED  Pt has not had fall. Bed locked and in lowest position. 1:1 sitter present at bedside.

## 2018-10-31 NOTE — PROGRESS NOTES
Bedside report received. Pt is sitting up in bed.  Patient A&O x 4 on RA.  Complains of 6/10 headache, will medicate per MAR.  POC discussed with patient.  Patient verbalized understanding.  Call light and belongings with in reach.  Bed locked and in lowest position, alarm and fall precautions in place. Pt is on legal 2000 hold, 1:1 sitter is present.

## 2018-10-31 NOTE — CARE PLAN
Problem: Infection  Goal: Will remain free from infection  Outcome: PROGRESSING AS EXPECTED  No s/sx infection noted. Hand hygiene implemented before and after pt contact.     Problem: Bowel/Gastric:  Goal: Normal bowel function is maintained or improved  Outcome: PROGRESSING AS EXPECTED  Pt had BM without difficulty.

## 2018-10-31 NOTE — DISCHARGE PLANNING
Anticipated Discharge Disposition: D/C to Psych Facility    Action: Pt is on a legal hold, which was initiated within the community by law enforcement. MD signed medical clearance. LSW forwarded hold to Legal Hold DESIREE Tran and also sent MCAT referral to [facility] for review. LSW requested DESIREE Ma send referrals to local psych facilities.     Barriers to Discharge: Psych facility acceptance.    Plan: Await response from psych facilities, LSW to arrange transport once there has been acceptance.

## 2018-10-31 NOTE — PROGRESS NOTES
"Hospital Medicine Daily Progress Note    Date of Service  10/31/2018    Chief Complaint  Intentional drug overdose    Hospital Course  33-year-old male past medical history of alcoholism, polysubstance use, personality disorders admitted for intentional drug overdose \" Librium 12 capsules\".  Admitted to telemetry, was hemodynamically stable apart from intermittent tachycardia.   He was saturating well on room air.  Started on a legal hold psychiatry consulted.  His legal hold was extended. continue to one-on-one sitter.     Interval Problem Update  Hemodynamically stable apart from intermittent tachycardia that improved.  Has not been showing signs of alcohol withdrawal apart from mild anxiety.  Leukocytosis improved to 9.4, from 14  AST 54, ALT 63  Potassium 4.1, magnesium 2.1  Can be transferred to medical floor.  Psychiatry planning to transfer him to inpatient mental health facility.     Consultants/Specialty  Psychiatry     Code Status  Full    Disposition  Inpatient, likely  facility     Review of Systems  Review of Systems   Constitutional: Negative for chills, fever and malaise/fatigue.   HENT: Negative.    Eyes: Negative.    Respiratory: Negative.    Cardiovascular: Negative for chest pain and palpitations.   Gastrointestinal: Negative for blood in stool, nausea and vomiting.   Genitourinary: Negative.    Skin: Negative.    Neurological: Negative for focal weakness, seizures and loss of consciousness.   Psychiatric/Behavioral: Positive for depression and hallucinations (Reported having hallucinations yesterday.  However when I asked to describe them he said I do not remember). The patient is nervous/anxious.         Physical Exam  Temp:  [36.6 °C (97.9 °F)-37.4 °C (99.4 °F)] 36.9 °C (98.4 °F)  Pulse:  [] 87  Resp:  [16-18] 16  BP: (103-133)/() 103/77    Physical Exam   Constitutional: He is oriented to person, place, and time. He appears well-developed. No distress.   Anxious   HENT:   Head: " Normocephalic and atraumatic.   Eyes: Pupils are equal, round, and reactive to light. Conjunctivae are normal. Right eye exhibits no discharge. Left eye exhibits no discharge.   Neck: No JVD present. No tracheal deviation present. No thyromegaly present.   Cardiovascular: Exam reveals no gallop and no friction rub.    No murmur heard.  Intermittently tachycardic   Pulmonary/Chest: Effort normal. No stridor. No respiratory distress. He has no wheezes. He has no rales.   Abdominal: Soft. Bowel sounds are normal. He exhibits no distension. There is no tenderness. There is no rebound and no guarding.   Musculoskeletal: He exhibits no edema, tenderness or deformity.   Deformities of both hands, apparently congenital   Neurological: He is alert and oriented to person, place, and time.   Skin: Skin is warm and dry. No rash noted. He is not diaphoretic. No erythema.   Psychiatric:   Depressed.  Not forthcoming, anxious.  Impaired judgment   Nursing note and vitals reviewed.      Fluids    Intake/Output Summary (Last 24 hours) at 10/31/18 1812  Last data filed at 10/31/18 1700   Gross per 24 hour   Intake             1650 ml   Output             1850 ml   Net             -200 ml       Laboratory  Recent Labs      10/29/18   1902  10/31/18   0034   WBC  14.0*  9.4   RBC  4.86  4.55*   HEMOGLOBIN  14.9  14.1   HEMATOCRIT  43.4  41.5*   MCV  89.3  91.2   MCH  30.7  31.0   MCHC  34.3  34.0   RDW  41.8  43.7   PLATELETCT  189  163*   MPV  11.3  11.5     Recent Labs      10/29/18   1902  10/31/18   0034   SODIUM  138  137   POTASSIUM  3.7  4.1   CHLORIDE  104  103   CO2  22  27   GLUCOSE  103*  102*   BUN  4*  8   CREATININE  0.76  0.82   CALCIUM  10.1  9.9             Recent Labs      10/31/18   0034   TRIGLYCERIDE  134   HDL  69   LDL  68       Imaging  No orders to display        Assessment/Plan  * Drug overdose   Assessment & Plan    Overdose on librium,   Status post 24 hours telemetry, no respiratory depression  Can be  transferred to medical        Alcohol withdrawal ([facility])   Assessment & Plan    Since being in the ER several hours patient is now in withdrawals   Continue withdrawal protocol   Evens bag   Monitor         Suicide ideation- (present on admission)   Assessment & Plan    On legal hold  Psych consulted, planning for inpatient Mx        Abnormal LFTs due to alcohol abuse- (present on admission)   Assessment & Plan    Cont to monitor LFT's   Counseled to stop drinking          Tachycardia   Assessment & Plan    Mostly related to anxiety  Improved        Leukocytosis   Assessment & Plan    Likely reactive with no evidence of infection   Cont to monitor           Ectodermal dysplasia, 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

## 2018-11-01 LAB
ANION GAP SERPL CALC-SCNC: 6 MMOL/L (ref 0–11.9)
BUN SERPL-MCNC: 14 MG/DL (ref 8–22)
CALCIUM SERPL-MCNC: 10.1 MG/DL (ref 8.5–10.5)
CHLORIDE SERPL-SCNC: 101 MMOL/L (ref 96–112)
CO2 SERPL-SCNC: 31 MMOL/L (ref 20–33)
CREAT SERPL-MCNC: 0.81 MG/DL (ref 0.5–1.4)
GLUCOSE SERPL-MCNC: 96 MG/DL (ref 65–99)
MAGNESIUM SERPL-MCNC: 2.2 MG/DL (ref 1.5–2.5)
PHOSPHATE SERPL-MCNC: 5.3 MG/DL (ref 2.5–4.5)
POTASSIUM SERPL-SCNC: 4.9 MMOL/L (ref 3.6–5.5)
SODIUM SERPL-SCNC: 138 MMOL/L (ref 135–145)

## 2018-11-01 PROCEDURE — A9270 NON-COVERED ITEM OR SERVICE: HCPCS | Performed by: HOSPITALIST

## 2018-11-01 PROCEDURE — 700102 HCHG RX REV CODE 250 W/ 637 OVERRIDE(OP): Performed by: STUDENT IN AN ORGANIZED HEALTH CARE EDUCATION/TRAINING PROGRAM

## 2018-11-01 PROCEDURE — 36415 COLL VENOUS BLD VENIPUNCTURE: CPT

## 2018-11-01 PROCEDURE — 87491 CHLMYD TRACH DNA AMP PROBE: CPT

## 2018-11-01 PROCEDURE — 770001 HCHG ROOM/CARE - MED/SURG/GYN PRIV*

## 2018-11-01 PROCEDURE — 83735 ASSAY OF MAGNESIUM: CPT

## 2018-11-01 PROCEDURE — A9270 NON-COVERED ITEM OR SERVICE: HCPCS | Performed by: STUDENT IN AN ORGANIZED HEALTH CARE EDUCATION/TRAINING PROGRAM

## 2018-11-01 PROCEDURE — 84100 ASSAY OF PHOSPHORUS: CPT

## 2018-11-01 PROCEDURE — 87591 N.GONORRHOEAE DNA AMP PROB: CPT

## 2018-11-01 PROCEDURE — 99232 SBSQ HOSP IP/OBS MODERATE 35: CPT | Performed by: HOSPITALIST

## 2018-11-01 PROCEDURE — 80048 BASIC METABOLIC PNL TOTAL CA: CPT

## 2018-11-01 PROCEDURE — 700102 HCHG RX REV CODE 250 W/ 637 OVERRIDE(OP): Performed by: HOSPITALIST

## 2018-11-01 RX ORDER — FEXOFENADINE HCL 60 MG/1
60 TABLET, FILM COATED ORAL EVERY 12 HOURS
Status: DISCONTINUED | OUTPATIENT
Start: 2018-11-01 | End: 2018-11-06 | Stop reason: HOSPADM

## 2018-11-01 RX ADMIN — LORAZEPAM 0.5 MG: 1 TABLET ORAL at 14:21

## 2018-11-01 RX ADMIN — DIPHENHYDRAMINE HCL 50 MG: 25 TABLET ORAL at 14:21

## 2018-11-01 RX ADMIN — FEXOFENADINE HCL 60 MG: 60 TABLET, FILM COATED ORAL at 20:46

## 2018-11-01 RX ADMIN — THIAMINE HCL TAB 100 MG 100 MG: 100 TAB at 05:20

## 2018-11-01 RX ADMIN — THERA TABS 1 TABLET: TAB at 05:20

## 2018-11-01 RX ADMIN — LORAZEPAM 0.5 MG: 1 TABLET ORAL at 20:47

## 2018-11-01 RX ADMIN — LORAZEPAM 0.5 MG: 1 TABLET ORAL at 03:20

## 2018-11-01 RX ADMIN — FOLIC ACID 1 MG: 1 TABLET ORAL at 05:20

## 2018-11-01 RX ADMIN — DIPHENHYDRAMINE HCL 50 MG: 25 TABLET ORAL at 03:18

## 2018-11-01 ASSESSMENT — ENCOUNTER SYMPTOMS
FOCAL WEAKNESS: 0
PALPITATIONS: 0
VOMITING: 0
EYES NEGATIVE: 1
HALLUCINATIONS: 0
NAUSEA: 0
NERVOUS/ANXIOUS: 1
CHILLS: 0
FEVER: 0
SEIZURES: 0
LOSS OF CONSCIOUSNESS: 0
BLOOD IN STOOL: 0
DEPRESSION: 1
RESPIRATORY NEGATIVE: 1

## 2018-11-01 ASSESSMENT — PAIN SCALES - GENERAL
PAINLEVEL_OUTOF10: 0

## 2018-11-01 ASSESSMENT — PATIENT HEALTH QUESTIONNAIRE - PHQ9
SUM OF ALL RESPONSES TO PHQ9 QUESTIONS 1 AND 2: 0
2. FEELING DOWN, DEPRESSED, IRRITABLE, OR HOPELESS: NOT AT ALL
1. LITTLE INTEREST OR PLEASURE IN DOING THINGS: NOT AT ALL

## 2018-11-01 NOTE — PROGRESS NOTES
"Pt wants to talk to his son who seven, he is really upset that he missed halloween and says, \"my son is not going to understand, he is going to be angry and not talk to me.\"   He wants to talk to psych to get approval to use the phone to call. Psych paged    "

## 2018-11-01 NOTE — PROGRESS NOTES
Pt in bed sleeping, refusing nursing assessment and VS, no needs expressed at this time, sitter at bedside. Standard fall precautions in place, bed locked in lowest position.

## 2018-11-01 NOTE — DISCHARGE PLANNING
Agency/Facility Name: [facility]  Spoke To: Brian  Outcome: Patient declined, out of medicare days.

## 2018-11-01 NOTE — PROGRESS NOTES
"Hospital Medicine Daily Progress Note    Date of Service  11/1/2018    Chief Complaint  Intentional drug overdose    Hospital Course  33-year-old male past medical history of alcoholism, polysubstance use, personality disorders admitted for intentional drug overdose \" Librium 12 capsules\".  Admitted to telemetry, was hemodynamically stable apart from intermittent tachycardia.   He was saturating well on room air.  Started on a legal hold psychiatry consulted.  His legal hold was extended. continue to one-on-one sitter.     Interval Problem Update  Hemodynamically stable   Has not been showing signs of alcohol withdrawal apart from mild anxiety.  Reports having several unprotected sexual encounters over the past year, concerned about having a sexual transmitted disease.  Screening for HIV and chlamydia and hepatitis B.   Complaining from allergies, reports having good response with fexofenadine, ordered    Has transfer orders to medical floor.  Psychiatry planning to transfer him to inpatient mental health facility.     Consultants/Specialty  Psychiatry     Code Status  Full    Disposition  Inpatient, likely  facility     Review of Systems  Review of Systems   Constitutional: Negative for chills, fever and malaise/fatigue.   HENT: Negative.    Eyes: Negative.    Respiratory: Negative.    Cardiovascular: Negative for chest pain and palpitations.   Gastrointestinal: Negative for blood in stool, nausea and vomiting.   Genitourinary: Negative.    Skin: Negative.    Neurological: Negative for focal weakness, seizures and loss of consciousness.   Psychiatric/Behavioral: Positive for depression. Negative for hallucinations. The patient is nervous/anxious.         Physical Exam  Temp:  [36.8 °C (98.2 °F)-36.9 °C (98.4 °F)] 36.8 °C (98.2 °F)  Pulse:  [86-99] 86  Resp:  [16-18] 16  BP: (103-131)/(74-94) 131/94    Physical Exam   Constitutional: He is oriented to person, place, and time. He appears well-developed. No " distress.   HENT:   Head: Normocephalic and atraumatic.   Eyes: Pupils are equal, round, and reactive to light. Conjunctivae are normal. Right eye exhibits no discharge. Left eye exhibits no discharge.   Neck: No JVD present. No tracheal deviation present. No thyromegaly present.   Cardiovascular: Normal rate and regular rhythm.  Exam reveals no gallop and no friction rub.    No murmur heard.  Pulmonary/Chest: Effort normal. No stridor. No respiratory distress. He has no wheezes. He has no rales.   Abdominal: Soft. Bowel sounds are normal. He exhibits no distension. There is no tenderness. There is no rebound and no guarding.   Musculoskeletal: He exhibits no edema, tenderness or deformity.   Deformities of both hands, apparently congenital   Neurological: He is alert and oriented to person, place, and time.   Skin: Skin is warm and dry. No rash noted. He is not diaphoretic. No erythema.   Nursing note and vitals reviewed.      Fluids    Intake/Output Summary (Last 24 hours) at 11/01/18 1743  Last data filed at 10/31/18 2000   Gross per 24 hour   Intake                0 ml   Output              500 ml   Net             -500 ml       Laboratory  Recent Labs      10/29/18   1902  10/31/18   0034   WBC  14.0*  9.4   RBC  4.86  4.55*   HEMOGLOBIN  14.9  14.1   HEMATOCRIT  43.4  41.5*   MCV  89.3  91.2   MCH  30.7  31.0   MCHC  34.3  34.0   RDW  41.8  43.7   PLATELETCT  189  163*   MPV  11.3  11.5     Recent Labs      10/29/18   1902  10/31/18   0034  11/01/18   0804   SODIUM  138  137  138   POTASSIUM  3.7  4.1  4.9   CHLORIDE  104  103  101   CO2  22  27  31   GLUCOSE  103*  102*  96   BUN  4*  8  14   CREATININE  0.76  0.82  0.81   CALCIUM  10.1  9.9  10.1             Recent Labs      10/31/18   0034   TRIGLYCERIDE  134   HDL  69   LDL  68       Imaging  No orders to display        Assessment/Plan  * Drug overdose   Assessment & Plan    Overdose on librium,   Status post 24 hours telemetry, no respiratory  depression  Can be transferred to medical         Alcohol withdrawal (HCC)   Assessment & Plan    Since being in the ER several hours patient is now in withdrawals   Continue withdrawal protocol   Evens bag   Monitor          Suicide ideation- (present on admission)   Assessment & Plan    On legal hold  Psych consulted, planning for inpatient Mx         Abnormal LFTs due to alcohol abuse- (present on admission)   Assessment & Plan    Cont to monitor LFT's   Reports unsafe sexual practices concern for STD, screening for hepatitis B and C  Counseled to stop drinking              Tachycardia   Assessment & Plan    Mostly related to anxiety  Improved         Leukocytosis   Assessment & Plan    Likely reactive with no evidence of infection   Cont to monitor            Ectodermal dysplasia, 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

## 2018-11-02 LAB
25(OH)D3 SERPL-MCNC: 14 NG/ML (ref 30–100)
C TRACH DNA SPEC QL NAA+PROBE: NEGATIVE
HBV SURFACE AG SER QL: NEGATIVE
HCV AB SER QL: NEGATIVE
HIV 1+2 AB+HIV1 P24 AG SERPL QL IA: NON REACTIVE
N GONORRHOEA DNA SPEC QL NAA+PROBE: NEGATIVE
SPECIMEN SOURCE: NORMAL

## 2018-11-02 PROCEDURE — A9270 NON-COVERED ITEM OR SERVICE: HCPCS | Performed by: HOSPITALIST

## 2018-11-02 PROCEDURE — 87340 HEPATITIS B SURFACE AG IA: CPT

## 2018-11-02 PROCEDURE — G0475 HIV COMBINATION ASSAY: HCPCS

## 2018-11-02 PROCEDURE — A9270 NON-COVERED ITEM OR SERVICE: HCPCS | Performed by: NURSE PRACTITIONER

## 2018-11-02 PROCEDURE — 700102 HCHG RX REV CODE 250 W/ 637 OVERRIDE(OP): Performed by: NURSE PRACTITIONER

## 2018-11-02 PROCEDURE — 86803 HEPATITIS C AB TEST: CPT

## 2018-11-02 PROCEDURE — 99232 SBSQ HOSP IP/OBS MODERATE 35: CPT | Performed by: HOSPITALIST

## 2018-11-02 PROCEDURE — 700102 HCHG RX REV CODE 250 W/ 637 OVERRIDE(OP): Performed by: HOSPITALIST

## 2018-11-02 PROCEDURE — 99233 SBSQ HOSP IP/OBS HIGH 50: CPT | Performed by: PSYCHIATRY & NEUROLOGY

## 2018-11-02 PROCEDURE — 36415 COLL VENOUS BLD VENIPUNCTURE: CPT

## 2018-11-02 PROCEDURE — 770001 HCHG ROOM/CARE - MED/SURG/GYN PRIV*

## 2018-11-02 PROCEDURE — 82306 VITAMIN D 25 HYDROXY: CPT

## 2018-11-02 RX ORDER — NALTREXONE HYDROCHLORIDE 50 MG/1
50 TABLET, FILM COATED ORAL DAILY
Status: DISCONTINUED | OUTPATIENT
Start: 2018-11-02 | End: 2018-11-06 | Stop reason: HOSPADM

## 2018-11-02 RX ORDER — ERGOCALCIFEROL 1.25 MG/1
50000 CAPSULE ORAL
Status: DISCONTINUED | OUTPATIENT
Start: 2018-11-02 | End: 2018-11-06 | Stop reason: HOSPADM

## 2018-11-02 RX ORDER — BUPROPION HYDROCHLORIDE 75 MG/1
75 TABLET ORAL 2 TIMES DAILY
Status: DISCONTINUED | OUTPATIENT
Start: 2018-11-02 | End: 2018-11-06 | Stop reason: HOSPADM

## 2018-11-02 RX ADMIN — LORAZEPAM 0.5 MG: 1 TABLET ORAL at 22:55

## 2018-11-02 RX ADMIN — ERGOCALCIFEROL 50000 UNITS: 1.25 CAPSULE ORAL at 16:21

## 2018-11-02 RX ADMIN — NALTREXONE HYDROCHLORIDE 50 MG: 50 TABLET, FILM COATED ORAL at 17:52

## 2018-11-02 RX ADMIN — LORAZEPAM 0.5 MG: 1 TABLET ORAL at 08:57

## 2018-11-02 RX ADMIN — FEXOFENADINE HCL 60 MG: 60 TABLET, FILM COATED ORAL at 16:21

## 2018-11-02 RX ADMIN — FOLIC ACID 1 MG: 1 TABLET ORAL at 05:36

## 2018-11-02 RX ADMIN — THIAMINE HCL TAB 100 MG 100 MG: 100 TAB at 05:36

## 2018-11-02 RX ADMIN — FEXOFENADINE HCL 60 MG: 60 TABLET, FILM COATED ORAL at 05:36

## 2018-11-02 RX ADMIN — THERA TABS 1 TABLET: TAB at 05:36

## 2018-11-02 RX ADMIN — BUPROPION HYDROCHLORIDE 75 MG: 75 TABLET, FILM COATED ORAL at 14:12

## 2018-11-02 RX ADMIN — LORAZEPAM 0.5 MG: 1 TABLET ORAL at 02:50

## 2018-11-02 RX ADMIN — LORAZEPAM 0.5 MG: 1 TABLET ORAL at 16:22

## 2018-11-02 ASSESSMENT — ENCOUNTER SYMPTOMS
EYES NEGATIVE: 1
LOSS OF CONSCIOUSNESS: 0
NAUSEA: 0
VOMITING: 0
SEIZURES: 0
DEPRESSION: 1
FEVER: 0
NERVOUS/ANXIOUS: 1
BLOOD IN STOOL: 0
RESPIRATORY NEGATIVE: 1
HALLUCINATIONS: 0
CHILLS: 0
PALPITATIONS: 0
FOCAL WEAKNESS: 0

## 2018-11-02 ASSESSMENT — PAIN SCALES - GENERAL
PAINLEVEL_OUTOF10: 0

## 2018-11-02 NOTE — DISCHARGE PLANNING
Agency/Facility Name: [facility]  Spoke To: Luisa  Outcome: Referral received, under review.

## 2018-11-02 NOTE — PSYCHIATRY
"PSYCHOLOGICAL FOLLOW-UP:  Reason for admission: Alcohol withdrawal (HCC)  Suicide (HCC)  Supervising Physician: Luisa Fontaine MD      Legal status: On Hold    Chief Complaint: \" Im worried about relapsing on alcohol\"    HPI: 34yo male with history of polysubstance use, ADHD, MDD and cluster B personality traits. Patient has been disagreeable to starting medications with prior psych staff. He cites reasons as being he only has one regimen that works, which is Ativan PRN, Buspar, and Adderal. I explained that the psych team here is inpatient only and we cannot follow him after discharge and that is why we cannot give him controlled substances. He contributes his depression to not being able to speak to his kids and his recent negative life changes. He denies depression or suicidal thoughts but says his anxiety has been running high. He said he has been anxious about everything lately and admits its an exaggerated response. The patient says he has a very poor support network out here. He says he is open to going to the shelter if discharged. The patient was agreeable to starting Wellbutrin and naltrexone. These will be given today and through the weekend. We will re-evaluate Monday to see if the patients mood has stabilized enough to discharge.     Psychiatric Examination:  Vitals: Blood pressure 130/98, pulse 96, temperature 37 °C (98.6 °F), resp. rate (!) 22, height 1.727 m (5' 8\"), weight 67.4 kg (148 lb 9.4 oz), SpO2 95 %.  Musculoskeletal: normal psychomotor activity, no tics or unusual mannerisms noted  Appearance and Eye Contact: Appropriate dress and grooming. Behavior is calm, cooperative,  appropriate eye contact  Attention/Alertness: Alert  Thought Process: Linear, Logical and Goal Directed    Thought Content: No SI, HI, Hallucinations, or delusions  Speech: Clear with normal rate and rhythm  Mood: \"Fair\"            Affect: Congruent with mood         SI/HI: Denies    Memory: Recent and remote " memory appear intact    Orientation: alert, oriented to person, place and time  Insight into symptoms: poor  Judgement into symptoms:poor    ASSESSMENT:     DSM5 Diagnostic Considerations:   Adjustment disorder with depressed mood  Alcohol use disorder  RO Mood disorder    PLAN:  Begin Wellbutrin  Begin Naltrexone  Observe for stabilizing mood over weekend  Legal status: On Hold  Anticipate F/U Monday  Phone calls: Yes, supervised  Records reviewed: Yes  Discussed patient with other provider: Yes, Luisa Fontaine MD  Will continue to follow    KODI ColeyP-BC

## 2018-11-02 NOTE — DISCHARGE PLANNING
Agency/Facility Name: [facility]  Spoke To: Batool  Outcome: Patient declined due to aggressive behavior.

## 2018-11-02 NOTE — PROGRESS NOTES
Bedside report received. Pt is resting in bed. Patient A&O x 4 on RA. No complaints of pain or SOB at this time.  POC discussed with patient.  Patient verbalized understanding.  Call light and belongings with in reach.  Bed locked and in lowest position, alarm and fall precautions in place. Pt is on Legal 2000 hold.  1:1 sitter in present

## 2018-11-02 NOTE — DISCHARGE PLANNING
MSW received call from Malathi at [facility]. Pt has 2 Medicare days left. Pt would have to self-pay for treatment after 2 days are complete. MSW will leave report for Unit SW.

## 2018-11-02 NOTE — PROGRESS NOTES
"Pt becoming very frustrated, he states, \"I am not suicidal. I would not have called 911 if I was suicidal.\" I want to contact the [employer] and have them send one of their attorneys out and their patient rights advocate. Will notify SW.  "

## 2018-11-02 NOTE — DISCHARGE PLANNING
Anticipated Discharge Disposition: Pending    Action: LAURA received information from Christa with [facility] that pt is appropriate for their P.U.[facility] Program if his legal hold is discontinued.     LSW spoke with pt at bedside regarding referrals and updates. LSW notified pt that at this time he has been denied by [facility] and [facility] due to not having anymore lifetime Medicare days left for psych facilities. The only pending option at this time is [facility] while pt is on a hold. LSW discussed pt's willingness to go to [facility] and pt reported he would like this option as he would get case management and mental health treatment.    LSW paged psychiatry to discuss above. Per Christa, pt can transfer to [facility] today if hold is lifted soon.    Barriers to Discharge: Legal hold.    Plan: Await return call from psychiatry, LATASHAW to contact [facility] for transport if pt's hold is lifted.

## 2018-11-02 NOTE — PROGRESS NOTES
"Hospital Medicine Daily Progress Note    Date of Service  11/2/2018    Chief Complaint  Intentional drug overdose    Hospital Course  33-year-old male past medical history of alcoholism, polysubstance use, personality disorders admitted for intentional drug overdose \" Librium 12 capsules\".  Admitted to telemetry, was hemodynamically stable apart from intermittent tachycardia.   He was saturating well on room air.  Started on a legal hold psychiatry consulted.  His legal hold was extended. continue to one-on-one sitter.     Interval Problem Update  Hemodynamically stable   No signs of alcohol withdrawal   HIV hepatitis B, C Neg.   Has transfer orders to medical floor.  Psychiatry planning to transfer him to inpatient mental health facility.  Difficult placement, legal hold extended through Monday.     Consultants/Specialty  Psychiatry     Code Status  Full    Disposition  Inpatient, likely  facility     Review of Systems  Review of Systems   Constitutional: Negative for chills, fever and malaise/fatigue.   HENT: Negative.    Eyes: Negative.    Respiratory: Negative.    Cardiovascular: Negative for chest pain and palpitations.   Gastrointestinal: Negative for blood in stool, nausea and vomiting.   Genitourinary: Negative.    Skin: Negative.    Neurological: Negative for focal weakness, seizures and loss of consciousness.   Psychiatric/Behavioral: Positive for depression. Negative for hallucinations. The patient is nervous/anxious.         Physical Exam  Temp:  [36.7 °C (98 °F)-37.2 °C (99 °F)] 37 °C (98.6 °F)  Pulse:  [] 96  Resp:  [16-22] 22  BP: (116-136)/() 130/98    Physical Exam   Constitutional: He is oriented to person, place, and time. He appears well-developed. No distress.   HENT:   Head: Normocephalic and atraumatic.   Eyes: Pupils are equal, round, and reactive to light. Conjunctivae are normal. Right eye exhibits no discharge. Left eye exhibits no discharge.   Neck: No JVD present. No " tracheal deviation present. No thyromegaly present.   Cardiovascular: Normal rate and regular rhythm.  Exam reveals no gallop and no friction rub.    No murmur heard.  Pulmonary/Chest: Effort normal. No stridor. No respiratory distress. He has no wheezes. He has no rales.   Abdominal: Soft. Bowel sounds are normal. He exhibits no distension. There is no tenderness. There is no rebound and no guarding.   Musculoskeletal: He exhibits no edema, tenderness or deformity.   Deformities of both hands, apparently congenital   Neurological: He is alert and oriented to person, place, and time.   Skin: Skin is warm and dry. No rash noted. He is not diaphoretic. No erythema.   Nursing note and vitals reviewed.      Fluids  No intake or output data in the 24 hours ending 11/02/18 1438    Laboratory  Recent Labs      10/31/18   0034   WBC  9.4   RBC  4.55*   HEMOGLOBIN  14.1   HEMATOCRIT  41.5*   MCV  91.2   MCH  31.0   MCHC  34.0   RDW  43.7   PLATELETCT  163*   MPV  11.5     Recent Labs      10/31/18   0034  11/01/18   0804   SODIUM  137  138   POTASSIUM  4.1  4.9   CHLORIDE  103  101   CO2  27  31   GLUCOSE  102*  96   BUN  8  14   CREATININE  0.82  0.81   CALCIUM  9.9  10.1             Recent Labs      10/31/18   0034   TRIGLYCERIDE  134   HDL  69   LDL  68       Imaging  No orders to display        Assessment/Plan  * Drug overdose   Assessment & Plan    Overdose on librium,   Status post 24 hours telemetry, no respiratory depression  Can be transferred to medical         Alcohol withdrawal ([facility])   Assessment & Plan    Since being in the ER several hours patient is now in withdrawals   Continue withdrawal protocol   Rally bag   Monitor          Suicide ideation- (present on admission)   Assessment & Plan    On legal hold  Psych consulted, planning for inpatient Mx         Abnormal LFTs due to alcohol abuse- (present on admission)   Assessment & Plan    Cont to monitor LFT's   Reports unsafe sexual practices concern for STD,  screening for hepatitis B and C  Counseled to stop drinking              Tachycardia   Assessment & Plan    Mostly related to anxiety  Improved         Leukocytosis   Assessment & Plan    Likely reactive with no evidence of infection   Cont to monitor            Ectodermal dysplasia, 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

## 2018-11-02 NOTE — PROGRESS NOTES
Pt in room with 1:1  sitter, call light in reach, plan of care reviewed with pt. Pt would like to speak with SW and psych.

## 2018-11-03 PROCEDURE — A9270 NON-COVERED ITEM OR SERVICE: HCPCS | Performed by: NURSE PRACTITIONER

## 2018-11-03 PROCEDURE — 700102 HCHG RX REV CODE 250 W/ 637 OVERRIDE(OP): Performed by: HOSPITALIST

## 2018-11-03 PROCEDURE — A9270 NON-COVERED ITEM OR SERVICE: HCPCS | Performed by: HOSPITALIST

## 2018-11-03 PROCEDURE — 99232 SBSQ HOSP IP/OBS MODERATE 35: CPT | Performed by: HOSPITALIST

## 2018-11-03 PROCEDURE — 770001 HCHG ROOM/CARE - MED/SURG/GYN PRIV*

## 2018-11-03 PROCEDURE — 700102 HCHG RX REV CODE 250 W/ 637 OVERRIDE(OP): Performed by: NURSE PRACTITIONER

## 2018-11-03 PROCEDURE — 700111 HCHG RX REV CODE 636 W/ 250 OVERRIDE (IP): Performed by: HOSPITALIST

## 2018-11-03 RX ADMIN — LORAZEPAM 0.5 MG: 1 TABLET ORAL at 17:46

## 2018-11-03 RX ADMIN — NALTREXONE HYDROCHLORIDE 50 MG: 50 TABLET, FILM COATED ORAL at 17:46

## 2018-11-03 RX ADMIN — FEXOFENADINE HCL 60 MG: 60 TABLET, FILM COATED ORAL at 04:58

## 2018-11-03 RX ADMIN — LORAZEPAM 0.5 MG: 1 TABLET ORAL at 11:05

## 2018-11-03 RX ADMIN — FOLIC ACID 1 MG: 1 TABLET ORAL at 04:58

## 2018-11-03 RX ADMIN — FEXOFENADINE HCL 60 MG: 60 TABLET, FILM COATED ORAL at 17:46

## 2018-11-03 RX ADMIN — BUPROPION HYDROCHLORIDE 75 MG: 75 TABLET, FILM COATED ORAL at 08:02

## 2018-11-03 RX ADMIN — ONDANSETRON 4 MG: 4 TABLET, ORALLY DISINTEGRATING ORAL at 06:20

## 2018-11-03 RX ADMIN — BUPROPION HYDROCHLORIDE 75 MG: 75 TABLET, FILM COATED ORAL at 14:19

## 2018-11-03 RX ADMIN — THIAMINE HCL TAB 100 MG 100 MG: 100 TAB at 04:58

## 2018-11-03 RX ADMIN — THERA TABS 1 TABLET: TAB at 04:58

## 2018-11-03 RX ADMIN — ACETAMINOPHEN 650 MG: 325 TABLET, FILM COATED ORAL at 06:20

## 2018-11-03 ASSESSMENT — ENCOUNTER SYMPTOMS
CHILLS: 0
BLOOD IN STOOL: 0
FOCAL WEAKNESS: 0
SEIZURES: 0
HALLUCINATIONS: 0
DEPRESSION: 1
LOSS OF CONSCIOUSNESS: 0
NERVOUS/ANXIOUS: 0
NAUSEA: 0
VOMITING: 0
PALPITATIONS: 0
FEVER: 0
EYES NEGATIVE: 1
RESPIRATORY NEGATIVE: 1

## 2018-11-03 ASSESSMENT — PAIN SCALES - GENERAL
PAINLEVEL_OUTOF10: 0

## 2018-11-03 NOTE — PROGRESS NOTES
"Hospital Medicine Daily Progress Note    Date of Service  11/3/2018    Chief Complaint  Intentional drug overdose    Hospital Course  33-year-old male past medical history of alcoholism, polysubstance use, personality disorders admitted for intentional drug overdose \" Librium 12 capsules\".  Admitted to telemetry, was hemodynamically stable apart from intermittent tachycardia.   He was saturating well on room air.  Started on a legal hold psychiatry consulted.  His legal hold was extended. continue to one-on-one sitter.     Interval Problem Update  Hemodynamically stable, not anxious anymore.  Doing well on Wellbutrin, naltrexone.   Has transfer orders to medical floor.  Psychiatry planning to transfer him to inpatient mental health facility.  Difficult placement, legal hold extended through Monday.  Getting Vit D for deficiency.    Fexofenadine for allergy    Consultants/Specialty  Psychiatry     Code Status  Full    Disposition  Inpatient, likely  facility     Review of Systems  Review of Systems   Constitutional: Negative for chills, fever and malaise/fatigue.   HENT: Negative.    Eyes: Negative.    Respiratory: Negative.    Cardiovascular: Negative for chest pain and palpitations.   Gastrointestinal: Negative for blood in stool, nausea and vomiting.   Genitourinary: Negative.    Skin: Negative.    Neurological: Negative for focal weakness, seizures and loss of consciousness.   Psychiatric/Behavioral: Positive for depression. Negative for hallucinations. The patient is not nervous/anxious.         Physical Exam  Temp:  [36.7 °C (98 °F)-36.7 °C (98.1 °F)] 36.7 °C (98 °F)  Pulse:  [87-94] 94  Resp:  [17-18] 17  BP: (123-136)/(80-94) 136/94    Physical Exam   Constitutional: He is oriented to person, place, and time. He appears well-developed. No distress.   HENT:   Head: Normocephalic and atraumatic.   Eyes: Pupils are equal, round, and reactive to light. Conjunctivae are normal. Right eye exhibits no discharge. " Left eye exhibits no discharge.   Neck: No JVD present. No tracheal deviation present. No thyromegaly present.   Cardiovascular: Normal rate and regular rhythm.  Exam reveals no gallop and no friction rub.    No murmur heard.  Pulmonary/Chest: Effort normal. No stridor. No respiratory distress. He has no wheezes. He has no rales.   Abdominal: Soft. Bowel sounds are normal. He exhibits no distension. There is no tenderness. There is no rebound and no guarding.   Musculoskeletal: He exhibits no edema, tenderness or deformity.   Deformities of both hands, apparently congenital   Neurological: He is alert and oriented to person, place, and time.   Skin: Skin is warm and dry. No rash noted. He is not diaphoretic. No erythema.   Psychiatric:   More interactive today   Nursing note and vitals reviewed.      Fluids    Intake/Output Summary (Last 24 hours) at 11/03/18 7707  Last data filed at 11/03/18 1200   Gross per 24 hour   Intake              600 ml   Output                0 ml   Net              600 ml       Laboratory      Recent Labs      11/01/18   0804   SODIUM  138   POTASSIUM  4.9   CHLORIDE  101   CO2  31   GLUCOSE  96   BUN  14   CREATININE  0.81   CALCIUM  10.1                   Imaging  No orders to display        Assessment/Plan  * Drug overdose   Assessment & Plan    Overdose on librium,   Status post 24 hours telemetry, no respiratory depression  Can be transferred to medical            Alcohol withdrawal (HCC)   Assessment & Plan    Since being in the ER several hours patient is now in withdrawals   Continue withdrawal protocol   Rally bag   Monitor            Suicide ideation- (present on admission)   Assessment & Plan    On legal hold  Psych consulted, planning for inpatient Mx Vs outpt follow up          Abnormal LFTs due to alcohol abuse- (present on admission)   Assessment & Plan    Cont to monitor LFT's   Reports unsafe sexual practices concern for STD,   HIV hepatitis B, C Neg.    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

## 2018-11-03 NOTE — PROGRESS NOTES
Bedside report received from JORDY Doll. Patient resting in bed. A&Ox4. Patient on legal 2000 hold; 1:1 sitter at bedside.

## 2018-11-04 PROBLEM — R74.8 LIVER ENZYME ELEVATION: Status: ACTIVE | Noted: 2018-11-04

## 2018-11-04 PROCEDURE — 770001 HCHG ROOM/CARE - MED/SURG/GYN PRIV*

## 2018-11-04 PROCEDURE — 700102 HCHG RX REV CODE 250 W/ 637 OVERRIDE(OP): Performed by: HOSPITALIST

## 2018-11-04 PROCEDURE — 700102 HCHG RX REV CODE 250 W/ 637 OVERRIDE(OP): Performed by: NURSE PRACTITIONER

## 2018-11-04 PROCEDURE — 302101 FENESTRATED FOAM: Performed by: HOSPITALIST

## 2018-11-04 PROCEDURE — A9270 NON-COVERED ITEM OR SERVICE: HCPCS | Performed by: NURSE PRACTITIONER

## 2018-11-04 PROCEDURE — 99232 SBSQ HOSP IP/OBS MODERATE 35: CPT | Performed by: HOSPITALIST

## 2018-11-04 PROCEDURE — A9270 NON-COVERED ITEM OR SERVICE: HCPCS | Performed by: HOSPITALIST

## 2018-11-04 RX ADMIN — NALTREXONE HYDROCHLORIDE 50 MG: 50 TABLET, FILM COATED ORAL at 16:59

## 2018-11-04 RX ADMIN — ACETAMINOPHEN 650 MG: 325 TABLET, FILM COATED ORAL at 08:11

## 2018-11-04 RX ADMIN — FEXOFENADINE HCL 60 MG: 60 TABLET, FILM COATED ORAL at 05:46

## 2018-11-04 RX ADMIN — LORAZEPAM 0.5 MG: 1 TABLET ORAL at 00:44

## 2018-11-04 RX ADMIN — BUPROPION HYDROCHLORIDE 75 MG: 75 TABLET, FILM COATED ORAL at 05:46

## 2018-11-04 RX ADMIN — LORAZEPAM 0.5 MG: 1 TABLET ORAL at 08:11

## 2018-11-04 RX ADMIN — FEXOFENADINE HCL 60 MG: 60 TABLET, FILM COATED ORAL at 16:59

## 2018-11-04 RX ADMIN — LORAZEPAM 0.5 MG: 1 TABLET ORAL at 16:00

## 2018-11-04 RX ADMIN — LORAZEPAM 0.5 MG: 1 TABLET ORAL at 21:58

## 2018-11-04 RX ADMIN — ACETAMINOPHEN 650 MG: 325 TABLET, FILM COATED ORAL at 20:55

## 2018-11-04 RX ADMIN — BUPROPION HYDROCHLORIDE 75 MG: 75 TABLET, FILM COATED ORAL at 13:03

## 2018-11-04 ASSESSMENT — ENCOUNTER SYMPTOMS
HALLUCINATIONS: 0
FEVER: 0
NAUSEA: 0
EYES NEGATIVE: 1
FOCAL WEAKNESS: 0
PALPITATIONS: 0
DEPRESSION: 1
NERVOUS/ANXIOUS: 0
CHILLS: 0
VOMITING: 0
LOSS OF CONSCIOUSNESS: 0
RESPIRATORY NEGATIVE: 1
BLOOD IN STOOL: 0
SEIZURES: 0

## 2018-11-04 ASSESSMENT — PAIN SCALES - GENERAL
PAINLEVEL_OUTOF10: 3
PAINLEVEL_OUTOF10: 0
PAINLEVEL_OUTOF10: 5
PAINLEVEL_OUTOF10: 0
PAINLEVEL_OUTOF10: 3
PAINLEVEL_OUTOF10: 5

## 2018-11-04 NOTE — PROGRESS NOTES
"Hospital Medicine Daily Progress Note    Date of Service  11/4/2018    Chief Complaint  Intentional drug overdose    Hospital Course  33-year-old male past medical history of alcoholism, polysubstance use, personality disorders admitted for intentional drug overdose \" Librium 12 capsules\".  Admitted to telemetry, was hemodynamically stable apart from intermittent tachycardia.   He was saturating well on room air.  Started on a legal hold psychiatry consulted.  His legal hold was extended. continue to one-on-one sitter.     Interval Problem Update  Hemodynamically stable, not anxious anymore.  Doing well on Wellbutrin, naltrexone.   Has transfer orders to medical floor.  Psychiatry planning to transfer him to inpatient mental health facility.  Difficult placement, legal hold extended through Monday.   Fexofenadine for allergy   Getting Vit D for deficiency.       Consultants/Specialty  Psychiatry     Code Status  Full    Disposition  Medically cleared pending psych dispo    Review of Systems  Review of Systems   Constitutional: Negative for chills, fever and malaise/fatigue.   HENT: Negative.    Eyes: Negative.    Respiratory: Negative.    Cardiovascular: Negative for chest pain and palpitations.   Gastrointestinal: Negative for blood in stool, nausea and vomiting.   Genitourinary: Negative.    Skin: Negative.    Neurological: Negative for focal weakness, seizures and loss of consciousness.   Psychiatric/Behavioral: Positive for depression. Negative for hallucinations. The patient is not nervous/anxious.         Physical Exam  Temp:  [36.6 °C (97.8 °F)-37 °C (98.6 °F)] 36.8 °C (98.2 °F)  Pulse:  [16-95] 16  Resp:  [16-82] 82  BP: (119-134)/(76-97) 134/97    Physical Exam   Constitutional: He is oriented to person, place, and time. He appears well-developed. No distress.   HENT:   Head: Normocephalic and atraumatic.   Eyes: Pupils are equal, round, and reactive to light. Conjunctivae are normal. Right eye exhibits no " discharge. Left eye exhibits no discharge.   Neck: No JVD present. No tracheal deviation present. No thyromegaly present.   Cardiovascular: Normal rate and regular rhythm.  Exam reveals no gallop and no friction rub.    No murmur heard.  Pulmonary/Chest: Effort normal. No stridor. No respiratory distress. He has no wheezes. He has no rales.   Abdominal: Soft. Bowel sounds are normal. He exhibits no distension. There is no tenderness. There is no rebound and no guarding.   Musculoskeletal: He exhibits no edema, tenderness or deformity.   Deformities of both hands, apparently congenital   Neurological: He is alert and oriented to person, place, and time.   Skin: Skin is warm and dry. No rash noted. He is not diaphoretic. No erythema.   Psychiatric:   More interactive today   Nursing note and vitals reviewed.      Fluids    Intake/Output Summary (Last 24 hours) at 11/04/18 1735  Last data filed at 11/04/18 0800   Gross per 24 hour   Intake              480 ml   Output                0 ml   Net              480 ml       Laboratory                        Imaging  No orders to display        Assessment/Plan  * Drug overdose   Assessment & Plan    Overdose on librium,   Status post 24 hours telemetry, no respiratory depression  Can be transferred to medical          Alcohol withdrawal (HCC)   Assessment & Plan    Since being in the ER several hours patient is now in withdrawals   Continue withdrawal protocol   Rally bag   Monitor         Suicide ideation- (present on admission)   Assessment & Plan    On legal hold  Psych consulted, planning for inpatient Mx Vs outpt follow up        Abnormal LFTs due to alcohol abuse- (present on admission)   Assessment & Plan    Cont to monitor LFT's   Reports unsafe sexual practices concern for STD,   HIV hepatitis B, C Neg.     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

## 2018-11-04 NOTE — PROGRESS NOTES
Bedside report received from Sharmila SPENCE. Patient A&Ox4. No complaints of pain. Bedside sitter present. Legal 2000 in place.

## 2018-11-04 NOTE — PROGRESS NOTES
Bedside report received. Patient A&O x4. Pt has sitter at bedside.  RA. Pt is medical. No complaints of pain at this time. POC discussed with patient. Patient verbalized understanding. Call light and belongings within reach. Bed locked and in lowest position, alarm and fall precautions in place.

## 2018-11-04 NOTE — PROGRESS NOTES
Pt in bed 1:1 sitter at bedside, denies needs at this time, call light in reach. Ptmcurrently calm and cooperative

## 2018-11-04 NOTE — CARE PLAN
Problem: Communication  Goal: The ability to communicate needs accurately and effectively will improve  Outcome: PROGRESSING AS EXPECTED  Discussed POC and medications with patient. Pt educated on psych to re-evaluate on Monday for D/C plan.  Pt verbalized understanding.      Problem: Safety  Goal: Will remain free from injury  Outcome: PROGRESSING AS EXPECTED  Bed locked and in lowest position. 1:1 sitter at bedside. Pt standby assist when ambulating with sitter. Treaded socks provided. Call light and belongings within reach.  Patient educated to call for assistance. Pt verbalized understanding. Hourly rounding in place.

## 2018-11-05 PROCEDURE — A9270 NON-COVERED ITEM OR SERVICE: HCPCS | Performed by: HOSPITALIST

## 2018-11-05 PROCEDURE — A9270 NON-COVERED ITEM OR SERVICE: HCPCS | Performed by: NURSE PRACTITIONER

## 2018-11-05 PROCEDURE — 700102 HCHG RX REV CODE 250 W/ 637 OVERRIDE(OP): Performed by: NURSE PRACTITIONER

## 2018-11-05 PROCEDURE — 770001 HCHG ROOM/CARE - MED/SURG/GYN PRIV*

## 2018-11-05 PROCEDURE — 99232 SBSQ HOSP IP/OBS MODERATE 35: CPT | Performed by: HOSPITALIST

## 2018-11-05 PROCEDURE — 700102 HCHG RX REV CODE 250 W/ 637 OVERRIDE(OP): Performed by: HOSPITALIST

## 2018-11-05 RX ADMIN — BUPROPION HYDROCHLORIDE 75 MG: 75 TABLET, FILM COATED ORAL at 05:16

## 2018-11-05 RX ADMIN — ACETAMINOPHEN 650 MG: 325 TABLET, FILM COATED ORAL at 04:03

## 2018-11-05 RX ADMIN — BUPROPION HYDROCHLORIDE 75 MG: 75 TABLET, FILM COATED ORAL at 12:57

## 2018-11-05 RX ADMIN — NALTREXONE HYDROCHLORIDE 50 MG: 50 TABLET, FILM COATED ORAL at 16:12

## 2018-11-05 RX ADMIN — FEXOFENADINE HCL 60 MG: 60 TABLET, FILM COATED ORAL at 05:16

## 2018-11-05 RX ADMIN — LORAZEPAM 0.5 MG: 1 TABLET ORAL at 14:13

## 2018-11-05 RX ADMIN — FEXOFENADINE HCL 60 MG: 60 TABLET, FILM COATED ORAL at 16:12

## 2018-11-05 RX ADMIN — LORAZEPAM 0.5 MG: 1 TABLET ORAL at 20:23

## 2018-11-05 RX ADMIN — LORAZEPAM 0.5 MG: 1 TABLET ORAL at 07:59

## 2018-11-05 ASSESSMENT — ENCOUNTER SYMPTOMS
RESPIRATORY NEGATIVE: 1
HALLUCINATIONS: 0
NAUSEA: 0
NERVOUS/ANXIOUS: 1
LOSS OF CONSCIOUSNESS: 0
VOMITING: 0
BLOOD IN STOOL: 0
FEVER: 0
FOCAL WEAKNESS: 0
DEPRESSION: 1
EYES NEGATIVE: 1
PALPITATIONS: 0
SEIZURES: 0
CHILLS: 0

## 2018-11-05 ASSESSMENT — PAIN SCALES - GENERAL
PAINLEVEL_OUTOF10: 4
PAINLEVEL_OUTOF10: 2
PAINLEVEL_OUTOF10: 0

## 2018-11-05 NOTE — PROGRESS NOTES
Received report from day shift RN. Discussed pt plan of care. Any questions and concerns addressed at this time. Pt resting in bed. Bed locked and in lowest position. Educated pt on use of call light and call light within reach. Pt on legal 2000 hold. All personal and dangerous items removed. Sitter at bedside. No other needs at this time.

## 2018-11-05 NOTE — CARE PLAN
Problem: Fluid Volume:  Goal: Will maintain balanced intake and output  Outcome: PROGRESSING AS EXPECTED  Pt oral fluid and intake %. Pt states his appetite has been very good and has been drinking plenty of fluids. Last BM 11/4/18 and voids regularly.     Problem: Psychosocial Needs:  Goal: Level of anxiety will decrease  Outcome: PROGRESSING AS EXPECTED  Discussed with pt ways to decrease anxiety and recognize when he is becoming anxious. Pt given ativan and performed relaxation techniques.

## 2018-11-05 NOTE — CARE PLAN
Problem: Infection  Goal: Will remain free from infection  Outcome: PROGRESSING AS EXPECTED  Educated patient on the importance of good hand hygiene for self and staff, verbalized understanding.

## 2018-11-06 VITALS
OXYGEN SATURATION: 96 % | WEIGHT: 148.81 LBS | HEART RATE: 99 BPM | HEIGHT: 68 IN | SYSTOLIC BLOOD PRESSURE: 146 MMHG | RESPIRATION RATE: 18 BRPM | TEMPERATURE: 99.1 F | BODY MASS INDEX: 22.55 KG/M2 | DIASTOLIC BLOOD PRESSURE: 97 MMHG

## 2018-11-06 LAB
ALBUMIN SERPL BCP-MCNC: 4 G/DL (ref 3.2–4.9)
ALBUMIN/GLOB SERPL: 1.2 G/DL
ALP SERPL-CCNC: 82 U/L (ref 30–99)
ALT SERPL-CCNC: 112 U/L (ref 2–50)
ANION GAP SERPL CALC-SCNC: 9 MMOL/L (ref 0–11.9)
AST SERPL-CCNC: 49 U/L (ref 12–45)
BILIRUB SERPL-MCNC: 0.3 MG/DL (ref 0.1–1.5)
BUN SERPL-MCNC: 17 MG/DL (ref 8–22)
CALCIUM SERPL-MCNC: 10 MG/DL (ref 8.5–10.5)
CHLORIDE SERPL-SCNC: 101 MMOL/L (ref 96–112)
CO2 SERPL-SCNC: 28 MMOL/L (ref 20–33)
CREAT SERPL-MCNC: 0.93 MG/DL (ref 0.5–1.4)
GLOBULIN SER CALC-MCNC: 3.4 G/DL (ref 1.9–3.5)
GLUCOSE SERPL-MCNC: 86 MG/DL (ref 65–99)
POTASSIUM SERPL-SCNC: 4.5 MMOL/L (ref 3.6–5.5)
PROT SERPL-MCNC: 7.4 G/DL (ref 6–8.2)
SODIUM SERPL-SCNC: 138 MMOL/L (ref 135–145)

## 2018-11-06 PROCEDURE — A9270 NON-COVERED ITEM OR SERVICE: HCPCS | Performed by: NURSE PRACTITIONER

## 2018-11-06 PROCEDURE — 36415 COLL VENOUS BLD VENIPUNCTURE: CPT

## 2018-11-06 PROCEDURE — 80053 COMPREHEN METABOLIC PANEL: CPT

## 2018-11-06 PROCEDURE — 99239 HOSP IP/OBS DSCHRG MGMT >30: CPT | Performed by: HOSPITALIST

## 2018-11-06 PROCEDURE — 700102 HCHG RX REV CODE 250 W/ 637 OVERRIDE(OP): Performed by: HOSPITALIST

## 2018-11-06 PROCEDURE — A9270 NON-COVERED ITEM OR SERVICE: HCPCS | Performed by: HOSPITALIST

## 2018-11-06 PROCEDURE — 700102 HCHG RX REV CODE 250 W/ 637 OVERRIDE(OP): Performed by: NURSE PRACTITIONER

## 2018-11-06 PROCEDURE — 99232 SBSQ HOSP IP/OBS MODERATE 35: CPT | Performed by: PSYCHIATRY & NEUROLOGY

## 2018-11-06 RX ORDER — NALTREXONE HYDROCHLORIDE 50 MG/1
50 TABLET, FILM COATED ORAL DAILY
Qty: 30 TAB | Refills: 0 | Status: ON HOLD | OUTPATIENT
Start: 2018-11-06 | End: 2018-11-27

## 2018-11-06 RX ORDER — BUPROPION HYDROCHLORIDE 75 MG/1
75 TABLET ORAL 2 TIMES DAILY
Qty: 60 TAB | Refills: 3 | Status: ON HOLD | OUTPATIENT
Start: 2018-11-07 | End: 2018-11-27

## 2018-11-06 RX ADMIN — FEXOFENADINE HCL 60 MG: 60 TABLET, FILM COATED ORAL at 06:34

## 2018-11-06 RX ADMIN — BUPROPION HYDROCHLORIDE 75 MG: 75 TABLET, FILM COATED ORAL at 13:13

## 2018-11-06 RX ADMIN — LORAZEPAM 0.5 MG: 1 TABLET ORAL at 09:04

## 2018-11-06 RX ADMIN — BUPROPION HYDROCHLORIDE 75 MG: 75 TABLET, FILM COATED ORAL at 06:34

## 2018-11-06 ASSESSMENT — PAIN SCALES - GENERAL: PAINLEVEL_OUTOF10: 0

## 2018-11-06 NOTE — PROGRESS NOTES
"Hospital Medicine Daily Progress Note    Date of Service  11/5/2018    Chief Complaint  Intentional drug overdose    Hospital Course  33-year-old male past medical history of alcoholism, polysubstance use, personality disorders admitted for intentional drug overdose \" Librium 12 capsules\".  Admitted to telemetry, was hemodynamically stable apart from intermittent tachycardia.   He was saturating well on room air.  Started on a legal hold psychiatry consulted.  His legal hold was extended. continue to one-on-one sitter.     Interval Problem Update  Hemodynamically stable, anxious to see the mental health providers.  Otherwise, doing well on Wellbutrin, and naltrexone.   Has transfer orders to medical floor.  Psychiatry planning to transfer him to inpatient mental health facility.  Difficult placement, legal hold extended per last psychiatry note.   Fexofenadine for allergy   Getting Vit D for deficiency.       Consultants/Specialty  Psychiatry     Code Status  Full    Disposition  Medically cleared pending psych dispo    Review of Systems  Review of Systems   Constitutional: Negative for chills, fever and malaise/fatigue.   HENT: Negative.    Eyes: Negative.    Respiratory: Negative.    Cardiovascular: Negative for chest pain and palpitations.   Gastrointestinal: Negative for blood in stool, nausea and vomiting.   Genitourinary: Negative.    Skin: Negative.    Neurological: Negative for focal weakness, seizures and loss of consciousness.   Psychiatric/Behavioral: Positive for depression. Negative for hallucinations. The patient is nervous/anxious.         Physical Exam  Temp:  [36.2 °C (97.2 °F)-37.2 °C (98.9 °F)] 36.7 °C (98 °F)  Pulse:  [] 95  Resp:  [15-18] 18  BP: (118-138)/() 122/91    Physical Exam   Constitutional: He is oriented to person, place, and time. He appears well-developed. No distress.   HENT:   Head: Normocephalic and atraumatic.   Eyes: Pupils are equal, round, and reactive to light. " Conjunctivae are normal. Right eye exhibits no discharge. Left eye exhibits no discharge.   Neck: No JVD present. No tracheal deviation present. No thyromegaly present.   Cardiovascular: Normal rate and regular rhythm.  Exam reveals no gallop and no friction rub.    No murmur heard.  Pulmonary/Chest: Effort normal. No stridor. No respiratory distress. He has no wheezes. He has no rales.   Abdominal: Soft. Bowel sounds are normal. He exhibits no distension. There is no tenderness. There is no rebound and no guarding.   Musculoskeletal: He exhibits no edema, tenderness or deformity.   Deformities of both hands, apparently congenital   Neurological: He is alert and oriented to person, place, and time.   Skin: Skin is warm and dry. No rash noted. He is not diaphoretic. No erythema.   Psychiatric:   More interactive today   Nursing note and vitals reviewed.      Fluids  No intake or output data in the 24 hours ending 11/05/18 4047    Laboratory                        Imaging  No orders to display        Assessment/Plan  * Drug overdose- (present on admission)   Assessment & Plan    Overdose on librium,   Status post 24 hours telemetry, no respiratory depression  Can be transferred to medical           Alcohol withdrawal (HCC)   Assessment & Plan    Since being in the ER several hours patient is now in withdrawals   Continue withdrawal protocol   Rally bag   Monitor          Suicide ideation- (present on admission)   Assessment & Plan    On legal hold  Psych consulted, planning for inpatient Mx Vs outpt follow up         Abnormal LFTs due to alcohol abuse- (present on admission)   Assessment & Plan    Cont to monitor LFT's   Reports unsafe sexual practices concern for STD,   HIV hepatitis B, C Neg.     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

## 2018-11-06 NOTE — DISCHARGE INSTRUCTIONS
Discharge Instructions    Discharged to home by car with relative. Discharged via wheelchair, hospital escort: Refused.  Special equipment needed: Not Applicable    Be sure to schedule a follow-up appointment with your primary care doctor or any specialists as instructed.     Discharge Plan:   Influenza Vaccine Indication: Patient Refuses    I understand that a diet low in cholesterol, fat, and sodium is recommended for good health. Unless I have been given specific instructions below for another diet, I accept this instruction as my diet prescription.   Other diet: regular    Special Instructions: None  Suicidal Feelings: How to Help Yourself  Suicide is the taking of one's own life. If you feel as though life is getting too tough to handle and are thinking about suicide, get help right away. To get help:  · Call your local emergency services (911 in the [place]  · Call a suicide hotline to speak with a trained counselor who understands how you are feeling. The following is a list of suicide hotlines in the [place]. For a list of hotlines in [place], visit www.suicide.org/hotlines/international/lkoevi-zgilvkf-uhiktqws.html.  ¨ 3-012-094-TALK (1-737.873.6269).  ¨ 0-969-FNKMWUA (1-795.740.3276).  ¨ 1-148.364.5138. This is a hotline for Macedonian speakers.  ¨ 0-543-277-4TTY (1-664.282.7272). This is a hotline for TTY users.  ¨ 9-598-5-U-GOGO (1-134.480.1965). This is a hotline for lesbian, varner, bisexual, transgender, or questioning youth.  · Contact a crisis center or a local suicide prevention center. To find a crisis center or suicide prevention center:  ¨ Call your local hospital, clinic, community service organization, mental health center, social service provider, or health department. Ask for assistance in connecting to a crisis center.  ¨ Visit www.suicidepreventionlifeline.org/getinvolved/ for a list of crisis centers in the [place], or visit  www.suicideprevention.ca/yzqsllor-xqitx-pehwoes/find-a-crisis-centre for a list of centers in [place].  · Visit the following websites:  ¨ National Suicide Prevention Lifeline: www.suicidepreventionlifeline.org  ¨ Hopeline: www.hopeline.com  ¨ American Foundation for Suicide Prevention: www.afsp.org  ¨ The Joshua Project (for lesbian, varner, bisexual, transgender, or questioning youth): www.thetrevorproject.org  How can I help myself feel better?  · Promise yourself that you will not do anything drastic when you have suicidal feelings. Remember, there is hope. Many people have gotten through suicidal thoughts and feelings, and you will, too. You may have gotten through them before, and this proves that you can get through them again.  · Let family, friends, teachers, or counselors know how you are feeling. Try not to isolate yourself from those who care about you. Remember, they will want to help you. Talk with someone every day, even if you do not feel sociable. Face-to-face conversation is best.  · Call a mental health professional and see one regularly.  · Visit your primary health care provider every year.  · Eat a well-balanced diet, and space your meals so you eat regularly.  · Get plenty of rest.  · Avoid alcohol and drugs, and remove them from your home. They will only make you feel worse.  · If you are thinking of taking a lot of medicine, give your medicine to someone who can give it to you one day at a time. If you are on antidepressants and are concerned you will overdose, let your health care provider know so he or she can give you safer medicines. Ask your mental health professional about the possible side effects of any medicines you are taking.  · Remove weapons, poisons, knives, and anything else that could harm you from your home.  · Try to stick to routines. Follow a schedule every day. Put self-care on your schedule.  · Make a list of realistic goals, and cross them off when you achieve them.  Accomplishments give a sense of worth.  · Wait until you are feeling better before doing the things you find difficult or unpleasant.  · Exercise if you are able. You will feel better if you exercise for even a half hour each day.  · Go out in the sun or into nature. This will help you recover from depression faster. If you have a favorite place to walk, go there.  · Do the things that have always given you pleasure. Play your favorite music, read a good book, paint a picture, play your favorite instrument, or do anything else that takes your mind off your depression if it is safe to do.  · Keep your living space well lit.  · When you are feeling well, write yourself a letter about tips and support that you can read when you are not feeling well.  · Remember that life’s difficulties can be sorted out with help. Conditions can be treated. You can work on thoughts and strategies that serve you well.  This information is not intended to replace advice given to you by your health care provider. Make sure you discuss any questions you have with your health care provider.  Document Released: 06/23/2004 Document Revised: 08/16/2017 Document Reviewed: 04/14/2015  [employer] Interactive Patient Education © 2017 [employer]    Self-Destructive Behavior  Self-destructive behavior includes attitudes and behaviors that can harm, injure, or be destructive to the person who has or performs them. Self-destructive behavior is often used as a coping strategy to deal with painful emotions and stress. It is often habit-forming and difficult to control without help. Self-destructive behavior can result in serious injury or death.   EXAMPLES OF SELF-DESTRUCTIVE BEHAVIOR   · Hurting yourself (self-injury). This includes cutting, scratching, burning, or otherwise injuring yourself to release tension or lessen emotional pain.  · Binge eating and other eating disorders.  · Excessive drinking.  · Drug abuse.  · Shopping sprees, reckless  spending, or gambling that causes you to go into debt.  · Any type of addictive behavior. This includes gambling, shoplifting, and other behaviors.  · Not setting healthy limits. This may include depriving yourself of proper rest and nutrition in order to meet the demands of others.  · Intense or chronic feelings of anxiety, anger, impulsiveness, unworthiness, hopelessness, or loss.  WHAT CAUSES SELF-DESTRUCTIVE BEHAVIOR?  The underlying causes of self-destructive behavior are personal and may include:  · Difficulty managing stress.  · Trauma or abuse (including in past life events).  · Other mental health issues, such as clinical depression and low self-esteem.  WHAT SHOULD I DO IF I WANT TO HURT MYSELF?   · See your health care provider or counselor. He or she will help you recognize the source of your problems, sort out your feelings, and get the help you need.  · Avoid alcohol and drugs.  · Try distracting yourself with other activities (such as chewing gum, exercising, cleaning, or snapping rubber bands) until you are calm and can seek help.  · Learn how to deal with stress in healthy, positive ways (such as with relaxation techniques or exercise).  · Learn to identify and avoid the things that trigger your self-destructive behavior.  · Get involved in a local support group.  SEEK MEDICAL CARE IF:   · You are experiencing suicidal thoughts.  · You experience illness or injury as a result of self-destructive behavior.  SEEK IMMEDIATE MEDICAL CARE IF:   Y our behavior is out of control and your life is in danger. Call :   · The police.    · Your health care provider.  · Local emergency services (911 in [place]  MAKE SURE YOU:  · Understand these instructions.  · Will watch your condition.  · Will get help right away if you are not doing well or get worse.  This information is not intended to replace advice given to you by your health care provider. Make sure you discuss any questions you have with your health care  provider.  Document Released: 01/25/2006 Document Revised: 04/10/2017 Document Reviewed: 05/06/2014  [employer] Interactive Patient Education © 2017 [employer]  Helping Someone Who is Suicidal  Suicide is when someone takes his or her own life. Someone who is thinking about suicide needs immediate help. Although you might not know what to say or do to help, start by letting that person know you care. Listen to him or her. Then talk about how to get help. Help is available through therapy, medicine, and other treatments.  What are signs that someone is suicidal?  Common signs include:  · Signs of depression, such as:  ¨ Rage.  ¨ Irritability.  ¨ Shame.  ¨ Excessive worry.  ¨ Loss of interest in things the person once enjoyed.  · Changes in social behaviors and relationships, including:  ¨ Isolating oneself.  ¨ Withdrawing from friends and family.  ¨ Giving away possessions.  ¨ Saying good-bye.  ¨ Acting aggressively.  ¨ Sleeping more or less than usual.  ¨ Having trouble managing school or work.  ¨ Talking about feeling hopeless or being a burden.  ¨ Engaging in risky behaviors, such as drinking more alcohol or using more drugs.  What are the risk factors for suicide?  Risk factors for suicide include:  · Other suicides in the family.  · A history of suicide attempts.  · Depression or other mental health issues.  · Being in [facility]PC or facing [facility] time.  · Having had close friends who have committed suicide.  · Alcohol or drug abuse, especially combined with a mental illness.  What should I do if someone is suicidal?  If you believe a person is in immediate danger of committing suicide, call your local emergency services (911 in the [place] for help.  If a person says he or she wants to commit suicide, take the threat seriously. Help the person get help right away by:  · Calling your local emergency services.  · Calling a suicide prevention hotline.  · Contacting a crisis center or a local suicide prevention center. These  are often located at hospitals, clinics, community service organizations, social service providers, or health departments.  If a person confides in you that he or she is considering suicide:  · Listen to the person's thoughts and concerns with compassion.  · Let the person know you will stay with him or her.  · Ask if the person is having thoughts of hurting himself or herself.  · Offer to help the person get to a doctor or mental health professional.  · Remove all weapons and medicines from the person's living space.  · Do not promise to keep his or her thoughts of suicide a secret.  This information is not intended to replace advice given to you by your health care provider. Make sure you discuss any questions you have with your health care provider.  Document Released: 06/23/2004 Document Revised: 05/25/2017 Document Reviewed: 05/28/2015  [employer] Interactive Patient Education © 2017 [employer]    Drug Overdose  A drug overdose happens when you take too much of a drug. An overdose can occur with illegal drugs, prescription drugs, or over-the-counter (OTC) drugs.  The effects of a drug overdose can be mild, dangerous, or even deadly.  What are the causes?  This condition may be caused by:  · Taking too much of a drug by accident.  · Taking too much of a drug on purpose.  · An error made by a health care provider who prescribes a drug.  · An error made by the pharmacist who fills the prescription order.  Drugs that commonly cause overdose include:  · Mental health drugs.  · Pain medicines.  · Illegal drugs.  · OTC cough and cold medicines.  · Heart medicines.  · Seizure medicines.  What increases the risk?  A drug overdose is more likely in:  · Children. They may be attracted to colorful pills. Because of a child's small size, even a small amount of a drug can be dangerous.  · Elderly people. They may be taking many different drugs. Elderly people may have difficulty reading labels or remembering when they last  took their medicine.  The risk of a drug overdose is also higher for someone who:  · Takes illegal drugs.  · Takes a drug and drinks alcohol.  · Has a mental health condition.  What are the signs or symptoms?  Symptoms of a drug overdose depend on the drug and the amount that was taken. Common danger signs include:  · Behavior changes.  · Sleepiness.  · Slowed breathing.  · Nausea and vomiting.  · Seizures.  · Changes in eye pupil size. The pupil may be very large or very small.  If there are signs and symptoms of very low blood pressure (shock) from an overdose, emergency treatment is required. These include:  · Cold and clammy skin.  · Pale skin.  · Blue lips.  · Very slow breathing.  · Extreme sleepiness.  · Loss of consciousness.  How is this diagnosed?  This condition may be diagnosed based on your symptoms. It is important to tell your health care provider:  · All of the drugs that you took.  · When you took the drugs.  · Whether you were drinking alcohol.  Your health care provider will do a physical exam. This exam may include:  · Checking and monitoring your heart rate and rhythm, your temperature, and your blood pressure (vital signs).  · Checking your breathing and oxygen level.  You may also have tests, including:  · Urine tests to check for drugs in your system.  · Blood tests to check for:  ¨ Drugs in your system.  ¨ Signs of an imbalance of your blood minerals (electrolytes).  ¨ Liver damage.  ¨ Kidney damage.  How is this treated?  Supporting your vital signs and your breathing is the first step in treating a drug overdose. Treatment may also include:  · Giving fluids and electrolytes through an IV tube.  · Inserting a breathing tube (endotracheal tube) in your airway to help you breathe.  · Passing a tube through your nose and into your stomach (NG tube, or nasogastric tube) to wash out your stomach.  · Giving medicines that:  ¨ Make you vomit.  ¨ Absorb any medicine that is left in your digestive  system.  ¨ Block or reverse the effect of the drug that caused the overdose.  · Filtering your blood through an artificial kidney machine (hemodialysis). You may need this if your overdose is severe or if you have kidney failure.  · Ongoing counseling and mental health support if you intentionally overdosed or used an illegal drug.  Follow these instructions at home:  · Take medicines only as directed by your health care provider. Always ask your health care provider about possible side effects of any new drug that you start taking.  · Keep a list of all of the drugs that you take, including over-the-counter medicines. Bring this list with you to all of your medical visits.  · Drink enough fluid to keep your urine clear or pale yellow.  · Keep all follow-up visits as directed by your health care provider. This is important.  How is this prevented?  · Get help if you are struggling with:  ¨ Alcohol or drug use.  ¨ Depression or another mental health problem.  · Keep the phone number of your local poison control center near your phone or on your cell phone.  · Store all medicines in safety containers that are out of the reach of children.  · Read the drug inserts that come with your medicines.  · Do not use illegal drugs.  · Do not drink alcohol when taking drugs.  · Do not take medicines that are not prescribed for you.  Contact a health care provider if:  · Your symptoms return.  · You develop new symptoms or side effects when you take medicines.  Get help right away if:  · You think that you or someone else may have taken too much of a drug. The hotline of the National Poison Control Center is (266) 782-0807.  · You or someone else is having symptoms of a drug overdose.  · You have serious thoughts about hurting yourself or others.  · You become confused.  · You have:  ¨ Chest pain.  ¨ Difficulty breathing.  ¨ A loss of consciousness.  Drug overdose is an emergency. Do not wait to see if the symptoms will go away.  Get medical help right away. Call your local emergency services (911 in the [place] Do not drive yourself to the hospital.   This information is not intended to replace advice given to you by your health care provider. Make sure you discuss any questions you have with your health care provider.  Document Released: 05/03/2016 Document Revised: 04/28/2017 Document Reviewed: 12/23/2015  [employer] Interactive Patient Education © 2017 [employer]      Morphine; Naltrexone oral capsules  What is this medicine?  MORPHINE; NALTREXONE (MOR feen; nal TREX one) is a pain reliever. It is used to treat moderate to severe pain that lasts for more than a few days.  This medicine may be used for other purposes; ask your health care provider or pharmacist if you have questions.  COMMON BRAND NAME(S): Olega  What should I tell my health care provider before I take this medicine?  They need to know if you have any of these conditions:  -brain tumor  -drug abuse or addiction  -head injury  -heart disease  -if you often drink alcohol  -liver disease  -lung or breathing disease, like asthma  -problems urinating  -seizures  -stomach or intestine problems  -taken an MAOI like Carbex, Eldepryl, Marplan, Nardil, or Parnate in the last 14 days  -an unusual or allergic reaction to morphine, other medications, foods, dyes, or preservatives  -pregnant or trying to get pregnant  -breast-feeding  How should I use this medicine?  Take this medicine by mouth with a glass of water. Follow the directions on the prescription label. You can take this medicine with or without food. If it upsets your stomach, take it with food. Swallow this medicine whole. Or, if you have problems swallowing the capsule, you may carefully open it and sprinkle the contents on a small amount of cold applesauce. Immediately swallow all of the applesauce. Do not save it for later. Do not chew the applesauce. Do not let the medicine dissolve in the applesauce. After you take  the medicine, rinse your mouth with water. None of the medicine should stay in your mouth. Take your medicine at regular intervals. Do not take it more often than directed. Do not stop taking except on your doctor's advice.  A special MedGuide will be given to you by the pharmacist with each prescription and refill. Be sure to read this information carefully each time.  Talk to your pediatrician regarding the use of this medicine in children. Special care may be needed.  Overdosage: If you think you have taken too much of this medicine contact a poison control center or emergency room at once.  NOTE: This medicine is only for you. Do not share this medicine with others.  What if I miss a dose?  If you miss a dose, take it as soon as you can. If it is almost time for your next dose, take only that dose. Do not take double or extra doses.  What may interact with this medicine?  This medicine may interact with the following medications:  -alcohol  -antihistamines for allergy, cough and cold  -atropine  -certain medicines for anxiety or sleep  -certain medicines for bladder problems like oxybutynin, tolterodine  -certain medicines for depression like amitriptyline, fluoxetine, sertraline  -certain medicines for Parkinson's disease like benztropine, trihexyphenidyl  -certain medicines for seizures like phenobarbital, primidone  -certain medicines for stomach problems like dicyclomine, hyoscyamine  -certain medicines for travel sickness like scopolamine  -cimetidine  -general anesthetics like halothane, isoflurane, methoxyflurane, propofol  -ipratropium  -local anesthetics like lidocaine, pramoxine, tetracaine  -MAOIs like Carbex, Eldepryl, Marplan, Nardil, and Parnate  -medicines that relax muscles for surgery  -other narcotic medicines for pain or cough  -phenothiazines like chlorpromazine, mesoridazine, prochlorperazine, thioridazine  This list may not describe all possible interactions. Give your health care provider  a list of all the medicines, herbs, non-prescription drugs, or dietary supplements you use. Also tell them if you smoke, drink alcohol, or use illegal drugs. Some items may interact with your medicine.  What should I watch for while using this medicine?  Tell your doctor or health care professional if your pain does not go away, if it gets worse, or if you have new or a different type of pain. You may develop tolerance to the medicine. Tolerance means that you will need a higher dose of the medicine for pain relief. Tolerance is normal and is expected if you take morphine for a long time.  Do not suddenly stop taking your medicine because you may develop a severe reaction. Your body becomes used to the medicine. This does NOT mean you are addicted. Addiction is a behavior related to getting and using a drug for a non-medical reason. If you have pain, you have a medical reason to take pain medicine. Your doctor will tell you how much medicine to take. If your doctor wants you to stop the medicine, the dose will be slowly lowered over time to avoid any side effects.  There are different types of narcotic medicines (opiates). If you take more than one type at the same time or if you are taking another medicine that also causes drowsiness, you may have more side effects. Give your health care provider a list of all medicines you use. Your doctor will tell you how much medicine to take. Do not take more medicine than directed. Call emergency for help if you have problems breathing or unusual sleepiness.  You may get drowsy or dizzy. Do not drive, use machinery, or do anything that needs mental alertness until you know how this medicine affects you. Do not stand or sit up quickly, especially if you are an older patient. This reduces the risk of dizzy or fainting spells. Alcohol may interfere with the effect of this medicine. Avoid alcoholic drinks.  This medicine will cause constipation. Try to have a bowel movement at  least every 2 to 3 days. If you do not have a bowel movement for 3 days, call your doctor or health care professional.  Your mouth may get dry. Chewing sugarless gum or sucking hard candy, and drinking plenty of water may help. Contact your doctor if the problem does not go away or is severe.  What side effects may I notice from receiving this medicine?  Side effects that you should report to your doctor or health care professional as soon as possible:  -allergic reactions like skin rash, itching or hives, swelling of the face, lips, or tongue  -breathing problems  -confusion  -seizures  -signs and symptoms of low blood pressure like dizziness; feeling faint or lightheaded, falls; unusually weak or tired  -trouble passing urine or change in the amount of urine  Side effects that usually do not require medical attention (report to your doctor or health care professional if they continue or are bothersome):  -constipation  -dry mouth  -nausea, vomiting  -tiredness  This list may not describe all possible side effects. Call your doctor for medical advice about side effects. You may report side effects to FDA at 8-183-FDA-9966.  Where should I keep my medicine?  Keep out of the reach of children. This medicine can be abused. Keep your medicine in a safe place to protect it from theft. Do not share this medicine with anyone. Selling or giving away this medicine is dangerous and against the law.  Store at room temperature between 15 and 30 degrees C (59 and 86 degrees F).  This medicine may cause accidental overdose and death if it is taken by other adults, children, or pets. Flush any unused medicine down the toilet to reduce the chance of harm. Do not use the medicine after the expiration date.  NOTE: This sheet is a summary. It may not cover all possible information. If you have questions about this medicine, talk to your doctor, pharmacist, or health care provider.  © 2018 Elsevier/Gold Standard (2016-09-12  12:25:09)  Bupropion extended-release tablets (Depression/Mood Disorders)  What is this medicine?  BUPROPION (byoo PROE pee on) is used to treat depression.  This medicine may be used for other purposes; ask your health care provider or pharmacist if you have questions.  COMMON BRAND NAME(S): Aplenzin, Budeprion XL, Forfivo XL, Wellbutrin XL  What should I tell my health care provider before I take this medicine?  They need to know if you have any of these conditions:  -an eating disorder, such as anorexia or bulimia  -bipolar disorder or psychosis  -diabetes or high blood sugar, treated with medication  -glaucoma  -head injury or brain tumor  -heart disease, previous heart attack, or irregular heart beat  -high blood pressure  -kidney or liver disease  -seizures (convulsions)  -suicidal thoughts or a previous suicide attempt  -Tourette's syndrome  -weight loss  -an unusual or allergic reaction to bupropion, other medicines, foods, dyes, or preservatives  -breast-feeding  -pregnant or trying to become pregnant  How should I use this medicine?  Take this medicine by mouth with a glass of water. Follow the directions on the prescription label. You can take it with or without food. If it upsets your stomach, take it with food. Do not crush, chew, or cut these tablets. This medicine is taken once daily at the same time each day. Do not take your medicine more often than directed. Do not stop taking this medicine suddenly except upon the advice of your doctor. Stopping this medicine too quickly may cause serious side effects or your condition may worsen.  A special MedGuide will be given to you by the pharmacist with each prescription and refill. Be sure to read this information carefully each time.  Talk to your pediatrician regarding the use of this medicine in children. Special care may be needed.  Overdosage: If you think you have taken too much of this medicine contact a poison control center or emergency room at  once.  NOTE: This medicine is only for you. Do not share this medicine with others.  What if I miss a dose?  If you miss a dose, skip the missed dose and take your next tablet at the regular time. Do not take double or extra doses.  What may interact with this medicine?  Do not take this medicine with any of the following medications:  -linezolid  -MAOIs like Azilect, Carbex, Eldepryl, Marplan, Nardil, and Parnate  -methylene blue (injected into a vein)  -other medicines that contain bupropion like Zyban  This medicine may also interact with the following medications:  -alcohol  -certain medicines for anxiety or sleep  -certain medicines for blood pressure like metoprolol, propranolol  -certain medicines for depression or psychotic disturbances  -certain medicines for HIV or AIDS like efavirenz, lopinavir, nelfinavir, ritonavir  -certain medicines for irregular heart beat like propafenone, flecainide  -certain medicines for Parkinson's disease like amantadine, levodopa  -certain medicines for seizures like carbamazepine, phenytoin, phenobarbital  -cimetidine  -clopidogrel  -cyclophosphamide  -digoxin  -furazolidone  -isoniazid  -nicotine  -orphenadrine  -procarbazine  -steroid medicines like prednisone or cortisone  -stimulant medicines for attention disorders, weight loss, or to stay awake  -tamoxifen  -theophylline  -thiotepa  -ticlopidine  -tramadol  -warfarin  This list may not describe all possible interactions. Give your health care provider a list of all the medicines, herbs, non-prescription drugs, or dietary supplements you use. Also tell them if you smoke, drink alcohol, or use illegal drugs. Some items may interact with your medicine.  What should I watch for while using this medicine?  Tell your doctor if your symptoms do not get better or if they get worse. Visit your doctor or health care professional for regular checks on your progress. Because it may take several weeks to see the full effects of this  medicine, it is important to continue your treatment as prescribed by your doctor.  Patients and their families should watch out for new or worsening thoughts of suicide or depression. Also watch out for sudden changes in feelings such as feeling anxious, agitated, panicky, irritable, hostile, aggressive, impulsive, severely restless, overly excited and hyperactive, or not being able to sleep. If this happens, especially at the beginning of treatment or after a change in dose, call your health care professional.  Avoid alcoholic drinks while taking this medicine. Drinking large amounts of alcoholic beverages, using sleeping or anxiety medicines, or quickly stopping the use of these agents while taking this medicine may increase your risk for a seizure.  Do not drive or use heavy machinery until you know how this medicine affects you. This medicine can impair your ability to perform these tasks.  Do not take this medicine close to bedtime. It may prevent you from sleeping.  Your mouth may get dry. Chewing sugarless gum or sucking hard candy, and drinking plenty of water may help. Contact your doctor if the problem does not go away or is severe.  The tablet shell for some brands of this medicine does not dissolve. This is normal. The tablet shell may appear whole in the stool. This is not a cause for concern.  What side effects may I notice from receiving this medicine?  Side effects that you should report to your doctor or health care professional as soon as possible:  -allergic reactions like skin rash, itching or hives, swelling of the face, lips, or tongue  -breathing problems  -changes in vision  -confusion  -elevated mood, decreased need for sleep, racing thoughts, impulsive behavior  -fast or irregular heartbeat  -hallucinations, loss of contact with reality  -increased blood pressure  -redness, blistering, peeling or loosening of the skin, including inside the mouth  -seizures  -suicidal thoughts or other mood  changes  -unusually weak or tired  -vomiting  Side effects that usually do not require medical attention (report to your doctor or health care professional if they continue or are bothersome):  -constipation  -headache  -loss of appetite  -nausea  -tremors  -weight loss  This list may not describe all possible side effects. Call your doctor for medical advice about side effects. You may report side effects to FDA at 6-188-NSD-2643.  Where should I keep my medicine?  Keep out of the reach of children.  Store at room temperature between 15 and 30 degrees C (59 and 86 degrees F). Throw away any unused medicine after the expiration date.  NOTE: This sheet is a summary. It may not cover all possible information. If you have questions about this medicine, talk to your doctor, pharmacist, or health care provider.  © 2018 Elsevier/Gold Standard (2017-06-09 13:55:13)      · Is patient discharged on Warfarin / Coumadin?   No     Depression / Suicide Risk    As you are discharged from this [facility] facility, it is important to learn how to keep safe from harming yourself.    Recognize the warning signs:  · Abrupt changes in personality, positive or negative- including increase in energy   · Giving away possessions  · Change in eating patterns- significant weight changes-  positive or negative  · Change in sleeping patterns- unable to sleep or sleeping all the time   · Unwillingness or inability to communicate  · Depression  · Unusual sadness, discouragement and loneliness  · Talk of wanting to die  · Neglect of personal appearance   · Rebelliousness- reckless behavior  · Withdrawal from people/activities they love  · Confusion- inability to concentrate     If you or a loved one observes any of these behaviors or has concerns about self-harm, here's what you can do:  · Talk about it- your feelings and reasons for harming yourself  · Remove any means that you might use to hurt yourself (examples: pills, rope, extension  cords, firearm)  · Get professional help from the community (Mental Health, Substance Abuse, psychological counseling)  · Do not be alone:Call your Safe Contact- someone whom you trust who will be there for you.  · Call your local CRISIS HOTLINE 971-1518 or 698-918-1948  · Call your local Children's Mobile Crisis Response Team [place] (790) 313-9837 or www.[employer]  · Call the toll free National Suicide Prevention Hotlines   · National Suicide Prevention Lifeline 512-771-DXJC (3452)  · National Hope Line Network 800-SUICIDE (373-9194)

## 2018-11-06 NOTE — DISCHARGE SUMMARY
Discharge Summary    CHIEF COMPLAINT ON ADMISSION  Chief Complaint   Patient presents with   • Drug Overdose       Reason for Admission  ETOH     Admission Date  10/29/2018    CODE STATUS  Full Code    HPI & HOSPITAL COURSE  This is a 33 y.o. male here with intentional drug overdose.  The patient was initially identified to have taken an overdose of Librium.  When the patient was questioned he identifies that he was only trying to take this because he wanted to get high.  The patient at this point has completely resolved with his Librium overdose.  He is not not suicidal he is not homicidal.  Psychiatry at this point has taken him off the legal hold which she was initially placed on.  Patient at this point has been optimized on his medications.  He at this point has clear and good understanding of discharge options and instructions.  Patient at this point will be discharged home with outpatient follow-ups and management.       Therefore, he is discharged in good and stable condition to home with close outpatient follow-up.    The patient met 2-midnight criteria for an inpatient stay at the time of discharge.    Discharge Date  11/6/2018    FOLLOW UP ITEMS POST DISCHARGE  Follow-up with the primary care physician in 7-10 days    DISCHARGE DIAGNOSES  Principal Problem:    Drug overdose POA: Yes  Active Problems:    Suicide ideation POA: Yes    Abnormal LFTs due to alcohol abuse POA: Yes    Ectodermal dysplasia, congenital POA: Yes    Leukocytosis POA: Yes    Tachycardia POA: Yes    Liver enzyme elevation POA: Yes  Resolved Problems:    * No resolved hospital problems. *      FOLLOW UP  No future appointments.  No follow-up provider specified.    MEDICATIONS ON DISCHARGE     Medication List      START taking these medications      Instructions   buPROPion 75 MG Tabs  Start taking on:  11/7/2018  Commonly known as:  WELLBUTRIN   Take 1 Tab by mouth 2 Times a Day.  Dose:  75 mg     naltrexone 50 MG Tabs  Commonly known  as:  DEPADE   Take 1 Tab by mouth every day.  Dose:  50 mg        CONTINUE taking these medications      Instructions   thiamine 100 MG tablet  Commonly known as:  THIAMINE   Take 1 Tab by mouth every day.  Dose:  100 mg        STOP taking these medications    chlordiazePOXIDE 25 MG Caps  Commonly known as:  LIBRIUM            Allergies  No Known Allergies    DIET  Orders Placed This Encounter   Procedures   • Diet Order Regular ([employer])     Standing Status:   Standing     Number of Occurrences:   1     Order Specific Question:   Diet:     Answer:   Regular [1]     Comments:   Plastic Silverware       ACTIVITY  As tolerated.  Weight bearing as tolerated    CONSULTATIONS  Psychiatry    PROCEDURES  None    LABORATORY  Lab Results   Component Value Date    SODIUM 138 11/06/2018    POTASSIUM 4.5 11/06/2018    CHLORIDE 101 11/06/2018    CO2 28 11/06/2018    GLUCOSE 86 11/06/2018    BUN 17 11/06/2018    CREATININE 0.93 11/06/2018        Lab Results   Component Value Date    WBC 9.4 10/31/2018    HEMOGLOBIN 14.1 10/31/2018    HEMATOCRIT 41.5 (L) 10/31/2018    PLATELETCT 163 (L) 10/31/2018        Total time of the discharge process exceeds 35 minutes.

## 2018-11-06 NOTE — PROGRESS NOTES
Report received by day shift RN. Sitter at bedside for 1:1 monitoring. Pt updated to POC and verbalized understanding. Bed locked in low position with fall precautions in place. Pt does not have any suicidal thoughts at this time.

## 2018-11-06 NOTE — PSYCHIATRY
PSYCHIATRIC FOLLOW UP:    Reason for Admission: overdose   Legal hold status:   On hold   Psychiatric Supervising Attending:     Minal      HPI:     32 y/o man with polysubstance abuse, cluster b personality traits, ADHD, suicide attempt. He has been taking wellbutrin and depade, which is very good as he had been refusing to take anything but stimulants and benzos. Per staff he hasn't been agitated and has been in good spirits. He is more calm. No agitation. No si. He has no complaints with the medications. No s/e. He states he is doing much better. He voted and is happy about that, shows me his sticker that shows he did. He was able to organize being able to do that while in hospital.       Psychiatric Examination: observed phenomenon:  Vitals:   Vitals:    11/05/18 1900 11/05/18 2000 11/06/18 0500 11/06/18 0850   BP:  126/97 121/79 146/97   Pulse:  93 87 99   Resp:  17 17 18   Temp:  37.3 °C (99.2 °F) 37.1 °C (98.8 °F) 37.3 °C (99.1 °F)   SpO2:  99% 96% 96%   Weight: 67.5 kg (148 lb 13 oz)      Height:           Musculoskeletal  No pychomotor agitation or retardation   Appearance:Grooming wnl   Thoughts: Logical and sequential, goal-directed No a/vh, no evidence of delusions, no ideas of reference, no internal stimulation noted   Speech:Nl tone, rate, and volume. Not pressured. Understandable.   Mood:    Good   Affect:    Full and congruent   SI/HI:   Denies   Attention/Alertness:   Awake, alert attention wnl   Memory:    Grossly intact.   Orientation:    Grossly intact.   Fund of Knowledge:    Grossly intact.   Cognition:Grossly intact.   Insight/Judgement into symptoms improved   Neurological Testing:    Medical systems reviewed:     No sob  No cp  No n/v  No abd pain   No confusion   All other systems reviewed and are negative.     Lab results/tests:        Recent Results (from the past 48 hour(s))   COMP METABOLIC PANEL    Collection Time: 11/06/18 12:43 AM   Result Value Ref Range    Sodium 138  135 - 145 mmol/L    Potassium 4.5 3.6 - 5.5 mmol/L    Chloride 101 96 - 112 mmol/L    Co2 28 20 - 33 mmol/L    Anion Gap 9.0 0.0 - 11.9    Glucose 86 65 - 99 mg/dL    Bun 17 8 - 22 mg/dL    Creatinine 0.93 0.50 - 1.40 mg/dL    Calcium 10.0 8.5 - 10.5 mg/dL    AST(SGOT) 49 (H) 12 - 45 U/L    ALT(SGPT) 112 (H) 2 - 50 U/L    Alkaline Phosphatase 82 30 - 99 U/L    Total Bilirubin 0.3 0.1 - 1.5 mg/dL    Albumin 4.0 3.2 - 4.9 g/dL    Total Protein 7.4 6.0 - 8.2 g/dL    Globulin 3.4 1.9 - 3.5 g/dL    A-G Ratio 1.2 g/dL   ESTIMATED GFR    Collection Time: 11/06/18 12:43 AM   Result Value Ref Range    GFR If African American >60 >60 mL/min/1.73 m 2    GFR If Non African American >60 >60 mL/min/1.73 m 2         Assessment:  Ajdustment disorder with depressed mood   ETOH use disorder, severe  Sedative hypnotic use disorder   Stimulant use disorder  ADHD by history           Plan:  legal hold: discontinuing     D/c home, will provide medication. Pt will f/u with outpatient provider.   Signing off.

## 2018-11-06 NOTE — PROGRESS NOTES
Pt provided with phone numbers for [employer] for [facility] and the [facility] per his request.

## 2018-11-06 NOTE — CARE PLAN
Problem: Safety  Goal: Will remain free from injury    Intervention: Collaborate with Interdisciplinary Team for safe transfer and mobilization techniques  1:1 sitter at bedside. Pt has no suicidal ideations at this time.      Problem: Discharge Barriers/Planning  Goal: Patient's continuum of care needs will be met    Intervention: Assess potential discharge barriers on admission and throughout hospital stay  Pt educated to the need of psychiatrist visit prior to discharge.

## 2018-11-07 ENCOUNTER — PATIENT OUTREACH (OUTPATIENT)
Dept: HEALTH INFORMATION MANAGEMENT | Facility: OTHER | Age: 33
End: 2018-11-07

## 2018-11-07 NOTE — DISCHARGE PLANNING
Notice of withdrawal filed with the court via [employer], scanned copy of verified notice onto .

## 2018-11-07 NOTE — PROGRESS NOTES
CM post discharge outreach call first attempt. Patient's grandmother answered telephone at home number listed in EPIC.  Grandmother reports patient has a new telephone number [phone]. CM attempted to reach pt at this number.  No answer, VM has not been set up. CM unable to leave a message.

## 2018-11-07 NOTE — PROGRESS NOTES
Discharge Summary    Educated patient on new medications, follow up with PCP within one week, and s/s to look for when returning to the emergency room. Patient verbalized understanding. Vitals WNL. Patient refused escort.

## 2018-11-08 ENCOUNTER — HOSPITAL ENCOUNTER (EMERGENCY)
Dept: HOSPITAL 8 - ED | Age: 33
Discharge: LEFT BEFORE DISCHARGE | End: 2018-11-08
Payer: MEDICARE

## 2018-11-08 VITALS — WEIGHT: 143.3 LBS | BODY MASS INDEX: 21.72 KG/M2 | HEIGHT: 68 IN

## 2018-11-08 VITALS — DIASTOLIC BLOOD PRESSURE: 75 MMHG | SYSTOLIC BLOOD PRESSURE: 114 MMHG

## 2018-11-08 DIAGNOSIS — I10: ICD-10-CM

## 2018-11-08 DIAGNOSIS — F29: ICD-10-CM
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
## 2018-11-08 DIAGNOSIS — F17.200: ICD-10-CM

## 2018-11-08 DIAGNOSIS — G62.9: ICD-10-CM

## 2018-11-08 DIAGNOSIS — F10.229: Primary | ICD-10-CM

## 2018-11-08 DIAGNOSIS — F15.10: ICD-10-CM

## 2018-11-08 DIAGNOSIS — Z72.9: ICD-10-CM

## 2018-11-08 DIAGNOSIS — F32.9: ICD-10-CM

## 2018-11-08 DIAGNOSIS — F41.0: ICD-10-CM

## 2018-11-08 DIAGNOSIS — F90.9: ICD-10-CM

## 2018-11-08 DIAGNOSIS — E11.9: ICD-10-CM

## 2018-11-08 PROCEDURE — 99283 EMERGENCY DEPT VISIT LOW MDM: CPT

## 2018-11-10 ENCOUNTER — HOSPITAL ENCOUNTER (EMERGENCY)
Dept: HOSPITAL 8 - ED | Age: 33
Discharge: LEFT BEFORE DISCHARGE | End: 2018-11-10
Payer: MEDICARE

## 2018-11-10 ENCOUNTER — HOSPITAL ENCOUNTER (EMERGENCY)
Dept: HOSPITAL 8 - ED | Age: 33
Discharge: HOME | End: 2018-11-10
Payer: MEDICARE

## 2018-11-10 ENCOUNTER — HOSPITAL ENCOUNTER (EMERGENCY)
Facility: MEDICAL CENTER | Age: 33
End: 2018-11-10
Attending: EMERGENCY MEDICINE
Payer: MEDICARE

## 2018-11-10 VITALS
SYSTOLIC BLOOD PRESSURE: 162 MMHG | DIASTOLIC BLOOD PRESSURE: 109 MMHG | WEIGHT: 133 LBS | RESPIRATION RATE: 20 BRPM | HEART RATE: 121 BPM | OXYGEN SATURATION: 94 % | TEMPERATURE: 97.6 F | HEIGHT: 68 IN | BODY MASS INDEX: 20.16 KG/M2

## 2018-11-10 VITALS
RESPIRATION RATE: 18 BRPM | DIASTOLIC BLOOD PRESSURE: 92 MMHG | HEART RATE: 112 BPM | OXYGEN SATURATION: 94 % | TEMPERATURE: 97.5 F | WEIGHT: 150 LBS | BODY MASS INDEX: 22.81 KG/M2 | SYSTOLIC BLOOD PRESSURE: 130 MMHG

## 2018-11-10 VITALS — HEIGHT: 68 IN | WEIGHT: 154.32 LBS | BODY MASS INDEX: 23.39 KG/M2

## 2018-11-10 VITALS — SYSTOLIC BLOOD PRESSURE: 132 MMHG | DIASTOLIC BLOOD PRESSURE: 90 MMHG

## 2018-11-10 VITALS — BODY MASS INDEX: 22.82 KG/M2 | HEIGHT: 68 IN | WEIGHT: 150.58 LBS

## 2018-11-10 VITALS — DIASTOLIC BLOOD PRESSURE: 101 MMHG | SYSTOLIC BLOOD PRESSURE: 141 MMHG

## 2018-11-10 DIAGNOSIS — I10: ICD-10-CM

## 2018-11-10 DIAGNOSIS — F10.10 ALCOHOL ABUSE: ICD-10-CM

## 2018-11-10 DIAGNOSIS — F17.200: ICD-10-CM

## 2018-11-10 DIAGNOSIS — F10.920 ALCOHOLIC INTOXICATION WITHOUT COMPLICATION (HCC): ICD-10-CM

## 2018-11-10 DIAGNOSIS — E11.9: ICD-10-CM

## 2018-11-10 DIAGNOSIS — Z76.5: ICD-10-CM

## 2018-11-10 DIAGNOSIS — R45.851: Primary | ICD-10-CM

## 2018-11-10 DIAGNOSIS — F10.129: Primary | ICD-10-CM

## 2018-11-10 DIAGNOSIS — F90.9: ICD-10-CM

## 2018-11-10 DIAGNOSIS — Z53.21: ICD-10-CM

## 2018-11-10 DIAGNOSIS — F10.220: Primary | ICD-10-CM

## 2018-11-10 DIAGNOSIS — F32.9: ICD-10-CM

## 2018-11-10 LAB — POC BREATHALIZER: 0.08 PERCENT (ref 0–0.01)

## 2018-11-10 PROCEDURE — 99284 EMERGENCY DEPT VISIT MOD MDM: CPT

## 2018-11-10 PROCEDURE — 302970 POC BREATHALIZER: Performed by: EMERGENCY MEDICINE

## 2018-11-10 PROCEDURE — 99283 EMERGENCY DEPT VISIT LOW MDM: CPT

## 2018-11-10 PROCEDURE — 302970 POC BREATHALIZER

## 2018-11-10 NOTE — ED NOTES
"Assumed care of pt from triage. Pt ambulatory to bathroom w 1 RN assistance w mostly steady gait. + slurred speech. Sts \"I don't feel good.\" Unable to further specify. Admits to etoh today after leaving [facility]/  Fall precautions in place.  Awaiting MD eval/orders. Ongoing monitoring.        "

## 2018-11-10 NOTE — ED NOTES
"Chief Complaint   Patient presents with   • Weakness   • ETOH Withdrawal     last drink today but does not know the time    • Suicidal Ideation       Pt bib ems, was recently d/c from [facility] for similar complaints. Pt admits to drinking 10 earthquakes after being d/c from other facility. Pt admits to having suicidal ideation plan to \" jump off bridge\".  Pt to green [facility]  "

## 2018-11-10 NOTE — ED PROVIDER NOTES
"ED Provider Note    ER PROVIDER NOTE        CHIEF COMPLAINT  Chief Complaint   Patient presents with   • Weakness   • ETOH Withdrawal     last drink today but does not know the time    • Suicidal Ideation       HPI  Gopal Ceja is a 33 y.o. male who presents to the emergency department complaining of dizziness.  Patient reports that he was feeling dizzy earlier this morning and this has continued.  He states he is concerned he will withdraw from alcohol although he is unsure the last time that he drank.  He denies any focal complaints of pain.  No trauma.  No other ingestion.  No chest pain or difficulty breathing.  Patient states he is \"suicidal\" although he has no plan and had to be reminded about this    REVIEW OF SYSTEMS  Pertinent positives include dizziness. Pertinent negatives include no chest pain. See HPI for details. All other systems reviewed and are negative.    PAST MEDICAL HISTORY   has a past medical history of Acute anxiety; ADHD (attention deficit hyperactivity disorder); ADHD (attention deficit hyperactivity disorder); Alcohol abuse; Bipolar affective (HCC); Depression; Ectrodactyly-ectodermal dysplasia-clefting syndrome; ETOH abuse; and Psychiatric disorder.    SURGICAL HISTORY   has a past surgical history that includes other orthopedic surgery.    FAMILY HISTORY  Family History   Problem Relation Age of Onset   • Heart Disease Neg Hx    • Hypertension Neg Hx    • Hyperlipidemia Neg Hx        SOCIAL HISTORY  Social History     Social History   • Marital status: Single     Spouse name: N/A   • Number of children: N/A   • Years of education: N/A     Social History Main Topics   • Smoking status: Current Some Day Smoker     Packs/day: 0.25     Types: Cigarettes   • Smokeless tobacco: Never Used      Comment: Smokes couple of times a month   • Alcohol use 0.0 oz/week      Comment: drinks 10 earthquakes a day   • Drug use: No   • Sexual activity: Not on file     Other Topics Concern   • Not on file "     Social History Narrative    ** Merged History Encounter **           History   Drug Use No       CURRENT MEDICATIONS  Home Medications     Reviewed by Chelsy Jefferson R.N. (Registered Nurse) on 11/10/18 at 1425  Med List Status: Partial   Medication Last Dose Status   buPROPion (WELLBUTRIN) 75 MG Tab >2months Active   naltrexone (DEPADE) 50 MG Tab >2months Active   thiamine (THIAMINE) 100 MG tablet >2months Active                ALLERGIES  No Known Allergies    PHYSICAL EXAM  VITAL SIGNS: /92   Pulse (!) 112   Temp 36.4 °C (97.5 °F)   Resp 18   Wt 68 kg (150 lb)   SpO2 94%   BMI 22.81 kg/m²   Pulse ox interpretation: I interpret this pulse ox as normal.    Constitutional: Alert in no apparent distress.  HENT: No signs of trauma, Bilateral external ears normal, Nose normal.   Eyes: Pupils are equal and reactive, bilateral conjunctival discharge chronic non-icteric.   Neck: Normal range of motion, No tenderness, Supple, No stridor.   Lymphatic: No lymphadenopathy noted.   Cardiovascular: Tachycardic, no murmurs.   Thorax & Lungs: Normal breath sounds, No respiratory distress, No wheezing, No chest tenderness.   Abdomen: Bowel sounds normal, Soft, No tenderness, No masses, No pulsatile masses. No peritoneal signs.  Skin: Warm, Dry, No erythema, No rash.   Back: No bony tenderness, No CVA tenderness.   Extremities: Intact distal pulses, No edema, No tenderness, No cyanosis, Negative Jose Antonio's sign.  Musculoskeletal: Good range of motion in all major joints. No tenderness to palpation or major deformities noted.   Neurologic: Alert , no tremors, no fasciculations normal motor function, Normal sensory function, No focal deficits noted.   Psychiatric: Affect normal, Judgment normal, Mood normal.     DIAGNOSTIC STUDIES / PROCEDURES        COURSE & MEDICAL DECISION MAKING  Nursing notes, VS, PMSFHx reviewed in chart.    2:47 PM Patient seen and examined at bedside.  We will plan on discharge      Decision  Making:  This is a 33 y.o. male presenting with concerns of dizziness.  Patient is well-known to this provider in this department in fact air emergency department in this area as he was just at [place] this morning as well.  At this time I detect no acute medical process.  Patient does not appear to be in florid alcohol withdrawal, he is planning to go back out and drink more so I do not see an indication for hospitalization or further treatment of his withdrawal.  Patient often states he is not suicidal although when questioned he typically, and today, will jump from this to other complaints and given his multiple presentations with the exact same complaint and prior mental health evaluations and no prior tones I feel he is appropriate for discharge   The patient will return for new or worsening symptoms and is stable at the time of discharge.    The patient is referred to a primary physician for blood pressure management, diabetic screening, and for all other preventative health concerns.    DISPOSITION:  Patient will be discharged home in stable condition.    FOLLOW UP:  KAREN GoddardNRogerio  [address] [address]  [phone]            OUTPATIENT MEDICATIONS:  Discharge Medication List as of 11/10/2018  3:03 PM            FINAL IMPRESSION  1. Alcohol abuse      The note accurately reflects work and decisions made by me.  Timbo Pierre  11/10/2018  6:47 PM

## 2018-11-11 ENCOUNTER — HOSPITAL ENCOUNTER (EMERGENCY)
Facility: MEDICAL CENTER | Age: 33
End: 2018-11-11
Attending: EMERGENCY MEDICINE
Payer: MEDICARE

## 2018-11-11 ENCOUNTER — HOSPITAL ENCOUNTER (EMERGENCY)
Dept: HOSPITAL 8 - ED | Age: 33
Discharge: HOME | End: 2018-11-11
Payer: MEDICARE

## 2018-11-11 ENCOUNTER — HOSPITAL ENCOUNTER (EMERGENCY)
Facility: MEDICAL CENTER | Age: 33
End: 2018-11-11
Payer: MEDICARE

## 2018-11-11 VITALS
BODY MASS INDEX: 22.73 KG/M2 | OXYGEN SATURATION: 97 % | DIASTOLIC BLOOD PRESSURE: 88 MMHG | WEIGHT: 150 LBS | HEIGHT: 68 IN | HEART RATE: 102 BPM | RESPIRATION RATE: 16 BRPM | SYSTOLIC BLOOD PRESSURE: 120 MMHG | TEMPERATURE: 98 F

## 2018-11-11 VITALS
TEMPERATURE: 98 F | DIASTOLIC BLOOD PRESSURE: 92 MMHG | OXYGEN SATURATION: 93 % | SYSTOLIC BLOOD PRESSURE: 149 MMHG | HEART RATE: 73 BPM | BODY MASS INDEX: 22.73 KG/M2 | WEIGHT: 150 LBS | RESPIRATION RATE: 18 BRPM | HEIGHT: 68 IN

## 2018-11-11 VITALS
HEIGHT: 68 IN | DIASTOLIC BLOOD PRESSURE: 90 MMHG | TEMPERATURE: 96.9 F | RESPIRATION RATE: 18 BRPM | OXYGEN SATURATION: 95 % | SYSTOLIC BLOOD PRESSURE: 140 MMHG | HEART RATE: 111 BPM | BODY MASS INDEX: 20.16 KG/M2 | WEIGHT: 133 LBS

## 2018-11-11 VITALS — DIASTOLIC BLOOD PRESSURE: 93 MMHG | SYSTOLIC BLOOD PRESSURE: 141 MMHG

## 2018-11-11 DIAGNOSIS — F32.9: ICD-10-CM

## 2018-11-11 DIAGNOSIS — F10.20 ALCOHOLISM (HCC): ICD-10-CM

## 2018-11-11 DIAGNOSIS — F17.200: ICD-10-CM

## 2018-11-11 DIAGNOSIS — F10.220: Primary | ICD-10-CM

## 2018-11-11 DIAGNOSIS — F41.1: ICD-10-CM

## 2018-11-11 DIAGNOSIS — I10: ICD-10-CM

## 2018-11-11 DIAGNOSIS — Z76.5 MALINGERING: ICD-10-CM

## 2018-11-11 LAB
ALBUMIN SERPL-MCNC: 4 G/DL (ref 3.4–5)
ALP SERPL-CCNC: 135 U/L (ref 45–117)
ALT SERPL-CCNC: 75 U/L (ref 12–78)
ANION GAP SERPL CALC-SCNC: 11 MMOL/L (ref 5–15)
BASOPHILS # BLD AUTO: 0.06 X10^3/UL (ref 0–0.1)
BASOPHILS NFR BLD AUTO: 1 % (ref 0–1)
BILIRUB SERPL-MCNC: 0.3 MG/DL (ref 0.2–1)
CALCIUM SERPL-MCNC: 8.4 MG/DL (ref 8.5–10.1)
CHLORIDE SERPL-SCNC: 104 MMOL/L (ref 98–107)
CREAT SERPL-MCNC: 0.92 MG/DL (ref 0.7–1.3)
EOSINOPHIL # BLD AUTO: 0.03 X10^3/UL (ref 0–0.4)
EOSINOPHIL NFR BLD AUTO: 0 % (ref 1–7)
ERYTHROCYTE [DISTWIDTH] IN BLOOD BY AUTOMATED COUNT: 15.2 % (ref 9.4–14.8)
LYMPHOCYTES # BLD AUTO: 2.82 X10^3/UL (ref 1–3.4)
LYMPHOCYTES NFR BLD AUTO: 28 % (ref 22–44)
MCH RBC QN AUTO: 30.9 PG (ref 27.5–34.5)
MCHC RBC AUTO-ENTMCNC: 33.6 G/DL (ref 33.2–36.2)
MCV RBC AUTO: 91.9 FL (ref 81–97)
MD: NO
MONOCYTES # BLD AUTO: 0.87 X10^3/UL (ref 0.2–0.8)
MONOCYTES NFR BLD AUTO: 9 % (ref 2–9)
NEUTROPHILS # BLD AUTO: 6.26 X10^3/UL (ref 1.8–6.8)
NEUTROPHILS NFR BLD AUTO: 62 % (ref 42–75)
PLATELET # BLD AUTO: 555 X10^3/UL (ref 130–400)
PMV BLD AUTO: 7.9 FL (ref 7.4–10.4)
PROT SERPL-MCNC: 9 G/DL (ref 6.4–8.2)
RBC # BLD AUTO: 5.42 X10^6/UL (ref 4.38–5.82)

## 2018-11-11 PROCEDURE — 80307 DRUG TEST PRSMV CHEM ANLYZR: CPT

## 2018-11-11 PROCEDURE — 99284 EMERGENCY DEPT VISIT MOD MDM: CPT

## 2018-11-11 PROCEDURE — 80053 COMPREHEN METABOLIC PANEL: CPT

## 2018-11-11 PROCEDURE — 85025 COMPLETE CBC W/AUTO DIFF WBC: CPT

## 2018-11-11 PROCEDURE — 83690 ASSAY OF LIPASE: CPT

## 2018-11-11 PROCEDURE — 36415 COLL VENOUS BLD VENIPUNCTURE: CPT

## 2018-11-11 PROCEDURE — 302449 STATCHG TRIAGE ONLY (STATISTIC)

## 2018-11-11 ASSESSMENT — LIFESTYLE VARIABLES: DO YOU DRINK ALCOHOL: YES

## 2018-11-11 ASSESSMENT — PAIN SCALES - GENERAL: PAINLEVEL_OUTOF10: 0

## 2018-11-11 NOTE — ED NOTES
"Chief Complaint   Patient presents with   • Detox   • Eye Drainage     Pt ambulatory from bathroom to wheelchair in lobby with steady gait.  Pt roomed, states, \"Alcohol doesn't work anymore. I'm detoxing.\"   Pt intermittently complaint.  Refuses gown.  VSS, ERP to see.   "

## 2018-11-11 NOTE — ED NOTES
Report received from Floresita SPENCE, pt sitting upright on gurney, follows directives appropriately.

## 2018-11-11 NOTE — ED NOTES
Pt did not wait for discharge paperwork.  He got up and ambulated without difficulty.  Patient escorted to ER waiting

## 2018-11-11 NOTE — ED NOTES
Pt alert, ambulates with a steady gait, refused vital signs, ambulates independently to discharge.

## 2018-11-11 NOTE — ED TRIAGE NOTES
"[place]  Pt was unable to ambulate on scene, but currently will not stay in [facility].  Patient keeps repeating \"I don't feel good\".  Significant dried purulent drainage from left eye and small amount to right eye with conjunctiva injection and periorbital swelling to bilateral eyes.   "

## 2018-11-11 NOTE — ED TRIAGE NOTES
"Chief Complaint   Patient presents with   • Alcohol Intoxication     Pt bib ems, pt recently d/c here. Per pt, he went straight to liquor store and started drinking \"earthquakes\". Per ems, they found half empty can of earthquake and 1 full one next to him. Pt crying, denies si/hi.       "

## 2018-11-11 NOTE — ED TRIAGE NOTES
"Ambulatory to triage with   Chief Complaint   Patient presents with   • Detox   • Eye Drainage   States \"I don't feel good, Im dizzy\" repeatedly. Admits to 10 Earthquakes per day. Unknown time of last drink. States hx of seizures.     Bilateral eye drainage.    "

## 2018-11-11 NOTE — DISCHARGE INSTRUCTIONS
You need to use your primary care doctor for help with your chronic alcoholism and other medical problems.  And used to use homeless shelters and homeless resources for problems with housing and food.  The emergency department is available for acute, dangerous, or severe medical conditions.

## 2018-11-11 NOTE — ED PROVIDER NOTES
"ED Provider Note    Scribed for Miki Garner M.D. by Miki Garner. 11/11/2018  11:26 AM    CHIEF COMPLAINT  Chief Complaint   Patient presents with   • Alcohol Intoxication     Pt recently d/c for same this morning       HPI  Gopal Ceja is a 33 y.o. male frequent user of the emergency department and abuser of emergency department and EMS resources who presents to the Emergency Room for the fourth time in 24 hours, after someone called EMS after he was sleeping on the sidewalk.  He presented to triage, and then eloped earlier this morning after being roomed in the emergency department, reporting that \"alcohol does not work anymore.  I am detoxing.\"  He then walked out of the emergency department.  [place]  He was reportedly unable to ambulate on scene, though has a perfectly stable gait at this time.  As always, the patient is disheveled, and has dried purulent drainage from around his eyes.  He has some conjunctival injection, which is his baseline.  Patient is well-known to this emergency department for chronic refractory alcoholism and medical noncompliance.  He has never demonstrated any interest in alcohol recovery.      REVIEW OF SYSTEMS  See HPI for further details.    PAST MEDICAL HISTORY   has a past medical history of Acute anxiety; ADHD (attention deficit hyperactivity disorder); ADHD (attention deficit hyperactivity disorder); Alcohol abuse; Bipolar affective (HCC); Depression; Ectrodactyly-ectodermal dysplasia-clefting syndrome; ETOH abuse; and Psychiatric disorder.    SOCIAL HISTORY  Social History     Social History Main Topics   • Smoking status: Current Some Day Smoker     Packs/day: 0.25     Types: Cigarettes   • Smokeless tobacco: Never Used      Comment: Smokes couple of times a month   • Alcohol use 0.0 oz/week      Comment: drinks 10 earthquakes a day   • Drug use: No   • Sexual activity: Not on file       SURGICAL " "HISTORY   has a past surgical history that includes other orthopedic surgery.    CURRENT MEDICATIONS  Home Medications     Reviewed by Kayla Cerna R.N. (Registered Nurse) on 11/11/18 at 1120  Med List Status: Partial   Medication Last Dose Status   buPROPion (WELLBUTRIN) 75 MG Tab >2months Active   naltrexone (DEPADE) 50 MG Tab >2months Active   thiamine (THIAMINE) 100 MG tablet >2months Active                ALLERGIES  No Known Allergies    PHYSICAL EXAM  VITAL SIGNS: /88   Pulse (!) 102   Temp 36.7 °C (98 °F)   Resp 16   Ht 1.727 m (5' 8\")   Wt 68 kg (150 lb)   SpO2 97%   BMI 22.81 kg/m² Pulse ox interpretation: I interpret this pulse ox as normal.  Constitutional: Alert in no apparent distress.  Anxious.  HENT: No signs of trauma, Bilateral external ears normal, Nose normal.   Eyes: Chronic baseline conjunctival injection with bilateral conjunctival discharge.  Unchanged from multiple previous visits.  Neck: Normal range of motion, No tenderness, Supple, No stridor.    Cardiovascular: Normal peripheral perfusion  Thorax & Lungs: Unlabored respirations, equal chest expansion, no accessory muscle use  Abdomen: Non-distended  Skin:  No erythema, No rash.   Back: Normal alignment and ROM  Extremities: No gross deformity.  Baseline history of syndactyly in the hands.  Musculoskeletal: Good range of motion in all major joints.   Neurologic: Alert, Normal motor function, No focal deficits noted.   Psychiatric: Affect normal, Judgment normal, Mood normal.      COURSE & MEDICAL DECISION MAKING  The patient's VS, Nurses notes reviewed. (See chart for details)    11:26 AM Patient seen and examined at bedside.  This patient is a chronic history of homelessness and alcohol abuse.  He did not intend to come back to the emergency department at this particular visit, though was reportedly found intoxicated and unable to walk.  He demonstrates no gait instability and has no acute or new medical problems.  There is " no indication for continued emergency department evaluation.  There is no signs of alcohol withdrawal or trauma or acute infection.     DISPOSITION:  Patient will be discharged home in stable condition.    FOLLOW UP:  RUSS Goddard  [address] [address]  [phone]    Schedule an appointment as soon as possible for a visit         OUTPATIENT MEDICATIONS:  New Prescriptions    No medications on file         FINAL IMPRESSION  1. Malingering Chronic   2. Alcoholism (HCC) Chronic

## 2018-11-11 NOTE — ED PROVIDER NOTES
"ED Provider Note    ER PROVIDER NOTE        CHIEF COMPLAINT  Chief Complaint   Patient presents with   • Alcohol Intoxication       HPI  Gopal Ceja is a 33 y.o. male who presents to the emergency department complaining of alcohol intoxication.  Patient was just discharged from this department 2 hours ago and states he went out and drank \"a lot of earthquakes\" he reports that he feels dizzy.  He denies any other focal complaints, no headaches, abdominal pain nausea vomiting.  No fevers.     REVIEW OF SYSTEMS  Pertinent positives include alcohol intoxication, dizziness. Pertinent negatives include no headache. See HPI for details. All other systems reviewed and are negative.    PAST MEDICAL HISTORY   has a past medical history of Acute anxiety; ADHD (attention deficit hyperactivity disorder); ADHD (attention deficit hyperactivity disorder); Alcohol abuse; Bipolar affective (HCC); Depression; Ectrodactyly-ectodermal dysplasia-clefting syndrome; ETOH abuse; and Psychiatric disorder.    SURGICAL HISTORY   has a past surgical history that includes other orthopedic surgery.    FAMILY HISTORY  Family History   Problem Relation Age of Onset   • Heart Disease Neg Hx    • Hypertension Neg Hx    • Hyperlipidemia Neg Hx        SOCIAL HISTORY  Social History     Social History   • Marital status: Single     Spouse name: N/A   • Number of children: N/A   • Years of education: N/A     Social History Main Topics   • Smoking status: Current Some Day Smoker     Packs/day: 0.25     Types: Cigarettes   • Smokeless tobacco: Never Used      Comment: Smokes couple of times a month   • Alcohol use 0.0 oz/week      Comment: drinks 10 earthquakes a day   • Drug use: No   • Sexual activity: Not on file     Other Topics Concern   • Not on file     Social History Narrative    ** Merged History Encounter **           History   Drug Use No       CURRENT MEDICATIONS  Home Medications     Reviewed by Chelsy Jefferson R.N. (Registered Nurse) on " "11/10/18 at 1746  Med List Status: Complete   Medication Last Dose Status   buPROPion (WELLBUTRIN) 75 MG Tab >2months Active   naltrexone (DEPADE) 50 MG Tab >2months Active   thiamine (THIAMINE) 100 MG tablet >2months Active                ALLERGIES  No Known Allergies    PHYSICAL EXAM  VITAL SIGNS: BP (!) 162/109   Pulse (!) 121   Temp 36.4 °C (97.6 °F)   Resp 20   Ht 1.727 m (5' 8\")   Wt 60.3 kg (133 lb)   SpO2 94%   BMI 20.22 kg/m²   Pulse ox interpretation: I interpret this pulse ox as normal.    Constitutional: Alert mildly slurred speech.  HENT: No signs of trauma, Bilateral external ears normal, Nose normal.   Eyes: Pupils are equal and reactive, bilateral conjunctival discharge chronic non-icteric.   Neck: Normal range of motion, No tenderness, Supple, No stridor.   Cardiovascular: Tachycardic, no murmurs.   Thorax & Lungs: Normal breath sounds, No respiratory distress, No wheezing, No chest tenderness.   Abdomen: Bowel sounds normal, Soft, No tenderness, No masses, No pulsatile masses. No peritoneal signs.  Skin: Warm, Dry, No erythema, No rash.   Back: No bony tenderness, No CVA tenderness.   Musculoskeletal: Chronic deformity to bilateral upper hands good range of motion in all major joints. No tenderness to palpation or major deformities noted.   Neurologic: Alert , no tremors, no fasciculations normal motor function, Normal sensory function, No focal deficits noted.   Psychiatric: Affect normal, Judgment normal, Mood normal.     DIAGNOSTIC STUDIES / PROCEDURES  Labs Reviewed - No data to display    RADIOLOGY  No orders to display     The radiologist's interpretation of all radiological studies have been reviewed by me.    COURSE & MEDICAL DECISION MAKING  Nursing notes, VS, PMSFHx reviewed in chart.    6:41 PM Patient seen and examined at bedside.       Decision Making:  This is a 33 y.o. male with concerns of dizziness.  Patient is well-known to this provider and this department in fact every " emergency department in this area as he was just at [facility] this morning as well is here this afternoon just a few hours ago.  At this time I detect no acute medical process other than his intoxication.  Patient does not appear to be in florid alcohol withdrawal although he states he needs some Ativan for his withdrawal, he is planning to go back out and drink more.  Patient is observed until safe ambulation and appropriate sobriety and care will be signed over to Dr. Mac pending this.  I do not see an indication for hospitalization at this time     The patient will return for new or worsening symptoms and is stable at the time of discharge.    The patient is referred to a primary physician for blood pressure management, diabetic screening, and for all other preventative health concerns.      DISPOSITION:  Patient will be discharged home in stable condition.    FOLLOW UP:  Dalton Pagan, A.[email or web address]  75 [facility] [address] [place] [id number]  [phone]      As needed      OUTPATIENT MEDICATIONS:  Discharge Medication List as of 11/10/2018  7:46 PM            FINAL IMPRESSION  1. Alcoholic intoxication without complication (HCC)         The note accurately reflects work and decisions made by me.  Timbo Pierre  11/10/2018  7:50 PM

## 2018-11-11 NOTE — ED NOTES
Pt in [place], requesting to leave.  Security present, escorted pt off grounds. Pt ambulated with steady gait.   Never seen by ERP.  Eloped before.

## 2018-11-11 NOTE — ED NOTES
Pt walked out of the ED. Pt called with no response. Pt not in the lobby, Senior lounge, or the restroom. Pt will be dismissed.

## 2018-11-11 NOTE — ED TRIAGE NOTES
"Gopal Ceja  Chief Complaint   Patient presents with   • ETOH Withdrawal     Pt states that he is detoxing from alcohol. Pt has been evaluated 2x here in the last 24 hours. Pt was also seen at [facility] just prior to this current visit. Pt states that he has had 10 earthquakes today. Pt does not recall when his last drink was.      Pt ambulatory to triage with above complaint.     /90   Pulse (!) 111   Temp 36.1 °C (96.9 °F)   Resp 18   Ht 1.727 m (5' 8\")   Wt 60.3 kg (133 lb)   SpO2 95%   BMI 20.22 kg/m²     Pt informed of triage process and encouraged to notify staff of any changes or concerns. Pt verbalized understanding of instructions. Apologized for long wait time. Pt placed back in lobby.     "

## 2018-11-13 ENCOUNTER — HOSPITAL ENCOUNTER (EMERGENCY)
Dept: HOSPITAL 8 - ED | Age: 33
Discharge: HOME | End: 2018-11-13
Payer: MEDICARE

## 2018-11-13 VITALS — HEIGHT: 68 IN | BODY MASS INDEX: 23.05 KG/M2 | WEIGHT: 152.12 LBS

## 2018-11-13 VITALS — SYSTOLIC BLOOD PRESSURE: 134 MMHG | DIASTOLIC BLOOD PRESSURE: 86 MMHG

## 2018-11-13 DIAGNOSIS — I10: ICD-10-CM

## 2018-11-13 DIAGNOSIS — F17.210: ICD-10-CM

## 2018-11-13 DIAGNOSIS — F10.10: ICD-10-CM

## 2018-11-13 DIAGNOSIS — H01.004: Primary | ICD-10-CM

## 2018-11-13 DIAGNOSIS — H01.001: ICD-10-CM
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
## 2018-11-13 PROCEDURE — 99283 EMERGENCY DEPT VISIT LOW MDM: CPT

## 2018-11-14 ENCOUNTER — HOSPITAL ENCOUNTER (EMERGENCY)
Dept: HOSPITAL 8 - ED | Age: 33
Discharge: LEFT BEFORE DISCHARGE | End: 2018-11-14
Payer: MEDICARE

## 2018-11-14 VITALS — DIASTOLIC BLOOD PRESSURE: 90 MMHG | SYSTOLIC BLOOD PRESSURE: 129 MMHG

## 2018-11-14 VITALS — WEIGHT: 154.32 LBS | HEIGHT: 68 IN | BODY MASS INDEX: 23.39 KG/M2

## 2018-11-14 DIAGNOSIS — H10.9: Primary | ICD-10-CM

## 2018-11-14 PROCEDURE — 99281 EMR DPT VST MAYX REQ PHY/QHP: CPT

## 2018-11-15 ENCOUNTER — HOSPITAL ENCOUNTER (EMERGENCY)
Facility: MEDICAL CENTER | Age: 33
End: 2018-11-15
Attending: EMERGENCY MEDICINE
Payer: MEDICARE

## 2018-11-15 ENCOUNTER — HOSPITAL ENCOUNTER (EMERGENCY)
Facility: MEDICAL CENTER | Age: 33
End: 2018-11-15
Payer: MEDICARE

## 2018-11-15 VITALS
SYSTOLIC BLOOD PRESSURE: 134 MMHG | DIASTOLIC BLOOD PRESSURE: 91 MMHG | RESPIRATION RATE: 18 BRPM | WEIGHT: 150 LBS | HEART RATE: 138 BPM | BODY MASS INDEX: 22.73 KG/M2 | TEMPERATURE: 98.5 F | HEIGHT: 68 IN | OXYGEN SATURATION: 94 %

## 2018-11-15 VITALS
TEMPERATURE: 98 F | DIASTOLIC BLOOD PRESSURE: 90 MMHG | BODY MASS INDEX: 22.81 KG/M2 | OXYGEN SATURATION: 95 % | WEIGHT: 150 LBS | HEART RATE: 101 BPM | SYSTOLIC BLOOD PRESSURE: 138 MMHG | RESPIRATION RATE: 17 BRPM

## 2018-11-15 DIAGNOSIS — Z76.5 MALINGERING: ICD-10-CM

## 2018-11-15 DIAGNOSIS — F10.20 ALCOHOLISM (HCC): ICD-10-CM

## 2018-11-15 PROCEDURE — 302449 STATCHG TRIAGE ONLY (STATISTIC)

## 2018-11-15 PROCEDURE — 99284 EMERGENCY DEPT VISIT MOD MDM: CPT

## 2018-11-15 PROCEDURE — 700101 HCHG RX REV CODE 250: Performed by: EMERGENCY MEDICINE

## 2018-11-15 RX ORDER — ERYTHROMYCIN 5 MG/G
OINTMENT OPHTHALMIC ONCE
Status: COMPLETED | OUTPATIENT
Start: 2018-11-15 | End: 2018-11-15

## 2018-11-15 RX ADMIN — ERYTHROMYCIN: 5 OINTMENT OPHTHALMIC at 13:15

## 2018-11-15 ASSESSMENT — LIFESTYLE VARIABLES
TOTAL SCORE: 4
HAVE YOU EVER FELT YOU SHOULD CUT DOWN ON YOUR DRINKING: YES
CONSUMPTION TOTAL: INCOMPLETE
DOES PATIENT WANT TO STOP DRINKING: YES
DOES PATIENT WANT TO TALK TO SOMEONE ABOUT QUITTING: YES
TOTAL SCORE: 4
DO YOU DRINK ALCOHOL: YES
HAVE PEOPLE ANNOYED YOU BY CRITICIZING YOUR DRINKING: YES
TOTAL SCORE: 4
EVER HAD A DRINK FIRST THING IN THE MORNING TO STEADY YOUR NERVES TO GET RID OF A HANGOVER: YES
EVER FELT BAD OR GUILTY ABOUT YOUR DRINKING: YES

## 2018-11-15 NOTE — DISCHARGE PLANNING
Spoke to Gustavo Quinteros  who states that they sent him here to go through detox.  Patient is asking to leave as he has done 8 times in the past 4 days. Discussed the misuse of the ED by coming in and leaving AMA several times this month. Encouraged him to go to treatment at [facility], [facility], or any facility that will take him. Discharged AMA.

## 2018-11-15 NOTE — ED NOTES
Pt requesting food at this time. Per parul Lea to feed pt. Pt provided [employer] Mac [employer] Cheese and juice at this time.

## 2018-11-15 NOTE — ED PROVIDER NOTES
"ED Provider Note    Scribed for Sonja Gallego M.D. by Loretta Sanchez. 11/15/2018, 12:50 PM.    Primary care provider: RUSS Goddard  Means of arrival: EMS  History obtained from: Patient   History limited by: poor historian    CHIEF COMPLAINT  Chief Complaint   Patient presents with   • Alcohol Intoxication   • Detox   • Eye Pain       HPI  Gopal Ceja is a 33 y.o. male who presents to the Emergency Department for alcohol withdrawal. He states his body \"cannot take it anymore.\" Patient states he used all his days and can't go anywhere for help. He was seen in the ED for an overdose of lithium. Patient would like us to talk to his  to assess his options for detox.     Further history of present illness cannot be obtained due to the patient is a poor historian.      REVIEW OF SYSTEMS  Pertinent positives include substance abuse.   See HPI for further details. Further review of systems is unobtainable as noted above.  E.    PAST MEDICAL HISTORY  Past Medical History:   Diagnosis Date   • Acute anxiety    • ADHD (attention deficit hyperactivity disorder)    • ADHD (attention deficit hyperactivity disorder)    • Alcohol abuse    • Bipolar affective (HCC)    • Depression    • Ectrodactyly-ectodermal dysplasia-clefting syndrome    • ETOH abuse    • Psychiatric disorder     anxiety/panic disorder       SURGICAL HISTORY  Past Surgical History:   Procedure Laterality Date   • OTHER ORTHOPEDIC SURGERY      hands bilaterally r/t EEDC syndrome       SOCIAL HISTORY  Social History   Substance Use Topics   • Smoking status: Current Some Day Smoker     Packs/day: 0.25     Types: Cigarettes   • Smokeless tobacco: Never Used      Comment: Smokes couple of times a month   • Alcohol use 0.0 oz/week      Comment: drinks 10 earthquakes a day      History   Drug Use No       FAMILY HISTORY  Family History   Problem Relation Age of Onset   • Heart Disease Neg Hx    • Hypertension Neg Hx    • Hyperlipidemia Neg " Hx        CURRENT MEDICATIONS  No current facility-administered medications on file prior to encounter.      Current Outpatient Prescriptions on File Prior to Encounter   Medication Sig Dispense Refill   • buPROPion (WELLBUTRIN) 75 MG Tab Take 1 Tab by mouth 2 Times a Day. 60 Tab 3   • naltrexone (DEPADE) 50 MG Tab Take 1 Tab by mouth every day. 30 Tab 0   • thiamine (THIAMINE) 100 MG tablet Take 1 Tab by mouth every day. 30 Tab 0       ALLERGIES  No Known Allergies    PHYSICAL EXAM  VITAL SIGNS: /90   Pulse (!) 101   Temp 36.7 °C (98 °F) (Temporal)   Resp 17   Wt 68 kg (150 lb)   SpO2 95%   BMI 22.81 kg/m²   Vitals reviewed.    Consitutional: Well-developed, thin, disheveled. Negative for: distress.  HENT: Normocephalic, right external ear normal, left external ear normal, Dry mucus membranes with poor dentition.  Eyes: conjunctival injection bialterally that is chronic with thin weeping discharge bialterally. extraocular movements normal. Negative for:  icterus.  Neck: Range of motion normal, supple. Negative for cervical adenopathy.  Cardiovascular: Tachycardic rate, regular rhythm, heart sounds normal, intact distal pulses. Negative for: murmur, rub, gallop.  Pulmonary/Chest Wall: Effort normal, breath sounds normal. Negative for: respiratory distress, wheezes, rales, rhonchi.   Musculoskeletal: Normal range of motion. Negative for edema.  Chronic deformities bilateral hands unchanged  Neurological: Somnolent and intoxicated but oriented x3. No focal deficits.  Skin: Warm, dry. Negative for rash.  Psych: Intoxicated, somnolent, tearful      COURSE & MEDICAL DECISION MAKING  Nursing notes, VS, PMSFHx reviewed in chart.    Obtained and reviewed past medical records which indicate the patient has been here 5 times in the last 7 days for alcohol related issues    12:50 PM Patient seen and examined at bedside. I discussed with the patient that I will have life skills consult. The patient presents with  alcohol intoxication and likely homelessness.  Patient has chronic conjunctivitis and erythromycin ointment was applied.    1:20 PM Life Skills with consult with the patient's .     3:16 PM Life Skills was in touch with  and devised a plan for patient's treatment. Patient then eloped after eating.       Patient eloped.  He called the ambulance 2 hours later to come back.    FINAL IMPRESSION  1. Alcoholism (HCC)    2. Malingering          Loretta FELIPE (Scribe), am scribing for, and in the presence of, Sonja Gallego M.D..    Electronically signed by: Loretta Sanchez (Scribe), 11/15/2018    Sonja FELIPE M.D. personally performed the services described in this documentation, as scribed by Loretta Sanchez in my presence, and it is both accurate and complete.    The note accurately reflects work and decisions made by me.  Sonja Gallego  11/15/2018  7:52 PM

## 2018-11-15 NOTE — ED NOTES
Pt laying left side, appears asleep at this time. Pt resting quietly in room. No new complaints at this time.

## 2018-11-15 NOTE — ED TRIAGE NOTES
"Pt bib ems. Pt intoxicated, hx of same. Pt states \"he wants detox\". Pt also c/o left eye pain, drainage noted.   "

## 2018-11-15 NOTE — ED NOTES
Pt requesting to leave at this time. ERP notified. Working with social work to get placement for pt. Pt eloped at this time. Security escorted pt out. Pt with stable gait at this time.

## 2018-11-15 NOTE — ED NOTES
"Pt in room at this time. Pt tearful, states he is an alcoholic and he needs to quit drinking. Pt states he does not know when his last alcoholic drink was. Pt states, \"My body can't take much more of this\".  "

## 2018-11-16 ENCOUNTER — HOSPITAL ENCOUNTER (EMERGENCY)
Dept: HOSPITAL 8 - ED | Age: 33
LOS: 1 days | Discharge: HOME | End: 2018-11-17
Payer: MEDICARE

## 2018-11-16 ENCOUNTER — HOSPITAL ENCOUNTER (EMERGENCY)
Dept: HOSPITAL 8 - ED | Age: 33
Discharge: HOME | End: 2018-11-16
Payer: MEDICARE

## 2018-11-16 VITALS — WEIGHT: 163.14 LBS | BODY MASS INDEX: 24.73 KG/M2 | HEIGHT: 68 IN

## 2018-11-16 VITALS — BODY MASS INDEX: 23.39 KG/M2 | WEIGHT: 154.32 LBS | HEIGHT: 68 IN

## 2018-11-16 VITALS — SYSTOLIC BLOOD PRESSURE: 132 MMHG | DIASTOLIC BLOOD PRESSURE: 85 MMHG

## 2018-11-16 VITALS — SYSTOLIC BLOOD PRESSURE: 119 MMHG | DIASTOLIC BLOOD PRESSURE: 74 MMHG

## 2018-11-16 DIAGNOSIS — F10.120: Primary | ICD-10-CM

## 2018-11-16 DIAGNOSIS — F10.220: Primary | ICD-10-CM

## 2018-11-16 DIAGNOSIS — I10: ICD-10-CM

## 2018-11-16 DIAGNOSIS — E11.40: ICD-10-CM

## 2018-11-16 DIAGNOSIS — Z72.9: ICD-10-CM

## 2018-11-16 DIAGNOSIS — E11.9: ICD-10-CM

## 2018-11-16 PROCEDURE — 99283 EMERGENCY DEPT VISIT LOW MDM: CPT

## 2018-11-16 NOTE — ED TRIAGE NOTES
BIB [facility] to triage  Chief Complaint   Patient presents with   • Alcohol Intoxication     Pt was seen earlier today and discharged, here for same, no medical complaints.

## 2018-11-16 NOTE — ED NOTES
Pt found laying on the bathroom floor, assisted to the WC by security and ED tech.  Pt wakes sternal rub and was able to answer questions.

## 2018-11-17 ENCOUNTER — HOSPITAL ENCOUNTER (EMERGENCY)
Facility: MEDICAL CENTER | Age: 33
End: 2018-11-18
Attending: EMERGENCY MEDICINE
Payer: MEDICARE

## 2018-11-17 DIAGNOSIS — F10.929 ALCOHOLIC INTOXICATION WITH COMPLICATION (HCC): ICD-10-CM

## 2018-11-17 DIAGNOSIS — F10.920 ALCOHOLIC INTOXICATION WITHOUT COMPLICATION (HCC): ICD-10-CM

## 2018-11-17 DIAGNOSIS — F10.10 ALCOHOL ABUSE: ICD-10-CM

## 2018-11-17 DIAGNOSIS — R41.82 ALTERED MENTAL STATUS, UNSPECIFIED ALTERED MENTAL STATUS TYPE: ICD-10-CM

## 2018-11-17 LAB
ALBUMIN SERPL BCP-MCNC: 4.2 G/DL (ref 3.2–4.9)
ALBUMIN/GLOB SERPL: 1 G/DL
ALP SERPL-CCNC: 121 U/L (ref 30–99)
ALT SERPL-CCNC: 29 U/L (ref 2–50)
AMPHET UR QL SCN: NEGATIVE
ANION GAP SERPL CALC-SCNC: 11 MMOL/L (ref 0–11.9)
APTT PPP: 30 SEC (ref 24.7–36)
AST SERPL-CCNC: 33 U/L (ref 12–45)
BARBITURATES UR QL SCN: NEGATIVE
BASOPHILS # BLD AUTO: 0.2 % (ref 0–1.8)
BASOPHILS # BLD: 0.02 K/UL (ref 0–0.12)
BENZODIAZ UR QL SCN: POSITIVE
BILIRUB SERPL-MCNC: 0.3 MG/DL (ref 0.1–1.5)
BUN SERPL-MCNC: 11 MG/DL (ref 8–22)
BZE UR QL SCN: NEGATIVE
CALCIUM SERPL-MCNC: 9.1 MG/DL (ref 8.5–10.5)
CANNABINOIDS UR QL SCN: NEGATIVE
CHLORIDE SERPL-SCNC: 100 MMOL/L (ref 96–112)
CO2 SERPL-SCNC: 30 MMOL/L (ref 20–33)
CREAT SERPL-MCNC: 0.82 MG/DL (ref 0.5–1.4)
EOSINOPHIL # BLD AUTO: 0.04 K/UL (ref 0–0.51)
EOSINOPHIL NFR BLD: 0.4 % (ref 0–6.9)
ERYTHROCYTE [DISTWIDTH] IN BLOOD BY AUTOMATED COUNT: 47.6 FL (ref 35.9–50)
ETHANOL BLD-MCNC: 0.42 G/DL
GLOBULIN SER CALC-MCNC: 4.1 G/DL (ref 1.9–3.5)
GLUCOSE SERPL-MCNC: 84 MG/DL (ref 65–99)
HCT VFR BLD AUTO: 48.2 % (ref 42–52)
HGB BLD-MCNC: 16.5 G/DL (ref 14–18)
IMM GRANULOCYTES # BLD AUTO: 0.02 K/UL (ref 0–0.11)
IMM GRANULOCYTES NFR BLD AUTO: 0.2 % (ref 0–0.9)
INR PPP: 0.96 (ref 0.87–1.13)
LYMPHOCYTES # BLD AUTO: 2.07 K/UL (ref 1–4.8)
LYMPHOCYTES NFR BLD: 21.7 % (ref 22–41)
MCH RBC QN AUTO: 31.4 PG (ref 27–33)
MCHC RBC AUTO-ENTMCNC: 34.2 G/DL (ref 33.7–35.3)
MCV RBC AUTO: 91.6 FL (ref 81.4–97.8)
METHADONE UR QL SCN: NEGATIVE
MONOCYTES # BLD AUTO: 0.65 K/UL (ref 0–0.85)
MONOCYTES NFR BLD AUTO: 6.8 % (ref 0–13.4)
NEUTROPHILS # BLD AUTO: 6.72 K/UL (ref 1.82–7.42)
NEUTROPHILS NFR BLD: 70.7 % (ref 44–72)
NRBC # BLD AUTO: 0 K/UL
NRBC BLD-RTO: 0 /100 WBC
OPIATES UR QL SCN: NEGATIVE
OXYCODONE UR QL SCN: NEGATIVE
PCP UR QL SCN: NEGATIVE
PLATELET # BLD AUTO: 316 K/UL (ref 164–446)
PMV BLD AUTO: 10 FL (ref 9–12.9)
POTASSIUM SERPL-SCNC: 3.9 MMOL/L (ref 3.6–5.5)
PROPOXYPH UR QL SCN: NEGATIVE
PROT SERPL-MCNC: 8.3 G/DL (ref 6–8.2)
PROTHROMBIN TIME: 12.8 SEC (ref 12–14.6)
RBC # BLD AUTO: 5.26 M/UL (ref 4.7–6.1)
SODIUM SERPL-SCNC: 141 MMOL/L (ref 135–145)
WBC # BLD AUTO: 9.5 K/UL (ref 4.8–10.8)

## 2018-11-17 PROCEDURE — 96375 TX/PRO/DX INJ NEW DRUG ADDON: CPT

## 2018-11-17 PROCEDURE — 99285 EMERGENCY DEPT VISIT HI MDM: CPT

## 2018-11-17 PROCEDURE — 96366 THER/PROPH/DIAG IV INF ADDON: CPT

## 2018-11-17 PROCEDURE — 700101 HCHG RX REV CODE 250: Performed by: EMERGENCY MEDICINE

## 2018-11-17 PROCEDURE — 96365 THER/PROPH/DIAG IV INF INIT: CPT

## 2018-11-17 PROCEDURE — 36415 COLL VENOUS BLD VENIPUNCTURE: CPT

## 2018-11-17 PROCEDURE — 80053 COMPREHEN METABOLIC PANEL: CPT

## 2018-11-17 PROCEDURE — 85610 PROTHROMBIN TIME: CPT

## 2018-11-17 PROCEDURE — 700111 HCHG RX REV CODE 636 W/ 250 OVERRIDE (IP): Performed by: EMERGENCY MEDICINE

## 2018-11-17 PROCEDURE — 85730 THROMBOPLASTIN TIME PARTIAL: CPT

## 2018-11-17 PROCEDURE — 80307 DRUG TEST PRSMV CHEM ANLYZR: CPT

## 2018-11-17 PROCEDURE — 85025 COMPLETE CBC W/AUTO DIFF WBC: CPT

## 2018-11-17 RX ORDER — LORAZEPAM 2 MG/ML
1 INJECTION INTRAMUSCULAR ONCE
Status: COMPLETED | OUTPATIENT
Start: 2018-11-17 | End: 2018-11-17

## 2018-11-17 RX ORDER — LORAZEPAM 2 MG/ML
2 INJECTION INTRAMUSCULAR ONCE
Status: DISCONTINUED | OUTPATIENT
Start: 2018-11-17 | End: 2018-11-17

## 2018-11-17 RX ORDER — LORAZEPAM 2 MG/ML
1 INJECTION INTRAMUSCULAR ONCE
Status: COMPLETED | OUTPATIENT
Start: 2018-11-18 | End: 2018-11-17

## 2018-11-17 RX ADMIN — THIAMINE HYDROCHLORIDE 1000 ML: 100 INJECTION, SOLUTION INTRAMUSCULAR; INTRAVENOUS at 20:05

## 2018-11-17 RX ADMIN — LORAZEPAM 1 MG: 2 INJECTION INTRAMUSCULAR; INTRAVENOUS at 23:58

## 2018-11-17 RX ADMIN — LORAZEPAM 1 MG: 2 INJECTION INTRAMUSCULAR; INTRAVENOUS at 22:21

## 2018-11-17 ASSESSMENT — LIFESTYLE VARIABLES
DO YOU DRINK ALCOHOL: YES
HOW MANY TIMES IN THE PAST YEAR HAVE YOU HAD 5 OR MORE DRINKS IN A DAY: 5
TOTAL SCORE: 4
TOTAL SCORE: 4
HAVE YOU EVER FELT YOU SHOULD CUT DOWN ON YOUR DRINKING: YES
AVERAGE NUMBER OF DAYS PER WEEK YOU HAVE A DRINK CONTAINING ALCOHOL: 10
TOTAL SCORE: 4
ON A TYPICAL DAY WHEN YOU DRINK ALCOHOL HOW MANY DRINKS DO YOU HAVE: 10
DOES PATIENT WANT TO STOP DRINKING: NO
HAVE PEOPLE ANNOYED YOU BY CRITICIZING YOUR DRINKING: YES
EVER FELT BAD OR GUILTY ABOUT YOUR DRINKING: YES
CONSUMPTION TOTAL: POSITIVE
EVER HAD A DRINK FIRST THING IN THE MORNING TO STEADY YOUR NERVES TO GET RID OF A HANGOVER: YES

## 2018-11-17 ASSESSMENT — PAIN SCALES - GENERAL: PAINLEVEL_OUTOF10: 0

## 2018-11-18 VITALS
HEART RATE: 109 BPM | OXYGEN SATURATION: 92 % | SYSTOLIC BLOOD PRESSURE: 129 MMHG | WEIGHT: 150.79 LBS | TEMPERATURE: 97.7 F | RESPIRATION RATE: 18 BRPM | HEIGHT: 68 IN | DIASTOLIC BLOOD PRESSURE: 79 MMHG | BODY MASS INDEX: 22.85 KG/M2

## 2018-11-18 LAB — POC BREATHALIZER: 0.08 PERCENT (ref 0–0.01)

## 2018-11-18 PROCEDURE — 302970 POC BREATHALIZER: Performed by: EMERGENCY MEDICINE

## 2018-11-18 PROCEDURE — 700102 HCHG RX REV CODE 250 W/ 637 OVERRIDE(OP): Performed by: EMERGENCY MEDICINE

## 2018-11-18 PROCEDURE — 90791 PSYCH DIAGNOSTIC EVALUATION: CPT

## 2018-11-18 PROCEDURE — A9270 NON-COVERED ITEM OR SERVICE: HCPCS | Performed by: EMERGENCY MEDICINE

## 2018-11-18 RX ORDER — LORAZEPAM 1 MG/1
2 TABLET ORAL ONCE
Status: COMPLETED | OUTPATIENT
Start: 2018-11-18 | End: 2018-11-18

## 2018-11-18 RX ADMIN — LORAZEPAM 2 MG: 1 TABLET ORAL at 08:50

## 2018-11-18 NOTE — ED PROVIDER NOTES
"ED Provider Note    CHIEF COMPLAINT  Chief Complaint   Patient presents with   • Detox     pt states \"i drink 10 earthquakes a day\"; has been drinking all day, denies drug use   • Suicidal Ideation     pt states \"i am suicidal\" but appears to intoxicated to state if he has a plan stating only \"i took a whole bottle of librium weeks ago\" when asked if he has attempted SI in the past       HPI  Gopal Ceja is a 33 y.o. male who presents for evaluation of altered mental status.  Patient's been seen here multiple times for alcohol abuse and complications associated with this.  Upon my evaluation the patient did not state that he was suicidal.  He has very slurred speech and admits to drinking today.  The patient is a poor historian but denies: Fever, URI symptoms, cardiorespiratory symptoms, hematemesis, melena hematochezia.  No other acute symptomatology or complaints.    REVIEW OF SYSTEMS  See HPI for further details.  No history of: Hypertension, diabetes, seizures, heart disease, cancer, stroke.  All other systems negative.    PAST MEDICAL HISTORY  Past Medical History:   Diagnosis Date   • Acute anxiety    • ADHD (attention deficit hyperactivity disorder)    • ADHD (attention deficit hyperactivity disorder)    • Alcohol abuse    • Bipolar affective (HCC)    • Depression    • Ectrodactyly-ectodermal dysplasia-clefting syndrome    • ETOH abuse    • Psychiatric disorder     anxiety/panic disorder       FAMILY HISTORY  Family History   Problem Relation Age of Onset   • Heart Disease Neg Hx    • Hypertension Neg Hx    • Hyperlipidemia Neg Hx        SOCIAL HISTORY  Positive tobacco use; positive alcohol abuse; denies drug use;    SURGICAL HISTORY  Past Surgical History:   Procedure Laterality Date   • OTHER ORTHOPEDIC SURGERY      hands bilaterally r/t EEDC syndrome       CURRENT MEDICATIONS  See nurse's notes    ALLERGIES  No Known Allergies    PHYSICAL EXAM  VITAL SIGNS: /107   Pulse (!) 118   Temp 36.5 °C " "(97.7 °F) (Temporal)   Resp 16   Ht 1.727 m (5' 8\")   Wt 68.4 kg (150 lb 12.7 oz)   SpO2 92%   BMI 22.93 kg/m²    Constitutional: 33-year-old male, slurred speech, smells of alcohol metabolites, arousable, oriented x1  HENT: Normocephalic, Atraumatic, Nares:Clear, Oropharynx: Dry, posterior pharynx:clear   Eyes: PERRL, EOMI, Conjunctiva normal, No discharge.   Neck: Normal range of motion, No tenderness, Supple, No stridor.   Lymphatic: No lymphadenopathy noted.   Cardiovascular: Regular rate and rhythm without mumurs, gallups, rubs   Thorax & Lungs: Normal Equal breath sounds, No respiratory distress, No wheezing, no stridor, no rales. No chest tenderness.   Abdomen: Soft, nontender, nondistended, no organomegaly, positive bowel sounds normal in quality. No guarding or rebound.  Skin: Mildly decreased skin turgor, pink, warm, dry. No rashes, petechiae, purpura. Normal capillary refill.   Back: No tenderness, No CVA tenderness.   Extremities: Intact distal pulses, No edema, No tenderness, No cyanosis,  Vascular: Pulses are 2+, symmetric in the upper and lower extremities.  Musculoskeletal: Good range of motion in all major joints. No tenderness to palpation or major deformities noted.   Neurologic: Slurred speech and lethargic, oriented x 1,  No gross focal deficits noted.   Psychiatric: Oriented x1; not exhibiting active suicidal ideation upon my initial evaluation;    COURSE & MEDICAL DECISION MAKING  Pertinent Labs & Imaging studies reviewed. (See chart for details)  1.  IV: Detox; IV fluids administered for clinical dehydration as well as administering supplementation for complications associated with alcohol abuse;    Laboratory studies: CBC and differential within normal; coags within normal; diagnostic alcohol 0.42; CMP shows alk phos 621, protein 8.3, otherwise within normal;    Discussion: At this time, the patient presents for evaluation of altered mental status.  Patient's findings are consistent " with acute alcohol intoxication.  Patient will need to be observed for an extended period of time.  Patient will be ready evaluated for potential suicidal ideation.  Patient has been seen multiple times in the past.  Currently, he does not meet criteria for an involuntary committal and will need to be observed further.    FINAL IMPRESSION  1. Altered mental status, unspecified altered mental status type    2. Alcoholic intoxication without complication (HCC)           PLAN  1.  Appropriate disposition will be made after appropriate observation reevaluation    Electronically signed by: Guy G Gansert, 11/17/2018 7:08 PM

## 2018-11-18 NOTE — DISCHARGE PLANNING
Alert Team Note: Patient has been sleeping throughout the evening, intoxicated and unable to be assessed at this time. Will stand by to provide a behavioral health assessment once patient is medically cleared and able to participate in the interview process.    Roz Sykes, Ph.D.  Alert Team Therapist

## 2018-11-18 NOTE — ED NOTES
Pt sitting upright on gurney.  Pt anxious, requests medication.  BUE tremors noted.  ERP notified.

## 2018-11-18 NOTE — ED NOTES
Pt resting all evening. placing pt in exam room for assessment once pt is not intoxicated. bilateral chest rise.

## 2018-11-18 NOTE — ED NOTES
Pt sleeping. Bilateral chest rise. Tolerating bed more since being moved out of direct light in his face
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
## 2018-11-18 NOTE — ED NOTES
Report received from Aster SPENCE, assumed care of patient.  Bed in lowest position.  Pt resting on gurney, visible chest rise and fall noted.

## 2018-11-18 NOTE — ED PROVIDER NOTES
ED Provider Note            Patient was seen by [facility].  Patient is uma for safety and is no longer suicidal.  He is A and O x3.  Patient understands he may return to the emergency department at any point.

## 2018-11-18 NOTE — ED TRIAGE NOTES
".  Chief Complaint   Patient presents with   • Detox     pt states \"i drink 10 earthquakes a day\"; has been drinking all day, denies drug use   • Suicidal Ideation     pt states \"i am suicidal\" but appears to intoxicated to state if he has a plan stating only \"i took a whole bottle of librium weeks ago\" when asked if he has attempted SI in the past     Patient clearly intoxicated walking into triage with pants and underwear hanging down. Patient states that he is SI; denies HI, denies drug use. SAD score 6; Charge RN notified. Patient to [address]    "

## 2018-11-19 ENCOUNTER — HOSPITAL ENCOUNTER (EMERGENCY)
Dept: HOSPITAL 8 - ED | Age: 33
Discharge: HOME | End: 2018-11-19
Payer: MEDICARE

## 2018-11-19 ENCOUNTER — HOSPITAL ENCOUNTER (EMERGENCY)
Facility: MEDICAL CENTER | Age: 33
End: 2018-11-19
Attending: EMERGENCY MEDICINE
Payer: MEDICARE

## 2018-11-19 ENCOUNTER — HOSPITAL ENCOUNTER (EMERGENCY)
Dept: HOSPITAL 8 - ED | Age: 33
Discharge: LEFT BEFORE DISCHARGE | End: 2018-11-19
Payer: MEDICARE

## 2018-11-19 ENCOUNTER — HOSPITAL ENCOUNTER (EMERGENCY)
Facility: MEDICAL CENTER | Age: 33
End: 2018-11-20
Attending: EMERGENCY MEDICINE
Payer: MEDICARE

## 2018-11-19 VITALS — WEIGHT: 153 LBS | HEIGHT: 68 IN | BODY MASS INDEX: 23.19 KG/M2

## 2018-11-19 VITALS — SYSTOLIC BLOOD PRESSURE: 137 MMHG | DIASTOLIC BLOOD PRESSURE: 97 MMHG

## 2018-11-19 VITALS
TEMPERATURE: 98.7 F | HEART RATE: 115 BPM | OXYGEN SATURATION: 94 % | WEIGHT: 150 LBS | HEIGHT: 68 IN | DIASTOLIC BLOOD PRESSURE: 90 MMHG | BODY MASS INDEX: 22.73 KG/M2 | SYSTOLIC BLOOD PRESSURE: 137 MMHG | RESPIRATION RATE: 16 BRPM

## 2018-11-19 VITALS
HEART RATE: 100 BPM | DIASTOLIC BLOOD PRESSURE: 96 MMHG | BODY MASS INDEX: 23.18 KG/M2 | SYSTOLIC BLOOD PRESSURE: 132 MMHG | HEIGHT: 67 IN | RESPIRATION RATE: 16 BRPM | TEMPERATURE: 98.1 F | WEIGHT: 147.71 LBS

## 2018-11-19 DIAGNOSIS — Z53.21: ICD-10-CM

## 2018-11-19 DIAGNOSIS — Z72.9: ICD-10-CM

## 2018-11-19 DIAGNOSIS — I10: ICD-10-CM

## 2018-11-19 DIAGNOSIS — F31.9: ICD-10-CM

## 2018-11-19 DIAGNOSIS — F17.200: ICD-10-CM

## 2018-11-19 DIAGNOSIS — F90.9: ICD-10-CM

## 2018-11-19 DIAGNOSIS — F10.14: Primary | ICD-10-CM

## 2018-11-19 DIAGNOSIS — E11.9: ICD-10-CM

## 2018-11-19 DIAGNOSIS — F10.10: ICD-10-CM

## 2018-11-19 DIAGNOSIS — F10.929 ALCOHOLIC INTOXICATION WITH COMPLICATION (HCC): ICD-10-CM

## 2018-11-19 DIAGNOSIS — F10.120: Primary | ICD-10-CM

## 2018-11-19 PROCEDURE — 99283 EMERGENCY DEPT VISIT LOW MDM: CPT

## 2018-11-19 PROCEDURE — 80307 DRUG TEST PRSMV CHEM ANLYZR: CPT

## 2018-11-19 PROCEDURE — 99284 EMERGENCY DEPT VISIT MOD MDM: CPT

## 2018-11-19 PROCEDURE — 36415 COLL VENOUS BLD VENIPUNCTURE: CPT

## 2018-11-19 PROCEDURE — 302449 STATCHG TRIAGE ONLY (STATISTIC)

## 2018-11-19 ASSESSMENT — PAIN SCALES - GENERAL
PAINLEVEL_OUTOF10: 0
PAINLEVEL_OUTOF10: 0

## 2018-11-20 PROCEDURE — 99284 EMERGENCY DEPT VISIT MOD MDM: CPT

## 2018-11-20 NOTE — ED PROVIDER NOTES
"ER Provider Note     Scribed for Dalton Enamorado M.D. by Abner Owen. 11/19/2018, 10:08 PM.    Primary Care Provider: RUSS Goddard  Means of Arrival: EMS   History obtained from: Patient  History limited by: Alcohol Intoxication    CHIEF COMPLAINT  Chief Complaint   Patient presents with   • Alcohol Intoxication     pt here via EMS \"I don't feel good.\" Left AMA one hour ago for treatment of same issue       HPI  Gopal Ceja is a 33 y.o. male who presents to the Emergency Department after being brought here by EMS for alcohol intoxication for the second time tonight. Per nurse the patient left AMA after being brought here the first time, however has returned after being picked up by EMS again. Gopal reports drinking 10 \"earthquakes\" today, and now he feels poorly. Gopal states he has been hallucinating. He denies any suicidal or homicidal ideations.    HPI limited secondary to alcohol intoxication    REVIEW OF SYSTEMS  See HPI for further details.  ROS limited secondary to alcohol intoxication.    PAST MEDICAL HISTORY   has a past medical history of Acute anxiety; ADHD (attention deficit hyperactivity disorder); ADHD (attention deficit hyperactivity disorder); Alcohol abuse; Bipolar affective (HCC); Depression; Ectrodactyly-ectodermal dysplasia-clefting syndrome; ETOH abuse; and Psychiatric disorder.    SURGICAL HISTORY   has a past surgical history that includes other orthopedic surgery.    SOCIAL HISTORY  Social History   Substance Use Topics   • Smoking status: Current Some Day Smoker     Packs/day: 0.25     Types: Cigarettes   • Smokeless tobacco: Never Used      Comment: Smokes couple of times a month   • Alcohol use 0.0 oz/week      Comment: drinks 10 earthquakes a day      History   Drug Use No       FAMILY HISTORY  Family History   Problem Relation Age of Onset   • Heart Disease Neg Hx    • Hypertension Neg Hx    • Hyperlipidemia Neg Hx        CURRENT MEDICATIONS  No current " "facility-administered medications on file prior to encounter.      Current Outpatient Prescriptions on File Prior to Encounter   Medication Sig Dispense Refill   • buPROPion (WELLBUTRIN) 75 MG Tab Take 1 Tab by mouth 2 Times a Day. 60 Tab 3   • naltrexone (DEPADE) 50 MG Tab Take 1 Tab by mouth every day. 30 Tab 0   • thiamine (THIAMINE) 100 MG tablet Take 1 Tab by mouth every day. 30 Tab 0       ALLERGIES  No Known Allergies    PHYSICAL EXAM  VITAL SIGNS: /96   Pulse 100   Temp 36.7 °C (98.1 °F) (Oral)   Resp 16   Ht 1.702 m (5' 7\")   Wt 67 kg (147 lb 11.3 oz)   BMI 23.13 kg/m²      Constitutional: Intoxicated,   HENT: No signs of trauma, Bilateral external ears normal, Nose normal.   Eyes: Pupils are equal and reactive, Conjunctiva normal, Non-icteric.   Neck: Normal range of motion, No tenderness, Supple, No stridor.   Lymphatic: No lymphadenopathy noted.   Cardiovascular: Regular rate and rhythm, no murmurs.   Thorax & Lungs: Normal breath sounds, No respiratory distress, No wheezing, No chest tenderness.   Abdomen: Bowel sounds normal, Soft, No tenderness, No masses, No pulsatile masses. No peritoneal signs.  Skin: Warm, Dry, No erythema, No rash.   Back: No bony tenderness, No CVA tenderness.   Extremities: Intact distal pulses, No edema, No tenderness, No cyanosis.  Musculoskeletal: Good range of motion in all major joints. No tenderness to palpation or major deformities noted. No obvious trauma,   Neurologic: Slurred speech, Normal motor function,  No focal deficits noted.   Psychiatric: Affect normal,        COURSE & MEDICAL DECISION MAKING  Pertinent Labs & Imaging studies reviewed. (See chart for details)    This is a 33 y.o. male that presents with alcohol intoxication.  The patient has no evidence of trauma on exam at this time.  My plan is that the patient metabolize and then reassess after there is no need for laboratory tests at this time..     10:08 PM - Patient seen and examined at " bedside, who agrees to rest here until he evy up.     The patient will return for new or worsening symptoms and is stable at the time of discharge.  Patient is now clinically sober and no longer having any complaints.  Strict return precautions were given and follow-up was arranged.    The patient is referred to a primary physician for blood pressure management, diabetic screening, and for all other preventative health concerns.    DISPOSITION:  Patient will be discharged home in stable condition.    FOLLOW UP:  RUSS Goddard  [address] [facility] [address]  [facility] [address]  [phone]    In 1 day        FINAL IMPRESSION  1. Alcoholic intoxication with complication (HCC)          Abner FELIPE (Scribe), am scribing for, and in the presence of, Dalton Enamorado M.D..    Electronically signed by: Abner Owen (Scribe), 11/19/2018    Dalton FELIPE M.D. personally performed the services described in this documentation, as scribed by Abner Owen in my presence, and it is both accurate and complete. E.    The note accurately reflects work and decisions made by me.  Dalton Enamorado  11/20/2018  4:41 AM
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
## 2018-11-20 NOTE — ED PROVIDER NOTES
"ED Provider Note    CHIEF COMPLAINT  Chief Complaint   Patient presents with   • Alcohol Intoxication     pt here via EMS \"I don't feel good.\" Left AMA one hour ago for treatment of same issue       HPI  Gopal Ceja is a 33 y.o. male who presents admitting to drinking alcohol today.  He was at [facility] earlier this morning and discharged.  He presents tachycardic, states his abdomen feels sore.  No headache, no neck pain.  He denies trauma.  Patient denies suicidal or homicidal ideation.  Patient with long-standing history of frequent visits to the ER for similar complaint.  Initially compliant lying on the bed, no distress.    REVIEW OF SYSTEMS  Constitutional: Admits to alcohol intoxication  Respiratory: No shortness of breath  Cardiac: No chest pain or syncope  Gastrointestinal: \"Sore abdomen\".  No vomiting  Musculoskeletal: No acute neck or back pain  Neurologic: No headache  Psychiatric: Denies suicidal ideation          PAST MEDICAL HISTORY  Past Medical History:   Diagnosis Date   • Acute anxiety    • ADHD (attention deficit hyperactivity disorder)    • ADHD (attention deficit hyperactivity disorder)    • Alcohol abuse    • Bipolar affective (HCC)    • Depression    • Ectrodactyly-ectodermal dysplasia-clefting syndrome    • ETOH abuse    • Psychiatric disorder     anxiety/panic disorder       FAMILY HISTORY  Family History   Problem Relation Age of Onset   • Heart Disease Neg Hx    • Hypertension Neg Hx    • Hyperlipidemia Neg Hx        SOCIAL HISTORY  Social History     Social History   • Marital status: Single     Spouse name: N/A   • Number of children: N/A   • Years of education: N/A     Social History Main Topics   • Smoking status: Current Some Day Smoker     Packs/day: 0.25     Types: Cigarettes   • Smokeless tobacco: Never Used      Comment: Smokes couple of times a month   • Alcohol use 0.0 oz/week      Comment: drinks 10 earthquakes a day   • Drug use: No   • Sexual activity: Not on file " "    Other Topics Concern   • Not on file     Social History Narrative    ** Merged History Encounter **            SURGICAL HISTORY  Past Surgical History:   Procedure Laterality Date   • OTHER ORTHOPEDIC SURGERY      hands bilaterally r/t EEDC syndrome       CURRENT MEDICATIONS  Home Medications    **Home medications have not yet been reviewed for this encounter**         ALLERGIES  No Known Allergies    PHYSICAL EXAM  VITAL SIGNS: /96   Pulse 100   Temp 36.7 °C (98.1 °F) (Oral)   Resp 16   Ht 1.702 m (5' 7\")   Wt 67 kg (147 lb 11.3 oz)   BMI 23.13 kg/m²    Constitutional: Nontoxic appearance  ENT: Nares clear, mucous membranes slightly dry.  Eyes:  Conjunctiva normal, No discharge.    Lymphatic: No adenopathy.   Cardiovascular: Tachycardic heart rate, Normal rhythm.   Pulmonary: No wheezing, no rales  Gastrointestinal: Soft, mild tenderness all 4 quadrants.  The no distention, no guarding.  No peritoneal signs  Skin: Warm, Dry, No jaundice  Musculoskeletal:  No CVA tenderness.   Neurologic: Speech mildly slurred.  No facial droop.   strength and leg strength equal.   Psychiatric: Patient is calm, normal mood    RADIOLOGY/PROCEDURES/Labs  Patient eloped prior to laboratory evaluation being obtained    COURSE & MEDICAL DECISION MAKING  Pertinent Labs & Imaging studies reviewed. (See chart for details)  I was informed by the nursing staff that the patient eloped prior to further testing.  I had ordered blood work as well as IV detoxification fluid.  Had no further contact with this patient    FINAL IMPRESSION  1. Alcoholic intoxication with complication (HCC)            Electronically signed by: Brayden Hollingsworth, 11/19/2018 9:36 PM    "

## 2018-11-20 NOTE — ED NOTES
While in another room pt is asking other staff if he can leave AMA, before coming to talk to pt, he has left AMA, ERP aware

## 2018-11-20 NOTE — ED TRIAGE NOTES
"Pt BIBA, here with ETOH intoxication, states \"I don't feel good.\"  Reports drinking 10 earthquakes today.  Denies SI/HI.  Pt poor historian and keeps turning away during triage assessment.  Left AMA one hour ago   "
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
## 2018-11-22 ENCOUNTER — HOSPITAL ENCOUNTER (EMERGENCY)
Facility: MEDICAL CENTER | Age: 33
End: 2018-11-22
Attending: EMERGENCY MEDICINE
Payer: MEDICARE

## 2018-11-22 VITALS
WEIGHT: 145 LBS | DIASTOLIC BLOOD PRESSURE: 86 MMHG | BODY MASS INDEX: 22.05 KG/M2 | HEART RATE: 122 BPM | RESPIRATION RATE: 18 BRPM | OXYGEN SATURATION: 98 % | SYSTOLIC BLOOD PRESSURE: 130 MMHG | TEMPERATURE: 97.6 F

## 2018-11-22 VITALS
RESPIRATION RATE: 18 BRPM | SYSTOLIC BLOOD PRESSURE: 107 MMHG | DIASTOLIC BLOOD PRESSURE: 65 MMHG | BODY MASS INDEX: 21.98 KG/M2 | TEMPERATURE: 98.4 F | WEIGHT: 145 LBS | OXYGEN SATURATION: 96 % | HEART RATE: 103 BPM | HEIGHT: 68 IN

## 2018-11-22 DIAGNOSIS — F10.920 ALCOHOLIC INTOXICATION WITHOUT COMPLICATION (HCC): ICD-10-CM

## 2018-11-22 DIAGNOSIS — F10.10 ALCOHOL ABUSE: ICD-10-CM

## 2018-11-22 PROCEDURE — 99284 EMERGENCY DEPT VISIT MOD MDM: CPT

## 2018-11-22 ASSESSMENT — PAIN SCALES - GENERAL
PAINLEVEL_OUTOF10: 0
PAINLEVEL_OUTOF10: 5

## 2018-11-22 ASSESSMENT — LIFESTYLE VARIABLES: DO YOU DRINK ALCOHOL: YES

## 2018-11-22 NOTE — ED TRIAGE NOTES
"Chief Complaint   Patient presents with   • Alcohol Intoxication     BIB EMS for above. Pt ambulatory, NAD noted. This is the patient's 11th visit to this ER since 11/10 for same issue. Reports he wants to detox but leaves here and continues drink. HR noted.    Pt returned to lobby. Educated on triage process and to inform staff of any changes.     /98   Pulse (!) 116   Temp 36.9 °C (98.4 °F) (Temporal)   Resp 17   Ht 1.727 m (5' 8\")   Wt 65.8 kg (145 lb)   SpO2 96%   BMI 22.05 kg/m²     "

## 2018-11-22 NOTE — ED PROVIDER NOTES
ED Provider Note    CHIEF COMPLAINT  Chief Complaint   Patient presents with   • Alcohol Intoxication       HPI  Gopal Ceja is a 33 y.o. male who presents for evaluation of alcohol intoxication.  The patient is well-known to myself in this facility for chronic refractory alcohol abuse.  He was brought in by paramedics because he was too intoxicated to ambulate.  There is no report of any trauma.  Patient had slurred speech on arrival no objective evidence of any trauma he has no complaints    REVIEW OF SYSTEMS  See HPI for further details.  No high fevers chills night sweats weight loss numbness tingling weakness all other systems are negative.     PAST MEDICAL HISTORY  Past Medical History:   Diagnosis Date   • Acute anxiety    • ADHD (attention deficit hyperactivity disorder)    • ADHD (attention deficit hyperactivity disorder)    • Alcohol abuse    • Bipolar affective (HCC)    • Depression    • Ectrodactyly-ectodermal dysplasia-clefting syndrome    • ETOH abuse    • Psychiatric disorder     anxiety/panic disorder       FAMILY HISTORY  Noncontributory    SOCIAL HISTORY  Social History     Social History   • Marital status: Single     Spouse name: N/A   • Number of children: N/A   • Years of education: N/A     Social History Main Topics   • Smoking status: Current Some Day Smoker     Packs/day: 0.25     Types: Cigarettes   • Smokeless tobacco: Never Used      Comment: Smokes couple of times a month   • Alcohol use 0.0 oz/week      Comment: drinks 10 earthquakes a day   • Drug use: No   • Sexual activity: Not on file     Other Topics Concern   • Not on file     Social History Narrative    ** Merged History Encounter **        Daily heavy alcohol use    SURGICAL HISTORY  Past Surgical History:   Procedure Laterality Date   • OTHER ORTHOPEDIC SURGERY      hands bilaterally r/t EEDC syndrome       CURRENT MEDICATIONS  Home Medications     Reviewed by Eddie Bennett R.N. (Registered Nurse) on 11/22/18 at 1414  Med  "List Status: Partial   Medication Last Dose Status   buPROPion (WELLBUTRIN) 75 MG Tab >2months Active   naltrexone (DEPADE) 50 MG Tab >2months Active   thiamine (THIAMINE) 100 MG tablet >2months Active                ALLERGIES  No Known Allergies    PHYSICAL EXAM  VITAL SIGNS: /98   Pulse (!) 116   Temp 36.9 °C (98.4 °F) (Temporal)   Resp 17   Ht 1.727 m (5' 8\")   Wt 65.8 kg (145 lb)   SpO2 96%   BMI 22.05 kg/m²  Room air O2: 96    Constitutional: Disheveled smells of alcohol   hENT: Normocephalic, Atraumatic, Bilateral external ears normal, Oropharynx moist, No oral exudates, Nose normal.   Eyes: PERRLA, EOMI, Conjunctiva normal, No discharge.   Neck: Normal range of motion, No tenderness, Supple, No stridor.   Cardiovascular: Mildly tachycardic, Normal rhythm, No murmurs, No rubs, No gallops.   Thorax & Lungs: Normal breath sounds, No respiratory distress, No wheezing, No chest tenderness.   Abdomen: Bowel sounds normal, Soft, No tenderness, No masses, No pulsatile masses.   Skin: Warm, Dry, No erythema, No rash.   Back: No tenderness, No CVA tenderness.   Extremities: Intact distal pulses, No edema, No tenderness, No cyanosis, No clubbing.   Neurologic: Alert & oriented x 3, Normal motor function, Normal sensory function, No focal deficits noted.   Psychiatric: No suicidality reported.         COURSE & MEDICAL DECISION MAKING  Pertinent Labs & Imaging studies reviewed. (See chart for details)  Patient was observed for several hours.  At the time of discharge at approximately 1800, he was clinically sober ambulatory and did not have any complaints.  He be discharged home to his own care    FINAL IMPRESSION  1.   1. Alcoholic intoxication without complication (HCC)             Electronically signed by: Brian Champagne, 11/22/2018 3:55 PM    "

## 2018-11-23 NOTE — ED PROVIDER NOTES
ED Provider Note    CHIEF COMPLAINT  Chief Complaint   Patient presents with   • Alcohol Intoxication       HPI  Gopal Ceja is a 33 y.o. male who presents complaints of alcohol intoxication who really has no specific complaint at this time patient was just discharged from this facility around 6 PM.  The patient has a history of alcohol use disorder is frequently visited in the emergency room with alcohol related problems he was found public intoxicated and brought here    REVIEW OF SYSTEMS  See HPI for further details.  Patient has no specific complaints although not a good historian all other systems are negative.    PAST MEDICAL HISTORY  Past Medical History:   Diagnosis Date   • Acute anxiety    • ADHD (attention deficit hyperactivity disorder)    • ADHD (attention deficit hyperactivity disorder)    • Alcohol abuse    • Bipolar affective (HCC)    • Depression    • Ectrodactyly-ectodermal dysplasia-clefting syndrome    • ETOH abuse    • Psychiatric disorder     anxiety/panic disorder       FAMILY HISTORY  Family History   Problem Relation Age of Onset   • Heart Disease Neg Hx    • Hypertension Neg Hx    • Hyperlipidemia Neg Hx        SOCIAL HISTORY  Social History     Social History   • Marital status: Single     Spouse name: N/A   • Number of children: N/A   • Years of education: N/A     Social History Main Topics   • Smoking status: Current Some Day Smoker     Packs/day: 0.25     Types: Cigarettes   • Smokeless tobacco: Never Used      Comment: Smokes couple of times a month   • Alcohol use 0.0 oz/week      Comment: drinks 10 earthquakes a day   • Drug use: No   • Sexual activity: Not on file     Other Topics Concern   • Not on file     Social History Narrative    ** Merged History Encounter **            SURGICAL HISTORY  Past Surgical History:   Procedure Laterality Date   • OTHER ORTHOPEDIC SURGERY      hands bilaterally r/t EEDC syndrome       CURRENT MEDICATIONS  Home Medications    **Home medications  have not yet been reviewed for this encounter**         ALLERGIES  No Known Allergies    PHYSICAL EXAM  VITAL SIGNS: /98   Pulse (!) 116   Temp 36.4 °C (97.6 °F) (Temporal)   Resp 16   Wt 65.8 kg (145 lb)   SpO2 96%   BMI 22.05 kg/m²     Constitutional: Well developed, Well nourished, No acute distress, Non-toxic appearance.   HENT: Normocephalic, Atraumatic, Bilateral external ears normal, mucous membranes moist, No oral exudates, Nose normal.   Eyes: PERRLA,  Conjunctiva and lids normal, No discharge.   Skin: Warm, Dry, No erythema, No rash.   Extremities: Intact distal pulses, No edema, No tenderness, No cyanosis, No clubbing.   Musculoskeletal: Good range of motion in all major joints. No tenderness to palpation or major deformities, or injuries noted.   Neurologic: Alert & oriented x 3, Normal motor function, Normal sensory function, No focal deficits noted.             COURSE & MEDICAL DECISION MAKING  Pertinent Labs & Imaging studies reviewed. (See chart for details)  Patient seems possibly slightly is mildly intoxicated but is able to ambulate I do not think any acute treatment is needed he is clinically sober enough at this point for discharge    FINAL IMPRESSION  1.  Alcohol use disorder      Electronically signed by: Brayden Shelley, 11/22/2018 10:40 PM

## 2018-11-23 NOTE — ED TRIAGE NOTES
Gopal Ceja  33 y.o.  male  Chief Complaint   Patient presents with   • Alcohol Intoxication     Brought in by tjsa picked up outside [facility] for Alcohol Intoxication. Ambulated to triage without difficulty.

## 2018-11-23 NOTE — ED NOTES
RN attempted to go through the pts. Discharge paperwork. The pt. Refused to sign the paperwork and did not want to keep the discharge paperwork.     The pt. Left ambulatory and steady. Pt. Tolerated the PO challenge.

## 2018-11-23 NOTE — ED NOTES
Pt given discharge instructions/home care instructions explained, pt verbalized understanding of instructions given, pt ambulatory to SHUN mayo.

## 2018-11-26 ENCOUNTER — HOSPITAL ENCOUNTER (EMERGENCY)
Dept: HOSPITAL 8 - ED | Age: 33
Discharge: HOME | End: 2018-11-26
Payer: MEDICARE

## 2018-11-26 DIAGNOSIS — F41.1: ICD-10-CM

## 2018-11-26 DIAGNOSIS — F32.9: ICD-10-CM

## 2018-11-26 DIAGNOSIS — I10: ICD-10-CM

## 2018-11-26 DIAGNOSIS — F17.200: ICD-10-CM

## 2018-11-26 DIAGNOSIS — Z72.9: ICD-10-CM

## 2018-11-26 DIAGNOSIS — F10.120: Primary | ICD-10-CM

## 2018-11-26 DIAGNOSIS — F90.9: ICD-10-CM

## 2018-11-26 DIAGNOSIS — E11.9: ICD-10-CM

## 2018-11-26 PROCEDURE — 99283 EMERGENCY DEPT VISIT LOW MDM: CPT

## 2018-11-27 ENCOUNTER — APPOINTMENT (OUTPATIENT)
Dept: RADIOLOGY | Facility: MEDICAL CENTER | Age: 33
DRG: 897 | End: 2018-11-27
Attending: EMERGENCY MEDICINE
Payer: MEDICARE

## 2018-11-27 ENCOUNTER — HOSPITAL ENCOUNTER (INPATIENT)
Facility: MEDICAL CENTER | Age: 33
LOS: 5 days | DRG: 897 | End: 2018-12-02
Attending: EMERGENCY MEDICINE | Admitting: HOSPITALIST
Payer: MEDICARE

## 2018-11-27 DIAGNOSIS — F10.10 ALCOHOL ABUSE: ICD-10-CM

## 2018-11-27 DIAGNOSIS — F10.930 ALCOHOL WITHDRAWAL SYNDROME WITHOUT COMPLICATION (HCC): ICD-10-CM

## 2018-11-27 DIAGNOSIS — J06.9 UPPER RESPIRATORY TRACT INFECTION, UNSPECIFIED TYPE: ICD-10-CM

## 2018-11-27 DIAGNOSIS — H10.33 ACUTE CONJUNCTIVITIS OF BOTH EYES, UNSPECIFIED ACUTE CONJUNCTIVITIS TYPE: ICD-10-CM

## 2018-11-27 PROBLEM — E87.1 HYPONATREMIA: Status: ACTIVE | Noted: 2018-11-27

## 2018-11-27 PROBLEM — Q93.88 CHROMOSOME 16P11.2 MICRODELETION SYNDROME: Status: ACTIVE | Noted: 2018-11-27

## 2018-11-27 PROBLEM — Z59.00 HOMELESSNESS: Status: ACTIVE | Noted: 2018-11-27

## 2018-11-27 PROBLEM — K70.10 ALCOHOLIC HEPATITIS: Status: ACTIVE | Noted: 2018-10-26

## 2018-11-27 LAB
ALBUMIN SERPL BCP-MCNC: 3.1 G/DL (ref 3.2–4.9)
ALBUMIN SERPL BCP-MCNC: 3.8 G/DL (ref 3.2–4.9)
ALBUMIN/GLOB SERPL: 1 G/DL
ALBUMIN/GLOB SERPL: 1.1 G/DL
ALP SERPL-CCNC: 111 U/L (ref 30–99)
ALP SERPL-CCNC: 93 U/L (ref 30–99)
ALT SERPL-CCNC: 40 U/L (ref 2–50)
ALT SERPL-CCNC: 51 U/L (ref 2–50)
AMMONIA PLAS-SCNC: 15 UMOL/L (ref 11–45)
ANION GAP SERPL CALC-SCNC: 10 MMOL/L (ref 0–11.9)
ANION GAP SERPL CALC-SCNC: 13 MMOL/L (ref 0–11.9)
AST SERPL-CCNC: 115 U/L (ref 12–45)
AST SERPL-CCNC: 83 U/L (ref 12–45)
BASOPHILS # BLD AUTO: 0.2 % (ref 0–1.8)
BASOPHILS # BLD: 0.01 K/UL (ref 0–0.12)
BILIRUB SERPL-MCNC: 0.6 MG/DL (ref 0.1–1.5)
BILIRUB SERPL-MCNC: 0.6 MG/DL (ref 0.1–1.5)
BUN SERPL-MCNC: 6 MG/DL (ref 8–22)
BUN SERPL-MCNC: 9 MG/DL (ref 8–22)
CALCIUM SERPL-MCNC: 7.5 MG/DL (ref 8.4–10.2)
CALCIUM SERPL-MCNC: 7.9 MG/DL (ref 8.4–10.2)
CHLORIDE SERPL-SCNC: 94 MMOL/L (ref 96–112)
CHLORIDE SERPL-SCNC: 97 MMOL/L (ref 96–112)
CO2 SERPL-SCNC: 23 MMOL/L (ref 20–33)
CO2 SERPL-SCNC: 25 MMOL/L (ref 20–33)
CREAT SERPL-MCNC: 0.62 MG/DL (ref 0.5–1.4)
CREAT SERPL-MCNC: 0.76 MG/DL (ref 0.5–1.4)
EOSINOPHIL # BLD AUTO: 0.01 K/UL (ref 0–0.51)
EOSINOPHIL NFR BLD: 0.2 % (ref 0–6.9)
ERYTHROCYTE [DISTWIDTH] IN BLOOD BY AUTOMATED COUNT: 44.6 FL (ref 35.9–50)
ETHANOL BLD-MCNC: 0.3 G/DL
GLOBULIN SER CALC-MCNC: 3 G/DL (ref 1.9–3.5)
GLOBULIN SER CALC-MCNC: 3.6 G/DL (ref 1.9–3.5)
GLUCOSE SERPL-MCNC: 101 MG/DL (ref 65–99)
GLUCOSE SERPL-MCNC: 89 MG/DL (ref 65–99)
HCT VFR BLD AUTO: 41.4 % (ref 42–52)
HGB BLD-MCNC: 14.4 G/DL (ref 14–18)
IMM GRANULOCYTES # BLD AUTO: 0.02 K/UL (ref 0–0.11)
IMM GRANULOCYTES NFR BLD AUTO: 0.3 % (ref 0–0.9)
INR PPP: 0.96 (ref 0.87–1.13)
LIPASE SERPL-CCNC: 37 U/L (ref 7–58)
LYMPHOCYTES # BLD AUTO: 1.13 K/UL (ref 1–4.8)
LYMPHOCYTES NFR BLD: 17.4 % (ref 22–41)
MAGNESIUM SERPL-MCNC: 1.7 MG/DL (ref 1.5–2.5)
MCH RBC QN AUTO: 30.6 PG (ref 27–33)
MCHC RBC AUTO-ENTMCNC: 34.8 G/DL (ref 33.7–35.3)
MCV RBC AUTO: 88.1 FL (ref 81.4–97.8)
MONOCYTES # BLD AUTO: 1 K/UL (ref 0–0.85)
MONOCYTES NFR BLD AUTO: 15.4 % (ref 0–13.4)
NEUTROPHILS # BLD AUTO: 4.33 K/UL (ref 1.82–7.42)
NEUTROPHILS NFR BLD: 66.5 % (ref 44–72)
NRBC # BLD AUTO: 0 K/UL
NRBC BLD-RTO: 0 /100 WBC
PHOSPHATE SERPL-MCNC: 2.6 MG/DL (ref 2.5–4.5)
PLATELET # BLD AUTO: 82 K/UL (ref 164–446)
PMV BLD AUTO: 11.1 FL (ref 9–12.9)
POTASSIUM SERPL-SCNC: 3.1 MMOL/L (ref 3.6–5.5)
POTASSIUM SERPL-SCNC: 4 MMOL/L (ref 3.6–5.5)
PROT SERPL-MCNC: 6.1 G/DL (ref 6–8.2)
PROT SERPL-MCNC: 7.4 G/DL (ref 6–8.2)
PROTHROMBIN TIME: 12.7 SEC (ref 12–14.6)
RBC # BLD AUTO: 4.7 M/UL (ref 4.7–6.1)
SODIUM SERPL-SCNC: 130 MMOL/L (ref 135–145)
SODIUM SERPL-SCNC: 132 MMOL/L (ref 135–145)
WBC # BLD AUTO: 6.5 K/UL (ref 4.8–10.8)

## 2018-11-27 PROCEDURE — 99291 CRITICAL CARE FIRST HOUR: CPT | Performed by: INTERNAL MEDICINE

## 2018-11-27 PROCEDURE — A9270 NON-COVERED ITEM OR SERVICE: HCPCS | Performed by: HOSPITALIST

## 2018-11-27 PROCEDURE — 99223 1ST HOSP IP/OBS HIGH 75: CPT | Mod: AI | Performed by: HOSPITALIST

## 2018-11-27 PROCEDURE — 700111 HCHG RX REV CODE 636 W/ 250 OVERRIDE (IP): Performed by: HOSPITALIST

## 2018-11-27 PROCEDURE — 83735 ASSAY OF MAGNESIUM: CPT

## 2018-11-27 PROCEDURE — 85025 COMPLETE CBC W/AUTO DIFF WBC: CPT

## 2018-11-27 PROCEDURE — 700101 HCHG RX REV CODE 250: Performed by: EMERGENCY MEDICINE

## 2018-11-27 PROCEDURE — 96375 TX/PRO/DX INJ NEW DRUG ADDON: CPT

## 2018-11-27 PROCEDURE — 82140 ASSAY OF AMMONIA: CPT

## 2018-11-27 PROCEDURE — 85610 PROTHROMBIN TIME: CPT

## 2018-11-27 PROCEDURE — 700102 HCHG RX REV CODE 250 W/ 637 OVERRIDE(OP): Performed by: HOSPITALIST

## 2018-11-27 PROCEDURE — 700111 HCHG RX REV CODE 636 W/ 250 OVERRIDE (IP): Performed by: EMERGENCY MEDICINE

## 2018-11-27 PROCEDURE — 84100 ASSAY OF PHOSPHORUS: CPT

## 2018-11-27 PROCEDURE — 700105 HCHG RX REV CODE 258: Performed by: INTERNAL MEDICINE

## 2018-11-27 PROCEDURE — 99285 EMERGENCY DEPT VISIT HI MDM: CPT

## 2018-11-27 PROCEDURE — 94760 N-INVAS EAR/PLS OXIMETRY 1: CPT

## 2018-11-27 PROCEDURE — 700105 HCHG RX REV CODE 258: Performed by: EMERGENCY MEDICINE

## 2018-11-27 PROCEDURE — 96376 TX/PRO/DX INJ SAME DRUG ADON: CPT

## 2018-11-27 PROCEDURE — 770022 HCHG ROOM/CARE - ICU (200)

## 2018-11-27 PROCEDURE — 700111 HCHG RX REV CODE 636 W/ 250 OVERRIDE (IP): Performed by: INTERNAL MEDICINE

## 2018-11-27 PROCEDURE — 700101 HCHG RX REV CODE 250: Performed by: INTERNAL MEDICINE

## 2018-11-27 PROCEDURE — 96366 THER/PROPH/DIAG IV INF ADDON: CPT

## 2018-11-27 PROCEDURE — 700101 HCHG RX REV CODE 250: Performed by: HOSPITALIST

## 2018-11-27 PROCEDURE — 71045 X-RAY EXAM CHEST 1 VIEW: CPT

## 2018-11-27 PROCEDURE — HZ2ZZZZ DETOXIFICATION SERVICES FOR SUBSTANCE ABUSE TREATMENT: ICD-10-PCS | Performed by: HOSPITALIST

## 2018-11-27 PROCEDURE — 96365 THER/PROPH/DIAG IV INF INIT: CPT

## 2018-11-27 PROCEDURE — 700105 HCHG RX REV CODE 258: Performed by: HOSPITALIST

## 2018-11-27 PROCEDURE — 80307 DRUG TEST PRSMV CHEM ANLYZR: CPT

## 2018-11-27 PROCEDURE — 83690 ASSAY OF LIPASE: CPT

## 2018-11-27 PROCEDURE — 99291 CRITICAL CARE FIRST HOUR: CPT | Performed by: HOSPITALIST

## 2018-11-27 PROCEDURE — 80053 COMPREHEN METABOLIC PANEL: CPT

## 2018-11-27 RX ORDER — LORAZEPAM 1 MG/1
3 TABLET ORAL
Status: DISCONTINUED | OUTPATIENT
Start: 2018-11-27 | End: 2018-12-02 | Stop reason: HOSPADM

## 2018-11-27 RX ORDER — MAGNESIUM SULFATE HEPTAHYDRATE 40 MG/ML
2 INJECTION, SOLUTION INTRAVENOUS ONCE
Status: COMPLETED | OUTPATIENT
Start: 2018-11-27 | End: 2018-11-27

## 2018-11-27 RX ORDER — ERYTHROMYCIN 5 MG/G
OINTMENT OPHTHALMIC DAILY
Status: DISCONTINUED | OUTPATIENT
Start: 2018-11-27 | End: 2018-11-28

## 2018-11-27 RX ORDER — SODIUM CHLORIDE 9 MG/ML
1000 INJECTION, SOLUTION INTRAVENOUS ONCE
Status: COMPLETED | OUTPATIENT
Start: 2018-11-27 | End: 2018-11-27

## 2018-11-27 RX ORDER — LORAZEPAM 2 MG/ML
1 INJECTION INTRAMUSCULAR
Status: DISCONTINUED | OUTPATIENT
Start: 2018-11-27 | End: 2018-12-02 | Stop reason: HOSPADM

## 2018-11-27 RX ORDER — LORAZEPAM 2 MG/ML
1.5 INJECTION INTRAMUSCULAR
Status: DISCONTINUED | OUTPATIENT
Start: 2018-11-27 | End: 2018-12-02 | Stop reason: HOSPADM

## 2018-11-27 RX ORDER — POLYMYXIN B SULFATE AND TRIMETHOPRIM 1; 10000 MG/ML; [USP'U]/ML
1 SOLUTION OPHTHALMIC EVERY 4 HOURS
Qty: 10 ML | Refills: 0 | Status: SHIPPED | OUTPATIENT
Start: 2018-11-27 | End: 2018-12-07

## 2018-11-27 RX ORDER — GABAPENTIN 300 MG/1
300 CAPSULE ORAL 3 TIMES DAILY
Status: DISCONTINUED | OUTPATIENT
Start: 2018-11-27 | End: 2018-11-30

## 2018-11-27 RX ORDER — BUPROPION HYDROCHLORIDE 75 MG/1
75 TABLET ORAL 2 TIMES DAILY
Status: DISCONTINUED | OUTPATIENT
Start: 2018-11-27 | End: 2018-11-27

## 2018-11-27 RX ORDER — LORAZEPAM 1 MG/1
4 TABLET ORAL
Status: DISCONTINUED | OUTPATIENT
Start: 2018-11-27 | End: 2018-12-02 | Stop reason: HOSPADM

## 2018-11-27 RX ORDER — AMOXICILLIN 250 MG
2 CAPSULE ORAL 2 TIMES DAILY
Status: DISCONTINUED | OUTPATIENT
Start: 2018-11-27 | End: 2018-12-02 | Stop reason: HOSPADM

## 2018-11-27 RX ORDER — ONDANSETRON 2 MG/ML
4 INJECTION INTRAMUSCULAR; INTRAVENOUS EVERY 4 HOURS PRN
Status: DISCONTINUED | OUTPATIENT
Start: 2018-11-27 | End: 2018-12-02 | Stop reason: HOSPADM

## 2018-11-27 RX ORDER — LORAZEPAM 0.5 MG/1
0.5 TABLET ORAL EVERY 4 HOURS PRN
Status: DISCONTINUED | OUTPATIENT
Start: 2018-11-27 | End: 2018-12-02 | Stop reason: HOSPADM

## 2018-11-27 RX ORDER — DIVALPROEX SODIUM 500 MG/1
500 TABLET, DELAYED RELEASE ORAL EVERY 8 HOURS
Status: DISCONTINUED | OUTPATIENT
Start: 2018-11-27 | End: 2018-11-28

## 2018-11-27 RX ORDER — LORAZEPAM 2 MG/ML
2 INJECTION INTRAMUSCULAR
Status: DISCONTINUED | OUTPATIENT
Start: 2018-11-27 | End: 2018-12-02 | Stop reason: HOSPADM

## 2018-11-27 RX ORDER — CLONIDINE HYDROCHLORIDE 0.1 MG/1
0.1 TABLET ORAL 3 TIMES DAILY
Status: DISCONTINUED | OUTPATIENT
Start: 2018-11-27 | End: 2018-11-30

## 2018-11-27 RX ORDER — LORAZEPAM 1 MG/1
2 TABLET ORAL
Status: DISCONTINUED | OUTPATIENT
Start: 2018-11-27 | End: 2018-12-02 | Stop reason: HOSPADM

## 2018-11-27 RX ORDER — ERYTHROMYCIN 5 MG/G
OINTMENT OPHTHALMIC ONCE
Status: COMPLETED | OUTPATIENT
Start: 2018-11-27 | End: 2018-11-27

## 2018-11-27 RX ORDER — SODIUM CHLORIDE 9 MG/ML
INJECTION, SOLUTION INTRAVENOUS CONTINUOUS
Status: DISCONTINUED | OUTPATIENT
Start: 2018-11-27 | End: 2018-11-30

## 2018-11-27 RX ORDER — LORAZEPAM 2 MG/ML
2 INJECTION INTRAMUSCULAR ONCE
Status: COMPLETED | OUTPATIENT
Start: 2018-11-27 | End: 2018-11-27

## 2018-11-27 RX ORDER — LORAZEPAM 2 MG/ML
0.5 INJECTION INTRAMUSCULAR EVERY 4 HOURS PRN
Status: DISCONTINUED | OUTPATIENT
Start: 2018-11-27 | End: 2018-12-02 | Stop reason: HOSPADM

## 2018-11-27 RX ORDER — POTASSIUM CHLORIDE 20 MEQ/1
40 TABLET, EXTENDED RELEASE ORAL ONCE
Status: COMPLETED | OUTPATIENT
Start: 2018-11-27 | End: 2018-11-27

## 2018-11-27 RX ORDER — POLYETHYLENE GLYCOL 3350 17 G/17G
1 POWDER, FOR SOLUTION ORAL
Status: DISCONTINUED | OUTPATIENT
Start: 2018-11-27 | End: 2018-12-02 | Stop reason: HOSPADM

## 2018-11-27 RX ORDER — LORAZEPAM 1 MG/1
1 TABLET ORAL EVERY 4 HOURS PRN
Status: DISCONTINUED | OUTPATIENT
Start: 2018-11-27 | End: 2018-12-02 | Stop reason: HOSPADM

## 2018-11-27 RX ORDER — POTASSIUM CHLORIDE 7.45 MG/ML
10 INJECTION INTRAVENOUS
Status: COMPLETED | OUTPATIENT
Start: 2018-11-27 | End: 2018-11-28

## 2018-11-27 RX ORDER — BISACODYL 10 MG
10 SUPPOSITORY, RECTAL RECTAL
Status: DISCONTINUED | OUTPATIENT
Start: 2018-11-27 | End: 2018-12-02 | Stop reason: HOSPADM

## 2018-11-27 RX ORDER — ONDANSETRON 2 MG/ML
4 INJECTION INTRAMUSCULAR; INTRAVENOUS ONCE
Status: COMPLETED | OUTPATIENT
Start: 2018-11-27 | End: 2018-11-27

## 2018-11-27 RX ADMIN — LORAZEPAM 1.5 MG: 2 INJECTION INTRAMUSCULAR; INTRAVENOUS at 14:05

## 2018-11-27 RX ADMIN — LORAZEPAM 2 MG: 2 INJECTION INTRAMUSCULAR; INTRAVENOUS at 01:59

## 2018-11-27 RX ADMIN — LORAZEPAM 1 MG: 2 INJECTION INTRAMUSCULAR; INTRAVENOUS at 08:58

## 2018-11-27 RX ADMIN — SODIUM CHLORIDE: 9 INJECTION, SOLUTION INTRAVENOUS at 14:43

## 2018-11-27 RX ADMIN — POTASSIUM CHLORIDE 40 MEQ: 1500 TABLET, EXTENDED RELEASE ORAL at 11:06

## 2018-11-27 RX ADMIN — DIVALPROEX SODIUM 500 MG: 500 TABLET, DELAYED RELEASE ORAL at 12:18

## 2018-11-27 RX ADMIN — THIAMINE HYDROCHLORIDE 1000 ML: 100 INJECTION, SOLUTION INTRAMUSCULAR; INTRAVENOUS at 01:58

## 2018-11-27 RX ADMIN — POTASSIUM CHLORIDE 10 MEQ: 7.46 INJECTION, SOLUTION INTRAVENOUS at 21:42

## 2018-11-27 RX ADMIN — CLONIDINE HYDROCHLORIDE 0.1 MG: 0.1 TABLET ORAL at 13:43

## 2018-11-27 RX ADMIN — LORAZEPAM 1.5 MG: 2 INJECTION INTRAMUSCULAR; INTRAVENOUS at 12:18

## 2018-11-27 RX ADMIN — LORAZEPAM 1 MG: 1 TABLET ORAL at 21:55

## 2018-11-27 RX ADMIN — DIVALPROEX SODIUM 500 MG: 500 TABLET, DELAYED RELEASE ORAL at 21:42

## 2018-11-27 RX ADMIN — GABAPENTIN 300 MG: 300 CAPSULE ORAL at 17:40

## 2018-11-27 RX ADMIN — POTASSIUM CHLORIDE 10 MEQ: 7.46 INJECTION, SOLUTION INTRAVENOUS at 20:10

## 2018-11-27 RX ADMIN — LORAZEPAM 2 MG: 2 INJECTION INTRAMUSCULAR; INTRAVENOUS at 03:40

## 2018-11-27 RX ADMIN — ERYTHROMYCIN: 5 OINTMENT OPHTHALMIC at 04:54

## 2018-11-27 RX ADMIN — BENZOCAINE AND MENTHOL 1 LOZENGE: 15; 4 LOZENGE ORAL at 04:54

## 2018-11-27 RX ADMIN — ERYTHROMYCIN: 5 OINTMENT OPHTHALMIC at 04:00

## 2018-11-27 RX ADMIN — LORAZEPAM 1 MG: 2 INJECTION INTRAMUSCULAR; INTRAVENOUS at 06:31

## 2018-11-27 RX ADMIN — GABAPENTIN 300 MG: 300 CAPSULE ORAL at 13:43

## 2018-11-27 RX ADMIN — POTASSIUM CHLORIDE 10 MEQ: 7.46 INJECTION, SOLUTION INTRAVENOUS at 17:37

## 2018-11-27 RX ADMIN — SODIUM CHLORIDE: 9 INJECTION, SOLUTION INTRAVENOUS at 04:55

## 2018-11-27 RX ADMIN — ONDANSETRON 4 MG: 2 INJECTION INTRAMUSCULAR; INTRAVENOUS at 01:37

## 2018-11-27 RX ADMIN — LORAZEPAM 1.5 MG: 2 INJECTION INTRAMUSCULAR; INTRAVENOUS at 13:12

## 2018-11-27 RX ADMIN — LORAZEPAM 2 MG: 2 INJECTION INTRAMUSCULAR; INTRAVENOUS at 14:53

## 2018-11-27 RX ADMIN — LORAZEPAM 1.5 MG: 2 INJECTION INTRAMUSCULAR; INTRAVENOUS at 04:58

## 2018-11-27 RX ADMIN — MAGNESIUM SULFATE HEPTAHYDRATE 2 G: 40 INJECTION, SOLUTION INTRAVENOUS at 17:37

## 2018-11-27 RX ADMIN — POTASSIUM CHLORIDE 10 MEQ: 7.46 INJECTION, SOLUTION INTRAVENOUS at 23:07

## 2018-11-27 RX ADMIN — SODIUM CHLORIDE 1000 ML: 9 INJECTION, SOLUTION INTRAVENOUS at 01:59

## 2018-11-27 RX ADMIN — CLONIDINE HYDROCHLORIDE 0.1 MG: 0.1 TABLET ORAL at 17:40

## 2018-11-27 RX ADMIN — MAGNESIUM SULFATE HEPTAHYDRATE 2 G: 40 INJECTION, SOLUTION INTRAVENOUS at 11:06

## 2018-11-27 RX ADMIN — LORAZEPAM 1.5 MG: 2 INJECTION INTRAMUSCULAR; INTRAVENOUS at 11:05

## 2018-11-27 RX ADMIN — LORAZEPAM 1 MG: 2 INJECTION INTRAMUSCULAR; INTRAVENOUS at 16:19

## 2018-11-27 RX ADMIN — BENZOCAINE AND MENTHOL 1 LOZENGE: 15; 4 LOZENGE ORAL at 14:53

## 2018-11-27 RX ADMIN — DEXMEDETOMIDINE HYDROCHLORIDE 0.2 MCG/KG/HR: 100 INJECTION, SOLUTION INTRAVENOUS at 17:35

## 2018-11-27 ASSESSMENT — ENCOUNTER SYMPTOMS
WEAKNESS: 0
NAUSEA: 1
MEMORY LOSS: 1
DEPRESSION: 0
EYE REDNESS: 1
CARDIOVASCULAR NEGATIVE: 1
DOUBLE VISION: 0
NERVOUS/ANXIOUS: 1
SHORTNESS OF BREATH: 0
HEMATOLOGIC/LYMPHATIC NEGATIVE: 1
BLURRED VISION: 0
TREMORS: 1
SORE THROAT: 0
PSYCHIATRIC NEGATIVE: 1
ALLERGIC/IMMUNOLOGIC NEGATIVE: 1
MUSCULOSKELETAL NEGATIVE: 1
MYALGIAS: 1
GASTROINTESTINAL NEGATIVE: 1
INSOMNIA: 1
ENDOCRINE NEGATIVE: 1
EYE PAIN: 0
CHILLS: 1
COUGH: 0
VOMITING: 0
BACK PAIN: 0
TINGLING: 0
HALLUCINATIONS: 1
ABDOMINAL PAIN: 1
NECK PAIN: 0
INSOMNIA: 0
CHILLS: 0
FATIGUE: 1
EYES NEGATIVE: 1
DIZZINESS: 1
HEADACHES: 0
VOMITING: 1
RESPIRATORY NEGATIVE: 1
FEVER: 0
SORE THROAT: 1
BRUISES/BLEEDS EASILY: 0
DIZZINESS: 0
HALLUCINATIONS: 0
PALPITATIONS: 0

## 2018-11-27 ASSESSMENT — LIFESTYLE VARIABLES
AGITATION: SOMEWHAT MORE THAN NORMAL ACTIVITY
ORIENTATION AND CLOUDING OF SENSORIUM: ORIENTED AND CAN DO SERIAL ADDITIONS
ORIENTATION AND CLOUDING OF SENSORIUM: CANNOT DO SERIAL ADDITIONS OR IS UNCERTAIN ABOUT DATE
ANXIETY: *
TOTAL SCORE: 25
TOTAL SCORE: VERY MILD ITCHING, PINS AND NEEDLES SENSATION, BURNING OR NUMBNESS
AVERAGE NUMBER OF DAYS PER WEEK YOU HAVE A DRINK CONTAINING ALCOHOL: 5
DOES PATIENT WANT TO TALK TO SOMEONE ABOUT QUITTING: YES
CONSUMPTION TOTAL: POSITIVE
NAUSEA AND VOMITING: MILD NAUSEA WITH NO VOMITING
EVER FELT BAD OR GUILTY ABOUT YOUR DRINKING: YES
SUBSTANCE_ABUSE: 1
TOTAL SCORE: MILD ITCHING, PINS AND NEEDLES SENSATION, BURNING OR NUMBNESS
AUDITORY DISTURBANCES: NOT PRESENT
PAROXYSMAL SWEATS: *
TREMOR: *
TOTAL SCORE: 4
PAROXYSMAL SWEATS: BARELY PERCEPTIBLE SWEATING. PALMS MOIST
TOTAL SCORE: 13
HOW MANY TIMES IN THE PAST YEAR HAVE YOU HAD 5 OR MORE DRINKS IN A DAY: 4
VISUAL DISTURBANCES: VERY MILD SENSITIVITY
HEADACHE, FULLNESS IN HEAD: NOT PRESENT
ORIENTATION AND CLOUDING OF SENSORIUM: CANNOT DO SERIAL ADDITIONS OR IS UNCERTAIN ABOUT DATE
AGITATION: SOMEWHAT MORE THAN NORMAL ACTIVITY
TREMOR: *
TOTAL SCORE: 18
PAROXYSMAL SWEATS: BARELY PERCEPTIBLE SWEATING. PALMS MOIST
HEADACHE, FULLNESS IN HEAD: VERY MILD
ANXIETY: *
ANXIETY: *
TREMOR: *
ANXIETY: *
AGITATION: SOMEWHAT MORE THAN NORMAL ACTIVITY
NAUSEA AND VOMITING: NO NAUSEA AND NO VOMITING
AUDITORY DISTURBANCES: VERY MILD HARSHNESS OR ABILITY TO FRIGHTEN
AUDITORY DISTURBANCES: VERY MILD HARSHNESS OR ABILITY TO FRIGHTEN
TOTAL SCORE: VERY MILD ITCHING, PINS AND NEEDLES SENSATION, BURNING OR NUMBNESS
AGITATION: SOMEWHAT MORE THAN NORMAL ACTIVITY
TOTAL SCORE: 16
ON A TYPICAL DAY WHEN YOU DRINK ALCOHOL HOW MANY DRINKS DO YOU HAVE: 8
VISUAL DISTURBANCES: MODERATE SENSITIVITY
AUDITORY DISTURBANCES: VERY MILD HARSHNESS OR ABILITY TO FRIGHTEN
TOTAL SCORE: VERY MILD ITCHING, PINS AND NEEDLES SENSATION, BURNING OR NUMBNESS
AUDITORY DISTURBANCES: VERY MILD HARSHNESS OR ABILITY TO FRIGHTEN
ANXIETY: MILDLY ANXIOUS
HEADACHE, FULLNESS IN HEAD: NOT PRESENT
PAROXYSMAL SWEATS: NO SWEAT VISIBLE
PAROXYSMAL SWEATS: BARELY PERCEPTIBLE SWEATING. PALMS MOIST
TOTAL SCORE: MILD ITCHING, PINS AND NEEDLES SENSATION, BURNING OR NUMBNESS
AUDITORY DISTURBANCES: VERY MILD HARSHNESS OR ABILITY TO FRIGHTEN
AGITATION: *
PAROXYSMAL SWEATS: *
EVER HAD A DRINK FIRST THING IN THE MORNING TO STEADY YOUR NERVES TO GET RID OF A HANGOVER: YES
HEADACHE, FULLNESS IN HEAD: MODERATE
ORIENTATION AND CLOUDING OF SENSORIUM: ORIENTED AND CAN DO SERIAL ADDITIONS
TREMOR: MODERATE TREMOR WITH ARMS EXTENDED
VISUAL DISTURBANCES: MODERATE SENSITIVITY
PAROXYSMAL SWEATS: BARELY PERCEPTIBLE SWEATING. PALMS MOIST
AGITATION: SOMEWHAT MORE THAN NORMAL ACTIVITY
TOTAL SCORE: 11
DOES PATIENT WANT TO STOP DRINKING: YES
AUDITORY DISTURBANCES: VERY MILD HARSHNESS OR ABILITY TO FRIGHTEN
ANXIETY: NO ANXIETY (AT EASE)
TOTAL SCORE: 4
PAROXYSMAL SWEATS: BARELY PERCEPTIBLE SWEATING. PALMS MOIST
TOTAL SCORE: 16
HEADACHE, FULLNESS IN HEAD: MODERATE
NAUSEA AND VOMITING: MILD NAUSEA WITH NO VOMITING
TOTAL SCORE: 17
HAVE PEOPLE ANNOYED YOU BY CRITICIZING YOUR DRINKING: YES
TOTAL SCORE: 11
NAUSEA AND VOMITING: *
TREMOR: *
TOTAL SCORE: 23
TREMOR: *
HEADACHE, FULLNESS IN HEAD: MODERATE
ORIENTATION AND CLOUDING OF SENSORIUM: CANNOT DO SERIAL ADDITIONS OR IS UNCERTAIN ABOUT DATE
AUDITORY DISTURBANCES: MILD HARSHNESS OR ABILITY TO FRIGHTEN
HEADACHE, FULLNESS IN HEAD: MILD
ANXIETY: *
VISUAL DISTURBANCES: MILD SENSITIVITY
TOTAL SCORE: MILD ITCHING, PINS AND NEEDLES SENSATION, BURNING OR NUMBNESS
NAUSEA AND VOMITING: MILD NAUSEA WITH NO VOMITING
TREMOR: TREMOR NOT VISIBLE BUT CAN BE FELT, FINGERTIP TO FINGERTIP
TREMOR: MODERATE TREMOR WITH ARMS EXTENDED
TOTAL SCORE: MILD ITCHING, PINS AND NEEDLES SENSATION, BURNING OR NUMBNESS
AGITATION: SOMEWHAT MORE THAN NORMAL ACTIVITY
ORIENTATION AND CLOUDING OF SENSORIUM: DATE DISORIENTATION BY MORE THAN TWO CALENDAR DAYS
TOTAL SCORE: 14
ORIENTATION AND CLOUDING OF SENSORIUM: CANNOT DO SERIAL ADDITIONS OR IS UNCERTAIN ABOUT DATE
ORIENTATION AND CLOUDING OF SENSORIUM: DATE DISORIENTATION BY NO MORE THAN TWO CALENDAR DAYS
TOTAL SCORE: 14
ORIENTATION AND CLOUDING OF SENSORIUM: DATE DISORIENTATION BY NO MORE THAN TWO CALENDAR DAYS
TOTAL SCORE: VERY MILD ITCHING, PINS AND NEEDLES SENSATION, BURNING OR NUMBNESS
ORIENTATION AND CLOUDING OF SENSORIUM: CANNOT DO SERIAL ADDITIONS OR IS UNCERTAIN ABOUT DATE
VISUAL DISTURBANCES: MILD SENSITIVITY
AGITATION: SOMEWHAT MORE THAN NORMAL ACTIVITY
PAROXYSMAL SWEATS: BEADS OF SWEAT OBVIOUS ON FOREHEAD
AGITATION: *
VISUAL DISTURBANCES: MILD SENSITIVITY
ANXIETY: NO ANXIETY (AT EASE)
NAUSEA AND VOMITING: *
TREMOR: *
EVER_SMOKED: YES
AUDITORY DISTURBANCES: VERY MILD HARSHNESS OR ABILITY TO FRIGHTEN
VISUAL DISTURBANCES: VERY MILD SENSITIVITY
ANXIETY: MODERATELY ANXIOUS OR GUARDED, SO ANXIETY IS INFERRED
AUDITORY DISTURBANCES: VERY MILD HARSHNESS OR ABILITY TO FRIGHTEN
NAUSEA AND VOMITING: *
NAUSEA AND VOMITING: MILD NAUSEA WITH NO VOMITING
VISUAL DISTURBANCES: VERY MILD SENSITIVITY
NAUSEA AND VOMITING: NO NAUSEA AND NO VOMITING
VISUAL DISTURBANCES: MILD SENSITIVITY
ANXIETY: *
NAUSEA AND VOMITING: NO NAUSEA AND NO VOMITING
HAVE YOU EVER FELT YOU SHOULD CUT DOWN ON YOUR DRINKING: YES
NAUSEA AND VOMITING: *
AUDITORY DISTURBANCES: MILD HARSHNESS OR ABILITY TO FRIGHTEN
PAROXYSMAL SWEATS: BARELY PERCEPTIBLE SWEATING. PALMS MOIST
TOTAL SCORE: VERY MILD ITCHING, PINS AND NEEDLES SENSATION, BURNING OR NUMBNESS
TOTAL SCORE: MODERATE ITCHING, PINS AND NEEDLES SENSATION, BURNING OR NUMBNESS
ANXIETY: *
TOTAL SCORE: VERY MILD ITCHING, PINS AND NEEDLES SENSATION, BURNING OR NUMBNESS
AGITATION: SOMEWHAT MORE THAN NORMAL ACTIVITY
AUDITORY DISTURBANCES: VERY MILD HARSHNESS OR ABILITY TO FRIGHTEN
HEADACHE, FULLNESS IN HEAD: MILD
HEADACHE, FULLNESS IN HEAD: MILD
HEADACHE, FULLNESS IN HEAD: MODERATE
AGITATION: SOMEWHAT MORE THAN NORMAL ACTIVITY
VISUAL DISTURBANCES: MILD SENSITIVITY
HEADACHE, FULLNESS IN HEAD: MODERATE
TREMOR: *
PAROXYSMAL SWEATS: BARELY PERCEPTIBLE SWEATING. PALMS MOIST
HEADACHE, FULLNESS IN HEAD: MODERATE
TOTAL SCORE: 18
TOTAL SCORE: VERY MILD ITCHING, PINS AND NEEDLES SENSATION, BURNING OR NUMBNESS
TOTAL SCORE: 4
ORIENTATION AND CLOUDING OF SENSORIUM: CANNOT DO SERIAL ADDITIONS OR IS UNCERTAIN ABOUT DATE
TREMOR: MODERATE TREMOR WITH ARMS EXTENDED
VISUAL DISTURBANCES: MILD SENSITIVITY
ANXIETY: MILDLY ANXIOUS
ALCOHOL_USE: YES
ORIENTATION AND CLOUDING OF SENSORIUM: DATE DISORIENTATION BY NO MORE THAN TWO CALENDAR DAYS
AGITATION: NORMAL ACTIVITY
TREMOR: *
NAUSEA AND VOMITING: MILD NAUSEA WITH NO VOMITING
VISUAL DISTURBANCES: MILD SENSITIVITY
PAROXYSMAL SWEATS: BARELY PERCEPTIBLE SWEATING. PALMS MOIST

## 2018-11-27 ASSESSMENT — PATIENT HEALTH QUESTIONNAIRE - PHQ9
2. FEELING DOWN, DEPRESSED, IRRITABLE, OR HOPELESS: NOT AT ALL
SUM OF ALL RESPONSES TO PHQ9 QUESTIONS 1 AND 2: 0
SUM OF ALL RESPONSES TO PHQ9 QUESTIONS 1 AND 2: 0
1. LITTLE INTEREST OR PLEASURE IN DOING THINGS: NOT AT ALL
2. FEELING DOWN, DEPRESSED, IRRITABLE, OR HOPELESS: NOT AT ALL
1. LITTLE INTEREST OR PLEASURE IN DOING THINGS: NOT AT ALL

## 2018-11-27 ASSESSMENT — COGNITIVE AND FUNCTIONAL STATUS - GENERAL
SUGGESTED CMS G CODE MODIFIER MOBILITY: CH
DAILY ACTIVITIY SCORE: 23
HELP NEEDED FOR BATHING: A LITTLE
SUGGESTED CMS G CODE MODIFIER DAILY ACTIVITY: CI
MOBILITY SCORE: 24

## 2018-11-27 ASSESSMENT — PAIN SCALES - GENERAL
PAINLEVEL_OUTOF10: 0
PAINLEVEL_OUTOF10: 6
PAINLEVEL_OUTOF10: 0

## 2018-11-27 ASSESSMENT — COPD QUESTIONNAIRES
HAVE YOU SMOKED AT LEAST 100 CIGARETTES IN YOUR ENTIRE LIFE: YES
DO YOU EVER COUGH UP ANY MUCUS OR PHLEGM?: NO/ONLY WITH OCCASIONAL COLDS OR INFECTIONS
COPD SCREENING SCORE: 2
DURING THE PAST 4 WEEKS HOW MUCH DID YOU FEEL SHORT OF BREATH: NONE/LITTLE OF THE TIME

## 2018-11-27 NOTE — ASSESSMENT & PLAN NOTE
Recurrent admissions for this. We have discussed quitting. He never remembers his admissions to the hospital and how bad they have been.

## 2018-11-27 NOTE — PROGRESS NOTES
Received phone call from [employer], HR of 157, updated MD. Orders for clonidine and gabapentin. Medicated per MD request, will continue to monitor closely.

## 2018-11-27 NOTE — CARE PLAN
Problem: Safety  Goal: Will remain free from injury  Outcome: PROGRESSING AS EXPECTED  Safety precautions in place, bed alarm on, call light within reach.will continue to monitor.    Problem: Medication  Goal: Compliance with prescribed medication will improve  Outcome: PROGRESSING AS EXPECTED  Medicated per MAR.

## 2018-11-27 NOTE — ED TRIAGE NOTES
Patient [facility] via [facility] with reports of wanting help with alcohol detox.  Patient to 6H.  Chart up for ERP evaluation.

## 2018-11-27 NOTE — FLOWSHEET NOTE
11/27/18 0503   Type of Assessment   Assessment Yes   Patient History   Pulmonary Diagnosis none   Surgical Procedures none   Home O2 No   Home Treatments/Frequency No   COPD Risk Screening   Do you have a history of COPD? No   COPD Population Screener   During the past 4 weeks, how much did you feel short of breath? 0   Do you ever cough up any mucus or phlegm? 0   In the past 12 months, you do less than you used to because of your breathing problems 0   Have you smoked at least 100 cigarettes in your entire life? 2   How old are you? 0   COPD Screening Score 2   Level Of Consciousness   Level of Consciousness Alert   Respiratory WDL   Respiratory (WDL) WDL   Chest Exam   Respiration 18   Pulse (!) 130   Oximetry   #Pulse Oximetry (Single Determination) Yes   Oxygen   Home O2 Use Prior To Admission? No   Pulse Oximetry 94 %   O2 Daily Delivery Respiratory  Room Air with O2 Available   Room Air Challenge Pass

## 2018-11-27 NOTE — PROGRESS NOTES
Report received from JODRY Arango. Pt arrival to room 317 with personal belongings. Pt with tremors; alert and oriented to all but time. Assisted to stand transfer to bed. Pt states generalized pain. Assisted to position of comfort. Pt oriented to room, call light and POC. Pt instructed to call RN with any and all needs. Bed alarm engaged. Pt medicated per orders per CIWA scale. Will continue with care.

## 2018-11-27 NOTE — ASSESSMENT & PLAN NOTE
-With Tachycardia and Hallucination, h/o ETOH related seizures  -Admit to inpatient with telemetry  - [facility] protocol  -Zofran PRN

## 2018-11-27 NOTE — PROGRESS NOTES
Report called to ICU JORDY Dial pt transferred to ICU via wheel chair. Chart and medication transferred w/ pt.

## 2018-11-27 NOTE — PROGRESS NOTES
2 RN skin assessment done; skin not WDL. Healing scab to R knee; scab to L great toe/toenail split and healing; bruise to left mid-lower back area.

## 2018-11-27 NOTE — PROGRESS NOTES
Med rec updated and complete  Allergies reviewed  Pt reports no prescription medications, OTC's, or vitamins in the last 2 months or longer.  Pt reports no antibiotics in the last 30 days.

## 2018-11-27 NOTE — ED NOTES
Report given to JORDY Gonzales.  Patient prepared for transport to floor via gurney with cardiac monitor and belongings.

## 2018-11-27 NOTE — PROGRESS NOTES
[facility] Hospitalist Progress Note    Date of Service: 2018    Chief Complaint  33 y.o. male admitted 2018 with recurrent alcohol withdrawal.     Interval Problem Update  Worsening markedly over the day today. Escalating medication needs and CIWA score ultimately requiring transfer to the ICU. Retching.  Hallucinating. Tremors. Agitated .  Tachy into 160s  K replaced  We discussed his history again and [facility].     Consultants/Specialty  I discussed plan for transfer to the ICU with Dr. Rosas in the ICU.     Disposition  To ICU        Review of Systems   Constitutional: Negative for chills and fever.   HENT: Negative for sore throat.    Eyes: Negative for blurred vision and pain.   Respiratory: Negative for cough and shortness of breath.    Cardiovascular: Negative for chest pain and palpitations.   Gastrointestinal: Positive for abdominal pain, nausea and vomiting.   Genitourinary: Negative for dysuria and urgency.   Musculoskeletal: Negative for back pain and neck pain.   Skin: Negative for itching and rash.   Neurological: Positive for dizziness and tremors. Negative for tingling and headaches.   Psychiatric/Behavioral: Positive for hallucinations, memory loss and substance abuse. Negative for depression. The patient is nervous/anxious and has insomnia.    All other systems reviewed and are negative.     Physical Exam  Laboratory/Imaging   Hemodynamics  Temp (24hrs), Av.4 °C (99.4 °F), Min:36.7 °C (98 °F), Max:37.8 °C (100.1 °F)   Temperature: 37.5 °C (99.5 °F)  Pulse  Av.9  Min: 100  Max: 142 Heart Rate (Monitored): (!) 117  Blood Pressure: 139/71      Respiratory      Respiration: 20, Pulse Oximetry: 92 %, O2 Daily Delivery Respiratory : Room Air with O2 Available             Fluids    Intake/Output Summary (Last 24 hours) at 18 4521  Last data filed at 18 5753   Gross per 24 hour   Intake             2000 ml   Output                0 ml   Net              2000 ml       Nutrition  Orders Placed This Encounter   Procedures   • Diet Order Regular     Standing Status:   Standing     Number of Occurrences:   1     Order Specific Question:   Diet:     Answer:   Regular [1]     Physical Exam   Constitutional: He is oriented to person, place, and time. He appears well-developed and well-nourished. No distress.   Patient seen and examined  Plan discussed with JORDY JENNINGST:   Right Ear: External ear normal.   Left Ear: External ear normal.   Nose: Nose normal.   Eyes: Right eye exhibits no discharge. Left eye exhibits no discharge. No scleral icterus.   Neck: No JVD present. No tracheal deviation present.   Cardiovascular: Normal heart sounds and intact distal pulses.    No murmur heard.  Cap refill 2sec  Pulses 2+ throughout  tachycardia     Pulmonary/Chest: Effort normal and breath sounds normal. No respiratory distress. He has no wheezes. He has no rales.   Abdominal: Soft. Bowel sounds are normal. He exhibits no distension. There is no tenderness. There is no guarding.   Musculoskeletal: He exhibits no edema or tenderness.   fusodactly - congenital   Neurological: He is alert and oriented to person, place, and time.   tremors   Skin: Skin is warm and dry. He is not diaphoretic. No erythema.   Normal skin color   Psychiatric: His mood appears anxious. His affect is labile. He is actively hallucinating.   Nursing note and vitals reviewed.      Recent Labs      11/27/18 0141   WBC  6.5   RBC  4.70   HEMOGLOBIN  14.4   HEMATOCRIT  41.4*   MCV  88.1   MCH  30.6   MCHC  34.8   RDW  44.6   PLATELETCT  82*   MPV  11.1     Recent Labs      11/27/18 0141 11/27/18   0904   SODIUM  130*  132*   POTASSIUM  4.0  3.1*   CHLORIDE  94*  97   CO2  23  25   GLUCOSE  89  101*   BUN  9  6*   CREATININE  0.76  0.62   CALCIUM  7.9*  7.5*     Recent Labs      11/27/18 0141   INR  0.96                  Assessment/Plan     * Alcohol withdrawal (HCC)- (present on admission)    Assessment & Plan    Markedly worse. Hallucinating. Worsening CIWA scores despite addition of multiple medications today. He is very high risk for respiratory failure in this situation.   I added neurontin, depakote and clonidine over the day with no relief. He has been Getting ativan every hour per the CIWA protocol.   He Ultimately he required transfer to the ICU and I suspect he may need a precidex drip. I discussed this with the intensivist and they will start this when he arrives there if still needed.     Critical care time managing this issue today is 35min. No overlap.      Alcoholic hepatitis- (present on admission)   Assessment & Plan    No indication for steroids.   Continue to trend.        Alcohol dependence (HCC)- (present on admission)   Assessment & Plan    Recurrent admissions for this. We again discussed quitting. He never remembers his admissions to the hospital and how bad they have been.      Methamphetamine addiction (HCC)- (present on admission)   Assessment & Plan    -pending drug screen     Conjunctivitis- (present on admission)   Assessment & Plan    Erythromycin ointment ongoing.   Suspect more 2/2 retching that infectious but possible.  No exudate as no exudate. Suspect we can stop ointment if this improves.      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      Homelessness   Assessment & Plan     and  will be needed to work on safe discharge.     Tachycardia- (present on admission)   Assessment & Plan    Worsening up to 160s/70s. Severe with high risk for cardiac damage.   Add clonidine and treat alcohol withdrawal  Tele monitoring  Low threshold for repeat ekg if p waves are lost as high risk for afib/flutter     Polysubstance abuse (HCC)- (present on admission)   Assessment & Plan    Needs to quit. Recurrent issues-     ADHD (attention deficit hyperactivity disorder)- (present on admission)   Assessment & Plan     He is noncompliant with his meds.        Quality-Core Measures

## 2018-11-27 NOTE — PROGRESS NOTES
Telemetry Shift Summary    Rhythm ST  HR Range 130s  Ectopy -  Measurements 0.16/0.08/0.32        Normal Values  Rhythm SR  HR Range    Measurements 0.12-0.20 / 0.06-0.10  / 0.30-0.52

## 2018-11-27 NOTE — ED PROVIDER NOTES
"ED Provider Note        CHIEF COMPLAINT  Chief Complaint   Patient presents with   • ETOH Withdrawal       HPI    Gopal Ceja is a 33 y.o. male presenting for alcohol detoxication.  States he is \"tired\" of his alcoholism, wants to stop.  States he is going into withdrawal.  States his last drink was approximately 12 hours ago.  Also having sore throat, cough, and bilateral eye discharge for the past week.  Currently denies any pain.  States is having visual hallucinations, seeing \"snowflakes\" spinning around the air.  Denies drug use.        REVIEW OF SYSTEMS  Positives as above. Pertinent negatives include no fever, vomiting, diarrhea, seizures.  All other review of systems are negative        PAST MEDICAL HISTORY   has a past medical history of Acute anxiety; ADHD (attention deficit hyperactivity disorder); ADHD (attention deficit hyperactivity disorder); Alcohol abuse; Bipolar affective (HCC); Depression; Ectrodactyly-ectodermal dysplasia-clefting syndrome; ETOH abuse; and Psychiatric disorder.    SOCIAL HISTORY  Social History     Social History Main Topics   • Smoking status: Current Some Day Smoker     Packs/day: 0.25     Types: Cigarettes   • Smokeless tobacco: Never Used      Comment: Smokes couple of times a month   • Alcohol use 0.0 oz/week      Comment: drinks 10 earthquakes a day   • Drug use: No   • Sexual activity: Not on file       SURGICAL HISTORY   has a past surgical history that includes other orthopedic surgery.    CURRENT MEDICATIONS  Home Medications    **Home medications have not yet been reviewed for this encounter**         ALLERGIES  No Known Allergies    PHYSICAL EXAM  VITAL SIGNS: /67   Pulse (!) 118   Temp 37.6 °C (99.7 °F) (Temporal)   Resp 18   Ht 1.727 m (5' 8\")   Wt 69.9 kg (154 lb 1.6 oz)   BMI 23.43 kg/m²    SpO2: I interpret this pulse oximetry as normal  Constitutional: Alert in no apparent distress.  HENT: Normocephalic atraumatic, MMM  Eyes: PERRL,  Non-icteric.  " Injected conjunctiva bilaterally, mild upper and lower lid swelling of the left eye, yellow discharge from bilateral eyes, no pain with extraocular eye movements, no significant surrounding swelling or erythema  Neck: Normal range of motion, No tenderness, Supple, No stridor.   Lymphatic: No lymphadenopathy noted.   Cardiovascular: Tachycardic, regular rhythm, no murmurs.   Thorax & Lungs: Normal breath sounds, No respiratory distress, No wheezing, No chest tenderness.   Abdomen: Soft, epigastric tenderness to palpation, No pulsatile masses. No peritoneal signs.  Skin: Warm, Dry, No erythema, No rash.   Extremities: Intact distal pulses, No edema, No tenderness, No cyanosis  Musculoskeletal: Good range of motion in all major joints. No tenderness to palpation or major deformities noted.   Neurologic: Alert and oriented x3, No focal deficits noted.  No tremors.  Psychiatric: Affect normal, Judgment normal, Mood normal.     DIAGNOSTIC STUDIES / PROCEDURES      LABORATORY  Labs Reviewed   CBC WITH DIFFERENTIAL - Abnormal; Notable for the following:        Result Value    Hematocrit 41.4 (*)     Platelet Count 82 (*)     Lymphocytes 17.40 (*)     Monocytes 15.40 (*)     Monos (Absolute) 1.00 (*)     All other components within normal limits   COMP METABOLIC PANEL - Abnormal; Notable for the following:     Sodium 130 (*)     Chloride 94 (*)     Anion Gap 13.0 (*)     Calcium 7.9 (*)     AST(SGOT) 115 (*)     ALT(SGPT) 51 (*)     Alkaline Phosphatase 111 (*)     Globulin 3.6 (*)     All other components within normal limits   DIAGNOSTIC ALCOHOL - Abnormal; Notable for the following:     Diagnostic Alcohol 0.30 (*)     All other components within normal limits   LIPASE   ESTIMATED GFR       RADIOLOGY    DX-CHEST-PORTABLE (1 VIEW)   Final Result         1.  No acute cardiopulmonary disease.          Chest XR, 1 view (my read): No focal infiltrates or large effusion.  Normal mediastinum. No prior films were immediately  "available for comparison.  Impression: Normal chest x-ray      CHART REVIEW  Pertinent medical chart information was reviewed and reveals: Frequent ED visits for alcohol intoxication    Medications   LORazepam (ATIVAN) injection 2 mg (not administered)   erythromycin ophthalmic ointment (not administered)   ondansetron (ZOFRAN) syringe/vial injection 4 mg (4 mg Intravenous Given 11/27/18 0137)   NS infusion 1,000 mL (1,000 mL Intravenous New Bag 11/27/18 0159)   er detox iv 1000 mL (D5LR + magnesium 1 g + thiamine 100 mg + folic acid 1 mg) infusion (1,000 mL Intravenous New Bag 11/27/18 0158)   LORazepam (ATIVAN) injection 2 mg (2 mg Intravenous Given 11/27/18 0159)       COURSE & MEDICAL DECISION MAKING  Pertinent Labs & Imaging studies reviewed. (See chart for details)    Differential diagnosis includes, but not limited to:  Alcohol intoxication, alcohol withdrawal, gastritis, pancreatitis, drug intoxication    Vitals:    11/27/18 0118 11/27/18 0136 11/27/18 0307   BP: 142/86 137/82 113/67   Pulse: 100 (!) 130 (!) 118   Resp: 16 18 18   Temp: 36.7 °C (98 °F) 37.6 °C (99.6 °F) 37.6 °C (99.7 °F)   TempSrc: Temporal Temporal Temporal   Weight: 69.9 kg (154 lb 1.6 oz)     Height: 1.727 m (5' 8\") 1.727 m (5' 8\")         HYDRATION: Based on the patient's presentation of Tachycardia the patient was given IV fluids. IV Hydration was used because oral hydration was not as rapid as required. Upon recheck following hydration, the patient was ImprovedImproved.      33-year-old male with history of alcohol abuse presenting for reported detoxification.  Very frequent visits to our emergency departments.  Patient states he wants to quit drinking.  States he is in withdrawal.  Having hallucinations.  Also with symptoms of conjunctivitis and upper respiratory infection.  Nothing to suggest orbital or periorbital cellulitis.  Vital signs reassuring, slightly tachycardic.  Patient had abdominal pain in the epigastric region on " palpation and began dry heaving.  Therefore opting for lab evaluation for abdominal pain.  Also given antiemetics. No evidence of pneumonia on chest x-ray, no other clinical signs of pneumonia.    Tachycardia and hallucinations concerning for alcohol withdrawal, given dose of Ativan.  Also given fluids, thiamine, folic acid.  Labs show slight liver enzyme elevation with AST predominance, repeat abdominal exam without any pain, was not given any pain medications, patient denies any recent pain or vomiting, I have low suspicion for occult cholecystitis or other biliary process at this time.  Alcohol level of 0.3, feeling much better after dose of Ativan, however very concerning as he is extremely alert at this alcohol level and having signs of alcohol withdrawal, states he will not drink if he leaves the hospital because he wants to quit, I am concerned he will progress into delirium tremens if not treated as an inpatient..  Consulted with Dr. Bowen for admission for alcohol withdrawal.    DISPOSITION  Admitted to medicine in guarded condition    FINAL IMPRESSION  Visit Diagnoses     ICD-10-CM   1. Alcohol abuse F10.10   2. Acute conjunctivitis of both eyes, unspecified acute conjunctivitis type H10.33   3. Alcohol withdrawal syndrome without complication (HCC) F10.230   4. Upper respiratory tract infection, unspecified type J06.9          Electronically signed by: Trey Pierre MD, TRAVIS 11/27/2018     This dictation has been created using voice recognition software and/or scribes. The accuracy of the dictation is limited by the abilities of the software and the expertise of the scribes. I expect there may be some errors of grammar and possibly content. I made every attempt to manually correct the errors within my dictation. However, errors related to voice recognition software and/or scribes may still exist and should be interpreted within the appropriate context.

## 2018-11-27 NOTE — H&P
"Hospital Medicine History & Physical Note    Date of Service  11/27/2018    Primary Care Physician  ISAIAH Goddard.      Code Status  Full Code    Chief Complaint  Called EMS because wanted to quit drinking    History of Presenting Illness  33 y.o. male with history of Alcohol abuse and multiple visits to the ER in hospital, apparently called EMS due to his desire of \"quit drinking \".  Patient is a poor historian and does not seem to remember much of what happened.  He remembers drinking yesterday and \"having a blackout \".    Patient has history of having hallucinations, seizures and severe symptoms with alcohol withdrawal.  He has been tachycardic and having visual hallucinations here in the emergency department.  Side note, he is homeless, noncompliant with depression and ADHD medications.  He also has underlying history of Chromosome 16 abnormalities.    Review of Systems  Review of Systems   Constitutional: Positive for chills and malaise/fatigue. Negative for fever.   HENT: Positive for sore throat. Negative for congestion.    Eyes: Positive for redness. Negative for blurred vision and double vision.   Respiratory: Negative for cough and shortness of breath.    Cardiovascular: Negative for chest pain and palpitations.   Gastrointestinal: Positive for abdominal pain and nausea. Negative for vomiting.   Genitourinary: Negative for dysuria and urgency.   Musculoskeletal: Positive for myalgias. Negative for neck pain.   Skin: Negative for itching and rash.   Neurological: Negative for dizziness, weakness and headaches.   Endo/Heme/Allergies: Does not bruise/bleed easily.   Psychiatric/Behavioral: Negative for depression. The patient does not have insomnia.        Past Medical History   has a past medical history of Acute anxiety; ADHD (attention deficit hyperactivity disorder); ADHD (attention deficit hyperactivity disorder); Alcohol abuse; Bipolar affective (HCC); Depression; Ectrodactyly-ectodermal " dysplasia-clefting syndrome; ETOH abuse; and Psychiatric disorder.    Surgical History   has a past surgical history that includes other orthopedic surgery.     Family History  He does not know much about his family history    Social History   reports that he has been smoking Cigarettes.  He has been smoking about 0.25 packs per day. He has never used smokeless tobacco. He reports that he drinks alcohol. He reports that he does not use drugs.    Allergies  No Known Allergies    Medications  Prior to Admission Medications   Prescriptions Last Dose Informant Patient Reported? Taking?   buPROPion (WELLBUTRIN) 75 MG Tab >2months  No No   Sig: Take 1 Tab by mouth 2 Times a Day.   naltrexone (DEPADE) 50 MG Tab >2months  No No   Sig: Take 1 Tab by mouth every day.   thiamine (THIAMINE) 100 MG tablet >2months  No No   Sig: Take 1 Tab by mouth every day.      Facility-Administered Medications: None       Physical Exam  Temp:  [36.7 °C (98 °F)-37.6 °C (99.7 °F)] 37.6 °C (99.7 °F)  Pulse:  [100-130] 118  Resp:  [16-18] 18  BP: (113-142)/(67-86) 113/67    Physical Exam   Constitutional: He is oriented to person, place, and time. He appears well-developed and well-nourished.   HENT:   Head: Normocephalic and atraumatic.   Eyes: Pupils are equal, round, and reactive to light. EOM are normal.   Neck: Normal range of motion. Neck supple.   Cardiovascular: Regular rhythm.  Tachycardia present.    No murmur heard.  Pulmonary/Chest: Effort normal and breath sounds normal.   Abdominal: Soft. Bowel sounds are normal. He exhibits no distension. There is tenderness. Rebound: Mild diffuse tenderness to palpation    Musculoskeletal: Normal range of motion. He exhibits no edema.   Neurological: He is alert and oriented to person, place, and time.   Skin: Skin is warm and dry.   Psychiatric: He has a normal mood and affect. His behavior is normal.       Laboratory:  Recent Labs      11/27/18   0141   WBC  6.5   RBC  4.70   HEMOGLOBIN  14.4  "  HEMATOCRIT  41.4*   MCV  88.1   MCH  30.6   MCHC  34.8   RDW  44.6   PLATELETCT  82*   MPV  11.1     Recent Labs      11/27/18   0141   SODIUM  130*   POTASSIUM  4.0   CHLORIDE  94*   CO2  23   GLUCOSE  89   BUN  9   CREATININE  0.76   CALCIUM  7.9*     Recent Labs      11/27/18   0141   ALTSGPT  51*   ASTSGOT  115*   ALKPHOSPHAT  111*   TBILIRUBIN  0.6   LIPASE  37   GLUCOSE  89                 No results for input(s): TROPONINI in the last 72 hours.    Urinalysis:    No results found     Imaging:  DX-CHEST-PORTABLE (1 VIEW)   Final Result         1.  No acute cardiopulmonary disease.            Assessment/Plan:  I anticipate this patient will require at least two midnights for appropriate medical management, necessitating inpatient admission.    * Alcohol withdrawal (HCC)- (present on admission)   Assessment & Plan    -With Tachycardia and Hallucination, h/o ETOH related seizures  -Admit to inpatient with telemetry  - [facility] protocol  -Zofran PRN  -Patient has associated thrombocytopenia, elevated LFTs.  I also added coags and ammonia levels.     Abnormal LFTs due to alcohol abuse- (present on admission)   Assessment & Plan    -/baseline 33 on November 17 and ALT 56 on admission.  -We will monitor daily CMP's  -This is likely secondary to alcohol abuse history.     Alcohol dependence (HCC)- (present on admission)   Assessment & Plan    -Patient states \"I desire to quit alcohol \".  He has multiple ER visits and admissions related to alcohol issues.  -Encourage patient to look for help into quitting alcohol.     Methamphetamine addiction (HCC)- (present on admission)   Assessment & Plan    -Add drug screen     Conjunctivitis- (present on admission)   Assessment & Plan    -Bilateral.  -Erythromycin ointment started in the ER.  Will repeat once a day and to improving, up to 6 days.     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     Tachycardia- (present on admission)   Assessment & Plan    -Likely due to alcohol withdrawal.  -IV fluids, normal saline 125 cc an hour     Polysubstance abuse (HCC)- (present on admission)   Assessment & Plan    -     ADHD (attention deficit hyperactivity disorder)- (present on admission)   Assessment & Plan    -Currently noncompliant with any of his medication.         VTE prophylaxis: SCDs

## 2018-11-27 NOTE — PROGRESS NOTES
Received report from NOC RN; assumed pt care. Pt oriented to self, place and situation. Pt states 6/10 pain throughout body. Tremors noted. 16 CIWA, medicated per MAR. Bed alarm on.

## 2018-11-28 PROBLEM — R94.31 ABNORMAL EKG: Status: ACTIVE | Noted: 2018-11-28

## 2018-11-28 LAB
ALBUMIN SERPL BCP-MCNC: 3.2 G/DL (ref 3.2–4.9)
ALBUMIN/GLOB SERPL: 0.9 G/DL
ALP SERPL-CCNC: 100 U/L (ref 30–99)
ALT SERPL-CCNC: 48 U/L (ref 2–50)
AMPHET UR QL SCN: NEGATIVE
ANION GAP SERPL CALC-SCNC: 6 MMOL/L (ref 0–11.9)
AST SERPL-CCNC: 116 U/L (ref 12–45)
BARBITURATES UR QL SCN: NEGATIVE
BASOPHILS # BLD AUTO: 0.3 % (ref 0–1.8)
BASOPHILS # BLD: 0.01 K/UL (ref 0–0.12)
BENZODIAZ UR QL SCN: POSITIVE
BILIRUB SERPL-MCNC: 1.1 MG/DL (ref 0.1–1.5)
BUN SERPL-MCNC: 6 MG/DL (ref 8–22)
BZE UR QL SCN: NEGATIVE
CA-I SERPL-SCNC: 1.08 MMOL/L (ref 1.1–1.3)
CALCIUM SERPL-MCNC: 7.9 MG/DL (ref 8.4–10.2)
CANNABINOIDS UR QL SCN: NEGATIVE
CHLORIDE SERPL-SCNC: 102 MMOL/L (ref 96–112)
CO2 SERPL-SCNC: 24 MMOL/L (ref 20–33)
CREAT SERPL-MCNC: 0.78 MG/DL (ref 0.5–1.4)
EKG IMPRESSION: NORMAL
EOSINOPHIL # BLD AUTO: 0.01 K/UL (ref 0–0.51)
EOSINOPHIL NFR BLD: 0.3 % (ref 0–6.9)
ERYTHROCYTE [DISTWIDTH] IN BLOOD BY AUTOMATED COUNT: 46.5 FL (ref 35.9–50)
GLOBULIN SER CALC-MCNC: 3.5 G/DL (ref 1.9–3.5)
GLUCOSE SERPL-MCNC: 88 MG/DL (ref 65–99)
HCT VFR BLD AUTO: 39.4 % (ref 42–52)
HGB BLD-MCNC: 13.2 G/DL (ref 14–18)
IMM GRANULOCYTES # BLD AUTO: 0.01 K/UL (ref 0–0.11)
IMM GRANULOCYTES NFR BLD AUTO: 0.3 % (ref 0–0.9)
LYMPHOCYTES # BLD AUTO: 1.23 K/UL (ref 1–4.8)
LYMPHOCYTES NFR BLD: 31 % (ref 22–41)
MAGNESIUM SERPL-MCNC: 2.5 MG/DL (ref 1.5–2.5)
MCH RBC QN AUTO: 30.6 PG (ref 27–33)
MCHC RBC AUTO-ENTMCNC: 33.5 G/DL (ref 33.7–35.3)
MCV RBC AUTO: 91.4 FL (ref 81.4–97.8)
METHADONE UR QL SCN: NEGATIVE
MONOCYTES # BLD AUTO: 0.78 K/UL (ref 0–0.85)
MONOCYTES NFR BLD AUTO: 19.6 % (ref 0–13.4)
NEUTROPHILS # BLD AUTO: 1.93 K/UL (ref 1.82–7.42)
NEUTROPHILS NFR BLD: 48.5 % (ref 44–72)
NRBC # BLD AUTO: 0 K/UL
NRBC BLD-RTO: 0 /100 WBC
OPIATES UR QL SCN: NEGATIVE
OXYCODONE UR QL SCN: NEGATIVE
PCP UR QL SCN: NEGATIVE
PHOSPHATE SERPL-MCNC: 3.1 MG/DL (ref 2.5–4.5)
PLATELET # BLD AUTO: 55 K/UL (ref 164–446)
PMV BLD AUTO: 11.6 FL (ref 9–12.9)
POTASSIUM SERPL-SCNC: 4.3 MMOL/L (ref 3.6–5.5)
POTASSIUM SERPL-SCNC: 4.9 MMOL/L (ref 3.6–5.5)
PROPOXYPH UR QL SCN: NEGATIVE
PROT SERPL-MCNC: 6.7 G/DL (ref 6–8.2)
RBC # BLD AUTO: 4.31 M/UL (ref 4.7–6.1)
SODIUM SERPL-SCNC: 132 MMOL/L (ref 135–145)
TROPONIN I SERPL-MCNC: <0.02 NG/ML (ref 0–0.04)
VIT B12 SERPL-MCNC: 870 PG/ML (ref 211–911)
WBC # BLD AUTO: 4 K/UL (ref 4.8–10.8)

## 2018-11-28 PROCEDURE — 93010 ELECTROCARDIOGRAM REPORT: CPT | Performed by: INTERNAL MEDICINE

## 2018-11-28 PROCEDURE — 700111 HCHG RX REV CODE 636 W/ 250 OVERRIDE (IP): Performed by: HOSPITALIST

## 2018-11-28 PROCEDURE — 700111 HCHG RX REV CODE 636 W/ 250 OVERRIDE (IP): Performed by: INTERNAL MEDICINE

## 2018-11-28 PROCEDURE — 85025 COMPLETE CBC W/AUTO DIFF WBC: CPT

## 2018-11-28 PROCEDURE — 93005 ELECTROCARDIOGRAM TRACING: CPT | Performed by: HOSPITALIST

## 2018-11-28 PROCEDURE — 82330 ASSAY OF CALCIUM: CPT

## 2018-11-28 PROCEDURE — 84484 ASSAY OF TROPONIN QUANT: CPT

## 2018-11-28 PROCEDURE — 700105 HCHG RX REV CODE 258: Performed by: INTERNAL MEDICINE

## 2018-11-28 PROCEDURE — 770022 HCHG ROOM/CARE - ICU (200)

## 2018-11-28 PROCEDURE — 84100 ASSAY OF PHOSPHORUS: CPT

## 2018-11-28 PROCEDURE — 84132 ASSAY OF SERUM POTASSIUM: CPT

## 2018-11-28 PROCEDURE — 700105 HCHG RX REV CODE 258: Performed by: HOSPITALIST

## 2018-11-28 PROCEDURE — 82607 VITAMIN B-12: CPT

## 2018-11-28 PROCEDURE — 83735 ASSAY OF MAGNESIUM: CPT

## 2018-11-28 PROCEDURE — 700102 HCHG RX REV CODE 250 W/ 637 OVERRIDE(OP): Performed by: HOSPITALIST

## 2018-11-28 PROCEDURE — 99291 CRITICAL CARE FIRST HOUR: CPT | Performed by: INTERNAL MEDICINE

## 2018-11-28 PROCEDURE — 700101 HCHG RX REV CODE 250: Performed by: INTERNAL MEDICINE

## 2018-11-28 PROCEDURE — A9270 NON-COVERED ITEM OR SERVICE: HCPCS | Performed by: HOSPITALIST

## 2018-11-28 PROCEDURE — 80053 COMPREHEN METABOLIC PANEL: CPT

## 2018-11-28 RX ADMIN — CLONIDINE HYDROCHLORIDE 0.1 MG: 0.1 TABLET ORAL at 17:38

## 2018-11-28 RX ADMIN — CLONIDINE HYDROCHLORIDE 0.1 MG: 0.1 TABLET ORAL at 14:29

## 2018-11-28 RX ADMIN — GABAPENTIN 300 MG: 300 CAPSULE ORAL at 14:29

## 2018-11-28 RX ADMIN — LORAZEPAM 1 MG: 2 INJECTION INTRAMUSCULAR; INTRAVENOUS at 14:58

## 2018-11-28 RX ADMIN — CALCIUM GLUCONATE 1000 MG: 98 INJECTION, SOLUTION INTRAVENOUS at 08:00

## 2018-11-28 RX ADMIN — ERYTHROMYCIN: 5 OINTMENT OPHTHALMIC at 05:09

## 2018-11-28 RX ADMIN — GABAPENTIN 300 MG: 300 CAPSULE ORAL at 05:07

## 2018-11-28 RX ADMIN — DIVALPROEX SODIUM 500 MG: 500 TABLET, DELAYED RELEASE ORAL at 14:30

## 2018-11-28 RX ADMIN — DEXMEDETOMIDINE HYDROCHLORIDE 0.4 MCG/KG/HR: 100 INJECTION, SOLUTION INTRAVENOUS at 16:17

## 2018-11-28 RX ADMIN — CLONIDINE HYDROCHLORIDE 0.1 MG: 0.1 TABLET ORAL at 05:07

## 2018-11-28 RX ADMIN — GABAPENTIN 300 MG: 300 CAPSULE ORAL at 17:38

## 2018-11-28 RX ADMIN — ONDANSETRON 4 MG: 2 INJECTION INTRAMUSCULAR; INTRAVENOUS at 01:04

## 2018-11-28 RX ADMIN — DIVALPROEX SODIUM 500 MG: 500 TABLET, DELAYED RELEASE ORAL at 05:07

## 2018-11-28 RX ADMIN — LORAZEPAM 1 MG: 2 INJECTION INTRAMUSCULAR; INTRAVENOUS at 20:12

## 2018-11-28 RX ADMIN — SODIUM CHLORIDE: 9 INJECTION, SOLUTION INTRAVENOUS at 15:01

## 2018-11-28 RX ADMIN — SODIUM CHLORIDE: 9 INJECTION, SOLUTION INTRAVENOUS at 00:23

## 2018-11-28 RX ADMIN — LORAZEPAM 1.5 MG: 2 INJECTION INTRAMUSCULAR; INTRAVENOUS at 07:56

## 2018-11-28 RX ADMIN — LORAZEPAM 1 MG: 2 INJECTION INTRAMUSCULAR; INTRAVENOUS at 00:37

## 2018-11-28 RX ADMIN — SODIUM CHLORIDE: 9 INJECTION, SOLUTION INTRAVENOUS at 08:02

## 2018-11-28 ASSESSMENT — PAIN SCALES - GENERAL
PAINLEVEL_OUTOF10: 0
PAINLEVEL_OUTOF10: 2
PAINLEVEL_OUTOF10: 0
PAINLEVEL_OUTOF10: 0

## 2018-11-28 ASSESSMENT — LIFESTYLE VARIABLES
TOTAL SCORE: MILD ITCHING, PINS AND NEEDLES SENSATION, BURNING OR NUMBNESS
ORIENTATION AND CLOUDING OF SENSORIUM: DATE DISORIENTATION BY MORE THAN TWO CALENDAR DAYS
NAUSEA AND VOMITING: NO NAUSEA AND NO VOMITING
HEADACHE, FULLNESS IN HEAD: NOT PRESENT
AGITATION: NORMAL ACTIVITY
TREMOR: *
TOTAL SCORE: VERY MILD ITCHING, PINS AND NEEDLES SENSATION, BURNING OR NUMBNESS
AGITATION: NORMAL ACTIVITY
TOTAL SCORE: VERY MILD ITCHING, PINS AND NEEDLES SENSATION, BURNING OR NUMBNESS
VISUAL DISTURBANCES: MODERATELY SEVERE HALLUCINATIONS
NAUSEA AND VOMITING: MILD NAUSEA WITH NO VOMITING
HEADACHE, FULLNESS IN HEAD: NOT PRESENT
ORIENTATION AND CLOUDING OF SENSORIUM: CANNOT DO SERIAL ADDITIONS OR IS UNCERTAIN ABOUT DATE
AGITATION: NORMAL ACTIVITY
NAUSEA AND VOMITING: MILD NAUSEA WITH NO VOMITING
PAROXYSMAL SWEATS: NO SWEAT VISIBLE
TOTAL SCORE: 7
HEADACHE, FULLNESS IN HEAD: NOT PRESENT
PAROXYSMAL SWEATS: NO SWEAT VISIBLE
PAROXYSMAL SWEATS: NO SWEAT VISIBLE
AGITATION: SOMEWHAT MORE THAN NORMAL ACTIVITY
TOTAL SCORE: 10
TOTAL SCORE: 13
HEADACHE, FULLNESS IN HEAD: MILD
TOTAL SCORE: 22
PAROXYSMAL SWEATS: NO SWEAT VISIBLE
VISUAL DISTURBANCES: MODERATE SENSITIVITY
AGITATION: NORMAL ACTIVITY
AUDITORY DISTURBANCES: VERY MILD HARSHNESS OR ABILITY TO FRIGHTEN
TREMOR: *
ANXIETY: NO ANXIETY (AT EASE)
PAROXYSMAL SWEATS: NO SWEAT VISIBLE
VISUAL DISTURBANCES: MILD SENSITIVITY
ANXIETY: NO ANXIETY (AT EASE)
TREMOR: *
TOTAL SCORE: 12
PAROXYSMAL SWEATS: NO SWEAT VISIBLE
TOTAL SCORE: MILD ITCHING, PINS AND NEEDLES SENSATION, BURNING OR NUMBNESS
TREMOR: *
HEADACHE, FULLNESS IN HEAD: MILD
NAUSEA AND VOMITING: MILD NAUSEA WITH NO VOMITING
HEADACHE, FULLNESS IN HEAD: NOT PRESENT
ORIENTATION AND CLOUDING OF SENSORIUM: CANNOT DO SERIAL ADDITIONS OR IS UNCERTAIN ABOUT DATE
TOTAL SCORE: 7
AGITATION: SOMEWHAT MORE THAN NORMAL ACTIVITY
AGITATION: *
ORIENTATION AND CLOUDING OF SENSORIUM: CANNOT DO SERIAL ADDITIONS OR IS UNCERTAIN ABOUT DATE
AUDITORY DISTURBANCES: MILD HARSHNESS OR ABILITY TO FRIGHTEN
ORIENTATION AND CLOUDING OF SENSORIUM: DATE DISORIENTATION BY NO MORE THAN TWO CALENDAR DAYS
ORIENTATION AND CLOUDING OF SENSORIUM: CANNOT DO SERIAL ADDITIONS OR IS UNCERTAIN ABOUT DATE
VISUAL DISTURBANCES: NOT PRESENT
AGITATION: *
NAUSEA AND VOMITING: NO NAUSEA AND NO VOMITING
ANXIETY: MILDLY ANXIOUS
VISUAL DISTURBANCES: MILD SENSITIVITY
ANXIETY: NO ANXIETY (AT EASE)
PAROXYSMAL SWEATS: NO SWEAT VISIBLE
AGITATION: NORMAL ACTIVITY
AUDITORY DISTURBANCES: MILD HARSHNESS OR ABILITY TO FRIGHTEN
HEADACHE, FULLNESS IN HEAD: NOT PRESENT
AUDITORY DISTURBANCES: VERY MILD HARSHNESS OR ABILITY TO FRIGHTEN
AUDITORY DISTURBANCES: NOT PRESENT
ANXIETY: NO ANXIETY (AT EASE)
TREMOR: *
TOTAL SCORE: 10
ORIENTATION AND CLOUDING OF SENSORIUM: DATE DISORIENTATION BY NO MORE THAN TWO CALENDAR DAYS
VISUAL DISTURBANCES: MODERATE SENSITIVITY
AUDITORY DISTURBANCES: VERY MILD HARSHNESS OR ABILITY TO FRIGHTEN
VISUAL DISTURBANCES: MODERATE SENSITIVITY
ANXIETY: MILDLY ANXIOUS
TOTAL SCORE: 4
TOTAL SCORE: MODERATE ITCHING, PINS AND NEEDLES SENSATION, BURNING OR NUMBNESS
TOTAL SCORE: 9
NAUSEA AND VOMITING: MILD NAUSEA WITH NO VOMITING
NAUSEA AND VOMITING: *
TREMOR: *
HEADACHE, FULLNESS IN HEAD: NOT PRESENT
AUDITORY DISTURBANCES: NOT PRESENT
PAROXYSMAL SWEATS: NO SWEAT VISIBLE
TREMOR: TREMOR NOT VISIBLE BUT CAN BE FELT, FINGERTIP TO FINGERTIP
VISUAL DISTURBANCES: MODERATELY SEVERE HALLUCINATIONS
TREMOR: *
TOTAL SCORE: 18
ORIENTATION AND CLOUDING OF SENSORIUM: DATE DISORIENTATION BY NO MORE THAN TWO CALENDAR DAYS
ANXIETY: NO ANXIETY (AT EASE)
PAROXYSMAL SWEATS: NO SWEAT VISIBLE
AUDITORY DISTURBANCES: VERY MILD HARSHNESS OR ABILITY TO FRIGHTEN
ORIENTATION AND CLOUDING OF SENSORIUM: CANNOT DO SERIAL ADDITIONS OR IS UNCERTAIN ABOUT DATE
AUDITORY DISTURBANCES: NOT PRESENT
AUDITORY DISTURBANCES: VERY MILD HARSHNESS OR ABILITY TO FRIGHTEN
PAROXYSMAL SWEATS: NO SWEAT VISIBLE
ANXIETY: *
AGITATION: NORMAL ACTIVITY
TOTAL SCORE: MILD ITCHING, PINS AND NEEDLES SENSATION, BURNING OR NUMBNESS
ANXIETY: MILDLY ANXIOUS
ANXIETY: MODERATELY ANXIOUS OR GUARDED, SO ANXIETY IS INFERRED
HEADACHE, FULLNESS IN HEAD: NOT PRESENT
TOTAL SCORE: VERY MILD ITCHING, PINS AND NEEDLES SENSATION, BURNING OR NUMBNESS
NAUSEA AND VOMITING: *
VISUAL DISTURBANCES: MODERATE SENSITIVITY
TREMOR: *
NAUSEA AND VOMITING: NO NAUSEA AND NO VOMITING
NAUSEA AND VOMITING: NO NAUSEA AND NO VOMITING
VISUAL DISTURBANCES: VERY MILD SENSITIVITY
ORIENTATION AND CLOUDING OF SENSORIUM: CANNOT DO SERIAL ADDITIONS OR IS UNCERTAIN ABOUT DATE
HEADACHE, FULLNESS IN HEAD: NOT PRESENT
TREMOR: *

## 2018-11-28 NOTE — CONSULTS
Critical Care Consultation    Date of consult: 11/27/2018    Referring Physician  Dr. Tinsley    Reason for Consultation  Etoh withdrawal requiring frequent hrly medications    History of Presenting Illness  33 y.o. male who presented 11/27/2018 with alcohol withdrawal  Homeless      Code Status  Full Code    Review of Systems  Review of Systems   Constitutional: Positive for fatigue.   HENT: Negative.    Eyes: Negative.    Respiratory: Negative.    Cardiovascular: Negative.    Gastrointestinal: Negative.    Endocrine: Negative.    Genitourinary: Negative.    Musculoskeletal: Negative.    Skin: Negative.    Allergic/Immunologic: Negative.    Neurological: Positive for tremors.   Hematological: Negative.    Psychiatric/Behavioral: Negative.  Negative for hallucinations.       Past Medical History   has a past medical history of Acute anxiety; ADHD (attention deficit hyperactivity disorder); ADHD (attention deficit hyperactivity disorder); Alcohol abuse; Bipolar affective (HCC); Depression; Ectrodactyly-ectodermal dysplasia-clefting syndrome; ETOH abuse; and Psychiatric disorder.    Surgical History   has a past surgical history that includes other orthopedic surgery.    Family History  family history is not on file.    Social History   reports that he has been smoking Cigarettes.  He has been smoking about 0.25 packs per day. He has never used smokeless tobacco. He reports that he drinks alcohol. He reports that he does not use drugs.    Medications  Home Medications     Reviewed by Yuki Joyce (Pharmacy Tech) on 11/27/18 at 0837  Med List Status: Complete   Medication Last Dose Status        Patient Torey Taking any Medications                     Current Facility-Administered Medications   Medication Dose Route Frequency Provider Last Rate Last Dose   • senna-docusate (PERICOLACE or SENOKOT S) 8.6-50 MG per tablet 2 Tab  2 Tab Oral BID Abigail Bowen M.D.        And   • polyethylene glycol/lytes  (MIRALAX) PACKET 1 Packet  1 Packet Oral QDAY PRN Abigail Bowen M.D.        And   • magnesium hydroxide (MILK OF MAGNESIA) suspension 30 mL  30 mL Oral QDAY PRN Abigail Bowen M.D.        And   • bisacodyl (DULCOLAX) suppository 10 mg  10 mg Rectal QDAY PRN Abigail Bowen M.D.       • LORazepam (ATIVAN) tablet 0.5 mg  0.5 mg Oral Q4HRS PRN Abigail Bowen M.D.       • LORazepam (ATIVAN) tablet 1 mg  1 mg Oral Q4HRS PRN Abigail Bowen M.D.        Or   • LORazepam (ATIVAN) injection 0.5 mg  0.5 mg Intravenous Q4HRS PRN Abigail Bowen M.D.       • LORazepam (ATIVAN) tablet 2 mg  2 mg Oral Q2HRS PRN Abigail Bowen M.D.        Or   • LORazepam (ATIVAN) injection 1 mg  1 mg Intravenous Q2HRS PRN Abigail Bowen M.D.   1 mg at 11/27/18 1619   • LORazepam (ATIVAN) tablet 3 mg  3 mg Oral Q HOUR PRN Abigail Bowen M.D.        Or   • LORazepam (ATIVAN) injection 1.5 mg  1.5 mg Intravenous Q HOUR PRN Abigail Bowen M.D.   1.5 mg at 11/27/18 1405   • LORazepam (ATIVAN) tablet 4 mg  4 mg Oral Q15 MIN PRN Abigail Bowen M.D.        Or   • LORazepam (ATIVAN) injection 2 mg  2 mg Intravenous Q15 MIN PRN Abigail Bowen M.D.   2 mg at 11/27/18 1453   • Respiratory Care per Protocol   Nebulization Continuous RT Abigail Bowen M.D.       • NS infusion   Intravenous Continuous Abigail Bowen M.D. 125 mL/hr at 11/27/18 1443     • ondansetron (ZOFRAN) syringe/vial injection 4 mg  4 mg Intravenous Q4HRS PRN Abigail Bowen M.D.       • benzocaine-menthol (CEPACOL) lozenge 1 Lozenge  1 Lozenge Mouth/Throat Q2HRS PRN Abigail Bowen M.D.   1 Lozenge at 11/27/18 1453   • erythromycin ophthalmic ointment   Both Eyes DAILY Abigail Bowen M.D.       • divalproex (DEPAKOTE) delayed-release tablet 500 mg  500 mg Oral Q8HRS Dalton Tinsley M.D.   500 mg at 11/27/18 1218   • cloNIDine (CATAPRES) tablet 0.1 mg  0.1 mg Oral TID Dalton Tinsley M.D.   0.1 mg at 11/27/18 1343   • gabapentin (NEURONTIN) capsule 300 mg  300 mg Oral TID  Dalton Tinsley M.D.   300 mg at 11/27/18 1343   • MD Alert...ICU Electrolyte Replacement per Pharmacy   Other pharmacy to dose Fara Cary M.D.           Allergies  No Known Allergies    Vital Signs last 24 hours  Temp:  [36.7 °C (98 °F)-37.8 °C (100.1 °F)] 37.3 °C (99.1 °F)  Pulse:  [100-157] 106  Resp:  [16-32] 29  BP: (113-142)/(67-86) 141/74    Physical Exam  Physical Exam   Malodorous  Constitutional: He is oriented to person, place, and time. He appears well-developed and well-nourished.   HENT:   Head: Normocephalic and atraumatic.   Eyes: Pupils are equal, round, and reactive to light. EOM are normal. Injected conjuctiva  Neck: Normal range of motion. Neck supple.   Cardiovascular: Regular rhythm.  Tachycardia present.    No murmur heard.  Pulmonary/Chest: Effort normal and breath sounds normal.   Abdominal: Soft. Bowel sounds are normal. He exhibits no distension.  Musculoskeletal: Normal range of motion. He exhibits no edema. He has 3 missing digits on the right and 2 digits on the left  Neurological: He is alert and oriented to person, place, and time. Tremors  Skin: Skin is warm and dry. Poor skin turgor  Psychiatric: He has a normal mood and affect. His behavior is normal.    Fluids    Intake/Output Summary (Last 24 hours) at 11/27/18 1637  Last data filed at 11/27/18 1600   Gross per 24 hour   Intake             2640 ml   Output                0 ml   Net             2640 ml       Laboratory  Recent Results (from the past 48 hour(s))   CBC WITH DIFFERENTIAL    Collection Time: 11/27/18  1:41 AM   Result Value Ref Range    WBC 6.5 4.8 - 10.8 K/uL    RBC 4.70 4.70 - 6.10 M/uL    Hemoglobin 14.4 14.0 - 18.0 g/dL    Hematocrit 41.4 (L) 42.0 - 52.0 %    MCV 88.1 81.4 - 97.8 fL    MCH 30.6 27.0 - 33.0 pg    MCHC 34.8 33.7 - 35.3 g/dL    RDW 44.6 35.9 - 50.0 fL    Platelet Count 82 (L) 164 - 446 K/uL    MPV 11.1 9.0 - 12.9 fL    Neutrophils-Polys 66.50 44.00 - 72.00 %    Lymphocytes 17.40 (L) 22.00  - 41.00 %    Monocytes 15.40 (H) 0.00 - 13.40 %    Eosinophils 0.20 0.00 - 6.90 %    Basophils 0.20 0.00 - 1.80 %    Immature Granulocytes 0.30 0.00 - 0.90 %    Nucleated RBC 0.00 /100 WBC    Neutrophils (Absolute) 4.33 1.82 - 7.42 K/uL    Lymphs (Absolute) 1.13 1.00 - 4.80 K/uL    Monos (Absolute) 1.00 (H) 0.00 - 0.85 K/uL    Eos (Absolute) 0.01 0.00 - 0.51 K/uL    Baso (Absolute) 0.01 0.00 - 0.12 K/uL    Immature Granulocytes (abs) 0.02 0.00 - 0.11 K/uL    NRBC (Absolute) 0.00 K/uL   COMP METABOLIC PANEL    Collection Time: 11/27/18  1:41 AM   Result Value Ref Range    Sodium 130 (L) 135 - 145 mmol/L    Potassium 4.0 3.6 - 5.5 mmol/L    Chloride 94 (L) 96 - 112 mmol/L    Co2 23 20 - 33 mmol/L    Anion Gap 13.0 (H) 0.0 - 11.9    Glucose 89 65 - 99 mg/dL    Bun 9 8 - 22 mg/dL    Creatinine 0.76 0.50 - 1.40 mg/dL    Calcium 7.9 (L) 8.4 - 10.2 mg/dL    AST(SGOT) 115 (H) 12 - 45 U/L    ALT(SGPT) 51 (H) 2 - 50 U/L    Alkaline Phosphatase 111 (H) 30 - 99 U/L    Total Bilirubin 0.6 0.1 - 1.5 mg/dL    Albumin 3.8 3.2 - 4.9 g/dL    Total Protein 7.4 6.0 - 8.2 g/dL    Globulin 3.6 (H) 1.9 - 3.5 g/dL    A-G Ratio 1.1 g/dL   LIPASE    Collection Time: 11/27/18  1:41 AM   Result Value Ref Range    Lipase 37 7 - 58 U/L   DIAGNOSTIC ALCOHOL    Collection Time: 11/27/18  1:41 AM   Result Value Ref Range    Diagnostic Alcohol 0.30 (H) 0.00 g/dL   ESTIMATED GFR    Collection Time: 11/27/18  1:41 AM   Result Value Ref Range    GFR If African American >60 >60 mL/min/1.73 m 2    GFR If Non African American >60 >60 mL/min/1.73 m 2   Magnesium    Collection Time: 11/27/18  1:41 AM   Result Value Ref Range    Magnesium 1.7 1.5 - 2.5 mg/dL   PHOSPHORUS    Collection Time: 11/27/18  1:41 AM   Result Value Ref Range    Phosphorus 2.6 2.5 - 4.5 mg/dL   PROTHROMBIN TIME    Collection Time: 11/27/18  1:41 AM   Result Value Ref Range    PT 12.7 12.0 - 14.6 sec    INR 0.96 0.87 - 1.13   Ammonia: STAT    Collection Time: 11/27/18  4:05 AM   Result  Value Ref Range    Ammonia 15 11 - 45 umol/L   Comp Metabolic Panel (CMP)    Collection Time: 11/27/18  9:04 AM   Result Value Ref Range    Sodium 132 (L) 135 - 145 mmol/L    Potassium 3.1 (L) 3.6 - 5.5 mmol/L    Chloride 97 96 - 112 mmol/L    Co2 25 20 - 33 mmol/L    Anion Gap 10.0 0.0 - 11.9    Glucose 101 (H) 65 - 99 mg/dL    Bun 6 (L) 8 - 22 mg/dL    Creatinine 0.62 0.50 - 1.40 mg/dL    Calcium 7.5 (L) 8.4 - 10.2 mg/dL    AST(SGOT) 83 (H) 12 - 45 U/L    ALT(SGPT) 40 2 - 50 U/L    Alkaline Phosphatase 93 30 - 99 U/L    Total Bilirubin 0.6 0.1 - 1.5 mg/dL    Albumin 3.1 (L) 3.2 - 4.9 g/dL    Total Protein 6.1 6.0 - 8.2 g/dL    Globulin 3.0 1.9 - 3.5 g/dL    A-G Ratio 1.0 g/dL   ESTIMATED GFR    Collection Time: 11/27/18  9:04 AM   Result Value Ref Range    GFR If African American >60 >60 mL/min/1.73 m 2    GFR If Non African American >60 >60 mL/min/1.73 m 2       Imaging  Personally reviewed 11/27/18 CXR no infiltrates or effusions    Assessment/Plan  * Alcohol withdrawal (HCC)- (present on admission)   Assessment & Plan    Etoh protocol  Continue clonidine and depakote  Continue monitor in ICU  May use precedex but high risk of escalating further  No current hallucinations but has had them today     Alcoholic hepatitis- (present on admission)   Assessment & Plan    LFTs improving     Conjunctivitis- (present on admission)   Assessment & Plan    monitor -- likely viral     Hyponatremia- (present on admission)   Assessment & Plan    At this time monitor - 132  Based on poor skin turgor likely hypovolemic hyponatremia  Continue normal saline at 125 cc/hr     Hypokalemia- (present on admission)   Assessment & Plan    Replaced and add electrolyte protocol         Discussed patient condition and risk of morbidity and/or mortality with Hospitalist and RN.    The patient remains critically ill.  Critical care time = 31 minutes in directly providing and coordinating critical care and extensive data review.  No time  overlap and excludes procedures.

## 2018-11-28 NOTE — PROGRESS NOTES
Plan of care reviewed with pt. Patient is alert and oriented, but disoriented to dates. Precedex drip at 0.2mcg throughout shift. Pt resting comfortably. Ciwa score per order and Ativan given as needed.   No acute events this shift. See flow sheets for further information.

## 2018-11-28 NOTE — PROGRESS NOTES
Critical Care Progress Note    Date of admission  11/27/2018    Hospital Course    33 y.o. male admitted 11/27/2018 with alcohol withdrawal - frequent admission for it  Requiring escalating doses of medications so was tx to icu for management       Interval Problem Update  Reviewed last 24 hour events:  Required minimal precedex  Still actively hallucinating but able to reorient  Concern for prolonged QTC last night EKG done which showed QTc 490 msecs and mild ST elevation in majority of the leads suggestive of acute pericarditis = however pt with no chest pain and normal hemodynamics at this time    Review of Systems  ROS   Unable to due to acuity    Vital Signs for last 24 hours   Temp:  [36.5 °C (97.7 °F)-37.2 °C (98.9 °F)] 36.7 °C (98.1 °F)  Pulse:  [] 63  Resp:  [14-24] 18  BP: ()/(68-83) 99/68    Physical Exam   Physical Exam   Constitutional: He appears well-developed and well-nourished. No distress.   HENT:   Head: Normocephalic and atraumatic.   Right Ear: External ear normal.   Left Ear: External ear normal.   Nose: Nose normal.   Mouth/Throat: Oropharynx is clear and moist.   Eyes: Pupils are equal, round, and reactive to light. EOM are normal. Left eye exhibits discharge. No scleral icterus.   Neck: No JVD present. No tracheal deviation present. No thyromegaly present.   Cardiovascular: Normal rate, regular rhythm, normal heart sounds and intact distal pulses.    Pulmonary/Chest: Effort normal and breath sounds normal. No stridor. No respiratory distress. He has no wheezes. He has no rales. He exhibits no tenderness.   Abdominal: Soft. Bowel sounds are normal. He exhibits no mass.   Musculoskeletal: He exhibits no edema.   Lymphadenopathy:     He has no cervical adenopathy.   Neurological: He is alert.   Hallucinating    Skin: Skin is warm and dry. No rash noted. He is not diaphoretic. No erythema.   Psychiatric:   arousable but really not willing to engage in conversation        Medications  Current Facility-Administered Medications   Medication Dose Route Frequency Provider Last Rate Last Dose   • senna-docusate (PERICOLACE or SENOKOT S) 8.6-50 MG per tablet 2 Tab  2 Tab Oral BID Abigail Bowen M.D.        And   • polyethylene glycol/lytes (MIRALAX) PACKET 1 Packet  1 Packet Oral QDAY PRN Abigail Bwoen M.D.        And   • magnesium hydroxide (MILK OF MAGNESIA) suspension 30 mL  30 mL Oral QDAY PRN Abigail Bowen M.D.        And   • bisacodyl (DULCOLAX) suppository 10 mg  10 mg Rectal QDAY PRN Abigail Bowen M.D.       • LORazepam (ATIVAN) tablet 0.5 mg  0.5 mg Oral Q4HRS PRN Abigail Bowen M.D.       • LORazepam (ATIVAN) tablet 1 mg  1 mg Oral Q4HRS PRN Abigail Bowen M.D.   1 mg at 11/27/18 2155    Or   • LORazepam (ATIVAN) injection 0.5 mg  0.5 mg Intravenous Q4HRS PRN Abigail Bowen M.D.       • LORazepam (ATIVAN) tablet 2 mg  2 mg Oral Q2HRS PRN Abigail Bowen M.D.        Or   • LORazepam (ATIVAN) injection 1 mg  1 mg Intravenous Q2HRS PRN Abigail Bowen M.D.   1 mg at 11/28/18 1458   • LORazepam (ATIVAN) tablet 3 mg  3 mg Oral Q HOUR PRN Abigail Bowen M.D.        Or   • LORazepam (ATIVAN) injection 1.5 mg  1.5 mg Intravenous Q HOUR PRN Abigail Bowen M.D.   1.5 mg at 11/28/18 0756   • LORazepam (ATIVAN) tablet 4 mg  4 mg Oral Q15 MIN PRN Abigail Bowen M.D.        Or   • LORazepam (ATIVAN) injection 2 mg  2 mg Intravenous Q15 MIN PRN Abigail Bowen M.D.   2 mg at 11/27/18 1453   • Respiratory Care per Protocol   Nebulization Continuous RT Abigail Bowen M.D.       • NS infusion   Intravenous Continuous Abigail Bowen M.D. 125 mL/hr at 11/28/18 1501     • ondansetron (ZOFRAN) syringe/vial injection 4 mg  4 mg Intravenous Q4HRS PRN Abigail Bowen M.D.   4 mg at 11/28/18 0104   • benzocaine-menthol (CEPACOL) lozenge 1 Lozenge  1 Lozenge Mouth/Throat Q2HRS PRN Abigail Bowen M.D.   1 Lozenge at 11/27/18 6083   • cloNIDine (CATAPRES) tablet 0.1 mg  0.1 mg Oral TID  Dalton Tinsley M.D.   0.1 mg at 11/28/18 1429   • gabapentin (NEURONTIN) capsule 300 mg  300 mg Oral TID Dalton Tinsley M.D.   300 mg at 11/28/18 1429   • MD Alert...ICU Electrolyte Replacement per Pharmacy   Other pharmacy to dose Fara Cary M.D.       • dexmedetomidine (PRECEDEX) 400 mcg in  mL infusion  0.1-1.5 mcg/kg/hr Intravenous Continuous Fara Cary M.D. 7 mL/hr at 11/28/18 1617 0.4 mcg/kg/hr at 11/28/18 1617       Fluids    Intake/Output Summary (Last 24 hours) at 11/28/18 1704  Last data filed at 11/28/18 1600   Gross per 24 hour   Intake          3914.75 ml   Output             4925 ml   Net         -1010.25 ml       Laboratory      Recent Labs      11/28/18   0615   TROPONINI  <0.02     Recent Labs      11/27/18   0141  11/27/18   0904  11/28/18   0010  11/28/18   0442   SODIUM  130*  132*   --   132*   POTASSIUM  4.0  3.1*  4.9  4.3   CHLORIDE  94*  97   --   102   CO2  23  25   --   24   BUN  9  6*   --   6*   CREATININE  0.76  0.62   --   0.78   MAGNESIUM  1.7   --    --   2.5   PHOSPHORUS  2.6   --    --   3.1   CALCIUM  7.9*  7.5*   --   7.9*     Recent Labs      11/27/18   0141  11/27/18   0904  11/28/18   0442   ALTSGPT  51*  40  48   ASTSGOT  115*  83*  116*   ALKPHOSPHAT  111*  93  100*   TBILIRUBIN  0.6  0.6  1.1   LIPASE  37   --    --    GLUCOSE  89  101*  88     Recent Labs      11/27/18   0141  11/27/18   0904  11/28/18   0442   WBC  6.5   --   4.0*   NEUTSPOLYS  66.50   --   48.50   LYMPHOCYTES  17.40*   --   31.00   MONOCYTES  15.40*   --   19.60*   EOSINOPHILS  0.20   --   0.30   BASOPHILS  0.20   --   0.30   ASTSGOT  115*  83*  116*   ALTSGPT  51*  40  48   ALKPHOSPHAT  111*  93  100*   TBILIRUBIN  0.6  0.6  1.1     Recent Labs      11/27/18   0141  11/28/18   0442   RBC  4.70  4.31*   HEMOGLOBIN  14.4  13.2*   HEMATOCRIT  41.4*  39.4*   PLATELETCT  82*  55*   PROTHROMBTM  12.7   --    INR  0.96   --          Assessment/Plan  * Alcohol withdrawal (HCC)-  (present on admission)   Assessment & Plan    Etoh protocol  Continue clonidine, gabapentin and depakote (stopping depakote due to increase in LFts)  Continue monitor in ICU   on very small dose of precedex which may be affecting his QT -- monitoring  + hallucinations  Monitoring closely     Alcoholic hepatitis- (present on admission)   Assessment & Plan    LFTs  Up gain - monitor but stopping depakote     Conjunctivitis- (present on admission)   Assessment & Plan    monitor -- likely viral     Hyponatremia- (present on admission)   Assessment & Plan    Holding at - 132  Continue normal saline at 125 cc/hr     Hypokalemia- (present on admission)   Assessment & Plan    Replaced and on electrolyte protocol     Abnormal EKG   Assessment & Plan    Prolonged QTC - before precedex - monitor closely  All lytes replaced  Elevated ST suggested of pericarditis as all leads have small ST elevation--- no clinical evidence  Repeat in am          VTE:  Heparin  Ulcer: Not Indicated  Lines: None    I have performed a physical exam and reviewed and updated ROS and Plan today (11/28/2018). In review of yesterday's note (11/27/2018), there are no changes except as documented above.     Discussed patient condition and risk of morbidity and/or mortality with RN, Pharmacy,  and multidisciplinary rounds  The patient remains critically ill.  Critical care time = 31 minutes in directly providing and coordinating critical care and extensive data review.  No time overlap and excludes procedures.

## 2018-11-28 NOTE — PROGRESS NOTES
Tele Rhythm:ST   HR: 110-125    Ectopy:none    0.12/0.08/0.34    Pt increased to 150s at 0822 and 1109.

## 2018-11-28 NOTE — PROGRESS NOTES
"0700 - report from Thalia SPENCE.  Pt resting quietly on R side in bed.  rr 14. vss  [id number] - Pt resting in bed.  A&O x 3, unsure of date. States is having hallucinations \"that are like crazy movies in his head\".  Repositions self in bed. Fall precautions in effect, bed alarm on.  Pt states wants to stand edge of bed to void, refuses to have RN in room.  This RN states to pt will remains present for pt safety, but will remain behind curtain for privacy.  Pt demands no staff in rm and then uses inappropriate language calling RN names. Pt states will not void and demands catheter or states will just void in bed. C/o nausea.  Denies pain, sob, dizziness. See flowsheet for assess.  poc reviewed with pt, pt refuses to respond. Tele = sr,  Vss. Refuses am meal.    CIWA scale and medication interventions as in MAR/charting.  1200 - pt cont to rest.  Pt states has not been sleeping, just resting with eyes closed.  1400 - up to bsc with sba for BM & void.  Linens/gown soiled, pt cleaned and full linen change. Pt states is having cont hallucinations and anxiety, see mar for interventions.    "

## 2018-11-28 NOTE — CARE PLAN
Problem: Safety  Goal: Will remain free from injury  Outcome: PROGRESSING AS EXPECTED  Pt will remain safe from falls, bed alarm on at all times, asst to bsc. Pt will vu to call for asst oob.    Problem: Knowledge Deficit  Goal: Knowledge of disease process/condition, treatment plan, diagnostic tests, and medications will improve  Outcome: PROGRESSING SLOWER THAN EXPECTED    Intervention: Assess knowledge level of disease process/condition, treatment plan, diagnostic tests, and medications  Pt will vu of current poc and ongoing prn treatment options.      Problem: Pain Management  Goal: Pain level will decrease to patient's comfort goal    Intervention: Follow pain managment plan developed in collaboration with patient and Interdisciplinary Team  Pt denies pain at this time, Pt encouraged to call with any pain management concerns.        Problem: Safety:  Goal: Will remain free from injury  Outcome: PROGRESSING AS EXPECTED  Pt will remain safe from falls, bed alarm on at all times, asst to bsc. Pt will vu to call for asst oob.    Problem: Physical Regulation:  Goal: Complications related to the disease process, condition or treatment will be avoided or minimized  Outcome: PROGRESSING AS EXPECTED  Pt will maintain stable vs, cognition, ciwa and rass for assessments and tx    Problem: Knowledge Deficit:  Goal: Knowledge of disease process/condition, treatment plan, diagnostic tests, and medications will improve  Outcome: PROGRESSING SLOWER THAN EXPECTED    Intervention: Assess knowledge level of disease process/condition, treatment plan, diagnostic tests, and medications  Pt will vu of current poc and ongoing prn treatment options.      Problem: Health Behavior:  Goal: Ability to identify changes in lifestyle to reduce recurrence of condition will improve    Intervention: Discuss recommended lifestyle changes  Pt will vu to dx process and vu of potential tx options upon d/c.

## 2018-11-29 PROBLEM — D69.6 THROMBOCYTOPENIA (HCC): Status: ACTIVE | Noted: 2018-11-29

## 2018-11-29 LAB
ALBUMIN SERPL BCP-MCNC: 3 G/DL (ref 3.2–4.9)
ALBUMIN/GLOB SERPL: 0.8 G/DL
ALP SERPL-CCNC: 102 U/L (ref 30–99)
ALT SERPL-CCNC: 51 U/L (ref 2–50)
ANION GAP SERPL CALC-SCNC: 6 MMOL/L (ref 0–11.9)
AST SERPL-CCNC: 96 U/L (ref 12–45)
BASOPHILS # BLD AUTO: 0.3 % (ref 0–1.8)
BASOPHILS # BLD: 0.01 K/UL (ref 0–0.12)
BILIRUB SERPL-MCNC: 1 MG/DL (ref 0.1–1.5)
BUN SERPL-MCNC: <5 MG/DL (ref 8–22)
CA-I SERPL-SCNC: 1.07 MMOL/L (ref 1.1–1.3)
CALCIUM SERPL-MCNC: 8.7 MG/DL (ref 8.4–10.2)
CHLORIDE SERPL-SCNC: 101 MMOL/L (ref 96–112)
CO2 SERPL-SCNC: 24 MMOL/L (ref 20–33)
COMMENT 1642: NORMAL
CREAT SERPL-MCNC: 0.76 MG/DL (ref 0.5–1.4)
EKG IMPRESSION: NORMAL
EOSINOPHIL # BLD AUTO: 0.04 K/UL (ref 0–0.51)
EOSINOPHIL NFR BLD: 1.1 % (ref 0–6.9)
ERYTHROCYTE [DISTWIDTH] IN BLOOD BY AUTOMATED COUNT: 45.9 FL (ref 35.9–50)
GLOBULIN SER CALC-MCNC: 3.6 G/DL (ref 1.9–3.5)
GLUCOSE SERPL-MCNC: 85 MG/DL (ref 65–99)
HCT VFR BLD AUTO: 43.6 % (ref 42–52)
HGB BLD-MCNC: 14.5 G/DL (ref 14–18)
IMM GRANULOCYTES # BLD AUTO: 0.01 K/UL (ref 0–0.11)
IMM GRANULOCYTES NFR BLD AUTO: 0.3 % (ref 0–0.9)
LYMPHOCYTES # BLD AUTO: 1.11 K/UL (ref 1–4.8)
LYMPHOCYTES NFR BLD: 29.2 % (ref 22–41)
MCH RBC QN AUTO: 30.6 PG (ref 27–33)
MCHC RBC AUTO-ENTMCNC: 33.3 G/DL (ref 33.7–35.3)
MCV RBC AUTO: 92 FL (ref 81.4–97.8)
MONOCYTES # BLD AUTO: 0.52 K/UL (ref 0–0.85)
MONOCYTES NFR BLD AUTO: 13.7 % (ref 0–13.4)
NEUTROPHILS # BLD AUTO: 2.11 K/UL (ref 1.82–7.42)
NEUTROPHILS NFR BLD: 55.4 % (ref 44–72)
NRBC # BLD AUTO: 0 K/UL
NRBC BLD-RTO: 0 /100 WBC
PLATELET # BLD AUTO: 50 K/UL (ref 164–446)
PLATELETS.RETICULATED NFR BLD AUTO: 13.9 K/UL (ref 0.6–13.1)
PMV BLD AUTO: 11.8 FL (ref 9–12.9)
POTASSIUM SERPL-SCNC: 4.2 MMOL/L (ref 3.6–5.5)
PROT SERPL-MCNC: 6.6 G/DL (ref 6–8.2)
RBC # BLD AUTO: 4.74 M/UL (ref 4.7–6.1)
SODIUM SERPL-SCNC: 131 MMOL/L (ref 135–145)
WBC # BLD AUTO: 3.8 K/UL (ref 4.8–10.8)

## 2018-11-29 PROCEDURE — 700102 HCHG RX REV CODE 250 W/ 637 OVERRIDE(OP): Performed by: INTERNAL MEDICINE

## 2018-11-29 PROCEDURE — 93010 ELECTROCARDIOGRAM REPORT: CPT | Performed by: INTERNAL MEDICINE

## 2018-11-29 PROCEDURE — 80053 COMPREHEN METABOLIC PANEL: CPT

## 2018-11-29 PROCEDURE — 85055 RETICULATED PLATELET ASSAY: CPT

## 2018-11-29 PROCEDURE — 93005 ELECTROCARDIOGRAM TRACING: CPT | Performed by: HOSPITALIST

## 2018-11-29 PROCEDURE — 700105 HCHG RX REV CODE 258

## 2018-11-29 PROCEDURE — 700102 HCHG RX REV CODE 250 W/ 637 OVERRIDE(OP): Performed by: HOSPITALIST

## 2018-11-29 PROCEDURE — A9270 NON-COVERED ITEM OR SERVICE: HCPCS | Performed by: HOSPITALIST

## 2018-11-29 PROCEDURE — 700101 HCHG RX REV CODE 250: Performed by: INTERNAL MEDICINE

## 2018-11-29 PROCEDURE — 99291 CRITICAL CARE FIRST HOUR: CPT | Performed by: INTERNAL MEDICINE

## 2018-11-29 PROCEDURE — 700111 HCHG RX REV CODE 636 W/ 250 OVERRIDE (IP): Performed by: HOSPITALIST

## 2018-11-29 PROCEDURE — 82330 ASSAY OF CALCIUM: CPT

## 2018-11-29 PROCEDURE — 85025 COMPLETE CBC W/AUTO DIFF WBC: CPT

## 2018-11-29 PROCEDURE — 770022 HCHG ROOM/CARE - ICU (200)

## 2018-11-29 PROCEDURE — 700105 HCHG RX REV CODE 258: Performed by: HOSPITALIST

## 2018-11-29 PROCEDURE — 700111 HCHG RX REV CODE 636 W/ 250 OVERRIDE (IP)

## 2018-11-29 PROCEDURE — A9270 NON-COVERED ITEM OR SERVICE: HCPCS | Performed by: INTERNAL MEDICINE

## 2018-11-29 PROCEDURE — 700105 HCHG RX REV CODE 258: Performed by: INTERNAL MEDICINE

## 2018-11-29 RX ORDER — FLUTICASONE PROPIONATE 50 MCG
1 SPRAY, SUSPENSION (ML) NASAL 2 TIMES DAILY
Status: DISCONTINUED | OUTPATIENT
Start: 2018-11-29 | End: 2018-12-02 | Stop reason: HOSPADM

## 2018-11-29 RX ADMIN — CLONIDINE HYDROCHLORIDE 0.1 MG: 0.1 TABLET ORAL at 17:31

## 2018-11-29 RX ADMIN — SODIUM CHLORIDE: 9 INJECTION, SOLUTION INTRAVENOUS at 07:54

## 2018-11-29 RX ADMIN — LORAZEPAM 0.5 MG: 2 INJECTION INTRAMUSCULAR; INTRAVENOUS at 00:03

## 2018-11-29 RX ADMIN — GABAPENTIN 300 MG: 300 CAPSULE ORAL at 12:16

## 2018-11-29 RX ADMIN — MINERAL OIL AND WHITE PETROLATUM 1 APPLICATION: 150; 830 OINTMENT OPHTHALMIC at 12:20

## 2018-11-29 RX ADMIN — LORAZEPAM 2 MG: 1 TABLET ORAL at 22:57

## 2018-11-29 RX ADMIN — GABAPENTIN 300 MG: 300 CAPSULE ORAL at 17:31

## 2018-11-29 RX ADMIN — GABAPENTIN 300 MG: 300 CAPSULE ORAL at 05:42

## 2018-11-29 RX ADMIN — LORAZEPAM 1 MG: 2 INJECTION INTRAMUSCULAR; INTRAVENOUS at 07:54

## 2018-11-29 RX ADMIN — LORAZEPAM 2 MG: 1 TABLET ORAL at 20:34

## 2018-11-29 RX ADMIN — SODIUM CHLORIDE: 9 INJECTION, SOLUTION INTRAVENOUS at 00:05

## 2018-11-29 RX ADMIN — BENZOCAINE AND MENTHOL 1 LOZENGE: 15; 4 LOZENGE ORAL at 20:19

## 2018-11-29 RX ADMIN — FLUTICASONE PROPIONATE 50 MCG: 50 SPRAY, METERED NASAL at 23:35

## 2018-11-29 RX ADMIN — LORAZEPAM 2 MG: 1 TABLET ORAL at 18:37

## 2018-11-29 RX ADMIN — SODIUM CHLORIDE: 9 INJECTION, SOLUTION INTRAVENOUS at 16:05

## 2018-11-29 RX ADMIN — CLONIDINE HYDROCHLORIDE 0.1 MG: 0.1 TABLET ORAL at 12:16

## 2018-11-29 RX ADMIN — CALCIUM GLUCONATE 1000 MG: 98 INJECTION, SOLUTION INTRAVENOUS at 10:02

## 2018-11-29 RX ADMIN — LORAZEPAM 1 MG: 2 INJECTION INTRAMUSCULAR; INTRAVENOUS at 05:42

## 2018-11-29 RX ADMIN — ONDANSETRON 4 MG: 2 INJECTION INTRAMUSCULAR; INTRAVENOUS at 20:08

## 2018-11-29 RX ADMIN — LORAZEPAM 2 MG: 1 TABLET ORAL at 16:04

## 2018-11-29 RX ADMIN — ONDANSETRON 4 MG: 2 INJECTION INTRAMUSCULAR; INTRAVENOUS at 12:26

## 2018-11-29 RX ADMIN — DEXMEDETOMIDINE HYDROCHLORIDE 0.3 MCG/KG/HR: 100 INJECTION, SOLUTION INTRAVENOUS at 12:16

## 2018-11-29 RX ADMIN — CLONIDINE HYDROCHLORIDE 0.1 MG: 0.1 TABLET ORAL at 05:42

## 2018-11-29 RX ADMIN — MINERAL OIL AND WHITE PETROLATUM 1 APPLICATION: 150; 830 OINTMENT OPHTHALMIC at 22:56

## 2018-11-29 RX ADMIN — BENZOCAINE AND MENTHOL 1 LOZENGE: 15; 4 LOZENGE ORAL at 20:20

## 2018-11-29 RX ADMIN — ONDANSETRON 4 MG: 2 INJECTION INTRAMUSCULAR; INTRAVENOUS at 07:54

## 2018-11-29 RX ADMIN — SODIUM CHLORIDE: 9 INJECTION, SOLUTION INTRAVENOUS at 23:52

## 2018-11-29 ASSESSMENT — LIFESTYLE VARIABLES
ORIENTATION AND CLOUDING OF SENSORIUM: CANNOT DO SERIAL ADDITIONS OR IS UNCERTAIN ABOUT DATE
AUDITORY DISTURBANCES: NOT PRESENT
PAROXYSMAL SWEATS: NO SWEAT VISIBLE
VISUAL DISTURBANCES: MODERATELY SEVERE HALLUCINATIONS
PAROXYSMAL SWEATS: NO SWEAT VISIBLE
NAUSEA AND VOMITING: NO NAUSEA AND NO VOMITING
PAROXYSMAL SWEATS: BARELY PERCEPTIBLE SWEATING. PALMS MOIST
HEADACHE, FULLNESS IN HEAD: VERY MILD
TOTAL SCORE: 12
PAROXYSMAL SWEATS: *
TREMOR: TREMOR NOT VISIBLE BUT CAN BE FELT, FINGERTIP TO FINGERTIP
ANXIETY: NO ANXIETY (AT EASE)
ANXIETY: MILDLY ANXIOUS
ORIENTATION AND CLOUDING OF SENSORIUM: CANNOT DO SERIAL ADDITIONS OR IS UNCERTAIN ABOUT DATE
ANXIETY: *
HEADACHE, FULLNESS IN HEAD: MODERATELY SEVERE
VISUAL DISTURBANCES: NOT PRESENT
VISUAL DISTURBANCES: MODERATELY SEVERE HALLUCINATIONS
ANXIETY: *
AUDITORY DISTURBANCES: NOT PRESENT
TREMOR: TREMOR NOT VISIBLE BUT CAN BE FELT, FINGERTIP TO FINGERTIP
PAROXYSMAL SWEATS: NO SWEAT VISIBLE
AGITATION: SOMEWHAT MORE THAN NORMAL ACTIVITY
NAUSEA AND VOMITING: *
VISUAL DISTURBANCES: MODERATE SENSITIVITY
AUDITORY DISTURBANCES: VERY MILD HARSHNESS OR ABILITY TO FRIGHTEN
VISUAL DISTURBANCES: NOT PRESENT
TREMOR: *
AUDITORY DISTURBANCES: NOT PRESENT
PAROXYSMAL SWEATS: BARELY PERCEPTIBLE SWEATING. PALMS MOIST
TOTAL SCORE: VERY MILD ITCHING, PINS AND NEEDLES SENSATION, BURNING OR NUMBNESS
ANXIETY: MILDLY ANXIOUS
AUDITORY DISTURBANCES: NOT PRESENT
HEADACHE, FULLNESS IN HEAD: NOT PRESENT
VISUAL DISTURBANCES: NOT PRESENT
HEADACHE, FULLNESS IN HEAD: MILD
PAROXYSMAL SWEATS: NO SWEAT VISIBLE
VISUAL DISTURBANCES: MODERATE SENSITIVITY
TREMOR: *
AGITATION: NORMAL ACTIVITY
ANXIETY: MILDLY ANXIOUS
AGITATION: NORMAL ACTIVITY
TOTAL SCORE: 14
ORIENTATION AND CLOUDING OF SENSORIUM: DATE DISORIENTATION BY MORE THAN TWO CALENDAR DAYS
AGITATION: SOMEWHAT MORE THAN NORMAL ACTIVITY
NAUSEA AND VOMITING: MILD NAUSEA WITH NO VOMITING
ORIENTATION AND CLOUDING OF SENSORIUM: CANNOT DO SERIAL ADDITIONS OR IS UNCERTAIN ABOUT DATE
NAUSEA AND VOMITING: MILD NAUSEA WITH NO VOMITING
HEADACHE, FULLNESS IN HEAD: VERY MILD
ORIENTATION AND CLOUDING OF SENSORIUM: CANNOT DO SERIAL ADDITIONS OR IS UNCERTAIN ABOUT DATE
NAUSEA AND VOMITING: NO NAUSEA AND NO VOMITING
PAROXYSMAL SWEATS: NO SWEAT VISIBLE
NAUSEA AND VOMITING: MILD NAUSEA WITH NO VOMITING
AGITATION: NORMAL ACTIVITY
ORIENTATION AND CLOUDING OF SENSORIUM: CANNOT DO SERIAL ADDITIONS OR IS UNCERTAIN ABOUT DATE
ORIENTATION AND CLOUDING OF SENSORIUM: CANNOT DO SERIAL ADDITIONS OR IS UNCERTAIN ABOUT DATE
AGITATION: NORMAL ACTIVITY
AUDITORY DISTURBANCES: NOT PRESENT
TOTAL SCORE: 11
VISUAL DISTURBANCES: MODERATE SENSITIVITY
HEADACHE, FULLNESS IN HEAD: VERY MILD
ORIENTATION AND CLOUDING OF SENSORIUM: CANNOT DO SERIAL ADDITIONS OR IS UNCERTAIN ABOUT DATE
AUDITORY DISTURBANCES: NOT PRESENT
AUDITORY DISTURBANCES: NOT PRESENT
HEADACHE, FULLNESS IN HEAD: NOT PRESENT
ANXIETY: *
TREMOR: TREMOR NOT VISIBLE BUT CAN BE FELT, FINGERTIP TO FINGERTIP
ANXIETY: *
TOTAL SCORE: 18
TREMOR: TREMOR NOT VISIBLE BUT CAN BE FELT, FINGERTIP TO FINGERTIP
TOTAL SCORE: 4
PAROXYSMAL SWEATS: BEADS OF SWEAT OBVIOUS ON FOREHEAD
ANXIETY: NO ANXIETY (AT EASE)
TOTAL SCORE: 11
AGITATION: *
AGITATION: SOMEWHAT MORE THAN NORMAL ACTIVITY
HEADACHE, FULLNESS IN HEAD: MODERATELY SEVERE
TOTAL SCORE: 7
HEADACHE, FULLNESS IN HEAD: MILD
TOTAL SCORE: 12
TREMOR: *
NAUSEA AND VOMITING: MILD NAUSEA WITH NO VOMITING
AGITATION: *
TOTAL SCORE: 11
NAUSEA AND VOMITING: MILD NAUSEA WITH NO VOMITING
AUDITORY DISTURBANCES: NOT PRESENT
TREMOR: *
TREMOR: *
NAUSEA AND VOMITING: *
VISUAL DISTURBANCES: MODERATELY SEVERE HALLUCINATIONS
ORIENTATION AND CLOUDING OF SENSORIUM: CANNOT DO SERIAL ADDITIONS OR IS UNCERTAIN ABOUT DATE

## 2018-11-29 ASSESSMENT — PAIN SCALES - GENERAL
PAINLEVEL_OUTOF10: 3
PAINLEVEL_OUTOF10: 0
PAINLEVEL_OUTOF10: 2
PAINLEVEL_OUTOF10: 1
PAINLEVEL_OUTOF10: 0
PAINLEVEL_OUTOF10: 5
PAINLEVEL_OUTOF10: 2

## 2018-11-29 NOTE — PROGRESS NOTES
"[id number] - report from Lillian SEPNCE.  Pt resting comfortably in bed.  A&O x 3, unsure of date. Pt is irritable and mod anxious.  C/o mod to severe visual hallucinations with eyes closed, see mar for med interventions. Repositions self in bed freq.  Fall precautions in effect, bed alarm on.  Pt calls approp for asst at times.  C/o mild HA, declines intervention at this time. Denies other pain, sob, dizziness, nausea. See flowsheet for assess.  poc reviewed with pt, pt vu with \"whatever\".  Fair po intake with am meal.  "

## 2018-11-29 NOTE — DISCHARGE PLANNING
Patient is currently not awake and alert d/t health care reasons, I will assess patient tomorrow for discharge needs and living conditions.

## 2018-11-29 NOTE — ASSESSMENT & PLAN NOTE
From etoh abuse resulting in bone marrow suppression  montinor  No bleeding source, platelets at 50k  Continue heparin for DVT prophylaxis

## 2018-11-29 NOTE — PROGRESS NOTES
Assumed pt care. AAOx3, disoriented to time. Pt denied pain or SOB at rest. CIWA score was 13. Gave pt ativan per CIWA protocol. See MAR. Assessment completed. Reminded pt to call for assistance. Call light within reach, bed in lowest position, bed alarm on, will continue to monitor.

## 2018-11-29 NOTE — PROGRESS NOTES
Lab called to say that pt's platelets were clumping. They requested a blue top. Pt refused to have his blood drawn again at this time.

## 2018-11-29 NOTE — PROGRESS NOTES
Critical Care Progress Note    Date of admission  11/27/2018    Hospital Course    33 y.o. male admitted 11/27/2018 with alcohol withdrawal - frequent admission for it  Requiring escalating doses of medications so was tx 11/27 to icu for management       Interval Problem Update  Reviewed last 24 hour events:  QTC better this am  LFTS improved off depakote  Perecedex is 0.3 mcgs    Review of Systems  ROS   Unable to due to acuity    Vital Signs for last 24 hours   Temp:  [36.2 °C (97.2 °F)-37 °C (98.6 °F)] 36.2 °C (97.2 °F)  Pulse:  [56-79] 73  Resp:  [12-29] 17    Physical Exam   Physical Exam   Constitutional: He appears well-developed and well-nourished. No distress.   HENT:   Head: Normocephalic and atraumatic.   Right Ear: External ear normal.   Left Ear: External ear normal.   Nose: Nose normal.   Mouth/Throat: Oropharynx is clear and moist.   Eyes: Pupils are equal, round, and reactive to light. EOM are normal. Left eye exhibits discharge. No scleral icterus.   Markedly better and minimal discharge   Neck: No JVD present. No tracheal deviation present. No thyromegaly present.   Cardiovascular: Normal rate, regular rhythm, normal heart sounds and intact distal pulses.    Pulmonary/Chest: Effort normal and breath sounds normal. No stridor. No respiratory distress. He has no wheezes. He has no rales. He exhibits no tenderness.   Abdominal: Soft. Bowel sounds are normal. He exhibits no mass.   Musculoskeletal: He exhibits no edema.   Lymphadenopathy:     He has no cervical adenopathy.   Neurological: He is alert.   Awake saying if he closes his eyes he hallucinated  No obvious tremors   Skin: Skin is warm and dry. No rash noted. He is not diaphoretic. No erythema.   Psychiatric:   Appears agitated       Medications  Current Facility-Administered Medications   Medication Dose Route Frequency Provider Last Rate Last Dose   • artificial tear ointment (REFRESH,LACRI-LUBE) 1 Application  1 Application Both Eyes Q8HRS  Fara Cary M.D.   1 Application at 11/29/18 1220   • senna-docusate (PERICOLACE or SENOKOT S) 8.6-50 MG per tablet 2 Tab  2 Tab Oral BID Abigail Bowen M.D.   Stopped at 11/28/18 1740    And   • polyethylene glycol/lytes (MIRALAX) PACKET 1 Packet  1 Packet Oral QDAY PRN Abigail Bowen M.D.        And   • magnesium hydroxide (MILK OF MAGNESIA) suspension 30 mL  30 mL Oral QDAY PRN Abigail Bowen M.D.        And   • bisacodyl (DULCOLAX) suppository 10 mg  10 mg Rectal QDAY PRN Abigail Bowen M.D.       • LORazepam (ATIVAN) tablet 0.5 mg  0.5 mg Oral Q4HRS PRN Abigail Bowen M.D.       • LORazepam (ATIVAN) tablet 1 mg  1 mg Oral Q4HRS PRN Abigail Bowen M.D.   1 mg at 11/27/18 2155    Or   • LORazepam (ATIVAN) injection 0.5 mg  0.5 mg Intravenous Q4HRS PRN Abigail Bowen M.D.   0.5 mg at 11/29/18 0003   • LORazepam (ATIVAN) tablet 2 mg  2 mg Oral Q2HRS PRN Abigail Bowen M.D.        Or   • LORazepam (ATIVAN) injection 1 mg  1 mg Intravenous Q2HRS PRN Abigail Bowen M.D.   1 mg at 11/29/18 0754   • LORazepam (ATIVAN) tablet 3 mg  3 mg Oral Q HOUR PRN Abigail Bowen M.D.        Or   • LORazepam (ATIVAN) injection 1.5 mg  1.5 mg Intravenous Q HOUR PRN Abigail Bowen M.D.   1.5 mg at 11/28/18 0756   • LORazepam (ATIVAN) tablet 4 mg  4 mg Oral Q15 MIN PRN Abigail Bowen M.D.        Or   • LORazepam (ATIVAN) injection 2 mg  2 mg Intravenous Q15 MIN PRN Abigail Bowen M.D.   2 mg at 11/27/18 1453   • Respiratory Care per Protocol   Nebulization Continuous RT Abigail Bowen M.D.       • NS infusion   Intravenous Continuous Abigail Bowen M.D. 125 mL/hr at 11/29/18 0754     • ondansetron (ZOFRAN) syringe/vial injection 4 mg  4 mg Intravenous Q4HRS PRN Abigail Bowen M.D.   4 mg at 11/29/18 1226   • benzocaine-menthol (CEPACOL) lozenge 1 Lozenge  1 Lozenge Mouth/Throat Q2HRS PRN Abigail Bowen M.D.   1 Lozenge at 11/27/18 1453   • cloNIDine (CATAPRES) tablet 0.1 mg  0.1 mg Oral TID Dalton Tinsley,  M.D.   0.1 mg at 11/29/18 1216   • gabapentin (NEURONTIN) capsule 300 mg  300 mg Oral TID Dalton Tinsley M.D.   300 mg at 11/29/18 1216   • MD Alert...ICU Electrolyte Replacement per Pharmacy   Other pharmacy to dose Fara Cary M.D.       • dexmedetomidine (PRECEDEX) 400 mcg in  mL infusion  0.1-1.5 mcg/kg/hr Intravenous Continuous Fara Cary M.D. 5.2 mL/hr at 11/29/18 1216 0.3 mcg/kg/hr at 11/29/18 1216       Fluids    Intake/Output Summary (Last 24 hours) at 11/29/18 1410  Last data filed at 11/29/18 1002   Gross per 24 hour   Intake          3793.06 ml   Output             4800 ml   Net         -1006.94 ml       Laboratory      Recent Labs      11/28/18   0615   TROPONINI  <0.02     Recent Labs      11/27/18   0141  11/27/18   0904  11/28/18   0010  11/28/18 0442 11/29/18   0345   SODIUM  130*  132*   --   132*  131*   POTASSIUM  4.0  3.1*  4.9  4.3  4.2   CHLORIDE  94*  97   --   102  101   CO2  23  25   --   24  24   BUN  9  6*   --   6*  <5*   CREATININE  0.76  0.62   --   0.78  0.76   MAGNESIUM  1.7   --    --   2.5   --    PHOSPHORUS  2.6   --    --   3.1   --    CALCIUM  7.9*  7.5*   --   7.9*  8.7     Recent Labs      11/27/18 0141 11/27/18   0904  11/28/18   0442  11/29/18   0345   ALTSGPT  51*  40  48  51*   ASTSGOT  115*  83*  116*  96*   ALKPHOSPHAT  111*  93  100*  102*   TBILIRUBIN  0.6  0.6  1.1  1.0   LIPASE  37   --    --    --    GLUCOSE  89  101*  88  85     Recent Labs      11/27/18   0141  11/27/18   0904  11/28/18   0442  11/29/18   0345   WBC  6.5   --   4.0*  3.8*   NEUTSPOLYS  66.50   --   48.50  55.40   LYMPHOCYTES  17.40*   --   31.00  29.20   MONOCYTES  15.40*   --   19.60*  13.70*   EOSINOPHILS  0.20   --   0.30  1.10   BASOPHILS  0.20   --   0.30  0.30   ASTSGOT  115*  83*  116*  96*   ALTSGPT  51*  40  48  51*   ALKPHOSPHAT  111*  93  100*  102*   TBILIRUBIN  0.6  0.6  1.1  1.0     Recent Labs      11/27/18   0141  11/28/18   0442  11/29/18   0345    RBC  4.70  4.31*  4.74   HEMOGLOBIN  14.4  13.2*  14.5   HEMATOCRIT  41.4*  39.4*  43.6   PLATELETCT  82*  55*  50*   PROTHROMBTM  12.7   --    --    INR  0.96   --    --          Assessment/Plan  * Alcohol withdrawal (HCC)- (present on admission)   Assessment & Plan    Etoh protocol  Continue clonidine, gabapentin   Continue monitor in ICU   on very small dose of precedex    + hallucinations  I do think he is improving and likely stop the precedex I am and assess     Alcoholic hepatitis- (present on admission)   Assessment & Plan    improving     Conjunctivitis- (present on admission)   Assessment & Plan    Likely viral and resolved almost     Thrombocytopenia (HCC)- (present on admission)   Assessment & Plan    From etoh abuse resulting in bone marrow suppression  montinor  No bleeding source  Continue heparin for DVT prophylaxis     Hyponatremia- (present on admission)   Assessment & Plan    131 today  Continue normal saline at 125 cc/hr     Hypokalemia- (present on admission)   Assessment & Plan    Replaced and on electrolyte protocol     Abnormal EKG   Assessment & Plan    EKG with improved QTc 462  msec          VTE:  Heparin  Ulcer: Not Indicated  Lines: None    I have performed a physical exam and reviewed and updated ROS and Plan today (11/29/2018). In review of yesterday's note (11/28/2018), there are no changes except as documented above.     Discussed patient condition and risk of morbidity and/or mortality with RN, Pharmacy,  and multidisciplinary rounds  The patient remains critically ill.  Critical care time = 30 minutes in directly providing and coordinating critical care and extensive data review.  No time overlap and excludes procedures.

## 2018-11-29 NOTE — CARE PLAN
"Problem: Safety  Goal: Will remain free from falls    Intervention: Implement fall precautions   11/28/18 2000   OTHER   Environmental Precautions Treaded Slipper Socks on Patient;Personal Belongings, Wastebasket, Call Bell etc. in Easy Reach;Report Given to Other Health Care Providers Regarding Fall Risk;Bed in Low Position;Communication Sign for Patients & Families;Mobility Assessed & Appropriate Sign Placed   Bed Alarm Yes - Alarm On         Problem: Physical Regulation:  Goal: Ability to maintain clinical measurements within normal limits will improve    Intervention: Provide medications  Pt is on a precedex drip at 0.4 mcg/kg/hr and is receiving ativan prn.      Problem: Health Behavior:  Goal: Ability to identify changes in lifestyle to reduce recurrence of condition will improve    Intervention: Discuss recommended lifestyle changes  Pt verbalized desire to quit drinking. Pt said, \"I know I'm going to die if I don't stop. My body feels like it's 80 years old.\"        "

## 2018-11-30 LAB
ALBUMIN SERPL BCP-MCNC: 2.8 G/DL (ref 3.2–4.9)
ALBUMIN/GLOB SERPL: 0.8 G/DL
ALP SERPL-CCNC: 101 U/L (ref 30–99)
ALT SERPL-CCNC: 43 U/L (ref 2–50)
ANION GAP SERPL CALC-SCNC: 6 MMOL/L (ref 0–11.9)
AST SERPL-CCNC: 61 U/L (ref 12–45)
BILIRUB SERPL-MCNC: 0.6 MG/DL (ref 0.1–1.5)
BUN SERPL-MCNC: 5 MG/DL (ref 8–22)
CA-I SERPL-SCNC: 1.1 MMOL/L (ref 1.1–1.3)
CALCIUM SERPL-MCNC: 8.3 MG/DL (ref 8.4–10.2)
CHLORIDE SERPL-SCNC: 99 MMOL/L (ref 96–112)
CO2 SERPL-SCNC: 23 MMOL/L (ref 20–33)
CREAT SERPL-MCNC: 0.76 MG/DL (ref 0.5–1.4)
GLOBULIN SER CALC-MCNC: 3.6 G/DL (ref 1.9–3.5)
GLUCOSE SERPL-MCNC: 98 MG/DL (ref 65–99)
MAGNESIUM SERPL-MCNC: 1.8 MG/DL (ref 1.5–2.5)
PHOSPHATE SERPL-MCNC: 3.2 MG/DL (ref 2.5–4.5)
POTASSIUM SERPL-SCNC: 3.6 MMOL/L (ref 3.6–5.5)
PROT SERPL-MCNC: 6.4 G/DL (ref 6–8.2)
SODIUM SERPL-SCNC: 128 MMOL/L (ref 135–145)

## 2018-11-30 PROCEDURE — 700102 HCHG RX REV CODE 250 W/ 637 OVERRIDE(OP): Performed by: HOSPITALIST

## 2018-11-30 PROCEDURE — A9270 NON-COVERED ITEM OR SERVICE: HCPCS | Performed by: HOSPITALIST

## 2018-11-30 PROCEDURE — A9270 NON-COVERED ITEM OR SERVICE: HCPCS | Performed by: INTERNAL MEDICINE

## 2018-11-30 PROCEDURE — 82330 ASSAY OF CALCIUM: CPT

## 2018-11-30 PROCEDURE — 83735 ASSAY OF MAGNESIUM: CPT

## 2018-11-30 PROCEDURE — 80053 COMPREHEN METABOLIC PANEL: CPT

## 2018-11-30 PROCEDURE — 700102 HCHG RX REV CODE 250 W/ 637 OVERRIDE(OP): Performed by: INTERNAL MEDICINE

## 2018-11-30 PROCEDURE — 700111 HCHG RX REV CODE 636 W/ 250 OVERRIDE (IP): Performed by: INTERNAL MEDICINE

## 2018-11-30 PROCEDURE — 770006 HCHG ROOM/CARE - MED/SURG/GYN SEMI*

## 2018-11-30 PROCEDURE — 84100 ASSAY OF PHOSPHORUS: CPT

## 2018-11-30 PROCEDURE — 99233 SBSQ HOSP IP/OBS HIGH 50: CPT | Performed by: INTERNAL MEDICINE

## 2018-11-30 RX ORDER — POTASSIUM CHLORIDE 20 MEQ/1
60 TABLET, EXTENDED RELEASE ORAL ONCE
Status: COMPLETED | OUTPATIENT
Start: 2018-11-30 | End: 2018-11-30

## 2018-11-30 RX ORDER — MAGNESIUM SULFATE HEPTAHYDRATE 40 MG/ML
2 INJECTION, SOLUTION INTRAVENOUS ONCE
Status: COMPLETED | OUTPATIENT
Start: 2018-11-30 | End: 2018-11-30

## 2018-11-30 RX ADMIN — MINERAL OIL AND WHITE PETROLATUM 1 APPLICATION: 150; 830 OINTMENT OPHTHALMIC at 21:39

## 2018-11-30 RX ADMIN — LORAZEPAM 2 MG: 1 TABLET ORAL at 02:09

## 2018-11-30 RX ADMIN — GABAPENTIN 300 MG: 300 CAPSULE ORAL at 06:04

## 2018-11-30 RX ADMIN — FLUTICASONE PROPIONATE 50 MCG: 50 SPRAY, METERED NASAL at 06:04

## 2018-11-30 RX ADMIN — MAGNESIUM SULFATE HEPTAHYDRATE 2 G: 40 INJECTION, SOLUTION INTRAVENOUS at 08:17

## 2018-11-30 RX ADMIN — LORAZEPAM 1 MG: 1 TABLET ORAL at 18:06

## 2018-11-30 RX ADMIN — MINERAL OIL AND WHITE PETROLATUM 1 APPLICATION: 150; 830 OINTMENT OPHTHALMIC at 06:05

## 2018-11-30 RX ADMIN — LORAZEPAM 1 MG: 1 TABLET ORAL at 11:23

## 2018-11-30 RX ADMIN — LORAZEPAM 1 MG: 1 TABLET ORAL at 13:39

## 2018-11-30 RX ADMIN — LORAZEPAM 2 MG: 1 TABLET ORAL at 06:17

## 2018-11-30 RX ADMIN — LORAZEPAM 1 MG: 1 TABLET ORAL at 15:49

## 2018-11-30 RX ADMIN — FLUTICASONE PROPIONATE 50 MCG: 50 SPRAY, METERED NASAL at 18:06

## 2018-11-30 RX ADMIN — LORAZEPAM 2 MG: 1 TABLET ORAL at 21:39

## 2018-11-30 RX ADMIN — LORAZEPAM 1 MG: 1 TABLET ORAL at 08:16

## 2018-11-30 RX ADMIN — LORAZEPAM 2 MG: 1 TABLET ORAL at 04:17

## 2018-11-30 RX ADMIN — CLONIDINE HYDROCHLORIDE 0.1 MG: 0.1 TABLET ORAL at 06:04

## 2018-11-30 RX ADMIN — POTASSIUM CHLORIDE 60 MEQ: 1500 TABLET, EXTENDED RELEASE ORAL at 08:16

## 2018-11-30 ASSESSMENT — LIFESTYLE VARIABLES
TOTAL SCORE: 11
ANXIETY: MODERATELY ANXIOUS OR GUARDED, SO ANXIETY IS INFERRED
NAUSEA AND VOMITING: NO NAUSEA AND NO VOMITING
TREMOR: TREMOR NOT VISIBLE BUT CAN BE FELT, FINGERTIP TO FINGERTIP
HEADACHE, FULLNESS IN HEAD: MODERATE
VISUAL DISTURBANCES: MILD SENSITIVITY
PAROXYSMAL SWEATS: NO SWEAT VISIBLE
PAROXYSMAL SWEATS: BARELY PERCEPTIBLE SWEATING. PALMS MOIST
TREMOR: TREMOR NOT VISIBLE BUT CAN BE FELT, FINGERTIP TO FINGERTIP
ORIENTATION AND CLOUDING OF SENSORIUM: ORIENTED AND CAN DO SERIAL ADDITIONS
AUDITORY DISTURBANCES: NOT PRESENT
TOTAL SCORE: 13
PAROXYSMAL SWEATS: NO SWEAT VISIBLE
PAROXYSMAL SWEATS: NO SWEAT VISIBLE
VISUAL DISTURBANCES: MODERATELY SEVERE HALLUCINATIONS
AUDITORY DISTURBANCES: NOT PRESENT
AGITATION: SOMEWHAT MORE THAN NORMAL ACTIVITY
TOTAL SCORE: 9
VISUAL DISTURBANCES: MODERATELY SEVERE HALLUCINATIONS
TOTAL SCORE: 9
NAUSEA AND VOMITING: NO NAUSEA AND NO VOMITING
TOTAL SCORE: 9
TREMOR: TREMOR NOT VISIBLE BUT CAN BE FELT, FINGERTIP TO FINGERTIP
ORIENTATION AND CLOUDING OF SENSORIUM: CANNOT DO SERIAL ADDITIONS OR IS UNCERTAIN ABOUT DATE
ORIENTATION AND CLOUDING OF SENSORIUM: CANNOT DO SERIAL ADDITIONS OR IS UNCERTAIN ABOUT DATE
NAUSEA AND VOMITING: NO NAUSEA AND NO VOMITING
AGITATION: SOMEWHAT MORE THAN NORMAL ACTIVITY
VISUAL DISTURBANCES: MILD SENSITIVITY
HEADACHE, FULLNESS IN HEAD: VERY MILD
ORIENTATION AND CLOUDING OF SENSORIUM: CANNOT DO SERIAL ADDITIONS OR IS UNCERTAIN ABOUT DATE
HEADACHE, FULLNESS IN HEAD: VERY MILD
PAROXYSMAL SWEATS: NO SWEAT VISIBLE
NAUSEA AND VOMITING: MILD NAUSEA WITH NO VOMITING
ANXIETY: NO ANXIETY (AT EASE)
ORIENTATION AND CLOUDING OF SENSORIUM: ORIENTED AND CAN DO SERIAL ADDITIONS
AGITATION: NORMAL ACTIVITY
TREMOR: TREMOR NOT VISIBLE BUT CAN BE FELT, FINGERTIP TO FINGERTIP
HEADACHE, FULLNESS IN HEAD: MODERATELY SEVERE
TOTAL SCORE: 3
HEADACHE, FULLNESS IN HEAD: MODERATE
NAUSEA AND VOMITING: NO NAUSEA AND NO VOMITING
NAUSEA AND VOMITING: NO NAUSEA AND NO VOMITING
VISUAL DISTURBANCES: NOT PRESENT
AUDITORY DISTURBANCES: NOT PRESENT
VISUAL DISTURBANCES: NOT PRESENT
PAROXYSMAL SWEATS: BARELY PERCEPTIBLE SWEATING. PALMS MOIST
ANXIETY: *
NAUSEA AND VOMITING: NO NAUSEA AND NO VOMITING
PAROXYSMAL SWEATS: BARELY PERCEPTIBLE SWEATING. PALMS MOIST
ANXIETY: *
AGITATION: NORMAL ACTIVITY
TOTAL SCORE: 11
TREMOR: TREMOR NOT VISIBLE BUT CAN BE FELT, FINGERTIP TO FINGERTIP
AUDITORY DISTURBANCES: NOT PRESENT
NAUSEA AND VOMITING: NO NAUSEA AND NO VOMITING
ORIENTATION AND CLOUDING OF SENSORIUM: ORIENTED AND CAN DO SERIAL ADDITIONS
VISUAL DISTURBANCES: NOT PRESENT
AUDITORY DISTURBANCES: NOT PRESENT
VISUAL DISTURBANCES: VERY MILD SENSITIVITY
ORIENTATION AND CLOUDING OF SENSORIUM: ORIENTED AND CAN DO SERIAL ADDITIONS
AGITATION: *
ANXIETY: MODERATELY ANXIOUS OR GUARDED, SO ANXIETY IS INFERRED
PAROXYSMAL SWEATS: BARELY PERCEPTIBLE SWEATING. PALMS MOIST
NAUSEA AND VOMITING: NO NAUSEA AND NO VOMITING
ORIENTATION AND CLOUDING OF SENSORIUM: ORIENTED AND CAN DO SERIAL ADDITIONS
AUDITORY DISTURBANCES: NOT PRESENT
TREMOR: NO TREMOR
AUDITORY DISTURBANCES: NOT PRESENT
AGITATION: NORMAL ACTIVITY
ANXIETY: *
TOTAL SCORE: 9
TOTAL SCORE: 11
TREMOR: *
ORIENTATION AND CLOUDING OF SENSORIUM: ORIENTED AND CAN DO SERIAL ADDITIONS
TOTAL SCORE: MILD ITCHING, PINS AND NEEDLES SENSATION, BURNING OR NUMBNESS
TREMOR: *
AGITATION: SOMEWHAT MORE THAN NORMAL ACTIVITY
TREMOR: TREMOR NOT VISIBLE BUT CAN BE FELT, FINGERTIP TO FINGERTIP
ANXIETY: *
ORIENTATION AND CLOUDING OF SENSORIUM: CANNOT DO SERIAL ADDITIONS OR IS UNCERTAIN ABOUT DATE
ANXIETY: *
PAROXYSMAL SWEATS: NO SWEAT VISIBLE
VISUAL DISTURBANCES: NOT PRESENT
ANXIETY: *
HEADACHE, FULLNESS IN HEAD: MODERATE
AUDITORY DISTURBANCES: NOT PRESENT
AUDITORY DISTURBANCES: NOT PRESENT
PAROXYSMAL SWEATS: NO SWEAT VISIBLE
HEADACHE, FULLNESS IN HEAD: MODERATE
HEADACHE, FULLNESS IN HEAD: NOT PRESENT
NAUSEA AND VOMITING: NO NAUSEA AND NO VOMITING
VISUAL DISTURBANCES: MILD SENSITIVITY
HEADACHE, FULLNESS IN HEAD: MODERATE
HEADACHE, FULLNESS IN HEAD: MODERATE
AGITATION: SOMEWHAT MORE THAN NORMAL ACTIVITY
AGITATION: SOMEWHAT MORE THAN NORMAL ACTIVITY
AUDITORY DISTURBANCES: NOT PRESENT
TREMOR: TREMOR NOT VISIBLE BUT CAN BE FELT, FINGERTIP TO FINGERTIP
TOTAL SCORE: 9
ANXIETY: MILDLY ANXIOUS
AGITATION: SOMEWHAT MORE THAN NORMAL ACTIVITY

## 2018-11-30 ASSESSMENT — PAIN SCALES - GENERAL
PAINLEVEL_OUTOF10: 0
PAINLEVEL_OUTOF10: 6
PAINLEVEL_OUTOF10: 0

## 2018-11-30 NOTE — PROGRESS NOTES
Report given to Destiny.  Patient transferred to telemetry as a medical patient.  Patient given 1mg Ativan prior to transfer to telemetry.

## 2018-11-30 NOTE — CARE PLAN
Problem: Safety  Goal: Will remain free from falls  Discussed the use of a bed alarm with the patient to prevent falls during the withdrawal period. Patient verbalized understanding.

## 2018-11-30 NOTE — PROGRESS NOTES
Critical Care Progress Note    Date of admission  11/27/2018    Hospital Course    33 y.o. male admitted 11/27/2018 with alcohol withdrawal - frequent admission for it  Requiring escalating doses of medications so was tx 11/27 to icu for management       Interval Problem Update  Off all medications  No tremors  Patient states that if we discharge him today that he would likely drink as he doesn't fell well but cannot specify what it is   He is finally eating    Review of Systems  ROS   General feeling of being unwell but no chest pain or SOB     Vital Signs for last 24 hours   Temp:  [36.2 °C (97.2 °F)-37 °C (98.6 °F)] 36.8 °C (98.3 °F)  Pulse:  [] 88  Resp:  [10-37] 10    Physical Exam   Physical Exam   Constitutional: He is oriented to person, place, and time. He appears well-developed and well-nourished. No distress.   HENT:   Head: Normocephalic and atraumatic.   Right Ear: External ear normal.   Left Ear: External ear normal.   Nose: Nose normal.   Mouth/Throat: Oropharynx is clear and moist.   Eyes: Pupils are equal, round, and reactive to light. EOM are normal. Right eye exhibits no discharge. Left eye exhibits no discharge. No scleral icterus.   Markedly better and minimal discharge   Neck: No JVD present. No tracheal deviation present. No thyromegaly present.   Cardiovascular: Normal rate, regular rhythm, normal heart sounds and intact distal pulses.    Pulmonary/Chest: Effort normal and breath sounds normal. No stridor. No respiratory distress. He has no wheezes. He has no rales. He exhibits no tenderness.   Abdominal: Soft. Bowel sounds are normal. He exhibits no mass.   Musculoskeletal: He exhibits no edema.   Lymphadenopathy:     He has no cervical adenopathy.   Neurological: He is alert and oriented to person, place, and time. No cranial nerve deficit. Coordination normal.       Skin: Skin is warm and dry. No rash noted. He is not diaphoretic. No erythema.   Psychiatric: He has a normal mood and  affect.   Appears agitated       Medications  Current Facility-Administered Medications   Medication Dose Route Frequency Provider Last Rate Last Dose   • artificial tear ointment (REFRESH,LACRI-LUBE) 1 Application  1 Application Both Eyes Q8HRS Fara Cary M.D.   1 Application at 11/30/18 0605   • fluticasone (FLONASE) nasal spray 50 mcg  1 [place] Nasal BID Neris Becerra M.D.   50 mcg at 11/30/18 0604   • senna-docusate (PERICOLACE or SENOKOT S) 8.6-50 MG per tablet 2 Tab  2 Tab Oral BID Abigail Bowen M.D.   Stopped at 11/28/18 1740    And   • polyethylene glycol/lytes (MIRALAX) PACKET 1 Packet  1 Packet Oral QDAY PRN Abigail Bowen M.D.        And   • magnesium hydroxide (MILK OF MAGNESIA) suspension 30 mL  30 mL Oral QDAY PRDEEPALI Bowen M.D.        And   • bisacodyl (DULCOLAX) suppository 10 mg  10 mg Rectal QDAY PRDEEPALI Bowen M.D.       • LORazepam (ATIVAN) tablet 0.5 mg  0.5 mg Oral Q4HRS RUBEN Bowen M.D.       • LORazepam (ATIVAN) tablet 1 mg  1 mg Oral Q4HRS PRDEEPALI Bowen M.D.   1 mg at 11/27/18 2155    Or   • LORazepam (ATIVAN) injection 0.5 mg  0.5 mg Intravenous Q4HRS PRDEEPALI Bowen M.D.   0.5 mg at 11/29/18 0003   • LORazepam (ATIVAN) tablet 2 mg  2 mg Oral Q2HRS RUBEN Bowen M.D.   2 mg at 11/30/18 0617    Or   • LORazepam (ATIVAN) injection 1 mg  1 mg Intravenous Q2HRS PRDEEPALI Bowen M.D.   1 mg at 11/29/18 0754   • LORazepam (ATIVAN) tablet 3 mg  3 mg Oral Q HOUR PRN Abigail Bowen M.D.        Or   • LORazepam (ATIVAN) injection 1.5 mg  1.5 mg Intravenous Q HOUR PRN Abigail Bowen M.D.   1.5 mg at 11/28/18 0756   • LORazepam (ATIVAN) tablet 4 mg  4 mg Oral Q15 MIN PRN Abigail Bowen M.D.        Or   • LORazepam (ATIVAN) injection 2 mg  2 mg Intravenous Q15 MIN PRN Abigail Bowen M.D.   2 mg at 11/27/18 1453   • Respiratory Care per Protocol   Nebulization Continuous RT Abigail Bowen M.D.       • NS infusion   Intravenous Continuous Abigail  EDUARD Bowen 125 mL/hr at 11/29/18 2352     • ondansetron (ZOFRAN) syringe/vial injection 4 mg  4 mg Intravenous Q4HRS PRN Abigail Bowen M.D.   4 mg at 11/29/18 2008   • benzocaine-menthol (CEPACOL) lozenge 1 Lozenge  1 Lozenge Mouth/Throat Q2HRS PRN Abigail Bowen M.D.   1 Lozenge at 11/29/18 2020   • cloNIDine (CATAPRES) tablet 0.1 mg  0.1 mg Oral TID Dalton Tinsley M.D.   0.1 mg at 11/30/18 0604   • gabapentin (NEURONTIN) capsule 300 mg  300 mg Oral TID Dalton Tinsley M.D.   300 mg at 11/30/18 0604   • MD Alert...ICU Electrolyte Replacement per Pharmacy   Other pharmacy to dose Fara Cary M.D.       • dexmedetomidine (PRECEDEX) 400 mcg in  mL infusion  0.1-1.5 mcg/kg/hr Intravenous Continuous Fara Cary M.D.   Stopped at 11/30/18 0212       Fluids    Intake/Output Summary (Last 24 hours) at 11/30/18 0647  Last data filed at 11/30/18 0600   Gross per 24 hour   Intake          3890.67 ml   Output             3695 ml   Net           195.67 ml       Laboratory      Recent Labs      11/28/18   0615   TROPONINI  <0.02     Recent Labs      11/28/18 0442 11/29/18 0345 11/30/18 0418   SODIUM  132*  131*  128*   POTASSIUM  4.3  4.2  3.6   CHLORIDE  102  101  99   CO2  24  24  23   BUN  6*  <5*  5*   CREATININE  0.78  0.76  0.76   MAGNESIUM  2.5   --   1.8   PHOSPHORUS  3.1   --   3.2   CALCIUM  7.9*  8.7  8.3*     Recent Labs      11/28/18 0442 11/29/18 0345 11/30/18   0418   ALTSGPT  48  51*  43   ASTSGOT  116*  96*  61*   ALKPHOSPHAT  100*  102*  101*   TBILIRUBIN  1.1  1.0  0.6   GLUCOSE  88  85  98     Recent Labs      11/28/18 0442 11/29/18 0345 11/30/18   0418   WBC  4.0*  3.8*   --    NEUTSPOLYS  48.50  55.40   --    LYMPHOCYTES  31.00  29.20   --    MONOCYTES  19.60*  13.70*   --    EOSINOPHILS  0.30  1.10   --    BASOPHILS  0.30  0.30   --    ASTSGOT  116*  96*  61*   ALTSGPT  48  51*  43   ALKPHOSPHAT  100*  102*  101*   TBILIRUBIN  1.1  1.0  0.6     Recent  Labs      11/28/18   0442  11/29/18   0345   RBC  4.31*  4.74   HEMOGLOBIN  13.2*  14.5   HEMATOCRIT  39.4*  43.6   PLATELETCT  55*  50*         Assessment/Plan  * Alcohol withdrawal (HCC)- (present on admission)   Assessment & Plan    Etoh protocol  Continue clonidine, gabapentin   Continue monitor in ICU   on very small dose of precedex    + hallucinations  I do think he is improving and likely stop the precedex I am and assess     Alcoholic hepatitis- (present on admission)   Assessment & Plan    improving     Conjunctivitis- (present on admission)   Assessment & Plan    Likely viral and resolved almost     Thrombocytopenia (HCC)- (present on admission)   Assessment & Plan    From etoh abuse resulting in bone marrow suppression  montinor  No bleeding source  Continue heparin for DVT prophylaxis     Hyponatremia- (present on admission)   Assessment & Plan    131 today  Continue normal saline at 125 cc/hr     Hypokalemia- (present on admission)   Assessment & Plan    Replaced and on electrolyte protocol     Abnormal EKG   Assessment & Plan    EKG with improved QTc 462  msec          VTE:  Heparin  Ulcer: Not Indicated  Lines: None    I have performed a physical exam and reviewed and updated ROS and Plan today (11/30/2018). In review of yesterday's note (11/29/2018), there are no changes except as documented above.     Discussed patient condition and risk of morbidity and/or mortality with RN, Pharmacy,  and multidisciplinary rounds     Stable for transfer to the floor

## 2018-11-30 NOTE — PROGRESS NOTES
Plan of care reviewed with pt. Patient is alert and oriented times 4. Restless and irritable at times this shift. Positive results after ativan administration. Precedex gtt turned off. 0600- Patient states he feels more restless, anxious and irritable this am. Ciwa every 2 hours. Pt states Ativan helps but only for an hour or two. No acute events this shift. See flow sheets for further information.

## 2018-12-01 LAB
ALBUMIN SERPL BCP-MCNC: 3.5 G/DL (ref 3.2–4.9)
ALBUMIN/GLOB SERPL: 0.8 G/DL
ALP SERPL-CCNC: 111 U/L (ref 30–99)
ALT SERPL-CCNC: 52 U/L (ref 2–50)
ANION GAP SERPL CALC-SCNC: 6 MMOL/L (ref 0–11.9)
AST SERPL-CCNC: 74 U/L (ref 12–45)
BILIRUB SERPL-MCNC: 0.7 MG/DL (ref 0.1–1.5)
BUN SERPL-MCNC: 6 MG/DL (ref 8–22)
CALCIUM SERPL-MCNC: 9 MG/DL (ref 8.4–10.2)
CHLORIDE SERPL-SCNC: 96 MMOL/L (ref 96–112)
CO2 SERPL-SCNC: 27 MMOL/L (ref 20–33)
CREAT SERPL-MCNC: 0.78 MG/DL (ref 0.5–1.4)
GLOBULIN SER CALC-MCNC: 4.3 G/DL (ref 1.9–3.5)
GLUCOSE SERPL-MCNC: 96 MG/DL (ref 65–99)
MAGNESIUM SERPL-MCNC: 2.2 MG/DL (ref 1.5–2.5)
POTASSIUM SERPL-SCNC: 4 MMOL/L (ref 3.6–5.5)
PROT SERPL-MCNC: 7.8 G/DL (ref 6–8.2)
SODIUM SERPL-SCNC: 129 MMOL/L (ref 135–145)

## 2018-12-01 PROCEDURE — A9270 NON-COVERED ITEM OR SERVICE: HCPCS | Performed by: INTERNAL MEDICINE

## 2018-12-01 PROCEDURE — 700102 HCHG RX REV CODE 250 W/ 637 OVERRIDE(OP): Performed by: INTERNAL MEDICINE

## 2018-12-01 PROCEDURE — 83735 ASSAY OF MAGNESIUM: CPT

## 2018-12-01 PROCEDURE — 770006 HCHG ROOM/CARE - MED/SURG/GYN SEMI*

## 2018-12-01 PROCEDURE — A9270 NON-COVERED ITEM OR SERVICE: HCPCS | Performed by: HOSPITALIST

## 2018-12-01 PROCEDURE — 80053 COMPREHEN METABOLIC PANEL: CPT

## 2018-12-01 PROCEDURE — 700102 HCHG RX REV CODE 250 W/ 637 OVERRIDE(OP): Performed by: HOSPITALIST

## 2018-12-01 PROCEDURE — 99232 SBSQ HOSP IP/OBS MODERATE 35: CPT | Performed by: HOSPITALIST

## 2018-12-01 RX ADMIN — MINERAL OIL AND WHITE PETROLATUM 1 APPLICATION: 150; 830 OINTMENT OPHTHALMIC at 22:15

## 2018-12-01 RX ADMIN — MINERAL OIL AND WHITE PETROLATUM 1 APPLICATION: 150; 830 OINTMENT OPHTHALMIC at 04:56

## 2018-12-01 RX ADMIN — LORAZEPAM 2 MG: 1 TABLET ORAL at 01:57

## 2018-12-01 RX ADMIN — FLUTICASONE PROPIONATE 50 MCG: 50 SPRAY, METERED NASAL at 17:55

## 2018-12-01 RX ADMIN — LORAZEPAM 1 MG: 1 TABLET ORAL at 17:53

## 2018-12-01 RX ADMIN — LORAZEPAM 1 MG: 1 TABLET ORAL at 22:15

## 2018-12-01 RX ADMIN — LORAZEPAM 3 MG: 1 TABLET ORAL at 14:13

## 2018-12-01 RX ADMIN — GUAIFENESIN 200 MG: 100 SOLUTION ORAL at 17:52

## 2018-12-01 RX ADMIN — GUAIFENESIN 200 MG: 100 SOLUTION ORAL at 11:06

## 2018-12-01 RX ADMIN — LORAZEPAM 3 MG: 1 TABLET ORAL at 08:01

## 2018-12-01 RX ADMIN — LORAZEPAM 1 MG: 1 TABLET ORAL at 11:06

## 2018-12-01 RX ADMIN — FLUTICASONE PROPIONATE 50 MCG: 50 SPRAY, METERED NASAL at 05:01

## 2018-12-01 RX ADMIN — GUAIFENESIN 200 MG: 100 SOLUTION ORAL at 22:20

## 2018-12-01 RX ADMIN — MINERAL OIL AND WHITE PETROLATUM 1 APPLICATION: 150; 830 OINTMENT OPHTHALMIC at 14:14

## 2018-12-01 RX ADMIN — BENZOCAINE AND MENTHOL 1 LOZENGE: 15; 4 LOZENGE ORAL at 18:01

## 2018-12-01 RX ADMIN — LORAZEPAM 1 MG: 1 TABLET ORAL at 04:53

## 2018-12-01 ASSESSMENT — LIFESTYLE VARIABLES
TREMOR: TREMOR NOT VISIBLE BUT CAN BE FELT, FINGERTIP TO FINGERTIP
VISUAL DISTURBANCES: MILD SENSITIVITY
TOTAL SCORE: 17
NAUSEA AND VOMITING: NO NAUSEA AND NO VOMITING
AUDITORY DISTURBANCES: NOT PRESENT
TOTAL SCORE: 17
ANXIETY: MODERATELY ANXIOUS OR GUARDED, SO ANXIETY IS INFERRED
ANXIETY: MILDLY ANXIOUS
AUDITORY DISTURBANCES: NOT PRESENT
TREMOR: TREMOR NOT VISIBLE BUT CAN BE FELT, FINGERTIP TO FINGERTIP
AGITATION: NORMAL ACTIVITY
NAUSEA AND VOMITING: NO NAUSEA AND NO VOMITING
ANXIETY: *
AGITATION: SOMEWHAT MORE THAN NORMAL ACTIVITY
TREMOR: TREMOR NOT VISIBLE BUT CAN BE FELT, FINGERTIP TO FINGERTIP
ORIENTATION AND CLOUDING OF SENSORIUM: ORIENTED AND CAN DO SERIAL ADDITIONS
TREMOR: TREMOR NOT VISIBLE BUT CAN BE FELT, FINGERTIP TO FINGERTIP
PAROXYSMAL SWEATS: BARELY PERCEPTIBLE SWEATING. PALMS MOIST
HEADACHE, FULLNESS IN HEAD: MODERATE
TOTAL SCORE: MILD ITCHING, PINS AND NEEDLES SENSATION, BURNING OR NUMBNESS
TREMOR: TREMOR NOT VISIBLE BUT CAN BE FELT, FINGERTIP TO FINGERTIP
TOTAL SCORE: 10
ORIENTATION AND CLOUDING OF SENSORIUM: ORIENTED AND CAN DO SERIAL ADDITIONS
TOTAL SCORE: MILD ITCHING, PINS AND NEEDLES SENSATION, BURNING OR NUMBNESS
VISUAL DISTURBANCES: NOT PRESENT
HEADACHE, FULLNESS IN HEAD: VERY MILD
VISUAL DISTURBANCES: VERY MILD SENSITIVITY
AGITATION: SOMEWHAT MORE THAN NORMAL ACTIVITY
ORIENTATION AND CLOUDING OF SENSORIUM: ORIENTED AND CAN DO SERIAL ADDITIONS
NAUSEA AND VOMITING: NO NAUSEA AND NO VOMITING
AGITATION: SOMEWHAT MORE THAN NORMAL ACTIVITY
PAROXYSMAL SWEATS: *
AUDITORY DISTURBANCES: NOT PRESENT
VISUAL DISTURBANCES: MILD SENSITIVITY
HEADACHE, FULLNESS IN HEAD: NOT PRESENT
HEADACHE, FULLNESS IN HEAD: VERY MILD
TOTAL SCORE: MILD ITCHING, PINS AND NEEDLES SENSATION, BURNING OR NUMBNESS
PAROXYSMAL SWEATS: NO SWEAT VISIBLE
VISUAL DISTURBANCES: VERY MILD SENSITIVITY
PAROXYSMAL SWEATS: *
TOTAL SCORE: MILD ITCHING, PINS AND NEEDLES SENSATION, BURNING OR NUMBNESS
TOTAL SCORE: MILD ITCHING, PINS AND NEEDLES SENSATION, BURNING OR NUMBNESS
ORIENTATION AND CLOUDING OF SENSORIUM: ORIENTED AND CAN DO SERIAL ADDITIONS
PAROXYSMAL SWEATS: *
TOTAL SCORE: 9
ANXIETY: *
TREMOR: TREMOR NOT VISIBLE BUT CAN BE FELT, FINGERTIP TO FINGERTIP
AUDITORY DISTURBANCES: NOT PRESENT
HEADACHE, FULLNESS IN HEAD: VERY MILD
AUDITORY DISTURBANCES: NOT PRESENT
NAUSEA AND VOMITING: MILD NAUSEA WITH NO VOMITING
NAUSEA AND VOMITING: NO NAUSEA AND NO VOMITING
HEADACHE, FULLNESS IN HEAD: NOT PRESENT
VISUAL DISTURBANCES: VERY MILD SENSITIVITY
TOTAL SCORE: 9
PAROXYSMAL SWEATS: NO SWEAT VISIBLE
VISUAL DISTURBANCES: VERY MILD SENSITIVITY
AUDITORY DISTURBANCES: NOT PRESENT
ORIENTATION AND CLOUDING OF SENSORIUM: ORIENTED AND CAN DO SERIAL ADDITIONS
PAROXYSMAL SWEATS: BARELY PERCEPTIBLE SWEATING. PALMS MOIST
ORIENTATION AND CLOUDING OF SENSORIUM: ORIENTED AND CAN DO SERIAL ADDITIONS
NAUSEA AND VOMITING: MILD NAUSEA WITH NO VOMITING
ANXIETY: *
TOTAL SCORE: 10
AUDITORY DISTURBANCES: NOT PRESENT
AUDITORY DISTURBANCES: NOT PRESENT
TREMOR: *
ANXIETY: *
VISUAL DISTURBANCES: MILD SENSITIVITY
TOTAL SCORE: MILD ITCHING, PINS AND NEEDLES SENSATION, BURNING OR NUMBNESS
PAROXYSMAL SWEATS: BARELY PERCEPTIBLE SWEATING. PALMS MOIST
ANXIETY: *
NAUSEA AND VOMITING: *
TREMOR: *
AGITATION: NORMAL ACTIVITY
ORIENTATION AND CLOUDING OF SENSORIUM: ORIENTED AND CAN DO SERIAL ADDITIONS
ANXIETY: *
AGITATION: MODERATELY FIDGETY AND RESTLESS
AUDITORY DISTURBANCES: NOT PRESENT
TOTAL SCORE: 9
AGITATION: SOMEWHAT MORE THAN NORMAL ACTIVITY
TOTAL SCORE: 10
VISUAL DISTURBANCES: NOT PRESENT
PAROXYSMAL SWEATS: BARELY PERCEPTIBLE SWEATING. PALMS MOIST
NAUSEA AND VOMITING: *
TOTAL SCORE: MILD ITCHING, PINS AND NEEDLES SENSATION, BURNING OR NUMBNESS
ORIENTATION AND CLOUDING OF SENSORIUM: ORIENTED AND CAN DO SERIAL ADDITIONS
HEADACHE, FULLNESS IN HEAD: VERY MILD
TOTAL SCORE: VERY MILD ITCHING, PINS AND NEEDLES SENSATION, BURNING OR NUMBNESS
NAUSEA AND VOMITING: MILD NAUSEA WITH NO VOMITING
TREMOR: TREMOR NOT VISIBLE BUT CAN BE FELT, FINGERTIP TO FINGERTIP
AGITATION: MODERATELY FIDGETY AND RESTLESS
ORIENTATION AND CLOUDING OF SENSORIUM: ORIENTED AND CAN DO SERIAL ADDITIONS
TOTAL SCORE: MILD ITCHING, PINS AND NEEDLES SENSATION, BURNING OR NUMBNESS
AUDITORY DISTURBANCES: NOT PRESENT
NAUSEA AND VOMITING: NO NAUSEA AND NO VOMITING
TOTAL SCORE: VERY MILD ITCHING, PINS AND NEEDLES SENSATION, BURNING OR NUMBNESS
HEADACHE, FULLNESS IN HEAD: VERY MILD
VISUAL DISTURBANCES: VERY MILD SENSITIVITY
TOTAL SCORE: 8
TREMOR: TREMOR NOT VISIBLE BUT CAN BE FELT, FINGERTIP TO FINGERTIP
TOTAL SCORE: 13
AGITATION: SOMEWHAT MORE THAN NORMAL ACTIVITY
AGITATION: *
HEADACHE, FULLNESS IN HEAD: MODERATE
ANXIETY: MILDLY ANXIOUS
ORIENTATION AND CLOUDING OF SENSORIUM: ORIENTED AND CAN DO SERIAL ADDITIONS
HEADACHE, FULLNESS IN HEAD: NOT PRESENT
PAROXYSMAL SWEATS: BARELY PERCEPTIBLE SWEATING. PALMS MOIST
ANXIETY: *

## 2018-12-01 ASSESSMENT — ENCOUNTER SYMPTOMS
SORE THROAT: 0
INSOMNIA: 0
NERVOUS/ANXIOUS: 1
CHILLS: 0
HEADACHES: 0
TINGLING: 0
BLURRED VISION: 0
DIZZINESS: 0
DEPRESSION: 0
SHORTNESS OF BREATH: 0
VOMITING: 0
PALPITATIONS: 0
NECK PAIN: 0
COUGH: 0
TREMORS: 1
BACK PAIN: 0
NAUSEA: 0
ABDOMINAL PAIN: 0
EYE PAIN: 0
FEVER: 0

## 2018-12-01 ASSESSMENT — PAIN SCALES - GENERAL
PAINLEVEL_OUTOF10: 0

## 2018-12-01 NOTE — PROGRESS NOTES
Report received from Neftali SPENCE. Patient took a walk around the hallway. Steady gait. No new changes from report. Will continue CIWA protocol. Bed in low locked position, call bell within reach. Continue to monitor.

## 2018-12-01 NOTE — PROGRESS NOTES
Patient getting more anxious and worried he will have DTs and hallucinations. rescored patient's CIWA, administered per protocol for change in status.

## 2018-12-01 NOTE — PROGRESS NOTES
Patient resting in bed. Does c/o cough. meds administered per order. Continue CIWA protocol. Bed in low locked position, call bell within reach. Continue to monitor.

## 2018-12-01 NOTE — PROGRESS NOTES
Report received from Nicho SPENCE. Patients last CIWA was 9. 1 mg PO ativan was given. LFTS trending upwards. Bed in low locked position, call bell within reach. Continue to monitor.

## 2018-12-01 NOTE — PROGRESS NOTES
[facility] Hospitalist Progress Note    Date of Service: 2018    Chief Complaint  33 y.o. male admitted 2018 with alcohol withdrawal     Interval Problem Update  Agitated over night .still getting a little ativan. Mild tremor.   Transferred out of icu yesterday.   No other events.     Consultants/Specialty  none    Disposition  Home once improved.         Review of Systems   Constitutional: Negative for chills and fever.   HENT: Negative for sore throat.    Eyes: Negative for blurred vision and pain.   Respiratory: Negative for cough and shortness of breath.    Cardiovascular: Negative for chest pain and palpitations.   Gastrointestinal: Negative for abdominal pain, nausea and vomiting.   Genitourinary: Negative for dysuria and urgency.   Musculoskeletal: Negative for back pain and neck pain.   Skin: Negative for itching and rash.   Neurological: Positive for tremors. Negative for dizziness, tingling and headaches.   Psychiatric/Behavioral: Negative for depression. The patient is nervous/anxious. The patient does not have insomnia.    All other systems reviewed and are negative.     Physical Exam  Laboratory/Imaging   Hemodynamics  Temp (24hrs), Av.9 °C (98.4 °F), Min:36.8 °C (98.2 °F), Max:37 °C (98.6 °F)   Temperature: 36.8 °C (98.3 °F)  Pulse  Av.5  Min: 56  Max: 157 Heart Rate (Monitored): (!) 104  Blood Pressure: 141/98, NIBP: 142/87      Respiratory      Respiration: 20, Pulse Oximetry: 97 %     Work Of Breathing / Effort: Mild  RUL Breath Sounds: Clear, RML Breath Sounds: Diminished, RLL Breath Sounds: Diminished, EMELY Breath Sounds: Clear, LLL Breath Sounds: Diminished    Fluids    Intake/Output Summary (Last 24 hours) at 18 1009  Last data filed at 18 1100   Gross per 24 hour   Intake                0 ml   Output              425 ml   Net             -425 ml       Nutrition  Orders Placed This Encounter   Procedures   • Diet Order Regular     Standing Status:   Standing     Number  of Occurrences:   1     Order Specific Question:   Diet:     Answer:   Regular [1]     Physical Exam   Constitutional: He is oriented to person, place, and time. He appears well-developed and well-nourished. No distress.   Patient seen and examined  Plan discussed with JORDY JENNINGST:   Right Ear: External ear normal.   Left Ear: External ear normal.   Nose: Nose normal.   Eyes: Right eye exhibits no discharge. Left eye exhibits no discharge. No scleral icterus.   Neck: No JVD present. No tracheal deviation present.   Cardiovascular: Normal rate, normal heart sounds and intact distal pulses.    No murmur heard.  Cap refill 2sec  Pulses 2+ throughout     Pulmonary/Chest: Effort normal and breath sounds normal. No respiratory distress. He has no wheezes. He has no rales.   Abdominal: Soft. Bowel sounds are normal. He exhibits no distension. There is no tenderness. There is no guarding.   Musculoskeletal: He exhibits no edema or tenderness.   Fused digits in hands   Neurological: He is alert and oriented to person, place, and time.   Skin: Skin is warm and dry. He is not diaphoretic. No erythema.   Normal skin color   Psychiatric: He has a normal mood and affect. His behavior is normal.   Nursing note and vitals reviewed.      Recent Labs      11/29/18   0345   WBC  3.8*   RBC  4.74   HEMOGLOBIN  14.5   HEMATOCRIT  43.6   MCV  92.0   MCH  30.6   MCHC  33.3*   RDW  45.9   PLATELETCT  50*   MPV  11.8     Recent Labs      11/29/18   0345  11/30/18   0418  12/01/18   0426   SODIUM  131*  128*  129*   POTASSIUM  4.2  3.6  4.0   CHLORIDE  101  99  96   CO2  24  23  27   GLUCOSE  85  98  96   BUN  <5*  5*  6*   CREATININE  0.76  0.76  0.78   CALCIUM  8.7  8.3*  9.0                      Assessment/Plan     * Alcohol withdrawal (HCC)- (present on admission)   Assessment & Plan    Resolving.   Prn ativan  Mvi/folate/thiamine     Alcohol dependence (HCC)- (present on admission)   Assessment & Plan    Recurrent admissions for this. We  have discussed quitting. He never remembers his admissions to the hospital and how bad they have been.      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      Homelessness   Assessment & Plan     and  will be needed to work on safe discharge.     Tachycardia- (present on admission)   Assessment & Plan    resolved     Polysubstance abuse (HCC)- (present on admission)   Assessment & Plan    Needs to quit. Recurrent issues-     ADHD (attention deficit hyperactivity disorder)- (present on admission)   Assessment & Plan    He is noncompliant with his meds.        Quality-Core Measures   Reviewed items::  EKG reviewed, Labs reviewed, Medications reviewed and Radiology images reviewed  Cabrera catheter::  No Cabrera  DVT prophylaxis pharmacological::  Enoxaparin (Lovenox)  DVT prophylaxis - mechanical:  SCDs  Ulcer Prophylaxis::  Not indicated  Assessed for rehabilitation services:  Patient was assess for and/or received rehabilitation services during this hospitalization

## 2018-12-01 NOTE — PROGRESS NOTES
"Patient was agitated this am when CNA was trying to take vitals, stated, \"I didn't like to be woken up for vitals with I'm going through withdraw\". Educated on hospitals protocol. Will continue to educate and monitor.  "

## 2018-12-01 NOTE — CARE PLAN
Problem: Safety  Goal: Will remain free from injury  Outcome: PROGRESSING AS EXPECTED  Bed in low locked position, call bell within reach,  socks    Problem: Psychosocial Needs:  Goal: Level of anxiety will decrease  Outcome: PROGRESSING AS EXPECTED  Provide calm environment, distraction, and therapeutic touch    Problem: Mobility  Goal: Risk for activity intolerance will decrease  Outcome: PROGRESSING AS EXPECTED  Ambulate patient 3x/day    Problem: Safety:  Goal: Will remain free from injury  Outcome: PROGRESSING AS EXPECTED  Bed in low locked position, call bell within reach,  socks

## 2018-12-01 NOTE — PROGRESS NOTES
Report given to Nicho SPENCE. No new changes. Patient was educated on CIWA protocol. Fall precautions in place.

## 2018-12-02 ENCOUNTER — HOSPITAL ENCOUNTER (EMERGENCY)
Dept: HOSPITAL 8 - ED | Age: 33
Discharge: HOME | End: 2018-12-02
Payer: MEDICARE

## 2018-12-02 VITALS
WEIGHT: 134.7 LBS | HEART RATE: 106 BPM | BODY MASS INDEX: 20.41 KG/M2 | DIASTOLIC BLOOD PRESSURE: 90 MMHG | HEIGHT: 68 IN | SYSTOLIC BLOOD PRESSURE: 132 MMHG | TEMPERATURE: 97.8 F | OXYGEN SATURATION: 97 % | RESPIRATION RATE: 18 BRPM

## 2018-12-02 VITALS — WEIGHT: 138.89 LBS | HEIGHT: 68 IN | BODY MASS INDEX: 21.05 KG/M2

## 2018-12-02 VITALS — SYSTOLIC BLOOD PRESSURE: 158 MMHG | DIASTOLIC BLOOD PRESSURE: 87 MMHG

## 2018-12-02 DIAGNOSIS — R45.4: ICD-10-CM

## 2018-12-02 DIAGNOSIS — E11.9: ICD-10-CM

## 2018-12-02 DIAGNOSIS — F90.9: ICD-10-CM

## 2018-12-02 DIAGNOSIS — F32.9: ICD-10-CM

## 2018-12-02 DIAGNOSIS — F41.1: ICD-10-CM

## 2018-12-02 DIAGNOSIS — B97.89: ICD-10-CM

## 2018-12-02 DIAGNOSIS — F10.239: ICD-10-CM

## 2018-12-02 DIAGNOSIS — J02.8: Primary | ICD-10-CM

## 2018-12-02 DIAGNOSIS — I10: ICD-10-CM

## 2018-12-02 DIAGNOSIS — R45.1: ICD-10-CM

## 2018-12-02 LAB
ALBUMIN SERPL BCP-MCNC: 3.4 G/DL (ref 3.2–4.9)
ALBUMIN/GLOB SERPL: 0.8 G/DL
ALP SERPL-CCNC: 98 U/L (ref 30–99)
ALT SERPL-CCNC: 54 U/L (ref 2–50)
ANION GAP SERPL CALC-SCNC: 5 MMOL/L (ref 0–11.9)
AST SERPL-CCNC: 66 U/L (ref 12–45)
BASOPHILS # BLD AUTO: 0.3 % (ref 0–1.8)
BASOPHILS # BLD: 0.02 K/UL (ref 0–0.12)
BILIRUB SERPL-MCNC: 0.6 MG/DL (ref 0.1–1.5)
BUN SERPL-MCNC: 11 MG/DL (ref 8–22)
CALCIUM SERPL-MCNC: 9.1 MG/DL (ref 8.4–10.2)
CHLORIDE SERPL-SCNC: 102 MMOL/L (ref 96–112)
CO2 SERPL-SCNC: 29 MMOL/L (ref 20–33)
CREAT SERPL-MCNC: 0.88 MG/DL (ref 0.5–1.4)
EOSINOPHIL # BLD AUTO: 0.02 K/UL (ref 0–0.51)
EOSINOPHIL NFR BLD: 0.3 % (ref 0–6.9)
ERYTHROCYTE [DISTWIDTH] IN BLOOD BY AUTOMATED COUNT: 46.9 FL (ref 35.9–50)
GLOBULIN SER CALC-MCNC: 4.2 G/DL (ref 1.9–3.5)
GLUCOSE SERPL-MCNC: 102 MG/DL (ref 65–99)
HCT VFR BLD AUTO: 48 % (ref 42–52)
HGB BLD-MCNC: 15.9 G/DL (ref 14–18)
IMM GRANULOCYTES # BLD AUTO: 0.02 K/UL (ref 0–0.11)
IMM GRANULOCYTES NFR BLD AUTO: 0.3 % (ref 0–0.9)
LYMPHOCYTES # BLD AUTO: 2.14 K/UL (ref 1–4.8)
LYMPHOCYTES NFR BLD: 31.9 % (ref 22–41)
MAGNESIUM SERPL-MCNC: 2.3 MG/DL (ref 1.5–2.5)
MCH RBC QN AUTO: 30.6 PG (ref 27–33)
MCHC RBC AUTO-ENTMCNC: 33.1 G/DL (ref 33.7–35.3)
MCV RBC AUTO: 92.3 FL (ref 81.4–97.8)
MONOCYTES # BLD AUTO: 1.66 K/UL (ref 0–0.85)
MONOCYTES NFR BLD AUTO: 24.8 % (ref 0–13.4)
NEUTROPHILS # BLD AUTO: 2.84 K/UL (ref 1.82–7.42)
NEUTROPHILS NFR BLD: 42.4 % (ref 44–72)
NRBC # BLD AUTO: 0 K/UL
NRBC BLD-RTO: 0 /100 WBC
PLATELET # BLD AUTO: 121 K/UL (ref 164–446)
PMV BLD AUTO: 11.8 FL (ref 9–12.9)
POTASSIUM SERPL-SCNC: 4.5 MMOL/L (ref 3.6–5.5)
PROT SERPL-MCNC: 7.6 G/DL (ref 6–8.2)
RBC # BLD AUTO: 5.2 M/UL (ref 4.7–6.1)
SODIUM SERPL-SCNC: 136 MMOL/L (ref 135–145)
WBC # BLD AUTO: 6.7 K/UL (ref 4.8–10.8)

## 2018-12-02 PROCEDURE — 700102 HCHG RX REV CODE 250 W/ 637 OVERRIDE(OP): Performed by: HOSPITALIST

## 2018-12-02 PROCEDURE — A9270 NON-COVERED ITEM OR SERVICE: HCPCS | Performed by: HOSPITALIST

## 2018-12-02 PROCEDURE — 80053 COMPREHEN METABOLIC PANEL: CPT

## 2018-12-02 PROCEDURE — 99239 HOSP IP/OBS DSCHRG MGMT >30: CPT | Performed by: HOSPITALIST

## 2018-12-02 PROCEDURE — 85025 COMPLETE CBC W/AUTO DIFF WBC: CPT

## 2018-12-02 PROCEDURE — 700111 HCHG RX REV CODE 636 W/ 250 OVERRIDE (IP): Performed by: HOSPITALIST

## 2018-12-02 PROCEDURE — 93005 ELECTROCARDIOGRAM TRACING: CPT

## 2018-12-02 PROCEDURE — 83735 ASSAY OF MAGNESIUM: CPT

## 2018-12-02 PROCEDURE — 99283 EMERGENCY DEPT VISIT LOW MDM: CPT

## 2018-12-02 RX ORDER — CHLORDIAZEPOXIDE HYDROCHLORIDE 10 MG/1
10 CAPSULE, GELATIN COATED ORAL 2 TIMES DAILY
Qty: 6 CAP | Refills: 0 | Status: SHIPPED | OUTPATIENT
Start: 2018-12-02 | End: 2018-12-05

## 2018-12-02 RX ADMIN — FLUTICASONE PROPIONATE 50 MCG: 50 SPRAY, METERED NASAL at 05:22

## 2018-12-02 RX ADMIN — LORAZEPAM 2 MG: 1 TABLET ORAL at 01:13

## 2018-12-02 RX ADMIN — LORAZEPAM 3 MG: 1 TABLET ORAL at 07:56

## 2018-12-02 RX ADMIN — GUAIFENESIN 200 MG: 100 SOLUTION ORAL at 07:56

## 2018-12-02 RX ADMIN — MINERAL OIL AND WHITE PETROLATUM 1 APPLICATION: 150; 830 OINTMENT OPHTHALMIC at 05:22

## 2018-12-02 ASSESSMENT — LIFESTYLE VARIABLES
ORIENTATION AND CLOUDING OF SENSORIUM: ORIENTED AND CAN DO SERIAL ADDITIONS
TOTAL SCORE: MODERATE ITCHING, PINS AND NEEDLES SENSATION, BURNING OR NUMBNESS
NAUSEA AND VOMITING: MILD NAUSEA WITH NO VOMITING
ORIENTATION AND CLOUDING OF SENSORIUM: ORIENTED AND CAN DO SERIAL ADDITIONS
AUDITORY DISTURBANCES: NOT PRESENT
ANXIETY: *
TOTAL SCORE: MILD ITCHING, PINS AND NEEDLES SENSATION, BURNING OR NUMBNESS
NAUSEA AND VOMITING: NO NAUSEA AND NO VOMITING
AGITATION: SOMEWHAT MORE THAN NORMAL ACTIVITY
TOTAL SCORE: 8
TOTAL SCORE: MILD ITCHING, PINS AND NEEDLES SENSATION, BURNING OR NUMBNESS
AGITATION: *
PAROXYSMAL SWEATS: BARELY PERCEPTIBLE SWEATING. PALMS MOIST
TREMOR: TREMOR NOT VISIBLE BUT CAN BE FELT, FINGERTIP TO FINGERTIP
TOTAL SCORE: 13
NAUSEA AND VOMITING: NO NAUSEA AND NO VOMITING
PAROXYSMAL SWEATS: BARELY PERCEPTIBLE SWEATING. PALMS MOIST
AGITATION: *
TOTAL SCORE: MILD ITCHING, PINS AND NEEDLES SENSATION, BURNING OR NUMBNESS
VISUAL DISTURBANCES: VERY MILD SENSITIVITY
AGITATION: SOMEWHAT MORE THAN NORMAL ACTIVITY
HEADACHE, FULLNESS IN HEAD: VERY MILD
TOTAL SCORE: 15
ANXIETY: MILDLY ANXIOUS
ORIENTATION AND CLOUDING OF SENSORIUM: ORIENTED AND CAN DO SERIAL ADDITIONS
AUDITORY DISTURBANCES: NOT PRESENT
HEADACHE, FULLNESS IN HEAD: VERY MILD
ANXIETY: *
TREMOR: TREMOR NOT VISIBLE BUT CAN BE FELT, FINGERTIP TO FINGERTIP
ANXIETY: MODERATELY ANXIOUS OR GUARDED, SO ANXIETY IS INFERRED
PAROXYSMAL SWEATS: BARELY PERCEPTIBLE SWEATING. PALMS MOIST
ANXIETY: MODERATELY ANXIOUS OR GUARDED, SO ANXIETY IS INFERRED
HEADACHE, FULLNESS IN HEAD: VERY MILD
VISUAL DISTURBANCES: VERY MILD SENSITIVITY
TOTAL SCORE: 11
VISUAL DISTURBANCES: VERY MILD SENSITIVITY
ORIENTATION AND CLOUDING OF SENSORIUM: ORIENTED AND CAN DO SERIAL ADDITIONS
AUDITORY DISTURBANCES: NOT PRESENT
TOTAL SCORE: 14
NAUSEA AND VOMITING: NO NAUSEA AND NO VOMITING
AUDITORY DISTURBANCES: NOT PRESENT
VISUAL DISTURBANCES: VERY MILD SENSITIVITY
TREMOR: *
TREMOR: *
AUDITORY DISTURBANCES: NOT PRESENT
TREMOR: TREMOR NOT VISIBLE BUT CAN BE FELT, FINGERTIP TO FINGERTIP
NAUSEA AND VOMITING: MILD NAUSEA WITH NO VOMITING
VISUAL DISTURBANCES: VERY MILD SENSITIVITY
PAROXYSMAL SWEATS: BARELY PERCEPTIBLE SWEATING. PALMS MOIST
TOTAL SCORE: MODERATE ITCHING, PINS AND NEEDLES SENSATION, BURNING OR NUMBNESS
AGITATION: *
HEADACHE, FULLNESS IN HEAD: VERY MILD
HEADACHE, FULLNESS IN HEAD: VERY MILD
PAROXYSMAL SWEATS: BARELY PERCEPTIBLE SWEATING. PALMS MOIST
ORIENTATION AND CLOUDING OF SENSORIUM: ORIENTED AND CAN DO SERIAL ADDITIONS

## 2018-12-02 ASSESSMENT — PAIN SCALES - GENERAL
PAINLEVEL_OUTOF10: 0

## 2018-12-02 NOTE — CARE PLAN
"Problem: Mobility  Goal: Risk for activity intolerance will decrease  Outcome: PROGRESSING AS EXPECTED  Ambulating to restroom and being careful when on feet    Problem: Physical Regulation:  Goal: Ability to maintain clinical measurements within normal limits will improve  Outcome: PROGRESSING SLOWER THAN EXPECTED  Pt still having tremors and anxiety. States he \"sees objects moving \"  Intervention: Provide medications  Still requiring the use of Ativan.        "

## 2018-12-02 NOTE — PROGRESS NOTES
Patient given discharge instruction and prescriptions, verbalized understanding. SW notified about bus not running on Sundays, Cab voucher was given. Copy of ID was also given to patient. IV discontinued.  Patient ambulated to front [facility] with no escort.

## 2018-12-02 NOTE — DISCHARGE PLANNING
Notified by BSN pt is requesting assistance with getting to the shelter. SW initially offered bus pass, however RN and pt looked up bus schedule and noted bus doesn't run by [facility] on Sunday's. Pt states he doesn't have any finances to pay for cab to the shelter.   Provided pt with cab voucher to the shelter.

## 2018-12-02 NOTE — DISCHARGE INSTRUCTIONS
Discharge Instructions    Discharged to home by taxi with self. Discharged via walking, hospital escort: Refused.  Special equipment needed: Not Applicable    Be sure to schedule a follow-up appointment with your primary care doctor or any specialists as instructed.     Discharge Plan:   Smoking Cessation Offered: Patient Counseled  Influenza Vaccine Indication: Patient Refuses    I understand that a diet low in cholesterol, fat, and sodium is recommended for good health. Unless I have been given specific instructions below for another diet, I accept this instruction as my diet prescription.   Other diet: regular      Special Instructions: None    · Is patient discharged on Warfarin / Coumadin?   No     Depression / Suicide Risk    As you are discharged from this [facility] facility, it is important to learn how to keep safe from harming yourself.    Recognize the warning signs:  · Abrupt changes in personality, positive or negative- including increase in energy   · Giving away possessions  · Change in eating patterns- significant weight changes-  positive or negative  · Change in sleeping patterns- unable to sleep or sleeping all the time   · Unwillingness or inability to communicate  · Depression  · Unusual sadness, discouragement and loneliness  · Talk of wanting to die  · Neglect of personal appearance   · Rebelliousness- reckless behavior  · Withdrawal from people/activities they love  · Confusion- inability to concentrate     If you or a loved one observes any of these behaviors or has concerns about self-harm, here's what you can do:  · Talk about it- your feelings and reasons for harming yourself  · Remove any means that you might use to hurt yourself (examples: pills, rope, extension cords, firearm)  · Get professional help from the community (Mental Health, Substance Abuse, psychological counseling)  · Do not be alone:Call your Safe Contact- someone whom you trust who will be there for you.  · Call your  local CRISIS HOTLINE 779-9828 or 839-703-9074  · Call your local Children's Mobile Crisis Response Team [place] (760) 541-1623 or www.[employer]  · Call the toll free National Suicide Prevention Hotlines   · National Suicide Prevention Lifeline 783-199-RCRU (0388)  · National [employer] Line Network 800-SUICIDE (117-8855)

## 2018-12-02 NOTE — PROGRESS NOTES
Report received from Mckenzie SPENCE. No new changes. Continue CIWA protocol. Patient still has a cough. Robitussin PRN ordered. Bed in low locked position, call bell within reach. Continue to monitor.

## 2018-12-02 NOTE — DISCHARGE SUMMARY
Discharge Summary    CHIEF COMPLAINT ON ADMISSION  Chief Complaint   Patient presents with   • ETOH Withdrawal       Reason for Admission  Detox      Admission Date  11/27/2018    CODE STATUS  Prior    HPI & HOSPITAL COURSE  This is a 33 y.o. male here with recurrent alcohol withdrawal. He was initially severe and required transfer to the ICU and a precedex drip. He improved through his hospital course and has had extensive counseling again on his alcohol abuse. He assures me he will follow up with a pcp appointment we made for him and go to . He had no other complications during his admission. He is still having a little anxiety on the day of discharge and will complete a 3 day course of librium at home.         Therefore, he is discharged in good and stable condition to home with close outpatient follow-up.    The patient met 2-midnight criteria for an inpatient stay at the time of discharge.    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 *      FOLLOW UP  No future appointments.  KAREN GoddardNRogerio  [address] [id number]  [phone]    Schedule an appointment as soon as possible for a visit in 2 weeks        MEDICATIONS ON DISCHARGE     Medication List      START taking these medications      Instructions   chlordiazepoxide 10 MG capsule  Commonly known as:   LIBRIUM   Take 1 Cap by mouth 2 Times a Day for 3 days.  Dose:  10 mg     polymixin-trimethoprim 06493-2.1 UNIT/ML-% Soln  Commonly known as:  POLYTRIM   Place 1 Drop in both eyes every 4 hours for 10 days.  Dose:  1 Drop            Allergies  No Known Allergies    DIET  No orders of the defined types were placed in this encounter.      ACTIVITY  As tolerated.  Weight bearing as tolerated    CONSULTATIONS  none    PROCEDURES  none    LABORATORY  Lab Results   Component Value Date    SODIUM 136 12/02/2018    POTASSIUM 4.5 12/02/2018    CHLORIDE 102 12/02/2018    CO2 29 12/02/2018    GLUCOSE 102 (H) 12/02/2018    BUN 11 12/02/2018    CREATININE 0.88 12/02/2018        Lab Results   Component Value Date    WBC 6.7 12/02/2018    HEMOGLOBIN 15.9 12/02/2018    HEMATOCRIT 48.0 12/02/2018    PLATELETCT 121 (L) 12/02/2018        Total time of the discharge process exceeds 34 minutes.

## 2018-12-03 ENCOUNTER — PATIENT OUTREACH (OUTPATIENT)
Dept: HEALTH INFORMATION MANAGEMENT | Facility: OTHER | Age: 33
End: 2018-12-03

## 2018-12-03 NOTE — PROGRESS NOTES
CM post discharge outreach first attempt. Pt didn't answer telephone. CM unable to determine if this is the correct telephone number for pt. No VM left.

## 2018-12-14 ENCOUNTER — HOSPITAL ENCOUNTER (EMERGENCY)
Dept: HOSPITAL 8 - ED | Age: 33
Discharge: HOME | End: 2018-12-14
Payer: MEDICARE

## 2018-12-14 VITALS — DIASTOLIC BLOOD PRESSURE: 97 MMHG | SYSTOLIC BLOOD PRESSURE: 133 MMHG

## 2018-12-14 VITALS — HEIGHT: 68 IN | BODY MASS INDEX: 23.05 KG/M2 | WEIGHT: 152.12 LBS

## 2018-12-14 DIAGNOSIS — F17.200: ICD-10-CM

## 2018-12-14 DIAGNOSIS — T58.91XA: Primary | ICD-10-CM

## 2018-12-14 DIAGNOSIS — I10: ICD-10-CM

## 2018-12-14 DIAGNOSIS — F32.9: ICD-10-CM

## 2018-12-14 DIAGNOSIS — E11.9: ICD-10-CM

## 2018-12-14 DIAGNOSIS — Z00.01: ICD-10-CM

## 2018-12-14 LAB
ANION GAP SERPL CALC-SCNC: 8 MMOL/L (ref 5–15)
BASOPHILS # BLD AUTO: 0.17 X10^3/UL (ref 0–0.1)
BASOPHILS NFR BLD AUTO: 2 % (ref 0–1)
CALCIUM SERPL-MCNC: 8.5 MG/DL (ref 8.5–10.1)
CHLORIDE SERPL-SCNC: 103 MMOL/L (ref 98–107)
CREAT SERPL-MCNC: 0.84 MG/DL (ref 0.7–1.3)
EOSINOPHIL # BLD AUTO: 0.1 X10^3/UL (ref 0–0.4)
EOSINOPHIL NFR BLD AUTO: 1 % (ref 1–7)
ERYTHROCYTE [DISTWIDTH] IN BLOOD BY AUTOMATED COUNT: 15.2 % (ref 9.4–14.8)
LYMPHOCYTES # BLD AUTO: 2.4 X10^3/UL (ref 1–3.4)
LYMPHOCYTES NFR BLD AUTO: 32 % (ref 22–44)
MCH RBC QN AUTO: 31 PG (ref 27.5–34.5)
MCHC RBC AUTO-ENTMCNC: 33.8 G/DL (ref 33.2–36.2)
MCV RBC AUTO: 91.5 FL (ref 81–97)
MD: NO
MONOCYTES # BLD AUTO: 1.18 X10^3/UL (ref 0.2–0.8)
MONOCYTES NFR BLD AUTO: 16 % (ref 2–9)
NEUTROPHILS # BLD AUTO: 3.74 X10^3/UL (ref 1.8–6.8)
NEUTROPHILS NFR BLD AUTO: 49 % (ref 42–75)
PLATELET # BLD AUTO: 530 X10^3/UL (ref 130–400)
PMV BLD AUTO: 9.2 FL (ref 7.4–10.4)
RBC # BLD AUTO: 4.9 X10^6/UL (ref 4.38–5.82)

## 2018-12-14 PROCEDURE — 99284 EMERGENCY DEPT VISIT MOD MDM: CPT

## 2018-12-14 PROCEDURE — 36415 COLL VENOUS BLD VENIPUNCTURE: CPT

## 2018-12-14 PROCEDURE — 80048 BASIC METABOLIC PNL TOTAL CA: CPT

## 2018-12-14 PROCEDURE — 93005 ELECTROCARDIOGRAM TRACING: CPT

## 2018-12-14 PROCEDURE — 85025 COMPLETE CBC W/AUTO DIFF WBC: CPT

## 2018-12-14 PROCEDURE — 82375 ASSAY CARBOXYHB QUANT: CPT

## 2018-12-29 ENCOUNTER — HOSPITAL ENCOUNTER (EMERGENCY)
Dept: HOSPITAL 8 - ED | Age: 33
Discharge: HOME | End: 2018-12-29
Payer: MEDICARE

## 2018-12-29 VITALS — SYSTOLIC BLOOD PRESSURE: 135 MMHG | DIASTOLIC BLOOD PRESSURE: 96 MMHG

## 2018-12-29 VITALS — HEIGHT: 68 IN | WEIGHT: 150.47 LBS | BODY MASS INDEX: 22.8 KG/M2

## 2018-12-29 DIAGNOSIS — F32.9: ICD-10-CM

## 2018-12-29 DIAGNOSIS — F41.1: ICD-10-CM

## 2018-12-29 DIAGNOSIS — F10.120: Primary | ICD-10-CM

## 2018-12-29 DIAGNOSIS — E11.9: ICD-10-CM

## 2018-12-29 DIAGNOSIS — F90.9: ICD-10-CM

## 2018-12-29 DIAGNOSIS — I10: ICD-10-CM

## 2018-12-29 DIAGNOSIS — Z72.9: ICD-10-CM

## 2018-12-29 PROCEDURE — 99283 EMERGENCY DEPT VISIT LOW MDM: CPT

## 2018-12-29 NOTE — NUR
PT ARRIVED VIA EMS.  PER REPORT PT WANTS HELP WITH DETOX FROM ETOH. PT DRANK 6 
HURRICAINES TODAY.  "DRINKS SO MUCH, IT IS NO LONGER HELPING WITH THE PAIN"  PT 
REPORTS 10/10 "ALL BODY PAIN" PT HAD UNKNOW ADMISSION DATE AT REHAB FOR 16 
DAYS.  PT BEGAN DRINKING ETOH UPON DC.  PT ALSO REPORTS "INFECTION TO LEFT EYE" 
 MARIAM EYES RED, WEEPY.  LEFT WORSE THAN RIGHT.  PT COOPERATIVE WITH CARE.  
REPORT TO RICKEY RAPHAEL.

## 2018-12-30 ENCOUNTER — HOSPITAL ENCOUNTER (EMERGENCY)
Facility: MEDICAL CENTER | Age: 33
End: 2018-12-30
Attending: EMERGENCY MEDICINE
Payer: MEDICARE

## 2018-12-30 VITALS
RESPIRATION RATE: 16 BRPM | BODY MASS INDEX: 20.95 KG/M2 | HEART RATE: 121 BPM | WEIGHT: 137.79 LBS | SYSTOLIC BLOOD PRESSURE: 136 MMHG | DIASTOLIC BLOOD PRESSURE: 84 MMHG | OXYGEN SATURATION: 90 % | TEMPERATURE: 99 F

## 2018-12-30 DIAGNOSIS — F10.920 ALCOHOLIC INTOXICATION WITHOUT COMPLICATION (HCC): ICD-10-CM

## 2018-12-30 DIAGNOSIS — R00.0 TACHYCARDIA: ICD-10-CM

## 2018-12-30 DIAGNOSIS — F10.10 ETOH ABUSE: ICD-10-CM

## 2018-12-30 PROCEDURE — 99284 EMERGENCY DEPT VISIT MOD MDM: CPT

## 2018-12-30 ASSESSMENT — PAIN SCALES - GENERAL
PAINLEVEL_OUTOF10: 4
PAINLEVEL_OUTOF10: 6

## 2018-12-31 NOTE — ED NOTES
Pt now asking to go home. Pt tolerating PO fluids, provided crackers. Pt alert and oriented x 4, and walking around ER with stable gait. ERP over to see pt and discuss plan of care.

## 2018-12-31 NOTE — ED NOTES
Patient signed AMA form, and stated he his aware of the risks of leaving AMA. Patient provided printed discharge instructions which included signs and symptoms to look out for, why to return to ER, and other follow up appointment to make. Patient stated they understand discharge instructions and had no further questions or concerns at this time. Patient discharged to home by self. Patient ambulated out of ER with stable gait.

## 2018-12-31 NOTE — ED PROVIDER NOTES
ED Provider Note    CHIEF COMPLAINT  Chief Complaint   Patient presents with   • Alcohol Intoxication   • Depression       HPI  Gopal Ceja is a 33 y.o. male who presents by EMS who brings him from his [facility] to [facility] secondary to alcohol intoxication.  Apparently his roommate called for the ambulance with concerns and the patient is well-known to this emergency department and system for his alcohol abuse pattern.  The patient has a history of depression but denies any suicidal ideation.  He is on disability for his mental illness and is a chronic alcoholic historically.  Patient states that he drinks earthquakes    REVIEW OF SYSTEMS  See HPI for further details. All other systems are negative.     PAST MEDICAL HISTORY  Past Medical History:   Diagnosis Date   • Acute anxiety    • ADHD (attention deficit hyperactivity disorder)    • ADHD (attention deficit hyperactivity disorder)    • Alcohol abuse    • Bipolar affective (HCC)    • Depression    • Ectrodactyly-ectodermal dysplasia-clefting syndrome    • ETOH abuse    • Psychiatric disorder     anxiety/panic disorder       FAMILY HISTORY  Family History   Problem Relation Age of Onset   • Heart Disease Neg Hx    • Hypertension Neg Hx    • Hyperlipidemia Neg Hx        SOCIAL HISTORY   reports that he has quit smoking. His smoking use included Cigarettes. He smoked 0.25 packs per day. He has never used smokeless tobacco. He reports that he drinks alcohol. He reports that he does not use drugs.    SURGICAL HISTORY  Past Surgical History:   Procedure Laterality Date   • OTHER ORTHOPEDIC SURGERY      hands bilaterally r/t EEDC syndrome       CURRENT MEDICATIONS  Home Medications    **Home medications have not yet been reviewed for this encounter**         ALLERGIES  No Known Allergies    PHYSICAL EXAM  VITAL SIGNS: /84   Pulse (!) 116   Temp 37.3 °C (99.1 °F)   Resp 16   Wt 62.5 kg (137 lb 12.6 oz)   SpO2 90%   BMI 20.95  kg/m²    Constitutional: Smells of alcohol, No acute distress, Non-toxic appearance.   HENT: Normocephalic, Atraumatic, Bilateral external ears normal, Oropharynx is clear mucous membranes are moist. No oral exudates or nasal discharge.   Eyes: Pupils are equal round and reactive, EOMI, Conjunctiva significantly inflamed left greater than right with loss of some of his eyelashes consistent with blepharitis, No discharge.   Neck: Normal range of motion, No tenderness, Supple, No stridor. No meningismus.  Lymphatic: No lymphadenopathy noted.   Cardiovascular: Tachycardic rate and rhythm without murmur rub or gallop.  Thorax & Lungs: Clear breath sounds bilaterally without wheezes, rhonchi or rales. There is no chest wall tenderness.   Abdomen: Soft non-tender non-distended. There is no rebound or guarding. No organomegaly is appreciated. Bowel sounds are normal.  Skin: Normal without rash.   Back: No CVA or spinal tenderness.   Extremities: Intact distal pulses, No edema, No tenderness, No cyanosis, No clubbing. Capillary refill is less than 2 seconds.  Musculoskeletal: Good range of motion in all major joints. No tenderness to palpation or major deformities noted.   Neurologic: Alert & oriented x 3, slurred speech, Normal motor function, Normal sensory function, No focal deficits noted. Reflexes are normal.  Psychiatric: Affect normal, Judgment normal, Mood normal. There is no suicidal ideation or patient reported hallucinations.       COURSE & MEDICAL DECISION MAKING  Pertinent Labs & Imaging studies reviewed. (See chart for details)  Patient arrives tachycardic in the 1 teens but had no signs of withdrawal.  He is intoxicated of alcohol but is oriented x3 and understands his historical pattern of alcohol abuse leading to multiple emergency department visits and some admissions.  He does not seem to be in a good state of mind to truly get help and does not seem to want to change his drinking pattern stating that he  is depressed about his ex-girlfriend    Patient has blepharitis and he says that he is aware of this but has refused to take medication prescribed in the past.  I offered to give him a new prescription for erythromycin but he refused    The patient was able to metabolize during his emergency department course and when I reassessed him at 10:40 PM the patient seemed to have much improvement.  He only had slight slurring of his words and was able to demonstrate functional capacity with a stable gait and drink fluids and juice well.  He ate some crackers.  He continued to be tachycardic but patient states that he wants to leave.  At this point given that he is demonstrating functional capacity and is not suicidal I do not feel legally we have the right to detain this gentleman and therefore he is discharged AGAINST MEDICAL ADVICE as he is under dressed for cold weather and states that he wants to walk all the way back downtown    FINAL IMPRESSION  1. Alcoholic intoxication without complication (HCC)    2. ETOH abuse             Electronically signed by: Michael Gabriel, 12/30/2018 10:40 PM

## 2018-12-31 NOTE — ED TRIAGE NOTES
Pt BIB [facility] with c/o drinking too much today. Pt states wants to quit drinking bc alcohol isnt doing anything. Pt also states is depressed bc him and the mother of his son arent together anymore. Denies SI/HI thoughts

## 2019-01-05 ENCOUNTER — HOSPITAL ENCOUNTER (EMERGENCY)
Dept: HOSPITAL 8 - ED | Age: 34
Discharge: HOME | End: 2019-01-05
Payer: MEDICARE

## 2019-01-05 VITALS — HEIGHT: 64 IN | BODY MASS INDEX: 24.65 KG/M2 | WEIGHT: 144.4 LBS

## 2019-01-05 VITALS — DIASTOLIC BLOOD PRESSURE: 90 MMHG | SYSTOLIC BLOOD PRESSURE: 150 MMHG

## 2019-01-05 DIAGNOSIS — E11.9: ICD-10-CM

## 2019-01-05 DIAGNOSIS — F90.9: ICD-10-CM

## 2019-01-05 DIAGNOSIS — F10.220: Primary | ICD-10-CM

## 2019-01-05 DIAGNOSIS — Z72.9: ICD-10-CM

## 2019-01-05 DIAGNOSIS — F17.200: ICD-10-CM

## 2019-01-05 DIAGNOSIS — F32.9: ICD-10-CM

## 2019-01-05 DIAGNOSIS — F41.1: ICD-10-CM

## 2019-01-05 DIAGNOSIS — I10: ICD-10-CM

## 2019-01-05 PROCEDURE — 99283 EMERGENCY DEPT VISIT LOW MDM: CPT

## 2019-01-05 NOTE — NUR
pt danuta kan, marie speak to this nurse.  he stated to lucius that he wants to 
detox, came in from the shelter area per lucius. pt told lucius he drank 8 
earthquakes and wants to stop.  pt in hospital bed, given gown and side rails 
up x3, bed locked in low position. ermd to bs

## 2019-01-05 NOTE — NUR
PT AMBULATING OUT OF ROOM, STEADY GAIT, NO ASSISTANCE REQUIRED. PT ADVISED RN 
THAT HE IS READY TO LEAVE, PT DIRECTED BACK TO HIS ROOM TO WAIT FOR D/C 
PAPERWORK, PT AGREED. RN ADVISED ERP OF PT'S DESIRE AND READINESS TO LEAVE.

## 2019-01-08 ENCOUNTER — HOSPITAL ENCOUNTER (EMERGENCY)
Dept: HOSPITAL 8 - ED | Age: 34
Discharge: HOME | End: 2019-01-08
Payer: MEDICARE

## 2019-01-08 VITALS — HEIGHT: 68 IN | WEIGHT: 136.69 LBS | BODY MASS INDEX: 20.72 KG/M2

## 2019-01-08 VITALS — SYSTOLIC BLOOD PRESSURE: 142 MMHG | DIASTOLIC BLOOD PRESSURE: 82 MMHG

## 2019-01-08 DIAGNOSIS — E11.40: ICD-10-CM

## 2019-01-08 DIAGNOSIS — H10.022: ICD-10-CM

## 2019-01-08 DIAGNOSIS — I10: ICD-10-CM

## 2019-01-08 DIAGNOSIS — F10.229: Primary | ICD-10-CM

## 2019-01-08 PROCEDURE — 99283 EMERGENCY DEPT VISIT LOW MDM: CPT

## 2019-01-08 NOTE — NUR
PATIENT ARRIVES WITH BRE FROM [facility] WHERE HE CALLED EMS TO DETOX.  HE 
STATES HE DRANK8 EARTH QUAKES. HE STATES THAT HE WANTS TO DETOX. HE IS 
TREMULOUS, UNKEPT, AND ANSWERS QUESTIONS ONLY OCCASIONALLY. HE IS NOT A GOOD 
HISTORIAN. BRE REPORTS HE IS VERY WELL KNOWN AND FREQUENTLY COMBATIVE.  GOT 
HIM IN A GOWN AND ON MONITOR.  SMELLS OF ALCOHOL. DENIES SUICIDE.

## 2019-01-08 NOTE — NUR
THREE PEOPLE (WOLF, MYSELF AND ANOTHER TECH) HAVE ATTEMPTED BREATHYLZER 
UNSUCCESSFULLY. ALERTED CASPER BARAHONA.

## 2019-01-08 NOTE — NUR
Patient given discharge instructions and they have confirmed that they 
understand the instructions.  Patient ambulatory with steady gait. Pt given cab 
voucher.

## 2019-01-08 NOTE — NUR
PATIENT GETS UP AND WALKS AROUND OCCASIONALLY, REMINDING HIM TO STAY IN BED.  
TRYING TO EDUCATE TO STAY IN BED.

## 2019-01-08 NOTE — NUR
WOLF TECH ATTEMPTED BREAHYLZER X 3 AND THE BREATHYLYZER NOT WORKING. TRIED 
JUST NOW X3 AND STILL NOT WORKING, WILL ASK ABOUT LAB DRAW

## 2019-01-09 ENCOUNTER — HOSPITAL ENCOUNTER (INPATIENT)
Facility: MEDICAL CENTER | Age: 34
LOS: 4 days | DRG: 897 | End: 2019-01-14
Attending: EMERGENCY MEDICINE | Admitting: HOSPITALIST
Payer: MEDICARE

## 2019-01-09 ENCOUNTER — HOSPITAL ENCOUNTER (EMERGENCY)
Dept: HOSPITAL 8 - ED | Age: 34
Discharge: LEFT BEFORE DISCHARGE | End: 2019-01-09
Payer: MEDICARE

## 2019-01-09 DIAGNOSIS — Z53.21: ICD-10-CM

## 2019-01-09 DIAGNOSIS — F10.939 ALCOHOL WITHDRAWAL SYNDROME WITH COMPLICATION (HCC): ICD-10-CM

## 2019-01-09 DIAGNOSIS — F10.920 ALCOHOLIC INTOXICATION WITHOUT COMPLICATION (HCC): ICD-10-CM

## 2019-01-09 DIAGNOSIS — H10.32 ACUTE CONJUNCTIVITIS OF LEFT EYE, UNSPECIFIED ACUTE CONJUNCTIVITIS TYPE: ICD-10-CM

## 2019-01-09 DIAGNOSIS — F10.129: Primary | ICD-10-CM

## 2019-01-09 LAB
ALBUMIN SERPL BCP-MCNC: 4.5 G/DL (ref 3.2–4.9)
ALBUMIN/GLOB SERPL: 1.2 G/DL
ALP SERPL-CCNC: 108 U/L (ref 30–99)
ALT SERPL-CCNC: 211 U/L (ref 2–50)
ANION GAP SERPL CALC-SCNC: 15 MMOL/L (ref 0–11.9)
AST SERPL-CCNC: 414 U/L (ref 12–45)
BASOPHILS # BLD AUTO: 0.3 % (ref 0–1.8)
BASOPHILS # BLD: 0.02 K/UL (ref 0–0.12)
BILIRUB SERPL-MCNC: 0.9 MG/DL (ref 0.1–1.5)
BUN SERPL-MCNC: 10 MG/DL (ref 8–22)
CALCIUM SERPL-MCNC: 8.7 MG/DL (ref 8.5–10.5)
CHLORIDE SERPL-SCNC: 94 MMOL/L (ref 96–112)
CO2 SERPL-SCNC: 24 MMOL/L (ref 20–33)
CREAT SERPL-MCNC: 0.87 MG/DL (ref 0.5–1.4)
EOSINOPHIL # BLD AUTO: 0 K/UL (ref 0–0.51)
EOSINOPHIL NFR BLD: 0 % (ref 0–6.9)
ERYTHROCYTE [DISTWIDTH] IN BLOOD BY AUTOMATED COUNT: 47.1 FL (ref 35.9–50)
ETHANOL BLD-MCNC: 0.64 G/DL
GLOBULIN SER CALC-MCNC: 3.8 G/DL (ref 1.9–3.5)
GLUCOSE SERPL-MCNC: 147 MG/DL (ref 65–99)
HCT VFR BLD AUTO: 46.8 % (ref 42–52)
HGB BLD-MCNC: 16.5 G/DL (ref 14–18)
IMM GRANULOCYTES # BLD AUTO: 0.01 K/UL (ref 0–0.11)
IMM GRANULOCYTES NFR BLD AUTO: 0.2 % (ref 0–0.9)
LIPASE SERPL-CCNC: 134 U/L (ref 11–82)
LYMPHOCYTES # BLD AUTO: 1.69 K/UL (ref 1–4.8)
LYMPHOCYTES NFR BLD: 29.1 % (ref 22–41)
MCH RBC QN AUTO: 31.6 PG (ref 27–33)
MCHC RBC AUTO-ENTMCNC: 35.3 G/DL (ref 33.7–35.3)
MCV RBC AUTO: 89.7 FL (ref 81.4–97.8)
MONOCYTES # BLD AUTO: 0.55 K/UL (ref 0–0.85)
MONOCYTES NFR BLD AUTO: 9.5 % (ref 0–13.4)
NEUTROPHILS # BLD AUTO: 3.54 K/UL (ref 1.82–7.42)
NEUTROPHILS NFR BLD: 60.9 % (ref 44–72)
NRBC # BLD AUTO: 0 K/UL
NRBC BLD-RTO: 0 /100 WBC
PLATELET # BLD AUTO: 102 K/UL (ref 164–446)
PMV BLD AUTO: 10.6 FL (ref 9–12.9)
POC BREATHALIZER: 0.09 PERCENT (ref 0–0.01)
POTASSIUM SERPL-SCNC: 3.8 MMOL/L (ref 3.6–5.5)
PROT SERPL-MCNC: 8.3 G/DL (ref 6–8.2)
RBC # BLD AUTO: 5.22 M/UL (ref 4.7–6.1)
SODIUM SERPL-SCNC: 133 MMOL/L (ref 135–145)
WBC # BLD AUTO: 5.8 K/UL (ref 4.8–10.8)

## 2019-01-09 PROCEDURE — 85025 COMPLETE CBC W/AUTO DIFF WBC: CPT

## 2019-01-09 PROCEDURE — 700105 HCHG RX REV CODE 258: Performed by: EMERGENCY MEDICINE

## 2019-01-09 PROCEDURE — 96374 THER/PROPH/DIAG INJ IV PUSH: CPT

## 2019-01-09 PROCEDURE — 700101 HCHG RX REV CODE 250: Performed by: EMERGENCY MEDICINE

## 2019-01-09 PROCEDURE — 80053 COMPREHEN METABOLIC PANEL: CPT

## 2019-01-09 PROCEDURE — 302970 POC BREATHALIZER: Performed by: EMERGENCY MEDICINE

## 2019-01-09 PROCEDURE — 80307 DRUG TEST PRSMV CHEM ANLYZR: CPT

## 2019-01-09 PROCEDURE — 99285 EMERGENCY DEPT VISIT HI MDM: CPT

## 2019-01-09 PROCEDURE — 83690 ASSAY OF LIPASE: CPT

## 2019-01-09 PROCEDURE — 700111 HCHG RX REV CODE 636 W/ 250 OVERRIDE (IP): Performed by: EMERGENCY MEDICINE

## 2019-01-09 RX ORDER — LORAZEPAM 2 MG/ML
1 INJECTION INTRAMUSCULAR ONCE
Status: COMPLETED | OUTPATIENT
Start: 2019-01-10 | End: 2019-01-09

## 2019-01-09 RX ORDER — SODIUM CHLORIDE 9 MG/ML
2000 INJECTION, SOLUTION INTRAVENOUS ONCE
Status: COMPLETED | OUTPATIENT
Start: 2019-01-10 | End: 2019-01-10

## 2019-01-09 RX ORDER — ERYTHROMYCIN 5 MG/G
OINTMENT OPHTHALMIC ONCE
Status: COMPLETED | OUTPATIENT
Start: 2019-01-09 | End: 2019-01-09

## 2019-01-09 RX ORDER — ERYTHROMYCIN 5 MG/G
0.5 OINTMENT OPHTHALMIC 3 TIMES DAILY
Qty: 1 TUBE | Refills: 0 | Status: SHIPPED | OUTPATIENT
Start: 2019-01-09 | End: 2019-02-23 | Stop reason: CLARIF

## 2019-01-09 RX ADMIN — SODIUM CHLORIDE 2000 ML: 9 INJECTION, SOLUTION INTRAVENOUS at 23:58

## 2019-01-09 RX ADMIN — LORAZEPAM 1 MG: 2 INJECTION INTRAMUSCULAR at 23:56

## 2019-01-09 RX ADMIN — ERYTHROMYCIN: 5 OINTMENT OPHTHALMIC at 21:45

## 2019-01-09 NOTE — NUR
pt LWOBS, refused VS or to provide updated info, stated "I just wanted a ride 
home", refused to sign AMA form- ERP aware.

## 2019-01-10 LAB
EKG IMPRESSION: NORMAL
POC BREATHALIZER: 0.12 PERCENT (ref 0–0.01)
POC BREATHALIZER: 0.17 PERCENT (ref 0–0.01)

## 2019-01-10 PROCEDURE — 302970 POC BREATHALIZER: Performed by: EMERGENCY MEDICINE

## 2019-01-10 PROCEDURE — 700111 HCHG RX REV CODE 636 W/ 250 OVERRIDE (IP): Performed by: HOSPITALIST

## 2019-01-10 PROCEDURE — 700101 HCHG RX REV CODE 250: Performed by: HOSPITALIST

## 2019-01-10 PROCEDURE — 93005 ELECTROCARDIOGRAM TRACING: CPT | Performed by: HOSPITALIST

## 2019-01-10 PROCEDURE — 770006 HCHG ROOM/CARE - MED/SURG/GYN SEMI*

## 2019-01-10 PROCEDURE — 700105 HCHG RX REV CODE 258: Performed by: HOSPITALIST

## 2019-01-10 PROCEDURE — A9270 NON-COVERED ITEM OR SERVICE: HCPCS | Performed by: HOSPITALIST

## 2019-01-10 PROCEDURE — HZ2ZZZZ DETOXIFICATION SERVICES FOR SUBSTANCE ABUSE TREATMENT: ICD-10-PCS | Performed by: HOSPITALIST

## 2019-01-10 PROCEDURE — 700102 HCHG RX REV CODE 250 W/ 637 OVERRIDE(OP): Performed by: HOSPITALIST

## 2019-01-10 PROCEDURE — 99223 1ST HOSP IP/OBS HIGH 75: CPT | Performed by: HOSPITALIST

## 2019-01-10 PROCEDURE — A9270 NON-COVERED ITEM OR SERVICE: HCPCS | Performed by: EMERGENCY MEDICINE

## 2019-01-10 PROCEDURE — 36415 COLL VENOUS BLD VENIPUNCTURE: CPT

## 2019-01-10 PROCEDURE — 700102 HCHG RX REV CODE 250 W/ 637 OVERRIDE(OP): Performed by: EMERGENCY MEDICINE

## 2019-01-10 PROCEDURE — 93010 ELECTROCARDIOGRAM REPORT: CPT | Performed by: INTERNAL MEDICINE

## 2019-01-10 RX ORDER — BISACODYL 10 MG
10 SUPPOSITORY, RECTAL RECTAL
Status: DISCONTINUED | OUTPATIENT
Start: 2019-01-10 | End: 2019-01-14 | Stop reason: HOSPADM

## 2019-01-10 RX ORDER — LORAZEPAM 1 MG/1
2 TABLET ORAL ONCE
Status: COMPLETED | OUTPATIENT
Start: 2019-01-10 | End: 2019-01-10

## 2019-01-10 RX ORDER — HYDRALAZINE HYDROCHLORIDE 20 MG/ML
10 INJECTION INTRAMUSCULAR; INTRAVENOUS EVERY 4 HOURS PRN
Status: DISCONTINUED | OUTPATIENT
Start: 2019-01-10 | End: 2019-01-14 | Stop reason: HOSPADM

## 2019-01-10 RX ORDER — LORAZEPAM 2 MG/ML
1 INJECTION INTRAMUSCULAR
Status: DISCONTINUED | OUTPATIENT
Start: 2019-01-10 | End: 2019-01-14 | Stop reason: HOSPADM

## 2019-01-10 RX ORDER — LORAZEPAM 2 MG/ML
2 INJECTION INTRAMUSCULAR
Status: DISCONTINUED | OUTPATIENT
Start: 2019-01-10 | End: 2019-01-14 | Stop reason: HOSPADM

## 2019-01-10 RX ORDER — LORAZEPAM 1 MG/1
3 TABLET ORAL
Status: DISCONTINUED | OUTPATIENT
Start: 2019-01-10 | End: 2019-01-14 | Stop reason: HOSPADM

## 2019-01-10 RX ORDER — LORAZEPAM 2 MG/ML
0.5 INJECTION INTRAMUSCULAR EVERY 4 HOURS PRN
Status: DISCONTINUED | OUTPATIENT
Start: 2019-01-10 | End: 2019-01-14 | Stop reason: HOSPADM

## 2019-01-10 RX ORDER — POLYETHYLENE GLYCOL 3350 17 G/17G
1 POWDER, FOR SOLUTION ORAL
Status: DISCONTINUED | OUTPATIENT
Start: 2019-01-10 | End: 2019-01-14 | Stop reason: HOSPADM

## 2019-01-10 RX ORDER — LORAZEPAM 1 MG/1
2 TABLET ORAL
Status: DISCONTINUED | OUTPATIENT
Start: 2019-01-10 | End: 2019-01-14 | Stop reason: HOSPADM

## 2019-01-10 RX ORDER — LORAZEPAM 2 MG/ML
1.5 INJECTION INTRAMUSCULAR
Status: DISCONTINUED | OUTPATIENT
Start: 2019-01-10 | End: 2019-01-14 | Stop reason: HOSPADM

## 2019-01-10 RX ORDER — LORAZEPAM 1 MG/1
0.5 TABLET ORAL EVERY 4 HOURS PRN
Status: DISCONTINUED | OUTPATIENT
Start: 2019-01-10 | End: 2019-01-14 | Stop reason: HOSPADM

## 2019-01-10 RX ORDER — THIAMINE MONONITRATE (VIT B1) 100 MG
100 TABLET ORAL DAILY
Status: COMPLETED | OUTPATIENT
Start: 2019-01-11 | End: 2019-01-14

## 2019-01-10 RX ORDER — DIAZEPAM 5 MG/1
5 TABLET ORAL EVERY 6 HOURS
Status: DISCONTINUED | OUTPATIENT
Start: 2019-01-10 | End: 2019-01-11

## 2019-01-10 RX ORDER — FOLIC ACID 1 MG/1
1 TABLET ORAL DAILY
Status: COMPLETED | OUTPATIENT
Start: 2019-01-11 | End: 2019-01-14

## 2019-01-10 RX ORDER — LORAZEPAM 1 MG/1
1 TABLET ORAL EVERY 4 HOURS PRN
Status: DISCONTINUED | OUTPATIENT
Start: 2019-01-10 | End: 2019-01-14 | Stop reason: HOSPADM

## 2019-01-10 RX ORDER — AMOXICILLIN 250 MG
2 CAPSULE ORAL 2 TIMES DAILY
Status: DISCONTINUED | OUTPATIENT
Start: 2019-01-10 | End: 2019-01-14 | Stop reason: HOSPADM

## 2019-01-10 RX ORDER — LORAZEPAM 1 MG/1
4 TABLET ORAL
Status: DISCONTINUED | OUTPATIENT
Start: 2019-01-10 | End: 2019-01-14 | Stop reason: HOSPADM

## 2019-01-10 RX ADMIN — LORAZEPAM 2 MG: 1 TABLET ORAL at 22:49

## 2019-01-10 RX ADMIN — LORAZEPAM 3 MG: 1 TABLET ORAL at 18:30

## 2019-01-10 RX ADMIN — DIAZEPAM 5 MG: 5 TABLET ORAL at 18:02

## 2019-01-10 RX ADMIN — POTASSIUM CHLORIDE: 2 INJECTION, SOLUTION, CONCENTRATE INTRAVENOUS at 16:05

## 2019-01-10 RX ADMIN — LORAZEPAM 2 MG: 1 TABLET ORAL at 10:44

## 2019-01-10 RX ADMIN — LORAZEPAM 3 MG: 1 TABLET ORAL at 14:18

## 2019-01-10 RX ADMIN — LORAZEPAM 1.5 MG: 2 INJECTION INTRAMUSCULAR; INTRAVENOUS at 21:08

## 2019-01-10 RX ADMIN — DIAZEPAM 5 MG: 5 TABLET ORAL at 14:10

## 2019-01-10 ASSESSMENT — LIFESTYLE VARIABLES
PAROXYSMAL SWEATS: *
PAROXYSMAL SWEATS: *
AVERAGE NUMBER OF DAYS PER WEEK YOU HAVE A DRINK CONTAINING ALCOHOL: 7
TOTAL SCORE: 4
TOTAL SCORE: VERY MILD ITCHING, PINS AND NEEDLES SENSATION, BURNING OR NUMBNESS
TOTAL SCORE: 14
CONSUMPTION TOTAL: POSITIVE
HAVE YOU EVER FELT YOU SHOULD CUT DOWN ON YOUR DRINKING: YES
AGITATION: SOMEWHAT MORE THAN NORMAL ACTIVITY
VISUAL DISTURBANCES: MILD SENSITIVITY
TOTAL SCORE: 4
TOTAL SCORE: 5
AUDITORY DISTURBANCES: NOT PRESENT
AGITATION: SOMEWHAT MORE THAN NORMAL ACTIVITY
HAVE YOU EVER FELT YOU SHOULD CUT DOWN ON YOUR DRINKING: YES
HEADACHE, FULLNESS IN HEAD: MODERATE
HEADACHE, FULLNESS IN HEAD: NOT PRESENT
HOW MANY TIMES IN THE PAST YEAR HAVE YOU HAD 5 OR MORE DRINKS IN A DAY: 300
NAUSEA AND VOMITING: *
TOTAL SCORE: 4
DOES PATIENT WANT TO STOP DRINKING: YES
DO YOU DRINK ALCOHOL: YES
TOTAL SCORE: 17
TOTAL SCORE: 24
AGITATION: SOMEWHAT MORE THAN NORMAL ACTIVITY
HOW MANY TIMES IN THE PAST YEAR HAVE YOU HAD 5 OR MORE DRINKS IN A DAY: 300
AUDITORY DISTURBANCES: NOT PRESENT
HAVE PEOPLE ANNOYED YOU BY CRITICIZING YOUR DRINKING: YES
ORIENTATION AND CLOUDING OF SENSORIUM: ORIENTED AND CAN DO SERIAL ADDITIONS
AGITATION: *
VISUAL DISTURBANCES: MODERATE SENSITIVITY
ORIENTATION AND CLOUDING OF SENSORIUM: ORIENTED AND CAN DO SERIAL ADDITIONS
AVERAGE NUMBER OF DAYS PER WEEK YOU HAVE A DRINK CONTAINING ALCOHOL: 7
DOES PATIENT WANT TO TALK TO SOMEONE ABOUT QUITTING: YES
DOES PATIENT WANT TO STOP DRINKING: YES
AUDITORY DISTURBANCES: MILD HARSHNESS OR ABILITY TO FRIGHTEN
TOTAL SCORE: 20
ANXIETY: MILDLY ANXIOUS
ON A TYPICAL DAY WHEN YOU DRINK ALCOHOL HOW MANY DRINKS DO YOU HAVE: 8
HEADACHE, FULLNESS IN HEAD: VERY SEVERE
HEADACHE, FULLNESS IN HEAD: SEVERE
ON A TYPICAL DAY WHEN YOU DRINK ALCOHOL HOW MANY DRINKS DO YOU HAVE: 8
HAVE PEOPLE ANNOYED YOU BY CRITICIZING YOUR DRINKING: YES
ORIENTATION AND CLOUDING OF SENSORIUM: ORIENTED AND CAN DO SERIAL ADDITIONS
ANXIETY: MODERATELY ANXIOUS OR GUARDED, SO ANXIETY IS INFERRED
AGITATION: *
TREMOR: *
PAROXYSMAL SWEATS: NO SWEAT VISIBLE
TOTAL SCORE: 4
EVER HAD A DRINK FIRST THING IN THE MORNING TO STEADY YOUR NERVES TO GET RID OF A HANGOVER: YES
NAUSEA AND VOMITING: NO NAUSEA AND NO VOMITING
TREMOR: MODERATE TREMOR WITH ARMS EXTENDED
ORIENTATION AND CLOUDING OF SENSORIUM: ORIENTED AND CAN DO SERIAL ADDITIONS
VISUAL DISTURBANCES: MODERATELY SEVERE HALLUCINATIONS
TOTAL SCORE: MILD ITCHING, PINS AND NEEDLES SENSATION, BURNING OR NUMBNESS
DO YOU DRINK ALCOHOL: YES
ANXIETY: *
TOTAL SCORE: MILD ITCHING, PINS AND NEEDLES SENSATION, BURNING OR NUMBNESS
EVER FELT BAD OR GUILTY ABOUT YOUR DRINKING: YES
AUDITORY DISTURBANCES: NOT PRESENT
EVER HAD A DRINK FIRST THING IN THE MORNING TO STEADY YOUR NERVES TO GET RID OF A HANGOVER: YES
CONSUMPTION TOTAL: POSITIVE
DOES PATIENT WANT TO TALK TO SOMEONE ABOUT QUITTING: YES
ORIENTATION AND CLOUDING OF SENSORIUM: ORIENTED AND CAN DO SERIAL ADDITIONS
TOTAL SCORE: 4
TOTAL SCORE: 4
PAROXYSMAL SWEATS: BARELY PERCEPTIBLE SWEATING. PALMS MOIST
TREMOR: MODERATE TREMOR WITH ARMS EXTENDED
NAUSEA AND VOMITING: NO NAUSEA AND NO VOMITING
ANXIETY: MODERATELY ANXIOUS OR GUARDED, SO ANXIETY IS INFERRED
SUBSTANCE_ABUSE: 1
EVER_SMOKED: NEVER
EVER FELT BAD OR GUILTY ABOUT YOUR DRINKING: YES
NAUSEA AND VOMITING: MILD NAUSEA WITH NO VOMITING
TREMOR: MODERATE TREMOR WITH ARMS EXTENDED
HEADACHE, FULLNESS IN HEAD: NOT PRESENT
NAUSEA AND VOMITING: MILD NAUSEA WITH NO VOMITING
TREMOR: MODERATE TREMOR WITH ARMS EXTENDED
ANXIETY: NO ANXIETY (AT EASE)
AUDITORY DISTURBANCES: NOT PRESENT
VISUAL DISTURBANCES: NOT PRESENT
VISUAL DISTURBANCES: MILD SENSITIVITY
PAROXYSMAL SWEATS: NO SWEAT VISIBLE

## 2019-01-10 ASSESSMENT — PATIENT HEALTH QUESTIONNAIRE - PHQ9
8. MOVING OR SPEAKING SO SLOWLY THAT OTHER PEOPLE COULD HAVE NOTICED. OR THE OPPOSITE, BEING SO FIGETY OR RESTLESS THAT YOU HAVE BEEN MOVING AROUND A LOT MORE THAN USUAL: NOT AT ALL
5. POOR APPETITE OR OVEREATING: NOT AT ALL
4. FEELING TIRED OR HAVING LITTLE ENERGY: NEARLY EVERY DAY
SUM OF ALL RESPONSES TO PHQ QUESTIONS 1-9: 7
SUM OF ALL RESPONSES TO PHQ9 QUESTIONS 1 AND 2: 1
3. TROUBLE FALLING OR STAYING ASLEEP OR SLEEPING TOO MUCH: MORE THAN HALF THE DAYS
7. TROUBLE CONCENTRATING ON THINGS, SUCH AS READING THE NEWSPAPER OR WATCHING TELEVISION: SEVERAL DAYS
9. THOUGHTS THAT YOU WOULD BE BETTER OFF DEAD, OR OF HURTING YOURSELF: NOT AT ALL
2. FEELING DOWN, DEPRESSED, IRRITABLE, OR HOPELESS: SEVERAL DAYS
1. LITTLE INTEREST OR PLEASURE IN DOING THINGS: NOT AT ALL
6. FEELING BAD ABOUT YOURSELF - OR THAT YOU ARE A FAILURE OR HAVE LET YOURSELF OR YOUR FAMILY DOWN: NOT AL ALL

## 2019-01-10 ASSESSMENT — ENCOUNTER SYMPTOMS
NAUSEA: 0
CHILLS: 0
ORTHOPNEA: 0
VOMITING: 0
HEMOPTYSIS: 0
PALPITATIONS: 1
NERVOUS/ANXIOUS: 1
HALLUCINATIONS: 1
SPUTUM PRODUCTION: 0
COUGH: 0
DIZZINESS: 0

## 2019-01-10 ASSESSMENT — COGNITIVE AND FUNCTIONAL STATUS - GENERAL
DAILY ACTIVITIY SCORE: 24
SUGGESTED CMS G CODE MODIFIER MOBILITY: CH
MOBILITY SCORE: 24
SUGGESTED CMS G CODE MODIFIER DAILY ACTIVITY: CH

## 2019-01-10 ASSESSMENT — PAIN SCALES - GENERAL
PAINLEVEL_OUTOF10: 6
PAINLEVEL_OUTOF10: 0

## 2019-01-10 NOTE — ED NOTES
Pt laying in gurney bouncing feet up and down with eyes closed. Pt drank all of liquids provided.

## 2019-01-10 NOTE — H&P
Hospital Medicine History & Physical Note    Date of Service  1/10/2019    Primary Care Physician  ISAIAH Goddard.    Consultants  None    Code Status  Full Code    Chief Complaint  Anxiety    History of Presenting Illness  33 y.o. male who presented 1/9/2019 with anxiety.    I have reviewed some of the recent provider documentation available to me in the patient's medical chart.  Records are briefly summarized:  Mr. Ceja has a history of alcoholism and severe alcohol withdrawal.  His last inpatient admission in our system was from 11/27/2018 to 12/2/2018.  He was admitted to [facility] for alcohol withdrawal, he did require ICU care on a Precedex drip during that admission.  He has had multiple emergency room visits since that time.    Patient was brought to the emergency room 1/9/2018 for alcohol intoxication.  He was noted to have alcoholic hepatitis, provided with supportive care and time to metabolize.  Patient is worsened with increasing tachycardia, he is now in alcohol withdrawal and I was asked to admit the patient.  Patient tells me that he has been drinking heavily, unable to quantify but he is drinking large quantities of alcohol around the clock, last drink was on 1/9/18.  Patient complains of hallucinations, he indicates that he is seeing people who were not there.  He is also experiencing palpitations.  He is tremulous and anxious.  No signs of seizure    Review of Systems  Review of Systems   Constitutional: Negative for chills and malaise/fatigue.   Respiratory: Negative for cough, hemoptysis and sputum production.    Cardiovascular: Positive for palpitations. Negative for chest pain and orthopnea.   Gastrointestinal: Negative for nausea and vomiting.   Skin: Negative for itching and rash.   Neurological: Negative for dizziness.   Psychiatric/Behavioral: Positive for hallucinations and substance abuse. The patient is nervous/anxious.    All other systems reviewed and are  negative.      Past Medical History   has a past medical history of Acute anxiety; ADHD (attention deficit hyperactivity disorder); ADHD (attention deficit hyperactivity disorder); Alcohol abuse; Bipolar affective (HCC); Depression; Ectrodactyly-ectodermal dysplasia-clefting syndrome; ETOH abuse; and Psychiatric disorder.    Surgical History   has a past surgical history that includes other orthopedic surgery.     Family History  Daughter with kidney failure    Social History   reports that he has quit smoking. His smoking use included Cigarettes. He smoked 0.25 packs per day. He has never used smokeless tobacco. He reports that he drinks alcohol. He reports that he does not use drugs.    Allergies  No Known Allergies    Medications  None       Physical Exam  Temp:  [36.5 °C (97.7 °F)] 36.5 °C (97.7 °F)  Pulse:  [103-144] 144  Resp:  [14-20] 20  BP: (118-147)/(82-90) 119/87    Physical Exam   Constitutional: He is oriented to person, place, and time.   Thin and chronically ill-appearing   HENT:   Head: Normocephalic and atraumatic.   Eyes: Conjunctivae and EOM are normal. Right eye exhibits no discharge. Left eye exhibits no discharge.   Cardiovascular: Regular rhythm and intact distal pulses.    No murmur heard.  Tachycardic   Pulmonary/Chest: Effort normal and breath sounds normal. No respiratory distress. He has no wheezes.   Abdominal: Soft. Bowel sounds are normal. He exhibits no distension. There is no tenderness. There is no rebound.   Musculoskeletal: Normal range of motion. He exhibits no edema.   Bilateral hands post surgical with thumb and fifth finger only     Neurological: He is alert and oriented to person, place, and time. No cranial nerve deficit.   Skin: Skin is warm and dry. He is not diaphoretic. No erythema.   Skin is warm and well perfused   Psychiatric:   Anxious       Laboratory:  Recent Labs      01/09/19   2130   WBC  5.8   RBC  5.22   HEMOGLOBIN  16.5   HEMATOCRIT  46.8   MCV  89.7   MCH   31.6   MCHC  35.3   RDW  47.1   PLATELETCT  102*   MPV  10.6     Recent Labs      01/09/19   2130   SODIUM  133*   POTASSIUM  3.8   CHLORIDE  94*   CO2  24   GLUCOSE  147*   BUN  10   CREATININE  0.87   CALCIUM  8.7     Recent Labs      01/09/19 2130   ALTSGPT  211*   ASTSGOT  414*   ALKPHOSPHAT  108*   TBILIRUBIN  0.9   LIPASE  134*   GLUCOSE  147*                 No results for input(s): TROPONINI in the last 72 hours.    Urinalysis:    No results found     Imaging:  No orders to display         Assessment/Plan:  I anticipate this patient will require at least two midnights for appropriate medical management, necessitating inpatient admission.    Alcohol withdrawal (HCC)- (present on admission)   Assessment & Plan    Tremulous and tachycardic, endorses hallucinations, this is severe alcohol withdrawal  He is requesting to detox, indicates that he wishes to stop drinking alcohol  Admit inpatient status for [facility] protocol  Alcohol cessation recommended     Alcoholic hepatitis- (present on admission)   Assessment & Plan    Supportive care  Repeat CMP in the morning     Thrombocytopenia (HCC)- (present on admission)   Assessment & Plan    Due to alcohol  No signs of bleeding         VTE prophylaxis: lovenox

## 2019-01-10 NOTE — ED NOTES
Pt stumbled out of room, reinforced the need for Pt to stay in bed as gait is very unsteady. Pt said he could walk but was squatting on the floor in the madrigal. Pt assisted back to bed, given a urinal to use.

## 2019-01-10 NOTE — ED NOTES
"Pt sat up in bed and asked the PSA for his RN, Pt asking for medication because \"he doesn't feel well.\" Pt words are slurred, when asked when his last drink was he did not answer.   "

## 2019-01-10 NOTE — ED NOTES
Pt squatting out in the madrigal again asking for medication. Reinforced to Pt the need to stay in room and in bed. Pt asking for medication. Explained to Pt too intoxicated at this point for medication, will obtain vital signs. Pt cooperated and returned to bed. States he doesn't feel well.

## 2019-01-10 NOTE — ED NOTES
Rounded on pt, discussed POC, updated on wait status, answered questions, addressed needs. Provided emotional support for pt, encouraged pt to seek rehabilitation, and to make better life choices, pt discussed his ETOH abuse Hx, and needing to quit for his child.

## 2019-01-10 NOTE — ED TRIAGE NOTES
Pt BIB EMS for alcohol intoxication. Pt refused vital signs during transport. Pt refusing to communicate verbally, laying right side down curled into himself. Vitals signs completed, Pt has yellow discharge from left eye with matted material around both eye lids. Pt refusing to answer any questions.

## 2019-01-10 NOTE — ED NOTES
Pt returned to bed monitors applied, pt med seeking, pt reporting visual hallucinations and reporting he thinks he's withdrawing, as well as asking for medications.

## 2019-01-10 NOTE — ED PROVIDER NOTES
ED Provider Note    12:31 PM  At this time, the pt remains tachycardic, and now has tremors.  He will be admitted to Dr. Miller for further evaluation and treatment.

## 2019-01-10 NOTE — ED NOTES
PIV established, NS infusing, Pt medicated per MAR and placed on monitor. Reinforced to Pt the need to stay in bed, If Pt continued to get out of bed will have to restrain Pt. Pt acknowledged and repeated the directions to stay in bed.

## 2019-01-10 NOTE — ED PROVIDER NOTES
"ED Provider Note    CHIEF COMPLAINT  Chief Complaint   Patient presents with   • Alcohol Intoxication       HPI  Gopal Ceja is a 33 y.o. male who presents for evaluation of alcohol intoxication, well-known to this department with many presentations for the same.  He denies any head injuries, he really denies any specific complaints including suicidality.  Denies chest pain and abdominal pain.  He has no specific complaints otherwise at this time.    REVIEW OF SYSTEMS  Negative for fever, rash, chest pain, dyspnea, abdominal pain, back pain. All other systems are negative.     PAST MEDICAL HISTORY   has a past medical history of Acute anxiety; ADHD (attention deficit hyperactivity disorder); ADHD (attention deficit hyperactivity disorder); Alcohol abuse; Bipolar affective (HCC); Depression; Ectrodactyly-ectodermal dysplasia-clefting syndrome; ETOH abuse; and Psychiatric disorder.    SOCIAL HISTORY  Social History     Social History Main Topics   • Smoking status: Former Smoker     Packs/day: 0.25     Types: Cigarettes   • Smokeless tobacco: Never Used      Comment: Smokes couple of times a month   • Alcohol use 0.0 oz/week      Comment: drinks 10 earthquakes a day   • Drug use: No   • Sexual activity: Not on file       SURGICAL HISTORY   has a past surgical history that includes other orthopedic surgery.    CURRENT MEDICATIONS  I personally reviewed the medication list in the charting documentation.     ALLERGIES  No Known Allergies    PHYSICAL EXAM  VITAL SIGNS: /87   Pulse (!) 103   Temp 36.5 °C (97.7 °F) (Temporal)   Resp 16   Ht 1.727 m (5' 8\")   Wt 62.5 kg (137 lb 12.6 oz)   BMI 20.95 kg/m²   Constitutional: Disheveled, malodorous, no acute distress, does not appear toxically ill  HENT: No signs of trauma.   Eyes: Left injected conjunctiva with some exudate.  Pupils are equal and reactive.  Chest: Normal nonlabored respirations.  Lungs are clear bilaterally.  Minimal tachycardia, regular.  Skin: No " erythema, No rash.   Musculoskeletal: Good range of motion in all major joints.  Chronic hand deformities.  Neurologic: Alert, No focal deficits noted.   Psychiatric: Affect normal, Judgment normal.    DIAGNOSTIC STUDIES / PROCEDURES    LABS  Results for orders placed or performed during the hospital encounter of 01/09/19   DIAGNOSTIC ALCOHOL   Result Value Ref Range    Diagnostic Alcohol 0.64 (H) 0.00 g/dL   POC BREATHALIZER   Result Value Ref Range    POC Breathalizer 0.09 (A) 0.00 - 0.01 Percent        COURSE & MEDICAL DECISION MAKING  Pertinent Labs & Imaging studies reviewed. (See chart for details)    Encounter Summary: This is a 33 y.o. male with apparent alcohol intoxication with many presentations for the same, he is very well known to this facility.  He denies trauma, he has no other specific complaints and his exam reveals some conjunctivitis without any other abnormalities.  His initial breath alcohol was 0.09 but I feels that level of intoxication is higher so this will be confirmed on a blood draw although he denies any head injuries and I see no evidence of trauma on exam.  He is not febrile, his blood pressure is normal.  If his alcohol level is in fact higher than his initial breathalyzer, he will be watched here in the emergency department until he is clinically sober at which time he will be discharged home with strict return instructions, will also provide him with a prescription for erythromycin ointment for his conjunctivitis.      DISPOSITION: Discharge Home      FINAL IMPRESSION  1. Alcoholic intoxication without complication (HCC)    2. Acute conjunctivitis of left eye, unspecified acute conjunctivitis type        This dictation was created using voice recognition software. The accuracy of the dictation is limited to the abilities of the software. I expect there may be some errors of grammar and possibly content. The nursing notes were reviewed and certain aspects of this information were  incorporated into this note.    Electronically signed by: Tolu Crandall, 1/9/2019 9:26 PM

## 2019-01-10 NOTE — PROGRESS NOTES
Patient on unit prior to being notified RN to provide care.  No report received from transferring unit.  Will call for it promptly

## 2019-01-10 NOTE — ED PROVIDER NOTES
ED Provider Note      I assumed care of this patient at shift change.  Patient well-known to the emergency department here with alcohol level of 0.64 on previous practitioners assessment.  See previous practitioners note for full evaluation history and physical.  During my time with the patient he had an episode of profound tachycardia in the 130s.  He was given a liter of fluids milligram of Ativan laboratory examination was completed.  Patient has mild elevation of his lipase and moderate elevation of AST ALT alk phos consistent with his chronic alcohol abuse.  He is otherwise remained stable he is having improving sobriety.  He will be transferred to the oncoming physician at shift change.  Pending more clinical sobriety being able to ambulate and tolerate p.o. patient will be discharged.  He otherwise is stable at this time.      Impression:  1.  Alcohol intoxication  2.  Alcohol dependence  3.  Alcoholic hepatitis

## 2019-01-11 LAB
ANION GAP SERPL CALC-SCNC: 9 MMOL/L (ref 0–11.9)
BASOPHILS # BLD AUTO: 0.3 % (ref 0–1.8)
BASOPHILS # BLD: 0.02 K/UL (ref 0–0.12)
BUN SERPL-MCNC: 6 MG/DL (ref 8–22)
CALCIUM SERPL-MCNC: 8.9 MG/DL (ref 8.5–10.5)
CHLORIDE SERPL-SCNC: 95 MMOL/L (ref 96–112)
CO2 SERPL-SCNC: 27 MMOL/L (ref 20–33)
CREAT SERPL-MCNC: 0.74 MG/DL (ref 0.5–1.4)
EOSINOPHIL # BLD AUTO: 0.01 K/UL (ref 0–0.51)
EOSINOPHIL NFR BLD: 0.1 % (ref 0–6.9)
ERYTHROCYTE [DISTWIDTH] IN BLOOD BY AUTOMATED COUNT: 46 FL (ref 35.9–50)
GLUCOSE SERPL-MCNC: 101 MG/DL (ref 65–99)
HCT VFR BLD AUTO: 38.6 % (ref 42–52)
HGB BLD-MCNC: 13.5 G/DL (ref 14–18)
IMM GRANULOCYTES # BLD AUTO: 0.02 K/UL (ref 0–0.11)
IMM GRANULOCYTES NFR BLD AUTO: 0.3 % (ref 0–0.9)
LYMPHOCYTES # BLD AUTO: 1.13 K/UL (ref 1–4.8)
LYMPHOCYTES NFR BLD: 16.4 % (ref 22–41)
MAGNESIUM SERPL-MCNC: 1.7 MG/DL (ref 1.5–2.5)
MCH RBC QN AUTO: 31.3 PG (ref 27–33)
MCHC RBC AUTO-ENTMCNC: 35 G/DL (ref 33.7–35.3)
MCV RBC AUTO: 89.6 FL (ref 81.4–97.8)
MONOCYTES # BLD AUTO: 0.6 K/UL (ref 0–0.85)
MONOCYTES NFR BLD AUTO: 8.7 % (ref 0–13.4)
NEUTROPHILS # BLD AUTO: 5.1 K/UL (ref 1.82–7.42)
NEUTROPHILS NFR BLD: 74.2 % (ref 44–72)
NRBC # BLD AUTO: 0 K/UL
NRBC BLD-RTO: 0 /100 WBC
PLATELET # BLD AUTO: 60 K/UL (ref 164–446)
PMV BLD AUTO: 11.2 FL (ref 9–12.9)
POTASSIUM SERPL-SCNC: 3.6 MMOL/L (ref 3.6–5.5)
RBC # BLD AUTO: 4.31 M/UL (ref 4.7–6.1)
SODIUM SERPL-SCNC: 131 MMOL/L (ref 135–145)
WBC # BLD AUTO: 6.9 K/UL (ref 4.8–10.8)

## 2019-01-11 PROCEDURE — 99233 SBSQ HOSP IP/OBS HIGH 50: CPT | Performed by: INTERNAL MEDICINE

## 2019-01-11 PROCEDURE — 700102 HCHG RX REV CODE 250 W/ 637 OVERRIDE(OP): Performed by: NURSE PRACTITIONER

## 2019-01-11 PROCEDURE — 700102 HCHG RX REV CODE 250 W/ 637 OVERRIDE(OP): Performed by: HOSPITALIST

## 2019-01-11 PROCEDURE — 700111 HCHG RX REV CODE 636 W/ 250 OVERRIDE (IP): Performed by: INTERNAL MEDICINE

## 2019-01-11 PROCEDURE — A9270 NON-COVERED ITEM OR SERVICE: HCPCS | Performed by: NURSE PRACTITIONER

## 2019-01-11 PROCEDURE — 80048 BASIC METABOLIC PNL TOTAL CA: CPT

## 2019-01-11 PROCEDURE — 83735 ASSAY OF MAGNESIUM: CPT

## 2019-01-11 PROCEDURE — 700105 HCHG RX REV CODE 258: Performed by: NURSE PRACTITIONER

## 2019-01-11 PROCEDURE — 700111 HCHG RX REV CODE 636 W/ 250 OVERRIDE (IP): Performed by: HOSPITALIST

## 2019-01-11 PROCEDURE — 85025 COMPLETE CBC W/AUTO DIFF WBC: CPT

## 2019-01-11 PROCEDURE — A9270 NON-COVERED ITEM OR SERVICE: HCPCS | Performed by: HOSPITALIST

## 2019-01-11 PROCEDURE — 770022 HCHG ROOM/CARE - ICU (200)

## 2019-01-11 RX ORDER — MAGNESIUM SULFATE HEPTAHYDRATE 40 MG/ML
2 INJECTION, SOLUTION INTRAVENOUS ONCE
Status: COMPLETED | OUTPATIENT
Start: 2019-01-11 | End: 2019-01-11

## 2019-01-11 RX ORDER — ERYTHROMYCIN 5 MG/G
OINTMENT OPHTHALMIC PRN
Status: DISCONTINUED | OUTPATIENT
Start: 2019-01-11 | End: 2019-01-14 | Stop reason: HOSPADM

## 2019-01-11 RX ORDER — SODIUM CHLORIDE, SODIUM LACTATE, POTASSIUM CHLORIDE, CALCIUM CHLORIDE 600; 310; 30; 20 MG/100ML; MG/100ML; MG/100ML; MG/100ML
INJECTION, SOLUTION INTRAVENOUS CONTINUOUS
Status: DISCONTINUED | OUTPATIENT
Start: 2019-01-11 | End: 2019-01-13

## 2019-01-11 RX ADMIN — METOPROLOL TARTRATE 25 MG: 25 TABLET, FILM COATED ORAL at 17:09

## 2019-01-11 RX ADMIN — LORAZEPAM 2 MG: 1 TABLET ORAL at 02:46

## 2019-01-11 RX ADMIN — LORAZEPAM 2 MG: 1 TABLET ORAL at 11:57

## 2019-01-11 RX ADMIN — SODIUM CHLORIDE, POTASSIUM CHLORIDE, SODIUM LACTATE AND CALCIUM CHLORIDE: 600; 310; 30; 20 INJECTION, SOLUTION INTRAVENOUS at 23:24

## 2019-01-11 RX ADMIN — LORAZEPAM 3 MG: 1 TABLET ORAL at 00:34

## 2019-01-11 RX ADMIN — LORAZEPAM 3 MG: 1 TABLET ORAL at 08:07

## 2019-01-11 RX ADMIN — STANDARDIZED SENNA CONCENTRATE AND DOCUSATE SODIUM 2 TABLET: 8.6; 5 TABLET, FILM COATED ORAL at 05:12

## 2019-01-11 RX ADMIN — MAGNESIUM SULFATE IN WATER 2 G: 40 INJECTION, SOLUTION INTRAVENOUS at 11:51

## 2019-01-11 RX ADMIN — METOPROLOL TARTRATE 25 MG: 25 TABLET, FILM COATED ORAL at 08:07

## 2019-01-11 RX ADMIN — ENOXAPARIN SODIUM 40 MG: 100 INJECTION SUBCUTANEOUS at 05:10

## 2019-01-11 RX ADMIN — Medication 100 MG: at 05:12

## 2019-01-11 RX ADMIN — DIAZEPAM 5 MG: 5 TABLET ORAL at 01:25

## 2019-01-11 RX ADMIN — SODIUM CHLORIDE, POTASSIUM CHLORIDE, SODIUM LACTATE AND CALCIUM CHLORIDE: 600; 310; 30; 20 INJECTION, SOLUTION INTRAVENOUS at 09:15

## 2019-01-11 RX ADMIN — LORAZEPAM 1 MG: 1 TABLET ORAL at 20:06

## 2019-01-11 RX ADMIN — DIAZEPAM 5 MG: 5 TABLET ORAL at 07:30

## 2019-01-11 RX ADMIN — FOLIC ACID 1 MG: 1 TABLET ORAL at 05:12

## 2019-01-11 RX ADMIN — LORAZEPAM 2 MG: 1 TABLET ORAL at 23:33

## 2019-01-11 RX ADMIN — LORAZEPAM 1 MG: 1 TABLET ORAL at 16:05

## 2019-01-11 RX ADMIN — LORAZEPAM 3 MG: 1 TABLET ORAL at 05:11

## 2019-01-11 RX ADMIN — THERA TABS 1 TABLET: TAB at 05:12

## 2019-01-11 ASSESSMENT — LIFESTYLE VARIABLES
TOTAL SCORE: MODERATE ITCHING, PINS AND NEEDLES SENSATION, BURNING OR NUMBNESS
AGITATION: SOMEWHAT MORE THAN NORMAL ACTIVITY
AUDITORY DISTURBANCES: NOT PRESENT
VISUAL DISTURBANCES: VERY MILD SENSITIVITY
TOTAL SCORE: VERY MILD ITCHING, PINS AND NEEDLES SENSATION, BURNING OR NUMBNESS
PAROXYSMAL SWEATS: BARELY PERCEPTIBLE SWEATING. PALMS MOIST
ANXIETY: *
NAUSEA AND VOMITING: MILD NAUSEA WITH NO VOMITING
TREMOR: *
PAROXYSMAL SWEATS: BARELY PERCEPTIBLE SWEATING. PALMS MOIST
HEADACHE, FULLNESS IN HEAD: NOT PRESENT
TOTAL SCORE: 3
ORIENTATION AND CLOUDING OF SENSORIUM: ORIENTED AND CAN DO SERIAL ADDITIONS
PAROXYSMAL SWEATS: NO SWEAT VISIBLE
AUDITORY DISTURBANCES: VERY MILD HARSHNESS OR ABILITY TO FRIGHTEN
PAROXYSMAL SWEATS: NO SWEAT VISIBLE
ANXIETY: MODERATELY ANXIOUS OR GUARDED, SO ANXIETY IS INFERRED
ORIENTATION AND CLOUDING OF SENSORIUM: ORIENTED AND CAN DO SERIAL ADDITIONS
NAUSEA AND VOMITING: MILD NAUSEA WITH NO VOMITING
AGITATION: NORMAL ACTIVITY
TREMOR: *
HEADACHE, FULLNESS IN HEAD: SEVERE
ANXIETY: MILDLY ANXIOUS
TOTAL SCORE: 18
AUDITORY DISTURBANCES: NOT PRESENT
AUDITORY DISTURBANCES: NOT PRESENT
ORIENTATION AND CLOUDING OF SENSORIUM: ORIENTED AND CAN DO SERIAL ADDITIONS
NAUSEA AND VOMITING: NO NAUSEA AND NO VOMITING
VISUAL DISTURBANCES: NOT PRESENT
AGITATION: *
TREMOR: NO TREMOR
AGITATION: SOMEWHAT MORE THAN NORMAL ACTIVITY
AGITATION: NORMAL ACTIVITY
ORIENTATION AND CLOUDING OF SENSORIUM: ORIENTED AND CAN DO SERIAL ADDITIONS
NAUSEA AND VOMITING: MILD NAUSEA WITH NO VOMITING
TOTAL SCORE: 19
TREMOR: *
HEADACHE, FULLNESS IN HEAD: VERY SEVERE
TOTAL SCORE: VERY MILD ITCHING, PINS AND NEEDLES SENSATION, BURNING OR NUMBNESS
TREMOR: *
HEADACHE, FULLNESS IN HEAD: MILD
AGITATION: SOMEWHAT MORE THAN NORMAL ACTIVITY
ANXIETY: *
TREMOR: TREMOR NOT VISIBLE BUT CAN BE FELT, FINGERTIP TO FINGERTIP
HEADACHE, FULLNESS IN HEAD: MILD
PAROXYSMAL SWEATS: NO SWEAT VISIBLE
AUDITORY DISTURBANCES: NOT PRESENT
AUDITORY DISTURBANCES: NOT PRESENT
PAROXYSMAL SWEATS: NO SWEAT VISIBLE
PAROXYSMAL SWEATS: BARELY PERCEPTIBLE SWEATING. PALMS MOIST
AGITATION: *
TREMOR: *
TREMOR: TREMOR NOT VISIBLE BUT CAN BE FELT, FINGERTIP TO FINGERTIP
HEADACHE, FULLNESS IN HEAD: VERY SEVERE
TOTAL SCORE: 2
AGITATION: *
TREMOR: *
ANXIETY: *
NAUSEA AND VOMITING: NO NAUSEA AND NO VOMITING
AUDITORY DISTURBANCES: NOT PRESENT
SUBSTANCE_ABUSE: 1
AUDITORY DISTURBANCES: VERY MILD HARSHNESS OR ABILITY TO FRIGHTEN
HEADACHE, FULLNESS IN HEAD: MODERATE
TOTAL SCORE: MILD ITCHING, PINS AND NEEDLES SENSATION, BURNING OR NUMBNESS
NAUSEA AND VOMITING: MILD NAUSEA WITH NO VOMITING
ANXIETY: MODERATELY ANXIOUS OR GUARDED, SO ANXIETY IS INFERRED
ORIENTATION AND CLOUDING OF SENSORIUM: ORIENTED AND CAN DO SERIAL ADDITIONS
AUDITORY DISTURBANCES: NOT PRESENT
ORIENTATION AND CLOUDING OF SENSORIUM: ORIENTED AND CAN DO SERIAL ADDITIONS
ANXIETY: *
ANXIETY: MILDLY ANXIOUS
PAROXYSMAL SWEATS: NO SWEAT VISIBLE
NAUSEA AND VOMITING: MILD NAUSEA WITH NO VOMITING
HEADACHE, FULLNESS IN HEAD: NOT PRESENT
ORIENTATION AND CLOUDING OF SENSORIUM: ORIENTED AND CAN DO SERIAL ADDITIONS
AGITATION: NORMAL ACTIVITY
TREMOR: TREMOR NOT VISIBLE BUT CAN BE FELT, FINGERTIP TO FINGERTIP
VISUAL DISTURBANCES: MODERATE SENSITIVITY
TOTAL SCORE: MODERATE ITCHING, PINS AND NEEDLES SENSATION, BURNING OR NUMBNESS
VISUAL DISTURBANCES: NOT PRESENT
ANXIETY: NO ANXIETY (AT EASE)
TOTAL SCORE: VERY MILD ITCHING, PINS AND NEEDLES SENSATION, BURNING OR NUMBNESS
VISUAL DISTURBANCES: MILD SENSITIVITY
AUDITORY DISTURBANCES: NOT PRESENT
NAUSEA AND VOMITING: MILD NAUSEA WITH NO VOMITING
TOTAL SCORE: 16
PAROXYSMAL SWEATS: BARELY PERCEPTIBLE SWEATING. PALMS MOIST
TOTAL SCORE: MILD ITCHING, PINS AND NEEDLES SENSATION, BURNING OR NUMBNESS
VISUAL DISTURBANCES: VERY MILD SENSITIVITY
TOTAL SCORE: MILD ITCHING, PINS AND NEEDLES SENSATION, BURNING OR NUMBNESS
NAUSEA AND VOMITING: MILD NAUSEA WITH NO VOMITING
NAUSEA AND VOMITING: NO NAUSEA AND NO VOMITING
PAROXYSMAL SWEATS: NO SWEAT VISIBLE
ORIENTATION AND CLOUDING OF SENSORIUM: ORIENTED AND CAN DO SERIAL ADDITIONS
VISUAL DISTURBANCES: MILD SENSITIVITY
TOTAL SCORE: 14
AGITATION: *
HEADACHE, FULLNESS IN HEAD: MILD
ANXIETY: MILDLY ANXIOUS
TOTAL SCORE: 10
HEADACHE, FULLNESS IN HEAD: SEVERE
TOTAL SCORE: 14
ORIENTATION AND CLOUDING OF SENSORIUM: ORIENTED AND CAN DO SERIAL ADDITIONS
VISUAL DISTURBANCES: NOT PRESENT
TOTAL SCORE: 14
VISUAL DISTURBANCES: NOT PRESENT
VISUAL DISTURBANCES: VERY MILD SENSITIVITY
ORIENTATION AND CLOUDING OF SENSORIUM: ORIENTED AND CAN DO SERIAL ADDITIONS
TOTAL SCORE: MILD ITCHING, PINS AND NEEDLES SENSATION, BURNING OR NUMBNESS
TOTAL SCORE: 10

## 2019-01-11 ASSESSMENT — ENCOUNTER SYMPTOMS
FEVER: 0
DIZZINESS: 0
PALPITATIONS: 0
DOUBLE VISION: 0
WEAKNESS: 1
DIARRHEA: 0
SEIZURES: 0
MYALGIAS: 0
CHILLS: 0
BLURRED VISION: 0
NAUSEA: 0
SPEECH CHANGE: 0
TREMORS: 1
TINGLING: 0
SHORTNESS OF BREATH: 0
VOMITING: 0
FOCAL WEAKNESS: 0
CONSTIPATION: 0
ABDOMINAL PAIN: 0
COUGH: 0
HEADACHES: 0
SENSORY CHANGE: 0

## 2019-01-11 ASSESSMENT — PAIN SCALES - GENERAL
PAINLEVEL_OUTOF10: 0
PAINLEVEL_OUTOF10: 0
PAINLEVEL_OUTOF10: 3
PAINLEVEL_OUTOF10: 0

## 2019-01-11 NOTE — ASSESSMENT & PLAN NOTE
Tremulous and tachycardic, endorses hallucinations, this is severe alcohol withdrawal  He is requesting to detox, indicates that he wishes to stop drinking alcohol  Requiring frequent ativan.  Did require Precedex drip on last admission.  Persistent tachycardia   Continue CIWA  Low threshold to escalate to phenobarbital possibly  Improved currently  Continue with Librium

## 2019-01-11 NOTE — PROGRESS NOTES
Hospital Medicine Daily Progress Note    Date of Service  1/11/2019    Chief Complaint  33 y.o. Male with a h/o ETOH abuse, bipolar disorder admitted 1/9/2019 with alcohol withdrawals.    Hospital Course    Recently discharged after being treated for alcohol withdrawals, requiring ICU and Precedex drip.  On this admission with severe tremors, tachycardia, and hallucinations.  Requiring frequent ativan.      Interval Problem Update  1/11:  Withdrawals worsening.  Persistent tachycardia and tremors.  Respiratory status stable.  Answers questions appropriately.      Consultants/Specialty  N/A    Code Status  FULL    Disposition  Transfer to ICU.    Review of Systems  Review of Systems   Constitutional: Positive for malaise/fatigue. Negative for chills and fever.   HENT: Negative for congestion.    Eyes: Negative for blurred vision and double vision.   Respiratory: Negative for cough and shortness of breath.    Cardiovascular: Negative for chest pain, palpitations and leg swelling.   Gastrointestinal: Negative for abdominal pain, constipation, diarrhea, nausea and vomiting.   Genitourinary: Negative for dysuria.   Musculoskeletal: Negative for myalgias.   Skin: Negative for itching and rash.   Neurological: Positive for tremors and weakness. Negative for dizziness, tingling, sensory change, speech change, focal weakness, seizures and headaches.   Psychiatric/Behavioral: Positive for substance abuse.        Physical Exam  Temp:  [36.6 °C (97.9 °F)-37.2 °C (99 °F)] 36.7 °C (98 °F)  Pulse:  [104-144] 122  Resp:  [16-20] 18  BP: (119-153)/(83-96) 132/90    Physical Exam   Constitutional: He is oriented to person, place, and time. He appears well-developed. No distress.   HENT:   Head: Normocephalic and atraumatic.   Mouth/Throat: No oropharyngeal exudate.   Eyes: Conjunctivae are normal. Right eye exhibits no discharge. Left eye exhibits no discharge.   Neck: Normal range of motion. No tracheal deviation present.    Cardiovascular: Normal heart sounds.  Tachycardia present.    No murmur heard.  Pulmonary/Chest: Effort normal and breath sounds normal. No stridor. No respiratory distress. He has no wheezes. He has no rales.   Abdominal: Soft. Bowel sounds are normal. He exhibits no distension. There is no tenderness. There is no rebound.   Musculoskeletal: Normal range of motion. He exhibits no edema.   Neurological: He is alert and oriented to person, place, and time.   Skin: Skin is warm and dry. No rash noted. No erythema.   Psychiatric: His mood appears anxious. He is actively hallucinating.   Nursing note and vitals reviewed.      Fluids    Intake/Output Summary (Last 24 hours) at 01/11/19 0755  Last data filed at 01/11/19 0015   Gross per 24 hour   Intake                0 ml   Output              333 ml   Net             -333 ml       Laboratory  Recent Labs      01/09/19 2130 01/11/19   0056   WBC  5.8  6.9   RBC  5.22  4.31*   HEMOGLOBIN  16.5  13.5*   HEMATOCRIT  46.8  38.6*   MCV  89.7  89.6   MCH  31.6  31.3   MCHC  35.3  35.0   RDW  47.1  46.0   PLATELETCT  102*  60*   MPV  10.6  11.2     Recent Labs      01/09/19 2130 01/11/19   0056   SODIUM  133*  131*   POTASSIUM  3.8  3.6   CHLORIDE  94*  95*   CO2  24  27   GLUCOSE  147*  101*   BUN  10  6*   CREATININE  0.87  0.74   CALCIUM  8.7  8.9                   Imaging  No orders to display        Assessment/Plan  Alcohol withdrawal (HCC)- (present on admission)   Assessment & Plan    Tremulous and tachycardic, endorses hallucinations, this is severe alcohol withdrawal  He is requesting to detox, indicates that he wishes to stop drinking alcohol  Requiring frequent ativan.  Did require Precedex drip on last admission.  Persistent tachycardia   Start metoprolol  Continue CIWA  Transfer to ICU secondary to severity of symptoms.     Alcoholic hepatitis- (present on admission)   Assessment & Plan    Supportive care  Trend CMP.     Thrombocytopenia (HCC)- (present on  admission)   Assessment & Plan    Due to alcohol  No signs of bleeding     Hyponatremia- (present on admission)   Assessment & Plan    Start IV hydration  Follow CMP.          VTE prophylaxis: Lovenox.

## 2019-01-11 NOTE — PROGRESS NOTES
"Patient initially scored a 5 on unit for CIWA which required reassessment in 4 hours.  Patient started to exhibit withdrawal symptoms (visual hallucinations, \"my head and body are vibrating\")  Paged Dr. Miller to see if new CIWA assessment could be done prior to 4-hour brittany, to which he obliged.  New score of 20, which requires Q-1 hour assessment going forward, will pass along in change of shift report.   "

## 2019-01-11 NOTE — PROGRESS NOTES
Pt arrived to SICU 118. AOx4. VSS. Room air. Tearful.     2 RN skin check complete. No new findings. Scab on right shin. Blanching redness left ischium.

## 2019-01-11 NOTE — CARE PLAN
Problem: Safety  Goal: Will remain free from falls  Outcome: PROGRESSING SLOWER THAN EXPECTED  Reinforced the use of call light when needing assistance. Treaded socks on. Bed in lowest position. Personal belongings within reach. Clutter free environment provided.    Problem: Psychosocial Needs:  Goal: Level of anxiety will decrease  Outcome: PROGRESSING AS EXPECTED  Administered medication as needed.

## 2019-01-11 NOTE — PROGRESS NOTES
· Late entry:2 RN skin check complete with JORDY aSnchez.  · Scab to left elbow, deformed hands and feet. PIV left antecubital area intact no s/s of infection. Refused check to groin area.

## 2019-01-11 NOTE — PROGRESS NOTES
Received patient awake, alert and oriented x4. Notified on call hospitalist of CIWA score of 24 at 2100. Administered medication per protocol. Patient remained alert oriented, assisted to bathroom. Safety precautions in place, treated socks on, bed in lowest position, siderails up and padded, clutter free environment, reinforced use of call light for assistance.

## 2019-01-11 NOTE — CARE PLAN
Problem: Venous Thromboembolism (VTW)/Deep Vein Thrombosis (DVT) Prevention:  Goal: Patient will participate in Venous Thrombosis (VTE)/Deep Vein Thrombosis (DVT)Prevention Measures  Outcome: PROGRESSING AS EXPECTED  Pt refusing SCDs. Educated on risks. Lovenox in use.     Problem: Respiratory:  Goal: Respiratory status will improve  Outcome: PROGRESSING AS EXPECTED  Pt on room air. Normal respirations. Educated to deep breath and cough.

## 2019-01-12 PROBLEM — R74.8 SERUM LIPASE ELEVATION: Status: ACTIVE | Noted: 2019-01-12

## 2019-01-12 PROBLEM — R79.89 LFT ELEVATION: Status: ACTIVE | Noted: 2019-01-12

## 2019-01-12 LAB
ALBUMIN SERPL BCP-MCNC: 4 G/DL (ref 3.2–4.9)
ALBUMIN/GLOB SERPL: 1.3 G/DL
ALP SERPL-CCNC: 95 U/L (ref 30–99)
ALT SERPL-CCNC: 125 U/L (ref 2–50)
ANION GAP SERPL CALC-SCNC: 8 MMOL/L (ref 0–11.9)
AST SERPL-CCNC: 121 U/L (ref 12–45)
BILIRUB SERPL-MCNC: 1.2 MG/DL (ref 0.1–1.5)
BUN SERPL-MCNC: 8 MG/DL (ref 8–22)
CALCIUM SERPL-MCNC: 9.2 MG/DL (ref 8.5–10.5)
CHLORIDE SERPL-SCNC: 99 MMOL/L (ref 96–112)
CO2 SERPL-SCNC: 24 MMOL/L (ref 20–33)
CREAT SERPL-MCNC: 0.68 MG/DL (ref 0.5–1.4)
EKG IMPRESSION: NORMAL
ERYTHROCYTE [DISTWIDTH] IN BLOOD BY AUTOMATED COUNT: 46.2 FL (ref 35.9–50)
GLOBULIN SER CALC-MCNC: 3.2 G/DL (ref 1.9–3.5)
GLUCOSE SERPL-MCNC: 95 MG/DL (ref 65–99)
HCT VFR BLD AUTO: 41.7 % (ref 42–52)
HGB BLD-MCNC: 14.4 G/DL (ref 14–18)
MCH RBC QN AUTO: 31.4 PG (ref 27–33)
MCHC RBC AUTO-ENTMCNC: 34.5 G/DL (ref 33.7–35.3)
MCV RBC AUTO: 90.8 FL (ref 81.4–97.8)
MORPHOLOGY BLD-IMP: NORMAL
PLATELET # BLD AUTO: 51 K/UL (ref 164–446)
PLATELETS.RETICULATED NFR BLD AUTO: 17.2 K/UL (ref 0.6–13.1)
PMV BLD AUTO: 12.4 FL (ref 9–12.9)
POTASSIUM SERPL-SCNC: 3.8 MMOL/L (ref 3.6–5.5)
PROT SERPL-MCNC: 7.2 G/DL (ref 6–8.2)
RBC # BLD AUTO: 4.59 M/UL (ref 4.7–6.1)
SODIUM SERPL-SCNC: 131 MMOL/L (ref 135–145)
WBC # BLD AUTO: 5.5 K/UL (ref 4.8–10.8)

## 2019-01-12 PROCEDURE — 93010 ELECTROCARDIOGRAM REPORT: CPT | Performed by: INTERNAL MEDICINE

## 2019-01-12 PROCEDURE — 99233 SBSQ HOSP IP/OBS HIGH 50: CPT | Performed by: HOSPITALIST

## 2019-01-12 PROCEDURE — 99291 CRITICAL CARE FIRST HOUR: CPT | Performed by: INTERNAL MEDICINE

## 2019-01-12 PROCEDURE — 700102 HCHG RX REV CODE 250 W/ 637 OVERRIDE(OP): Performed by: NURSE PRACTITIONER

## 2019-01-12 PROCEDURE — 85055 RETICULATED PLATELET ASSAY: CPT

## 2019-01-12 PROCEDURE — 93005 ELECTROCARDIOGRAM TRACING: CPT | Performed by: HOSPITALIST

## 2019-01-12 PROCEDURE — 770022 HCHG ROOM/CARE - ICU (200)

## 2019-01-12 PROCEDURE — A9270 NON-COVERED ITEM OR SERVICE: HCPCS | Performed by: HOSPITALIST

## 2019-01-12 PROCEDURE — 700105 HCHG RX REV CODE 258: Performed by: INTERNAL MEDICINE

## 2019-01-12 PROCEDURE — 700102 HCHG RX REV CODE 250 W/ 637 OVERRIDE(OP): Performed by: HOSPITALIST

## 2019-01-12 PROCEDURE — A9270 NON-COVERED ITEM OR SERVICE: HCPCS | Performed by: NURSE PRACTITIONER

## 2019-01-12 PROCEDURE — 80053 COMPREHEN METABOLIC PANEL: CPT

## 2019-01-12 PROCEDURE — 700111 HCHG RX REV CODE 636 W/ 250 OVERRIDE (IP): Performed by: HOSPITALIST

## 2019-01-12 PROCEDURE — 85027 COMPLETE CBC AUTOMATED: CPT

## 2019-01-12 PROCEDURE — 700105 HCHG RX REV CODE 258: Performed by: NURSE PRACTITIONER

## 2019-01-12 PROCEDURE — 700101 HCHG RX REV CODE 250: Performed by: INTERNAL MEDICINE

## 2019-01-12 RX ORDER — SODIUM CHLORIDE, SODIUM LACTATE, POTASSIUM CHLORIDE, CALCIUM CHLORIDE 600; 310; 30; 20 MG/100ML; MG/100ML; MG/100ML; MG/100ML
2000 INJECTION, SOLUTION INTRAVENOUS ONCE
Status: COMPLETED | OUTPATIENT
Start: 2019-01-12 | End: 2019-01-12

## 2019-01-12 RX ORDER — VALPROIC ACID 250 MG/1
250 CAPSULE, LIQUID FILLED ORAL EVERY 8 HOURS
Status: DISCONTINUED | OUTPATIENT
Start: 2019-01-12 | End: 2019-01-13

## 2019-01-12 RX ORDER — CLONIDINE HYDROCHLORIDE 0.1 MG/1
0.1 TABLET ORAL TWICE DAILY
Status: DISCONTINUED | OUTPATIENT
Start: 2019-01-12 | End: 2019-01-14 | Stop reason: HOSPADM

## 2019-01-12 RX ADMIN — LORAZEPAM 1 MG: 1 TABLET ORAL at 15:15

## 2019-01-12 RX ADMIN — LORAZEPAM 3 MG: 1 TABLET ORAL at 12:21

## 2019-01-12 RX ADMIN — CLONIDINE HYDROCHLORIDE 0.1 MG: 0.1 TABLET ORAL at 18:12

## 2019-01-12 RX ADMIN — ERYTHROMYCIN 1 APPLICATION: 5 OINTMENT OPHTHALMIC at 14:06

## 2019-01-12 RX ADMIN — THERA TABS 1 TABLET: TAB at 06:02

## 2019-01-12 RX ADMIN — ERYTHROMYCIN: 5 OINTMENT OPHTHALMIC at 21:26

## 2019-01-12 RX ADMIN — LORAZEPAM 1 MG: 1 TABLET ORAL at 08:41

## 2019-01-12 RX ADMIN — SODIUM CHLORIDE, POTASSIUM CHLORIDE, SODIUM LACTATE AND CALCIUM CHLORIDE: 600; 310; 30; 20 INJECTION, SOLUTION INTRAVENOUS at 12:00

## 2019-01-12 RX ADMIN — LORAZEPAM 3 MG: 1 TABLET ORAL at 14:03

## 2019-01-12 RX ADMIN — FOLIC ACID 1 MG: 1 TABLET ORAL at 06:02

## 2019-01-12 RX ADMIN — LORAZEPAM 3 MG: 1 TABLET ORAL at 18:21

## 2019-01-12 RX ADMIN — Medication 100 MG: at 06:02

## 2019-01-12 RX ADMIN — LORAZEPAM 3 MG: 1 TABLET ORAL at 21:24

## 2019-01-12 RX ADMIN — LORAZEPAM 3 MG: 1 TABLET ORAL at 07:37

## 2019-01-12 RX ADMIN — METOPROLOL TARTRATE 25 MG: 25 TABLET, FILM COATED ORAL at 18:15

## 2019-01-12 RX ADMIN — LORAZEPAM 2 MG: 1 TABLET ORAL at 03:28

## 2019-01-12 RX ADMIN — LORAZEPAM 2 MG: 1 TABLET ORAL at 06:02

## 2019-01-12 RX ADMIN — SODIUM CHLORIDE, POTASSIUM CHLORIDE, SODIUM LACTATE AND CALCIUM CHLORIDE 2000 ML: 600; 310; 30; 20 INJECTION, SOLUTION INTRAVENOUS at 18:05

## 2019-01-12 RX ADMIN — ENOXAPARIN SODIUM 40 MG: 100 INJECTION SUBCUTANEOUS at 06:00

## 2019-01-12 RX ADMIN — ERYTHROMYCIN: 5 OINTMENT OPHTHALMIC at 03:55

## 2019-01-12 ASSESSMENT — LIFESTYLE VARIABLES
TOTAL SCORE: 12
AUDITORY DISTURBANCES: NOT PRESENT
ORIENTATION AND CLOUDING OF SENSORIUM: ORIENTED AND CAN DO SERIAL ADDITIONS
TOTAL SCORE: MILD ITCHING, PINS AND NEEDLES SENSATION, BURNING OR NUMBNESS
NAUSEA AND VOMITING: MILD NAUSEA WITH NO VOMITING
TOTAL SCORE: 5
PAROXYSMAL SWEATS: NO SWEAT VISIBLE
PAROXYSMAL SWEATS: BARELY PERCEPTIBLE SWEATING. PALMS MOIST
ORIENTATION AND CLOUDING OF SENSORIUM: ORIENTED AND CAN DO SERIAL ADDITIONS
TOTAL SCORE: MODERATE ITCHING, PINS AND NEEDLES SENSATION, BURNING OR NUMBNESS
ORIENTATION AND CLOUDING OF SENSORIUM: ORIENTED AND CAN DO SERIAL ADDITIONS
VISUAL DISTURBANCES: MILD SENSITIVITY
TOTAL SCORE: MODERATE ITCHING, PINS AND NEEDLES SENSATION, BURNING OR NUMBNESS
AUDITORY DISTURBANCES: NOT PRESENT
VISUAL DISTURBANCES: MILD SENSITIVITY
TOTAL SCORE: 9
AUDITORY DISTURBANCES: NOT PRESENT
AGITATION: *
AGITATION: SOMEWHAT MORE THAN NORMAL ACTIVITY
HEADACHE, FULLNESS IN HEAD: VERY MILD
PAROXYSMAL SWEATS: NO SWEAT VISIBLE
AUDITORY DISTURBANCES: NOT PRESENT
PAROXYSMAL SWEATS: NO SWEAT VISIBLE
TREMOR: *
ANXIETY: *
ANXIETY: *
AGITATION: MODERATELY FIDGETY AND RESTLESS
AGITATION: *
NAUSEA AND VOMITING: MILD NAUSEA WITH NO VOMITING
ANXIETY: MILDLY ANXIOUS
AGITATION: *
ANXIETY: MODERATELY ANXIOUS OR GUARDED, SO ANXIETY IS INFERRED
TREMOR: *
TREMOR: *
AUDITORY DISTURBANCES: NOT PRESENT
TREMOR: MODERATE TREMOR WITH ARMS EXTENDED
AGITATION: MODERATELY FIDGETY AND RESTLESS
ANXIETY: MODERATELY ANXIOUS OR GUARDED, SO ANXIETY IS INFERRED
ANXIETY: MODERATELY ANXIOUS OR GUARDED, SO ANXIETY IS INFERRED
ORIENTATION AND CLOUDING OF SENSORIUM: ORIENTED AND CAN DO SERIAL ADDITIONS
TOTAL SCORE: 16
ANXIETY: NO ANXIETY (AT EASE)
NAUSEA AND VOMITING: MILD NAUSEA WITH NO VOMITING
ANXIETY: MILDLY ANXIOUS
AUDITORY DISTURBANCES: NOT PRESENT
ORIENTATION AND CLOUDING OF SENSORIUM: ORIENTED AND CAN DO SERIAL ADDITIONS
HEADACHE, FULLNESS IN HEAD: MILD
AGITATION: NORMAL ACTIVITY
TOTAL SCORE: 16
AGITATION: SOMEWHAT MORE THAN NORMAL ACTIVITY
PAROXYSMAL SWEATS: NO SWEAT VISIBLE
HEADACHE, FULLNESS IN HEAD: NOT PRESENT
NAUSEA AND VOMITING: MILD NAUSEA WITH NO VOMITING
TOTAL SCORE: VERY MILD ITCHING, PINS AND NEEDLES SENSATION, BURNING OR NUMBNESS
TOTAL SCORE: 2
AUDITORY DISTURBANCES: NOT PRESENT
HEADACHE, FULLNESS IN HEAD: VERY MILD
HEADACHE, FULLNESS IN HEAD: MODERATELY SEVERE
HEADACHE, FULLNESS IN HEAD: MILD
AUDITORY DISTURBANCES: NOT PRESENT
PAROXYSMAL SWEATS: NO SWEAT VISIBLE
TREMOR: *
AGITATION: SOMEWHAT MORE THAN NORMAL ACTIVITY
AGITATION: NORMAL ACTIVITY
TOTAL SCORE: 19
PAROXYSMAL SWEATS: NO SWEAT VISIBLE
HEADACHE, FULLNESS IN HEAD: VERY MILD
TREMOR: *
ORIENTATION AND CLOUDING OF SENSORIUM: ORIENTED AND CAN DO SERIAL ADDITIONS
VISUAL DISTURBANCES: NOT PRESENT
SUBSTANCE_ABUSE: 1
VISUAL DISTURBANCES: NOT PRESENT
TOTAL SCORE: VERY MILD ITCHING, PINS AND NEEDLES SENSATION, BURNING OR NUMBNESS
TOTAL SCORE: 17
PAROXYSMAL SWEATS: NO SWEAT VISIBLE
TOTAL SCORE: VERY MILD ITCHING, PINS AND NEEDLES SENSATION, BURNING OR NUMBNESS
AUDITORY DISTURBANCES: NOT PRESENT
HEADACHE, FULLNESS IN HEAD: VERY MILD
ANXIETY: NO ANXIETY (AT EASE)
TREMOR: *
VISUAL DISTURBANCES: MODERATE SENSITIVITY
VISUAL DISTURBANCES: MODERATE SENSITIVITY
ORIENTATION AND CLOUDING OF SENSORIUM: ORIENTED AND CAN DO SERIAL ADDITIONS
NAUSEA AND VOMITING: NO NAUSEA AND NO VOMITING
PAROXYSMAL SWEATS: NO SWEAT VISIBLE
PAROXYSMAL SWEATS: BARELY PERCEPTIBLE SWEATING. PALMS MOIST
NAUSEA AND VOMITING: MILD NAUSEA WITH NO VOMITING
AGITATION: NORMAL ACTIVITY
VISUAL DISTURBANCES: MILD SENSITIVITY
TREMOR: *
ORIENTATION AND CLOUDING OF SENSORIUM: ORIENTED AND CAN DO SERIAL ADDITIONS
TREMOR: *
ORIENTATION AND CLOUDING OF SENSORIUM: ORIENTED AND CAN DO SERIAL ADDITIONS
TREMOR: MODERATE TREMOR WITH ARMS EXTENDED
ORIENTATION AND CLOUDING OF SENSORIUM: ORIENTED AND CAN DO SERIAL ADDITIONS
VISUAL DISTURBANCES: MODERATE SENSITIVITY
TOTAL SCORE: MILD ITCHING, PINS AND NEEDLES SENSATION, BURNING OR NUMBNESS
VISUAL DISTURBANCES: NOT PRESENT
ORIENTATION AND CLOUDING OF SENSORIUM: ORIENTED AND CAN DO SERIAL ADDITIONS
HEADACHE, FULLNESS IN HEAD: VERY MILD
AGITATION: *
PAROXYSMAL SWEATS: NO SWEAT VISIBLE
ANXIETY: MILDLY ANXIOUS
AGITATION: NORMAL ACTIVITY
PAROXYSMAL SWEATS: BARELY PERCEPTIBLE SWEATING. PALMS MOIST
TOTAL SCORE: MILD ITCHING, PINS AND NEEDLES SENSATION, BURNING OR NUMBNESS
TOTAL SCORE: 8
ANXIETY: MODERATELY ANXIOUS OR GUARDED, SO ANXIETY IS INFERRED
ANXIETY: NO ANXIETY (AT EASE)
TOTAL SCORE: MILD ITCHING, PINS AND NEEDLES SENSATION, BURNING OR NUMBNESS
ORIENTATION AND CLOUDING OF SENSORIUM: ORIENTED AND CAN DO SERIAL ADDITIONS
ORIENTATION AND CLOUDING OF SENSORIUM: ORIENTED AND CAN DO SERIAL ADDITIONS
AUDITORY DISTURBANCES: NOT PRESENT
TREMOR: TREMOR NOT VISIBLE BUT CAN BE FELT, FINGERTIP TO FINGERTIP
TOTAL SCORE: 6
NAUSEA AND VOMITING: NO NAUSEA AND NO VOMITING
AUDITORY DISTURBANCES: NOT PRESENT
VISUAL DISTURBANCES: MODERATE SENSITIVITY
TOTAL SCORE: 13
NAUSEA AND VOMITING: NO NAUSEA AND NO VOMITING
HEADACHE, FULLNESS IN HEAD: NOT PRESENT
TREMOR: *
PAROXYSMAL SWEATS: NO SWEAT VISIBLE
AUDITORY DISTURBANCES: NOT PRESENT
VISUAL DISTURBANCES: NOT PRESENT
TOTAL SCORE: 2
HEADACHE, FULLNESS IN HEAD: MILD
NAUSEA AND VOMITING: MILD NAUSEA WITH NO VOMITING
VISUAL DISTURBANCES: MILD SENSITIVITY
HEADACHE, FULLNESS IN HEAD: NOT PRESENT
ANXIETY: MODERATELY ANXIOUS OR GUARDED, SO ANXIETY IS INFERRED
AUDITORY DISTURBANCES: NOT PRESENT
HEADACHE, FULLNESS IN HEAD: VERY MILD
NAUSEA AND VOMITING: NO NAUSEA AND NO VOMITING
TOTAL SCORE: 18
VISUAL DISTURBANCES: NOT PRESENT
NAUSEA AND VOMITING: NO NAUSEA AND NO VOMITING
TREMOR: *

## 2019-01-12 ASSESSMENT — ENCOUNTER SYMPTOMS
BRUISES/BLEEDS EASILY: 0
CONFUSION: 1
AGITATION: 1
WHEEZING: 0
TREMORS: 1
SPEECH DIFFICULTY: 0
SPEECH CHANGE: 0
PALPITATIONS: 0
CONSTIPATION: 0
SEIZURES: 0
HEADACHES: 0
TROUBLE SWALLOWING: 0
MYALGIAS: 0
WEAKNESS: 0
BLURRED VISION: 0
SHORTNESS OF BREATH: 0
PHOTOPHOBIA: 0
DIARRHEA: 0
ADENOPATHY: 0
COLOR CHANGE: 0
FATIGUE: 1
ABDOMINAL DISTENTION: 0
DOUBLE VISION: 0
ABDOMINAL PAIN: 0
SENSORY CHANGE: 0
FOCAL WEAKNESS: 0
VOMITING: 0
NAUSEA: 0
ARTHRALGIAS: 0
FEVER: 0
WEAKNESS: 1
TINGLING: 0
CHEST TIGHTNESS: 0
DIZZINESS: 0
DIAPHORESIS: 0
BACK PAIN: 0
CHILLS: 0
COUGH: 0

## 2019-01-12 ASSESSMENT — PAIN SCALES - GENERAL
PAINLEVEL_OUTOF10: 2
PAINLEVEL_OUTOF10: 0
PAINLEVEL_OUTOF10: 6
PAINLEVEL_OUTOF10: 0

## 2019-01-12 NOTE — PROGRESS NOTES
Hospital Medicine Daily Progress Note    Date of Service  1/12/2019    Chief Complaint  33 y.o. Male with a h/o ETOH abuse, bipolar disorder admitted 1/9/2019 with alcohol withdrawals.    Hospital Course    Recently discharged after being treated for alcohol withdrawals, requiring ICU and Precedex drip.  On this admission with severe tremors, tachycardia, and hallucinations.  Requiring frequent ativan.      Interval Problem Update  Patient seen and examined today. ICU Care  Care and plan discussed in IDT/Hot rounds.  Lines and assistive devices reviewed.    Patient tolerating treatment and therapies.  All Data, Medication data reviewed.  Case discussed with nursing as available.  Plan of Care reviewed with patient and notified of changes.  1/12 the patient transferred to ICU with seeing agitation, on CIWA protocol, level 4 -9, some ocular drainage noted, the patient states having a.m. headache, subtle EKG abnormalities, no chest pain, alert and oriented  Consultants/Specialty  Intensivist    Code Status  FULL    Disposition  ICU    Review of Systems  Review of Systems   Constitutional: Positive for malaise/fatigue. Negative for chills and fever.   HENT: Negative for congestion.    Eyes: Negative for blurred vision and double vision.   Respiratory: Negative for cough and shortness of breath.    Cardiovascular: Negative for chest pain, palpitations and leg swelling.   Gastrointestinal: Negative for abdominal pain, constipation, diarrhea, nausea and vomiting.   Genitourinary: Negative for dysuria.   Musculoskeletal: Negative for myalgias.   Skin: Negative for itching and rash.   Neurological: Positive for tremors and weakness. Negative for dizziness, tingling, sensory change, speech change, focal weakness, seizures and headaches.   Psychiatric/Behavioral: Positive for substance abuse.        Physical Exam  Temp:  [36.4 °C (97.5 °F)-37.5 °C (99.5 °F)] 37.4 °C (99.4 °F)  Pulse:  [] 97  Resp:  [12-38] 17    Physical  Exam   Constitutional: He is oriented to person, place, and time. He appears well-developed. No distress.   HENT:   Head: Normocephalic and atraumatic.   Mouth/Throat: No oropharyngeal exudate.   Eyes: Conjunctivae are normal. Right eye exhibits no discharge. Left eye exhibits no discharge.   Neck: Normal range of motion. No tracheal deviation present.   Cardiovascular: Normal heart sounds.  Tachycardia present.    No murmur heard.  Pulmonary/Chest: Effort normal and breath sounds normal. No stridor. No respiratory distress. He has no wheezes. He has no rales.   Abdominal: Soft. Bowel sounds are normal. He exhibits no distension. There is no tenderness. There is no rebound.   Musculoskeletal: Normal range of motion. He exhibits no edema.   Neurological: He is alert and oriented to person, place, and time.   Skin: Skin is warm and dry. No rash noted. No erythema.   Psychiatric: His mood appears anxious. He is actively hallucinating.   Nursing note and vitals reviewed.      Fluids    Intake/Output Summary (Last 24 hours) at 01/12/19 0733  Last data filed at 01/12/19 0600   Gross per 24 hour   Intake          2026.25 ml   Output             2200 ml   Net          -173.75 ml       Laboratory  Recent Labs      01/09/19 2130 01/11/19   0056  01/12/19   0330   WBC  5.8  6.9  5.5   RBC  5.22  4.31*  4.59*   HEMOGLOBIN  16.5  13.5*  14.4   HEMATOCRIT  46.8  38.6*  41.7*   MCV  89.7  89.6  90.8   MCH  31.6  31.3  31.4   MCHC  35.3  35.0  34.5   RDW  47.1  46.0  46.2   PLATELETCT  102*  60*  51*   MPV  10.6  11.2  12.4     Recent Labs      01/09/19 2130 01/11/19   0056  01/12/19   0330   SODIUM  133*  131*  131*   POTASSIUM  3.8  3.6  3.8   CHLORIDE  94*  95*  99   CO2  24  27  24   GLUCOSE  147*  101*  95   BUN  10  6*  8   CREATININE  0.87  0.74  0.68   CALCIUM  8.7  8.9  9.2                   Imaging  No orders to display        Assessment/Plan  Alcohol withdrawal (HCC)- (present on admission)   Assessment & Plan     Tremulous and tachycardic, endorses hallucinations, this is severe alcohol withdrawal  He is requesting to detox, indicates that he wishes to stop drinking alcohol  Requiring frequent ativan.  Did require Precedex drip on last admission.  Persistent tachycardia   Continue CIWA  Low threshold to escalate to phenobarbital possibly     Alcoholic hepatitis- (present on admission)   Assessment & Plan    Supportive care  Trend CMP.     Conjunctivitis- (present on admission)   Assessment & Plan    Ophthalmic antibiotics     Serum lipase elevation   Assessment & Plan    Likely from nausea vomiting  Follow-up in the morning     LFT elevation   Assessment & Plan    Likely from alcohol abuse     Thrombocytopenia (HCC)- (present on admission)   Assessment & Plan    Due to alcohol  No signs of bleeding     Hyponatremia- (present on admission)   Assessment & Plan    Start IV hydration  Follow CMP.     Tachycardia- (present on admission)   Assessment & Plan    Subtle EKG changes  Likely J-point elevation, follow     Plan  Continue with alcohol withdrawal protocol and treatment  A.m. Labs  Consider transitioning to phenobarbital protocol if worsens  Electrolyte follow-up  Critically ill  See orders  I have performed a physical exam and reviewed and updated ROS and Plan today . In review of yesterday's note , there are no changes except as documented above.     VTE prophylaxis: Lovenox.

## 2019-01-12 NOTE — PROGRESS NOTES
CIWA = 19, PRN ativan given-see MAR. Pt refusing to keep BP cuff on, RN to come in Q1 hour to checks BPs. Pt assisted up to EOB to use urinal. Pt able to void & assisted back to bed safely. Bed alarm in place. Pt denies any further needs at this time. Hourly rounds continue.

## 2019-01-12 NOTE — CARE PLAN
Problem: Safety  Goal: Will remain free from falls    Intervention: Implement fall precautions  Bed locked and in lowest position, call bell and belongings within reach, educated on use of call bell, bed alarm on      Problem: Psychosocial Needs:  Goal: Level of anxiety will decrease    Intervention: Identify and develop with patient strategies to cope with anxiety triggers  Lorazepam administered per CIWAA protocol (see MAR). Discussed ETOH use with patient along with patient questions and concerns. Oriented to new unit. Reviewed plan of care and treatment goals.

## 2019-01-12 NOTE — PROGRESS NOTES
Report received from Tas[facility]em SPENCE. [facility] Patient would like wallet kept with clothing in pt closet. Cell phone at bedside table within reach.

## 2019-01-12 NOTE — PROGRESS NOTES
2 RN skin check performed for transfer to RICU. Completed with second RN. No pressure sores noted. Generalized bruising to lower extremities. Scab to right shin. 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 Conjunctivitis to left eye. Rash to left ischium.     Pillows in place to float heels and elbows, pt turns self, pulse ox and BP cuff rotated.

## 2019-01-12 NOTE — CONSULTS
Critical Care Consultation    Date of consult: 1/12/2019    Referring Physician  Itzel Tavares M.D.    Reason for Consultation  etoh withdrawal    History of Presenting Illness  33 y.o. male who presented 1/9/2019 with alcohol withdrawal.  On admission he did say that he drinks heavily.  He is complaining of hallucinations and seeing people that were not there.  He also complained of tremors, anxiety, and palpitations.  He had no signs of seizures at that time.  He transferred from another ICU overnight.  He is currently on Ativan protocol.    Interval history  ciwa 4-9  Sinus tach   Slight st elvation on monitor  ekg pending  Tele strip ok, j point  Chest xray from November    Code Status  Full Code    Review of Systems  Review of Systems   Constitutional: Positive for fatigue. Negative for chills, diaphoresis and fever.   HENT: Negative for congestion and trouble swallowing.    Eyes: Negative for photophobia and visual disturbance.   Respiratory: Negative for cough, chest tightness, shortness of breath and wheezing.    Cardiovascular: Negative for chest pain, palpitations and leg swelling.   Gastrointestinal: Negative for abdominal distention, abdominal pain and nausea.   Endocrine: Negative for cold intolerance and heat intolerance.   Genitourinary: Negative for difficulty urinating and dysuria.   Musculoskeletal: Negative for arthralgias and back pain.   Skin: Negative for color change and rash.   Neurological: Positive for tremors. Negative for dizziness, seizures, speech difficulty, weakness and headaches.   Hematological: Negative for adenopathy. Does not bruise/bleed easily.   Psychiatric/Behavioral: Positive for agitation and confusion.       Past Medical History   has a past medical history of Acute anxiety; ADHD (attention deficit hyperactivity disorder); ADHD (attention deficit hyperactivity disorder); Alcohol abuse; Bipolar affective (HCC); Depression; Ectrodactyly-ectodermal dysplasia-clefting  syndrome; ETOH abuse; and Psychiatric disorder.    Surgical History   has a past surgical history that includes other orthopedic surgery.    Family History  No significant cardiac or pulmonary disease    Social History   reports that he has quit smoking. His smoking use included Cigarettes. He smoked 0.25 packs per day. He has never used smokeless tobacco. He reports that he drinks alcohol. He reports that he does not use drugs.    Medications  Home Medications     Reviewed by Yuki High (Pharmacy [facility]) on 01/10/19 at 1329  Med List Status: Complete   Medication Last Dose Status        Patient Torey Taking any Medications                     Current Facility-Administered Medications   Medication Dose Route Frequency Provider Last Rate Last Dose   • metoprolol (LOPRESSOR) tablet 25 mg  25 mg Oral TWICE DAILY Philip Hernandez, A.P.R.N.   Stopped at 01/12/19 0602   • lactated ringers infusion   Intravenous Continuous Philip Hernandez A.P.R.N. 75 mL/hr at 01/11/19 2324     • erythromycin ophthalmic ointment   Both Eyes PRN Dalton Santillan M.D.       • LORazepam (ATIVAN) tablet 0.5 mg  0.5 mg Oral Q4HRS PRN Gunnar Miller M.D.       • LORazepam (ATIVAN) tablet 1 mg  1 mg Oral Q4HRS PRDEEPALI Miller M.D.   1 mg at 01/11/19 2006    Or   • LORazepam (ATIVAN) injection 0.5 mg  0.5 mg Intravenous Q4HRS PRDEEPALI Miller M.D.       • LORazepam (ATIVAN) tablet 2 mg  2 mg Oral Q2HRS PRDEEPALI Miller M.D.   2 mg at 01/12/19 0602    Or   • LORazepam (ATIVAN) injection 1 mg  1 mg Intravenous Q2HRS PRDEEPALI Miller M.D.       • LORazepam (ATIVAN) tablet 3 mg  3 mg Oral Q HOUR PRDEEPALI Miller M.D.   3 mg at 01/11/19 0807    Or   • LORazepam (ATIVAN) injection 1.5 mg  1.5 mg Intravenous Q HOUR PRDEEPALI Miller M.D.   1.5 mg at 01/10/19 2108   • LORazepam (ATIVAN) tablet 4 mg  4 mg Oral Q15 MIN PRN Gunnar E Angela, M.D.        Or   • LORazepam (ATIVAN) injection 2 mg  2 mg Intravenous Q15 MIN PRN Gunnar Miller M.D.       •  thiamine tablet 100 mg  100 mg Oral DAILY Gunnar Miller M.D.   100 mg at 01/12/19 0602    And   • multivitamin (THERAGRAN) tablet 1 Tab  1 Tab Oral DAILY Gunnar Miller M.D.   1 Tab at 01/12/19 0602    And   • folic acid (FOLVITE) tablet 1 mg  1 mg Oral DAILY Gunnar Miller M.D.   1 mg at 01/12/19 0602   • senna-docusate (PERICOLACE or SENOKOT S) 8.6-50 MG per tablet 2 Tab  2 Tab Oral BID Gunnar Miller M.D.   2 Tab at 01/11/19 0512    And   • polyethylene glycol/lytes (MIRALAX) PACKET 1 Packet  1 Packet Oral QDAY PRN Gunnar Miller M.D.        And   • magnesium hydroxide (MILK OF MAGNESIA) suspension 30 mL  30 mL Oral QDAY PRN Gunnar Miller M.D.        And   • bisacodyl (DULCOLAX) suppository 10 mg  10 mg Rectal QDAY PRN Gunnar Miller M.D.       • enoxaparin (LOVENOX) inj 40 mg  40 mg Subcutaneous DAILY Gunnar Miller M.D.   40 mg at 01/12/19 0600   • hydrALAZINE (APRESOLINE) injection 10 mg  10 mg Intravenous Q4HRS PRN Gunnar Miller M.D.           Allergies  No Known Allergies    Vital Signs last 24 hours  Temp:  [36.4 °C (97.5 °F)-37.5 °C (99.5 °F)] 37.4 °C (99.4 °F)  Pulse:  [] 101  Resp:  [12-57] 17  BP: (128)/(84) 128/84    Physical Exam  Physical Exam   Constitutional: He is oriented to person, place, and time. He appears distressed.   Ill appearing   HENT:   Head: Normocephalic and atraumatic.   Right Ear: External ear normal.   Left Ear: External ear normal.   Mouth/Throat: No oropharyngeal exudate.   Eyes: Pupils are equal, round, and reactive to light. Conjunctivae and EOM are normal. Right eye exhibits no discharge. Left eye exhibits no discharge.   Neck: No JVD present. No tracheal deviation present.   Cardiovascular: Regular rhythm and normal heart sounds.    No murmur heard.  Sinus tach   Pulmonary/Chest: Effort normal and breath sounds normal. No stridor. No respiratory distress. He has no wheezes. He has no rales. He exhibits no tenderness.   Abdominal: He exhibits no mass. There is no tenderness.  There is no rebound and no guarding.   Musculoskeletal: He exhibits no edema, tenderness or deformity.   Neurological: He is alert and oriented to person, place, and time. He displays normal reflexes. No cranial nerve deficit. Coordination normal.   Skin: Skin is warm. No rash noted. He is diaphoretic. No erythema.   Psychiatric:   Agitation, confusion       Fluids    Intake/Output Summary (Last 24 hours) at 19 0634  Last data filed at 19 0600   Gross per 24 hour   Intake          2026.25 ml   Output             2200 ml   Net          -173.75 ml       Laboratory  Recent Results (from the past 48 hour(s))   POC BREATHALIZER    Collection Time: 01/10/19  8:20 AM   Result Value Ref Range    POC Breathalizer 0.166 (A) 0.00 - 0.01 Percent   POC BREATHALIZER    Collection Time: 01/10/19 10:36 AM   Result Value Ref Range    POC Breathalizer 0.12 (A) 0.00 - 0.01 Percent   EKG    Collection Time: 01/10/19  6:11 PM   Result Value Ref Range    Report       [facility]    Test Date:  2019-01-10  Pt Name:    CHANCE DIAZ                 Department: 61  MRN:        [id number]                      Room:       [facility]  Gender:     Male                         Technician: [facility]  :        1985                   Requested By:GREGORY BARROW  Order #:    [id number]                    Reading MD: Sebas Booker MD    Measurements  Intervals                                Axis  Rate:       128                          P:          0  DC:         95                           QRS:        78  QRSD:       76                           T:          48  QT:         324  QTc:        473    Interpretive Statements  SINUS TACHYCARDIA  BORDERLINE PROLONGED QT INTERVAL      Electronically Signed On 1- 22:31:55 PST by Sebas Booker MD     Basic Metabolic Panel (BMP)    Collection Time: 19 12:56 AM   Result Value Ref Range    Sodium 131 (L) 135 - 145 mmol/L    Potassium 3.6 3.6 - 5.5 mmol/L    Chloride 95 (L) 96 -  112 mmol/L    Co2 27 20 - 33 mmol/L    Glucose 101 (H) 65 - 99 mg/dL    Bun 6 (L) 8 - 22 mg/dL    Creatinine 0.74 0.50 - 1.40 mg/dL    Calcium 8.9 8.5 - 10.5 mg/dL    Anion Gap 9.0 0.0 - 11.9   CBC with Differential    Collection Time: 01/11/19 12:56 AM   Result Value Ref Range    WBC 6.9 4.8 - 10.8 K/uL    RBC 4.31 (L) 4.70 - 6.10 M/uL    Hemoglobin 13.5 (L) 14.0 - 18.0 g/dL    Hematocrit 38.6 (L) 42.0 - 52.0 %    MCV 89.6 81.4 - 97.8 fL    MCH 31.3 27.0 - 33.0 pg    MCHC 35.0 33.7 - 35.3 g/dL    RDW 46.0 35.9 - 50.0 fL    Platelet Count 60 (L) 164 - 446 K/uL    MPV 11.2 9.0 - 12.9 fL    Neutrophils-Polys 74.20 (H) 44.00 - 72.00 %    Lymphocytes 16.40 (L) 22.00 - 41.00 %    Monocytes 8.70 0.00 - 13.40 %    Eosinophils 0.10 0.00 - 6.90 %    Basophils 0.30 0.00 - 1.80 %    Immature Granulocytes 0.30 0.00 - 0.90 %    Nucleated RBC 0.00 /100 WBC    Neutrophils (Absolute) 5.10 1.82 - 7.42 K/uL    Lymphs (Absolute) 1.13 1.00 - 4.80 K/uL    Monos (Absolute) 0.60 0.00 - 0.85 K/uL    Eos (Absolute) 0.01 0.00 - 0.51 K/uL    Baso (Absolute) 0.02 0.00 - 0.12 K/uL    Immature Granulocytes (abs) 0.02 0.00 - 0.11 K/uL    NRBC (Absolute) 0.00 K/uL   Magnesium    Collection Time: 01/11/19 12:56 AM   Result Value Ref Range    Magnesium 1.7 1.5 - 2.5 mg/dL   ESTIMATED GFR    Collection Time: 01/11/19 12:56 AM   Result Value Ref Range    GFR If African American >60 >60 mL/min/1.73 m 2    GFR If Non African American >60 >60 mL/min/1.73 m 2   COMP METABOLIC PANEL    Collection Time: 01/12/19  3:30 AM   Result Value Ref Range    Sodium 131 (L) 135 - 145 mmol/L    Potassium 3.8 3.6 - 5.5 mmol/L    Chloride 99 96 - 112 mmol/L    Co2 24 20 - 33 mmol/L    Anion Gap 8.0 0.0 - 11.9    Glucose 95 65 - 99 mg/dL    Bun 8 8 - 22 mg/dL    Creatinine 0.68 0.50 - 1.40 mg/dL    Calcium 9.2 8.5 - 10.5 mg/dL    AST(SGOT) 121 (H) 12 - 45 U/L    ALT(SGPT) 125 (H) 2 - 50 U/L    Alkaline Phosphatase 95 30 - 99 U/L    Total Bilirubin 1.2 0.1 - 1.5 mg/dL     Albumin 4.0 3.2 - 4.9 g/dL    Total Protein 7.2 6.0 - 8.2 g/dL    Globulin 3.2 1.9 - 3.5 g/dL    A-G Ratio 1.3 g/dL   CBC WITHOUT DIFFERENTIAL    Collection Time: 01/12/19  3:30 AM   Result Value Ref Range    WBC 5.5 4.8 - 10.8 K/uL    RBC 4.59 (L) 4.70 - 6.10 M/uL    Hemoglobin 14.4 14.0 - 18.0 g/dL    Hematocrit 41.7 (L) 42.0 - 52.0 %    MCV 90.8 81.4 - 97.8 fL    MCH 31.4 27.0 - 33.0 pg    MCHC 34.5 33.7 - 35.3 g/dL    RDW 46.2 35.9 - 50.0 fL    Platelet Count 51 (L) 164 - 446 K/uL    MPV 12.4 9.0 - 12.9 fL   ESTIMATED GFR    Collection Time: 01/12/19  3:30 AM   Result Value Ref Range    GFR If African American >60 >60 mL/min/1.73 m 2    GFR If Non African American >60 >60 mL/min/1.73 m 2   PERIPHERAL SMEAR REVIEW    Collection Time: 01/12/19  3:30 AM   Result Value Ref Range    Peripheral Smear Review see below    IMMATURE PLT FRACTION    Collection Time: 01/12/19  3:30 AM   Result Value Ref Range    Imm. Plt Fraction 17.2 (H) 0.6 - 13.1 K/uL       Imaging  No orders to display   No imaging on this admission.  Reviewed normal chest xray from 11/2018  ekg 11/2018 reviewed and sinus tach with qtc 478    Assessment/Plan  Alcohol withdrawal (HCC)- (present on admission)   Assessment & Plan    Severe withdrawal  On ciwa with ativan  Agitation/tremors  Continue vitamins as appropriate for etoh withdrawal     Alcoholic hepatitis- (present on admission)   Assessment & Plan    Secondary significant drinking  Elevated inr  Reduced plt  Elevated liver enzymes, slowly improving up to 414 ast and 211 alt  Likely cause of elevated lipase, no abdominal pain  IVF resuscitated       Thrombocytopenia (HCC)- (present on admission)   Assessment & Plan    Secondary to etoh abuse  Monitor closely  Heparin dvt px     Hyponatremia- (present on admission)   Assessment & Plan    Secondary to etoh abuse  Adequate for now  IVF resuscitation         Discussed patient condition and risk of morbidity and/or mortality with Hospitalist, RN, RT,  Therapies, Pharmacy, , Charge nurse / hot rounds and Patient   .    The patient remains critically ill.  ETOH withdrawal requiring high amounts of ativan.  Elevated lipase and liver enzymes, given 2 L IVF bolus for volume resuscitation.  Needs close monitoring for agitation.  Critical care time = 32 minutes in directly providing and coordinating critical care and extensive data review.  No time overlap and excludes procedures.

## 2019-01-12 NOTE — PROGRESS NOTES
Late entry  At 0835:  2 RN skin check complete. No new findings. Scab on right shin. Blanching redness left ischium

## 2019-01-12 NOTE — PROGRESS NOTES
2 RN skin check complete:     -Generalized bruising to lower extremities  -Scab on right shin  -Deformities to bilateral hands and feet  -Conjunctivitis to left eye    Interventions in place.

## 2019-01-12 NOTE — CARE PLAN
Problem: Safety  Goal: Will remain free from injury  Outcome: PROGRESSING AS EXPECTED  Interventions in place, call light within reach, adequate lighting, non-slip footwear in place when ambulating, education completed about calling before getting out of bed.    Problem: Psychosocial Needs:  Goal: Level of anxiety will decrease  Outcome: PROGRESSING AS EXPECTED  Interventions in place, educating patient on plan of care and answering questions as needed.

## 2019-01-13 LAB
ALBUMIN SERPL BCP-MCNC: 3.8 G/DL (ref 3.2–4.9)
ALBUMIN/GLOB SERPL: 1.3 G/DL
ALP SERPL-CCNC: 93 U/L (ref 30–99)
ALT SERPL-CCNC: 106 U/L (ref 2–50)
ANION GAP SERPL CALC-SCNC: 7 MMOL/L (ref 0–11.9)
AST SERPL-CCNC: 95 U/L (ref 12–45)
BASOPHILS # BLD AUTO: 0.3 % (ref 0–1.8)
BASOPHILS # BLD: 0.02 K/UL (ref 0–0.12)
BILIRUB SERPL-MCNC: 0.7 MG/DL (ref 0.1–1.5)
BUN SERPL-MCNC: 9 MG/DL (ref 8–22)
CALCIUM SERPL-MCNC: 9.3 MG/DL (ref 8.5–10.5)
CHLORIDE SERPL-SCNC: 103 MMOL/L (ref 96–112)
CO2 SERPL-SCNC: 24 MMOL/L (ref 20–33)
CREAT SERPL-MCNC: 0.63 MG/DL (ref 0.5–1.4)
EOSINOPHIL # BLD AUTO: 0.1 K/UL (ref 0–0.51)
EOSINOPHIL NFR BLD: 1.5 % (ref 0–6.9)
ERYTHROCYTE [DISTWIDTH] IN BLOOD BY AUTOMATED COUNT: 47.8 FL (ref 35.9–50)
GLOBULIN SER CALC-MCNC: 3 G/DL (ref 1.9–3.5)
GLUCOSE SERPL-MCNC: 102 MG/DL (ref 65–99)
HCT VFR BLD AUTO: 40.7 % (ref 42–52)
HGB BLD-MCNC: 13.5 G/DL (ref 14–18)
IMM GRANULOCYTES # BLD AUTO: 0.01 K/UL (ref 0–0.11)
IMM GRANULOCYTES NFR BLD AUTO: 0.2 % (ref 0–0.9)
LIPASE SERPL-CCNC: 63 U/L (ref 11–82)
LYMPHOCYTES # BLD AUTO: 1.33 K/UL (ref 1–4.8)
LYMPHOCYTES NFR BLD: 20 % (ref 22–41)
MAGNESIUM SERPL-MCNC: 1.9 MG/DL (ref 1.5–2.5)
MCH RBC QN AUTO: 31 PG (ref 27–33)
MCHC RBC AUTO-ENTMCNC: 33.2 G/DL (ref 33.7–35.3)
MCV RBC AUTO: 93.6 FL (ref 81.4–97.8)
MONOCYTES # BLD AUTO: 0.8 K/UL (ref 0–0.85)
MONOCYTES NFR BLD AUTO: 12 % (ref 0–13.4)
NEUTROPHILS # BLD AUTO: 4.4 K/UL (ref 1.82–7.42)
NEUTROPHILS NFR BLD: 66 % (ref 44–72)
NRBC # BLD AUTO: 0 K/UL
NRBC BLD-RTO: 0 /100 WBC
PHOSPHATE SERPL-MCNC: 4.2 MG/DL (ref 2.5–4.5)
PLATELET # BLD AUTO: 56 K/UL (ref 164–446)
PMV BLD AUTO: 13.1 FL (ref 9–12.9)
POTASSIUM SERPL-SCNC: 4 MMOL/L (ref 3.6–5.5)
PROT SERPL-MCNC: 6.8 G/DL (ref 6–8.2)
RBC # BLD AUTO: 4.35 M/UL (ref 4.7–6.1)
SODIUM SERPL-SCNC: 134 MMOL/L (ref 135–145)
TROPONIN I SERPL-MCNC: <0.01 NG/ML (ref 0–0.04)
WBC # BLD AUTO: 6.7 K/UL (ref 4.8–10.8)

## 2019-01-13 PROCEDURE — 770001 HCHG ROOM/CARE - MED/SURG/GYN PRIV*

## 2019-01-13 PROCEDURE — 80053 COMPREHEN METABOLIC PANEL: CPT

## 2019-01-13 PROCEDURE — 84484 ASSAY OF TROPONIN QUANT: CPT

## 2019-01-13 PROCEDURE — 700105 HCHG RX REV CODE 258: Performed by: INTERNAL MEDICINE

## 2019-01-13 PROCEDURE — A9270 NON-COVERED ITEM OR SERVICE: HCPCS | Performed by: INTERNAL MEDICINE

## 2019-01-13 PROCEDURE — A9270 NON-COVERED ITEM OR SERVICE: HCPCS | Performed by: HOSPITALIST

## 2019-01-13 PROCEDURE — 700102 HCHG RX REV CODE 250 W/ 637 OVERRIDE(OP): Performed by: INTERNAL MEDICINE

## 2019-01-13 PROCEDURE — 700102 HCHG RX REV CODE 250 W/ 637 OVERRIDE(OP): Performed by: HOSPITALIST

## 2019-01-13 PROCEDURE — 99232 SBSQ HOSP IP/OBS MODERATE 35: CPT | Performed by: HOSPITALIST

## 2019-01-13 PROCEDURE — A9270 NON-COVERED ITEM OR SERVICE: HCPCS | Performed by: NURSE PRACTITIONER

## 2019-01-13 PROCEDURE — 700102 HCHG RX REV CODE 250 W/ 637 OVERRIDE(OP): Performed by: NURSE PRACTITIONER

## 2019-01-13 PROCEDURE — 84100 ASSAY OF PHOSPHORUS: CPT

## 2019-01-13 PROCEDURE — 83735 ASSAY OF MAGNESIUM: CPT

## 2019-01-13 PROCEDURE — 99233 SBSQ HOSP IP/OBS HIGH 50: CPT | Performed by: INTERNAL MEDICINE

## 2019-01-13 PROCEDURE — 83690 ASSAY OF LIPASE: CPT

## 2019-01-13 PROCEDURE — 85025 COMPLETE CBC W/AUTO DIFF WBC: CPT

## 2019-01-13 RX ORDER — CHLORDIAZEPOXIDE HYDROCHLORIDE 25 MG/1
25 CAPSULE, GELATIN COATED ORAL EVERY 6 HOURS
Status: DISCONTINUED | OUTPATIENT
Start: 2019-01-15 | End: 2019-01-14 | Stop reason: HOSPADM

## 2019-01-13 RX ORDER — CHLORDIAZEPOXIDE HYDROCHLORIDE 25 MG/1
50 CAPSULE, GELATIN COATED ORAL EVERY 6 HOURS
Status: DISCONTINUED | OUTPATIENT
Start: 2019-01-13 | End: 2019-01-14 | Stop reason: HOSPADM

## 2019-01-13 RX ORDER — CHLORDIAZEPOXIDE HYDROCHLORIDE 25 MG/1
25 CAPSULE, GELATIN COATED ORAL EVERY 6 HOURS
Status: DISCONTINUED | OUTPATIENT
Start: 2019-01-13 | End: 2019-01-13

## 2019-01-13 RX ADMIN — LORAZEPAM 1 MG: 1 TABLET ORAL at 20:49

## 2019-01-13 RX ADMIN — THERA TABS 1 TABLET: TAB at 04:50

## 2019-01-13 RX ADMIN — CLONIDINE HYDROCHLORIDE 0.1 MG: 0.1 TABLET ORAL at 04:51

## 2019-01-13 RX ADMIN — SODIUM CHLORIDE, POTASSIUM CHLORIDE, SODIUM LACTATE AND CALCIUM CHLORIDE: 600; 310; 30; 20 INJECTION, SOLUTION INTRAVENOUS at 01:20

## 2019-01-13 RX ADMIN — FOLIC ACID 1 MG: 1 TABLET ORAL at 04:50

## 2019-01-13 RX ADMIN — CHLORDIAZEPOXIDE HYDROCHLORIDE 50 MG: 25 CAPSULE ORAL at 23:05

## 2019-01-13 RX ADMIN — Medication 100 MG: at 04:50

## 2019-01-13 RX ADMIN — CHLORDIAZEPOXIDE HYDROCHLORIDE 50 MG: 25 CAPSULE ORAL at 17:37

## 2019-01-13 RX ADMIN — ERYTHROMYCIN: 5 OINTMENT OPHTHALMIC at 17:38

## 2019-01-13 RX ADMIN — LORAZEPAM 3 MG: 1 TABLET ORAL at 03:30

## 2019-01-13 RX ADMIN — LORAZEPAM 2 MG: 1 TABLET ORAL at 00:13

## 2019-01-13 RX ADMIN — CHLORDIAZEPOXIDE HYDROCHLORIDE 50 MG: 25 CAPSULE ORAL at 11:31

## 2019-01-13 RX ADMIN — ERYTHROMYCIN: 5 OINTMENT OPHTHALMIC at 11:33

## 2019-01-13 RX ADMIN — CHLORDIAZEPOXIDE HYDROCHLORIDE 25 MG: 25 CAPSULE ORAL at 04:50

## 2019-01-13 RX ADMIN — CLONIDINE HYDROCHLORIDE 0.1 MG: 0.1 TABLET ORAL at 17:37

## 2019-01-13 RX ADMIN — METOPROLOL TARTRATE 25 MG: 25 TABLET, FILM COATED ORAL at 04:50

## 2019-01-13 ASSESSMENT — LIFESTYLE VARIABLES
TREMOR: *
SUBSTANCE_ABUSE: 1
VISUAL DISTURBANCES: NOT PRESENT
TREMOR: *
HEADACHE, FULLNESS IN HEAD: MILD
EVER FELT BAD OR GUILTY ABOUT YOUR DRINKING: YES
AGITATION: NORMAL ACTIVITY
VISUAL DISTURBANCES: NOT PRESENT
TOTAL SCORE: VERY MILD ITCHING, PINS AND NEEDLES SENSATION, BURNING OR NUMBNESS
HEADACHE, FULLNESS IN HEAD: MILD
AGITATION: NORMAL ACTIVITY
DOES PATIENT WANT TO TALK TO SOMEONE ABOUT QUITTING: YES
AGITATION: NORMAL ACTIVITY
ANXIETY: MILDLY ANXIOUS
HEADACHE, FULLNESS IN HEAD: MODERATELY SEVERE
TOTAL SCORE: 9
TOTAL SCORE: 14
HEADACHE, FULLNESS IN HEAD: VERY MILD
TREMOR: *
TOTAL SCORE: 5
ORIENTATION AND CLOUDING OF SENSORIUM: ORIENTED AND CAN DO SERIAL ADDITIONS
HAVE PEOPLE ANNOYED YOU BY CRITICIZING YOUR DRINKING: YES
ORIENTATION AND CLOUDING OF SENSORIUM: ORIENTED AND CAN DO SERIAL ADDITIONS
TREMOR: MODERATE TREMOR WITH ARMS EXTENDED
ORIENTATION AND CLOUDING OF SENSORIUM: ORIENTED AND CAN DO SERIAL ADDITIONS
TOTAL SCORE: 4
ORIENTATION AND CLOUDING OF SENSORIUM: ORIENTED AND CAN DO SERIAL ADDITIONS
TOTAL SCORE: 4
NAUSEA AND VOMITING: NO NAUSEA AND NO VOMITING
HOW MANY TIMES IN THE PAST YEAR HAVE YOU HAD 5 OR MORE DRINKS IN A DAY: 300
ORIENTATION AND CLOUDING OF SENSORIUM: ORIENTED AND CAN DO SERIAL ADDITIONS
NAUSEA AND VOMITING: NO NAUSEA AND NO VOMITING
VISUAL DISTURBANCES: NOT PRESENT
ANXIETY: *
HEADACHE, FULLNESS IN HEAD: VERY MILD
VISUAL DISTURBANCES: NOT PRESENT
ANXIETY: *
TOTAL SCORE: 4
TREMOR: TREMOR NOT VISIBLE BUT CAN BE FELT, FINGERTIP TO FINGERTIP
ON A TYPICAL DAY WHEN YOU DRINK ALCOHOL HOW MANY DRINKS DO YOU HAVE: 8
ORIENTATION AND CLOUDING OF SENSORIUM: ORIENTED AND CAN DO SERIAL ADDITIONS
ANXIETY: MILDLY ANXIOUS
AGITATION: NORMAL ACTIVITY
PAROXYSMAL SWEATS: NO SWEAT VISIBLE
HEADACHE, FULLNESS IN HEAD: NOT PRESENT
TREMOR: TREMOR NOT VISIBLE BUT CAN BE FELT, FINGERTIP TO FINGERTIP
VISUAL DISTURBANCES: NOT PRESENT
AUDITORY DISTURBANCES: NOT PRESENT
TOTAL SCORE: 4
AUDITORY DISTURBANCES: NOT PRESENT
TOTAL SCORE: 5
TOTAL SCORE: 22
CONSUMPTION TOTAL: POSITIVE
TOTAL SCORE: VERY MILD ITCHING, PINS AND NEEDLES SENSATION, BURNING OR NUMBNESS
PAROXYSMAL SWEATS: NO SWEAT VISIBLE
HAVE YOU EVER FELT YOU SHOULD CUT DOWN ON YOUR DRINKING: YES
TOTAL SCORE: 2
AUDITORY DISTURBANCES: NOT PRESENT
AUDITORY DISTURBANCES: NOT PRESENT
AVERAGE NUMBER OF DAYS PER WEEK YOU HAVE A DRINK CONTAINING ALCOHOL: 7
NAUSEA AND VOMITING: MILD NAUSEA WITH NO VOMITING
PAROXYSMAL SWEATS: NO SWEAT VISIBLE
DOES PATIENT WANT TO STOP DRINKING: YES
TOTAL SCORE: VERY MILD ITCHING, PINS AND NEEDLES SENSATION, BURNING OR NUMBNESS
PAROXYSMAL SWEATS: NO SWEAT VISIBLE
VISUAL DISTURBANCES: NOT PRESENT
AUDITORY DISTURBANCES: NOT PRESENT
TOTAL SCORE: VERY MILD ITCHING, PINS AND NEEDLES SENSATION, BURNING OR NUMBNESS
ORIENTATION AND CLOUDING OF SENSORIUM: ORIENTED AND CAN DO SERIAL ADDITIONS
NAUSEA AND VOMITING: NO NAUSEA AND NO VOMITING
AGITATION: NORMAL ACTIVITY
AGITATION: NORMAL ACTIVITY
VISUAL DISTURBANCES: NOT PRESENT
AUDITORY DISTURBANCES: NOT PRESENT
NAUSEA AND VOMITING: NO NAUSEA AND NO VOMITING
EVER HAD A DRINK FIRST THING IN THE MORNING TO STEADY YOUR NERVES TO GET RID OF A HANGOVER: YES
PAROXYSMAL SWEATS: NO SWEAT VISIBLE
PAROXYSMAL SWEATS: NO SWEAT VISIBLE
ANXIETY: NO ANXIETY (AT EASE)
TOTAL SCORE: VERY MILD ITCHING, PINS AND NEEDLES SENSATION, BURNING OR NUMBNESS
PAROXYSMAL SWEATS: NO SWEAT VISIBLE
TREMOR: *
AUDITORY DISTURBANCES: NOT PRESENT
DO YOU DRINK ALCOHOL: YES
NAUSEA AND VOMITING: MILD NAUSEA WITH NO VOMITING
HEADACHE, FULLNESS IN HEAD: MODERATELY SEVERE
ANXIETY: *
ANXIETY: MILDLY ANXIOUS
AGITATION: MODERATELY FIDGETY AND RESTLESS
NAUSEA AND VOMITING: NO NAUSEA AND NO VOMITING

## 2019-01-13 ASSESSMENT — ENCOUNTER SYMPTOMS
SPEECH CHANGE: 0
FEVER: 0
DOUBLE VISION: 0
BACK PAIN: 0
WEAKNESS: 1
NAUSEA: 0
ABDOMINAL PAIN: 0
AGITATION: 0
SHORTNESS OF BREATH: 0
FATIGUE: 0
CONSTIPATION: 0
BLURRED VISION: 0
CHEST TIGHTNESS: 0
SPEECH DIFFICULTY: 0
WHEEZING: 0
PALPITATIONS: 0
TROUBLE SWALLOWING: 0
VOMITING: 0
CONFUSION: 0
TINGLING: 0
ABDOMINAL DISTENTION: 0
HEADACHES: 0
TREMORS: 1
BRUISES/BLEEDS EASILY: 0
DIZZINESS: 0
CHILLS: 0
ADENOPATHY: 0
COLOR CHANGE: 0
DIARRHEA: 0
SENSORY CHANGE: 0
SEIZURES: 0
MYALGIAS: 0
PHOTOPHOBIA: 0
FOCAL WEAKNESS: 0
WEAKNESS: 0
ARTHRALGIAS: 0
COUGH: 0
DIAPHORESIS: 0

## 2019-01-13 ASSESSMENT — PAIN SCALES - GENERAL
PAINLEVEL_OUTOF10: 3
PAINLEVEL_OUTOF10: 5
PAINLEVEL_OUTOF10: 0
PAINLEVEL_OUTOF10: 5
PAINLEVEL_OUTOF10: 0
PAINLEVEL_OUTOF10: 5
PAINLEVEL_OUTOF10: 0
PAINLEVEL_OUTOF10: 0
PAINLEVEL_OUTOF10: 5

## 2019-01-13 ASSESSMENT — PATIENT HEALTH QUESTIONNAIRE - PHQ9
SUM OF ALL RESPONSES TO PHQ9 QUESTIONS 1 AND 2: 1
4. FEELING TIRED OR HAVING LITTLE ENERGY: MORE THAN HALF THE DAYS
1. LITTLE INTEREST OR PLEASURE IN DOING THINGS: NOT AT ALL
6. FEELING BAD ABOUT YOURSELF - OR THAT YOU ARE A FAILURE OR HAVE LET YOURSELF OR YOUR FAMILY DOWN: NOT AL ALL
SUM OF ALL RESPONSES TO PHQ QUESTIONS 1-9: 6
5. POOR APPETITE OR OVEREATING: NOT AT ALL
2. FEELING DOWN, DEPRESSED, IRRITABLE, OR HOPELESS: SEVERAL DAYS
7. TROUBLE CONCENTRATING ON THINGS, SUCH AS READING THE NEWSPAPER OR WATCHING TELEVISION: SEVERAL DAYS
3. TROUBLE FALLING OR STAYING ASLEEP OR SLEEPING TOO MUCH: MORE THAN HALF THE DAYS
8. MOVING OR SPEAKING SO SLOWLY THAT OTHER PEOPLE COULD HAVE NOTICED. OR THE OPPOSITE, BEING SO FIGETY OR RESTLESS THAT YOU HAVE BEEN MOVING AROUND A LOT MORE THAN USUAL: NOT AT ALL
9. THOUGHTS THAT YOU WOULD BE BETTER OFF DEAD, OR OF HURTING YOURSELF: NOT AT ALL

## 2019-01-13 NOTE — CONSULTS
Critical Care Consultation    Date of consult: 1/12/2019    Referring Physician  Itzel Tavares M.D.    Reason for Consultation  etoh withdrawal    History of Presenting Illness  33 y.o. male who presented 1/9/2019 with alcohol withdrawal.  On admission he did say that he drinks heavily.  He is complaining of hallucinations and seeing people that were not there.  He also complained of tremors, anxiety, and palpitations.  He had no signs of seizures at that time.  He transferred from another ICU overnight.  He is currently on Ativan protocol.    Interval history  ciwa 4-9  Sinus tach   Slight st elvation on monitor  ekg pending  Tele strip ok, j point  Chest xray from November    Code Status  Full Code    Review of Systems  Review of Systems   Constitutional: Negative for chills, diaphoresis, fatigue and fever.   HENT: Negative for congestion and trouble swallowing.    Eyes: Negative for photophobia and visual disturbance.   Respiratory: Negative for cough, chest tightness, shortness of breath and wheezing.    Cardiovascular: Negative for chest pain, palpitations and leg swelling.   Gastrointestinal: Negative for abdominal distention, abdominal pain and nausea.   Endocrine: Negative for cold intolerance and heat intolerance.   Genitourinary: Negative for difficulty urinating and dysuria.   Musculoskeletal: Negative for arthralgias and back pain.   Skin: Negative for color change and rash.   Neurological: Positive for tremors. Negative for dizziness, seizures, speech difficulty, weakness and headaches.   Hematological: Negative for adenopathy. Does not bruise/bleed easily.   Psychiatric/Behavioral: Negative for agitation and confusion.       Past Medical History   has a past medical history of Acute anxiety; ADHD (attention deficit hyperactivity disorder); ADHD (attention deficit hyperactivity disorder); Alcohol abuse; Bipolar affective (HCC); Depression; Ectrodactyly-ectodermal dysplasia-clefting syndrome;  ETOH abuse; and Psychiatric disorder.    Surgical History   has a past surgical history that includes other orthopedic surgery.    Family History  No significant cardiac or pulmonary disease    Social History   reports that he has quit smoking. His smoking use included Cigarettes. He smoked 0.25 packs per day. He has never used smokeless tobacco. He reports that he drinks alcohol. He reports that he does not use drugs.    Medications  Home Medications     Reviewed by Yuki High ([facility] [employer]) on 01/10/19 at 1329  Med List Status: Complete   Medication Last Dose Status        Patient Torey Taking any Medications                     Current Facility-Administered Medications   Medication Dose Route Frequency Provider Last Rate Last Dose   • chlordiazePOXIDE (LIBRIUM) capsule 25 mg  25 mg Oral Q6HRS Dalton Santillan M.D.   25 mg at 01/13/19 0450   • cloNIDine (CATAPRES) tablet 0.1 mg  0.1 mg Oral TWICE DAILY Jaden Colon M.D.   0.1 mg at 01/13/19 0451   • valproic acid (DEPAKENE) capsule 250 mg  250 mg Oral Q8HRS Jaden Colon M.D.       • metoprolol (LOPRESSOR) tablet 25 mg  25 mg Oral TWICE DAILY KAREN AlexR.N.   25 mg at 01/13/19 0450   • lactated ringers infusion   Intravenous Continuous Landon Lowe M.D. 125 mL/hr at 01/13/19 0120     • erythromycin ophthalmic ointment   Both Eyes PRN Dalton Santillan M.D.       • LORazepam (ATIVAN) tablet 0.5 mg  0.5 mg Oral Q4HRS PRN Gunnar Miller M.D.       • LORazepam (ATIVAN) tablet 1 mg  1 mg Oral Q4HRS PRDEEPALI Miller M.D.   1 mg at 01/12/19 1515    Or   • LORazepam (ATIVAN) injection 0.5 mg  0.5 mg Intravenous Q4HRS PRDEEPALI Miller M.D.       • LORazepam (ATIVAN) tablet 2 mg  2 mg Oral Q2HRS PRN Gunnar Miller M.D.   2 mg at 01/13/19 0013    Or   • LORazepam (ATIVAN) injection 1 mg  1 mg Intravenous Q2HRS PRN Gunnar Miller M.D.       • LORazepam (ATIVAN) tablet 3 mg  3 mg Oral Q HOUR PRN Gunnar Miller M.D.   3 mg at 01/13/19  0330    Or   • LORazepam (ATIVAN) injection 1.5 mg  1.5 mg Intravenous Q HOUR PRN Gunnar Miller M.D.   1.5 mg at 01/10/19 2108   • LORazepam (ATIVAN) tablet 4 mg  4 mg Oral Q15 MIN PRN Gunnar Miller M.D.        Or   • LORazepam (ATIVAN) injection 2 mg  2 mg Intravenous Q15 MIN PRN Gunnar Miller M.D.       • thiamine tablet 100 mg  100 mg Oral DAILY Gunnar Miller M.D.   100 mg at 01/13/19 0450    And   • multivitamin (THERAGRAN) tablet 1 Tab  1 Tab Oral DAILY Gunnar Miller M.D.   1 Tab at 01/13/19 0450    And   • folic acid (FOLVITE) tablet 1 mg  1 mg Oral DAILY Gunnar Miller M.D.   1 mg at 01/13/19 0450   • senna-docusate (PERICOLACE or SENOKOT S) 8.6-50 MG per tablet 2 Tab  2 Tab Oral BID Gunnar Miller M.D.   Stopped at 01/13/19 0600    And   • polyethylene glycol/lytes (MIRALAX) PACKET 1 Packet  1 Packet Oral QDAY PRN Gunnar Miller M.D.        And   • magnesium hydroxide (MILK OF MAGNESIA) suspension 30 mL  30 mL Oral QDAY PRN Gunnar Miller M.D.        And   • bisacodyl (DULCOLAX) suppository 10 mg  10 mg Rectal QDAY PRN Gunnar Miller M.D.       • enoxaparin (LOVENOX) inj 40 mg  40 mg Subcutaneous DAILY Gunnar Miller M.D.   40 mg at 01/12/19 0600   • hydrALAZINE (APRESOLINE) injection 10 mg  10 mg Intravenous Q4HRS PRN Gunnar Miller M.D.           Allergies  No Known Allergies    Vital Signs last 24 hours  Temp:  [36.7 °C (98 °F)-37.3 °C (99.1 °F)] 37.1 °C (98.8 °F)  Pulse:  [] 73  Resp:  [15-55] 21    Physical Exam  Physical Exam   Constitutional: He is oriented to person, place, and time. No distress.   Resting comforbatly   HENT:   Head: Normocephalic and atraumatic.   Right Ear: External ear normal.   Left Ear: External ear normal.   Mouth/Throat: No oropharyngeal exudate.   Eyes: Pupils are equal, round, and reactive to light. Conjunctivae and EOM are normal. Right eye exhibits no discharge. Left eye exhibits no discharge.   Neck: No JVD present. No tracheal deviation present.   Cardiovascular: Normal  rate, regular rhythm and normal heart sounds.    No murmur heard.  Pulmonary/Chest: Effort normal and breath sounds normal. No stridor. No respiratory distress. He has no wheezes. He has no rales. He exhibits no tenderness.   Abdominal: He exhibits no mass. There is no tenderness. There is no rebound and no guarding.   Musculoskeletal: He exhibits no edema, tenderness or deformity.   + tremors   Neurological: He is alert and oriented to person, place, and time. He displays normal reflexes. No cranial nerve deficit. Coordination normal.   Skin: Skin is warm. No rash noted. He is not diaphoretic. No erythema.   Psychiatric:   Agitation, confusion   Nursing note and vitals reviewed.      Fluids    Intake/Output Summary (Last 24 hours) at 01/13/19 0713  Last data filed at 01/13/19 0200   Gross per 24 hour   Intake             4460 ml   Output              925 ml   Net             3535 ml       Laboratory  Recent Results (from the past 48 hour(s))   COMP METABOLIC PANEL    Collection Time: 01/12/19  3:30 AM   Result Value Ref Range    Sodium 131 (L) 135 - 145 mmol/L    Potassium 3.8 3.6 - 5.5 mmol/L    Chloride 99 96 - 112 mmol/L    Co2 24 20 - 33 mmol/L    Anion Gap 8.0 0.0 - 11.9    Glucose 95 65 - 99 mg/dL    Bun 8 8 - 22 mg/dL    Creatinine 0.68 0.50 - 1.40 mg/dL    Calcium 9.2 8.5 - 10.5 mg/dL    AST(SGOT) 121 (H) 12 - 45 U/L    ALT(SGPT) 125 (H) 2 - 50 U/L    Alkaline Phosphatase 95 30 - 99 U/L    Total Bilirubin 1.2 0.1 - 1.5 mg/dL    Albumin 4.0 3.2 - 4.9 g/dL    Total Protein 7.2 6.0 - 8.2 g/dL    Globulin 3.2 1.9 - 3.5 g/dL    A-G Ratio 1.3 g/dL   CBC WITHOUT DIFFERENTIAL    Collection Time: 01/12/19  3:30 AM   Result Value Ref Range    WBC 5.5 4.8 - 10.8 K/uL    RBC 4.59 (L) 4.70 - 6.10 M/uL    Hemoglobin 14.4 14.0 - 18.0 g/dL    Hematocrit 41.7 (L) 42.0 - 52.0 %    MCV 90.8 81.4 - 97.8 fL    MCH 31.4 27.0 - 33.0 pg    MCHC 34.5 33.7 - 35.3 g/dL    RDW 46.2 35.9 - 50.0 fL    Platelet Count 51 (L) 164 - 446  K/uL    MPV 12.4 9.0 - 12.9 fL   ESTIMATED GFR    Collection Time: 19  3:30 AM   Result Value Ref Range    GFR If African American >60 >60 mL/min/1.73 m 2    GFR If Non African American >60 >60 mL/min/1.73 m 2   PERIPHERAL SMEAR REVIEW    Collection Time: 19  3:30 AM   Result Value Ref Range    Peripheral Smear Review see below    IMMATURE PLT FRACTION    Collection Time: 19  3:30 AM   Result Value Ref Range    Imm. Plt Fraction 17.2 (H) 0.6 - 13.1 K/uL   EKG    Collection Time: 19  5:02 PM   Result Value Ref Range    Report       [facility]    Test Date:  2019  Pt Name:    CHANCE DIAZ                 Department: [facility]  MRN:        [id number]                      Room:       [facility]  Gender:     Male                         Technician: JOSE  :        1985                   Requested By:LIZZ FLEMING  Order #:    [id number]                    Reading MD: Stu Brantley MD    Measurements  Intervals                                Axis  Rate:       102                          P:          50  MA:         140                          QRS:        62  QRSD:       76                           T:          55  QT:         344  QTc:        449    Interpretive Statements  SINUS TACHYCARDIA  ST ELEV, PROBABLE NORMAL EARLY REPOL PATTERN  Compared to ECG 01/10/2019 18:11:19  NO SIGNIFICANT CHANGE  Electronically Signed On 2019 17:33:58 PST by Stu Brantley MD     CBC WITH DIFFERENTIAL    Collection Time: 19  3:25 AM   Result Value Ref Range    WBC 6.7 4.8 - 10.8 K/uL    RBC 4.35 (L) 4.70 - 6.10 M/uL    Hemoglobin 13.5 (L) 14.0 - 18.0 g/dL    Hematocrit 40.7 (L) 42.0 - 52.0 %    MCV 93.6 81.4 - 97.8 fL    MCH 31.0 27.0 - 33.0 pg    MCHC 33.2 (L) 33.7 - 35.3 g/dL    RDW 47.8 35.9 - 50.0 fL    Platelet Count 56 (L) 164 - 446 K/uL    MPV 13.1 (H) 9.0 - 12.9 fL    Neutrophils-Polys 66.00 44.00 - 72.00 %    Lymphocytes 20.00 (L) 22.00 - 41.00 %    Monocytes 12.00 0.00 - 13.40 %     Eosinophils 1.50 0.00 - 6.90 %    Basophils 0.30 0.00 - 1.80 %    Immature Granulocytes 0.20 0.00 - 0.90 %    Nucleated RBC 0.00 /100 WBC    Neutrophils (Absolute) 4.40 1.82 - 7.42 K/uL    Lymphs (Absolute) 1.33 1.00 - 4.80 K/uL    Monos (Absolute) 0.80 0.00 - 0.85 K/uL    Eos (Absolute) 0.10 0.00 - 0.51 K/uL    Baso (Absolute) 0.02 0.00 - 0.12 K/uL    Immature Granulocytes (abs) 0.01 0.00 - 0.11 K/uL    NRBC (Absolute) 0.00 K/uL   COMP METABOLIC PANEL    Collection Time: 01/13/19  3:25 AM   Result Value Ref Range    Sodium 134 (L) 135 - 145 mmol/L    Potassium 4.0 3.6 - 5.5 mmol/L    Chloride 103 96 - 112 mmol/L    Co2 24 20 - 33 mmol/L    Anion Gap 7.0 0.0 - 11.9    Glucose 102 (H) 65 - 99 mg/dL    Bun 9 8 - 22 mg/dL    Creatinine 0.63 0.50 - 1.40 mg/dL    Calcium 9.3 8.5 - 10.5 mg/dL    AST(SGOT) 95 (H) 12 - 45 U/L    ALT(SGPT) 106 (H) 2 - 50 U/L    Alkaline Phosphatase 93 30 - 99 U/L    Total Bilirubin 0.7 0.1 - 1.5 mg/dL    Albumin 3.8 3.2 - 4.9 g/dL    Total Protein 6.8 6.0 - 8.2 g/dL    Globulin 3.0 1.9 - 3.5 g/dL    A-G Ratio 1.3 g/dL   MAGNESIUM    Collection Time: 01/13/19  3:25 AM   Result Value Ref Range    Magnesium 1.9 1.5 - 2.5 mg/dL   PHOSPHORUS    Collection Time: 01/13/19  3:25 AM   Result Value Ref Range    Phosphorus 4.2 2.5 - 4.5 mg/dL   TROPONIN    Collection Time: 01/13/19  3:25 AM   Result Value Ref Range    Troponin I <0.01 0.00 - 0.04 ng/mL   LIPASE    Collection Time: 01/13/19  3:25 AM   Result Value Ref Range    Lipase 63 11 - 82 U/L   ESTIMATED GFR    Collection Time: 01/13/19  3:25 AM   Result Value Ref Range    GFR If African American >60 >60 mL/min/1.73 m 2    GFR If Non African American >60 >60 mL/min/1.73 m 2       Imaging  No orders to display   No imaging on this admission.  Reviewed normal chest xray from 11/2018  ekg 11/2018 reviewed and sinus tach with qtc 478    Assessment/Plan  Alcohol withdrawal (HCC)  Tremulous and tachycardic, endorses hallucinations, this is severe  alcohol withdrawal  He is requesting to detox, indicates that he wishes to stop drinking alcohol  Requiring frequent ativan.  Did require Precedex drip on last admission.  Persistent tachycardia   Continue CIWA  Low threshold to escalate to phenobarbital possibly  Improved currently  Continue with Librium    Alcoholic hepatitis  Supportive care  Trend CMP.    Thrombocytopenia (HCC)  Due to alcohol  No signs of bleeding    Hyponatremia  Good p.o. intake  Follow CMP.    Alcohol withdrawal (HCC)  Severe withdrawal  On ciwa with ativan  Agitation/tremors  Continue vitamins as appropriate for etoh withdrawal  Increased librium       Alcoholic hepatitis  Secondary significant drinking  Elevated inr  Reduced plt  IVF resuscitation 1/12    Hyponatremia  Secondary to etoh abuse  Adequate for now  IVF resuscitation  Also related to poorpo intake    Thrombocytopenia (HCC)  Secondary to etoh abuse  Monitor closely  Heparin dvt px  Likely acute on chronic    Conjunctivitis  Ophthalmic antibiotics    Tachycardia  Subtle EKG changes  Likely J-point elevation, follow    LFT elevation  Likely from alcohol abuse    Serum lipase elevation  Likely from nausea vomiting  Follow-up in the morning    LFT elevation  Due to etoh hepatitis      Serum lipase elevation  Likely due to etoh  No abdominal pain  Received IVF fluids    Tachycardia  Due to etoh withdrawla  Improved with fluids      Discussed patient condition and risk of morbidity and/or mortality with Hospitalist, RN, RT, Therapies, Pharmacy, , Code status disscussed, Charge nurse / hot rounds and Patient   .

## 2019-01-13 NOTE — CARE PLAN
Problem: Safety  Goal: Will remain free from injury  Outcome: PROGRESSING AS EXPECTED  Bed alarm set, patient uses call light appropriately    Problem: Bowel/Gastric:  Goal: Normal bowel function is maintained or improved  Outcome: PROGRESSING AS EXPECTED      Problem: Psychosocial Needs:  Goal: Level of anxiety will decrease  Outcome: PROGRESSING AS EXPECTED  Anxiety decreases with ativan    Problem: Pain Management  Goal: Pain level will decrease to patient's comfort goal  Outcome: PROGRESSING AS EXPECTED  Headache decreases with ativan

## 2019-01-13 NOTE — PROGRESS NOTES
Hospital Medicine Daily Progress Note    Date of Service  1/13/2019    Chief Complaint  33 y.o. Male with a h/o ETOH abuse, bipolar disorder admitted 1/9/2019 with alcohol withdrawals.    Hospital Course    Recently discharged after being treated for alcohol withdrawals, requiring ICU and Precedex drip.  On this admission with severe tremors, tachycardia, and hallucinations.  Requiring frequent ativan.      Interval Problem Update  Patient seen and examined today. ICU Care  Care and plan discussed in IDT/Hot rounds.  Lines and assistive devices reviewed.    Patient tolerating treatment and therapies.  All Data, Medication data reviewed.  Case discussed with nursing as available.  Plan of Care reviewed with patient and notified of changes.  1/12 the patient transferred to ICU with seeing agitation, on CIWA protocol, level 4 -9, some ocular drainage noted, the patient states having a.m. headache, subtle EKG abnormalities, no chest pain, alert and oriented  1/13 patient feels better, less hallucinations, still nervous, remains with sinus tachycardia, okay to transfer  Consultants/Specialty  Intensivist    Code Status  FULL    Disposition  Stable for transfer out of ICU    Review of Systems  Review of Systems   Constitutional: Positive for malaise/fatigue. Negative for chills and fever.   HENT: Negative for congestion.    Eyes: Negative for blurred vision and double vision.   Respiratory: Negative for cough and shortness of breath.    Cardiovascular: Negative for chest pain, palpitations and leg swelling.   Gastrointestinal: Negative for abdominal pain, constipation, diarrhea, nausea and vomiting.   Genitourinary: Negative for dysuria.   Musculoskeletal: Negative for myalgias.   Skin: Negative for itching and rash.   Neurological: Positive for tremors and weakness. Negative for dizziness, tingling, sensory change, speech change, focal weakness, seizures and headaches.   Psychiatric/Behavioral: Positive for substance  abuse.        Physical Exam  Temp:  [36.7 °C (98 °F)-37.3 °C (99.1 °F)] 37.1 °C (98.8 °F)  Pulse:  [] 73  Resp:  [15-55] 21    Physical Exam   Constitutional: He is oriented to person, place, and time. He appears well-developed. No distress.   HENT:   Head: Normocephalic and atraumatic.   Right Ear: External ear normal.   Mouth/Throat: No oropharyngeal exudate.   Eyes: Conjunctivae are normal. Right eye exhibits no discharge. Left eye exhibits no discharge.   Neck: Normal range of motion. No tracheal deviation present.   Cardiovascular: Normal heart sounds.  Tachycardia present.    No murmur heard.  Pulmonary/Chest: Effort normal and breath sounds normal. No stridor. No respiratory distress. He has no wheezes. He has no rales.   Abdominal: Soft. Bowel sounds are normal. He exhibits no distension. There is no tenderness. There is no rebound.   Musculoskeletal: Normal range of motion. He exhibits no edema.   Neurological: He is alert and oriented to person, place, and time.   Skin: Skin is warm and dry. No rash noted. No erythema.   Psychiatric: His mood appears anxious.   Nursing note and vitals reviewed.      Fluids    Intake/Output Summary (Last 24 hours) at 01/13/19 0741  Last data filed at 01/13/19 0200   Gross per 24 hour   Intake             4460 ml   Output              925 ml   Net             3535 ml       Laboratory  Recent Labs      01/11/19   0056  01/12/19   0330  01/13/19   0325   WBC  6.9  5.5  6.7   RBC  4.31*  4.59*  4.35*   HEMOGLOBIN  13.5*  14.4  13.5*   HEMATOCRIT  38.6*  41.7*  40.7*   MCV  89.6  90.8  93.6   MCH  31.3  31.4  31.0   MCHC  35.0  34.5  33.2*   RDW  46.0  46.2  47.8   PLATELETCT  60*  51*  56*   MPV  11.2  12.4  13.1*     Recent Labs      01/11/19   0056  01/12/19   0330  01/13/19   0325   SODIUM  131*  131*  134*   POTASSIUM  3.6  3.8  4.0   CHLORIDE  95*  99  103   CO2  27  24  24   GLUCOSE  101*  95  102*   BUN  6*  8  9   CREATININE  0.74  0.68  0.63   CALCIUM  8.9  9.2   9.3                   Imaging  No orders to display        Assessment/Plan  Alcohol withdrawal (HCC)- (present on admission)   Assessment & Plan    Tremulous and tachycardic, endorses hallucinations, this is severe alcohol withdrawal  He is requesting to detox, indicates that he wishes to stop drinking alcohol  Requiring frequent ativan.  Did require Precedex drip on last admission.  Persistent tachycardia   Continue CIWA  Low threshold to escalate to phenobarbital possibly  Improved currently  Continue with Librium     Alcoholic hepatitis- (present on admission)   Assessment & Plan    Supportive care  Trend CMP.     Conjunctivitis- (present on admission)   Assessment & Plan    Ophthalmic antibiotics     Serum lipase elevation   Assessment & Plan    Likely from nausea vomiting  Follow-up in the morning     LFT elevation   Assessment & Plan    Likely from alcohol abuse     Thrombocytopenia (HCC)- (present on admission)   Assessment & Plan    Due to alcohol  No signs of bleeding     Hyponatremia- (present on admission)   Assessment & Plan    Good p.o. intake  Follow CMP.     Tachycardia- (present on admission)   Assessment & Plan    Subtle EKG changes  Likely J-point elevation, follow     Plan  Continue with alcohol withdrawal protocol and treatment  A.m. Labs  Electrolyte follow-up  Critically ill, stabilized for telemetry transfer  See orders  I have performed a physical exam and reviewed and updated ROS and Plan today . In review of yesterday's note , there are no changes except as documented above.     VTE prophylaxis: Lovenox.

## 2019-01-13 NOTE — ASSESSMENT & PLAN NOTE
Severe withdrawal -resolved  Continue Librium taper at discharge  Alcohol cessation education provided  Status post vitamin supplementation

## 2019-01-13 NOTE — ASSESSMENT & PLAN NOTE
Secondary significant drinking  Alcohol cessation education provided  Avoid hepatotoxins  Outpatient follow-up for LFT recheck

## 2019-01-13 NOTE — CARE PLAN
Problem: Psychosocial Needs:  Goal: Level of anxiety will decrease    Intervention: Identify and develop with patient strategies to cope with anxiety triggers  Pt will have CIWA scores less than 20 and controlled with PRN PO ativan       Problem: Mobility  Goal: Risk for activity intolerance will decrease  Outcome: PROGRESSING AS EXPECTED  Pt will remain safe with transfers & without falls, bed alarm will remain on

## 2019-01-13 NOTE — PROGRESS NOTES
Patient becoming increasing agitated despite giving ativan, patient is oriented but more frustrated and angry that the ativan is not working and wants to leave. In the past he has been on librium and states this worked well for him. Notified Dr Santillan and requested librium order to convince him to stay. Ordered entered per Dr Dr Santillan.

## 2019-01-14 VITALS
RESPIRATION RATE: 20 BRPM | WEIGHT: 140.21 LBS | TEMPERATURE: 99.1 F | OXYGEN SATURATION: 96 % | SYSTOLIC BLOOD PRESSURE: 128 MMHG | HEART RATE: 101 BPM | HEIGHT: 68 IN | DIASTOLIC BLOOD PRESSURE: 84 MMHG | BODY MASS INDEX: 21.25 KG/M2

## 2019-01-14 PROCEDURE — 700102 HCHG RX REV CODE 250 W/ 637 OVERRIDE(OP): Performed by: INTERNAL MEDICINE

## 2019-01-14 PROCEDURE — A9270 NON-COVERED ITEM OR SERVICE: HCPCS | Performed by: HOSPITALIST

## 2019-01-14 PROCEDURE — 700102 HCHG RX REV CODE 250 W/ 637 OVERRIDE(OP): Performed by: HOSPITALIST

## 2019-01-14 PROCEDURE — 99233 SBSQ HOSP IP/OBS HIGH 50: CPT | Performed by: INTERNAL MEDICINE

## 2019-01-14 PROCEDURE — 99239 HOSP IP/OBS DSCHRG MGMT >30: CPT | Performed by: HOSPITALIST

## 2019-01-14 PROCEDURE — A9270 NON-COVERED ITEM OR SERVICE: HCPCS | Performed by: INTERNAL MEDICINE

## 2019-01-14 RX ORDER — CHLORDIAZEPOXIDE HYDROCHLORIDE 25 MG/1
25 CAPSULE, GELATIN COATED ORAL 4 TIMES DAILY PRN
Qty: 16 CAP | Refills: 0 | Status: SHIPPED | OUTPATIENT
Start: 2019-01-14 | End: 2019-01-18

## 2019-01-14 RX ORDER — ERYTHROMYCIN 5 MG/G
1 OINTMENT OPHTHALMIC 2 TIMES DAILY
Qty: 1 TUBE | Refills: 0 | Status: SHIPPED | OUTPATIENT
Start: 2019-01-14 | End: 2019-02-23 | Stop reason: CLARIF

## 2019-01-14 RX ADMIN — LORAZEPAM 2 MG: 1 TABLET ORAL at 01:39

## 2019-01-14 RX ADMIN — Medication 100 MG: at 05:07

## 2019-01-14 RX ADMIN — ERYTHROMYCIN: 5 OINTMENT OPHTHALMIC at 11:16

## 2019-01-14 RX ADMIN — CHLORDIAZEPOXIDE HYDROCHLORIDE 50 MG: 25 CAPSULE ORAL at 05:07

## 2019-01-14 RX ADMIN — FOLIC ACID 1 MG: 1 TABLET ORAL at 05:07

## 2019-01-14 RX ADMIN — CLONIDINE HYDROCHLORIDE 0.1 MG: 0.1 TABLET ORAL at 05:08

## 2019-01-14 RX ADMIN — CHLORDIAZEPOXIDE HYDROCHLORIDE 50 MG: 25 CAPSULE ORAL at 11:17

## 2019-01-14 RX ADMIN — THERA TABS 1 TABLET: TAB at 05:08

## 2019-01-14 ASSESSMENT — PAIN SCALES - GENERAL
PAINLEVEL_OUTOF10: 0
PAINLEVEL_OUTOF10: 0

## 2019-01-14 ASSESSMENT — LIFESTYLE VARIABLES
TOTAL SCORE: 13
NAUSEA AND VOMITING: NO NAUSEA AND NO VOMITING
TOTAL SCORE: MILD ITCHING, PINS AND NEEDLES SENSATION, BURNING OR NUMBNESS
TREMOR: NO TREMOR
ANXIETY: NO ANXIETY (AT EASE)
PAROXYSMAL SWEATS: NO SWEAT VISIBLE
VISUAL DISTURBANCES: NOT PRESENT
AUDITORY DISTURBANCES: NOT PRESENT
TREMOR: MODERATE TREMOR WITH ARMS EXTENDED
AUDITORY DISTURBANCES: NOT PRESENT
ANXIETY: *
TOTAL SCORE: MODERATE ITCHING, PINS AND NEEDLES SENSATION, BURNING OR NUMBNESS
TOTAL SCORE: 1
TREMOR: NO TREMOR
NAUSEA AND VOMITING: NO NAUSEA AND NO VOMITING
ORIENTATION AND CLOUDING OF SENSORIUM: ORIENTED AND CAN DO SERIAL ADDITIONS
VISUAL DISTURBANCES: NOT PRESENT
ORIENTATION AND CLOUDING OF SENSORIUM: ORIENTED AND CAN DO SERIAL ADDITIONS
NAUSEA AND VOMITING: NO NAUSEA AND NO VOMITING
ANXIETY: NO ANXIETY (AT EASE)
AGITATION: NORMAL ACTIVITY
VISUAL DISTURBANCES: NOT PRESENT
PAROXYSMAL SWEATS: NO SWEAT VISIBLE
HEADACHE, FULLNESS IN HEAD: NOT PRESENT
AUDITORY DISTURBANCES: NOT PRESENT
TOTAL SCORE: 4
HEADACHE, FULLNESS IN HEAD: MODERATE
PAROXYSMAL SWEATS: BARELY PERCEPTIBLE SWEATING. PALMS MOIST
AGITATION: NORMAL ACTIVITY
HEADACHE, FULLNESS IN HEAD: MILD
AGITATION: NORMAL ACTIVITY
ORIENTATION AND CLOUDING OF SENSORIUM: ORIENTED AND CAN DO SERIAL ADDITIONS

## 2019-01-14 ASSESSMENT — ENCOUNTER SYMPTOMS
BRUISES/BLEEDS EASILY: 0
FEVER: 0
MYALGIAS: 0
DEPRESSION: 0
DIZZINESS: 0
HEARTBURN: 0
BLURRED VISION: 0
COUGH: 0

## 2019-01-14 NOTE — CARE PLAN
Problem: Communication  Goal: The ability to communicate needs accurately and effectively will improve  Outcome: PROGRESSING AS EXPECTED      Problem: Infection  Goal: Will remain free from infection  Outcome: PROGRESSING AS EXPECTED      Problem: Bowel/Gastric:  Goal: Normal bowel function is maintained or improved  Outcome: PROGRESSING AS EXPECTED

## 2019-01-14 NOTE — PROGRESS NOTES
Critical Care Progress Note    Date of admission  1/9/2019    Chief Complaint  33 y.o. male who presented 1/9/2019 with alcohol withdrawal.  On admission he did say that he drinks heavily.  He is complaining of hallucinations and seeing people that were not there.  He also complained of tremors, anxiety, and palpitations.  He had no signs of seizures at that time.  He transferred from another ICU overnight.  He is currently on Ativan protocol.     Interval history  ciwa 4-9  Sinus tach   Slight st elvation on monitor  ekg pending  Tele strip ok, j point  Chest xray from November    Review of Systems  ROS   Constitutional: Negative for chills, diaphoresis, fatigue and fever.   HENT: Negative for congestion and trouble swallowing.    Eyes: Negative for photophobia and visual disturbance.   Respiratory: Negative for cough, chest tightness, shortness of breath and wheezing.    Cardiovascular: Negative for chest pain, palpitations and leg swelling.   Gastrointestinal: Negative for abdominal distention, abdominal pain and nausea.   Endocrine: Negative for cold intolerance and heat intolerance.   Genitourinary: Negative for difficulty urinating and dysuria.   Musculoskeletal: Negative for arthralgias and back pain.   Skin: Negative for color change and rash.   Neurological: Positive for tremors. Negative for dizziness, seizures, speech difficulty, weakness and headaches.   Hematological: Negative for adenopathy. Does not bruise/bleed easily.   Psychiatric/Behavioral: Negative for agitation and confusion.   Vital Signs for last 24 hours   Temp:  [37 °C (98.6 °F)-37.2 °C (98.9 °F)] 37 °C (98.6 °F)  Pulse:  [73-98] 91  Resp:  [14-25] 24    Hemodynamic parameters for last 24 hours       Respiratory       Physical Exam   Physical Exam  Constitutional: He is oriented to person, place, and time. No distress.   Resting comforbatly   HENT:   Head: Normocephalic and atraumatic.   Right Ear: External ear normal.   Left Ear:  External ear normal.   Mouth/Throat: No oropharyngeal exudate.   Eyes: Pupils are equal, round, and reactive to light. Conjunctivae and EOM are normal. Right eye exhibits no discharge. Left eye exhibits no discharge.   Neck: No JVD present. No tracheal deviation present.   Cardiovascular: Normal rate, regular rhythm and normal heart sounds.    No murmur heard.  Pulmonary/Chest: Effort normal and breath sounds normal. No stridor. No respiratory distress. He has no wheezes. He has no rales. He exhibits no tenderness.   Abdominal: He exhibits no mass. There is no tenderness. There is no rebound and no guarding.   Musculoskeletal: He exhibits no edema, tenderness or deformity.   + tremors   Neurological: He is alert and oriented to person, place, and time. He displays normal reflexes. No cranial nerve deficit. Coordination normal.   Skin: Skin is warm. No rash noted. He is not diaphoretic. No erythema.   Psychiatric:   Agitation, confusion   Nursing note and vitals reviewed.  Medications  Current Facility-Administered Medications   Medication Dose Route Frequency Provider Last Rate Last Dose   • chlordiazePOXIDE (LIBRIUM) capsule 50 mg  50 mg Oral Q6HRS Landon Lowe M.D.   50 mg at 01/13/19 2305   • [START ON 1/15/2019] chlordiazePOXIDE (LIBRIUM) capsule 25 mg  25 mg Oral Q6HRS Landon Lowe M.D.       • cloNIDine (CATAPRES) tablet 0.1 mg  0.1 mg Oral TWICE DAILY Jaden Colon M.D.   0.1 mg at 01/13/19 1737   • erythromycin ophthalmic ointment   Both Eyes PRN Dalton Santillan M.D.       • LORazepam (ATIVAN) tablet 0.5 mg  0.5 mg Oral Q4HRS PRN Gunnar Miller M.D.       • LORazepam (ATIVAN) tablet 1 mg  1 mg Oral Q4HRS PRN Gunnar Miller M.D.   1 mg at 01/13/19 2049    Or   • LORazepam (ATIVAN) injection 0.5 mg  0.5 mg Intravenous Q4HRS PRN Gunnar Miller M.D.       • LORazepam (ATIVAN) tablet 2 mg  2 mg Oral Q2HRS PRN Gunnar Miller M.D.   2 mg at 01/13/19 0013    Or   • LORazepam (ATIVAN) injection 1 mg  1  mg Intravenous Q2HRS PRN Gunnar Miller M.D.       • LORazepam (ATIVAN) tablet 3 mg  3 mg Oral Q HOUR PRN Gunnar Miller M.D.   3 mg at 01/13/19 0330    Or   • LORazepam (ATIVAN) injection 1.5 mg  1.5 mg Intravenous Q HOUR PRN Gunnar Miller M.D.   1.5 mg at 01/10/19 2108   • LORazepam (ATIVAN) tablet 4 mg  4 mg Oral Q15 MIN PRN Gunnar Miller M.D.        Or   • LORazepam (ATIVAN) injection 2 mg  2 mg Intravenous Q15 MIN PRDEEPALI Miller M.D.       • thiamine tablet 100 mg  100 mg Oral DAILY Gunnar Miller M.D.   100 mg at 01/13/19 0450    And   • multivitamin (THERAGRAN) tablet 1 Tab  1 Tab Oral DAILY Gunnar Miller M.D.   1 Tab at 01/13/19 0450    And   • folic acid (FOLVITE) tablet 1 mg  1 mg Oral DAILY Gunnar Miller M.D.   1 mg at 01/13/19 0450   • senna-docusate (PERICOLACE or SENOKOT S) 8.6-50 MG per tablet 2 Tab  2 Tab Oral BID Gunnar Mliler M.D.   Stopped at 01/13/19 0600    And   • polyethylene glycol/lytes (MIRALAX) PACKET 1 Packet  1 Packet Oral QDAY PRN Gunnar Miller M.D.        And   • magnesium hydroxide (MILK OF MAGNESIA) suspension 30 mL  30 mL Oral QDAY PRN Gunnar Miller M.D.        And   • bisacodyl (DULCOLAX) suppository 10 mg  10 mg Rectal QDAY PRN Gunnar Miller M.D.       • enoxaparin (LOVENOX) inj 40 mg  40 mg Subcutaneous DAILY Gunnar Miller M.D.   40 mg at 01/12/19 0600   • hydrALAZINE (APRESOLINE) injection 10 mg  10 mg Intravenous Q4HRS PRN Gunnar Miller M.D.           Fluids    Intake/Output Summary (Last 24 hours) at 01/13/19 2353  Last data filed at 01/13/19 1800   Gross per 24 hour   Intake             1560 ml   Output                0 ml   Net             1560 ml       Laboratory      Recent Labs      01/13/19   0325   TROPONINI  <0.01     Recent Labs      01/11/19   0056  01/12/19   0330  01/13/19 0325   SODIUM  131*  131*  134*   POTASSIUM  3.6  3.8  4.0   CHLORIDE  95*  99  103   CO2  27  24  24   BUN  6*  8  9   CREATININE  0.74  0.68  0.63   MAGNESIUM  1.7   --   1.9   PHOSPHORUS    --    --   4.2   CALCIUM  8.9  9.2  9.3     Recent Labs      01/11/19   0056  01/12/19   0330  01/13/19   0325   ALTSGPT   --   125*  106*   ASTSGOT   --   121*  95*   ALKPHOSPHAT   --   95  93   TBILIRUBIN   --   1.2  0.7   LIPASE   --    --   63   GLUCOSE  101*  95  102*     Recent Labs      01/11/19 0056 01/12/19 0330 01/13/19   0325   WBC  6.9  5.5  6.7   NEUTSPOLYS  74.20*   --   66.00   LYMPHOCYTES  16.40*   --   20.00*   MONOCYTES  8.70   --   12.00   EOSINOPHILS  0.10   --   1.50   BASOPHILS  0.30   --   0.30   ASTSGOT   --   121*  95*   ALTSGPT   --   125*  106*   ALKPHOSPHAT   --   95  93   TBILIRUBIN   --   1.2  0.7     Recent Labs      01/11/19 0056 01/12/19 0330 01/13/19   0325   RBC  4.31*  4.59*  4.35*   HEMOGLOBIN  13.5*  14.4  13.5*   HEMATOCRIT  38.6*  41.7*  40.7*   PLATELETCT  60*  51*  56*       Imaging  X-Ray:  I have personally reviewed the images and compared with prior images. and No film today  EKG:  I have personally reviewed the images and compared with prior images. and My impression is: no st/t changes for ischemia noted.    Assessment/Plan  Alcohol withdrawal (HCC)- (present on admission)   Assessment & Plan    Severe withdrawal  On ciwa with ativan  Agitation/tremors  Continue vitamins as appropriate for etoh withdrawal  Increased librium        Alcoholic hepatitis- (present on admission)   Assessment & Plan    Secondary significant drinking  Elevated inr  Reduced plt  IVF resuscitation 1/12     Serum lipase elevation   Assessment & Plan    Likely due to etoh  No abdominal pain  Received IVF fluids     LFT elevation   Assessment & Plan    Due to etoh hepatitis       Thrombocytopenia (HCC)- (present on admission)   Assessment & Plan    Secondary to etoh abuse  Monitor closely  Heparin dvt px  Likely acute on chronic     Hyponatremia- (present on admission)   Assessment & Plan    Secondary to etoh abuse  Adequate for now  IVF resuscitation  Also related to poorpo intake      Tachycardia- (present on admission)   Assessment & Plan    Due to etoh withdrawla  Improved with fluids          VTE:  Lovenox  Ulcer: Not Indicated  Lines: None    I have performed a physical exam and reviewed and updated ROS and Plan today (1/13/2019). In review of yesterday's note (1/12/2019), there are no changes except as documented above.     Discussed patient condition and risk of morbidity and/or mortality with Hospitalist, RN, RT, Therapies, Pharmacy, Charge nurse / hot rounds and Patient

## 2019-01-14 NOTE — PROGRESS NOTES
Order to discharge patient;   AVS printed and reviewed with patient. Education provided about diagnosis.     IV removed. Pt is alert and oriented; ambulatory.

## 2019-01-14 NOTE — PROGRESS NOTES
Critical Care Progress Note    Date of admission  1/9/2019    Chief Complaint  33 y.o. male who presented 1/9/2019 with alcohol withdrawal.  On admission he did say that he drinks heavily.  He is complaining of hallucinations and seeing people that were not there.  He also complained of tremors, anxiety, and palpitations.  He had no signs of seizures at that time.  He transferred from another ICU overnight.  He is currently on Ativan protocol.     Interval history:  Reviewed 24 hr events   - ativan once overnight   - AAOx4   - AF, regular diet   - ambulating well so d/c lovenox    Review of Systems  Review of Systems   Constitutional: Negative for fever.   HENT: Negative for ear pain.    Eyes: Negative for blurred vision.   Respiratory: Negative for cough.    Cardiovascular: Negative for chest pain.   Gastrointestinal: Negative for heartburn.   Genitourinary: Negative for dysuria.   Musculoskeletal: Negative for myalgias.   Skin: Negative for rash.   Neurological: Negative for dizziness.   Endo/Heme/Allergies: Does not bruise/bleed easily.   Psychiatric/Behavioral: Negative for depression.   All other systems reviewed and are negative.       Vital Signs for last 24 hours   Temp:  [37 °C (98.6 °F)-37.2 °C (98.9 °F)] 37.2 °C (98.9 °F)  Pulse:  [76-98] 96  Resp:  [14-25] 20    Respiratory    Room air    Physical Exam   Physical Exam   Constitutional: He is oriented to person, place, and time. He appears well-developed and well-nourished. No distress.   Disheveled   HENT:   Head: Normocephalic and atraumatic.   Nose: Nose normal.   Mouth/Throat: Oropharynx is clear and moist.   Eyes: Pupils are equal, round, and reactive to light. Conjunctivae and EOM are normal.   Neck: Neck supple. No JVD present. No tracheal deviation present.   Cardiovascular: Normal rate, regular rhythm, normal heart sounds and intact distal pulses.    No murmur heard.  Pulmonary/Chest: Effort normal and breath sounds normal. No respiratory  distress. He has no wheezes. He has no rales.   Abdominal: Soft. Bowel sounds are normal. He exhibits no distension. There is no tenderness. There is no rebound.   Musculoskeletal: He exhibits no edema or tenderness.   Neurological: He is alert and oriented to person, place, and time. No cranial nerve deficit. He exhibits normal muscle tone.   Skin: Skin is warm and dry. No rash noted. No erythema. No pallor.   Psychiatric: His behavior is normal. Judgment and thought content normal.   Odd affect   Nursing note and vitals reviewed.      Medications  Current Facility-Administered Medications   Medication Dose Route Frequency Provider Last Rate Last Dose   • chlordiazePOXIDE (LIBRIUM) capsule 50 mg  50 mg Oral Q6HRS Landon Lowe M.D.   50 mg at 01/14/19 0507   • [START ON 1/15/2019] chlordiazePOXIDE (LIBRIUM) capsule 25 mg  25 mg Oral Q6HRS Landon Lowe M.D.       • cloNIDine (CATAPRES) tablet 0.1 mg  0.1 mg Oral TWICE DAILY Jaden Colon M.D.   0.1 mg at 01/14/19 0508   • erythromycin ophthalmic ointment   Both Eyes PRN Dalton Santillan M.D.       • LORazepam (ATIVAN) tablet 0.5 mg  0.5 mg Oral Q4HRS PRN Gunnar Miller M.D.       • LORazepam (ATIVAN) tablet 1 mg  1 mg Oral Q4HRS PRN Gunnar Miller M.D.   1 mg at 01/13/19 2049    Or   • LORazepam (ATIVAN) injection 0.5 mg  0.5 mg Intravenous Q4HRS PRN Gunnar Miller M.D.       • LORazepam (ATIVAN) tablet 2 mg  2 mg Oral Q2HRS PRN Gunnar Miller M.D.   2 mg at 01/14/19 0139    Or   • LORazepam (ATIVAN) injection 1 mg  1 mg Intravenous Q2HRS PRN Gunnar Miller M.D.       • LORazepam (ATIVAN) tablet 3 mg  3 mg Oral Q HOUR PRN Gunnar Miller M.D.   3 mg at 01/13/19 0330    Or   • LORazepam (ATIVAN) injection 1.5 mg  1.5 mg Intravenous Q HOUR PRN Gunnar Miller M.D.   1.5 mg at 01/10/19 2108   • LORazepam (ATIVAN) tablet 4 mg  4 mg Oral Q15 MIN PRN Gunnar Miller M.D.        Or   • LORazepam (ATIVAN) injection 2 mg  2 mg Intravenous Q15 MIN PRN Gunnar Miller M.D.        • senna-docusate (PERICOLACE or SENOKOT S) 8.6-50 MG per tablet 2 Tab  2 Tab Oral BID Gunnar Miller M.D.   Stopped at 01/13/19 0600    And   • polyethylene glycol/lytes (MIRALAX) PACKET 1 Packet  1 Packet Oral QDAY PRN Gunnar Miller M.D.        And   • magnesium hydroxide (MILK OF MAGNESIA) suspension 30 mL  30 mL Oral QDAY PRN Gunnar Miller M.D.        And   • bisacodyl (DULCOLAX) suppository 10 mg  10 mg Rectal QDAY PRN Gunnar Miller M.D.       • enoxaparin (LOVENOX) inj 40 mg  40 mg Subcutaneous DAILY Gunnar Miller M.D.   40 mg at 01/12/19 0600   • hydrALAZINE (APRESOLINE) injection 10 mg  10 mg Intravenous Q4HRS PRN Gunnar Miller M.D.           Fluids    Intake/Output Summary (Last 24 hours) at 01/14/19 0752  Last data filed at 01/14/19 0600   Gross per 24 hour   Intake             1800 ml   Output                0 ml   Net             1800 ml       Laboratory      Recent Labs      01/13/19   0325   TROPONINI  <0.01     Recent Labs      01/12/19   0330  01/13/19   0325   SODIUM  131*  134*   POTASSIUM  3.8  4.0   CHLORIDE  99  103   CO2  24  24   BUN  8  9   CREATININE  0.68  0.63   MAGNESIUM   --   1.9   PHOSPHORUS   --   4.2   CALCIUM  9.2  9.3     Recent Labs      01/12/19   0330  01/13/19   0325   ALTSGPT  125*  106*   ASTSGOT  121*  95*   ALKPHOSPHAT  95  93   TBILIRUBIN  1.2  0.7   LIPASE   --   63   GLUCOSE  95  102*     Recent Labs      01/12/19   0330  01/13/19   0325   WBC  5.5  6.7   NEUTSPOLYS   --   66.00   LYMPHOCYTES   --   20.00*   MONOCYTES   --   12.00   EOSINOPHILS   --   1.50   BASOPHILS   --   0.30   ASTSGOT  121*  95*   ALTSGPT  125*  106*   ALKPHOSPHAT  95  93   TBILIRUBIN  1.2  0.7     Recent Labs      01/12/19   0330  01/13/19   0325   RBC  4.59*  4.35*   HEMOGLOBIN  14.4  13.5*   HEMATOCRIT  41.7*  40.7*   PLATELETCT  51*  56*       Imaging  X-Ray:  No film today    Assessment/Plan  Alcohol withdrawal (HCC)- (present on admission)   Assessment & Plan    Severe withdrawal  -resolved  Continue Librium taper at discharge  Alcohol cessation education provided  Status post vitamin supplementation       Alcoholic hepatitis- (present on admission)   Assessment & Plan    Secondary significant drinking  Alcohol cessation education provided  Avoid hepatotoxins  Outpatient follow-up for LFT recheck     Conjunctivitis- (present on admission)   Assessment & Plan    Eye ointment as needed     Serum lipase elevation   Assessment & Plan    Likely due to etoh  No abdominal pain  Alcohol cessation education encouraged     LFT elevation   Assessment & Plan           Thrombocytopenia (HCC)- (present on admission)   Assessment & Plan    Secondary to etoh abuse  No sign of bleeding     Hyponatremia- (present on admission)   Assessment & Plan    Secondary to etoh abuse -improving       Tachycardia- (present on admission)   Assessment & Plan    Resolved     Full code    VTE:  Not Indicated  Ulcer: Not Indicated  Lines: None    I have performed a physical exam and reviewed and updated ROS and Plan today (1/14/2019). In review of yesterday's note (1/13/2019), there are no changes except as documented above.     Discussed patient condition and risk of morbidity and/or mortality with RN, RT, Pharmacy, , Charge nurse / hot rounds, Patient and Dr. Colon

## 2019-01-14 NOTE — DISCHARGE INSTRUCTIONS
Discharge Instructions    Discharged to other by taxi with self. Discharged via walking, hospital escort: Refused.  Special equipment needed: Not Applicable    Be sure to schedule a follow-up appointment with your primary care doctor or any specialists as instructed.     Discharge Plan:   Diet Plan: Discussed  Activity Level: Discussed  Confirmed Follow up Appointment: Patient to Call and Schedule Appointment  Confirmed Symptoms Management: Discussed  Medication Reconciliation Updated: Yes  Influenza Vaccine Indication: Patient Refuses    I understand that a diet low in cholesterol, fat, and sodium is recommended for good health. Unless I have been given specific instructions below for another diet, I accept this instruction as my diet prescription.   Other diet: Reg    Special Instructions: None    · Is patient discharged on Warfarin / Coumadin?   No       Depression / Suicide Risk    As you are discharged from this [facility] facility, it is important to learn how to keep safe from harming yourself.    Recognize the warning signs:  · Abrupt changes in personality, positive or negative- including increase in energy   · Giving away possessions  · Change in eating patterns- significant weight changes-  positive or negative  · Change in sleeping patterns- unable to sleep or sleeping all the time   · Unwillingness or inability to communicate  · Depression  · Unusual sadness, discouragement and loneliness  · Talk of wanting to die  · Neglect of personal appearance   · Rebelliousness- reckless behavior  · Withdrawal from people/activities they love  · Confusion- inability to concentrate     If you or a loved one observes any of these behaviors or has concerns about self-harm, here's what you can do:  · Talk about it- your feelings and reasons for harming yourself  · Remove any means that you might use to hurt yourself (examples: pills, rope, extension cords, firearm)  · Get professional help from the community (Mental  Health, Substance Abuse, psychological counseling)  · Do not be alone:Call your Safe Contact- someone whom you trust who will be there for you.  · Call your local CRISIS HOTLINE 386-0711 or 261-523-2261  · Call your local Children's Mobile Crisis Response Team [place] (111) 498-2471 or www.[employer]  · Call the toll free National Suicide Prevention Hotlines   · National Suicide Prevention Lifeline 449-544-NWVF (1001)  · National Hope Line Network 800-SUICIDE (279-9012)

## 2019-01-14 NOTE — DISCHARGE SUMMARY
Discharge Summary    CHIEF COMPLAINT ON ADMISSION  Chief Complaint   Patient presents with   • Alcohol Intoxication       Reason for Admission  EMS     Admission Date  1/9/2019    CODE STATUS  Full Code    HPI & HOSPITAL COURSE      Recently discharged after being treated for alcohol withdrawals, requiring ICU and Precedex drip.  On this admission with severe tremors, tachycardia, and hallucinations.  Requiring frequent ativan.   This is a 33 y.o. male here with history of recurrent alcohol abuse, bipolar disorder admitted with alcohol intoxication and alcohol withdrawals.  Patient was placed on a CIWA protocol but had increasing confusion and hallucinations and was transferred to ICU for more aggressive treatment.  Patient was treated conservatively did not require intubation or higher level protocol.  He was placed on Librium and improved overall.  He had evidence of conjunctival inflammation/infection and ophthalmic ointment was needed.  He had evidence of alcoholic hepatitis this was trended and improved, he had a mild lipase elevation from nausea vomiting, he overall progressed well to self-care and tolerating a diet and is currently stable for discharge with close outpatient follow-up he is referred to the [facility] clinic.  I strongly suggested to follow-up with outpatient alcohol treatment programs.  Therefore, he is discharged in fair and stable condition to home with close outpatient follow-up.    The patient met 2-midnight criteria for an inpatient stay at the time of discharge.    Discharge Date  1/14/2019    FOLLOW UP ITEMS POST DISCHARGE  Alcohol abstinence  Follow-up with primary care  Outpatient alcohol treatment program    DISCHARGE DIAGNOSES  Active Problems:    Alcohol withdrawal (HCC) POA: Yes    Conjunctivitis POA: Yes    Alcoholic hepatitis POA: Yes    Tachycardia POA: Yes    Hyponatremia POA: Yes    Thrombocytopenia (HCC) POA: Yes    LFT elevation POA: Unknown    Serum lipase elevation POA:  Unknown  Resolved Problems:    * No resolved hospital problems. *      FOLLOW UP  No future appointments.  RUSS Goddard  [address]  [place] [address]  [phone]          [facility], Emergency Dept  [address]  [place] [id number] [phone]          MEDICATIONS ON DISCHARGE     Medication List      START taking these medications      Instructions   chlordiazePOXIDE 25 MG Caps  Commonly known as:  LIBRIUM   Take 1 Cap by mouth 4 times a day as needed for Anxiety for up to 16 doses.  Dose:  25 mg     * erythromycin 5 MG/GM Oint   Place 0.5 Inches in both eyes 3 times a day.  Dose:  0.5 Inch     * erythromycin 5 MG/GM Oint   Place 1 Application in both eyes 2 times a day.  Dose:  1 Application        * This list has 2 medication(s) that are the same as other medications prescribed for you. Read the directions carefully, and ask your doctor or other care provider to review them with you.                Allergies  No Known Allergies    DIET  Orders Placed This Encounter   Procedures   • Diet Order Regular     Standing Status:   Standing     Number of Occurrences:   1     Order Specific Question:   Diet:     Answer:   Regular [1]       ACTIVITY  As tolerated.  Weight bearing as tolerated    CONSULTATIONS  Intensivist    PROCEDURES  None    LABORATORY  Lab Results   Component Value Date    SODIUM 134 (L) 01/13/2019    POTASSIUM 4.0 01/13/2019    CHLORIDE 103 01/13/2019    CO2 24 01/13/2019    GLUCOSE 102 (H) 01/13/2019    BUN 9 01/13/2019    CREATININE 0.63 01/13/2019        Lab Results   Component Value Date    WBC 6.7 01/13/2019    HEMOGLOBIN 13.5 (L) 01/13/2019    HEMATOCRIT 40.7 (L) 01/13/2019    PLATELETCT 56 (L) 01/13/2019        Total time of the discharge process exceeds 65 minutes.

## 2019-02-15 ENCOUNTER — HOSPITAL ENCOUNTER (EMERGENCY)
Dept: HOSPITAL 8 - ED | Age: 34
Discharge: LEFT BEFORE DISCHARGE | End: 2019-02-15
Payer: MEDICARE

## 2019-02-15 VITALS — WEIGHT: 149.91 LBS | BODY MASS INDEX: 22.72 KG/M2 | HEIGHT: 68 IN

## 2019-02-15 VITALS — DIASTOLIC BLOOD PRESSURE: 84 MMHG | SYSTOLIC BLOOD PRESSURE: 119 MMHG

## 2019-02-15 DIAGNOSIS — E11.9: ICD-10-CM

## 2019-02-15 DIAGNOSIS — Z72.9: ICD-10-CM

## 2019-02-15 DIAGNOSIS — F32.9: ICD-10-CM

## 2019-02-15 DIAGNOSIS — F10.229: Primary | ICD-10-CM

## 2019-02-15 DIAGNOSIS — F41.1: ICD-10-CM

## 2019-02-15 DIAGNOSIS — F90.9: ICD-10-CM

## 2019-02-15 DIAGNOSIS — I10: ICD-10-CM

## 2019-02-15 DIAGNOSIS — R00.0: ICD-10-CM

## 2019-02-15 LAB
ALBUMIN SERPL-MCNC: 3.9 G/DL (ref 3.4–5)
ALP SERPL-CCNC: 141 U/L (ref 45–117)
ALT SERPL-CCNC: 35 U/L (ref 12–78)
ANION GAP SERPL CALC-SCNC: 13 MMOL/L (ref 5–15)
BASOPHILS # BLD AUTO: 0.05 X10^3/UL (ref 0–0.1)
BASOPHILS NFR BLD AUTO: 1 % (ref 0–1)
BILIRUB SERPL-MCNC: 0.4 MG/DL (ref 0.2–1)
CALCIUM SERPL-MCNC: 8.7 MG/DL (ref 8.5–10.1)
CHLORIDE SERPL-SCNC: 99 MMOL/L (ref 98–107)
CREAT SERPL-MCNC: 0.99 MG/DL (ref 0.7–1.3)
EOSINOPHIL # BLD AUTO: 0 X10^3/UL (ref 0–0.4)
EOSINOPHIL NFR BLD AUTO: 0 % (ref 1–7)
ERYTHROCYTE [DISTWIDTH] IN BLOOD BY AUTOMATED COUNT: 14.5 % (ref 9.4–14.8)
LYMPHOCYTES # BLD AUTO: 2.71 X10^3/UL (ref 1–3.4)
LYMPHOCYTES NFR BLD AUTO: 25 % (ref 22–44)
MCH RBC QN AUTO: 31.5 PG (ref 27.5–34.5)
MCHC RBC AUTO-ENTMCNC: 33.8 G/DL (ref 33.2–36.2)
MCV RBC AUTO: 93.1 FL (ref 81–97)
MD: NO
MONOCYTES # BLD AUTO: 0.34 X10^3/UL (ref 0.2–0.8)
MONOCYTES NFR BLD AUTO: 3 % (ref 2–9)
NEUTROPHILS # BLD AUTO: 7.78 X10^3/UL (ref 1.8–6.8)
NEUTROPHILS NFR BLD AUTO: 72 % (ref 42–75)
PLATELET # BLD AUTO: 323 X10^3/UL (ref 130–400)
PMV BLD AUTO: 8.6 FL (ref 7.4–10.4)
PROT SERPL-MCNC: 8.6 G/DL (ref 6.4–8.2)
RBC # BLD AUTO: 5.6 X10^6/UL (ref 4.38–5.82)

## 2019-02-15 PROCEDURE — 85025 COMPLETE CBC W/AUTO DIFF WBC: CPT

## 2019-02-15 PROCEDURE — 36415 COLL VENOUS BLD VENIPUNCTURE: CPT

## 2019-02-15 PROCEDURE — 99283 EMERGENCY DEPT VISIT LOW MDM: CPT

## 2019-02-15 PROCEDURE — 80053 COMPREHEN METABOLIC PANEL: CPT

## 2019-02-15 NOTE — NUR
PT ELOPED, WITNESSED BY STAFF WALKING OUT OF DEPT WITH STEADY GAIT. MD 
NOTIFIED. NO PIV PLACED PRIOR TO ELOPMENT.

## 2019-02-15 NOTE — NUR
BIBA; REPORT TAKEN FROM EMS. PT C/O ETOH WITHDRAWAL AND GENERALIZED BODY PAIN; 
LAST DRINK "EARLIER TODAY". PT A&O TO PERSON, PLACE AND SITUATION, REFUSES TO 
ANSWER RN QUESTION OF TIME. PT IMMEDIATELY REQUESTING TO GO HOME AFTER ARRIVAL. 
ALL MONITORS IN PLACE, PT IS SINUS TACH RATE 130-140'S ON CARDIAC MONITOR. RENITA SANTOS AT BEDSIDE TO EVALUATE.

## 2019-02-21 ENCOUNTER — HOSPITAL ENCOUNTER (EMERGENCY)
Dept: HOSPITAL 8 - ED | Age: 34
Discharge: HOME | End: 2019-02-21
Payer: MEDICAID

## 2019-02-21 VITALS — HEIGHT: 68 IN | WEIGHT: 141.1 LBS | BODY MASS INDEX: 21.38 KG/M2

## 2019-02-21 VITALS — DIASTOLIC BLOOD PRESSURE: 80 MMHG | SYSTOLIC BLOOD PRESSURE: 121 MMHG

## 2019-02-21 DIAGNOSIS — F90.9: ICD-10-CM

## 2019-02-21 DIAGNOSIS — F32.9: ICD-10-CM
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
## 2019-02-21 DIAGNOSIS — F29: ICD-10-CM

## 2019-02-21 DIAGNOSIS — F10.120: Primary | ICD-10-CM

## 2019-02-21 DIAGNOSIS — Z72.9: ICD-10-CM

## 2019-02-21 DIAGNOSIS — E11.9: ICD-10-CM

## 2019-02-21 DIAGNOSIS — I10: ICD-10-CM

## 2019-02-21 PROCEDURE — 99283 EMERGENCY DEPT VISIT LOW MDM: CPT

## 2019-02-21 NOTE — NUR
Pt BIB EMS for alcohol intoxication. Pt states he drank "too much" today and 
states that his last drink was just prior to EMS arrival. Pt with no complaint 
for this RN, just stating that he wants to sleep on the gurney. Pt with strong 
odor of ETOH.

## 2019-02-21 NOTE — NUR
PT AMBULATED TO BATHROOM, NO ASSISTANCE REQUIRED. PT BACK TO ROOM AND 
REQUESTING CAB VOUCHER TO LEAVE. MD MADE AWARE.

## 2019-02-21 NOTE — NUR
Patient/Caregiver given discharge instructions and they have confirmed that 
they understand the instructions.  Patient ambulatory with steady gait. Pt 
given hi5 voucher for safe discharge home.

## 2019-02-22 ENCOUNTER — HOSPITAL ENCOUNTER (EMERGENCY)
Dept: HOSPITAL 8 - ED | Age: 34
End: 2019-02-22
Payer: MEDICARE

## 2019-02-22 VITALS — BODY MASS INDEX: 23.05 KG/M2 | WEIGHT: 152.12 LBS | HEIGHT: 68 IN

## 2019-02-22 VITALS — DIASTOLIC BLOOD PRESSURE: 90 MMHG | SYSTOLIC BLOOD PRESSURE: 142 MMHG

## 2019-02-22 DIAGNOSIS — F10.120: Primary | ICD-10-CM

## 2019-02-22 DIAGNOSIS — F32.9: ICD-10-CM

## 2019-02-22 DIAGNOSIS — F90.9: ICD-10-CM

## 2019-02-22 DIAGNOSIS — I10: ICD-10-CM
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
## 2019-02-22 PROCEDURE — 99283 EMERGENCY DEPT VISIT LOW MDM: CPT

## 2019-02-22 NOTE — NUR
PT BIB [facility] FOR ETOH.  PTS LAST DRINK WAS TODAY.  HE DRINKS EARTHQUAKE.  PT 
WAS SEEN HERE YESTERDAY FOR THE SAME.  PT REPORTS PAIN ALL OVER HIS BODY.  BP 
115/88, HR 130S, O2 SAT 96%.  PT IS CONNECTED TO THE MONITOR.  CALL LIGHT 
WITHIN REACH.

## 2019-02-23 ENCOUNTER — HOSPITAL ENCOUNTER (INPATIENT)
Facility: MEDICAL CENTER | Age: 34
LOS: 4 days | DRG: 894 | End: 2019-02-27
Attending: EMERGENCY MEDICINE | Admitting: INTERNAL MEDICINE
Payer: MEDICARE

## 2019-02-23 DIAGNOSIS — F10.939 ALCOHOL WITHDRAWAL SYNDROME WITH COMPLICATION (HCC): ICD-10-CM

## 2019-02-23 DIAGNOSIS — R00.0 TACHYCARDIA: ICD-10-CM

## 2019-02-23 DIAGNOSIS — F10.920 ALCOHOLIC INTOXICATION WITHOUT COMPLICATION (HCC): ICD-10-CM

## 2019-02-23 LAB
ALBUMIN SERPL BCP-MCNC: 4.7 G/DL (ref 3.2–4.9)
ALBUMIN/GLOB SERPL: 1.3 G/DL
ALP SERPL-CCNC: 107 U/L (ref 30–99)
ALT SERPL-CCNC: 97 U/L (ref 2–50)
ANION GAP SERPL CALC-SCNC: 17 MMOL/L (ref 0–11.9)
AST SERPL-CCNC: 202 U/L (ref 12–45)
BASOPHILS # BLD AUTO: 0.5 % (ref 0–1.8)
BASOPHILS # BLD: 0.03 K/UL (ref 0–0.12)
BILIRUB SERPL-MCNC: 0.6 MG/DL (ref 0.1–1.5)
BUN SERPL-MCNC: 11 MG/DL (ref 8–22)
CALCIUM SERPL-MCNC: 9.1 MG/DL (ref 8.5–10.5)
CHLORIDE SERPL-SCNC: 96 MMOL/L (ref 96–112)
CO2 SERPL-SCNC: 22 MMOL/L (ref 20–33)
CREAT SERPL-MCNC: 0.77 MG/DL (ref 0.5–1.4)
EKG IMPRESSION: NORMAL
EOSINOPHIL # BLD AUTO: 0 K/UL (ref 0–0.51)
EOSINOPHIL NFR BLD: 0 % (ref 0–6.9)
ERYTHROCYTE [DISTWIDTH] IN BLOOD BY AUTOMATED COUNT: 44.9 FL (ref 35.9–50)
GLOBULIN SER CALC-MCNC: 3.5 G/DL (ref 1.9–3.5)
GLUCOSE SERPL-MCNC: 99 MG/DL (ref 65–99)
HCT VFR BLD AUTO: 47.4 % (ref 42–52)
HGB BLD-MCNC: 16.5 G/DL (ref 14–18)
IMM GRANULOCYTES # BLD AUTO: 0.01 K/UL (ref 0–0.11)
IMM GRANULOCYTES NFR BLD AUTO: 0.2 % (ref 0–0.9)
INR PPP: 0.92 (ref 0.87–1.13)
LIPASE SERPL-CCNC: 110 U/L (ref 11–82)
LYMPHOCYTES # BLD AUTO: 1.84 K/UL (ref 1–4.8)
LYMPHOCYTES NFR BLD: 28 % (ref 22–41)
MCH RBC QN AUTO: 32.2 PG (ref 27–33)
MCHC RBC AUTO-ENTMCNC: 34.8 G/DL (ref 33.7–35.3)
MCV RBC AUTO: 92.4 FL (ref 81.4–97.8)
MONOCYTES # BLD AUTO: 0.28 K/UL (ref 0–0.85)
MONOCYTES NFR BLD AUTO: 4.3 % (ref 0–13.4)
NEUTROPHILS # BLD AUTO: 4.42 K/UL (ref 1.82–7.42)
NEUTROPHILS NFR BLD: 67 % (ref 44–72)
NRBC # BLD AUTO: 0 K/UL
NRBC BLD-RTO: 0 /100 WBC
PLATELET # BLD AUTO: 207 K/UL (ref 164–446)
PMV BLD AUTO: 10.9 FL (ref 9–12.9)
POTASSIUM SERPL-SCNC: 4 MMOL/L (ref 3.6–5.5)
PROT SERPL-MCNC: 8.2 G/DL (ref 6–8.2)
PROTHROMBIN TIME: 12.5 SEC (ref 12–14.6)
RBC # BLD AUTO: 5.13 M/UL (ref 4.7–6.1)
SODIUM SERPL-SCNC: 135 MMOL/L (ref 135–145)
TROPONIN I SERPL-MCNC: <0.01 NG/ML (ref 0–0.04)
WBC # BLD AUTO: 6.6 K/UL (ref 4.8–10.8)

## 2019-02-23 PROCEDURE — 96366 THER/PROPH/DIAG IV INF ADDON: CPT

## 2019-02-23 PROCEDURE — 96375 TX/PRO/DX INJ NEW DRUG ADDON: CPT

## 2019-02-23 PROCEDURE — 85025 COMPLETE CBC W/AUTO DIFF WBC: CPT

## 2019-02-23 PROCEDURE — 80053 COMPREHEN METABOLIC PANEL: CPT

## 2019-02-23 PROCEDURE — A9270 NON-COVERED ITEM OR SERVICE: HCPCS | Performed by: EMERGENCY MEDICINE

## 2019-02-23 PROCEDURE — HZ2ZZZZ DETOXIFICATION SERVICES FOR SUBSTANCE ABUSE TREATMENT: ICD-10-PCS | Performed by: EMERGENCY MEDICINE

## 2019-02-23 PROCEDURE — 700111 HCHG RX REV CODE 636 W/ 250 OVERRIDE (IP): Performed by: EMERGENCY MEDICINE

## 2019-02-23 PROCEDURE — 99285 EMERGENCY DEPT VISIT HI MDM: CPT

## 2019-02-23 PROCEDURE — 700102 HCHG RX REV CODE 250 W/ 637 OVERRIDE(OP): Performed by: STUDENT IN AN ORGANIZED HEALTH CARE EDUCATION/TRAINING PROGRAM

## 2019-02-23 PROCEDURE — A9270 NON-COVERED ITEM OR SERVICE: HCPCS | Performed by: STUDENT IN AN ORGANIZED HEALTH CARE EDUCATION/TRAINING PROGRAM

## 2019-02-23 PROCEDURE — 93005 ELECTROCARDIOGRAM TRACING: CPT | Performed by: EMERGENCY MEDICINE

## 2019-02-23 PROCEDURE — 96365 THER/PROPH/DIAG IV INF INIT: CPT

## 2019-02-23 PROCEDURE — 83690 ASSAY OF LIPASE: CPT

## 2019-02-23 PROCEDURE — 700111 HCHG RX REV CODE 636 W/ 250 OVERRIDE (IP): Performed by: STUDENT IN AN ORGANIZED HEALTH CARE EDUCATION/TRAINING PROGRAM

## 2019-02-23 PROCEDURE — 85610 PROTHROMBIN TIME: CPT

## 2019-02-23 PROCEDURE — 770020 HCHG ROOM/CARE - TELE (206)

## 2019-02-23 PROCEDURE — 96376 TX/PRO/DX INJ SAME DRUG ADON: CPT

## 2019-02-23 PROCEDURE — 94760 N-INVAS EAR/PLS OXIMETRY 1: CPT

## 2019-02-23 PROCEDURE — 700101 HCHG RX REV CODE 250: Performed by: EMERGENCY MEDICINE

## 2019-02-23 PROCEDURE — 84484 ASSAY OF TROPONIN QUANT: CPT

## 2019-02-23 PROCEDURE — 700102 HCHG RX REV CODE 250 W/ 637 OVERRIDE(OP): Performed by: EMERGENCY MEDICINE

## 2019-02-23 RX ORDER — LORAZEPAM 2 MG/ML
0.5 INJECTION INTRAMUSCULAR EVERY 4 HOURS PRN
Status: DISCONTINUED | OUTPATIENT
Start: 2019-02-23 | End: 2019-02-23

## 2019-02-23 RX ORDER — LORAZEPAM 1 MG/1
1 TABLET ORAL EVERY 4 HOURS PRN
Status: DISCONTINUED | OUTPATIENT
Start: 2019-02-23 | End: 2019-02-24

## 2019-02-23 RX ORDER — LORAZEPAM 2 MG/ML
1.5 INJECTION INTRAMUSCULAR
Status: DISCONTINUED | OUTPATIENT
Start: 2019-02-23 | End: 2019-02-23

## 2019-02-23 RX ORDER — LORAZEPAM 2 MG/ML
2 INJECTION INTRAMUSCULAR
Status: DISCONTINUED | OUTPATIENT
Start: 2019-02-23 | End: 2019-02-24

## 2019-02-23 RX ORDER — LORAZEPAM 1 MG/1
4 TABLET ORAL
Status: DISCONTINUED | OUTPATIENT
Start: 2019-02-23 | End: 2019-02-24

## 2019-02-23 RX ORDER — LORAZEPAM 1 MG/1
0.5 TABLET ORAL EVERY 4 HOURS PRN
Status: DISCONTINUED | OUTPATIENT
Start: 2019-02-23 | End: 2019-02-24

## 2019-02-23 RX ORDER — LORAZEPAM 2 MG/ML
2 INJECTION INTRAMUSCULAR
Status: DISCONTINUED | OUTPATIENT
Start: 2019-02-23 | End: 2019-02-23

## 2019-02-23 RX ORDER — FOLIC ACID 1 MG/1
1 TABLET ORAL DAILY
Status: DISCONTINUED | OUTPATIENT
Start: 2019-02-24 | End: 2019-02-23

## 2019-02-23 RX ORDER — DEXTROAMPHETAMINE SACCHARATE, AMPHETAMINE ASPARTATE MONOHYDRATE, DEXTROAMPHETAMINE SULFATE AND AMPHETAMINE SULFATE 7.5; 7.5; 7.5; 7.5 MG/1; MG/1; MG/1; MG/1
30 CAPSULE, EXTENDED RELEASE ORAL DAILY
COMMUNITY
End: 2019-02-27

## 2019-02-23 RX ORDER — LORAZEPAM 2 MG/ML
1.5 INJECTION INTRAMUSCULAR
Status: DISCONTINUED | OUTPATIENT
Start: 2019-02-23 | End: 2019-02-24

## 2019-02-23 RX ORDER — LORAZEPAM 2 MG/ML
1 INJECTION INTRAMUSCULAR ONCE
Status: COMPLETED | OUTPATIENT
Start: 2019-02-23 | End: 2019-02-23

## 2019-02-23 RX ORDER — LORAZEPAM 1 MG/1
3 TABLET ORAL
Status: DISCONTINUED | OUTPATIENT
Start: 2019-02-23 | End: 2019-02-23

## 2019-02-23 RX ORDER — ENALAPRILAT 1.25 MG/ML
1.25 INJECTION INTRAVENOUS EVERY 6 HOURS PRN
Status: DISCONTINUED | OUTPATIENT
Start: 2019-02-23 | End: 2019-02-23

## 2019-02-23 RX ORDER — LORAZEPAM 1 MG/1
4 TABLET ORAL
Status: DISCONTINUED | OUTPATIENT
Start: 2019-02-23 | End: 2019-02-23

## 2019-02-23 RX ORDER — CLONIDINE HYDROCHLORIDE 0.1 MG/1
0.1 TABLET ORAL
Status: DISCONTINUED | OUTPATIENT
Start: 2019-02-23 | End: 2019-02-24

## 2019-02-23 RX ORDER — AMOXICILLIN 250 MG
2 CAPSULE ORAL 2 TIMES DAILY
Status: DISCONTINUED | OUTPATIENT
Start: 2019-02-23 | End: 2019-02-27 | Stop reason: HOSPADM

## 2019-02-23 RX ORDER — POLYETHYLENE GLYCOL 3350 17 G/17G
1 POWDER, FOR SOLUTION ORAL
Status: DISCONTINUED | OUTPATIENT
Start: 2019-02-23 | End: 2019-02-27 | Stop reason: HOSPADM

## 2019-02-23 RX ORDER — LORAZEPAM 2 MG/ML
0.5 INJECTION INTRAMUSCULAR EVERY 4 HOURS PRN
Status: DISCONTINUED | OUTPATIENT
Start: 2019-02-23 | End: 2019-02-24

## 2019-02-23 RX ORDER — LORAZEPAM 2 MG/ML
1 INJECTION INTRAMUSCULAR
Status: DISCONTINUED | OUTPATIENT
Start: 2019-02-23 | End: 2019-02-24

## 2019-02-23 RX ORDER — LORAZEPAM 2 MG/ML
1 INJECTION INTRAMUSCULAR
Status: DISCONTINUED | OUTPATIENT
Start: 2019-02-23 | End: 2019-02-23

## 2019-02-23 RX ORDER — LORAZEPAM 1 MG/1
2 TABLET ORAL
Status: DISCONTINUED | OUTPATIENT
Start: 2019-02-23 | End: 2019-02-24

## 2019-02-23 RX ORDER — LORAZEPAM 1 MG/1
2 TABLET ORAL
Status: DISCONTINUED | OUTPATIENT
Start: 2019-02-23 | End: 2019-02-23

## 2019-02-23 RX ORDER — LORAZEPAM 1 MG/1
0.5 TABLET ORAL EVERY 4 HOURS PRN
Status: DISCONTINUED | OUTPATIENT
Start: 2019-02-23 | End: 2019-02-23

## 2019-02-23 RX ORDER — LORAZEPAM 1 MG/1
1 TABLET ORAL EVERY 4 HOURS PRN
Status: DISCONTINUED | OUTPATIENT
Start: 2019-02-23 | End: 2019-02-23

## 2019-02-23 RX ORDER — LORAZEPAM 1 MG/1
3 TABLET ORAL
Status: DISCONTINUED | OUTPATIENT
Start: 2019-02-23 | End: 2019-02-24

## 2019-02-23 RX ORDER — BISACODYL 10 MG
10 SUPPOSITORY, RECTAL RECTAL
Status: DISCONTINUED | OUTPATIENT
Start: 2019-02-23 | End: 2019-02-27 | Stop reason: HOSPADM

## 2019-02-23 RX ORDER — THIAMINE MONONITRATE (VIT B1) 100 MG
100 TABLET ORAL DAILY
Status: DISCONTINUED | OUTPATIENT
Start: 2019-02-24 | End: 2019-02-23

## 2019-02-23 RX ORDER — LORAZEPAM 1 MG/1
1 TABLET ORAL ONCE
Status: COMPLETED | OUTPATIENT
Start: 2019-02-23 | End: 2019-02-23

## 2019-02-23 RX ADMIN — LORAZEPAM 1.5 MG: 2 INJECTION INTRAMUSCULAR; INTRAVENOUS at 23:55

## 2019-02-23 RX ADMIN — LORAZEPAM 1 MG: 2 INJECTION INTRAMUSCULAR; INTRAVENOUS at 23:12

## 2019-02-23 RX ADMIN — LORAZEPAM 1 MG: 2 INJECTION INTRAMUSCULAR; INTRAVENOUS at 21:17

## 2019-02-23 RX ADMIN — LORAZEPAM 1 MG: 1 TABLET ORAL at 18:30

## 2019-02-23 RX ADMIN — CLONIDINE 1 PATCH: 0.3 PATCH, EXTENDED RELEASE TRANSDERMAL at 23:30

## 2019-02-23 RX ADMIN — THIAMINE HYDROCHLORIDE: 100 INJECTION, SOLUTION INTRAMUSCULAR; INTRAVENOUS at 20:00

## 2019-02-23 RX ADMIN — LORAZEPAM 1 MG: 2 INJECTION INTRAMUSCULAR; INTRAVENOUS at 19:57

## 2019-02-23 ASSESSMENT — LIFESTYLE VARIABLES
TOTAL SCORE: 16
PAROXYSMAL SWEATS: *
AUDITORY DISTURBANCES: VERY MILD HARSHNESS OR ABILITY TO FRIGHTEN
AGITATION: SOMEWHAT MORE THAN NORMAL ACTIVITY
AGITATION: *
NAUSEA AND VOMITING: MILD NAUSEA WITH NO VOMITING
VISUAL DISTURBANCES: VERY MILD SENSITIVITY
ORIENTATION AND CLOUDING OF SENSORIUM: ORIENTED AND CAN DO SERIAL ADDITIONS
HEADACHE, FULLNESS IN HEAD: MILD
TACTILE DISTURBANCES: VERY MILD ITCHING, PINS AND NEEDLES SENSATION, BURNING OR NUMBNESS
ANXIETY: MODERATELY ANXIOUS OR GUARDED, SO ANXIETY IS INFERRED
ANXIETY: *
HEADACHE, FULLNESS IN HEAD: MODERATE
ORIENTATION AND CLOUDING OF SENSORIUM: ORIENTED AND CAN DO SERIAL ADDITIONS
TREMOR: *
PAROXYSMAL SWEATS: *
TOTAL SCORE: MILD ITCHING, PINS AND NEEDLES SENSATION, BURNING OR NUMBNESS
NAUSEA AND VOMITING: *
TOTAL SCORE: 19
TREMOR: *
VISUAL DISTURBANCES: MILD SENSITIVITY
AUDITORY DISTURBANCES: NOT PRESENT

## 2019-02-24 LAB
25(OH)D3 SERPL-MCNC: 15 NG/ML (ref 30–100)
ALBUMIN SERPL BCP-MCNC: 4.1 G/DL (ref 3.2–4.9)
ALBUMIN/GLOB SERPL: 1.4 G/DL
ALP SERPL-CCNC: 90 U/L (ref 30–99)
ALT SERPL-CCNC: 77 U/L (ref 2–50)
AMPHET UR QL SCN: NEGATIVE
ANION GAP SERPL CALC-SCNC: 14 MMOL/L (ref 0–11.9)
ANION GAP SERPL CALC-SCNC: 6 MMOL/L (ref 0–11.9)
AST SERPL-CCNC: 143 U/L (ref 12–45)
BARBITURATES UR QL SCN: NEGATIVE
BASOPHILS # BLD AUTO: 0.4 % (ref 0–1.8)
BASOPHILS # BLD: 0.03 K/UL (ref 0–0.12)
BENZODIAZ UR QL SCN: NEGATIVE
BILIRUB SERPL-MCNC: 0.7 MG/DL (ref 0.1–1.5)
BUN SERPL-MCNC: 10 MG/DL (ref 8–22)
BUN SERPL-MCNC: 9 MG/DL (ref 8–22)
BZE UR QL SCN: NEGATIVE
CALCIUM SERPL-MCNC: 8.7 MG/DL (ref 8.5–10.5)
CALCIUM SERPL-MCNC: 9.4 MG/DL (ref 8.5–10.5)
CANNABINOIDS UR QL SCN: NEGATIVE
CHLORIDE SERPL-SCNC: 97 MMOL/L (ref 96–112)
CHLORIDE SERPL-SCNC: 98 MMOL/L (ref 96–112)
CO2 SERPL-SCNC: 26 MMOL/L (ref 20–33)
CO2 SERPL-SCNC: 31 MMOL/L (ref 20–33)
CREAT SERPL-MCNC: 0.69 MG/DL (ref 0.5–1.4)
CREAT SERPL-MCNC: 0.81 MG/DL (ref 0.5–1.4)
EOSINOPHIL # BLD AUTO: 0 K/UL (ref 0–0.51)
EOSINOPHIL NFR BLD: 0 % (ref 0–6.9)
ERYTHROCYTE [DISTWIDTH] IN BLOOD BY AUTOMATED COUNT: 45 FL (ref 35.9–50)
GLOBULIN SER CALC-MCNC: 2.9 G/DL (ref 1.9–3.5)
GLUCOSE SERPL-MCNC: 105 MG/DL (ref 65–99)
GLUCOSE SERPL-MCNC: 159 MG/DL (ref 65–99)
HCT VFR BLD AUTO: 41.5 % (ref 42–52)
HGB BLD-MCNC: 14.4 G/DL (ref 14–18)
IMM GRANULOCYTES # BLD AUTO: 0.01 K/UL (ref 0–0.11)
IMM GRANULOCYTES NFR BLD AUTO: 0.1 % (ref 0–0.9)
LYMPHOCYTES # BLD AUTO: 2.04 K/UL (ref 1–4.8)
LYMPHOCYTES NFR BLD: 29.4 % (ref 22–41)
MAGNESIUM SERPL-MCNC: 1.9 MG/DL (ref 1.5–2.5)
MAGNESIUM SERPL-MCNC: 2.3 MG/DL (ref 1.5–2.5)
MCH RBC QN AUTO: 32.1 PG (ref 27–33)
MCHC RBC AUTO-ENTMCNC: 34.7 G/DL (ref 33.7–35.3)
MCV RBC AUTO: 92.6 FL (ref 81.4–97.8)
METHADONE UR QL SCN: NEGATIVE
MONOCYTES # BLD AUTO: 0.65 K/UL (ref 0–0.85)
MONOCYTES NFR BLD AUTO: 9.4 % (ref 0–13.4)
NEUTROPHILS # BLD AUTO: 4.2 K/UL (ref 1.82–7.42)
NEUTROPHILS NFR BLD: 60.7 % (ref 44–72)
NRBC # BLD AUTO: 0 K/UL
NRBC BLD-RTO: 0 /100 WBC
OPIATES UR QL SCN: NEGATIVE
OXYCODONE UR QL SCN: NEGATIVE
PCP UR QL SCN: NEGATIVE
PHOSPHATE SERPL-MCNC: 2.6 MG/DL (ref 2.5–4.5)
PHOSPHATE SERPL-MCNC: 3.1 MG/DL (ref 2.5–4.5)
PLATELET # BLD AUTO: 132 K/UL (ref 164–446)
PMV BLD AUTO: 10.3 FL (ref 9–12.9)
POTASSIUM SERPL-SCNC: 3.4 MMOL/L (ref 3.6–5.5)
POTASSIUM SERPL-SCNC: 3.5 MMOL/L (ref 3.6–5.5)
PREALB SERPL-MCNC: 32 MG/DL (ref 18–38)
PROPOXYPH UR QL SCN: NEGATIVE
PROT SERPL-MCNC: 7 G/DL (ref 6–8.2)
RBC # BLD AUTO: 4.48 M/UL (ref 4.7–6.1)
SODIUM SERPL-SCNC: 134 MMOL/L (ref 135–145)
SODIUM SERPL-SCNC: 138 MMOL/L (ref 135–145)
TSH SERPL DL<=0.005 MIU/L-ACNC: 1.82 UIU/ML (ref 0.38–5.33)
WBC # BLD AUTO: 6.9 K/UL (ref 4.8–10.8)

## 2019-02-24 PROCEDURE — 700102 HCHG RX REV CODE 250 W/ 637 OVERRIDE(OP): Performed by: INTERNAL MEDICINE

## 2019-02-24 PROCEDURE — 99291 CRITICAL CARE FIRST HOUR: CPT | Performed by: INTERNAL MEDICINE

## 2019-02-24 PROCEDURE — 84443 ASSAY THYROID STIM HORMONE: CPT

## 2019-02-24 PROCEDURE — 84100 ASSAY OF PHOSPHORUS: CPT

## 2019-02-24 PROCEDURE — 700105 HCHG RX REV CODE 258: Performed by: INTERNAL MEDICINE

## 2019-02-24 PROCEDURE — 83735 ASSAY OF MAGNESIUM: CPT

## 2019-02-24 PROCEDURE — 85025 COMPLETE CBC W/AUTO DIFF WBC: CPT

## 2019-02-24 PROCEDURE — 700111 HCHG RX REV CODE 636 W/ 250 OVERRIDE (IP): Performed by: INTERNAL MEDICINE

## 2019-02-24 PROCEDURE — 770022 HCHG ROOM/CARE - ICU (200)

## 2019-02-24 PROCEDURE — 700105 HCHG RX REV CODE 258: Performed by: STUDENT IN AN ORGANIZED HEALTH CARE EDUCATION/TRAINING PROGRAM

## 2019-02-24 PROCEDURE — A9270 NON-COVERED ITEM OR SERVICE: HCPCS | Performed by: STUDENT IN AN ORGANIZED HEALTH CARE EDUCATION/TRAINING PROGRAM

## 2019-02-24 PROCEDURE — 82306 VITAMIN D 25 HYDROXY: CPT

## 2019-02-24 PROCEDURE — 84134 ASSAY OF PREALBUMIN: CPT

## 2019-02-24 PROCEDURE — A9270 NON-COVERED ITEM OR SERVICE: HCPCS | Performed by: INTERNAL MEDICINE

## 2019-02-24 PROCEDURE — 80053 COMPREHEN METABOLIC PANEL: CPT

## 2019-02-24 PROCEDURE — 80048 BASIC METABOLIC PNL TOTAL CA: CPT

## 2019-02-24 PROCEDURE — 700102 HCHG RX REV CODE 250 W/ 637 OVERRIDE(OP): Performed by: STUDENT IN AN ORGANIZED HEALTH CARE EDUCATION/TRAINING PROGRAM

## 2019-02-24 PROCEDURE — 80307 DRUG TEST PRSMV CHEM ANLYZR: CPT

## 2019-02-24 PROCEDURE — 700111 HCHG RX REV CODE 636 W/ 250 OVERRIDE (IP): Performed by: STUDENT IN AN ORGANIZED HEALTH CARE EDUCATION/TRAINING PROGRAM

## 2019-02-24 PROCEDURE — 99223 1ST HOSP IP/OBS HIGH 75: CPT | Mod: GC | Performed by: INTERNAL MEDICINE

## 2019-02-24 RX ORDER — PHENOBARBITAL SODIUM 130 MG/ML
130 INJECTION INTRAMUSCULAR; INTRAVENOUS
Status: DISCONTINUED | OUTPATIENT
Start: 2019-02-24 | End: 2019-02-26

## 2019-02-24 RX ORDER — THIAMINE MONONITRATE (VIT B1) 100 MG
100 TABLET ORAL DAILY
Status: DISCONTINUED | OUTPATIENT
Start: 2019-02-24 | End: 2019-02-27 | Stop reason: HOSPADM

## 2019-02-24 RX ORDER — SODIUM CHLORIDE, SODIUM LACTATE, POTASSIUM CHLORIDE, CALCIUM CHLORIDE 600; 310; 30; 20 MG/100ML; MG/100ML; MG/100ML; MG/100ML
1000 INJECTION, SOLUTION INTRAVENOUS ONCE
Status: COMPLETED | OUTPATIENT
Start: 2019-02-24 | End: 2019-02-24

## 2019-02-24 RX ORDER — MAGNESIUM SULFATE 1 G/100ML
1 INJECTION INTRAVENOUS ONCE
Status: COMPLETED | OUTPATIENT
Start: 2019-02-24 | End: 2019-02-24

## 2019-02-24 RX ORDER — POTASSIUM CHLORIDE 20 MEQ/1
20 TABLET, EXTENDED RELEASE ORAL ONCE
Status: COMPLETED | OUTPATIENT
Start: 2019-02-24 | End: 2019-02-24

## 2019-02-24 RX ORDER — PHENOBARBITAL SODIUM 130 MG/ML
260 INJECTION INTRAMUSCULAR; INTRAVENOUS
Status: DISCONTINUED | OUTPATIENT
Start: 2019-02-24 | End: 2019-02-26

## 2019-02-24 RX ORDER — FOLIC ACID 1 MG/1
1 TABLET ORAL DAILY
Status: DISCONTINUED | OUTPATIENT
Start: 2019-02-24 | End: 2019-02-27 | Stop reason: HOSPADM

## 2019-02-24 RX ORDER — SODIUM CHLORIDE, SODIUM LACTATE, POTASSIUM CHLORIDE, CALCIUM CHLORIDE 600; 310; 30; 20 MG/100ML; MG/100ML; MG/100ML; MG/100ML
1000 INJECTION, SOLUTION INTRAVENOUS CONTINUOUS
Status: DISCONTINUED | OUTPATIENT
Start: 2019-02-24 | End: 2019-02-27 | Stop reason: HOSPADM

## 2019-02-24 RX ORDER — MAGNESIUM SULFATE HEPTAHYDRATE 40 MG/ML
2 INJECTION, SOLUTION INTRAVENOUS ONCE
Status: DISCONTINUED | OUTPATIENT
Start: 2019-02-24 | End: 2019-02-24

## 2019-02-24 RX ORDER — SODIUM CHLORIDE, SODIUM LACTATE, POTASSIUM CHLORIDE, AND CALCIUM CHLORIDE .6; .31; .03; .02 G/100ML; G/100ML; G/100ML; G/100ML
1000 INJECTION, SOLUTION INTRAVENOUS ONCE
Status: COMPLETED | OUTPATIENT
Start: 2019-02-24 | End: 2019-02-24

## 2019-02-24 RX ORDER — POTASSIUM CHLORIDE 20 MEQ/1
40 TABLET, EXTENDED RELEASE ORAL ONCE
Status: DISCONTINUED | OUTPATIENT
Start: 2019-02-24 | End: 2019-02-24

## 2019-02-24 RX ADMIN — PHENOBARBITAL SODIUM 684 MG: 130 INJECTION INTRAMUSCULAR; INTRAVENOUS at 13:50

## 2019-02-24 RX ADMIN — LORAZEPAM 1.5 MG: 2 INJECTION INTRAMUSCULAR; INTRAVENOUS at 09:01

## 2019-02-24 RX ADMIN — FOLIC ACID 1 MG: 1 TABLET ORAL at 11:32

## 2019-02-24 RX ADMIN — SODIUM CHLORIDE, POTASSIUM CHLORIDE, SODIUM LACTATE AND CALCIUM CHLORIDE 1000 ML: 600; 310; 30; 20 INJECTION, SOLUTION INTRAVENOUS at 16:00

## 2019-02-24 RX ADMIN — LORAZEPAM 1.5 MG: 2 INJECTION INTRAMUSCULAR; INTRAVENOUS at 02:06

## 2019-02-24 RX ADMIN — Medication 100 MG: at 11:32

## 2019-02-24 RX ADMIN — LORAZEPAM 1 MG: 2 INJECTION INTRAMUSCULAR; INTRAVENOUS at 08:01

## 2019-02-24 RX ADMIN — LORAZEPAM 3 MG: 2 TABLET ORAL at 05:09

## 2019-02-24 RX ADMIN — LORAZEPAM 1.5 MG: 2 INJECTION INTRAMUSCULAR; INTRAVENOUS at 04:05

## 2019-02-24 RX ADMIN — LORAZEPAM 1.5 MG: 2 INJECTION INTRAMUSCULAR; INTRAVENOUS at 03:05

## 2019-02-24 RX ADMIN — VITAMIN D, TAB 1000IU (100/BT) 1000 UNITS: 25 TAB at 05:10

## 2019-02-24 RX ADMIN — SODIUM CHLORIDE, POTASSIUM CHLORIDE, SODIUM LACTATE AND CALCIUM CHLORIDE 1000 ML: 600; 310; 30; 20 INJECTION, SOLUTION INTRAVENOUS at 15:22

## 2019-02-24 RX ADMIN — SODIUM CHLORIDE, POTASSIUM CHLORIDE, SODIUM LACTATE AND CALCIUM CHLORIDE 1000 ML: 600; 310; 30; 20 INJECTION, SOLUTION INTRAVENOUS at 11:00

## 2019-02-24 RX ADMIN — LORAZEPAM 2 MG: 2 INJECTION INTRAMUSCULAR; INTRAVENOUS at 08:01

## 2019-02-24 RX ADMIN — LORAZEPAM 2 MG: 2 INJECTION INTRAMUSCULAR; INTRAVENOUS at 07:47

## 2019-02-24 RX ADMIN — LORAZEPAM 1.5 MG: 2 INJECTION INTRAMUSCULAR; INTRAVENOUS at 07:27

## 2019-02-24 RX ADMIN — LORAZEPAM 1.5 MG: 2 INJECTION INTRAMUSCULAR; INTRAVENOUS at 06:15

## 2019-02-24 RX ADMIN — POTASSIUM CHLORIDE 20 MEQ: 1500 TABLET, EXTENDED RELEASE ORAL at 17:50

## 2019-02-24 RX ADMIN — THERA TABS 1 TABLET: TAB at 11:32

## 2019-02-24 RX ADMIN — MAGNESIUM SULFATE IN DEXTROSE 1 G: 10 INJECTION, SOLUTION INTRAVENOUS at 18:00

## 2019-02-24 RX ADMIN — SODIUM CHLORIDE, POTASSIUM CHLORIDE, SODIUM LACTATE AND CALCIUM CHLORIDE 1000 ML: 600; 310; 30; 20 INJECTION, SOLUTION INTRAVENOUS at 03:57

## 2019-02-24 RX ADMIN — ENOXAPARIN SODIUM 40 MG: 100 INJECTION SUBCUTANEOUS at 05:12

## 2019-02-24 RX ADMIN — LORAZEPAM 1.5 MG: 2 INJECTION INTRAMUSCULAR; INTRAVENOUS at 01:05

## 2019-02-24 RX ADMIN — PHENOBARBITAL SODIUM 130 MG: 130 INJECTION INTRAMUSCULAR; INTRAVENOUS at 20:23

## 2019-02-24 ASSESSMENT — COGNITIVE AND FUNCTIONAL STATUS - GENERAL
MOBILITY SCORE: 21
SUGGESTED CMS G CODE MODIFIER DAILY ACTIVITY: CH
DAILY ACTIVITIY SCORE: 24
CLIMB 3 TO 5 STEPS WITH RAILING: A LITTLE
STANDING UP FROM CHAIR USING ARMS: A LITTLE
WALKING IN HOSPITAL ROOM: A LITTLE
SUGGESTED CMS G CODE MODIFIER MOBILITY: CJ

## 2019-02-24 ASSESSMENT — LIFESTYLE VARIABLES
AGITATION: SOMEWHAT MORE THAN NORMAL ACTIVITY
TOTAL SCORE: 4
AUDITORY DISTURBANCES: NOT PRESENT
TOTAL SCORE: 15
TREMOR: *
TOTAL SCORE: MODERATE ITCHING, PINS AND NEEDLES SENSATION, BURNING OR NUMBNESS
PAROXYSMAL SWEATS: BEADS OF SWEAT OBVIOUS ON FOREHEAD
NAUSEA AND VOMITING: MILD NAUSEA WITH NO VOMITING
TREMOR: MODERATE TREMOR WITH ARMS EXTENDED
ANXIETY: *
DOES PATIENT WANT TO STOP DRINKING: YES
ANXIETY: *
VISUAL DISTURBANCES: MODERATE SENSITIVITY
TOTAL SCORE: 24
HEADACHE, FULLNESS IN HEAD: MODERATE
VISUAL DISTURBANCES: MILD SENSITIVITY
HEADACHE, FULLNESS IN HEAD: MODERATELY SEVERE
TOTAL SCORE: 14
ANXIETY: *
AGITATION: *
TOTAL SCORE: MODERATE ITCHING, PINS AND NEEDLES SENSATION, BURNING OR NUMBNESS
HEADACHE, FULLNESS IN HEAD: MILD
TOTAL SCORE: MILD ITCHING, PINS AND NEEDLES SENSATION, BURNING OR NUMBNESS
ON A TYPICAL DAY WHEN YOU DRINK ALCOHOL HOW MANY DRINKS DO YOU HAVE: 8
TOTAL SCORE: 4
EVER FELT BAD OR GUILTY ABOUT YOUR DRINKING: YES
ORIENTATION AND CLOUDING OF SENSORIUM: ORIENTED AND CAN DO SERIAL ADDITIONS
NAUSEA AND VOMITING: NO NAUSEA AND NO VOMITING
HAVE PEOPLE ANNOYED YOU BY CRITICIZING YOUR DRINKING: YES
PAROXYSMAL SWEATS: *
AUDITORY DISTURBANCES: NOT PRESENT
NAUSEA AND VOMITING: *
ORIENTATION AND CLOUDING OF SENSORIUM: ORIENTED AND CAN DO SERIAL ADDITIONS
TREMOR: MODERATE TREMOR WITH ARMS EXTENDED
TOTAL SCORE: 15
HEADACHE, FULLNESS IN HEAD: MILD
PAROXYSMAL SWEATS: BARELY PERCEPTIBLE SWEATING. PALMS MOIST
NAUSEA AND VOMITING: NO NAUSEA AND NO VOMITING
VISUAL DISTURBANCES: MILD SENSITIVITY
AGITATION: *
TOTAL SCORE: MILD ITCHING, PINS AND NEEDLES SENSATION, BURNING OR NUMBNESS
VISUAL DISTURBANCES: MODERATE SENSITIVITY
TREMOR: *
TOTAL SCORE: MODERATE ITCHING, PINS AND NEEDLES SENSATION, BURNING OR NUMBNESS
AUDITORY DISTURBANCES: NOT PRESENT
AGITATION: *
TOTAL SCORE: 4
ORIENTATION AND CLOUDING OF SENSORIUM: ORIENTED AND CAN DO SERIAL ADDITIONS
HEADACHE, FULLNESS IN HEAD: MILD
TOTAL SCORE: VERY MILD ITCHING, PINS AND NEEDLES SENSATION, BURNING OR NUMBNESS
TOTAL SCORE: MODERATE ITCHING, PINS AND NEEDLES SENSATION, BURNING OR NUMBNESS
TOTAL SCORE: 20
HAVE YOU EVER FELT YOU SHOULD CUT DOWN ON YOUR DRINKING: YES
ANXIETY: MODERATELY ANXIOUS OR GUARDED, SO ANXIETY IS INFERRED
TREMOR: *
AUDITORY DISTURBANCES: NOT PRESENT
AUDITORY DISTURBANCES: NOT PRESENT
AGITATION: MODERATELY FIDGETY AND RESTLESS
TOTAL SCORE: 19
AGITATION: MODERATELY FIDGETY AND RESTLESS
TREMOR: *
ORIENTATION AND CLOUDING OF SENSORIUM: ORIENTED AND CAN DO SERIAL ADDITIONS
AUDITORY DISTURBANCES: NOT PRESENT
PAROXYSMAL SWEATS: NO SWEAT VISIBLE
ANXIETY: *
AUDITORY DISTURBANCES: NOT PRESENT
NAUSEA AND VOMITING: *
AGITATION: SOMEWHAT MORE THAN NORMAL ACTIVITY
VISUAL DISTURBANCES: MILD SENSITIVITY
ORIENTATION AND CLOUDING OF SENSORIUM: CANNOT DO SERIAL ADDITIONS OR IS UNCERTAIN ABOUT DATE
HEADACHE, FULLNESS IN HEAD: MILD
ORIENTATION AND CLOUDING OF SENSORIUM: ORIENTED AND CAN DO SERIAL ADDITIONS
AGITATION: *
ORIENTATION AND CLOUDING OF SENSORIUM: ORIENTED AND CAN DO SERIAL ADDITIONS
HEADACHE, FULLNESS IN HEAD: MODERATELY SEVERE
ANXIETY: MODERATELY ANXIOUS OR GUARDED, SO ANXIETY IS INFERRED
NAUSEA AND VOMITING: NO NAUSEA AND NO VOMITING
VISUAL DISTURBANCES: MODERATE SENSITIVITY
ANXIETY: MODERATELY ANXIOUS OR GUARDED, SO ANXIETY IS INFERRED
TOTAL SCORE: 27
TOTAL SCORE: 18
AUDITORY DISTURBANCES: NOT PRESENT
ORIENTATION AND CLOUDING OF SENSORIUM: CANNOT DO SERIAL ADDITIONS OR IS UNCERTAIN ABOUT DATE
ANXIETY: *
ALCOHOL_USE: YES
TOTAL SCORE: 17
VISUAL DISTURBANCES: MODERATE SENSITIVITY
VISUAL DISTURBANCES: MODERATE SENSITIVITY
HOW MANY TIMES IN THE PAST YEAR HAVE YOU HAD 5 OR MORE DRINKS IN A DAY: 350
TREMOR: *
NAUSEA AND VOMITING: NO NAUSEA AND NO VOMITING
AUDITORY DISTURBANCES: NOT PRESENT
PAROXYSMAL SWEATS: *
TOTAL SCORE: MILD ITCHING, PINS AND NEEDLES SENSATION, BURNING OR NUMBNESS
NAUSEA AND VOMITING: MILD NAUSEA WITH NO VOMITING
PAROXYSMAL SWEATS: BARELY PERCEPTIBLE SWEATING. PALMS MOIST
VISUAL DISTURBANCES: MODERATELY SEVERE HALLUCINATIONS
ORIENTATION AND CLOUDING OF SENSORIUM: ORIENTED AND CAN DO SERIAL ADDITIONS
HEADACHE, FULLNESS IN HEAD: MILD
AVERAGE NUMBER OF DAYS PER WEEK YOU HAVE A DRINK CONTAINING ALCOHOL: 7
TREMOR: *
TOTAL SCORE: MILD ITCHING, PINS AND NEEDLES SENSATION, BURNING OR NUMBNESS
AUDITORY DISTURBANCES: NOT PRESENT
PAROXYSMAL SWEATS: NO SWEAT VISIBLE
HEADACHE, FULLNESS IN HEAD: MILD
ANXIETY: MILDLY ANXIOUS
TOTAL SCORE: MODERATELY SEVERE HALLUCINATIONS
TOTAL SCORE: MODERATE ITCHING, PINS AND NEEDLES SENSATION, BURNING OR NUMBNESS
TOTAL SCORE: 17
PAROXYSMAL SWEATS: *
NAUSEA AND VOMITING: NO NAUSEA AND NO VOMITING
VISUAL DISTURBANCES: MODERATE SENSITIVITY
ORIENTATION AND CLOUDING OF SENSORIUM: CANNOT DO SERIAL ADDITIONS OR IS UNCERTAIN ABOUT DATE
ANXIETY: *
ANXIETY: *
AGITATION: *
CONSUMPTION TOTAL: POSITIVE
PAROXYSMAL SWEATS: NO SWEAT VISIBLE
TOTAL SCORE: 31
PAROXYSMAL SWEATS: NO SWEAT VISIBLE
NAUSEA AND VOMITING: *
EVER HAD A DRINK FIRST THING IN THE MORNING TO STEADY YOUR NERVES TO GET RID OF A HANGOVER: YES
HEADACHE, FULLNESS IN HEAD: MILD
AUDITORY DISTURBANCES: NOT PRESENT
DOES PATIENT WANT TO TALK TO SOMEONE ABOUT QUITTING: YES
ORIENTATION AND CLOUDING OF SENSORIUM: ORIENTED AND CAN DO SERIAL ADDITIONS
SUBSTANCE_ABUSE: 1
TREMOR: *
AGITATION: *
TREMOR: MODERATE TREMOR WITH ARMS EXTENDED
AGITATION: *
HEADACHE, FULLNESS IN HEAD: MODERATELY SEVERE
PAROXYSMAL SWEATS: BEADS OF SWEAT OBVIOUS ON FOREHEAD
VISUAL DISTURBANCES: VERY MILD SENSITIVITY
TREMOR: *
NAUSEA AND VOMITING: *

## 2019-02-24 ASSESSMENT — PATIENT HEALTH QUESTIONNAIRE - PHQ9
1. LITTLE INTEREST OR PLEASURE IN DOING THINGS: NOT AT ALL
2. FEELING DOWN, DEPRESSED, IRRITABLE, OR HOPELESS: NOT AT ALL
SUM OF ALL RESPONSES TO PHQ9 QUESTIONS 1 AND 2: 0

## 2019-02-24 ASSESSMENT — ENCOUNTER SYMPTOMS
HEARTBURN: 0
DEPRESSION: 0
HEADACHES: 0
DOUBLE VISION: 0
HEMOPTYSIS: 0
COUGH: 0
BLURRED VISION: 0
NAUSEA: 0
FEVER: 0
CHILLS: 0
DIZZINESS: 0
PALPITATIONS: 0
HALLUCINATIONS: 1

## 2019-02-24 NOTE — ASSESSMENT & PLAN NOTE
Transaminitis. No evidence of cirrhosis. Normal synthetic function.  -supportive care  -encourage alcohol cessation as noted

## 2019-02-24 NOTE — ASSESSMENT & PLAN NOTE
Elevated transaminases. No evidence of cirrhosis, normal synthetic function.  -encourage cessation, provide counseling when withdrawal has resolved

## 2019-02-24 NOTE — ED NOTES
Patient appears more calm at this time. Lying on bed, RR even and unlabored on room air. Given boxed lunch per request. Will CTM.

## 2019-02-24 NOTE — ASSESSMENT & PLAN NOTE
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 These pts tend to have developmental delay and intellectual disability with impaired communication and socialization skills, as well as delayed development of speech and language.  -no acute intervention indicated

## 2019-02-24 NOTE — ED NOTES
"  Patient with HR of 109 while sleeping. At this time, HR 150s- patient sitting up on stretcher, rocking back and forth and dry heaving. States \"I don't feel well.\" Moved to bay 29 and placed on monitor. EKG done. MD Nicole aware of vitals.   "

## 2019-02-24 NOTE — PROGRESS NOTES
I got called by patient nurse regarding  as he received total of 12 mg of IV Ativan. His CIWA score 32 per nurse. By the time I stepped in to his room, Rapid response team by bedside assessing patient and recommended that patient has to be transferred to ICU based on frequency of Ativan given and his -150. Patient AXOXO3, Denies chest pain or sob. Blood pressure 117/60, , Saturating normally on 2L. EKG on admission sinus tachycardia. ICU team ( , ICU senior resident ) informed at 8:14 a.m. Will come to evaluate patient.

## 2019-02-24 NOTE — CONSULTS
Critical Care Consultation    Date of consult: 2/24/2019    Referring Physician  KAITLIN Zamorano*    Reason for Consultation  etoh withdrawal    History of Presenting Illness  33 y.o. male who presented 2/23/2019 with alcohol intoxication on 2/23.  His last drink was 12 hours prior to arrival.  He has been binge drinking for the past week.  Per the chart review he is intoxicated on admission and he has had multiple admissions for alcohol withdrawal.  Per the resident note on admission there is documented hallucinations.  At the time he denied any drug use or cigarette smoking.  Currently the patient is alert to person and place.  He has significant tremors.  He is uncomfortable and has some abdominal pain.  He says he cannot remember why he came to the hospital.  He does not remember his last drink.  He has no chest pain or shortness of breath.  History is limited due to altered mental status.    Code Status  Full Code    Review of Systems  Review of Systems   Unable to perform ROS: Mental status change       Past Medical History   has a past medical history of Acute anxiety; ADHD (attention deficit hyperactivity disorder); ADHD (attention deficit hyperactivity disorder); Alcohol abuse; Bipolar affective (HCC); Depression; Ectrodactyly-ectodermal dysplasia-clefting syndrome; ETOH abuse; and Psychiatric disorder.    Surgical History   has a past surgical history that includes other orthopedic surgery.    Family History  family history is not on file.    Social History   reports that he has quit smoking. His smoking use included Cigarettes. He smoked 0.25 packs per day. He has never used smokeless tobacco. He reports that he drinks alcohol. He reports that he does not use drugs.    Medications  Home Medications     Reviewed by Yuki Villa (Pharmacy Tech) on 02/23/19 at 2310  Med List Status: Complete   Medication Last Dose Status   amphetamine-dextroamphetamine ER (ADDERALL XR) 30 MG XR capsule  >2weeks Active              Current Facility-Administered Medications   Medication Dose Route Frequency Provider Last Rate Last Dose   • vitamin D (cholecalciferol) tablet 1,000 Units  1,000 Units Oral DAILY Aurelio Russo M.D.   1,000 Units at 02/24/19 0510   • lactated ringers infusion  1,000 mL Intravenous Continuous Aurelio Russo M.D. 100 mL/hr at 02/24/19 0357 1,000 mL at 02/24/19 0357   • cloNIDine (CATAPRES) 0.3 MG/24HR 1 Patch  1 Patch Transdermal Q7 DAYS Aurelio Russo M.D.   1 Patch at 02/23/19 2330   • senna-docusate (PERICOLACE or SENOKOT S) 8.6-50 MG per tablet 2 Tab  2 Tab Oral BID Aurelio Russo M.D.        And   • polyethylene glycol/lytes (MIRALAX) PACKET 1 Packet  1 Packet Oral QDAY PRN Aurelio Russo M.D.        And   • magnesium hydroxide (MILK OF MAGNESIA) suspension 30 mL  30 mL Oral QDAY PRN Aurelio Russo M.D.        And   • bisacodyl (DULCOLAX) suppository 10 mg  10 mg Rectal QDAY PRN Aurelio Russo M.D.       • enoxaparin (LOVENOX) inj 40 mg  40 mg Subcutaneous DAILY Aurelio Russo M.D.   40 mg at 02/24/19 0512   • LORazepam (ATIVAN) tablet 0.5 mg  0.5 mg Oral Q4HRS PRN Aurelio Russo M.D.       • LORazepam (ATIVAN) tablet 1 mg  1 mg Oral Q4HRS PRN Aurelio Russo M.D.        Or   • LORazepam (ATIVAN) injection 0.5 mg  0.5 mg Intravenous Q4HRS PRN Aurelio Russo M.D.       • LORazepam (ATIVAN) tablet 2 mg  2 mg Oral Q2HRS PRN Aurelio Russo M.D.        Or   • LORazepam (ATIVAN) injection 1 mg  1 mg Intravenous Q2HRS PRN Aurelio Russo M.D.   1 mg at 02/24/19 0801   • LORazepam (ATIVAN) tablet 3 mg  3 mg Oral Q HOUR PRN Aurelio Russo M.D.   3 mg at 02/24/19 0509    Or   • LORazepam (ATIVAN) injection 1.5 mg  1.5 mg Intravenous Q HOUR PRN Aurelio Russo M.D.   1.5 mg at 02/24/19 0901   • LORazepam (ATIVAN) tablet 4 mg  4 mg Oral Q15 MIN PRN Aurelio Russo M.D.        Or   • LORazepam (ATIVAN) injection 2 mg  2 mg Intravenous Q15 MIN PRN Aurelio Russo M.D.   2 mg at 02/24/19 0801   •  cloNIDine (CATAPRES) tablet 0.1 mg  0.1 mg Oral Q HOUR PRN Aurelio Russo M.D.           Allergies  No Known Allergies    Vital Signs last 24 hours  Temp:  [36.6 °C (97.9 °F)-37.8 °C (100 °F)] 37.4 °C (99.4 °F)  Pulse:  [109-150] 120  Resp:  [6-23] 20  BP: (111-137)/(56-93) 111/56  SpO2:  [92 %-100 %] 97 %    Physical Exam  Physical Exam   Constitutional: He appears distressed.   HENT:   Head: Normocephalic and atraumatic.   Right Ear: External ear normal.   Left Ear: External ear normal.   Mouth/Throat: No oropharyngeal exudate.   Eyes: Pupils are equal, round, and reactive to light. Conjunctivae and EOM are normal. Right eye exhibits no discharge. Left eye exhibits no discharge.   Neck: No JVD present. No tracheal deviation present.   Cardiovascular: Normal rate, regular rhythm and normal heart sounds.    No murmur heard.  Pulmonary/Chest: No stridor. He is in respiratory distress. He has no wheezes. He has no rales. He exhibits no tenderness.   Moderate air movement   Abdominal: He exhibits no mass. There is no tenderness. There is no rebound and no guarding.   Musculoskeletal: He exhibits no edema, tenderness or deformity.   Neurological: He is alert. He displays normal reflexes. No cranial nerve deficit. Coordination normal.   Bilateral tremors   Skin: Skin is warm. No rash noted. He is diaphoretic. No erythema.   Psychiatric: He has a normal mood and affect. His behavior is normal.   Nursing note and vitals reviewed.      Fluids    Intake/Output Summary (Last 24 hours) at 02/24/19 1040  Last data filed at 02/24/19 0200   Gross per 24 hour   Intake             1120 ml   Output                0 ml   Net             1120 ml       Laboratory  Recent Results (from the past 48 hour(s))   EKG    Collection Time: 02/23/19  7:47 PM   Result Value Ref Range    Report       [facility] Emergency Dept.    Test Date:  2019-02-23  Pt Name:    CHANCE DIAZ                 Department: ER  MRN:         [id number]                      Room:       [facility]  Gender:     Male                         Technician: [id number]  :        1985                   Requested By:CLARICE ERWIN  Order #:    [id number]                    Reading MD: Clarice Erwin MD    Measurements  Intervals                                Axis  Rate:       144                          P:  AL:                                      QRS:        76  QRSD:       80                           T:          58  QT:         292  QTc:        452    Interpretive Statements  SINUS TACHYCARDIA  Normal axis and intervals  No ectopy or hypertrophy  No ST or T wave change  Compared to ECG 2019 17:02:29  Increased rate otherwise no signficant change    Electronically Signed On 2019 19:59:45 PST by Clarice Erwin MD     CBC WITH DIFFERENTIAL    Collection Time: 19  7:50 PM   Result Value Ref Range    WBC 6.6 4.8 - 10.8 K/uL    RBC 5.13 4.70 - 6.10 M/uL    Hemoglobin 16.5 14.0 - 18.0 g/dL    Hematocrit 47.4 42.0 - 52.0 %    MCV 92.4 81.4 - 97.8 fL    MCH 32.2 27.0 - 33.0 pg    MCHC 34.8 33.7 - 35.3 g/dL    RDW 44.9 35.9 - 50.0 fL    Platelet Count 207 164 - 446 K/uL    MPV 10.9 9.0 - 12.9 fL    Neutrophils-Polys 67.00 44.00 - 72.00 %    Lymphocytes 28.00 22.00 - 41.00 %    Monocytes 4.30 0.00 - 13.40 %    Eosinophils 0.00 0.00 - 6.90 %    Basophils 0.50 0.00 - 1.80 %    Immature Granulocytes 0.20 0.00 - 0.90 %    Nucleated RBC 0.00 /100 WBC    Neutrophils (Absolute) 4.42 1.82 - 7.42 K/uL    Lymphs (Absolute) 1.84 1.00 - 4.80 K/uL    Monos (Absolute) 0.28 0.00 - 0.85 K/uL    Eos (Absolute) 0.00 0.00 - 0.51 K/uL    Baso (Absolute) 0.03 0.00 - 0.12 K/uL    Immature Granulocytes (abs) 0.01 0.00 - 0.11 K/uL    NRBC (Absolute) 0.00 K/uL   COMP METABOLIC PANEL    Collection Time: 19  7:50 PM   Result Value Ref Range    Sodium 135 135 - 145 mmol/L    Potassium 4.0 3.6 - 5.5 mmol/L    Chloride 96 96 - 112 mmol/L    Co2 22 20 - 33 mmol/L    Anion Gap 17.0  (H) 0.0 - 11.9    Glucose 99 65 - 99 mg/dL    Bun 11 8 - 22 mg/dL    Creatinine 0.77 0.50 - 1.40 mg/dL    Calcium 9.1 8.5 - 10.5 mg/dL    AST(SGOT) 202 (H) 12 - 45 U/L    ALT(SGPT) 97 (H) 2 - 50 U/L    Alkaline Phosphatase 107 (H) 30 - 99 U/L    Total Bilirubin 0.6 0.1 - 1.5 mg/dL    Albumin 4.7 3.2 - 4.9 g/dL    Total Protein 8.2 6.0 - 8.2 g/dL    Globulin 3.5 1.9 - 3.5 g/dL    A-G Ratio 1.3 g/dL   LIPASE    Collection Time: 02/23/19  7:50 PM   Result Value Ref Range    Lipase 110 (H) 11 - 82 U/L   TROPONIN    Collection Time: 02/23/19  7:50 PM   Result Value Ref Range    Troponin I <0.01 0.00 - 0.04 ng/mL   ESTIMATED GFR    Collection Time: 02/23/19  7:50 PM   Result Value Ref Range    GFR If African American >60 >60 mL/min/1.73 m 2    GFR If Non African American >60 >60 mL/min/1.73 m 2   Prothrombin Time (PT/INR)    Collection Time: 02/23/19  7:50 PM   Result Value Ref Range    PT 12.5 12.0 - 14.6 sec    INR 0.92 0.87 - 1.13   Urine Drug Screen    Collection Time: 02/24/19  2:24 AM   Result Value Ref Range    Amphetamines Urine Negative Negative    Barbiturates Negative Negative    Benzodiazepines Negative Negative    Cocaine Metabolite Negative Negative    Methadone Negative Negative    Opiates Negative Negative    Oxycodone Negative Negative    Phencyclidine -Pcp Negative Negative    Propoxyphene Negative Negative    Cannabinoid Metab Negative Negative   CBC with Differential    Collection Time: 02/24/19  2:35 AM   Result Value Ref Range    WBC 6.9 4.8 - 10.8 K/uL    RBC 4.48 (L) 4.70 - 6.10 M/uL    Hemoglobin 14.4 14.0 - 18.0 g/dL    Hematocrit 41.5 (L) 42.0 - 52.0 %    MCV 92.6 81.4 - 97.8 fL    MCH 32.1 27.0 - 33.0 pg    MCHC 34.7 33.7 - 35.3 g/dL    RDW 45.0 35.9 - 50.0 fL    Platelet Count 132 (L) 164 - 446 K/uL    MPV 10.3 9.0 - 12.9 fL    Neutrophils-Polys 60.70 44.00 - 72.00 %    Lymphocytes 29.40 22.00 - 41.00 %    Monocytes 9.40 0.00 - 13.40 %    Eosinophils 0.00 0.00 - 6.90 %    Basophils 0.40 0.00 -  1.80 %    Immature Granulocytes 0.10 0.00 - 0.90 %    Nucleated RBC 0.00 /100 WBC    Neutrophils (Absolute) 4.20 1.82 - 7.42 K/uL    Lymphs (Absolute) 2.04 1.00 - 4.80 K/uL    Monos (Absolute) 0.65 0.00 - 0.85 K/uL    Eos (Absolute) 0.00 0.00 - 0.51 K/uL    Baso (Absolute) 0.03 0.00 - 0.12 K/uL    Immature Granulocytes (abs) 0.01 0.00 - 0.11 K/uL    NRBC (Absolute) 0.00 K/uL   Comp Metabolic Panel (CMP)    Collection Time: 02/24/19  2:35 AM   Result Value Ref Range    Sodium 138 135 - 145 mmol/L    Potassium 3.5 (L) 3.6 - 5.5 mmol/L    Chloride 98 96 - 112 mmol/L    Co2 26 20 - 33 mmol/L    Anion Gap 14.0 (H) 0.0 - 11.9    Glucose 159 (H) 65 - 99 mg/dL    Bun 10 8 - 22 mg/dL    Creatinine 0.81 0.50 - 1.40 mg/dL    Calcium 8.7 8.5 - 10.5 mg/dL    AST(SGOT) 143 (H) 12 - 45 U/L    ALT(SGPT) 77 (H) 2 - 50 U/L    Alkaline Phosphatase 90 30 - 99 U/L    Total Bilirubin 0.7 0.1 - 1.5 mg/dL    Albumin 4.1 3.2 - 4.9 g/dL    Total Protein 7.0 6.0 - 8.2 g/dL    Globulin 2.9 1.9 - 3.5 g/dL    A-G Ratio 1.4 g/dL   Magnesium    Collection Time: 02/24/19  2:35 AM   Result Value Ref Range    Magnesium 2.3 1.5 - 2.5 mg/dL   ESTIMATED GFR    Collection Time: 02/24/19  2:35 AM   Result Value Ref Range    GFR If African American >60 >60 mL/min/1.73 m 2    GFR If Non African American >60 >60 mL/min/1.73 m 2   PHOSPHORUS    Collection Time: 02/24/19  3:43 AM   Result Value Ref Range    Phosphorus 3.1 2.5 - 4.5 mg/dL   TSH WITH REFLEX TO FT4    Collection Time: 02/24/19  3:43 AM   Result Value Ref Range    TSH 1.820 0.380 - 5.330 uIU/mL   PREALBUMIN    Collection Time: 02/24/19  3:43 AM   Result Value Ref Range    Pre-Albumin 32.0 18.0 - 38.0 mg/dL   VITAMIN D,25 HYDROXY    Collection Time: 02/24/19  3:43 AM   Result Value Ref Range    25-Hydroxy   Vitamin D 25 15 (L) 30 - 100 ng/mL       Imaging  No orders to display       Assessment/Plan  * Alcohol withdrawal (HCC)- (present on admission)   Assessment & Plan    Significant intoxication  on admission  High ciwa  Mvt/thiamine/folic acid  IVF resuscitation  Likely cause of elevated lipase  Phenobarb therapy     Serum lipase elevation- (present on admission)   Assessment & Plan    Likely from etoh abuse/intoxication  Mild abdominal pain  Soft on exam  IVF resuscitation, bolus then maint     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)   Assessment & Plan    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     Alcohol dependence (HCC)- (present on admission)   Assessment & Plan    Intoxication  Hx of mult hospitalizaitons  Phenobarb therapy   once less withdrawal     LFT elevation- (present on admission)   Assessment & Plan    Secondary to etoh abuse  Non significant abdominal exam     Tachycardia- (present on admission)   Assessment & Plan    IVF resuscitation  Phenobarb therpay for withdrawal     Hypokalemia- (present on admission)   Assessment & Plan    Replete to > 4     No need for ct head imaging as gcs > 13, no focal deficits    Discussed patient condition and risk of morbidity and/or mortality with RN, RT, Therapies, Pharmacy, , [facility] Gold resident, Code status disscussed, Charge nurse / hot rounds and Patient.      The patient remains critically ill.  Critical care time = 34 minutes in directly providing and coordinating critical care and extensive data review.  No time overlap and excludes procedures.

## 2019-02-24 NOTE — PROGRESS NOTES
Patient arrived to unit. Received report from ED RN, Pt assessed, A&Ox4, pt is on 2L of O2 via NC no SOB noted, pt has no complains of pain at this time. Call light within reach, personal belongings within reach. Bed is locked and in lowest position, Bed Strip alarm is on. Tele monitor on and monitor room notified, rhythm is sinus tach. Hourly rounding in place.

## 2019-02-24 NOTE — ED PROVIDER NOTES
"ED Provider Note    Scribed for Clarice Nicole M.D. by Milton Cooper. 2/23/2019  6:14 PM    Means of arrival: Ambulance  History obtained from: Patient  History limited by: Patient being a poor historian. See HPI for details.       CHIEF COMPLAINT  Chief Complaint   Patient presents with   • Alcohol Intoxication       HPI  Gopal Ceja is a 33 y.o. male with past medical history of significant alcohol abuse and withdrawal in addition to bipolar disorder who is well-known to the department who presents to the Emergency Department for evaluation of alcohol intoxication and need for detox onset about 3 hours ago. The patient states that he has been drinking \"a lot of alcohol today\", but he does not know when his last drink was. He was brought in by EMS who found him on the street. He endorses experiencing associated hallucinations and generalized body aches. He denies experiencing nausea or vomiting.     HPI is limited secondary to the patient being a poor historian. The patient is currently intoxicated.     REVIEW OF SYSTEMS  Pertinent positive include alcohol intoxication, hallucinations, and generalized body aches. Pertinent negative include nausea or vomiting.  ROS is limited secondary to the patient being a poor historian. See HPI for details.     PAST MEDICAL HISTORY   has a past medical history of Acute anxiety; ADHD (attention deficit hyperactivity disorder); ADHD (attention deficit hyperactivity disorder); Alcohol abuse; Bipolar affective (HCC); Depression; Ectrodactyly-ectodermal dysplasia-clefting syndrome; ETOH abuse; and Psychiatric disorder.    SOCIAL HISTORY  Social History     Social History Main Topics   • Smoking status: Former Smoker     Packs/day: 0.25     Types: Cigarettes   • Smokeless tobacco: Never Used      Comment: Smokes couple of times a month   • Alcohol use 0.0 oz/week      Comment: drinks 10 earthquakes a day   • Drug use: No   • Sexual activity: Not on file       SURGICAL " "HISTORY   has a past surgical history that includes other orthopedic surgery.    CURRENT MEDICATIONS  Home Medications     Reviewed by Velma Anna R.N. (Registered Nurse) on 19 at 1745  Med List Status: <None>   Medication Last Dose Status   erythromycin 5 MG/GM Ointment  Active   erythromycin 5 MG/GM Ointment  Active                ALLERGIES  No Known Allergies    PHYSICAL EXAM   VITAL SIGNS: /82   Pulse (!) 130   Temp 36.6 °C (97.9 °F) (Temporal)   Resp 20   Ht 1.727 m (5' 8\")   Wt 63.5 kg (140 lb)   SpO2 92%   BMI 21.29 kg/m²      Constitutional: Disheveled, intoxicated young male with pants around his ankles. Alert in no apparent distress.  HENT: Normocephalic, Atraumatic. Bilateral external ears normal. Nose normal.  Moist mucous membranes.  Oropharynx clear.  Eyes: Pupils are equal and reactive. Conjunctiva normal.   Neck: Supple, full range of motion  Heart: Tachycardic.  No murmurs.    Lungs: No respiratory distress, normal work of breathing. Lungs clear to auscultation bilaterally.  Abdomen Soft, no distention.  No tenderness to palpation.  Musculoskeletal: Atraumatic. No obvious deformities noted.  No lower extremity edema.  Skin: Warm, Dry.  No erythema, No rash.   Neurologic: Alert and oriented x3. Moving all extremities spontaneously without focal deficits. No significant tremors or fasciculations. Slurred speech.   Psychiatric: Appears intoxicated, otherwise unable to assess      DIAGNOSTIC STUDIES    EKG  Results for orders placed or performed during the hospital encounter of 19   EKG   Result Value Ref Range    Report       [facility] Emergency Dept.    Test Date:  2019  Pt Name:    CHANCE DIAZ                 Department: ER  MRN:        [id number]                      Room:       [facility]  Gender:     Male                         Technician: [id number]  :        1985                   Requested By:BETO ERWIN  Order #:    [id number]       "              Reading MD: Clarice Nicole MD    Measurements  Intervals                                Axis  Rate:       144                          P:  CT:                                      QRS:        76  QRSD:       80                           T:          58  QT:         292  QTc:        452    Interpretive Statements  SINUS TACHYCARDIA  Normal axis and intervals  No ectopy or hypertrophy  No ST or T wave change  Compared to ECG 01/12/2019 17:02:29  Increased rate otherwise no signficant change    Electronically Signed On 2- 19:59:45 PST by Clarice Nicole MD         LABS  Personally reviewed by me  Labs Reviewed   COMP METABOLIC PANEL - Abnormal; Notable for the following:        Result Value    Anion Gap 17.0 (*)     AST(SGOT) 202 (*)     ALT(SGPT) 97 (*)     Alkaline Phosphatase 107 (*)     All other components within normal limits   LIPASE - Abnormal; Notable for the following:     Lipase 110 (*)     All other components within normal limits   CBC WITH DIFFERENTIAL   TROPONIN   ESTIMATED GFR     ED COURSE  Vitals:    02/23/19 2030 02/23/19 2100 02/23/19 2130 02/23/19 2230   BP:       Pulse: (!) 119 (!) 129 (!) 120 (!) 117   Resp:  (!) 6 12 (!) 1   Temp:       TempSrc:       SpO2: 92% 94% 99% 97%   Weight:       Height:           Medications administered:  Medications   LORazepam (ATIVAN) tablet 0.5 mg (not administered)   LORazepam (ATIVAN) tablet 1 mg (not administered)     Or   LORazepam (ATIVAN) injection 0.5 mg (not administered)   LORazepam (ATIVAN) tablet 2 mg ( Oral See Alternative 2/23/19 2117)     Or   LORazepam (ATIVAN) injection 1 mg (1 mg Intravenous Given 2/23/19 2117)   LORazepam (ATIVAN) tablet 3 mg (not administered)     Or   LORazepam (ATIVAN) injection 1.5 mg (not administered)   LORazepam (ATIVAN) tablet 4 mg (not administered)     Or   LORazepam (ATIVAN) injection 2 mg (not administered)   LORazepam (ATIVAN) tablet 1 mg (1 mg Oral Given 2/23/19 1830)   er detox iv 1000 mL (D5LR +  "magnesium 1 g + thiamine 100 mg + folic acid 1 mg) infusion ( Intravenous New Bag 2/23/19 2000)   LORazepam (ATIVAN) injection 1 mg (1 mg Intravenous Given 2/23/19 1957)     Patient was given IV fluids for tachycardia and possibly dehydration.  IV hydration was used because oral hydration was not adequate alone.  Following fluid administration patient's symptoms were not significantly improved.    Old records personally reviewed:  He was last admitted from January 9th to the 14th for alcohol admission and he required admission to the ICU.  Alcohol level was 0.67 upon admission    6:14 PM Patient seen and examined at bedside. The patient presents with alcohol intoxication. Patient will be treated with Ativan 1 mg for his symptoms. I informed the patient that we will give him Ativan to calm him down. He understood and agreed to the plan of care.      MEDICAL DECISION MAKING  Patient with significant alcohol abuse history who presents with alcohol intoxication and concern for withdrawal.  He is alert, afebrile, significantly tachycardic on arrival with otherwise reassuring vital signs.   Patient does not have any fevers or complaints for underlying infectious process.  He has no history of recent falls or evidence of trauma on exam.  No focal neurologic deficits concerning for CVA or intracranial hemorrhage.  Initial breath alcohol was approximately 0.5 therefore initial plan was to monitor here in the department for sober reassessment.     7:52 PM Patient is persistently tachycardic and complaining of \"not feeling well.\"  Therefore ordered Lipase, Troponin, CBC, CMP, and EKG. The patient will be treated with ER detox IV 1000 mL and Ativan.  CIWA protocol was initiated.  Lipase is mildly elevated however labs are otherwise reassuring.  He has mild anion gap acidosis likely due to alcoholic ketoacidosis in addition to mild transaminitis.  No evidence of significant electrolyte abnormality.    10:23 PM Patient " reevaluated at bedside. He was persistently tachycardic and at this time was mildly hypoxic following Ativan administration.     10:37 PM Consult with [facility] who agreed to admit the patient.  Patient is stable at this time for transfer to the floor.      DISPOSITION:  Patient will be admitted to R in guarded condition.    IMPRESSION  (F10.920) Alcoholic intoxication without complication (HCC)  (F10.239) Alcohol withdrawal syndrome with complication (HCC)  (R00.0) Tachycardia    Results, diagnoses, and treatment options were discussed with the patient and/or family. Patient verbalized understanding of plan of care.     Milton FELIPE (Scribe), am scribing for, and in the presence of, Clarice Nicole M.D..    Electronically signed by: Milton Cooper (Scribe), 2/23/2019    Clarice FELIPE M.D. personally performed the services described in this documentation, as scribed by Milton Cooper in my presence, and it is both accurate and complete. C.     The note accurately reflects work and decisions made by me.  Clarice Nicole  2/24/2019  1:22 AM

## 2019-02-24 NOTE — PROGRESS NOTES
ICU Acceptance  Note.     33-year-old male with a past medical history of alcohol abuse with severe alcohol withdrawal admitted last night for alcohol withdrawal.  Patient has a history of large quantity of alcohol  Consumption.  He has been admitted twice to the ICU for severe alcohol withdrawal over the past 3 months.  He was not intubated but required Librium and Precedex on prior hospitalizations.  A Rapid Response  was called for increasing CIWA scores and frequent  IV Ativan.  Since 1am today patient required  a total of  ~18mg ativan with CIWAS as high as  31.Recent CIWA 14 @ 8.20am   On exam patient is drowsy, dry heaving.     Vitals  : , RR20, /64mmHg.  Resp: Tachycardia, S1 S2 no mummur rubs or gallop.  Abdomen: BS+ mild epigastric pain,     Assessment   Severe Alcohol withdrawal    Plan  - Admit to the ICU  - Start Phenobarb protocol  - Thiamine. M/vitamin, folic acid.  - Monitor for signs of Respiratory depression, may need to be intubated.  -

## 2019-02-24 NOTE — PROGRESS NOTES
2 RN skin check complete with Jeronimo RN  Devices in place: silicone oxygen tubing.  Skin assessed under devices intact..  Confirmed pressure ulcers found: N/A  New potential pressure ulcers noted: N/A.  The following interventions in place, pillows in use for repositioning, pt is able to turn self from side to side, moisturizer at bed side.

## 2019-02-24 NOTE — PROGRESS NOTES
0702: Report received from JORDY Berry. Patient in bed, visibly sweating, without IV access at this time, full body tremors. Patient alert to self and place at this time. Fall precautions in place safety maintained, bed alarm on, call light within reach.    0710: IV #20 L FA placed and IVF restated by this RN. CIWA completed and ativan given.    0720: Rapid RN called to update about HR and CIWA. MD Brown, at bedside updated on ativan needs and CIWA and 's for over 8hrs.     0745: Rapid called for CIWA >25.    0945: ICU transfer order placed.    0950: Report given to ICU RN.

## 2019-02-24 NOTE — ED NOTES
Med Rec Updated and Complete per Pt at bedside  Allergies Reviewed  No PO ABX last 30 days.    Pt reports he is supposed to be taking his Adderall but he relapsed about 2 weeks ago and stopped taking them.

## 2019-02-24 NOTE — ED TRIAGE NOTES
Chief Complaint   Patient presents with   • Alcohol Intoxication   Pt bib [facility] for ETOH intoxication and pt requesting detox.    .

## 2019-02-24 NOTE — H&P
Internal Medicine Admitting History and Physical    Note Author: Aurelio Russo M.D.       Name Gopal Ceja       1985   Age/Sex 33 y.o. male   MRN [id number]   Code Status Full Code     After 5PM or if no immediate response to page, please call for cross-coverage  Attending/Team: Dr. Knowles/Greg See Patient List for primary contact information  Call [phone] to page    1st Call - Day Intern (R1):   Dr. Leigh 2nd Call - Day Sr. Resident (R2/R3):   Dr. Gil       Chief Complaint:   Alcohol intoxication/withdrawl    HPI:  Mr. Ceja is a pleasant 33-year-old male with past medical history of alcohol abuse with alcohol withdrawal seizures, ICU admission for alcohol withdrawal, anxiety, drug abuse(methamphetamines), ADHD and thrombocytopenia. He was brought in by EMS for alcohol intoxication.  He reports last drink was 12 hours ago with continuous binge drinking for the last week. Pt was intoxicated during the interview. Multiple hospitalizations for alcohol withdrawal.  Patient reports hallucinations at the time of interview with generalized pain. Strongly denies drug use or cigarette smoking.     ED course:  on admission with max of 149, /82, 94% on 2L NC, RR 20. Pt was started on CIWA protocol. Detox IV bag. Lipase slightly elevated with transaminitis. Chart review shows pt is usually tachycardic at the time of admission    Review of Systems   Constitutional: Negative for chills and fever.        Limited by alcohol intoxication    HENT: Negative for hearing loss and tinnitus.    Eyes: Negative for blurred vision and double vision.   Respiratory: Negative for cough and hemoptysis.    Cardiovascular: Negative for chest pain and palpitations.   Gastrointestinal: Negative for heartburn and nausea.   Genitourinary: Negative for dysuria and urgency.   Neurological: Negative for dizziness and headaches.   Psychiatric/Behavioral: Positive for hallucinations and substance abuse (Alcohol).  Negative for depression and suicidal ideas.             Past Medical History (Chronic medical problem, known complications and current treatment)    Past Medical History:   Diagnosis Date   • Acute anxiety    • ADHD (attention deficit hyperactivity disorder)    • ADHD (attention deficit hyperactivity disorder)    • Alcohol abuse    • Bipolar affective (HCC)    • Depression    • Ectrodactyly-ectodermal dysplasia-clefting syndrome    • ETOH abuse    • Psychiatric disorder     anxiety/panic disorder         Past Surgical History:  Past Surgical History:   Procedure Laterality Date   • OTHER ORTHOPEDIC SURGERY      hands bilaterally r/t EEDC syndrome       Current Outpatient Medications:  Home Medications     Reviewed by Yuki Villa (Pharmacy Tech) on 02/23/19 at 2308  Med List Status: <None>   Medication Last Dose Status        Patient Torey Taking any Medications                       Medication Allergy/Sensitivities:  No Known Allergies      Family History (mandatory)   Family History   Problem Relation Age of Onset   • Heart Disease Neg Hx    • Hypertension Neg Hx    • Hyperlipidemia Neg Hx        Social History (mandatory)   Social History     Social History   • Marital status: Single     Spouse name: N/A   • Number of children: N/A   • Years of education: N/A     Occupational History   • Not on file.     Social History Main Topics   • Smoking status: Former Smoker     Packs/day: 0.25     Types: Cigarettes   • Smokeless tobacco: Never Used      Comment: Smokes couple of times a month   • Alcohol use 0.0 oz/week      Comment: drinks 10 earthquakes a day   • Drug use: No   • Sexual activity: Not on file     Other Topics Concern   • Not on file     Social History Narrative    ** Merged History Encounter **          Living situation: Alone  PCP : Dalton Pagan A.P.N.    Physical Exam     Vitals:    02/23/19 2030 02/23/19 2100 02/23/19 2130 02/23/19 2230   BP:       Pulse: (!) 119 (!) 129 (!) 120 (!) 117  "  Resp:  (!) 6 12 18   Temp:       TempSrc:       SpO2: 92% 94% 99% 97%   Weight:       Height:         Body mass index is 21.29 kg/m².  /87   Pulse (!) 117   Temp 36.6 °C (97.9 °F) (Temporal)   Resp 18   Ht 1.727 m (5' 8\")   Wt 63.5 kg (140 lb)   SpO2 97%   BMI 21.29 kg/m²   O2 therapy: Pulse Oximetry: 97 %    Physical Exam   Constitutional: He is oriented to person, place, and time. No distress.   Pleasant   Intoxicated  Unkept  Strong alcohol breath/odor     HENT:   Head: Normocephalic and atraumatic.   Mouth/Throat: Oropharynx is clear and moist.   Poor oral dentition with missing/darkened teeth    Eyes: Pupils are equal, round, and reactive to light. EOM are normal. Right eye exhibits no discharge.   Neck: Normal range of motion. Neck supple. No thyromegaly present.   Cardiovascular: Normal rate and regular rhythm.    No murmur heard.  Pulmonary/Chest: Effort normal and breath sounds normal. No respiratory distress.   Abdominal: Soft. Bowel sounds are normal. He exhibits no distension.   Musculoskeletal: Normal range of motion. He exhibits deformity (Syndactyly present at both hands ). He exhibits no edema.   Neurological: He is alert and oriented to person, place, and time. No cranial nerve deficit.   Skin: Skin is warm and dry. He is not diaphoretic. No erythema.   Psychiatric: Mood and memory normal.   Elevated mood           Data Review       Old Records Request:   Completed  Current Records review/summary: Completed    Lab Data Review:  Recent Results (from the past 24 hour(s))   EKG    Collection Time: 19  7:47 PM   Result Value Ref Range    Report       [facility]    Test Date:  2019  Pt Name:    CHANCE DIAZ                 Department: ER  MRN:        [id number]                      Room:        29  Gender:     Male                         Technician: [id number]  :        1985                   Requested By:BETO ERWIN  Order #:    " [id number]                    Reading MD: Clarice Nicole MD    Measurements  Intervals                                Axis  Rate:       144                          P:  AK:                                      QRS:        76  QRSD:       80                           T:          58  QT:         292  QTc:        452    Interpretive Statements  SINUS TACHYCARDIA  Normal axis and intervals  No ectopy or hypertrophy  No ST or T wave change  Compared to ECG 01/12/2019 17:02:29  Increased rate otherwise no signficant change    Electronically Signed On 2- 19:59:45 PST by Clarice Nicole MD     CBC WITH DIFFERENTIAL    Collection Time: 02/23/19  7:50 PM   Result Value Ref Range    WBC 6.6 4.8 - 10.8 K/uL    RBC 5.13 4.70 - 6.10 M/uL    Hemoglobin 16.5 14.0 - 18.0 g/dL    Hematocrit 47.4 42.0 - 52.0 %    MCV 92.4 81.4 - 97.8 fL    MCH 32.2 27.0 - 33.0 pg    MCHC 34.8 33.7 - 35.3 g/dL    RDW 44.9 35.9 - 50.0 fL    Platelet Count 207 164 - 446 K/uL    MPV 10.9 9.0 - 12.9 fL    Neutrophils-Polys 67.00 44.00 - 72.00 %    Lymphocytes 28.00 22.00 - 41.00 %    Monocytes 4.30 0.00 - 13.40 %    Eosinophils 0.00 0.00 - 6.90 %    Basophils 0.50 0.00 - 1.80 %    Immature Granulocytes 0.20 0.00 - 0.90 %    Nucleated RBC 0.00 /100 WBC    Neutrophils (Absolute) 4.42 1.82 - 7.42 K/uL    Lymphs (Absolute) 1.84 1.00 - 4.80 K/uL    Monos (Absolute) 0.28 0.00 - 0.85 K/uL    Eos (Absolute) 0.00 0.00 - 0.51 K/uL    Baso (Absolute) 0.03 0.00 - 0.12 K/uL    Immature Granulocytes (abs) 0.01 0.00 - 0.11 K/uL    NRBC (Absolute) 0.00 K/uL   COMP METABOLIC PANEL    Collection Time: 02/23/19  7:50 PM   Result Value Ref Range    Sodium 135 135 - 145 mmol/L    Potassium 4.0 3.6 - 5.5 mmol/L    Chloride 96 96 - 112 mmol/L    Co2 22 20 - 33 mmol/L    Anion Gap 17.0 (H) 0.0 - 11.9    Glucose 99 65 - 99 mg/dL    Bun 11 8 - 22 mg/dL    Creatinine 0.77 0.50 - 1.40 mg/dL    Calcium 9.1 8.5 - 10.5 mg/dL    AST(SGOT) 202 (H) 12 - 45 U/L    ALT(SGPT) 97 (H) 2 -  "50 U/L    Alkaline Phosphatase 107 (H) 30 - 99 U/L    Total Bilirubin 0.6 0.1 - 1.5 mg/dL    Albumin 4.7 3.2 - 4.9 g/dL    Total Protein 8.2 6.0 - 8.2 g/dL    Globulin 3.5 1.9 - 3.5 g/dL    A-G Ratio 1.3 g/dL   LIPASE    Collection Time: 02/23/19  7:50 PM   Result Value Ref Range    Lipase 110 (H) 11 - 82 U/L   TROPONIN    Collection Time: 02/23/19  7:50 PM   Result Value Ref Range    Troponin I <0.01 0.00 - 0.04 ng/mL   ESTIMATED GFR    Collection Time: 02/23/19  7:50 PM   Result Value Ref Range    GFR If African American >60 >60 mL/min/1.73 m 2    GFR If Non African American >60 >60 mL/min/1.73 m 2       Imaging/Procedures Review:    Independant Imaging Review: Completed  No orders to display        EKG:   EKG Independent Review: Completed  QTc:452, HR: 144, Normal Sinus Rhythm, no ST/T changes     Records reviewed and summarized in current documentation :  Yes  [employer] teaching service handout given to patient:  No         Assessment/Plan     * Alcohol withdrawal (HCC)- (present on admission)   Assessment & Plan    Mr. Ceja is a pleasant 33-year-old male with past medical history of alcohol abuse with alcohol withdrawal seizures, ICU admission for alcohol withdrawal admitted for alcohol intoxication/withdrawl  -Pt was actively hallucinating during the time of interview \"I'm seeing spirals\"  -Denies any seizures at this time  -Normal INR    Plan  Fluids  Prealbumin pending  S/p Detox bag in ED  CIWA protocol               Serum lipase elevation- (present on admission)   Assessment & Plan    Slight elevation in Lipase with 110.   Lipase on previous admission 134  Pt does complain of generalized tenderness. 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   These pts tend to have " developmental delay and intellectual disability with impaired communication and socialization skills, as well as delayed development of speech and language     Unable to assess due to alcohol intoxication       Alcohol dependence (HCC)- (present on admission)   Assessment & Plan    Please refer to Alcohol withdrawl     Alcoholic hepatitis- (present on admission)   Assessment & Plan    Elevated AST/ALT consistent along with chronic alcohol use with numerous hospital admissions at multiple different hospitals    -Get pt through withdrawls with CIWA protocol with Ativan, Fluids, vitamins         Hypovitaminosis D- (present on admission)   Assessment & Plan    Hx of low Vit D with 14 on 11/2/2018  -Repeat Vit D levels    Replacing with Vit D daily       LFT elevation- (present on admission)   Assessment & Plan    2:1 AST to ALT elevation consistent with alcohol transaminites      Tachycardia- (present on admission)   Assessment & Plan    Pt tends to tachycardic during the time of admission  Likely due to alcohol withdrawal  He's in sinus    Plan  Clonidine patch  PRN Clonidine         Hypokalemia- (present on admission)   Assessment & Plan    Replacing as needed         Anticipated Hospital stay:  >2 midnights        Quality Measures  Quality-Core Measures   Reviewed items::  Medications reviewed, Radiology images reviewed, Labs reviewed and EKG reviewed  Cabrera catheter::  No Cabrera  DVT prophylaxis pharmacological::  Enoxaparin (Lovenox)    PCP: RUSS Goddard

## 2019-02-24 NOTE — CARE PLAN
Problem: Safety  Goal: Will remain free from injury  Outcome: PROGRESSING AS EXPECTED  Fall precautions in place safety maintained, will continue to monitor.     Problem: Respiratory:  Goal: Respiratory status will improve  Outcome: PROGRESSING AS EXPECTED  Patient protecting airway at this time, increased O2 needs this morning, MD aware, will continue to monitor.

## 2019-02-24 NOTE — H&P
"SENIOR ADMIT NOTE    CHIEF COMPLAINT:  \"I would like to get help from alcoholism\".    HISTORY OF PRESENT ILLNESS:  In brief, this is a 33-year-old male who is well   known to the emergency department with history of alcohol intoxication and   withdrawals, presents to the ER again requesting to detoxify from alcohol.    This gentleman has been admitted multiple times, at least 50+ times in the   recent past for alcohol withdrawal.  His last admission was 1 month ago and he   was discharged on 01/14/2019, after a prolonged stay in the ICU where he   required a Precedex drip to help through the alcohol withdrawal.  He reports   he went back to using alcohol after previous discharge, but is now interested   in quitting.  His last drink was 6 hours prior to presentation, and he reports   that he has a history of severe alcohol withdrawals with previous seizures,   hallucinations and severe agitation.  This was confirmed in the chart.  In the   ER, patient was found to be tachycardic, having hallucinations and was   started on CIWA protocol.  His labs were notable for elevated liver enzymes.    PAST MEDICAL HISTORY:  1.  Alcohol abuse.  2.  Methamphetamine abuse.  3.  Alcoholic hepatitis.  4.  Alcohol withdrawal seizures.  5.  Attention deficit hyperactivity disorder.  6.  Congenital ectodermal dysplasia.  7.  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    PAST SURGICAL HISTORY:  No reported surgical history.    FAMILY HISTORY:  Denies history of heart disease, hypertension, or   dyslipidemia in family.    SOCIAL HISTORY:  Current alcohol use, severe and last known drink was 6 hours   prior to admission.  Former smoker.  Denies recreational drug use.    REVIEW OF SYSTEMS:  CONSTITUTIONAL:  Denies fevers, chills.  EYES:  Denies dizziness, blurry vision.  HEENT:  Denies congestion, sore throat.  RESPIRATORY:  Denies shortness of breath, cough.  CARDIOVASCULAR:  Denies chest pain, palpitations.  ABDOMEN:  Reports nausea, dry " heaving, denies abdominal pain.  EXTREMITIES:  Denies edema, bluish discoloration.  NEUROLOGICAL:  Denies focal deficits, seizures.    PHYSICAL EXAMINATION:  VITAL SIGNS:  Significant for heart rate of 120, respiratory rate of 18, blood   pressure 127/72, O2 saturation of 97% on 2 L of oxygen.  CONSTITUTIONAL:  He appears comfortable, not in distress.  EYES:  PERRLA, EOMI.  HEENT:  Normocephalic, atraumatic.  RESPIRATORY:  Clear to auscultation bilaterally.  No wheeze or rales.  CARDIOVASCULAR:  Denies chest pain or palpitations.  ABDOMEN:  Soft, nontender.  Normal bowel sounds.  EXTREMITIES:  Fused fingers bilaterally.  NEUROLOGIC:  Reports hallucinations, otherwise alert and oriented x4.    LABORATORY DATA:  Significant for hemoglobin of 16.5, bicarbonate 22, AST of   202, ALT of 97, and ALP of 107.  Lipase is 110.    IMAGING:  EKG:  Per my read, sinus tachycardia, no ischemic changes.    ASSESSMENT AND PLAN:  1.  Alcohol withdrawal:  This is a well-known gentleman to the hospital with   multiple admissions for alcohol withdrawal.  He is currently undergoing   through another alcohol withdrawal.  We will admit him with CIWA protocol, put   him on aspiration, fall and seizure precautions.  He got a rally bag in the   ER, we will put him on p.o. thiamine, folate, and multivitamins.  We will add   clonidine to suppress the hyperadrenergic drive.  If he becomes to get more   agitated, gabapentin, and valproic acid might be a good option.  Valproic acid   at the moment might be contraindicated given the presence of elevated liver   enzymes.  2.  Alcohol abuse.  I have counseled the patient extensively on alcohol   cessation.  The patient appears to have a poor understanding and clearly has   some sort of intellectual disability.  He, however, wants to stop using   alcohol.  At the time of discharge, proper resources will need to be arranged   for patient.  3.  Elevated liver enzymes:  Likely from alcohol abuse.   Bilirubin is normal,   do not suspect him to have alcoholic hepatitis.  We will get liver ultrasound   to check the liver architecture.  4.  Elevated lipase level:  This is only slightly above the normal limit, and   I do not think the patient is having pancreatitis at the moment.  5.  Sinus tachycardia:  Per chart review, patient has always had elevated   heart rate in the 140s to 150s on admission.  This is likely from the   hyperadrenergic drive from alcohol withdrawal.  We will start clonidine to   suppress the hyperadrenergic drive and reduce the heart rate.  6.  Core measures have been addressed appropriately.  Patient is full code,   Lovenox for DVT prophylaxis.  Please refer to the intern's history and   physical note for more details.       ____________________________________     MD WARREN Yeager / RONEY    DD:  02/23/2019 23:14:57  DT:  02/23/2019 23:38:14    D#:  [id number]  Job#:  [id number]

## 2019-02-24 NOTE — ASSESSMENT & PLAN NOTE
Likely from etoh abuse/intoxication  Mild abdominal pain  Soft on exam  IVF resuscitation, bolus then maint

## 2019-02-24 NOTE — CARE PLAN
Problem: Communication  Goal: The ability to communicate needs accurately and effectively will improve  Outcome: PROGRESSING AS EXPECTED  Pt able to communicate needs appropriately at this time, educated on the importance of using the call light.     Problem: Safety  Goal: Will remain free from falls  Outcome: PROGRESSING AS EXPECTED  Fall risk assessed, fall precautions in place, bed alarm is, pt verbalizes understanding of fall risk.

## 2019-02-24 NOTE — ASSESSMENT & PLAN NOTE
Intoxication  Hx of [facility] hospitalizaitons  Phenobarb therapy   once less withdrawal

## 2019-02-24 NOTE — ASSESSMENT & PLAN NOTE
Mild elevation in Lipase at 110. On previous admission was 134. Likely 2/2 alcohol abuse. Does not meet criteria for pancreatitis.  -continue supportive care as noted

## 2019-02-24 NOTE — ASSESSMENT & PLAN NOTE
Significant intoxication on admission  High ciwa  Mvt/thiamine/folic acid  IVF resuscitation  Likely cause of elevated lipase  Phenobarb therapy

## 2019-02-24 NOTE — ASSESSMENT & PLAN NOTE
Hx alcohol withdrawal with seizures, ICU admission.   CIWA scores were 32 on floor, transferred to ICU for phenobarbital protocol. S/p IV thiamine, electrolyte, vitamin replacement.  Last drink approx 72 hours ago, should be at approximate peak of withdrawal symptoms, however no objective evidence of withdrawal. Patient is anxious, but at baseline.  -rreceived electrolyte, vitamin replacement  -phenobarbital protocol discontinued  -avoid additional ativan if possible, consider clonidine for tachycardia and return for phenobarb if withdrawal symptoms become severe  -transfer orders to medical floor placed 2/26  -medically stable for discharge, awaiting Psychiatry recommendations

## 2019-02-25 LAB
ALBUMIN SERPL BCP-MCNC: 4 G/DL (ref 3.2–4.9)
ALBUMIN/GLOB SERPL: 1.1 G/DL
ALP SERPL-CCNC: 89 U/L (ref 30–99)
ALT SERPL-CCNC: 66 U/L (ref 2–50)
ANION GAP SERPL CALC-SCNC: 9 MMOL/L (ref 0–11.9)
AST SERPL-CCNC: 86 U/L (ref 12–45)
BILIRUB SERPL-MCNC: 1.1 MG/DL (ref 0.1–1.5)
BUN SERPL-MCNC: 7 MG/DL (ref 8–22)
CALCIUM SERPL-MCNC: 9.3 MG/DL (ref 8.5–10.5)
CHLORIDE SERPL-SCNC: 96 MMOL/L (ref 96–112)
CO2 SERPL-SCNC: 26 MMOL/L (ref 20–33)
CREAT SERPL-MCNC: 0.54 MG/DL (ref 0.5–1.4)
GLOBULIN SER CALC-MCNC: 3.7 G/DL (ref 1.9–3.5)
GLUCOSE SERPL-MCNC: 119 MG/DL (ref 65–99)
MAGNESIUM SERPL-MCNC: 2.2 MG/DL (ref 1.5–2.5)
PHOSPHATE SERPL-MCNC: 3 MG/DL (ref 2.5–4.5)
POTASSIUM SERPL-SCNC: 3.5 MMOL/L (ref 3.6–5.5)
PROT SERPL-MCNC: 7.7 G/DL (ref 6–8.2)
SODIUM SERPL-SCNC: 131 MMOL/L (ref 135–145)

## 2019-02-25 PROCEDURE — A9270 NON-COVERED ITEM OR SERVICE: HCPCS | Performed by: INTERNAL MEDICINE

## 2019-02-25 PROCEDURE — 770022 HCHG ROOM/CARE - ICU (200)

## 2019-02-25 PROCEDURE — A9270 NON-COVERED ITEM OR SERVICE: HCPCS | Performed by: STUDENT IN AN ORGANIZED HEALTH CARE EDUCATION/TRAINING PROGRAM

## 2019-02-25 PROCEDURE — 99233 SBSQ HOSP IP/OBS HIGH 50: CPT | Performed by: INTERNAL MEDICINE

## 2019-02-25 PROCEDURE — 700102 HCHG RX REV CODE 250 W/ 637 OVERRIDE(OP): Performed by: STUDENT IN AN ORGANIZED HEALTH CARE EDUCATION/TRAINING PROGRAM

## 2019-02-25 PROCEDURE — 700102 HCHG RX REV CODE 250 W/ 637 OVERRIDE(OP): Performed by: INTERNAL MEDICINE

## 2019-02-25 PROCEDURE — 80053 COMPREHEN METABOLIC PANEL: CPT

## 2019-02-25 PROCEDURE — 83735 ASSAY OF MAGNESIUM: CPT

## 2019-02-25 PROCEDURE — 700105 HCHG RX REV CODE 258: Performed by: STUDENT IN AN ORGANIZED HEALTH CARE EDUCATION/TRAINING PROGRAM

## 2019-02-25 PROCEDURE — 84100 ASSAY OF PHOSPHORUS: CPT

## 2019-02-25 PROCEDURE — 700111 HCHG RX REV CODE 636 W/ 250 OVERRIDE (IP): Performed by: INTERNAL MEDICINE

## 2019-02-25 RX ORDER — POLYVINYL ALCOHOL 14 MG/ML
1 SOLUTION/ DROPS OPHTHALMIC
Status: DISCONTINUED | OUTPATIENT
Start: 2019-02-25 | End: 2019-02-27 | Stop reason: HOSPADM

## 2019-02-25 RX ORDER — POTASSIUM CHLORIDE 20 MEQ/1
40 TABLET, EXTENDED RELEASE ORAL ONCE
Status: COMPLETED | OUTPATIENT
Start: 2019-02-25 | End: 2019-02-25

## 2019-02-25 RX ADMIN — PHENOBARBITAL SODIUM 130 MG: 130 INJECTION INTRAMUSCULAR; INTRAVENOUS at 14:40

## 2019-02-25 RX ADMIN — SODIUM CHLORIDE, POTASSIUM CHLORIDE, SODIUM LACTATE AND CALCIUM CHLORIDE 1000 ML: 600; 310; 30; 20 INJECTION, SOLUTION INTRAVENOUS at 18:36

## 2019-02-25 RX ADMIN — THERA TABS 1 TABLET: TAB at 06:24

## 2019-02-25 RX ADMIN — FOLIC ACID 1 MG: 1 TABLET ORAL at 06:24

## 2019-02-25 RX ADMIN — POLYVINYL ALCOHOL 1 DROP: 14 SOLUTION/ DROPS OPHTHALMIC at 20:25

## 2019-02-25 RX ADMIN — VITAMIN D, TAB 1000IU (100/BT) 1000 UNITS: 25 TAB at 06:24

## 2019-02-25 RX ADMIN — PHENOBARBITAL SODIUM 130 MG: 130 INJECTION INTRAMUSCULAR; INTRAVENOUS at 00:55

## 2019-02-25 RX ADMIN — Medication 100 MG: at 06:24

## 2019-02-25 RX ADMIN — PHENOBARBITAL SODIUM 130 MG: 130 INJECTION INTRAMUSCULAR; INTRAVENOUS at 22:25

## 2019-02-25 RX ADMIN — POTASSIUM CHLORIDE 40 MEQ: 1500 TABLET, EXTENDED RELEASE ORAL at 16:01

## 2019-02-25 RX ADMIN — POLYVINYL ALCOHOL 1 DROP: 14 SOLUTION/ DROPS OPHTHALMIC at 16:01

## 2019-02-25 RX ADMIN — PHENOBARBITAL SODIUM 130 MG: 130 INJECTION INTRAMUSCULAR; INTRAVENOUS at 12:10

## 2019-02-25 ASSESSMENT — ENCOUNTER SYMPTOMS
BLURRED VISION: 0
VOMITING: 0
NAUSEA: 0
FLANK PAIN: 0
NERVOUS/ANXIOUS: 1
HALLUCINATIONS: 0
SEIZURES: 0
SHORTNESS OF BREATH: 0
ABDOMINAL PAIN: 0
DIAPHORESIS: 0
FOCAL WEAKNESS: 0
MUSCULOSKELETAL NEGATIVE: 1
PALPITATIONS: 0
DOUBLE VISION: 0
TREMORS: 0
COUGH: 0
SORE THROAT: 0
FEVER: 0
INSOMNIA: 0
NAUSEA: 1
WHEEZING: 0
HEADACHES: 0
HEADACHES: 1
CHILLS: 0

## 2019-02-25 ASSESSMENT — LIFESTYLE VARIABLES: SUBSTANCE_ABUSE: 1

## 2019-02-25 NOTE — PROGRESS NOTES
[facility] ICU Progress Note      Admit Date: 2/23/2019  ICU Day:  3    Resident(s): Velma Richardson M.D.   Attending:  PING LARSON/ Dr. Bateman    Date & Time: 2/25/2019 9:02 AM       Patient ID:    Name:  Gopal Ceja     YOB: 1985  Age:  33 y.o.  male   MRN:  [id number]    ID (carried forward):  Gopal Ceja is a 33-year-old male with past medical history of alcohol abuse with alcohol withdrawal seizures, ICU admission for alcohol withdrawal, anxiety, methamphetamine abuse, ADHD and thrombocytopenia. He presented to the ED on the evening of 2/23/19 via EMS for alcohol intoxication.  He reported his last drink was 12 hours prior to admission with continuous binge drinking for the previous week. Patient reported hallucinations on admission with generalized pain. He was started on CIWA protocol and admitted to the floor, where his CIWA scores reached 32 and he received a total of 12 mg of IV ativan overnight. The following morning, a rapid response was called for tachycardia to 150. He was transferred to ICU and started on phenobarbital protocol.    Consultants:  none     Interval Events:  Transferred to ICU yesterday  Received phenobarb loading dose followed by 130 mg x2 overnight  -130s, anxiety. No seizure, hallucinations, tremor  LR at 100      Review of Systems   Constitutional: Negative for chills, diaphoresis and fever.   HENT: Negative.    Eyes: Negative for blurred vision and double vision.   Respiratory: Negative for shortness of breath and wheezing.    Cardiovascular: Negative for chest pain and palpitations.   Gastrointestinal: Negative for nausea and vomiting.   Genitourinary: Negative.    Musculoskeletal: Negative.    Skin: Negative for itching and rash.   Neurological: Negative for tremors, seizures and headaches.   Endo/Heme/Allergies: Negative.    Psychiatric/Behavioral: Positive for substance abuse. Negative for hallucinations. The patient is nervous/anxious. The patient does not  "have insomnia.        PHYSICAL EXAM:  Vitals:    02/25/19 0500 02/25/19 0600 02/25/19 0700 02/25/19 0800   BP:       Pulse: (!) 102 (!) 104 (!) 109 (!) 114   Resp: 17 18 19 19   Temp:       TempSrc:       SpO2: 95% 96% 94% 92%   Weight:       Height:        Body mass index is 22.93 kg/m².  Latest Vitals:  /80   Pulse (!) 114   Temp 36.1 °C (97 °F) (Temporal)   Resp 19   Ht 1.727 m (5' 8\")   Wt 68.4 kg (150 lb 12.7 oz)   SpO2 92%   BMI 22.93 kg/m²   O2 therapy: Pulse Oximetry: 92 %, O2 (LPM): 0, O2 Delivery: None (Room Air)  Vitals Range last 24h:  Temp:  [36.1 °C (97 °F)-37.1 °C (98.7 °F)] 36.1 °C (97 °F)  Pulse:  [] 114  Resp:  [12-27] 19  BP: (138)/(80) 138/80  SpO2:  [92 %-97 %] 92 %        Intake/Output Summary (Last 24 hours) at 02/25/19 0902  Last data filed at 02/25/19 0000   Gross per 24 hour   Intake             4185 ml   Output              600 ml   Net             3585 ml       Physical Exam   Constitutional: He is oriented to person, place, and time. He appears not jaundiced. He appears unhealthy. No distress.   HENT:   Head: Normocephalic and atraumatic.   Eyes: Pupils are equal, round, and reactive to light. EOM are normal. Right conjunctiva is injected. Left conjunctiva is injected. No scleral icterus.   Neck: Normal range of motion.   Cardiovascular: Regular rhythm, normal heart sounds and normal pulses.  Tachycardia present.    Pulmonary/Chest: Effort normal. No stridor. No tachypnea. He has no wheezes. He has no rhonchi.   Abdominal: Soft. Bowel sounds are normal. There is no tenderness.   Musculoskeletal: He exhibits no edema or deformity.   Neurological: He is alert and oriented to person, place, and time.   Skin: Skin is warm and dry.   Psychiatric: Affect normal. His mood appears anxious.         Recent Labs      02/23/19   1950  02/24/19   0235  02/24/19   0343  02/24/19   1515   SODIUM  135  138   --   134*   POTASSIUM  4.0  3.5*   --   3.4*   CHLORIDE  96  98   --   97 "   CO2  22  26   --   31   BUN  11  10   --   9   CREATININE  0.77  0.81   --   0.69   MAGNESIUM   --   2.3   --   1.9   PHOSPHORUS   --    --   3.1  2.6   CALCIUM  9.1  8.7   --   9.4     Recent Labs      02/23/19   1950  02/24/19   0235  02/24/19   0343  02/24/19   1515   ALTSGPT  97*  77*   --    --    ASTSGOT  202*  143*   --    --    ALKPHOSPHAT  107*  90   --    --    TBILIRUBIN  0.6  0.7   --    --    LIPASE  110*   --    --    --    PREALBUMIN   --    --   32.0   --    GLUCOSE  99  159*   --   105*     Recent Labs      02/23/19   1950  02/24/19   0235   RBC  5.13  4.48*   HEMOGLOBIN  16.5  14.4   HEMATOCRIT  47.4  41.5*   PLATELETCT  207  132*   PROTHROMBTM  12.5   --    INR  0.92   --      Recent Labs      02/23/19   1950  02/24/19   0235   WBC  6.6  6.9   NEUTSPOLYS  67.00  60.70   LYMPHOCYTES  28.00  29.40   MONOCYTES  4.30  9.40   EOSINOPHILS  0.00  0.00   BASOPHILS  0.50  0.40   ASTSGOT  202*  143*   ALTSGPT  97*  77*   ALKPHOSPHAT  107*  90   TBILIRUBIN  0.6  0.7       Meds:  • vitamin D  1,000 Units     • LR  1,000 mL 1,000 mL (02/24/19 1600)   • Pharmacy  1 Each      And   • PHENObarbital  130 mg      And   • PHENObarbital  260 mg     • thiamine  100 mg     • multivitamin  1 Tab     • folic acid  1 mg     • MD Alert...Adult ICU Electrolyte Replacement per Pharmacy       • senna-docusate  2 Tab      And   • polyethylene glycol/lytes  1 Packet      And   • magnesium hydroxide  30 mL      And   • bisacodyl  10 mg     • enoxaparin  40 mg          Procedures:  none    Imaging:  No orders to display       Problem and Plan:    * Alcohol withdrawal (HCC)- (present on admission)   Assessment & Plan    Hx alcohol withdrawal with seizures, ICU admission. Last drink 12 hours prior to admission.  CIWA scores 32 on floor, transferred to ICU for phenobarbital protocol. S/p IV thiamine, electrolyte, vitamin replacement.  -continue fluids  -continue phenobarbital protocol     Serum lipase elevation- (present on  admission)   Assessment & Plan    Mild elevation in Lipase at 110. On previous admission was 134. Likely 2/2 alcohol abuse. Does not meet criteria for pancreatitis.  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 These pts tend to have developmental delay and intellectual disability with impaired communication and socialization skills, as well as delayed development of speech and language.  -no acute intervention indicated     Alcohol dependence (HCC)- (present on admission)   Assessment & Plan    Elevated transaminases. No evidence of cirrhosis, normal synthetic function.  -encourage cessation, provide counseling when withdrawal has resolved     Alcoholic hepatitis- (present on admission)   Assessment & Plan    Transaminitis. No evidence of cirrhosis. Normal synthetic function.  -supportive care  -encourage cessation     Hypovitaminosis D- (present on admission)   Assessment & Plan    Low vitamin D at 15.  -replacement ordered     Hypokalemia- (present on admission)   Assessment & Plan    -monitor and replace as needed         DISPO: continue ICU    CODE STATUS: FULL CODE    Quality Measures:  Feeding: regular diet  Analgesia: n/a  Sedation: phenobarbital prn for withdrawal  Thromboprophylaxis: enoxaparin  Head of bed: >30 deg  Ulcer prophylaxis: n/a  Glycemic control: n/a  Bowel care: bowel regimen  Indwelling lines: n/a  Deescalation of antibiotics: n/a      Velma Richardson M.D.

## 2019-02-25 NOTE — CARE PLAN
Problem: Bowel/Gastric:  Goal: Normal bowel function is maintained or improved  Outcome: PROGRESSING SLOWER THAN EXPECTED  Pt having multiple soft/loose-cecily stools.    Problem: Knowledge Deficit  Goal: Knowledge of disease process/condition, treatment plan, diagnostic tests, and medications will improve  Outcome: PROGRESSING AS EXPECTED  Pt surprisingly knowledgeable regarding alcohol's effect on the body and effects of d.t.'s.

## 2019-02-25 NOTE — CARE PLAN
Problem: Nutritional:  Goal: Achieve adequate nutritional intake  Patient will consume 50-75% of meals and snacks  Outcome: PROGRESSING SLOWER THAN EXPECTED

## 2019-02-25 NOTE — DIETARY
"Nutrition services: Day 2 of admit.  Gopal Ceja is a 33 y.o. male with admitting DX of Alcohol withdrawal (HCC)  Alcohol withdrawal (HCC)  Hx of alcohol abuse with alcohol withdrawal seizures, ICU admission for alcohol withdrawal, anxiety, drug abuse (methamphetamines), ADHD, bipolar affective disorder, depression.    Consult received for poor PO intake PTA  -patient is currently not eating very well, partially related sedative  -he does not want Boost supplements at this time; nutrition rep seeing pt daily for snacks in between meals - she visited patient twice today and offered snacks during alcohol withdrawal period for adequate protein and B vitamins.    Assessment:  Height: 172.7 cm (5' 8\")  Weight: 68.4 kg (150 lb 12.7 oz)  Body mass index is 22.93 kg/m².   Diet/Intake: Regular    Evaluation:   1. Labs reviewed  2. Meds: thiamine, Folvite, Theragran, Kdur, senna-docusate, vitamin D (cholecalciferol)   3. +BM 2/24  4. No pressure ulcers noted per chart review.     Malnutrition Risk: Patient with moderate malnutrition in the context of starvation related malnutrition r/t ETOH abuse, as evidenced by moderate muscle loss, reduced PO intake PTA.      Recommendations/Plan:   1. Encourage intake of meals and snacks.  2. Document intake of all meals and snacks as % taken in ADL's to provide interdisciplinary communication across all shifts.   3. Monitor weight.  4. Nutrition rep will continue to see patient for ongoing meal and snack preferences.           "

## 2019-02-25 NOTE — PROGRESS NOTES
Critical Care Progress Note    Date of admission  2/23/2019    Chief Complaint  33 y.o. male admitted 2/23/2019 with ETOH WDs    Hospital Course    33 y.o. male who presented 2/23/2019 with alcohol intoxication on 2/23.  His last drink was 12 hours prior to arrival.  He has been binge drinking for the past week.  Per the chart review he is intoxicated on admission and he has had multiple admissions for alcohol withdrawal.  Per the resident note on admission there is documented hallucinations.  At the time he denied any drug use or cigarette smoking.  Currently the patient is alert to person and place.  He has significant tremors.  He is uncomfortable and has some abdominal pain.  He says he cannot remember why he came to the hospital.  He does not remember his last drink.  He has no chest pain or shortness of breath.  History is limited due to altered mental status.      Interval Problem Update  Reviewed last 24 hour events:    A&O x3-4  Phenobarb  x2 last night - 944 total  Sleepy/follows  ST 100s  SBp 120s  Afeb  UO adequate  Reg diet  PIVs    RA  No CXR  EOB/BR      2nd PIV needs to be placed    Patient assessed and reassessed  Query conjunctivitis  Patient agitated and requiring some IV phenobarbital today    Hemodynamic and respiratory status unchanged    Review of Systems  Review of Systems   Constitutional: Positive for malaise/fatigue.   HENT: Negative for congestion and sore throat.    Respiratory: Negative for cough and shortness of breath.    Cardiovascular: Negative for chest pain and palpitations.   Gastrointestinal: Positive for nausea. Negative for abdominal pain and vomiting.   Genitourinary: Negative for dysuria and flank pain.   Neurological: Positive for headaches. Negative for focal weakness.   Psychiatric/Behavioral: Positive for substance abuse. Negative for suicidal ideas.      Limited    Vital Signs for last 24 hours   Temp:  [36.1 °C (97 °F)-36.3 °C (97.4 °F)] 36.3 °C (97.4 °F)  Pulse:   [100-126] 105  Resp:  [12-24] 24  SpO2:  [90 %-96 %] 90 %    Hemodynamic parameters for last 24 hours       Respiratory Information for the last 24 hours       Physical Exam   Physical Exam   Constitutional: He appears well-nourished. He appears lethargic. He is cooperative. He is easily aroused. He has a sickly appearance. He appears distressed. He is sedated. Nasal cannula in place.   HENT:   Head: Normocephalic and atraumatic.   Mouth/Throat: Oropharynx is clear and moist. Mucous membranes are dry and not cyanotic.   Eyes: Pupils are equal, round, and reactive to light. EOM are normal. Left eye exhibits discharge. No scleral icterus.   Neck: Phonation normal. Neck supple. Normal carotid pulses and no JVD present. No thyromegaly present.   Cardiovascular: Regular rhythm and intact distal pulses.   No extrasystoles are present. Tachycardia present.  Exam reveals no gallop and no friction rub.    No murmur heard.  Pulmonary/Chest: No accessory muscle usage. No tachypnea. No respiratory distress. He has no wheezes. He has no rhonchi. He has no rales.   Abdominal: Soft. Bowel sounds are normal. He exhibits no distension, no fluid wave and no ascites. There is no hepatosplenomegaly. There is no tenderness. There is no guarding and no CVA tenderness.   Musculoskeletal: He exhibits no edema.   Lymphadenopathy:     He has no cervical adenopathy.   Neurological: He is easily aroused. He appears lethargic. He displays tremor. He displays no atrophy. No cranial nerve deficit or sensory deficit. He exhibits normal muscle tone. He displays no seizure activity. GCS eye subscore is 4. GCS verbal subscore is 5. GCS motor subscore is 6.   Skin: Skin is warm. He is diaphoretic. No cyanosis or erythema. Nails show no clubbing.   Psychiatric: His speech is normal. Thought content normal. His mood appears anxious. He is slowed. He is not agitated. Cognition and memory are impaired (Mild).       Medications  Current  Facility-Administered Medications   Medication Dose Route Frequency Provider Last Rate Last Dose   • artificial tears 1.4 % ophthalmic solution 1 Drop  1 Drop Both Eyes Q2HRS PRN Velma Richardson M.D.   1 Drop at 02/25/19 2025   • [START ON 2/26/2019] vitamin D (cholecalciferol) tablet 2,000 Units  2,000 Units Oral DAILY Velma Richardson M.D.       • lactated ringers infusion  1,000 mL Intravenous Continuous Aurelio Russo M.D. 100 mL/hr at 02/25/19 1836 1,000 mL at 02/25/19 1836   • Pharmacy Consult Request...Benzodiazepine Review  1 Each Other PHARMACY TO DOSE Debbi Valdes M.D.        And   • PHENObarbital injection 130 mg  130 mg Intravenous Q30 MIN PRN Debbi Valdes M.D.   130 mg at 02/25/19 2225    And   • PHENObarbital injection 260 mg  260 mg Intravenous Q30 MIN PRN Debbi Valdes M.D.       • thiamine tablet 100 mg  100 mg Oral DAILY Debbi Valdes M.D.   100 mg at 02/25/19 0624   • multivitamin (THERAGRAN) tablet 1 Tab  1 Tab Oral DAILY Debbi Valdes M.D.   1 Tab at 02/25/19 0624   • folic acid (FOLVITE) tablet 1 mg  1 mg Oral DAILY Debbi Valdes M.D.   1 mg at 02/25/19 0624   • MD Alert...ICU Electrolyte Replacement per Pharmacy   Other PHARMACY TO DOSE Debbi Valdes M.D.       • senna-docusate (PERICOLACE or SENOKOT S) 8.6-50 MG per tablet 2 Tab  2 Tab Oral BID Aurelio Russo M.D.   Stopped at 02/24/19 1800    And   • polyethylene glycol/lytes (MIRALAX) PACKET 1 Packet  1 Packet Oral QDAY PRN Aurelio Russo M.D.        And   • magnesium hydroxide (MILK OF MAGNESIA) suspension 30 mL  30 mL Oral QDAY PRN Aurelio Russo M.D.        And   • bisacodyl (DULCOLAX) suppository 10 mg  10 mg Rectal QDAY PRN Aurelio Russo M.D.       • enoxaparin (LOVENOX) inj 40 mg  40 mg Subcutaneous DAILY Aurelio Russo M.D.   40 mg at 02/24/19 0512       Fluids    Intake/Output Summary (Last 24 hours) at 02/25/19 7427  Last data filed at 02/25/19 1800   Gross per 24 hour   Intake             1000 ml   Output              1450 ml   Net             -450 ml       Laboratory      Recent Labs      02/23/19 1950   TROPONINI  <0.01     Recent Labs      02/24/19   0235 02/24/19   0343  02/24/19   1515  02/25/19   1240   SODIUM  138   --   134*  131*   POTASSIUM  3.5*   --   3.4*  3.5*   CHLORIDE  98   --   97  96   CO2  26   --   31  26   BUN  10   --   9  7*   CREATININE  0.81   --   0.69  0.54   MAGNESIUM  2.3   --   1.9  2.2   PHOSPHORUS   --   3.1  2.6  3.0   CALCIUM  8.7   --   9.4  9.3     Recent Labs      02/23/19 1950 02/24/19   0235 02/24/19   0343  02/24/19   1515  02/25/19   1240   ALTSGPT  97*  77*   --    --   66*   ASTSGOT  202*  143*   --    --   86*   ALKPHOSPHAT  107*  90   --    --   89   TBILIRUBIN  0.6  0.7   --    --   1.1   LIPASE  110*   --    --    --    --    PREALBUMIN   --    --   32.0   --    --    GLUCOSE  99  159*   --   105*  119*     Recent Labs      02/23/19 1950 02/24/19   0235 02/25/19   1240   WBC  6.6  6.9   --    NEUTSPOLYS  67.00  60.70   --    LYMPHOCYTES  28.00  29.40   --    MONOCYTES  4.30  9.40   --    EOSINOPHILS  0.00  0.00   --    BASOPHILS  0.50  0.40   --    ASTSGOT  202*  143*  86*   ALTSGPT  97*  77*  66*   ALKPHOSPHAT  107*  90  89   TBILIRUBIN  0.6  0.7  1.1     Recent Labs      02/23/19 1950 02/24/19 0235   RBC  5.13  4.48*   HEMOGLOBIN  16.5  14.4   HEMATOCRIT  47.4  41.5*   PLATELETCT  207  132*   PROTHROMBTM  12.5   --    INR  0.92   --        Imaging  X-Ray:  I have personally reviewed the images and compared with prior images.  EKG:  I have personally reviewed the images and compared with prior images.  CT:    Reviewed  Echo:   Reviewed    Assessment/Plan  * Alcohol withdrawal (HCC)- (present on admission)   Assessment & Plan    Phenobarbital protocols  Vitamin supplementation  Optimize/replete electrolytes  Maintain adequate hydration with IV fluids  Monitor for the need for additional therapy such as intravenous dexmedetomidine  Cessation of alcohol use  long-term will be encouraged when clinically appropriate  Outpatient follow-up with EtOH programs     Alcohol use disorder, severe, dependence (HCC)- (present on admission)   Assessment & Plan    Intoxication  Hx [facility]  Phenobarb therapy   once less withdrawal     Serum lipase elevation- (present on admission)   Assessment & Plan    Monitor for clinical signs of overt pancreatitis  Secondary to EtOH  Benign exam  Fluid resuscitation ongoing  Okay to take p.o.       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)   Assessment & Plan    Known cause by prior testing of missing digits on hands and feet, status post functional reparative surgery     Tachycardia- (present on admission)   Assessment & Plan    Secondary to above, hydrate with IV fluids, monitor     Hypokalemia- (present on admission)   Assessment & Plan    Replete     Conjunctivitis- (present on admission)   Assessment & Plan    Artificial tears  Monitor for the need for antibiotics  Hot compresses as needed          VTE:  Lovenox  Ulcer: Not Indicated  Lines: None    I have performed a physical exam and reviewed and updated ROS and Plan today (2/25/2019). In review of yesterday's note (2/24/2019), there are no changes except as documented above.     Discussed patient condition and risk of morbidity and/or mortality with RN, RT, Pharmacy, , [facility] resident, Charge  nurse / hot rounds and Patient

## 2019-02-25 NOTE — PROGRESS NOTES
2 RN skin check completed with Carlos Manuel BORJA RN.  Skin assessed under all devices, and all bony prominences thoroughly inspected.  BLE bruising.  Skin otherwise clean, dry, intact, warm, and color appropriate to ethnicity.

## 2019-02-26 PROBLEM — E87.6 HYPOKALEMIA: Status: RESOLVED | Noted: 2017-05-07 | Resolved: 2019-02-26

## 2019-02-26 LAB
ALBUMIN SERPL BCP-MCNC: 3.9 G/DL (ref 3.2–4.9)
ALBUMIN/GLOB SERPL: 1.2 G/DL
ALP SERPL-CCNC: 97 U/L (ref 30–99)
ALT SERPL-CCNC: 57 U/L (ref 2–50)
ANION GAP SERPL CALC-SCNC: 8 MMOL/L (ref 0–11.9)
AST SERPL-CCNC: 66 U/L (ref 12–45)
BILIRUB SERPL-MCNC: 0.7 MG/DL (ref 0.1–1.5)
BUN SERPL-MCNC: 11 MG/DL (ref 8–22)
CALCIUM SERPL-MCNC: 9.3 MG/DL (ref 8.5–10.5)
CHLORIDE SERPL-SCNC: 98 MMOL/L (ref 96–112)
CO2 SERPL-SCNC: 24 MMOL/L (ref 20–33)
CREAT SERPL-MCNC: 0.68 MG/DL (ref 0.5–1.4)
GLOBULIN SER CALC-MCNC: 3.3 G/DL (ref 1.9–3.5)
GLUCOSE SERPL-MCNC: 142 MG/DL (ref 65–99)
MAGNESIUM SERPL-MCNC: 2 MG/DL (ref 1.5–2.5)
PHOSPHATE SERPL-MCNC: 3.1 MG/DL (ref 2.5–4.5)
POTASSIUM SERPL-SCNC: 4 MMOL/L (ref 3.6–5.5)
PROT SERPL-MCNC: 7.2 G/DL (ref 6–8.2)
SODIUM SERPL-SCNC: 130 MMOL/L (ref 135–145)

## 2019-02-26 PROCEDURE — 83735 ASSAY OF MAGNESIUM: CPT

## 2019-02-26 PROCEDURE — 700102 HCHG RX REV CODE 250 W/ 637 OVERRIDE(OP): Performed by: INTERNAL MEDICINE

## 2019-02-26 PROCEDURE — 700102 HCHG RX REV CODE 250 W/ 637 OVERRIDE(OP): Performed by: STUDENT IN AN ORGANIZED HEALTH CARE EDUCATION/TRAINING PROGRAM

## 2019-02-26 PROCEDURE — 80053 COMPREHEN METABOLIC PANEL: CPT

## 2019-02-26 PROCEDURE — 700105 HCHG RX REV CODE 258: Performed by: STUDENT IN AN ORGANIZED HEALTH CARE EDUCATION/TRAINING PROGRAM

## 2019-02-26 PROCEDURE — A9270 NON-COVERED ITEM OR SERVICE: HCPCS | Performed by: STUDENT IN AN ORGANIZED HEALTH CARE EDUCATION/TRAINING PROGRAM

## 2019-02-26 PROCEDURE — 770001 HCHG ROOM/CARE - MED/SURG/GYN PRIV*

## 2019-02-26 PROCEDURE — 84100 ASSAY OF PHOSPHORUS: CPT

## 2019-02-26 PROCEDURE — 700101 HCHG RX REV CODE 250: Performed by: INTERNAL MEDICINE

## 2019-02-26 PROCEDURE — A9270 NON-COVERED ITEM OR SERVICE: HCPCS | Performed by: INTERNAL MEDICINE

## 2019-02-26 PROCEDURE — 99233 SBSQ HOSP IP/OBS HIGH 50: CPT | Performed by: INTERNAL MEDICINE

## 2019-02-26 RX ORDER — LORAZEPAM 1 MG/1
1 TABLET ORAL ONCE
Status: COMPLETED | OUTPATIENT
Start: 2019-02-26 | End: 2019-02-26

## 2019-02-26 RX ORDER — ERYTHROMYCIN 5 MG/G
OINTMENT OPHTHALMIC ONCE
Status: COMPLETED | OUTPATIENT
Start: 2019-02-26 | End: 2019-02-26

## 2019-02-26 RX ORDER — HYDROXYZINE HYDROCHLORIDE 25 MG/1
50 TABLET, FILM COATED ORAL EVERY 4 HOURS PRN
Status: DISCONTINUED | OUTPATIENT
Start: 2019-02-26 | End: 2019-02-27 | Stop reason: HOSPADM

## 2019-02-26 RX ADMIN — SODIUM CHLORIDE, POTASSIUM CHLORIDE, SODIUM LACTATE AND CALCIUM CHLORIDE 1000 ML: 600; 310; 30; 20 INJECTION, SOLUTION INTRAVENOUS at 05:46

## 2019-02-26 RX ADMIN — Medication 100 MG: at 05:46

## 2019-02-26 RX ADMIN — FOLIC ACID 1 MG: 1 TABLET ORAL at 05:46

## 2019-02-26 RX ADMIN — LORAZEPAM 1 MG: 1 TABLET ORAL at 22:06

## 2019-02-26 RX ADMIN — HYDROXYZINE HYDROCHLORIDE 50 MG: 25 TABLET, FILM COATED ORAL at 19:27

## 2019-02-26 RX ADMIN — POLYVINYL ALCOHOL 1 DROP: 14 SOLUTION/ DROPS OPHTHALMIC at 03:36

## 2019-02-26 RX ADMIN — POLYVINYL ALCOHOL 1 DROP: 14 SOLUTION/ DROPS OPHTHALMIC at 19:39

## 2019-02-26 RX ADMIN — ERYTHROMYCIN: 5 OINTMENT OPHTHALMIC at 11:53

## 2019-02-26 RX ADMIN — SODIUM CHLORIDE, POTASSIUM CHLORIDE, SODIUM LACTATE AND CALCIUM CHLORIDE 1000 ML: 600; 310; 30; 20 INJECTION, SOLUTION INTRAVENOUS at 15:34

## 2019-02-26 RX ADMIN — VITAMIN D, TAB 1000IU (100/BT) 2000 UNITS: 25 TAB at 05:45

## 2019-02-26 RX ADMIN — POLYVINYL ALCOHOL 1 DROP: 14 SOLUTION/ DROPS OPHTHALMIC at 05:51

## 2019-02-26 RX ADMIN — THERA TABS 1 TABLET: TAB at 05:45

## 2019-02-26 ASSESSMENT — ENCOUNTER SYMPTOMS
BLOOD IN STOOL: 0
BLURRED VISION: 0
PHOTOPHOBIA: 0
DIARRHEA: 0
SPEECH CHANGE: 0
FOCAL WEAKNESS: 0
HEADACHES: 0
SORE THROAT: 0
BACK PAIN: 0
INSOMNIA: 0
NAUSEA: 0
FEVER: 0
TREMORS: 0
NAUSEA: 1
COUGH: 0
MUSCULOSKELETAL NEGATIVE: 1
ABDOMINAL PAIN: 0
DIAPHORESIS: 0
NECK PAIN: 0
INSOMNIA: 1
HEADACHES: 1
BRUISES/BLEEDS EASILY: 0
WEAKNESS: 0
WHEEZING: 0
SEIZURES: 0
WEIGHT LOSS: 0
VOMITING: 0
HEMOPTYSIS: 0
HALLUCINATIONS: 0
DEPRESSION: 0
PND: 0
CHILLS: 0
SENSORY CHANGE: 0
ORTHOPNEA: 0
NERVOUS/ANXIOUS: 1
NERVOUS/ANXIOUS: 0
SHORTNESS OF BREATH: 0
DOUBLE VISION: 0
EYE PAIN: 1
EYE REDNESS: 1
PALPITATIONS: 0
FLANK PAIN: 0
EYE DISCHARGE: 1

## 2019-02-26 ASSESSMENT — LIFESTYLE VARIABLES: SUBSTANCE_ABUSE: 1

## 2019-02-26 NOTE — ASSESSMENT & PLAN NOTE
Secondary to above  Query old alternative etiologies  Serial CBC  Transfuse per protocols, no bleeding evident clinically at this time

## 2019-02-26 NOTE — ASSESSMENT & PLAN NOTE
Intoxication, resolved  Hx of [facility]  Phenobarb therapy, complete will discontinue 2/26  Cessation of alcohol use counseled at length today again   consultation Re: Support programs including AA etc.  [facility], patient encouraged to follow-up day of discharge

## 2019-02-26 NOTE — ASSESSMENT & PLAN NOTE
Counseling ongoing, initiated 2/26  Avoidance strongly encouraged again  Rehab programs? - CM consulting

## 2019-02-26 NOTE — ASSESSMENT & PLAN NOTE
Recurrent and related to allergies per patient. Eyes with conjunctival injection, moderate crusted discharge.  -saline drops  -erythromycin ointment  -warm compresses

## 2019-02-26 NOTE — CARE PLAN
Problem: Safety  Goal: Will remain free from falls  Outcome: MET Date Met: 02/26/19  Pt uses call light appropriately and awaits for staff to disconnect pt from cables or IV for safe ambulation.     Problem: Bowel/Gastric:  Goal: Normal bowel function is maintained or improved  Outcome: MET Date Met: 02/26/19  Per pt, they have experienced 4 bowel movements within the last 24 hours. Pt approprietly refusing Senna.     Problem: Psychosocial Needs:  Goal: Level of anxiety will decrease  Outcome: PROGRESSING AS EXPECTED  Pt showing signs of minor restlessness and anxiety. Phenobarb administered intermittently for RASS >1+. Will continue to monitor and assess.

## 2019-02-26 NOTE — ASSESSMENT & PLAN NOTE
Monitor for clinical signs of overt pancreatitis, no change  Secondary to EtOH clinically  Benign exam  Fluid resuscitation ongoing  Okay to take p.o., slowly advance diet -> reg diet tolerated

## 2019-02-26 NOTE — ASSESSMENT & PLAN NOTE
Avoid hepatotoxins  Serial CMP  Monitor for coagulopathy, hypoglycemia, encephalopathy  ETOH use cessation

## 2019-02-26 NOTE — PROGRESS NOTES
· 2 RN skin check complete with JORDY Carreon.  · Devices in place: none.  · Skin assessed under devices: N/A.  · Confirmed pressure ulcers found on: nowhere.  · New potential pressure ulcers noted on: nowhere. Wound consult placed and wound reported.  · The following interventions in place: none

## 2019-02-26 NOTE — ASSESSMENT & PLAN NOTE
Patient reports baseline anxiety. May contribute to substance abuse.  -Psychiatry consulted for medication recommendations, patient left prior to consult

## 2019-02-26 NOTE — PROGRESS NOTES
Critical Care Progress Note    Date of admission  2/23/2019    Chief Complaint  33 y.o. male admitted 2/23/2019 with ETOH WDs    Hospital Course    33 y.o. male who presented 2/23/2019 with alcohol intoxication on 2/23.  His last drink was 12 hours prior to arrival.  He has been binge drinking for the past week.  Per the chart review he is intoxicated on admission and he has had multiple admissions for alcohol withdrawal.  Per the resident note on admission there is documented hallucinations.  At the time he denied any drug use or cigarette smoking.  Currently the patient is alert to person and place.  He has significant tremors.  He is uncomfortable and has some abdominal pain.  He says he cannot remember why he came to the hospital.  He does not remember his last drink.  He has no chest pain or shortness of breath.  History is limited due to altered mental status.      Interval Problem Update  Reviewed last 24 hour events:    A&O x4  Complaining of his eyes, wants azithromycin ointment  Phenobarb 390 last 24 hrs  SR/ST    SBp 100-120s  Afeb  UO good    PIV  RA  No CXR  Na 130  Refusing lovenox  Vitamins    Ok to transfer to medical  D/c phenobarbital because  RCC to sign off to internal medicine     YESTERDAY  A&O x3-4  Phenobarb  x2 last night - 944 total  Sleepy/follows  ST 100s  SBp 120s  Afeb  UO adequate  Reg diet  PIVs    RA  No CXR  EOB/BR      2nd PIV needs to be placed    Patient assessed and reassessed  Query conjunctivitis  Patient agitated and requiring some IV phenobarbital today    Hemodynamic and respiratory status unchanged    Review of Systems  Review of Systems   Constitutional: Positive for malaise/fatigue. Negative for chills, diaphoresis, fever and weight loss.   HENT: Negative for congestion, nosebleeds and sore throat.    Eyes: Positive for pain, discharge and redness. Negative for blurred vision, double vision and photophobia.   Respiratory: Negative for cough,  hemoptysis, shortness of breath and wheezing.    Cardiovascular: Negative for chest pain, palpitations, orthopnea, leg swelling and PND.   Gastrointestinal: Positive for nausea (Improved). Negative for abdominal pain, blood in stool, diarrhea, melena and vomiting.   Genitourinary: Negative for dysuria, flank pain and hematuria.   Musculoskeletal: Negative for back pain and neck pain.   Skin: Negative for rash.   Neurological: Positive for headaches. Negative for sensory change, speech change, focal weakness and weakness.   Endo/Heme/Allergies: Does not bruise/bleed easily.   Psychiatric/Behavioral: Positive for substance abuse. Negative for depression, hallucinations and suicidal ideas. The patient has insomnia. The patient is not nervous/anxious.    Complete ROS performed, see above changes    Vital Signs for last 24 hours   Temp:  [36.4 °C (97.5 °F)-36.8 °C (98.2 °F)] 36.8 °C (98.2 °F)  Pulse:  [] 102  Resp:  [14-18] 18  SpO2:  [93 %-97 %] 95 %    Hemodynamic parameters for last 24 hours       Respiratory Information for the last 24 hours       Physical Exam   Physical Exam   Constitutional: He appears well-nourished. He appears lethargic. He is cooperative. He is easily aroused.  Non-toxic appearance. He appears ill. No distress.   HENT:   Head: Normocephalic and atraumatic.   Mouth/Throat: Oropharynx is clear and moist. Mucous membranes are not dry and not cyanotic. No oropharyngeal exudate.   Eyes: Pupils are equal, round, and reactive to light. EOM are normal. Left eye exhibits discharge (No change) and exudate. Left eye exhibits no chemosis. No foreign body present in the left eye. Left conjunctiva is injected. Left conjunctiva has no hemorrhage. No scleral icterus.   Neck: Phonation normal. Neck supple. Normal carotid pulses and no JVD present. No thyromegaly present.   Cardiovascular: Regular rhythm and intact distal pulses.   No extrasystoles are present. Tachycardia present.  Exam reveals no gallop  and no friction rub.    No murmur heard.  Pulmonary/Chest: No accessory muscle usage. No tachypnea. No respiratory distress. He has no wheezes. He has no rhonchi. He has no rales.   Abdominal: Soft. Bowel sounds are normal. He exhibits no distension, no fluid wave and no ascites. There is no hepatosplenomegaly. There is no tenderness. There is no rigidity, no guarding and no CVA tenderness.   Musculoskeletal: He exhibits no edema.   Lymphadenopathy:     He has no cervical adenopathy.   Neurological: He is easily aroused. He appears lethargic. He displays no atrophy and no tremor. No cranial nerve deficit or sensory deficit. He exhibits normal muscle tone. He displays no seizure activity. Coordination and gait normal. GCS eye subscore is 4. GCS verbal subscore is 5. GCS motor subscore is 6.   Skin: Skin is warm. No rash noted. He is not diaphoretic. No cyanosis or erythema. Nails show no clubbing.   Psychiatric: His speech is normal. Thought content normal. His mood appears not anxious. He is not agitated and not slowed. Cognition and memory are not impaired (Mild).   Complete follow-up physical exam performed, see above changes    Medications  Current Facility-Administered Medications   Medication Dose Route Frequency Provider Last Rate Last Dose   • hydrOXYzine HCl (ATARAX) tablet 50 mg  50 mg Oral Q4HRS PRN Kishan Cobb M.D.   50 mg at 02/26/19 1927   • artificial tears 1.4 % ophthalmic solution 1 Drop  1 Drop Both Eyes Q2HRS PRN Velma Richardson M.D.   1 Drop at 02/26/19 1939   • vitamin D (cholecalciferol) tablet 2,000 Units  2,000 Units Oral DAILY Velma Richardson M.D.   2,000 Units at 02/26/19 0545   • lactated ringers infusion  1,000 mL Intravenous Continuous Aurelio Russo M.D. 100 mL/hr at 02/26/19 1534 1,000 mL at 02/26/19 1534   • thiamine tablet 100 mg  100 mg Oral DAILY Debbi Valdes M.D.   100 mg at 02/26/19 0546   • multivitamin (THERAGRAN) tablet 1 Tab  1 Tab Oral DAILY Debbi Valdes M.D.   1  Tab at 02/26/19 0545   • folic acid (FOLVITE) tablet 1 mg  1 mg Oral DAILY Debbi Valdes M.D.   1 mg at 02/26/19 0546   • senna-docusate (PERICOLACE or SENOKOT S) 8.6-50 MG per tablet 2 Tab  2 Tab Oral BID Aurelio Russo M.D.   Stopped at 02/24/19 1800    And   • polyethylene glycol/lytes (MIRALAX) PACKET 1 Packet  1 Packet Oral QDAY PRN Aurelio Russo M.D.        And   • magnesium hydroxide (MILK OF MAGNESIA) suspension 30 mL  30 mL Oral QDAY PRN Aurelio Russo M.D.        And   • bisacodyl (DULCOLAX) suppository 10 mg  10 mg Rectal QDAY PRN Aurelio Russo M.D.       • enoxaparin (LOVENOX) inj 40 mg  40 mg Subcutaneous DAILY Aurelio Russo M.D.   40 mg at 02/24/19 0512       Fluids    Intake/Output Summary (Last 24 hours) at 02/26/19 2320  Last data filed at 02/26/19 2000   Gross per 24 hour   Intake             3470 ml   Output             2325 ml   Net             1145 ml       Laboratory          Recent Labs      02/24/19   1515  02/25/19   1240  02/26/19   0335   SODIUM  134*  131*  130*   POTASSIUM  3.4*  3.5*  4.0   CHLORIDE  97  96  98   CO2  31  26  24   BUN  9  7*  11   CREATININE  0.69  0.54  0.68   MAGNESIUM  1.9  2.2  2.0   PHOSPHORUS  2.6  3.0  3.1   CALCIUM  9.4  9.3  9.3     Recent Labs      02/24/19   0235  02/24/19   0343  02/24/19   1515  02/25/19   1240  02/26/19   0335   ALTSGPT  77*   --    --   66*  57*   ASTSGOT  143*   --    --   86*  66*   ALKPHOSPHAT  90   --    --   89  97   TBILIRUBIN  0.7   --    --   1.1  0.7   PREALBUMIN   --   32.0   --    --    --    GLUCOSE  159*   --   105*  119*  142*     Recent Labs      02/24/19   0235  02/25/19   1240  02/26/19   0335   WBC  6.9   --    --    NEUTSPOLYS  60.70   --    --    LYMPHOCYTES  29.40   --    --    MONOCYTES  9.40   --    --    EOSINOPHILS  0.00   --    --    BASOPHILS  0.40   --    --    ASTSGOT  143*  86*  66*   ALTSGPT  77*  66*  57*   ALKPHOSPHAT  90  89  97   TBILIRUBIN  0.7  1.1  0.7     Recent Labs      02/24/19   0235   RBC   4.48*   HEMOGLOBIN  14.4   HEMATOCRIT  41.5*   PLATELETCT  132*       Imaging  X-Ray:  I have personally reviewed the images and compared with prior images.  EKG:  I have personally reviewed the images and compared with prior images.  CT:    Reviewed  Echo:   Reviewed    Assessment/Plan  * Alcohol withdrawal (HCC)- (present on admission)   Assessment & Plan    Phenobarbital protocols complete, discontinued 2/26  Vitamin supplementation continue  Optimize/replete electrolytes  Maintain adequate hydration with IV fluids as needed  Cessation of alcohol use long-term will be encouraged at length today  Outpatient follow-up with EtOH programs,  to facilitate     Alcohol use disorder, severe, dependence (HCC)- (present on admission)   Assessment & Plan    Intoxication, resolved  Hx of [facility]  Phenobarb therapy, complete will discontinue 2/26  Cessation of alcohol use counseled at length today     Serum lipase elevation- (present on admission)   Assessment & Plan    Monitor for clinical signs of overt pancreatitis, no change  Secondary to EtOH clinically  Benign exam  Fluid resuscitation ongoing  Okay to take p.o., slowly advance diet       Alcohol dependence (HCC)- (present on admission)   Assessment & Plan    Counseling ongoing, initiated 2/26  Avoidance strongly encouraged     Alcoholic hepatitis- (present on admission)   Assessment & Plan    Avoid hepatotoxins  Serial CMP  Monitor for coagulopathy, hypoglycemia, encephalopathy       Vitamin D deficiency- (present on admission)   Assessment & Plan    Replete     Methamphetamine addiction (HCC)- (present on admission)   Assessment & Plan    Importance of cessation reviewed all today at length, complications of methamphetamine use including neuropsychiatric issues, hypertension, pulmonary hypertension, cardiomyopathy and death all reviewed     Anxiety disorder- (present on admission)   Assessment & Plan    Anxiolysis as needed  Psychiatry  evaluation/follow-up as needed     Thrombocytopenia (HCC)- (present on admission)   Assessment & Plan    Secondary to above  Query old alternative etiologies  Serial CBC  Transfuse per protocols, no bleeding evident clinically at this time     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 In review of yesterday's note (2/25/2019), there are no changes except as documented above.     Discussed patient condition and risk of morbidity and/or mortality with RN, RT, Pharmacy, , [facility] resident, Charge nurse / hot rounds and Patient

## 2019-02-26 NOTE — PROGRESS NOTES
"Attempted to place a second PIV after the pt's second PIV failed and was removed on day shift. Pt adamantly refused, crossed arms and refused to allow RN to look stating he would \"leave right now.\" Pt on LR at 100 mL/hr and receiving intermittent PRN Phenobarbital injections. Pt on no other drips at this time.      "

## 2019-02-26 NOTE — ASSESSMENT & PLAN NOTE
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

## 2019-02-26 NOTE — ASSESSMENT & PLAN NOTE
Importance of cessation reviewed today again, complications of methamphetamine use including neuropsychiatric issues, hypertension, pulmonary hypertension, cardiomyopathy and death all reviewed again  Self treating?  Uses Adderal that he receives from [facility] - stopped recently for failed drug screen

## 2019-02-26 NOTE — ASSESSMENT & PLAN NOTE
Phenobarbital protocols complete, discontinued 2/26  Continue vitamin supplementation  Optimize/replete electrolytes  Maintain adequate hydration with IV fluids as needed  Cessation of alcohol use long-term will be encouraged at length today again  Outpatient follow-up with EtOH programs,  to facilitate, usually goes to [facility]  Does not like AA programs

## 2019-02-26 NOTE — ASSESSMENT & PLAN NOTE
With history of withdrawal with seizures and ICU admission.   Elevated transaminases. No evidence of cirrhosis, normal synthetic function.  Thrombocytopenia may indicate marrow supression

## 2019-02-26 NOTE — CARE PLAN
Problem: Safety  Goal: Will remain free from injury  Side rails up.  Call light in reach.  Pt demonstrates use of call light.  Bed alarm on    Problem: Bowel/Gastric:  Goal: Normal bowel function is maintained or improved  Pt refused stool softener.  Stated had 4 large BMs yesterday.  Held at this time

## 2019-02-26 NOTE — PROGRESS NOTES
[facility] ICU Progress Note      Admit Date: 2/23/2019  ICU Day:  3    Resident(s): Velma Richardson M.D.   Attending:  PING LARSON/ Dr. Bateman    Date & Time: 2/26/2019 8:33 AM       Patient ID:    Name:  Gopal Ceja     YOB: 1985  Age:  33 y.o.  male   MRN:  [id number]    ID (carried forward):  Gopal Ceja is a 33-year-old male with past medical history of alcohol abuse with alcohol withdrawal seizures, ICU admission for alcohol withdrawal, anxiety, methamphetamine abuse, ADHD and thrombocytopenia. He presented to the ED on the evening of 2/23/19 via EMS for alcohol intoxication. At that time, he reported his last drink was 12 hours prior to admission with continuous binge drinking for the previous week. Patient reported hallucinations on admission with generalized pain. He was started on CIWA protocol and admitted to the floor, where his CIWA scores reached 32 and he received a total of 12 mg of IV ativan overnight. The following morning, a rapid response was called for tachycardia to 150. He was transferred to ICU and started on phenobarbital protocol.   He received a phenobarbital loading dose on transfer followed by 130 mg doses x5, last on 2/25 at 22:25. He is not tachycardic this morning and denies hallucinations. He reports anxiety but states that is normal for him. He has had no seizures, tremor, or hypertension during his stay in the ICU.    Consultants:  none     Interval Events:  HR up to 110s/24h, currently 80s  BP remains wnl  LR at 100  390 mg phenobarb in 24h      Review of Systems   Constitutional: Negative for chills, diaphoresis and fever.   HENT: Negative.    Eyes: Positive for discharge and redness. Negative for blurred vision and double vision.   Respiratory: Negative for shortness of breath and wheezing.    Cardiovascular: Negative for chest pain and palpitations.   Gastrointestinal: Negative for nausea and vomiting.   Genitourinary: Negative.    Musculoskeletal: Negative.   "  Skin: Negative for itching and rash.   Neurological: Negative for tremors, seizures and headaches.   Endo/Heme/Allergies: Negative.    Psychiatric/Behavioral: Positive for substance abuse. Negative for hallucinations. The patient is nervous/anxious (currently at baseline level of anxiety). The patient does not have insomnia.      PHYSICAL EXAM:  Vitals:    02/26/19 0200 02/26/19 0400 02/26/19 0600 02/26/19 0700   BP:       Pulse: 96  89 95   Resp: 18 16 14 17   Temp:  36.4 °C (97.5 °F)     TempSrc:  Temporal     SpO2: 95% 97% 96% 94%   Weight:       Height:        Body mass index is 22.93 kg/m².  Latest Vitals:  /80   Pulse 95   Temp 36.4 °C (97.5 °F) (Temporal)   Resp 17   Ht 1.727 m (5' 8\")   Wt 68.4 kg (150 lb 12.7 oz)   SpO2 94%   BMI 22.93 kg/m²   O2 therapy: Pulse Oximetry: 94 %, O2 Delivery: None (Room Air)  Vitals Range last 24h:  Temp:  [36.3 °C (97.4 °F)-36.7 °C (98 °F)] 36.4 °C (97.5 °F)  Pulse:  [] 95  Resp:  [14-24] 17  SpO2:  [90 %-97 %] 94 %    Date 02/26/19 0700 - 02/27/19 0659   Shift 2450-3767 8985-7756 0095-4035 24 Hour Total   I  N  T  A  K  E   I.V. 200   200      Volume (mL) (lactated ringers infusion) 200   200    Shift Total 200   200   O  U  T  P  U  T   Urine 350   350      Urine Void (mL) 350   350    Shift Total 350   350   NET -150   -150        Intake/Output Summary (Last 24 hours) at 02/26/19 0833  Last data filed at 02/26/19 0800   Gross per 24 hour   Intake             4250 ml   Output             1825 ml   Net             2425 ml       Physical Exam   Constitutional: He is oriented to person, place, and time. He appears not jaundiced. He appears unhealthy. No distress.   HENT:   Head: Normocephalic and atraumatic.   Eyes: Pupils are equal, round, and reactive to light. EOM are normal. Right eye exhibits discharge. Left eye exhibits discharge. Right conjunctiva is injected. Left conjunctiva is injected. No scleral icterus.   Neck: Normal range of motion. "   Cardiovascular: Regular rhythm, normal heart sounds and normal pulses.  Tachycardia present.    Pulmonary/Chest: Effort normal. No stridor. No tachypnea. He has no wheezes. He has no rhonchi.   Abdominal: Soft. Bowel sounds are normal. There is no tenderness.   Musculoskeletal: He exhibits no edema or deformity.   Neurological: He is alert and oriented to person, place, and time.   Skin: Skin is warm and dry.   Psychiatric: Affect normal. His mood appears anxious.         Recent Labs      02/24/19 1515 02/25/19 1240 02/26/19 0335   SODIUM  134*  131*  130*   POTASSIUM  3.4*  3.5*  4.0   CHLORIDE  97  96  98   CO2  31  26  24   BUN  9  7*  11   CREATININE  0.69  0.54  0.68   MAGNESIUM  1.9  2.2  2.0   PHOSPHORUS  2.6  3.0  3.1   CALCIUM  9.4  9.3  9.3     Recent Labs      02/23/19   1950  02/24/19 0235 02/24/19   0343  02/24/19 1515 02/25/19 1240 02/26/19 0335   ALTSGPT  97*  77*   --    --   66*  57*   ASTSGOT  202*  143*   --    --   86*  66*   ALKPHOSPHAT  107*  90   --    --   89  97   TBILIRUBIN  0.6  0.7   --    --   1.1  0.7   LIPASE  110*   --    --    --    --    --    PREALBUMIN   --    --   32.0   --    --    --    GLUCOSE  99  159*   --   105*  119*  142*     Recent Labs      02/23/19 1950 02/24/19 0235   RBC  5.13  4.48*   HEMOGLOBIN  16.5  14.4   HEMATOCRIT  47.4  41.5*   PLATELETCT  207  132*   PROTHROMBTM  12.5   --    INR  0.92   --      Recent Labs      02/23/19   1950  02/24/19 0235 02/25/19 1240 02/26/19 0335   WBC  6.6  6.9   --    --    NEUTSPOLYS  67.00  60.70   --    --    LYMPHOCYTES  28.00  29.40   --    --    MONOCYTES  4.30  9.40   --    --    EOSINOPHILS  0.00  0.00   --    --    BASOPHILS  0.50  0.40   --    --    ASTSGOT  202*  143*  86*  66*   ALTSGPT  97*  77*  66*  57*   ALKPHOSPHAT  107*  90  89  97   TBILIRUBIN  0.6  0.7  1.1  0.7       Meds:  • artificial tears  1 Drop     • vitamin D  2,000 Units     • LR  1,000 mL 1,000 mL (02/26/19 0546)   •  Pharmacy  1 Each      And   • PHENObarbital  130 mg      And   • PHENObarbital  260 mg     • thiamine  100 mg     • multivitamin  1 Tab     • folic acid  1 mg     • MD Alert...Adult ICU Electrolyte Replacement per Pharmacy       • senna-docusate  2 Tab      And   • polyethylene glycol/lytes  1 Packet      And   • magnesium hydroxide  30 mL      And   • bisacodyl  10 mg     • enoxaparin  40 mg          Procedures:  none    Imaging:  No orders to display       Problem and Plan:    * Alcohol withdrawal (HCC)- (present on admission)   Assessment & Plan    Hx alcohol withdrawal with seizures, ICU admission.   CIWA scores were 32 on floor, transferred to ICU for phenobarbital protocol. S/p IV thiamine, electrolyte, vitamin replacement.  Last drink approx 72 hours ago, should be at approximate peak of withdrawal symptoms, however no objective evidence of withdrawal. Patient is anxious, but at baseline.  -continue electrolyte, vitamin replacement  -discontinue phenobarbital protocol  -stable for transfer to medical floor  -avoid additional ativan if possible, consider clonidine for tachycardia and return for phenobarb if withdrawal symptoms become severe     Alcohol use disorder, severe, dependence (HCC)- (present on admission)   Assessment & Plan    With history of withdrawal with seizures and ICU admission.   Elevated transaminases. No evidence of cirrhosis, normal synthetic function.  Thrombocytopenia may indicate marrow supression  -encourage cessation, provide counseling and social support services when withdrawal has resolved     Serum lipase elevation- (present on admission)   Assessment & Plan    Mild elevation in Lipase at 110. On previous admission was 134. Likely 2/2 alcohol abuse. Does not meet criteria for pancreatitis.  -continue supportive care as noted     Alcoholic hepatitis- (present on admission)   Assessment & Plan    Transaminitis. No evidence of cirrhosis. Normal synthetic function.  -supportive  care  -encourage alcohol cessation as noted     Vitamin D deficiency- (present on admission)   Assessment & Plan    Low vitamin D at 15.  -continue replacement     Methamphetamine addiction (HCC)- (present on admission)   Assessment & Plan    Last positive UDS 5/2017.  -provide counseling and social support services prior to discharge     Anxiety disorder- (present on admission)   Assessment & Plan    Patient reports baseline anxiety. May contribute to substance abuse.  -consider referral to psychiatry for long term management     Thrombocytopenia (HCC)- (present on admission)   Assessment & Plan    2/2 alcohol abuse. No evidence of bleeding.   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 These pts tend to have developmental delay and intellectual disability with impaired communication and socialization skills, as well as delayed development of speech and language.  -no acute intervention indicated     Tachycardia- (present on admission)   Assessment & Plan    Improved. 2/2 alcohol withdrawal.  -continue fluid resuscitation     Conjunctivitis- (present on admission)   Assessment & Plan    Recurrent and related to allergies per patient. Eyes with conjunctival injection, moderate crusted discharge.  -saline drops  -erythromycin ointment  -warm compresses         DISPO: stable for transfer to medical floor    CODE STATUS: FULL CODE    Quality Measures:  Feeding: regular diet  Analgesia: n/a  Sedation: none indicated  Thromboprophylaxis: enoxaparin  Head of bed: >30 deg  Ulcer prophylaxis: n/a  Glycemic control: n/a  Bowel care: bowel regimen  Indwelling lines: n/a  Deescalation of antibiotics: n/a      Velma Richardson M.D.

## 2019-02-27 VITALS
SYSTOLIC BLOOD PRESSURE: 138 MMHG | WEIGHT: 154.32 LBS | BODY MASS INDEX: 23.39 KG/M2 | DIASTOLIC BLOOD PRESSURE: 80 MMHG | HEART RATE: 99 BPM | TEMPERATURE: 97.8 F | OXYGEN SATURATION: 99 % | HEIGHT: 68 IN | RESPIRATION RATE: 16 BRPM

## 2019-02-27 PROBLEM — R00.0 TACHYCARDIA: Status: RESOLVED | Noted: 2018-10-30 | Resolved: 2019-02-27

## 2019-02-27 PROBLEM — R74.8 SERUM LIPASE ELEVATION: Status: RESOLVED | Noted: 2019-01-12 | Resolved: 2019-02-27

## 2019-02-27 LAB
ALBUMIN SERPL BCP-MCNC: 4.3 G/DL (ref 3.2–4.9)
ALBUMIN/GLOB SERPL: 1.5 G/DL
ALP SERPL-CCNC: 104 U/L (ref 30–99)
ALT SERPL-CCNC: 69 U/L (ref 2–50)
ANION GAP SERPL CALC-SCNC: 6 MMOL/L (ref 0–11.9)
AST SERPL-CCNC: 64 U/L (ref 12–45)
BASOPHILS # BLD AUTO: 0.3 % (ref 0–1.8)
BASOPHILS # BLD: 0.03 K/UL (ref 0–0.12)
BILIRUB SERPL-MCNC: 0.4 MG/DL (ref 0.1–1.5)
BUN SERPL-MCNC: 13 MG/DL (ref 8–22)
CALCIUM SERPL-MCNC: 10.1 MG/DL (ref 8.5–10.5)
CHLORIDE SERPL-SCNC: 98 MMOL/L (ref 96–112)
CO2 SERPL-SCNC: 28 MMOL/L (ref 20–33)
CREAT SERPL-MCNC: 0.69 MG/DL (ref 0.5–1.4)
EOSINOPHIL # BLD AUTO: 0.14 K/UL (ref 0–0.51)
EOSINOPHIL NFR BLD: 1.6 % (ref 0–6.9)
ERYTHROCYTE [DISTWIDTH] IN BLOOD BY AUTOMATED COUNT: 45 FL (ref 35.9–50)
GLOBULIN SER CALC-MCNC: 2.9 G/DL (ref 1.9–3.5)
GLUCOSE SERPL-MCNC: 101 MG/DL (ref 65–99)
HCT VFR BLD AUTO: 44.8 % (ref 42–52)
HGB BLD-MCNC: 15 G/DL (ref 14–18)
IMM GRANULOCYTES # BLD AUTO: 0.03 K/UL (ref 0–0.11)
IMM GRANULOCYTES NFR BLD AUTO: 0.3 % (ref 0–0.9)
LYMPHOCYTES # BLD AUTO: 2.32 K/UL (ref 1–4.8)
LYMPHOCYTES NFR BLD: 26.2 % (ref 22–41)
MCH RBC QN AUTO: 32 PG (ref 27–33)
MCHC RBC AUTO-ENTMCNC: 33.5 G/DL (ref 33.7–35.3)
MCV RBC AUTO: 95.5 FL (ref 81.4–97.8)
MONOCYTES # BLD AUTO: 1.06 K/UL (ref 0–0.85)
MONOCYTES NFR BLD AUTO: 12 % (ref 0–13.4)
NEUTROPHILS # BLD AUTO: 5.27 K/UL (ref 1.82–7.42)
NEUTROPHILS NFR BLD: 59.6 % (ref 44–72)
NRBC # BLD AUTO: 0 K/UL
NRBC BLD-RTO: 0 /100 WBC
PLATELET # BLD AUTO: 85 K/UL (ref 164–446)
PMV BLD AUTO: 12.2 FL (ref 9–12.9)
POTASSIUM SERPL-SCNC: 4.3 MMOL/L (ref 3.6–5.5)
PROT SERPL-MCNC: 7.2 G/DL (ref 6–8.2)
RBC # BLD AUTO: 4.69 M/UL (ref 4.7–6.1)
SODIUM SERPL-SCNC: 132 MMOL/L (ref 135–145)
WBC # BLD AUTO: 8.9 K/UL (ref 4.8–10.8)

## 2019-02-27 PROCEDURE — 85025 COMPLETE CBC W/AUTO DIFF WBC: CPT

## 2019-02-27 PROCEDURE — 99232 SBSQ HOSP IP/OBS MODERATE 35: CPT | Performed by: INTERNAL MEDICINE

## 2019-02-27 PROCEDURE — 700102 HCHG RX REV CODE 250 W/ 637 OVERRIDE(OP): Performed by: STUDENT IN AN ORGANIZED HEALTH CARE EDUCATION/TRAINING PROGRAM

## 2019-02-27 PROCEDURE — A9270 NON-COVERED ITEM OR SERVICE: HCPCS | Performed by: STUDENT IN AN ORGANIZED HEALTH CARE EDUCATION/TRAINING PROGRAM

## 2019-02-27 PROCEDURE — 700102 HCHG RX REV CODE 250 W/ 637 OVERRIDE(OP): Performed by: INTERNAL MEDICINE

## 2019-02-27 PROCEDURE — 700101 HCHG RX REV CODE 250: Performed by: INTERNAL MEDICINE

## 2019-02-27 PROCEDURE — A9270 NON-COVERED ITEM OR SERVICE: HCPCS | Performed by: INTERNAL MEDICINE

## 2019-02-27 PROCEDURE — 80053 COMPREHEN METABOLIC PANEL: CPT

## 2019-02-27 RX ORDER — LORAZEPAM 1 MG/1
2 TABLET ORAL ONCE
Status: COMPLETED | OUTPATIENT
Start: 2019-02-27 | End: 2019-02-27

## 2019-02-27 RX ORDER — ERYTHROMYCIN 5 MG/G
OINTMENT OPHTHALMIC 3 TIMES DAILY
Status: DISCONTINUED | OUTPATIENT
Start: 2019-02-27 | End: 2019-02-27 | Stop reason: HOSPADM

## 2019-02-27 RX ADMIN — THERA TABS 1 TABLET: TAB at 06:11

## 2019-02-27 RX ADMIN — Medication 100 MG: at 06:12

## 2019-02-27 RX ADMIN — VITAMIN D, TAB 1000IU (100/BT) 2000 UNITS: 25 TAB at 06:12

## 2019-02-27 RX ADMIN — LORAZEPAM 2 MG: 1 TABLET ORAL at 02:20

## 2019-02-27 RX ADMIN — FOLIC ACID 1 MG: 1 TABLET ORAL at 06:12

## 2019-02-27 RX ADMIN — ERYTHROMYCIN: 5 OINTMENT OPHTHALMIC at 11:32

## 2019-02-27 ASSESSMENT — ENCOUNTER SYMPTOMS
HEADACHES: 0
CHILLS: 0
NAUSEA: 0
NERVOUS/ANXIOUS: 0
SEIZURES: 0
HEMOPTYSIS: 0
NERVOUS/ANXIOUS: 1
BRUISES/BLEEDS EASILY: 0
SENSORY CHANGE: 0
SHORTNESS OF BREATH: 0
FLANK PAIN: 0
FOCAL WEAKNESS: 0
WEAKNESS: 0
NECK PAIN: 0
MUSCULOSKELETAL NEGATIVE: 1
HALLUCINATIONS: 1
PND: 0
INSOMNIA: 1
WHEEZING: 0
DIAPHORESIS: 0
EYE DISCHARGE: 1
BLOOD IN STOOL: 0
SORE THROAT: 0
EYE REDNESS: 1
BLURRED VISION: 0
ORTHOPNEA: 0
HALLUCINATIONS: 0
TREMORS: 0
ABDOMINAL PAIN: 0
BACK PAIN: 0
INSOMNIA: 0
DEPRESSION: 0
SPEECH CHANGE: 0
DIARRHEA: 0
VOMITING: 0
WEIGHT LOSS: 0
FEVER: 0
HEADACHES: 1
PHOTOPHOBIA: 0
EYE PAIN: 1
DOUBLE VISION: 0
COUGH: 0
PALPITATIONS: 0

## 2019-02-27 ASSESSMENT — LIFESTYLE VARIABLES: SUBSTANCE_ABUSE: 1

## 2019-02-27 NOTE — PROGRESS NOTES
Critical Care Progress Note    Date of admission  2/23/2019    Chief Complaint  33 y.o. male admitted 2/23/2019 with ETOH WDs    Hospital Course    33 y.o. male who presented 2/23/2019 with alcohol intoxication on 2/23.  His last drink was 12 hours prior to arrival.  He has been binge drinking for the past week.  Per the chart review he is intoxicated on admission and he has had multiple admissions for alcohol withdrawal.  Per the resident note on admission there is documented hallucinations.  At the time he denied any drug use or cigarette smoking.  Currently the patient is alert to person and place.  He has significant tremors.  He is uncomfortable and has some abdominal pain.  He says he cannot remember why he came to the hospital.  He does not remember his last drink.  He has no chest pain or shortness of breath.  History is limited due to altered mental status.      Interval Problem Update  Reviewed last 24 hour events:    A&O x4  Mood appropriate  Patient noted some auditory hallucinations last night?  Patient stated he recently had a drug screen at the [facility] positive and they stopped prescribing his Ativan and Adderall and informed him he need for serial negative drug screens before they consider re-prescribing his medications and he would need close follow-up  Patient does not abuse any other drugs but alcohol and its his go to agent when he does not receive his prescriptions and that is why he was binging  Patient not interested in AA or other forms of alcohol rehab but will go back to the [facility]  Patient is willing to speak with case management about options for follow-up  Patient has not seen a psychiatrist recently the best to his knowledge and would not be adverse to seeing somebody here for evaluation  Patient denies SI at this time  Eyes feel the same or worse, patient requesting erythromycin ointment, is worked well for him in the past  SR 90s  SBp 120s  Tm 98.2  UO  adequate  Mobilizing  Taking PO well  PIV  IVF     Psychiatry evaluation  Erythromycin ophthalmic ointment   consultation Re: Discharge planning in alcohol rehab program and referral to psychiatry as an outpatient  [facility]-up [facility] for [facility] ideally with follow-up [facility]  DC planning     YESTERDAY  A&O x4  Complaining of his eyes, wants azithromycin ointment  Phenobarb 390 last 24 hrs  SR/ST    SBp 100-120s  Afeb  UO good    PIV  RA  No CXR  Na 130  Refusing lovenox  Vitamins    Ok to transfer to medical  D/c phenobarbital because  RCC to sign off to internal medicine    Review of Systems  Review of Systems   Constitutional: Positive for malaise/fatigue (Improved). Negative for chills, diaphoresis, fever and weight loss.   HENT: Negative for congestion, nosebleeds and sore throat.    Eyes: Positive for pain, discharge and redness. Negative for blurred vision, double vision and photophobia.        Ocular symptoms no change or slight better   Respiratory: Negative for cough, hemoptysis, shortness of breath and wheezing.    Cardiovascular: Negative for chest pain, palpitations, orthopnea, leg swelling and PND.   Gastrointestinal: Negative for abdominal pain (Resolved), blood in stool, diarrhea, melena, nausea (Resolved) and vomiting.   Genitourinary: Negative for dysuria, flank pain and hematuria.   Musculoskeletal: Negative for back pain and neck pain.   Skin: Negative for rash.   Neurological: Positive for headaches (Improved). Negative for sensory change, speech change, focal weakness and weakness.   Endo/Heme/Allergies: Does not bruise/bleed easily.   Psychiatric/Behavioral: Positive for hallucinations and substance abuse. Negative for depression and suicidal ideas. The patient has insomnia. The patient is not nervous/anxious.    Complete review of systems performed, see above changes    Vital Signs for last 24 hours   Temp:  [35.9 °C (96.6 °F)-36.8 °C (98.2 °F)] 36.6 °C (97.8  °F)  Pulse:  [] 99  Resp:  [16-20] 16  SpO2:  [93 %-99 %] 99 %    Hemodynamic parameters for last 24 hours       Respiratory Information for the last 24 hours       Physical Exam   Physical Exam   Constitutional: He is oriented to person, place, and time. He appears well-nourished. He is cooperative. He is easily aroused.  Non-toxic appearance. He appears ill. No distress.   HENT:   Head: Normocephalic and atraumatic.   Mouth/Throat: Oropharynx is clear and moist. Mucous membranes are not dry and not cyanotic. No oropharyngeal exudate.   Eyes: Pupils are equal, round, and reactive to light. EOM are normal. Left eye exhibits exudate. Left eye exhibits no chemosis. Left eye discharge: better. No foreign body present in the left eye. Left conjunctiva is injected. Left conjunctiva has no hemorrhage. No scleral icterus.   Neck: Phonation normal. Neck supple. Normal carotid pulses and no JVD present. No thyromegaly present.   Cardiovascular: Regular rhythm and intact distal pulses.   No extrasystoles are present. Tachycardia present.  Exam reveals no gallop and no friction rub.    No murmur heard.  Pulmonary/Chest: No accessory muscle usage. No tachypnea. No respiratory distress. He has no wheezes. He has no rhonchi. He has no rales.   Abdominal: Soft. Bowel sounds are normal. He exhibits no distension, no fluid wave and no ascites. There is no hepatosplenomegaly. There is no tenderness. There is no rigidity, no guarding and no CVA tenderness.   Musculoskeletal: He exhibits no edema.   Lymphadenopathy:     He has no cervical adenopathy.   Neurological: He is alert, oriented to person, place, and time and easily aroused. He has normal strength. He displays no atrophy and no tremor. No cranial nerve deficit or sensory deficit. He exhibits normal muscle tone. He displays no seizure activity. Coordination and gait normal. GCS eye subscore is 4. GCS verbal subscore is 5. GCS motor subscore is 6.   Skin: Skin is warm, dry  and intact. No rash noted. He is not diaphoretic. No cyanosis or erythema. Nails show no clubbing.   Psychiatric: His speech is normal. His mood appears not anxious. He is not agitated, not aggressive, not hyperactive, not slowed, not withdrawn, not actively hallucinating and not combative. Thought content is not paranoid and not delusional. Cognition and memory are not impaired. He expresses no homicidal and no suicidal ideation. He expresses no suicidal plans and no homicidal plans.   Complete follow-up physical exam performed, see above changes    Medications  Current Facility-Administered Medications   Medication Dose Route Frequency Provider Last Rate Last Dose   • erythromycin ophthalmic ointment   Both Eyes TID Sebas Bateman M.D.       • hydrOXYzine HCl (ATARAX) tablet 50 mg  50 mg Oral Q4HRS PRN Kishan Cobb M.D.   50 mg at 02/26/19 1927   • artificial tears 1.4 % ophthalmic solution 1 Drop  1 Drop Both Eyes Q2HRS PRN Velma Richardson M.D.   1 Drop at 02/26/19 1939   • vitamin D (cholecalciferol) tablet 2,000 Units  2,000 Units Oral DAILY Velma Richardson M.D.   2,000 Units at 02/27/19 0612   • lactated ringers infusion  1,000 mL Intravenous Continuous Aurelio Russo M.D.   Stopped at 02/27/19 0200   • thiamine tablet 100 mg  100 mg Oral DAILY Debbi Valdes M.D.   100 mg at 02/27/19 0612   • multivitamin (THERAGRAN) tablet 1 Tab  1 Tab Oral DAILY Debbi Valdes M.D.   1 Tab at 02/27/19 0611   • folic acid (FOLVITE) tablet 1 mg  1 mg Oral DAILY Debbi Valdes M.D.   1 mg at 02/27/19 0612   • senna-docusate (PERICOLACE or SENOKOT S) 8.6-50 MG per tablet 2 Tab  2 Tab Oral BID Aurelio Russo M.D.   Stopped at 02/24/19 1800    And   • polyethylene glycol/lytes (MIRALAX) PACKET 1 Packet  1 Packet Oral QDAY PRN Aurelio uRsso M.D.        And   • magnesium hydroxide (MILK OF MAGNESIA) suspension 30 mL  30 mL Oral QDAY PRN Aurelio Russo M.D.        And   • bisacodyl (DULCOLAX) suppository 10 mg  10 mg  Rectal QDAY PRN Aurelio Russo M.D.       • enoxaparin (LOVENOX) inj 40 mg  40 mg Subcutaneous DAILY Aurelio Russo M.D.   40 mg at 02/24/19 0512       Fluids    Intake/Output Summary (Last 24 hours) at 02/27/19 1121  Last data filed at 02/27/19 0800   Gross per 24 hour   Intake             2650 ml   Output             2175 ml   Net              475 ml       Laboratory          Recent Labs      02/24/19   1515  02/25/19   1240  02/26/19   0335  02/27/19 0618   SODIUM  134*  131*  130*  132*   POTASSIUM  3.4*  3.5*  4.0  4.3   CHLORIDE  97  96  98  98   CO2  31  26 24  28   BUN  9  7*  11  13   CREATININE  0.69  0.54  0.68  0.69   MAGNESIUM  1.9  2.2  2.0   --    PHOSPHORUS  2.6  3.0  3.1   --    CALCIUM  9.4  9.3  9.3  10.1     Recent Labs      02/25/19   1240  02/26/19   0335  02/27/19   0618   ALTSGPT  66*  57*  69*   ASTSGOT  86*  66*  64*   ALKPHOSPHAT  89  97  104*   TBILIRUBIN  1.1  0.7  0.4   GLUCOSE  119*  142*  101*     Recent Labs      02/25/19   1240  02/26/19   0335  02/27/19   0618   WBC   --    --   8.9   NEUTSPOLYS   --    --   59.60   LYMPHOCYTES   --    --   26.20   MONOCYTES   --    --   12.00   EOSINOPHILS   --    --   1.60   BASOPHILS   --    --   0.30   ASTSGOT  86*  66*  64*   ALTSGPT  66*  57*  69*   ALKPHOSPHAT  89  97  104*   TBILIRUBIN  1.1  0.7  0.4     Recent Labs      02/27/19 0618   RBC  4.69*   HEMOGLOBIN  15.0   HEMATOCRIT  44.8   PLATELETCT  85*       Imaging  X-Ray:  I have personally reviewed the images and compared with prior images.  EKG:  I have personally reviewed the images and compared with prior images.  CT:    Reviewed  Echo:   Reviewed    Assessment/Plan  * Alcohol withdrawal (HCC)- (present on admission)   Assessment & Plan    Phenobarbital protocols complete, discontinued 2/26  Continue vitamin supplementation  Optimize/replete electrolytes  Maintain adequate hydration with IV fluids as needed  Cessation of alcohol use long-term will be encouraged at length today  again  Outpatient follow-up with EtOH programs,  to facilitate, usually goes to [facility]  Does not like AA programs     Alcohol use disorder, severe, dependence (HCC)- (present on admission)   Assessment & Plan    Intoxication, resolved  Hx of mult hospitalizaitons  Phenobarb therapy, complete will discontinue 2/26  Cessation of alcohol use counseled at length today again   consultation Re: Support programs including AA etc.  Patient follows with therapist at [facility], patient encouraged to follow-up day of discharge     Serum lipase elevation- (present on admission)   Assessment & Plan    Monitor for clinical signs of overt pancreatitis, no change  Secondary to EtOH clinically  Benign exam  Fluid resuscitation ongoing  Okay to take p.o., slowly advance diet -> reg diet tolerated       Alcohol dependence (HCC)- (present on admission)   Assessment & Plan    Counseling ongoing, initiated 2/26  Avoidance strongly encouraged again  Rehab programs? - CM consulting     Alcoholic hepatitis- (present on admission)   Assessment & Plan    Avoid hepatotoxins  Serial CMP  Monitor for coagulopathy, hypoglycemia, encephalopathy  ETOH use cessation       Vitamin D deficiency- (present on admission)   Assessment & Plan    Replete     Methamphetamine addiction (HCC)- (present on admission)   Assessment & Plan    Importance of cessation reviewed today again, complications of methamphetamine use including neuropsychiatric issues, hypertension, pulmonary hypertension, cardiomyopathy and death all reviewed again  Self treating?  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 In review of yesterday's note (2/26/2019), there are no changes except as documented above.     Discussed patient condition and risk of morbidity and/or mortality with RN, RT, Pharmacy, , [facility] resident, Charge nurse / hot rounds and Patient

## 2019-02-27 NOTE — PROGRESS NOTES
Pt very agitated saying that he is going to leave, encouraged pt to wait to see Dr. Ansari.  Pt expressing desire to leave now.  Pt put clothes on.  Attempted to DC pt IV.  Pt now saying that he want to talk to the MD.  Dr. Bateman aware.  Paged resident.  Charge RN aware.

## 2019-02-27 NOTE — PSYCHIATRY
"Psych consult noted for \"anxiety contributing to polysubstance abuse, please assist with medication recs, discharge pending\".      Pt left before he could be seen by psychiatry, thank you       "

## 2019-02-27 NOTE — PROGRESS NOTES
Gopal Ceja patient has chosen to leave the hospital against medical advice. The attending physician has not discharged the patient. Patient is not a risk to himself or others. I have discussed with the patient the following:  Physician has not determined patient is ready for discharge, Risks and consequences of leaving the hospital too soon and Benefit of continued hospitalization.      Discharge against medical advice form has been Patient left abruptly - no chance to sign.        Attending physician has been notified.

## 2019-02-27 NOTE — CARE PLAN
Problem: Nutritional:  Goal: Achieve adequate nutritional intake  Patient will consume 50-75% of meals and snacks   Outcome: MET Date Met: 02/27/19

## 2019-02-27 NOTE — DOCUMENTATION QUERY
[facility]                                                                         Query Response Note      PATIENT:               CHANCE DIAZ  ACCT #:                  [id number]  MRN:                       [id number]  :                       1985  ADMIT DATE:       2019 5:21 PM  DISCH DATE:          RESPONDING  PROVIDER #:        [id number]           RESPONSE TEXT:    Delirium due to alcohol withdrawal    QUERY TEXT:    Altered Mental Status [id number]    Altered mental status is documented in the Medical Record.  Can a more specific diagnosis be provided?    NOTE:  If the appropriate response is not listed below, please respond with a new note.    The patient's Clinical Indicators include:  Altered mental status, hallucinations, agitation, lethargic, anxious, impaired cognition and memory  Risk Factors: alcohol withdrawal   Treatment: ICU monitoring, CIWA protocol  Query created by: Cara Wheatley on 2019 9:03 AM        Electronically signed by:  DARRICK GRANT MD 2019 7:37 AM

## 2019-02-27 NOTE — DISCHARGE SUMMARY
Internal Medicine Discharge Summary  Note Author: Velma Richardson M.D.     Name Gopal Ceja 1985   Age/Sex 33 y.o. male   MRN [id number]       Admit Date:  2019       Discharge Date:   19     Service:   [facility] Internal Medicine [facility] Team  Attending Physician(s):   Dr. Bateman       Senior Resident(s):   Dr. 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     He presented to the ED on the evening of 19 via EMS for alcohol intoxication. At that time, he reported his last drink was 12 hours prior to admission with continuous binge drinking for the previous week. Patient reported hallucinations on admission with generalized pain. He was started on CIWA protocol and admitted to the floor, where his CIWA scores reached 32 and he received a total of 12 mg of IV ativan overnight. The following morning, a rapid response was called for tachycardia to 150. He was transferred to ICU and started on phenobarbital protocol.     He received a phenobarbital loading dose on transfer followed by 130 mg doses x5, last on  at 22:25. He was not tachycardic since  "then and denied hallucinations. He reported anxiety but stated that was normal for him. He had no seizures, tremor, or hypertension during his stay in the ICU. He was stable for transfer to the medical floor and orders were placed on 2/26, but he did not transfer due to lack of bed availability. After phenobarbital protocol was discontinued on 2/26, that evening he repeatedly reported anxiety and received 3 mg ativan overnight. The following morning, he reported persistent anxiety and requested more anxiety medication, however reported that he had tried \"all the other medications\" and ativan is the only thing that works for him. He was advised that the psychiatry team had been consulted to recommend safe long-term medications for his anxiety and would see him that day, but he was unwilling to stay until then. He left AMA in stable condition on 2/27/19 after being advised of the risks of doing so.      Patient /Hospital Summary (Details -- Problem Oriented) :          Alcohol use disorder, severe, dependence (HCC)   Assessment & Plan    With history of withdrawal with seizures and ICU admission.   Elevated transaminases. No evidence of cirrhosis, normal synthetic function.  Thrombocytopenia may indicate marrow supression     * Alcohol withdrawal (HCC)   Assessment & Plan    Hx alcohol withdrawal with seizures, ICU admission.   CIWA scores were 32 on floor, transferred to ICU for phenobarbital protocol. S/p IV thiamine, electrolyte, vitamin replacement.  Last drink approx 72 hours ago, should be at approximate peak of withdrawal symptoms, however no objective evidence of withdrawal. Patient is anxious, but at baseline.  -rreceived electrolyte, vitamin replacement  -phenobarbital protocol discontinued  -avoid additional ativan if possible, consider clonidine for tachycardia and return for phenobarb if withdrawal symptoms become severe  -transfer orders to medical floor placed 2/26  -medically stable for discharge, " awaiting Psychiatry recommendations     Alcoholic hepatitis   Assessment & Plan    Transaminitis. No evidence of cirrhosis. Normal synthetic function.  -supportive care  -encourage alcohol cessation as noted     Vitamin D deficiency   Assessment & Plan    Low vitamin D at 15.  -continue replacement     Methamphetamine addiction (HCC)   Assessment & Plan    Last positive UDS 5/2017.  -recommend counseling and social support services     Serum lipase elevation-resolved as of 2/27/2019   Assessment & Plan    Mild elevation in Lipase at 110. On previous admission was 134. Likely 2/2 alcohol abuse. Does not meet criteria for pancreatitis.  -continue supportive care as noted     Anxiety disorder   Assessment & Plan    Patient reports baseline anxiety. May contribute to substance abuse.  -Psychiatry consulted for medication recommendations, patient left prior to consult     Thrombocytopenia (HCC)   Assessment & Plan    2/2 alcohol abuse. No evidence of bleeding.   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 These pts tend to have developmental delay and intellectual disability with impaired communication and socialization skills, as well as delayed development of speech and language.  -no acute intervention indicated     Conjunctivitis   Assessment & Plan    Recurrent and related to allergies per patient. Eyes with conjunctival injection, moderate crusted discharge.  -saline drops  -erythromycin ointment  -warm compresses     Tachycardia-resolved as of 2/27/2019   Assessment & Plan    Improved. 2/2 alcohol withdrawal.  -continue fluid resuscitation     Hypokalemia-resolved as of 2/26/2019   Assessment & Plan    Resolved.  -monitor and replace as needed         Consultants:     Critical Care  Psychiatry- patient left AMA prior to consult  Alcohol induced hepatitis  Vitamin D  deficiency    Procedures:        none    Imaging/ Testing:      No orders to display     Discharge Medications:         Medication Reconciliation: Completed       Medication List      STOP taking these medications    amphetamine-dextroamphetamine ER 30 MG XR capsule  Commonly known as:  ADDERALL XR          Disposition:   Patient left AMA    Diet:   regular    Activity:   Ad brian    Instructions:      Avoid alcohol and substance use. Follow up with primary care physician.   The patient was instructed to return to the ER in the event of worsening symptoms. I have counseled the patient on the importance of compliance and the patient has agreed to proceed with all medical recommendations and follow up plan indicated above.   The patient understands that all medications come with benefits and risks. Risks may include permanent injury or death and these risks can be minimized with close reassessment and monitoring.        Primary Care Provider:    RUSS Goddard   Discharge summary faxed to primary care provider:  Completed  Copy of discharge summary given to the patient: Deferred    Follow up appointment details :      No future appointments.  RUSS Goddard  [address] [address]  [phone]    Schedule an appointment as soon as possible for a visit  as soon as possible    Pending Studies:        none    Time spent on discharge day patient visit, preparing discharge paperwork and arranging for patient follow up.    Summary of follow up issues:   Anxiety  Polysubstance abuse  Conjunctivitis    Discharge Time (Minutes) :    40 minutes  Hospital Course Type:  Inpatient Stay >2 midnights      Condition on Discharge    ______________________________________________________________________    Interval history/exam for day of discharge:    Phenobarbital discontinued yesterday  Anxious overnight, received 3 mg ativan overnight, requesting more this am  Today HR, BP remain wnl     Review  "of Systems   Constitutional: Negative for chills, diaphoresis and fever.   HENT: Negative.    Eyes: Positive for discharge and redness. Negative for blurred vision and double vision.   Respiratory: Negative for shortness of breath and wheezing.    Cardiovascular: Negative for chest pain and palpitations.   Gastrointestinal: Negative for nausea and vomiting.   Genitourinary: Negative.    Musculoskeletal: Negative.    Skin: Negative for itching and rash.   Neurological: Negative for tremors, seizures and headaches.   Endo/Heme/Allergies: Negative.    Psychiatric/Behavioral: Positive for substance abuse. Negative for hallucinations. The patient is nervous/anxious (currently at baseline level of anxiety). The patient does not have insomnia.       PHYSICAL EXAM:  Latest Vitals:  /80   Pulse 99   Temp 36.6 °C (97.8 °F) (Temporal)   Resp 16   Ht 1.727 m (5' 8\")   Wt 70 kg (154 lb 5.2 oz)   SpO2 99%   BMI 23.46 kg/m²   O2 therapy: Pulse Oximetry: 99 %, O2 Delivery: None (Room Air)  Vitals Range last 24h:  Temp:  [35.9 °C (96.6 °F)-36.8 °C (98.2 °F)] 36.6 °C (97.8 °F)  Pulse:  [] 99  Resp:  [16-20] 16  SpO2:  [93 %-99 %] 99 %     Physical Exam   Constitutional: He is oriented to person, place, and time. He appears not jaundiced. He appears unhealthy. No distress.   HENT:   Head: Normocephalic and atraumatic.   Eyes: Pupils are equal, round, and reactive to light. EOM are normal. Right conjunctiva is injected. Left conjunctiva is injected. No scleral icterus.   Neck: Normal range of motion.   Cardiovascular: Regular rate and rhythm, normal heart sounds and normal pulses.  Pulmonary/Chest: Effort normal. No stridor. No tachypnea. He has no wheezes. He has no rhonchi.   Abdominal: Soft. Bowel sounds are normal. There is no tenderness.   Musculoskeletal: He exhibits no edema or deformity.   Neurological: He is alert and oriented to person, place, and time.   Skin: Skin is warm and dry.   Psychiatric: Affect " normal. His mood appears anxious.       Most Recent Labs:    Lab Results   Component Value Date/Time    WBC 8.9 02/27/2019 06:18 AM    RBC 4.69 (L) 02/27/2019 06:18 AM    HEMOGLOBIN 15.0 02/27/2019 06:18 AM    HEMATOCRIT 44.8 02/27/2019 06:18 AM    MCV 95.5 02/27/2019 06:18 AM    MCH 32.0 02/27/2019 06:18 AM    MCHC 33.5 (L) 02/27/2019 06:18 AM    MPV 12.2 02/27/2019 06:18 AM    NEUTSPOLYS 59.60 02/27/2019 06:18 AM    LYMPHOCYTES 26.20 02/27/2019 06:18 AM    MONOCYTES 12.00 02/27/2019 06:18 AM    EOSINOPHILS 1.60 02/27/2019 06:18 AM    BASOPHILS 0.30 02/27/2019 06:18 AM    HYPOCHROMIA 1+ 01/15/2014 09:32 PM      Lab Results   Component Value Date/Time    SODIUM 132 (L) 02/27/2019 06:18 AM    POTASSIUM 4.3 02/27/2019 06:18 AM    CHLORIDE 98 02/27/2019 06:18 AM    CO2 28 02/27/2019 06:18 AM    GLUCOSE 101 (H) 02/27/2019 06:18 AM    BUN 13 02/27/2019 06:18 AM    CREATININE 0.69 02/27/2019 06:18 AM      Lab Results   Component Value Date/Time    ALTSGPT 69 (H) 02/27/2019 06:18 AM    ASTSGOT 64 (H) 02/27/2019 06:18 AM    ALKPHOSPHAT 104 (H) 02/27/2019 06:18 AM    TBILIRUBIN 0.4 02/27/2019 06:18 AM    LIPASE 110 (H) 02/23/2019 07:50 PM    ALBUMIN 4.3 02/27/2019 06:18 AM    GLOBULIN 2.9 02/27/2019 06:18 AM    PREALBUMIN 32.0 02/24/2019 03:43 AM    INR 0.92 02/23/2019 07:50 PM     Lab Results   Component Value Date/Time    PROTHROMBTM 12.5 02/23/2019 07:50 PM    INR 0.92 02/23/2019 07:50 PM

## 2019-02-27 NOTE — PROGRESS NOTES
[facility] ICU Progress Note      Admit Date: 2/23/2019  ICU Day:  3    Resident(s): Velma Richardson M.D.   Attending:  PING LARSON/ Dr. Bateman    Patient ID:    Name:  Gopal Ceja     YOB: 1985  Age:  33 y.o.  male   MRN:  [id number]    ID (carried forward):  Gopal Ceja is a 33-year-old male with past medical history of alcohol abuse with alcohol withdrawal seizures, ICU admission for alcohol withdrawal, anxiety, methamphetamine abuse, ADHD and thrombocytopenia. He presented to the ED on the evening of 2/23/19 via EMS for alcohol intoxication. At that time, he reported his last drink was 12 hours prior to admission with continuous binge drinking for the previous week. Patient reported hallucinations on admission with generalized pain. He was started on CIWA protocol and admitted to the floor, where his CIWA scores reached 32 and he received a total of 12 mg of IV ativan overnight. The following morning, a rapid response was called for tachycardia to 150. He was transferred to ICU and started on phenobarbital protocol.   He received a phenobarbital loading dose on transfer followed by 130 mg doses x5, last on 2/25 at 22:25. He is not tachycardic this morning and denies hallucinations. He reports anxiety but states that is normal for him. He has had no seizures, tremor, or hypertension during his stay in the ICU.    Consultants:  none     Interval Events:  Phenobarbital discontinued yesterday  Anxious overnight, received 3 mg ativan overnight, requesting more this am  Today HR, BP remain wnl      Review of Systems   Constitutional: Negative for chills, diaphoresis and fever.   HENT: Negative.    Eyes: Positive for discharge and redness. Negative for blurred vision and double vision.   Respiratory: Negative for shortness of breath and wheezing.    Cardiovascular: Negative for chest pain and palpitations.   Gastrointestinal: Negative for nausea and vomiting.   Genitourinary: Negative.    Musculoskeletal:  "Negative.    Skin: Negative for itching and rash.   Neurological: Negative for tremors, seizures and headaches.   Endo/Heme/Allergies: Negative.    Psychiatric/Behavioral: Positive for substance abuse. Negative for hallucinations. The patient is nervous/anxious (currently at baseline level of anxiety). The patient does not have insomnia.      PHYSICAL EXAM:  Vitals:    02/26/19 2000 02/27/19 0000 02/27/19 0400 02/27/19 0800   BP:       Pulse:    99   Resp: 20 18 16 16   Temp: 36.8 °C (98.2 °F) 36.7 °C (98 °F) 35.9 °C (96.6 °F) 36.6 °C (97.8 °F)   TempSrc: Temporal Temporal Temporal Temporal   SpO2: 95% 96% 99% 99%   Weight:       Height:        Body mass index is 23.46 kg/m².  Latest Vitals:  /80   Pulse 99   Temp 36.6 °C (97.8 °F) (Temporal)   Resp 16   Ht 1.727 m (5' 8\")   Wt 70 kg (154 lb 5.2 oz)   SpO2 99%   BMI 23.46 kg/m²   O2 therapy: Pulse Oximetry: 99 %, O2 Delivery: None (Room Air)  Vitals Range last 24h:  Temp:  [35.9 °C (96.6 °F)-36.8 °C (98.2 °F)] 36.6 °C (97.8 °F)  Pulse:  [] 99  Resp:  [16-20] 16  SpO2:  [93 %-99 %] 99 %    Date 02/27/19 0700 - 02/28/19 0659   Shift 6931-6820 9687-5502 [id number] 24 Hour Total   I  N  T  A  K  E   P.O. 100   100      P.O. 100   100    Shift Total 100   100   O  U  T  P  U  T   Shift Total          100        Intake/Output Summary (Last 24 hours) at 02/27/19 0950  Last data filed at 02/27/19 0800   Gross per 24 hour   Intake             2850 ml   Output             2175 ml   Net              675 ml       Physical Exam   Constitutional: He is oriented to person, place, and time. He appears not jaundiced. He appears unhealthy. No distress.   HENT:   Head: Normocephalic and atraumatic.   Eyes: Pupils are equal, round, and reactive to light. EOM are normal. Right conjunctiva is injected. Left conjunctiva is injected. No scleral icterus.   Neck: Normal range of motion.   Cardiovascular: Regular rhythm, normal heart sounds and normal pulses.  " Tachycardia present.    Pulmonary/Chest: Effort normal. No stridor. No tachypnea. He has no wheezes. He has no rhonchi.   Abdominal: Soft. Bowel sounds are normal. There is no tenderness.   Musculoskeletal: He exhibits no edema or deformity.   Neurological: He is alert and oriented to person, place, and time.   Skin: Skin is warm and dry.   Psychiatric: Affect normal. His mood appears anxious.         Recent Labs      02/24/19   1515  02/25/19   1240  02/26/19   0335  02/27/19   0618   SODIUM  134*  131*  130*  132*   POTASSIUM  3.4*  3.5*  4.0  4.3   CHLORIDE  97  96  98  98   CO2  31  26  24  28   BUN  9  7*  11  13   CREATININE  0.69  0.54  0.68  0.69   MAGNESIUM  1.9  2.2  2.0   --    PHOSPHORUS  2.6  3.0  3.1   --    CALCIUM  9.4  9.3  9.3  10.1     Recent Labs      02/25/19   1240  02/26/19   0335  02/27/19   0618   ALTSGPT  66*  57*  69*   ASTSGOT  86*  66*  64*   ALKPHOSPHAT  89  97  104*   TBILIRUBIN  1.1  0.7  0.4   GLUCOSE  119*  142*  101*     Recent Labs      02/27/19 0618   RBC  4.69*   HEMOGLOBIN  15.0   HEMATOCRIT  44.8   PLATELETCT  85*     Recent Labs      02/25/19   1240  02/26/19   0335  02/27/19   0618   WBC   --    --   8.9   NEUTSPOLYS   --    --   59.60   LYMPHOCYTES   --    --   26.20   MONOCYTES   --    --   12.00   EOSINOPHILS   --    --   1.60   BASOPHILS   --    --   0.30   ASTSGOT  86*  66*  64*   ALTSGPT  66*  57*  69*   ALKPHOSPHAT  89  97  104*   TBILIRUBIN  1.1  0.7  0.4       Meds:  • hydrOXYzine HCl  50 mg     • artificial tears  1 Drop     • vitamin D  2,000 Units     • LR  1,000 mL Stopped (02/27/19 0200)   • thiamine  100 mg     • multivitamin  1 Tab     • folic acid  1 mg     • senna-docusate  2 Tab      And   • polyethylene glycol/lytes  1 Packet      And   • magnesium hydroxide  30 mL      And   • bisacodyl  10 mg     • enoxaparin  40 mg        Procedures:  none    Imaging:  No orders to display       Problem and Plan:    * Alcohol withdrawal (HCC)- (present on admission)    Assessment & Plan    Hx alcohol withdrawal with seizures, ICU admission.   CIWA scores were 32 on floor, transferred to ICU for phenobarbital protocol. S/p IV thiamine, electrolyte, vitamin replacement.  Last drink approx 72 hours ago, should be at approximate peak of withdrawal symptoms, however no objective evidence of withdrawal. Patient is anxious, but at baseline.  -continue electrolyte, vitamin replacement  -discontinue phenobarbital protocol  -avoid additional ativan if possible, consider clonidine for tachycardia and return for phenobarb if withdrawal symptoms become severe  -transfer orders to medical floor placed 2/26  -medically stable for discharge, awaiting Psychiatry recommendations     Alcohol use disorder, severe, dependence (HCC)- (present on admission)   Assessment & Plan    With history of withdrawal with seizures and ICU admission.   Elevated transaminases. No evidence of cirrhosis, normal synthetic function.  Thrombocytopenia may indicate marrow supression  -encourage cessation, provide counseling and social support services when withdrawal has resolved     Alcoholic hepatitis- (present on admission)   Assessment & Plan    Transaminitis. No evidence of cirrhosis. Normal synthetic function.  -supportive care  -encourage alcohol cessation as noted     Vitamin D deficiency- (present on admission)   Assessment & Plan    Low vitamin D at 15.  -continue replacement     Methamphetamine addiction (HCC)- (present on admission)   Assessment & Plan    Last positive UDS 5/2017.  -provide counseling and social support services prior to discharge     Anxiety disorder- (present on admission)   Assessment & Plan    Patient reports baseline anxiety. May contribute to substance abuse.  -Psychiatry consulted for medication recommendations     Thrombocytopenia (HCC)- (present on admission)   Assessment & Plan    2/2 alcohol abuse. No evidence of bleeding.   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 These pts tend to have developmental delay and intellectual disability with impaired communication and socialization skills, as well as delayed development of speech and language.  -no acute intervention indicated     Conjunctivitis- (present on admission)   Assessment & Plan    Recurrent and related to allergies per patient. Eyes with conjunctival injection, moderate crusted discharge.  -saline drops  -erythromycin ointment  -warm compresses         DISPO: medically stable for discharge    CODE STATUS: FULL CODE    Quality Measures:  Feeding: regular diet  Analgesia: n/a  Sedation: none indicated  Thromboprophylaxis: enoxaparin  Head of bed: >30 deg  Ulcer prophylaxis: n/a  Glycemic control: n/a  Bowel care: bowel regimen  Indwelling lines: n/a  Deescalation of antibiotics: n/a      Velma Richardson M.D.

## 2019-02-27 NOTE — CARE PLAN
Problem: Safety  Goal: Will remain free from injury  Outcome: PROGRESSING AS EXPECTED  Pt. utilizes call light appropriately and has been free from falls this shift.

## 2019-02-27 NOTE — PROGRESS NOTES
Spoke at length with pt regarding his feelings of anxiety and restlessness.  Pt is stating desire to go home.  Pt states that he is still experiencing symptoms of withdrawal, pt states that if he goes home he will start drinking again.  Pt counseled regarding ETOH cessation.  Paged Dr. Richardson, awaiting call back.

## 2019-02-27 NOTE — CARE PLAN
"Problem: Bowel/Gastric:  Goal: Will not experience complications related to bowel motility  Outcome: MET Date Met: 02/27/19  Pt continues to refuse senna, experiencing multiple bowel movements a day. Pt is currently not receiving any pain medication in the form of opioids. Will continue to monitor and assess for complications potentially associated with bowel motility.      Problem: Discharge Barriers/Planning  Goal: Patient's continuum of care needs will be met  Outcome: MET Date Met: 02/27/19  Discussed pt's change of care status from ICU to Medical. Pt aware they will potentially be transferred from here to a medical bed as soon as one becomes available and will be discharged from there. Pt confirmed. Will continue to answer questions as they arise regarding plan of care.     Problem: Psychosocial Needs:  Goal: Level of anxiety will decrease  Outcome: MET Date Met: 02/27/19  Attempting to decrease pt's anxiety levels post ETOH withdrawals. [facility] resident prescribed Atarax, which aided in decreasing the itchiness and dizziness but \"was not helpful\" in bringing down the pt's anxiety. A one time dose of Lorazepam was effective in calming the pt's anxiety, therefore allowing them to rest.       "

## 2019-02-28 ENCOUNTER — HOSPITAL ENCOUNTER (EMERGENCY)
Dept: HOSPITAL 8 - ED | Age: 34
Discharge: LEFT BEFORE DISCHARGE | End: 2019-02-28
Payer: MEDICARE

## 2019-02-28 ENCOUNTER — HOSPITAL ENCOUNTER (EMERGENCY)
Facility: MEDICAL CENTER | Age: 34
End: 2019-03-01
Payer: MEDICARE

## 2019-02-28 VITALS — WEIGHT: 166.23 LBS | BODY MASS INDEX: 26.71 KG/M2 | HEIGHT: 66 IN

## 2019-02-28 VITALS — SYSTOLIC BLOOD PRESSURE: 133 MMHG | DIASTOLIC BLOOD PRESSURE: 91 MMHG
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
## 2019-02-28 DIAGNOSIS — F90.9: ICD-10-CM

## 2019-02-28 DIAGNOSIS — I10: ICD-10-CM

## 2019-02-28 DIAGNOSIS — Z72.9: ICD-10-CM

## 2019-02-28 DIAGNOSIS — E11.9: ICD-10-CM

## 2019-02-28 DIAGNOSIS — F32.9: ICD-10-CM

## 2019-02-28 DIAGNOSIS — Z75.9: ICD-10-CM

## 2019-02-28 DIAGNOSIS — F10.120: Primary | ICD-10-CM

## 2019-02-28 DIAGNOSIS — Z63.8: ICD-10-CM

## 2019-02-28 DIAGNOSIS — Z91.14: ICD-10-CM

## 2019-02-28 PROCEDURE — 302449 STATCHG TRIAGE ONLY (STATISTIC)

## 2019-02-28 PROCEDURE — 99282 EMERGENCY DEPT VISIT SF MDM: CPT

## 2019-02-28 SDOH — SOCIAL STABILITY - SOCIAL INSECURITY: OTHER SPECIFIED PROBLEMS RELATED TO PRIMARY SUPPORT GROUP: Z63.8

## 2019-02-28 NOTE — NUR
PT SITTING ON CHAIR IN ROOM, PT TERFUL, STATED " i'M NOT CRAZY", ATTEMPTED TO 
REASSURE PT, PT NOW SITTING CALLMLY AWAITING ERP FOR EVAL

## 2019-02-28 NOTE — NUR
PT NOT IN ROOM,NOTED PT WALKED OUT AMBULANCE BAY DOORS, ATTEMPTED TO TALK WITH 
PT, PT CONTINUED TO WALK OUT WITH NO RESPONSE.

## 2019-03-01 VITALS
WEIGHT: 154.32 LBS | TEMPERATURE: 97.2 F | SYSTOLIC BLOOD PRESSURE: 151 MMHG | DIASTOLIC BLOOD PRESSURE: 82 MMHG | BODY MASS INDEX: 23.46 KG/M2 | RESPIRATION RATE: 18 BRPM | HEART RATE: 96 BPM | OXYGEN SATURATION: 96 %

## 2019-03-01 NOTE — ED TRIAGE NOTES
Gopal Ceja  33 y.o.  male  Chief Complaint   Patient presents with   • Alcohol Intoxication     [facility]. Patient alert and oriented. Ambulatory. Denies SI/HI.

## 2019-03-02 ENCOUNTER — HOSPITAL ENCOUNTER (EMERGENCY)
Dept: HOSPITAL 8 - ED | Age: 34
Discharge: LEFT BEFORE DISCHARGE | End: 2019-03-02
Payer: MEDICARE

## 2019-03-02 VITALS — DIASTOLIC BLOOD PRESSURE: 74 MMHG | SYSTOLIC BLOOD PRESSURE: 145 MMHG

## 2019-03-02 VITALS — WEIGHT: 150.8 LBS | HEIGHT: 68 IN | BODY MASS INDEX: 22.85 KG/M2

## 2019-03-02 DIAGNOSIS — I10: ICD-10-CM

## 2019-03-02 DIAGNOSIS — E11.9: ICD-10-CM

## 2019-03-02 DIAGNOSIS — F10.129: Primary | ICD-10-CM

## 2019-03-02 DIAGNOSIS — F32.9: ICD-10-CM

## 2019-03-02 PROCEDURE — 99281 EMR DPT VST MAYX REQ PHY/QHP: CPT

## 2019-03-18 ENCOUNTER — HOSPITAL ENCOUNTER (EMERGENCY)
Dept: HOSPITAL 8 - ED | Age: 34
Discharge: LEFT BEFORE DISCHARGE | End: 2019-03-18
Payer: MEDICARE

## 2019-03-18 VITALS — BODY MASS INDEX: 20.05 KG/M2 | WEIGHT: 132.28 LBS | HEIGHT: 68 IN

## 2019-03-18 VITALS — SYSTOLIC BLOOD PRESSURE: 142 MMHG | DIASTOLIC BLOOD PRESSURE: 67 MMHG

## 2019-03-18 DIAGNOSIS — F10.120: Primary | ICD-10-CM

## 2019-03-18 DIAGNOSIS — Z72.9: ICD-10-CM

## 2019-03-18 PROCEDURE — 99283 EMERGENCY DEPT VISIT LOW MDM: CPT

## 2019-03-18 NOTE — NUR
PT HERE FOR "DTS" PT AMBUALTED WITH STEADY GAIT IS A/OX4 AND 0 ON THE CIWA 
SCALE. PT REPROTS HE IS HAVING SEIZURES, PT IS RESTING IN BED WATCHING TV WHEN 
MAKING THE CLAIM HE IS HAVING A SEIZURE.

## 2019-03-28 ENCOUNTER — HOSPITAL ENCOUNTER (INPATIENT)
Facility: MEDICAL CENTER | Age: 34
LOS: 6 days | DRG: 897 | End: 2019-04-03
Attending: EMERGENCY MEDICINE | Admitting: HOSPITALIST
Payer: MEDICARE

## 2019-03-28 DIAGNOSIS — F10.930 ALCOHOL WITHDRAWAL SYNDROME WITHOUT COMPLICATION (HCC): ICD-10-CM

## 2019-03-28 DIAGNOSIS — F41.1 GENERALIZED ANXIETY DISORDER: ICD-10-CM

## 2019-03-28 LAB
ANION GAP SERPL CALC-SCNC: 13 MMOL/L (ref 0–11.9)
BASOPHILS # BLD AUTO: 0.6 % (ref 0–1.8)
BASOPHILS # BLD: 0.02 K/UL (ref 0–0.12)
BUN SERPL-MCNC: 7 MG/DL (ref 8–22)
CALCIUM SERPL-MCNC: 8.4 MG/DL (ref 8.5–10.5)
CHLORIDE SERPL-SCNC: 97 MMOL/L (ref 96–112)
CO2 SERPL-SCNC: 25 MMOL/L (ref 20–33)
CREAT SERPL-MCNC: 0.63 MG/DL (ref 0.5–1.4)
EOSINOPHIL # BLD AUTO: 0 K/UL (ref 0–0.51)
EOSINOPHIL NFR BLD: 0 % (ref 0–6.9)
ERYTHROCYTE [DISTWIDTH] IN BLOOD BY AUTOMATED COUNT: 45.2 FL (ref 35.9–50)
GLUCOSE SERPL-MCNC: 78 MG/DL (ref 65–99)
HCT VFR BLD AUTO: 47.4 % (ref 42–52)
HGB BLD-MCNC: 16.4 G/DL (ref 14–18)
IMM GRANULOCYTES # BLD AUTO: 0 K/UL (ref 0–0.11)
IMM GRANULOCYTES NFR BLD AUTO: 0 % (ref 0–0.9)
LYMPHOCYTES # BLD AUTO: 1.17 K/UL (ref 1–4.8)
LYMPHOCYTES NFR BLD: 33.1 % (ref 22–41)
MCH RBC QN AUTO: 32.6 PG (ref 27–33)
MCHC RBC AUTO-ENTMCNC: 34.6 G/DL (ref 33.7–35.3)
MCV RBC AUTO: 94.2 FL (ref 81.4–97.8)
MONOCYTES # BLD AUTO: 0.68 K/UL (ref 0–0.85)
MONOCYTES NFR BLD AUTO: 19.3 % (ref 0–13.4)
NEUTROPHILS # BLD AUTO: 1.66 K/UL (ref 1.82–7.42)
NEUTROPHILS NFR BLD: 47 % (ref 44–72)
NRBC # BLD AUTO: 0 K/UL
NRBC BLD-RTO: 0 /100 WBC
PLATELET # BLD AUTO: 133 K/UL (ref 164–446)
PMV BLD AUTO: 10.1 FL (ref 9–12.9)
POTASSIUM SERPL-SCNC: 3.9 MMOL/L (ref 3.6–5.5)
RBC # BLD AUTO: 5.03 M/UL (ref 4.7–6.1)
SODIUM SERPL-SCNC: 135 MMOL/L (ref 135–145)
WBC # BLD AUTO: 3.5 K/UL (ref 4.8–10.8)

## 2019-03-28 PROCEDURE — 93005 ELECTROCARDIOGRAM TRACING: CPT | Performed by: EMERGENCY MEDICINE

## 2019-03-28 PROCEDURE — 80048 BASIC METABOLIC PNL TOTAL CA: CPT

## 2019-03-28 PROCEDURE — 85025 COMPLETE CBC W/AUTO DIFF WBC: CPT

## 2019-03-28 PROCEDURE — 700111 HCHG RX REV CODE 636 W/ 250 OVERRIDE (IP): Performed by: EMERGENCY MEDICINE

## 2019-03-28 PROCEDURE — 93005 ELECTROCARDIOGRAM TRACING: CPT

## 2019-03-28 PROCEDURE — HZ2ZZZZ DETOXIFICATION SERVICES FOR SUBSTANCE ABUSE TREATMENT: ICD-10-PCS | Performed by: HOSPITALIST

## 2019-03-28 PROCEDURE — 99223 1ST HOSP IP/OBS HIGH 75: CPT | Performed by: HOSPITALIST

## 2019-03-28 PROCEDURE — 96375 TX/PRO/DX INJ NEW DRUG ADDON: CPT

## 2019-03-28 PROCEDURE — 770020 HCHG ROOM/CARE - TELE (206)

## 2019-03-28 PROCEDURE — 99285 EMERGENCY DEPT VISIT HI MDM: CPT

## 2019-03-28 RX ORDER — HEPARIN SODIUM 5000 [USP'U]/ML
5000 INJECTION, SOLUTION INTRAVENOUS; SUBCUTANEOUS EVERY 8 HOURS
Status: DISCONTINUED | OUTPATIENT
Start: 2019-03-29 | End: 2019-04-03 | Stop reason: HOSPADM

## 2019-03-28 RX ORDER — LORAZEPAM 2 MG/ML
1 INJECTION INTRAMUSCULAR
Status: DISCONTINUED | OUTPATIENT
Start: 2019-03-28 | End: 2019-04-03 | Stop reason: HOSPADM

## 2019-03-28 RX ORDER — LORAZEPAM 1 MG/1
1 TABLET ORAL EVERY 4 HOURS PRN
Status: DISCONTINUED | OUTPATIENT
Start: 2019-03-28 | End: 2019-04-03 | Stop reason: HOSPADM

## 2019-03-28 RX ORDER — SODIUM CHLORIDE 9 MG/ML
INJECTION, SOLUTION INTRAVENOUS CONTINUOUS
Status: DISCONTINUED | OUTPATIENT
Start: 2019-03-29 | End: 2019-04-01

## 2019-03-28 RX ORDER — PROMETHAZINE HYDROCHLORIDE 25 MG/1
12.5-25 SUPPOSITORY RECTAL EVERY 4 HOURS PRN
Status: DISCONTINUED | OUTPATIENT
Start: 2019-03-28 | End: 2019-04-03 | Stop reason: HOSPADM

## 2019-03-28 RX ORDER — ONDANSETRON 2 MG/ML
4 INJECTION INTRAMUSCULAR; INTRAVENOUS EVERY 4 HOURS PRN
Status: DISCONTINUED | OUTPATIENT
Start: 2019-03-28 | End: 2019-04-03 | Stop reason: HOSPADM

## 2019-03-28 RX ORDER — THIAMINE MONONITRATE (VIT B1) 100 MG
100 TABLET ORAL DAILY
Status: COMPLETED | OUTPATIENT
Start: 2019-03-29 | End: 2019-04-01

## 2019-03-28 RX ORDER — POLYETHYLENE GLYCOL 3350 17 G/17G
1 POWDER, FOR SOLUTION ORAL
Status: DISCONTINUED | OUTPATIENT
Start: 2019-03-28 | End: 2019-04-03 | Stop reason: HOSPADM

## 2019-03-28 RX ORDER — LORAZEPAM 2 MG/ML
1 INJECTION INTRAMUSCULAR ONCE
Status: COMPLETED | OUTPATIENT
Start: 2019-03-28 | End: 2019-03-28

## 2019-03-28 RX ORDER — LORAZEPAM 0.5 MG/1
0.5 TABLET ORAL EVERY 4 HOURS PRN
Status: DISCONTINUED | OUTPATIENT
Start: 2019-03-28 | End: 2019-04-03 | Stop reason: HOSPADM

## 2019-03-28 RX ORDER — LORAZEPAM 2 MG/1
2 TABLET ORAL
Status: DISCONTINUED | OUTPATIENT
Start: 2019-03-28 | End: 2019-04-03 | Stop reason: HOSPADM

## 2019-03-28 RX ORDER — LORAZEPAM 2 MG/1
4 TABLET ORAL
Status: DISCONTINUED | OUTPATIENT
Start: 2019-03-28 | End: 2019-04-03 | Stop reason: HOSPADM

## 2019-03-28 RX ORDER — LORAZEPAM 2 MG/ML
2 INJECTION INTRAMUSCULAR ONCE
Status: ACTIVE | OUTPATIENT
Start: 2019-03-28 | End: 2019-03-29

## 2019-03-28 RX ORDER — LORAZEPAM 2 MG/ML
1.5 INJECTION INTRAMUSCULAR
Status: DISCONTINUED | OUTPATIENT
Start: 2019-03-28 | End: 2019-04-03 | Stop reason: HOSPADM

## 2019-03-28 RX ORDER — PROMETHAZINE HYDROCHLORIDE 25 MG/1
12.5-25 TABLET ORAL EVERY 4 HOURS PRN
Status: DISCONTINUED | OUTPATIENT
Start: 2019-03-28 | End: 2019-04-03 | Stop reason: HOSPADM

## 2019-03-28 RX ORDER — LORAZEPAM 2 MG/ML
2 INJECTION INTRAMUSCULAR
Status: DISCONTINUED | OUTPATIENT
Start: 2019-03-28 | End: 2019-04-03 | Stop reason: HOSPADM

## 2019-03-28 RX ORDER — AMOXICILLIN 250 MG
2 CAPSULE ORAL 2 TIMES DAILY
Status: DISCONTINUED | OUTPATIENT
Start: 2019-03-29 | End: 2019-04-03 | Stop reason: HOSPADM

## 2019-03-28 RX ORDER — LORAZEPAM 2 MG/ML
0.5 INJECTION INTRAMUSCULAR EVERY 4 HOURS PRN
Status: DISCONTINUED | OUTPATIENT
Start: 2019-03-28 | End: 2019-04-03 | Stop reason: HOSPADM

## 2019-03-28 RX ORDER — BISACODYL 10 MG
10 SUPPOSITORY, RECTAL RECTAL
Status: DISCONTINUED | OUTPATIENT
Start: 2019-03-28 | End: 2019-04-03 | Stop reason: HOSPADM

## 2019-03-28 RX ORDER — FOLIC ACID 1 MG/1
1 TABLET ORAL DAILY
Status: COMPLETED | OUTPATIENT
Start: 2019-03-29 | End: 2019-04-01

## 2019-03-28 RX ORDER — ONDANSETRON 4 MG/1
4 TABLET, ORALLY DISINTEGRATING ORAL EVERY 4 HOURS PRN
Status: DISCONTINUED | OUTPATIENT
Start: 2019-03-28 | End: 2019-04-03 | Stop reason: HOSPADM

## 2019-03-28 RX ADMIN — LORAZEPAM 1 MG: 2 INJECTION INTRAMUSCULAR; INTRAVENOUS at 21:58

## 2019-03-28 ASSESSMENT — LIFESTYLE VARIABLES: DO YOU DRINK ALCOHOL: YES

## 2019-03-29 PROBLEM — D72.819 LEUKOPENIA: Status: ACTIVE | Noted: 2019-03-29

## 2019-03-29 PROBLEM — Z72.0 TOBACCO ABUSE: Status: ACTIVE | Noted: 2019-03-29

## 2019-03-29 LAB
ALBUMIN SERPL BCP-MCNC: 3.8 G/DL (ref 3.2–4.9)
ALBUMIN/GLOB SERPL: 1 G/DL
ALP SERPL-CCNC: 111 U/L (ref 30–99)
ALT SERPL-CCNC: 138 U/L (ref 2–50)
ANION GAP SERPL CALC-SCNC: 11 MMOL/L (ref 0–11.9)
AST SERPL-CCNC: 261 U/L (ref 12–45)
BASOPHILS # BLD AUTO: 0.3 % (ref 0–1.8)
BASOPHILS # BLD: 0.02 K/UL (ref 0–0.12)
BILIRUB SERPL-MCNC: 0.3 MG/DL (ref 0.1–1.5)
BUN SERPL-MCNC: 5 MG/DL (ref 8–22)
CALCIUM SERPL-MCNC: 8.1 MG/DL (ref 8.5–10.5)
CHLORIDE SERPL-SCNC: 100 MMOL/L (ref 96–112)
CO2 SERPL-SCNC: 29 MMOL/L (ref 20–33)
CREAT SERPL-MCNC: 0.61 MG/DL (ref 0.5–1.4)
EKG IMPRESSION: NORMAL
EOSINOPHIL # BLD AUTO: 0 K/UL (ref 0–0.51)
EOSINOPHIL NFR BLD: 0 % (ref 0–6.9)
ERYTHROCYTE [DISTWIDTH] IN BLOOD BY AUTOMATED COUNT: 45.2 FL (ref 35.9–50)
GLOBULIN SER CALC-MCNC: 3.7 G/DL (ref 1.9–3.5)
GLUCOSE SERPL-MCNC: 79 MG/DL (ref 65–99)
HCT VFR BLD AUTO: 45.1 % (ref 42–52)
HGB BLD-MCNC: 15 G/DL (ref 14–18)
IMM GRANULOCYTES # BLD AUTO: 0.01 K/UL (ref 0–0.11)
IMM GRANULOCYTES NFR BLD AUTO: 0.2 % (ref 0–0.9)
LYMPHOCYTES # BLD AUTO: 0.9 K/UL (ref 1–4.8)
LYMPHOCYTES NFR BLD: 15.2 % (ref 22–41)
MCH RBC QN AUTO: 31.3 PG (ref 27–33)
MCHC RBC AUTO-ENTMCNC: 33.3 G/DL (ref 33.7–35.3)
MCV RBC AUTO: 94.2 FL (ref 81.4–97.8)
MONOCYTES # BLD AUTO: 0.99 K/UL (ref 0–0.85)
MONOCYTES NFR BLD AUTO: 16.7 % (ref 0–13.4)
NEUTROPHILS # BLD AUTO: 4.02 K/UL (ref 1.82–7.42)
NEUTROPHILS NFR BLD: 67.6 % (ref 44–72)
NRBC # BLD AUTO: 0 K/UL
NRBC BLD-RTO: 0 /100 WBC
PLATELET # BLD AUTO: 113 K/UL (ref 164–446)
PMV BLD AUTO: 10.1 FL (ref 9–12.9)
POTASSIUM SERPL-SCNC: 4.2 MMOL/L (ref 3.6–5.5)
PROT SERPL-MCNC: 7.5 G/DL (ref 6–8.2)
RBC # BLD AUTO: 4.79 M/UL (ref 4.7–6.1)
SODIUM SERPL-SCNC: 140 MMOL/L (ref 135–145)
WBC # BLD AUTO: 5.9 K/UL (ref 4.8–10.8)

## 2019-03-29 PROCEDURE — 80053 COMPREHEN METABOLIC PANEL: CPT

## 2019-03-29 PROCEDURE — 700102 HCHG RX REV CODE 250 W/ 637 OVERRIDE(OP): Performed by: HOSPITALIST

## 2019-03-29 PROCEDURE — 96372 THER/PROPH/DIAG INJ SC/IM: CPT

## 2019-03-29 PROCEDURE — 85025 COMPLETE CBC W/AUTO DIFF WBC: CPT

## 2019-03-29 PROCEDURE — 770020 HCHG ROOM/CARE - TELE (206)

## 2019-03-29 PROCEDURE — 700101 HCHG RX REV CODE 250: Performed by: HOSPITALIST

## 2019-03-29 PROCEDURE — A9270 NON-COVERED ITEM OR SERVICE: HCPCS | Performed by: HOSPITALIST

## 2019-03-29 PROCEDURE — 36415 COLL VENOUS BLD VENIPUNCTURE: CPT

## 2019-03-29 PROCEDURE — 700105 HCHG RX REV CODE 258: Performed by: HOSPITALIST

## 2019-03-29 PROCEDURE — 96365 THER/PROPH/DIAG IV INF INIT: CPT

## 2019-03-29 PROCEDURE — 99233 SBSQ HOSP IP/OBS HIGH 50: CPT | Performed by: HOSPITALIST

## 2019-03-29 PROCEDURE — 700111 HCHG RX REV CODE 636 W/ 250 OVERRIDE (IP): Performed by: HOSPITALIST

## 2019-03-29 PROCEDURE — 96366 THER/PROPH/DIAG IV INF ADDON: CPT

## 2019-03-29 RX ORDER — ACETAMINOPHEN 325 MG/1
650 TABLET ORAL EVERY 6 HOURS PRN
Status: DISCONTINUED | OUTPATIENT
Start: 2019-03-29 | End: 2019-04-03 | Stop reason: HOSPADM

## 2019-03-29 RX ADMIN — LORAZEPAM 1 MG: 1 TABLET ORAL at 04:02

## 2019-03-29 RX ADMIN — HEPARIN SODIUM 5000 UNITS: 5000 INJECTION, SOLUTION INTRAVENOUS; SUBCUTANEOUS at 05:40

## 2019-03-29 RX ADMIN — SODIUM CHLORIDE: 9 INJECTION, SOLUTION INTRAVENOUS at 14:37

## 2019-03-29 RX ADMIN — LORAZEPAM 2 MG: 2 TABLET ORAL at 17:57

## 2019-03-29 RX ADMIN — FOLIC ACID 1 MG: 1 TABLET ORAL at 05:40

## 2019-03-29 RX ADMIN — HEPARIN SODIUM 5000 UNITS: 5000 INJECTION, SOLUTION INTRAVENOUS; SUBCUTANEOUS at 13:20

## 2019-03-29 RX ADMIN — THERA TABS 1 TABLET: TAB at 05:39

## 2019-03-29 RX ADMIN — LORAZEPAM 1.5 MG: 2 INJECTION INTRAMUSCULAR; INTRAVENOUS at 10:00

## 2019-03-29 RX ADMIN — ACETAMINOPHEN 650 MG: 325 TABLET, FILM COATED ORAL at 16:28

## 2019-03-29 RX ADMIN — Medication 100 MG: at 05:40

## 2019-03-29 RX ADMIN — LORAZEPAM 3 MG: 2 TABLET ORAL at 20:28

## 2019-03-29 RX ADMIN — THIAMINE HYDROCHLORIDE: 100 INJECTION, SOLUTION INTRAMUSCULAR; INTRAVENOUS at 00:22

## 2019-03-29 RX ADMIN — HEPARIN SODIUM 5000 UNITS: 5000 INJECTION, SOLUTION INTRAVENOUS; SUBCUTANEOUS at 00:22

## 2019-03-29 RX ADMIN — LORAZEPAM 1.5 MG: 2 INJECTION INTRAMUSCULAR; INTRAVENOUS at 11:46

## 2019-03-29 RX ADMIN — LORAZEPAM 1.5 MG: 2 INJECTION INTRAMUSCULAR; INTRAVENOUS at 08:32

## 2019-03-29 RX ADMIN — LORAZEPAM 2 MG: 2 TABLET ORAL at 21:51

## 2019-03-29 RX ADMIN — LORAZEPAM 3 MG: 2 TABLET ORAL at 16:28

## 2019-03-29 RX ADMIN — SODIUM CHLORIDE: 9 INJECTION, SOLUTION INTRAVENOUS at 04:03

## 2019-03-29 RX ADMIN — LORAZEPAM 2 MG: 2 TABLET ORAL at 13:21

## 2019-03-29 ASSESSMENT — LIFESTYLE VARIABLES
ANXIETY: MILDLY ANXIOUS
HAVE PEOPLE ANNOYED YOU BY CRITICIZING YOUR DRINKING: YES
TREMOR: *
ORIENTATION AND CLOUDING OF SENSORIUM: DATE DISORIENTATION BY NO MORE THAN TWO CALENDAR DAYS
PAROXYSMAL SWEATS: NO SWEAT VISIBLE
HEADACHE, FULLNESS IN HEAD: MODERATE
AUDITORY DISTURBANCES: MILD HARSHNESS OR ABILITY TO FRIGHTEN
TOTAL SCORE: MILD ITCHING, PINS AND NEEDLES SENSATION, BURNING OR NUMBNESS
AGITATION: NORMAL ACTIVITY
NAUSEA AND VOMITING: MILD NAUSEA WITH NO VOMITING
ANXIETY: *
VISUAL DISTURBANCES: MODERATELY SEVERE HALLUCINATIONS
NAUSEA AND VOMITING: MILD NAUSEA WITH NO VOMITING
ORIENTATION AND CLOUDING OF SENSORIUM: DATE DISORIENTATION BY NO MORE THAN TWO CALENDAR DAYS
EVER_SMOKED: YES
HEADACHE, FULLNESS IN HEAD: MILD
ANXIETY: MILDLY ANXIOUS
VISUAL DISTURBANCES: MODERATELY SEVERE HALLUCINATIONS
VISUAL DISTURBANCES: SEVERE HALLUCINATIONS
PAROXYSMAL SWEATS: NO SWEAT VISIBLE
AGITATION: NORMAL ACTIVITY
AUDITORY DISTURBANCES: NOT PRESENT
DOES PATIENT WANT TO TALK TO SOMEONE ABOUT QUITTING: YES
HAVE YOU EVER FELT YOU SHOULD CUT DOWN ON YOUR DRINKING: YES
AGITATION: NORMAL ACTIVITY
PAROXYSMAL SWEATS: NO SWEAT VISIBLE
TREMOR: *
ALCOHOL_USE: YES
TREMOR: *
TOTAL SCORE: MILD ITCHING, PINS AND NEEDLES SENSATION, BURNING OR NUMBNESS
NAUSEA AND VOMITING: *
NAUSEA AND VOMITING: MILD NAUSEA WITH NO VOMITING
NAUSEA AND VOMITING: MILD NAUSEA WITH NO VOMITING
ANXIETY: MILDLY ANXIOUS
TOTAL SCORE: 21
TOTAL SCORE: VERY MILD ITCHING, PINS AND NEEDLES SENSATION, BURNING OR NUMBNESS
PAROXYSMAL SWEATS: NO SWEAT VISIBLE
TOTAL SCORE: VERY MILD ITCHING, PINS AND NEEDLES SENSATION, BURNING OR NUMBNESS
VISUAL DISTURBANCES: VERY MILD SENSITIVITY
TOTAL SCORE: 14
NAUSEA AND VOMITING: *
AUDITORY DISTURBANCES: VERY MILD HARSHNESS OR ABILITY TO FRIGHTEN
TOTAL SCORE: 14
AGITATION: NORMAL ACTIVITY
VISUAL DISTURBANCES: MODERATELY SEVERE HALLUCINATIONS
AUDITORY DISTURBANCES: MILD HARSHNESS OR ABILITY TO FRIGHTEN
TOTAL SCORE: VERY MILD ITCHING, PINS AND NEEDLES SENSATION, BURNING OR NUMBNESS
TREMOR: TREMOR NOT VISIBLE BUT CAN BE FELT, FINGERTIP TO FINGERTIP
EVER FELT BAD OR GUILTY ABOUT YOUR DRINKING: YES
NAUSEA AND VOMITING: MILD NAUSEA WITH NO VOMITING
TOTAL SCORE: 4
TOTAL SCORE: 15
TOTAL SCORE: 8
TREMOR: TREMOR NOT VISIBLE BUT CAN BE FELT, FINGERTIP TO FINGERTIP
HEADACHE, FULLNESS IN HEAD: VERY MILD
AGITATION: NORMAL ACTIVITY
ANXIETY: *
NAUSEA AND VOMITING: MILD NAUSEA WITH NO VOMITING
VISUAL DISTURBANCES: NOT PRESENT
AGITATION: NORMAL ACTIVITY
HEADACHE, FULLNESS IN HEAD: NOT PRESENT
AUDITORY DISTURBANCES: NOT PRESENT
TOTAL SCORE: 11
HEADACHE, FULLNESS IN HEAD: MILD
ORIENTATION AND CLOUDING OF SENSORIUM: DATE DISORIENTATION BY NO MORE THAN TWO CALENDAR DAYS
PAROXYSMAL SWEATS: NO SWEAT VISIBLE
SUBSTANCE_ABUSE: 1
ORIENTATION AND CLOUDING OF SENSORIUM: DATE DISORIENTATION BY NO MORE THAN TWO CALENDAR DAYS
VISUAL DISTURBANCES: MILD SENSITIVITY
NAUSEA AND VOMITING: MILD NAUSEA WITH NO VOMITING
TOTAL SCORE: VERY MILD ITCHING, PINS AND NEEDLES SENSATION, BURNING OR NUMBNESS
VISUAL DISTURBANCES: MODERATELY SEVERE HALLUCINATIONS
NAUSEA AND VOMITING: MILD NAUSEA WITH NO VOMITING
TREMOR: *
AUDITORY DISTURBANCES: MILD HARSHNESS OR ABILITY TO FRIGHTEN
TOTAL SCORE: 16
TREMOR: *
VISUAL DISTURBANCES: MILD SENSITIVITY
ORIENTATION AND CLOUDING OF SENSORIUM: DATE DISORIENTATION BY NO MORE THAN TWO CALENDAR DAYS
VISUAL DISTURBANCES: MODERATELY SEVERE HALLUCINATIONS
TOTAL SCORE: 6
TOTAL SCORE: VERY MILD ITCHING, PINS AND NEEDLES SENSATION, BURNING OR NUMBNESS
AGITATION: SOMEWHAT MORE THAN NORMAL ACTIVITY
NAUSEA AND VOMITING: MILD NAUSEA WITH NO VOMITING
AGITATION: NORMAL ACTIVITY
PAROXYSMAL SWEATS: NO SWEAT VISIBLE
TOTAL SCORE: 4
TREMOR: *
EVER HAD A DRINK FIRST THING IN THE MORNING TO STEADY YOUR NERVES TO GET RID OF A HANGOVER: YES
AVERAGE NUMBER OF DAYS PER WEEK YOU HAVE A DRINK CONTAINING ALCOHOL: 7
CONSUMPTION TOTAL: POSITIVE
TOTAL SCORE: 10
TREMOR: *
TACTILE DISTURBANCES: MILD ITCHING, PINS AND NEEDLES SENSATION, BURNING OR NUMBNESS
PAROXYSMAL SWEATS: NO SWEAT VISIBLE
VISUAL DISTURBANCES: VERY MILD SENSITIVITY
AUDITORY DISTURBANCES: VERY MILD HARSHNESS OR ABILITY TO FRIGHTEN
ANXIETY: *
TREMOR: *
AUDITORY DISTURBANCES: NOT PRESENT
AGITATION: SOMEWHAT MORE THAN NORMAL ACTIVITY
ORIENTATION AND CLOUDING OF SENSORIUM: DATE DISORIENTATION BY NO MORE THAN TWO CALENDAR DAYS
HEADACHE, FULLNESS IN HEAD: MILD
AGITATION: NORMAL ACTIVITY
ORIENTATION AND CLOUDING OF SENSORIUM: CANNOT DO SERIAL ADDITIONS OR IS UNCERTAIN ABOUT DATE
ANXIETY: MILDLY ANXIOUS
AUDITORY DISTURBANCES: NOT PRESENT
ORIENTATION AND CLOUDING OF SENSORIUM: CANNOT DO SERIAL ADDITIONS OR IS UNCERTAIN ABOUT DATE
HEADACHE, FULLNESS IN HEAD: VERY MILD
ANXIETY: MILDLY ANXIOUS
PAROXYSMAL SWEATS: NO SWEAT VISIBLE
HEADACHE, FULLNESS IN HEAD: MILD
TOTAL SCORE: MILD ITCHING, PINS AND NEEDLES SENSATION, BURNING OR NUMBNESS
PAROXYSMAL SWEATS: NO SWEAT VISIBLE
PAROXYSMAL SWEATS: NO SWEAT VISIBLE
AUDITORY DISTURBANCES: VERY MILD HARSHNESS OR ABILITY TO FRIGHTEN
HEADACHE, FULLNESS IN HEAD: MILD
TOTAL SCORE: 18
HEADACHE, FULLNESS IN HEAD: MILD
ORIENTATION AND CLOUDING OF SENSORIUM: ORIENTED AND CAN DO SERIAL ADDITIONS
ANXIETY: MILDLY ANXIOUS
TOTAL SCORE: 15
PAROXYSMAL SWEATS: NO SWEAT VISIBLE
ANXIETY: MILDLY ANXIOUS
ON A TYPICAL DAY WHEN YOU DRINK ALCOHOL HOW MANY DRINKS DO YOU HAVE: 8
TOTAL SCORE: VERY MILD ITCHING, PINS AND NEEDLES SENSATION, BURNING OR NUMBNESS
ORIENTATION AND CLOUDING OF SENSORIUM: CANNOT DO SERIAL ADDITIONS OR IS UNCERTAIN ABOUT DATE
TOTAL SCORE: 4
TREMOR: *
AUDITORY DISTURBANCES: VERY MILD HARSHNESS OR ABILITY TO FRIGHTEN
AGITATION: NORMAL ACTIVITY
HEADACHE, FULLNESS IN HEAD: MILD
ORIENTATION AND CLOUDING OF SENSORIUM: CANNOT DO SERIAL ADDITIONS OR IS UNCERTAIN ABOUT DATE
ANXIETY: MODERATELY ANXIOUS OR GUARDED, SO ANXIETY IS INFERRED
DOES PATIENT WANT TO STOP DRINKING: YES
HOW MANY TIMES IN THE PAST YEAR HAVE YOU HAD 5 OR MORE DRINKS IN A DAY: 30

## 2019-03-29 ASSESSMENT — PATIENT HEALTH QUESTIONNAIRE - PHQ9
6. FEELING BAD ABOUT YOURSELF - OR THAT YOU ARE A FAILURE OR HAVE LET YOURSELF OR YOUR FAMILY DOWN: NOT AL ALL
3. TROUBLE FALLING OR STAYING ASLEEP OR SLEEPING TOO MUCH: NOT AT ALL
5. POOR APPETITE OR OVEREATING: NOT AT ALL
SUM OF ALL RESPONSES TO PHQ9 QUESTIONS 1 AND 2: 6
7. TROUBLE CONCENTRATING ON THINGS, SUCH AS READING THE NEWSPAPER OR WATCHING TELEVISION: SEVERAL DAYS
8. MOVING OR SPEAKING SO SLOWLY THAT OTHER PEOPLE COULD HAVE NOTICED. OR THE OPPOSITE, BEING SO FIGETY OR RESTLESS THAT YOU HAVE BEEN MOVING AROUND A LOT MORE THAN USUAL: NOT AT ALL
SUM OF ALL RESPONSES TO PHQ QUESTIONS 1-9: 7
9. THOUGHTS THAT YOU WOULD BE BETTER OFF DEAD, OR OF HURTING YOURSELF: NOT AT ALL
1. LITTLE INTEREST OR PLEASURE IN DOING THINGS: NEARLY EVERY DAY
2. FEELING DOWN, DEPRESSED, IRRITABLE, OR HOPELESS: NEARLY EVERY DAY
4. FEELING TIRED OR HAVING LITTLE ENERGY: NOT AT ALL

## 2019-03-29 ASSESSMENT — ENCOUNTER SYMPTOMS
NERVOUS/ANXIOUS: 1
MYALGIAS: 0
PALPITATIONS: 1
DIZZINESS: 0
COUGH: 0
FEVER: 0
HEARTBURN: 0
TINGLING: 0
EYES NEGATIVE: 1
TREMORS: 0
CHILLS: 0
WEIGHT LOSS: 0
NAUSEA: 0
VOMITING: 0
NECK PAIN: 0

## 2019-03-29 ASSESSMENT — COGNITIVE AND FUNCTIONAL STATUS - GENERAL
DAILY ACTIVITIY SCORE: 24
MOBILITY SCORE: 24
SUGGESTED CMS G CODE MODIFIER MOBILITY: CH
SUGGESTED CMS G CODE MODIFIER DAILY ACTIVITY: CH

## 2019-03-29 NOTE — ED PROVIDER NOTES
ER Provider Note     Scribed for Dalton Enamorado M.D. by aBrt Almazan. 3/28/2019, 8:49 PM.    Primary Care Provider: RUSS Goddard  Means of Arrival: Ambulance   History obtained from: Patient  History limited by: Patient's altered mental status    CHIEF COMPLAINT  Chief Complaint   Patient presents with   • Alcohol Intoxication       HPI  Gopal Ceja is a 33 y.o. male with a history of alcohol abuse who presents to the Emergency Department for evaluation of alcohol intoxication onset earlier today. The patient reports to have drank an unknown amount of alcohol today. He has no medical complaints at this time, but is requesting help to aid with sobriety. The patient denies suicidal ideations or homicidal ideations.      HPI limited secondary to patient's altered mental status.    REVIEW OF SYSTEMS  See HPI for further details.   ROS limited secondary to patient's altered mental status.     PAST MEDICAL HISTORY   has a past medical history of Acute anxiety; ADHD (attention deficit hyperactivity disorder); ADHD (attention deficit hyperactivity disorder); Alcohol abuse; Bipolar affective (HCC); Depression; Ectrodactyly-ectodermal dysplasia-clefting syndrome; ETOH abuse; and Psychiatric disorder.    SURGICAL HISTORY   has a past surgical history that includes other orthopedic surgery.    SOCIAL HISTORY  Social History   Substance Use Topics   • Smoking status: Former Smoker     Packs/day: 0.25     Types: Cigarettes   • Smokeless tobacco: Never Used      Comment: Smokes couple of times a month   • Alcohol use 0.0 oz/week      Comment: drinks 10 earthquakes a day      History   Drug Use No       FAMILY HISTORY  Family History   Problem Relation Age of Onset   • Heart Disease Neg Hx    • Hypertension Neg Hx    • Hyperlipidemia Neg Hx        CURRENT MEDICATIONS  Home Medications    **Home medications have not yet been reviewed for this encounter**         ALLERGIES  No Known Allergies    PHYSICAL EXAM  VITAL  SIGNS: /95   Pulse (!) 125   Temp 36.4 °C (97.5 °F) (Temporal)   Resp 18   Wt 66 kg (145 lb 8.1 oz)   SpO2 92%   BMI 22.12 kg/m²       Constitutional: Alert in no apparent distress.  HENT: No signs of trauma, Bilateral external ears normal, Nose normal.   Eyes: Pupils are equal and reactive, Conjunctiva normal, Non-icteric.   Neck: Normal range of motion, No tenderness, Supple, No stridor.   Cardiovascular: Tachycardic rate and regular rhythm, no murmurs.   Thorax & Lungs: Normal breath sounds, No respiratory distress, No wheezing, No chest tenderness.   Abdomen: Bowel sounds normal, Soft, No tenderness, No masses, No pulsatile masses. No peritoneal signs.  Skin: Warm, Dry, No erythema, No rash.   Extremities: Intact distal pulses, No edema, No tenderness, No cyanosis.  Musculoskeletal: Good range of motion in all major joints. No tenderness to palpation or major deformities noted.   Neurologic: Alert, Slurred speech, Normal motor function, Normal sensory function, No focal deficits noted.   Psychiatric: Appears intoxicated.    DIAGNOSTIC STUDIES / PROCEDURES    LABS  Labs Reviewed   CBC WITH DIFFERENTIAL - Abnormal; Notable for the following:        Result Value    WBC 3.5 (*)     Platelet Count 133 (*)     Monocytes 19.30 (*)     Neutrophils (Absolute) 1.66 (*)     All other components within normal limits   BASIC METABOLIC PANEL - Abnormal; Notable for the following:     Bun 7 (*)     Calcium 8.4 (*)     Anion Gap 13.0 (*)     All other components within normal limits   ESTIMATED GFR   CBC WITH DIFFERENTIAL   COMP METABOLIC PANEL     All labs reviewed by me.    EKG Interpretation:  Interpreted by me    12 Lead EKG interpreted by me to show:  Normal sinus rhythm  Rate 98  Axis: Normal  Intervals: Normal  Normal T waves  Normal ST segments  My impression of this EKG: Does not indicate ischemia or arrhythmia at this time.     COURSE & MEDICAL DECISION MAKING  Pertinent Labs & Imaging studies reviewed.  (See chart for details)    This is a 33 y.o. male that presents with alcohol intoxication and concern for alcohol withdrawal symptoms.  I will get basic labs to evaluate the patient for alcoholic ketoacidosis as well as anemia.  I also treat the patient with Ativan and then reassess.    8:49 PM - Patient seen and examined at bedside. Ordered EKG.  Patient will continue to be monitored and reevaluated shortly.     9:35 PM - Obtained and reviewed past medical records which indicate patient was admitted to ICU on 02/23/2019 for alcohol withdrawal.     9:39 PM - Patient is tachycardic. He will be treated with Ativan 1 mg. Ordered CBC with differential and BMP to evaluate.       After multiple doses of Ativan the patient continues to be in alcohol withdrawal.  I will admit him for this.  Patient has no significant electrolyte derangements and he has no anemia.  11:42 PM I discussed the patient's case and the above findings with Dr. Padilla (Hospitalist) who agrees to admit the patient.    DISPOSITION:  Patient will be admitted to Dr. Padilla, Hospitalist, in guarded condition.    FINAL IMPRESSION  1. Alcohol withdrawal syndrome without complication (HCC)         Bart FELIPE (Janee), am scribing for, and in the presence of, Dalton Enamorado M.D..    Electronically signed by: Bart Almazan (Janee), 3/28/2019    Dalton FELIPE M.D. personally performed the services described in this documentation, as scribed by Bart Almazan in my presence, and it is both accurate and complete.     C.    The note accurately reflects work and decisions made by me.  Dalton Enamorado  3/29/2019  1:10 AM

## 2019-03-29 NOTE — PROGRESS NOTES
Received bedside report from ED RN Chai. Pt transported on [employer] , Monitor to tele unit. Updated white board, pt placed on tele monitor -130's.  tahced up to 207 when standing up to get into bed. pt oriented to room and educated on fall risk. Strip alarm in place. Non skid socks on pt. Call light within reach will continue to monitor hourly.

## 2019-03-29 NOTE — H&P
Hospital Medicine History & Physical Note    Date of Service  3/28/2019    Primary Care Physician  RUSS Goddard    Consultants  none    Code Status  full    Chief Complaint  Alcohol withdrawal     History of Presenting Illness  33 y.o. male who presented 3/28/2019 with Past medical history of alcohol abuse who presents multiple times to the hospital with alcohol withdrawal who presents with alcohol intoxication.  Patient is currently intoxicated.  Unknown amount of alcohol drink today.  No acute complaints.  He wants to withdrawal in the hospital.  Otherwise history is limited secondary to mental status changes.    Review of Systems  Review of Systems   Unable to perform ROS: Mental status change       Past Medical History   has a past medical history of Acute anxiety; ADHD (attention deficit hyperactivity disorder); ADHD (attention deficit hyperactivity disorder); Alcohol abuse; Bipolar affective (HCC); Depression; Ectrodactyly-ectodermal dysplasia-clefting syndrome; ETOH abuse; and Psychiatric disorder.    Surgical History   has a past surgical history that includes other orthopedic surgery.     Family History  Reviewed and not pertinent    Social History   reports that he has quit smoking. His smoking use included Cigarettes. He smoked 0.25 packs per day. He has never used smokeless tobacco. He reports that he drinks alcohol. He reports that he does not use drugs.    Allergies  No Known Allergies    Medications  None       Physical Exam  Temp:  [36.4 °C (97.5 °F)] 36.4 °C (97.5 °F)  Pulse:  [114-125] 114  Resp:  [16-18] 16  BP: (122-139)/(86-95) 124/86  SpO2:  [92 %-95 %] 95 %    Physical Exam   Constitutional: He appears well-developed and well-nourished. He appears distressed.   HENT:   Head: Normocephalic and atraumatic.   Eyes: Pupils are equal, round, and reactive to light. Conjunctivae and EOM are normal.   Neck: Normal range of motion. Neck supple. No JVD present.   Cardiovascular: Regular  rhythm, normal heart sounds and intact distal pulses.    No murmur heard.  Tachycardia     Pulmonary/Chest: Effort normal and breath sounds normal. No respiratory distress. He exhibits no tenderness.   Abdominal: Soft. Bowel sounds are normal. He exhibits no distension. There is no tenderness.   Musculoskeletal: Normal range of motion. He exhibits no edema.   Hand and feet deformity   Neurological: He is alert. No cranial nerve deficit. He exhibits normal muscle tone.   tremor   Skin: Skin is warm and dry. No erythema.   Psychiatric: He has a normal mood and affect. His behavior is normal. Judgment and thought content normal.   Nursing note and vitals reviewed.      Laboratory:  Recent Labs      03/28/19   2151   WBC  3.5*   RBC  5.03   HEMOGLOBIN  16.4   HEMATOCRIT  47.4   MCV  94.2   MCH  32.6   MCHC  34.6   RDW  45.2   PLATELETCT  133*   MPV  10.1     Recent Labs      03/28/19   2151   SODIUM  135   POTASSIUM  3.9   CHLORIDE  97   CO2  25   GLUCOSE  78   BUN  7*   CREATININE  0.63   CALCIUM  8.4*     Recent Labs      03/28/19   2151   GLUCOSE  78                 No results for input(s): TROPONINI in the last 72 hours.    Urinalysis:    No results found     Imaging:  No orders to display         Assessment/Plan:  I anticipate this patient will require at least two midnights for appropriate medical management, necessitating inpatient admission.    * Alcohol withdrawal (HCC)- (present on admission)   Assessment & Plan    Unknown last injestion   High risk patient previous admission transfer to ICU with phenobarbital protocol   Cont with CIWA and telemetry monitoring   Monitor and replace lytes as appropriate   Rally bag followed by PO vitamins      Alcohol use disorder, severe, dependence (HCC)- (present on admission)   Assessment & Plan    Encourage cessation     Tobacco abuse   Assessment & Plan    Encourage cessation     Leukopenia   Assessment & Plan    Cont to monitor     Tachycardia- (present on admission)    Assessment & Plan    Cont to monitor     Ectodermal dysplasia, 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

## 2019-03-29 NOTE — PROGRESS NOTES
Report received at bedside from night shift RN, pt care assumed, tele box on. Patient resting in bed.  POC discussed with pt and verbalizes no questions. Complaining of hallucinations at this time, will assess with CIWA. Bed in lowest position, pt educated on fall risk and verbalized understanding, call light within reach, bed alarm on.

## 2019-03-29 NOTE — PROGRESS NOTES
[facility] Medicine Daily Progress Note    Date of Service  3/29/2019    Chief Complaint  33 y.o. male admitted 3/28/2019 with tremors , anxiety         Interval Problem Update  Treating alcohol withdrawal    Temp 100.5    Palpitations    Tremors    Anxiety    General malaise    Consultants/Specialty  none    Code Status  full    Disposition  home    Review of Systems  Review of Systems   Constitutional: Positive for malaise/fatigue. Negative for chills, fever and weight loss.   HENT: Negative for hearing loss and tinnitus.    Eyes: Negative.    Respiratory: Negative for cough.    Cardiovascular: Positive for palpitations.   Gastrointestinal: Negative for heartburn, nausea and vomiting.   Genitourinary: Negative for dysuria and urgency.   Musculoskeletal: Negative for myalgias and neck pain.   Neurological: Negative for dizziness, tingling and tremors.   Psychiatric/Behavioral: Positive for substance abuse. The patient is nervous/anxious.         Physical Exam  Temp:  [36.4 °C (97.5 °F)-37.7 °C (99.9 °F)] 37.7 °C (99.9 °F)  Pulse:  [106-125] 122  Resp:  [12-19] 18  BP: (122-139)/() 131/91  SpO2:  [92 %-98 %] 97 %    Physical Exam   Constitutional: He is oriented to person, place, and time. He appears distressed.   HENT:   Head: Normocephalic and atraumatic.   Mouth/Throat: No oropharyngeal exudate.   Eyes: Pupils are equal, round, and reactive to light. Left eye exhibits no discharge.   Neck: No JVD present. No tracheal deviation present. No thyromegaly present.   Cardiovascular:   tachycardia   Pulmonary/Chest: Breath sounds normal. No respiratory distress. He has no wheezes. He has no rales.   Abdominal: Soft. Bowel sounds are normal. He exhibits no distension. There is no rebound and no guarding.   Musculoskeletal: He exhibits deformity (both hands and feet).   Neurological: He is alert and oriented to person, place, and time.   tremors   Skin: No rash noted. No erythema. No pallor.   Psychiatric: He has a  normal mood and affect. His behavior is normal.       Fluids    Intake/Output Summary (Last 24 hours) at 03/29/19 1231  Last data filed at 03/29/19 0900   Gross per 24 hour   Intake             1000 ml   Output              750 ml   Net              250 ml       Laboratory  Recent Labs      03/28/19 2151 03/29/19   0742   WBC  3.5*  5.9   RBC  5.03  4.79   HEMOGLOBIN  16.4  15.0   HEMATOCRIT  47.4  45.1   MCV  94.2  94.2   MCH  32.6  31.3   MCHC  34.6  33.3*   RDW  45.2  45.2   PLATELETCT  133*  113*   MPV  10.1  10.1     Recent Labs      03/28/19 2151 03/29/19   0742   SODIUM  135  140   POTASSIUM  3.9  4.2   CHLORIDE  97  100   CO2  25  29   GLUCOSE  78  79   BUN  7*  5*   CREATININE  0.63  0.61   CALCIUM  8.4*  8.1*                   Imaging  No orders to display        Assessment/Plan  * Alcohol withdrawal (HCC)- (present on admission)   Assessment & Plan    Unknown last injestion   Cont with CIWA and telemetry monitoring   Monitor and replace lytes as appropriate   Rally bag followed by PO vitamins      Alcohol use disorder, severe, dependence (HCC)- (present on admission)   Assessment & Plan    Encourage cessation     Tobacco abuse- (present on admission)   Assessment & Plan    Encourage cessation     Leukopenia- (present on admission)   Assessment & Plan    Cont to monitor     Tachycardia- (present on admission)   Assessment & Plan    Cont to monitor     Ectodermal dysplasia, 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

## 2019-03-29 NOTE — PROGRESS NOTES
Med rec complete per pt .   Pt denies taking any medications.   Per pt he was last on Adderall over a month  Allergies reviewed

## 2019-03-29 NOTE — ASSESSMENT & PLAN NOTE
Unknown last injestion   Cont with CIWA and telemetry monitoring   Monitor and replace lytes as appropriate   Rally bag followed by PO vitamins   Started librium to avoid ativan

## 2019-03-29 NOTE — PROGRESS NOTES
2 RN skin check complete with JORDY Peralta .Generalized skin pink, warm, dry and intact.  Face and neck flushed red.  Sclera red bilaterally.  Sacrum red, blanching.  Feet dry bilaterally.  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

## 2019-03-29 NOTE — ED TRIAGE NOTES
Patient has an extensive psych history with drug and alcohol abuse noted.  Called ems intoxicated today.  History of alcohol abuse.  Patient is awake, able to answer all my questions.  Denies si/hi.  Noted elevated heart rate.  EKG ordered.  Will monitor.

## 2019-03-30 PROBLEM — F10.982 ALCOHOL-INDUCED INSOMNIA (HCC): Status: ACTIVE | Noted: 2019-03-30

## 2019-03-30 LAB
ANION GAP SERPL CALC-SCNC: 8 MMOL/L (ref 0–11.9)
BASOPHILS # BLD AUTO: 0.2 % (ref 0–1.8)
BASOPHILS # BLD: 0.01 K/UL (ref 0–0.12)
BUN SERPL-MCNC: 5 MG/DL (ref 8–22)
CALCIUM SERPL-MCNC: 8.6 MG/DL (ref 8.5–10.5)
CHLORIDE SERPL-SCNC: 99 MMOL/L (ref 96–112)
CO2 SERPL-SCNC: 28 MMOL/L (ref 20–33)
CREAT SERPL-MCNC: 0.55 MG/DL (ref 0.5–1.4)
EOSINOPHIL # BLD AUTO: 0.01 K/UL (ref 0–0.51)
EOSINOPHIL NFR BLD: 0.2 % (ref 0–6.9)
ERYTHROCYTE [DISTWIDTH] IN BLOOD BY AUTOMATED COUNT: 46.3 FL (ref 35.9–50)
GLUCOSE SERPL-MCNC: 81 MG/DL (ref 65–99)
HCT VFR BLD AUTO: 44.3 % (ref 42–52)
HGB BLD-MCNC: 14.6 G/DL (ref 14–18)
IMM GRANULOCYTES # BLD AUTO: 0.01 K/UL (ref 0–0.11)
IMM GRANULOCYTES NFR BLD AUTO: 0.2 % (ref 0–0.9)
LYMPHOCYTES # BLD AUTO: 0.91 K/UL (ref 1–4.8)
LYMPHOCYTES NFR BLD: 20.3 % (ref 22–41)
MCH RBC QN AUTO: 32.1 PG (ref 27–33)
MCHC RBC AUTO-ENTMCNC: 33 G/DL (ref 33.7–35.3)
MCV RBC AUTO: 97.4 FL (ref 81.4–97.8)
MONOCYTES # BLD AUTO: 0.8 K/UL (ref 0–0.85)
MONOCYTES NFR BLD AUTO: 17.8 % (ref 0–13.4)
NEUTROPHILS # BLD AUTO: 2.75 K/UL (ref 1.82–7.42)
NEUTROPHILS NFR BLD: 61.3 % (ref 44–72)
NRBC # BLD AUTO: 0 K/UL
NRBC BLD-RTO: 0 /100 WBC
PLATELET # BLD AUTO: 83 K/UL (ref 164–446)
PMV BLD AUTO: 11.1 FL (ref 9–12.9)
POTASSIUM SERPL-SCNC: 3.8 MMOL/L (ref 3.6–5.5)
RBC # BLD AUTO: 4.55 M/UL (ref 4.7–6.1)
SODIUM SERPL-SCNC: 135 MMOL/L (ref 135–145)
WBC # BLD AUTO: 4.5 K/UL (ref 4.8–10.8)

## 2019-03-30 PROCEDURE — 85025 COMPLETE CBC W/AUTO DIFF WBC: CPT

## 2019-03-30 PROCEDURE — 700105 HCHG RX REV CODE 258: Performed by: HOSPITALIST

## 2019-03-30 PROCEDURE — 700102 HCHG RX REV CODE 250 W/ 637 OVERRIDE(OP): Performed by: HOSPITALIST

## 2019-03-30 PROCEDURE — 36415 COLL VENOUS BLD VENIPUNCTURE: CPT

## 2019-03-30 PROCEDURE — 700111 HCHG RX REV CODE 636 W/ 250 OVERRIDE (IP): Performed by: HOSPITALIST

## 2019-03-30 PROCEDURE — A9270 NON-COVERED ITEM OR SERVICE: HCPCS | Performed by: HOSPITALIST

## 2019-03-30 PROCEDURE — 770020 HCHG ROOM/CARE - TELE (206)

## 2019-03-30 PROCEDURE — 99233 SBSQ HOSP IP/OBS HIGH 50: CPT | Performed by: HOSPITALIST

## 2019-03-30 PROCEDURE — 80048 BASIC METABOLIC PNL TOTAL CA: CPT

## 2019-03-30 RX ORDER — TRAZODONE HYDROCHLORIDE 50 MG/1
50 TABLET ORAL
Status: DISCONTINUED | OUTPATIENT
Start: 2019-03-30 | End: 2019-04-03 | Stop reason: HOSPADM

## 2019-03-30 RX ADMIN — HEPARIN SODIUM 5000 UNITS: 5000 INJECTION, SOLUTION INTRAVENOUS; SUBCUTANEOUS at 05:06

## 2019-03-30 RX ADMIN — Medication 100 MG: at 05:05

## 2019-03-30 RX ADMIN — SODIUM CHLORIDE: 9 INJECTION, SOLUTION INTRAVENOUS at 19:43

## 2019-03-30 RX ADMIN — LORAZEPAM 1 MG: 1 TABLET ORAL at 11:07

## 2019-03-30 RX ADMIN — LORAZEPAM 2 MG: 2 TABLET ORAL at 09:00

## 2019-03-30 RX ADMIN — METOPROLOL TARTRATE 25 MG: 25 TABLET, FILM COATED ORAL at 11:07

## 2019-03-30 RX ADMIN — LORAZEPAM 1 MG: 1 TABLET ORAL at 05:05

## 2019-03-30 RX ADMIN — METOPROLOL TARTRATE 25 MG: 25 TABLET, FILM COATED ORAL at 17:17

## 2019-03-30 RX ADMIN — LORAZEPAM 1 MG: 1 TABLET ORAL at 23:09

## 2019-03-30 RX ADMIN — FOLIC ACID 1 MG: 1 TABLET ORAL at 05:05

## 2019-03-30 RX ADMIN — LORAZEPAM 1 MG: 1 TABLET ORAL at 15:16

## 2019-03-30 RX ADMIN — LORAZEPAM 1 MG: 1 TABLET ORAL at 00:06

## 2019-03-30 RX ADMIN — SODIUM CHLORIDE: 9 INJECTION, SOLUTION INTRAVENOUS at 09:43

## 2019-03-30 RX ADMIN — LORAZEPAM 1 MG: 1 TABLET ORAL at 19:43

## 2019-03-30 RX ADMIN — THERA TABS 1 TABLET: TAB at 05:05

## 2019-03-30 RX ADMIN — SODIUM CHLORIDE: 9 INJECTION, SOLUTION INTRAVENOUS at 00:05

## 2019-03-30 ASSESSMENT — LIFESTYLE VARIABLES
NAUSEA AND VOMITING: MILD NAUSEA WITH NO VOMITING
HEADACHE, FULLNESS IN HEAD: VERY MILD
HEADACHE, FULLNESS IN HEAD: VERY MILD
VISUAL DISTURBANCES: MILD SENSITIVITY
HEADACHE, FULLNESS IN HEAD: MILD
TOTAL SCORE: 10
HEADACHE, FULLNESS IN HEAD: MILD
TOTAL SCORE: 10
TREMOR: TREMOR NOT VISIBLE BUT CAN BE FELT, FINGERTIP TO FINGERTIP
AUDITORY DISTURBANCES: NOT PRESENT
ORIENTATION AND CLOUDING OF SENSORIUM: CANNOT DO SERIAL ADDITIONS OR IS UNCERTAIN ABOUT DATE
ANXIETY: *
TOTAL SCORE: VERY MILD ITCHING, PINS AND NEEDLES SENSATION, BURNING OR NUMBNESS
ANXIETY: *
VISUAL DISTURBANCES: MILD SENSITIVITY
ORIENTATION AND CLOUDING OF SENSORIUM: CANNOT DO SERIAL ADDITIONS OR IS UNCERTAIN ABOUT DATE
VISUAL DISTURBANCES: MILD SENSITIVITY
TOTAL SCORE: VERY MILD ITCHING, PINS AND NEEDLES SENSATION, BURNING OR NUMBNESS
TOTAL SCORE: VERY MILD ITCHING, PINS AND NEEDLES SENSATION, BURNING OR NUMBNESS
VISUAL DISTURBANCES: VERY MILD SENSITIVITY
ANXIETY: MILDLY ANXIOUS
PAROXYSMAL SWEATS: NO SWEAT VISIBLE
TREMOR: TREMOR NOT VISIBLE BUT CAN BE FELT, FINGERTIP TO FINGERTIP
SUBSTANCE_ABUSE: 1
AGITATION: *
PAROXYSMAL SWEATS: NO SWEAT VISIBLE
PAROXYSMAL SWEATS: NO SWEAT VISIBLE
NAUSEA AND VOMITING: MILD NAUSEA WITH NO VOMITING
VISUAL DISTURBANCES: VERY MILD SENSITIVITY
NAUSEA AND VOMITING: MILD NAUSEA WITH NO VOMITING
ANXIETY: *
TOTAL SCORE: 10
AGITATION: *
NAUSEA AND VOMITING: *
ORIENTATION AND CLOUDING OF SENSORIUM: ORIENTED AND CAN DO SERIAL ADDITIONS
PAROXYSMAL SWEATS: NO SWEAT VISIBLE
AGITATION: NORMAL ACTIVITY
ORIENTATION AND CLOUDING OF SENSORIUM: ORIENTED AND CAN DO SERIAL ADDITIONS
NAUSEA AND VOMITING: *
PAROXYSMAL SWEATS: NO SWEAT VISIBLE
TREMOR: TREMOR NOT VISIBLE BUT CAN BE FELT, FINGERTIP TO FINGERTIP
TOTAL SCORE: 11
ORIENTATION AND CLOUDING OF SENSORIUM: CANNOT DO SERIAL ADDITIONS OR IS UNCERTAIN ABOUT DATE
VISUAL DISTURBANCES: VERY MILD SENSITIVITY
TREMOR: *
TOTAL SCORE: 10
NAUSEA AND VOMITING: MILD NAUSEA WITH NO VOMITING
ANXIETY: *
AUDITORY DISTURBANCES: VERY MILD HARSHNESS OR ABILITY TO FRIGHTEN
AGITATION: NORMAL ACTIVITY
TOTAL SCORE: VERY MILD ITCHING, PINS AND NEEDLES SENSATION, BURNING OR NUMBNESS
TOTAL SCORE: 10
AUDITORY DISTURBANCES: NOT PRESENT
AUDITORY DISTURBANCES: NOT PRESENT
AGITATION: NORMAL ACTIVITY
AGITATION: NORMAL ACTIVITY
PAROXYSMAL SWEATS: NO SWEAT VISIBLE
TREMOR: *
AUDITORY DISTURBANCES: VERY MILD HARSHNESS OR ABILITY TO FRIGHTEN
HEADACHE, FULLNESS IN HEAD: MILD
ANXIETY: *
AUDITORY DISTURBANCES: NOT PRESENT
ORIENTATION AND CLOUDING OF SENSORIUM: CANNOT DO SERIAL ADDITIONS OR IS UNCERTAIN ABOUT DATE
TREMOR: TREMOR NOT VISIBLE BUT CAN BE FELT, FINGERTIP TO FINGERTIP
HEADACHE, FULLNESS IN HEAD: MILD

## 2019-03-30 ASSESSMENT — ENCOUNTER SYMPTOMS
TREMORS: 0
COUGH: 0
MYALGIAS: 0
DIZZINESS: 0
INSOMNIA: 1
VOMITING: 0
NAUSEA: 0
PALPITATIONS: 1
EYES NEGATIVE: 1
HEARTBURN: 0
FEVER: 0
CHILLS: 0
TINGLING: 0
WEIGHT LOSS: 0
NECK PAIN: 0
NERVOUS/ANXIOUS: 1

## 2019-03-30 NOTE — PROGRESS NOTES
"Bedside report received from day RN. Assumed pt care. Pt a&ox3.Disoriented to time. POC discussed. Pt states \"I need ativan.\" Will assess CIWA score and medicate per MAR. Pt declines any other needs at this time. Bed alarm on. Bed locked and in lowest position. Call light within reach. Hourly rounding in place.   "

## 2019-03-30 NOTE — CARE PLAN
Problem: Safety  Goal: Will remain free from falls  Outcome: PROGRESSING AS EXPECTED  Fall risk assessed, fall precautions in place, bed alarm on, pt verbalizes understanding of fall risk.    Problem: Pain Management  Goal: Pain level will decrease to patient's comfort goal  Outcome: PROGRESSING AS EXPECTED  No complaints of pain or signs of distress. Frequently monitoring.

## 2019-03-30 NOTE — PROGRESS NOTES
[facility] Medicine Daily Progress Note    Date of Service  3/30/2019    Chief Complaint  33 y.o. male admitted 3/28/2019 with tremors , anxiety         Interval Problem Update  Treating alcohol withdrawal    Tmax 99    tachycardic    Tremors    Anxiety    General malaise    He did not sleep well    He is hallucinating    Consultants/Specialty  none    Code Status  full    Disposition  home    Review of Systems  Review of Systems   Constitutional: Positive for malaise/fatigue. Negative for chills, fever and weight loss.   HENT: Negative for hearing loss and tinnitus.    Eyes: Negative.    Respiratory: Negative for cough.    Cardiovascular: Positive for palpitations.   Gastrointestinal: Negative for heartburn, nausea and vomiting.   Genitourinary: Negative for dysuria and urgency.   Musculoskeletal: Negative for myalgias and neck pain.   Neurological: Negative for dizziness, tingling and tremors.   Psychiatric/Behavioral: Positive for substance abuse. The patient is nervous/anxious and has insomnia.         Physical Exam  Temp:  [36.5 °C (97.7 °F)-37.9 °C (100.2 °F)] 37.7 °C (99.8 °F)  Pulse:  [107-139] 107  Resp:  [18-20] 18  BP: (125-155)/() 133/94  SpO2:  [96 %-99 %] 98 %    Physical Exam   Constitutional: He is oriented to person, place, and time. He appears distressed.   HENT:   Head: Normocephalic and atraumatic.   Mouth/Throat: No oropharyngeal exudate.   Eyes: Pupils are equal, round, and reactive to light. Left eye exhibits no discharge.   Neck: No JVD present. No tracheal deviation present. No thyromegaly present.   Cardiovascular:   tachycardia   Pulmonary/Chest: Breath sounds normal. No respiratory distress. He has no wheezes. He has no rales.   Abdominal: Soft. Bowel sounds are normal. He exhibits no distension. There is no rebound and no guarding.   Musculoskeletal: He exhibits deformity (both hands and feet).   Neurological: He is alert and oriented to person, place, and time.   tremors   Skin: No  rash noted. No erythema. No pallor.   Psychiatric: He has a normal mood and affect. His behavior is normal.       Fluids    Intake/Output Summary (Last 24 hours) at 03/30/19 1027  Last data filed at 03/30/19 0800   Gross per 24 hour   Intake             1540 ml   Output             1700 ml   Net             -160 ml       Laboratory  Recent Labs      03/28/19 2151 03/29/19   0742  03/30/19   0509   WBC  3.5*  5.9  4.5*   RBC  5.03  4.79  4.55*   HEMOGLOBIN  16.4  15.0  14.6   HEMATOCRIT  47.4  45.1  44.3   MCV  94.2  94.2  97.4   MCH  32.6  31.3  32.1   MCHC  34.6  33.3*  33.0*   RDW  45.2  45.2  46.3   PLATELETCT  133*  113*  83*   MPV  10.1  10.1  11.1     Recent Labs      03/28/19 2151 03/29/19   0742  03/30/19   0509   SODIUM  135  140  135   POTASSIUM  3.9  4.2  3.8   CHLORIDE  97  100  99   CO2  25  29  28   GLUCOSE  78  79  81   BUN  7*  5*  5*   CREATININE  0.63  0.61  0.55   CALCIUM  8.4*  8.1*  8.6                   Imaging  No orders to display        Assessment/Plan  * Alcohol withdrawal (HCC)- (present on admission)   Assessment & Plan    Unknown last injestion   Cont with CIWA and telemetry monitoring   Monitor and replace lytes as appropriate   Rally bag followed by PO vitamins      Alcohol use disorder, severe, dependence (HCC)- (present on admission)   Assessment & Plan    Encourage cessation     Alcohol-induced insomnia (HCC)- (present on admission)   Assessment & Plan    trazadone at night     Tobacco abuse- (present on admission)   Assessment & Plan    Encourage cessation     Leukopenia- (present on admission)   Assessment & Plan    Cont to monitor     Tachycardia- (present on admission)   Assessment & Plan    Treat with lopressor 25 bid     Ectodermal dysplasia, 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

## 2019-03-31 ENCOUNTER — APPOINTMENT (OUTPATIENT)
Dept: RADIOLOGY | Facility: MEDICAL CENTER | Age: 34
DRG: 897 | End: 2019-03-31
Attending: HOSPITALIST
Payer: MEDICARE

## 2019-03-31 PROBLEM — H10.33 ACUTE BACTERIAL CONJUNCTIVITIS OF BOTH EYES: Status: ACTIVE | Noted: 2019-03-31

## 2019-03-31 PROBLEM — R05.9 COUGH: Status: ACTIVE | Noted: 2019-03-31

## 2019-03-31 PROCEDURE — 71046 X-RAY EXAM CHEST 2 VIEWS: CPT

## 2019-03-31 PROCEDURE — 700101 HCHG RX REV CODE 250: Performed by: HOSPITALIST

## 2019-03-31 PROCEDURE — 770020 HCHG ROOM/CARE - TELE (206)

## 2019-03-31 PROCEDURE — 700102 HCHG RX REV CODE 250 W/ 637 OVERRIDE(OP): Performed by: FAMILY MEDICINE

## 2019-03-31 PROCEDURE — 99233 SBSQ HOSP IP/OBS HIGH 50: CPT | Performed by: HOSPITALIST

## 2019-03-31 PROCEDURE — 700102 HCHG RX REV CODE 250 W/ 637 OVERRIDE(OP): Performed by: HOSPITALIST

## 2019-03-31 PROCEDURE — A9270 NON-COVERED ITEM OR SERVICE: HCPCS | Performed by: FAMILY MEDICINE

## 2019-03-31 PROCEDURE — 700111 HCHG RX REV CODE 636 W/ 250 OVERRIDE (IP): Performed by: HOSPITALIST

## 2019-03-31 PROCEDURE — A9270 NON-COVERED ITEM OR SERVICE: HCPCS | Performed by: HOSPITALIST

## 2019-03-31 PROCEDURE — 700105 HCHG RX REV CODE 258: Performed by: HOSPITALIST

## 2019-03-31 RX ORDER — HYDRALAZINE HYDROCHLORIDE 25 MG/1
25 TABLET, FILM COATED ORAL ONCE
Status: COMPLETED | OUTPATIENT
Start: 2019-03-31 | End: 2019-03-31

## 2019-03-31 RX ORDER — GUAIFENESIN/DEXTROMETHORPHAN 100-10MG/5
5 SYRUP ORAL EVERY 6 HOURS PRN
Status: DISCONTINUED | OUTPATIENT
Start: 2019-03-31 | End: 2019-04-03 | Stop reason: HOSPADM

## 2019-03-31 RX ORDER — ERYTHROMYCIN 5 MG/G
OINTMENT OPHTHALMIC EVERY 6 HOURS
Status: DISCONTINUED | OUTPATIENT
Start: 2019-03-31 | End: 2019-04-03 | Stop reason: HOSPADM

## 2019-03-31 RX ORDER — LORATADINE 10 MG/1
10 TABLET ORAL DAILY
Status: DISCONTINUED | OUTPATIENT
Start: 2019-03-31 | End: 2019-04-03 | Stop reason: HOSPADM

## 2019-03-31 RX ADMIN — LORAZEPAM 3 MG: 2 TABLET ORAL at 20:17

## 2019-03-31 RX ADMIN — LORAZEPAM 2 MG: 2 TABLET ORAL at 08:00

## 2019-03-31 RX ADMIN — LORAZEPAM 3 MG: 2 TABLET ORAL at 21:22

## 2019-03-31 RX ADMIN — LORAZEPAM 2 MG: 2 TABLET ORAL at 12:58

## 2019-03-31 RX ADMIN — ERYTHROMYCIN: 5 OINTMENT OPHTHALMIC at 14:13

## 2019-03-31 RX ADMIN — THERA TABS 1 TABLET: TAB at 05:40

## 2019-03-31 RX ADMIN — LORAZEPAM 1 MG: 1 TABLET ORAL at 22:56

## 2019-03-31 RX ADMIN — Medication 100 MG: at 05:40

## 2019-03-31 RX ADMIN — SODIUM CHLORIDE: 9 INJECTION, SOLUTION INTRAVENOUS at 05:40

## 2019-03-31 RX ADMIN — HEPARIN SODIUM 5000 UNITS: 5000 INJECTION, SOLUTION INTRAVENOUS; SUBCUTANEOUS at 13:00

## 2019-03-31 RX ADMIN — ERYTHROMYCIN: 5 OINTMENT OPHTHALMIC at 18:01

## 2019-03-31 RX ADMIN — FOLIC ACID 1 MG: 1 TABLET ORAL at 05:40

## 2019-03-31 RX ADMIN — LORAZEPAM 3 MG: 2 TABLET ORAL at 03:27

## 2019-03-31 RX ADMIN — SENNOSIDES, DOCUSATE SODIUM 2 TABLET: 50; 8.6 TABLET, FILM COATED ORAL at 05:40

## 2019-03-31 RX ADMIN — METOPROLOL TARTRATE 25 MG: 25 TABLET, FILM COATED ORAL at 18:00

## 2019-03-31 RX ADMIN — LORAZEPAM 2 MG: 2 TABLET ORAL at 18:00

## 2019-03-31 RX ADMIN — ONDANSETRON 4 MG: 2 INJECTION INTRAMUSCULAR; INTRAVENOUS at 20:18

## 2019-03-31 RX ADMIN — HYDRALAZINE HYDROCHLORIDE 25 MG: 25 TABLET, FILM COATED ORAL at 21:23

## 2019-03-31 RX ADMIN — SODIUM CHLORIDE: 9 INJECTION, SOLUTION INTRAVENOUS at 16:28

## 2019-03-31 RX ADMIN — LORATADINE 10 MG: 10 TABLET ORAL at 12:58

## 2019-03-31 RX ADMIN — METOPROLOL TARTRATE 25 MG: 25 TABLET, FILM COATED ORAL at 05:40

## 2019-03-31 ASSESSMENT — ENCOUNTER SYMPTOMS
WEIGHT LOSS: 0
FEVER: 0
MYALGIAS: 0
NECK PAIN: 0
NERVOUS/ANXIOUS: 1
HEARTBURN: 0
VOMITING: 0
PALPITATIONS: 1
EYE DISCHARGE: 1
COUGH: 1
CHILLS: 0
TINGLING: 0
INSOMNIA: 1
NAUSEA: 0
DIZZINESS: 0
TREMORS: 0
SPUTUM PRODUCTION: 0

## 2019-03-31 ASSESSMENT — LIFESTYLE VARIABLES
VISUAL DISTURBANCES: MILD SENSITIVITY
VISUAL DISTURBANCES: VERY MILD SENSITIVITY
TREMOR: TREMOR NOT VISIBLE BUT CAN BE FELT, FINGERTIP TO FINGERTIP
VISUAL DISTURBANCES: MILD SENSITIVITY
AGITATION: MODERATELY FIDGETY AND RESTLESS
TOTAL SCORE: 12
VISUAL DISTURBANCES: MODERATE SENSITIVITY
TOTAL SCORE: 18
AGITATION: MODERATELY FIDGETY AND RESTLESS
PAROXYSMAL SWEATS: NO SWEAT VISIBLE
AUDITORY DISTURBANCES: VERY MILD HARSHNESS OR ABILITY TO FRIGHTEN
ANXIETY: MILDLY ANXIOUS
ORIENTATION AND CLOUDING OF SENSORIUM: ORIENTED AND CAN DO SERIAL ADDITIONS
AUDITORY DISTURBANCES: MODERATE HARSHNESS OR ABILITY TO FRIGHTEN
VISUAL DISTURBANCES: VERY MILD SENSITIVITY
AUDITORY DISTURBANCES: VERY MILD HARSHNESS OR ABILITY TO FRIGHTEN
AGITATION: *
ORIENTATION AND CLOUDING OF SENSORIUM: ORIENTED AND CAN DO SERIAL ADDITIONS
ANXIETY: MODERATELY ANXIOUS OR GUARDED, SO ANXIETY IS INFERRED
ANXIETY: MODERATELY ANXIOUS OR GUARDED, SO ANXIETY IS INFERRED
TOTAL SCORE: VERY MILD ITCHING, PINS AND NEEDLES SENSATION, BURNING OR NUMBNESS
AGITATION: SOMEWHAT MORE THAN NORMAL ACTIVITY
ORIENTATION AND CLOUDING OF SENSORIUM: ORIENTED AND CAN DO SERIAL ADDITIONS
ANXIETY: *
TREMOR: TREMOR NOT VISIBLE BUT CAN BE FELT, FINGERTIP TO FINGERTIP
PAROXYSMAL SWEATS: NO SWEAT VISIBLE
AGITATION: SOMEWHAT MORE THAN NORMAL ACTIVITY
AUDITORY DISTURBANCES: MILD HARSHNESS OR ABILITY TO FRIGHTEN
TOTAL SCORE: MILD ITCHING, PINS AND NEEDLES SENSATION, BURNING OR NUMBNESS
PAROXYSMAL SWEATS: NO SWEAT VISIBLE
NAUSEA AND VOMITING: MILD NAUSEA WITH NO VOMITING
ANXIETY: MILDLY ANXIOUS
AGITATION: SOMEWHAT MORE THAN NORMAL ACTIVITY
ORIENTATION AND CLOUDING OF SENSORIUM: ORIENTED AND CAN DO SERIAL ADDITIONS
PAROXYSMAL SWEATS: BARELY PERCEPTIBLE SWEATING. PALMS MOIST
AUDITORY DISTURBANCES: VERY MILD HARSHNESS OR ABILITY TO FRIGHTEN
TREMOR: TREMOR NOT VISIBLE BUT CAN BE FELT, FINGERTIP TO FINGERTIP
HEADACHE, FULLNESS IN HEAD: MILD
PAROXYSMAL SWEATS: NO SWEAT VISIBLE
ORIENTATION AND CLOUDING OF SENSORIUM: ORIENTED AND CAN DO SERIAL ADDITIONS
AUDITORY DISTURBANCES: VERY MILD HARSHNESS OR ABILITY TO FRIGHTEN
TOTAL SCORE: 10
PAROXYSMAL SWEATS: NO SWEAT VISIBLE
NAUSEA AND VOMITING: MILD NAUSEA WITH NO VOMITING
ORIENTATION AND CLOUDING OF SENSORIUM: ORIENTED AND CAN DO SERIAL ADDITIONS
NAUSEA AND VOMITING: MILD NAUSEA WITH NO VOMITING
VISUAL DISTURBANCES: MILD SENSITIVITY
HEADACHE, FULLNESS IN HEAD: NOT PRESENT
NAUSEA AND VOMITING: NO NAUSEA AND NO VOMITING
ORIENTATION AND CLOUDING OF SENSORIUM: ORIENTED AND CAN DO SERIAL ADDITIONS
NAUSEA AND VOMITING: NO NAUSEA AND NO VOMITING
HEADACHE, FULLNESS IN HEAD: MILD
TREMOR: MODERATE TREMOR WITH ARMS EXTENDED
HEADACHE, FULLNESS IN HEAD: MODERATE
AUDITORY DISTURBANCES: MILD HARSHNESS OR ABILITY TO FRIGHTEN
TOTAL SCORE: MILD ITCHING, PINS AND NEEDLES SENSATION, BURNING OR NUMBNESS
TREMOR: TREMOR NOT VISIBLE BUT CAN BE FELT, FINGERTIP TO FINGERTIP
SUBSTANCE_ABUSE: 1
NAUSEA AND VOMITING: *
HEADACHE, FULLNESS IN HEAD: NOT PRESENT
ORIENTATION AND CLOUDING OF SENSORIUM: ORIENTED AND CAN DO SERIAL ADDITIONS
TOTAL SCORE: 14
TREMOR: MODERATE TREMOR WITH ARMS EXTENDED
PAROXYSMAL SWEATS: NO SWEAT VISIBLE
HEADACHE, FULLNESS IN HEAD: VERY MILD
TREMOR: TREMOR NOT VISIBLE BUT CAN BE FELT, FINGERTIP TO FINGERTIP
AUDITORY DISTURBANCES: VERY MILD HARSHNESS OR ABILITY TO FRIGHTEN
VISUAL DISTURBANCES: MILD SENSITIVITY
HEADACHE, FULLNESS IN HEAD: NOT PRESENT
VISUAL DISTURBANCES: MODERATELY SEVERE HALLUCINATIONS
VISUAL DISTURBANCES: VERY MILD SENSITIVITY
PAROXYSMAL SWEATS: NO SWEAT VISIBLE
AGITATION: SOMEWHAT MORE THAN NORMAL ACTIVITY
TREMOR: TREMOR NOT VISIBLE BUT CAN BE FELT, FINGERTIP TO FINGERTIP
ANXIETY: MILDLY ANXIOUS
ANXIETY: MILDLY ANXIOUS
TOTAL SCORE: 5
ORIENTATION AND CLOUDING OF SENSORIUM: ORIENTED AND CAN DO SERIAL ADDITIONS
TOTAL SCORE: 4
AGITATION: *
NAUSEA AND VOMITING: NO NAUSEA AND NO VOMITING
ANXIETY: *
NAUSEA AND VOMITING: MILD NAUSEA WITH NO VOMITING
ANXIETY: MODERATELY ANXIOUS OR GUARDED, SO ANXIETY IS INFERRED
HEADACHE, FULLNESS IN HEAD: MILD
TOTAL SCORE: VERY MILD ITCHING, PINS AND NEEDLES SENSATION, BURNING OR NUMBNESS
NAUSEA AND VOMITING: MILD NAUSEA WITH NO VOMITING
TOTAL SCORE: 13
TOTAL SCORE: 17
TOTAL SCORE: 24
HEADACHE, FULLNESS IN HEAD: VERY MILD
TREMOR: TREMOR NOT VISIBLE BUT CAN BE FELT, FINGERTIP TO FINGERTIP
AUDITORY DISTURBANCES: MILD HARSHNESS OR ABILITY TO FRIGHTEN
AGITATION: NORMAL ACTIVITY
PAROXYSMAL SWEATS: NO SWEAT VISIBLE

## 2019-04-01 PROBLEM — I10 ESSENTIAL HYPERTENSION: Status: ACTIVE | Noted: 2019-04-01

## 2019-04-01 PROCEDURE — 700102 HCHG RX REV CODE 250 W/ 637 OVERRIDE(OP): Performed by: HOSPITALIST

## 2019-04-01 PROCEDURE — 770020 HCHG ROOM/CARE - TELE (206)

## 2019-04-01 PROCEDURE — A9270 NON-COVERED ITEM OR SERVICE: HCPCS | Performed by: HOSPITALIST

## 2019-04-01 PROCEDURE — 700105 HCHG RX REV CODE 258: Performed by: HOSPITALIST

## 2019-04-01 PROCEDURE — 99232 SBSQ HOSP IP/OBS MODERATE 35: CPT | Performed by: HOSPITALIST

## 2019-04-01 PROCEDURE — 700111 HCHG RX REV CODE 636 W/ 250 OVERRIDE (IP): Performed by: HOSPITALIST

## 2019-04-01 PROCEDURE — 700101 HCHG RX REV CODE 250: Performed by: HOSPITALIST

## 2019-04-01 RX ORDER — METOPROLOL TARTRATE 50 MG/1
50 TABLET, FILM COATED ORAL TWICE DAILY
Status: DISCONTINUED | OUTPATIENT
Start: 2019-04-01 | End: 2019-04-03 | Stop reason: HOSPADM

## 2019-04-01 RX ADMIN — SODIUM CHLORIDE: 9 INJECTION, SOLUTION INTRAVENOUS at 02:48

## 2019-04-01 RX ADMIN — LORAZEPAM 2 MG: 2 TABLET ORAL at 11:28

## 2019-04-01 RX ADMIN — METOPROLOL TARTRATE 25 MG: 25 TABLET, FILM COATED ORAL at 11:13

## 2019-04-01 RX ADMIN — LORAZEPAM 2 MG: 2 TABLET ORAL at 17:08

## 2019-04-01 RX ADMIN — LORATADINE 10 MG: 10 TABLET ORAL at 11:14

## 2019-04-01 RX ADMIN — ERYTHROMYCIN: 5 OINTMENT OPHTHALMIC at 11:13

## 2019-04-01 RX ADMIN — LORAZEPAM 2 MG: 2 TABLET ORAL at 20:25

## 2019-04-01 RX ADMIN — LORAZEPAM 1 MG: 1 TABLET ORAL at 05:27

## 2019-04-01 RX ADMIN — ONDANSETRON 4 MG: 4 TABLET, ORALLY DISINTEGRATING ORAL at 17:07

## 2019-04-01 RX ADMIN — HEPARIN SODIUM 5000 UNITS: 5000 INJECTION, SOLUTION INTRAVENOUS; SUBCUTANEOUS at 15:30

## 2019-04-01 RX ADMIN — METOPROLOL TARTRATE 50 MG: 50 TABLET ORAL at 17:07

## 2019-04-01 RX ADMIN — ONDANSETRON 4 MG: 4 TABLET, ORALLY DISINTEGRATING ORAL at 07:59

## 2019-04-01 RX ADMIN — FOLIC ACID 1 MG: 1 TABLET ORAL at 05:19

## 2019-04-01 RX ADMIN — Medication 100 MG: at 05:19

## 2019-04-01 RX ADMIN — METOPROLOL TARTRATE 25 MG: 25 TABLET, FILM COATED ORAL at 05:19

## 2019-04-01 RX ADMIN — ERYTHROMYCIN: 5 OINTMENT OPHTHALMIC at 01:57

## 2019-04-01 RX ADMIN — ERYTHROMYCIN: 5 OINTMENT OPHTHALMIC at 17:13

## 2019-04-01 RX ADMIN — LORAZEPAM 2 MG: 2 TABLET ORAL at 22:02

## 2019-04-01 RX ADMIN — THERA TABS 1 TABLET: TAB at 05:19

## 2019-04-01 RX ADMIN — LORAZEPAM 1 MG: 1 TABLET ORAL at 01:59

## 2019-04-01 ASSESSMENT — LIFESTYLE VARIABLES
ORIENTATION AND CLOUDING OF SENSORIUM: ORIENTED AND CAN DO SERIAL ADDITIONS
TOTAL SCORE: 12
TREMOR: TREMOR NOT VISIBLE BUT CAN BE FELT, FINGERTIP TO FINGERTIP
AUDITORY DISTURBANCES: NOT PRESENT
VISUAL DISTURBANCES: VERY MILD SENSITIVITY
TOTAL SCORE: 10
ORIENTATION AND CLOUDING OF SENSORIUM: ORIENTED AND CAN DO SERIAL ADDITIONS
ANXIETY: MILDLY ANXIOUS
AGITATION: SOMEWHAT MORE THAN NORMAL ACTIVITY
HEADACHE, FULLNESS IN HEAD: NOT PRESENT
TOTAL SCORE: 14
VISUAL DISTURBANCES: VERY MILD SENSITIVITY
VISUAL DISTURBANCES: MILD SENSITIVITY
ORIENTATION AND CLOUDING OF SENSORIUM: ORIENTED AND CAN DO SERIAL ADDITIONS
PAROXYSMAL SWEATS: BARELY PERCEPTIBLE SWEATING. PALMS MOIST
PAROXYSMAL SWEATS: NO SWEAT VISIBLE
TOTAL SCORE: 11
AGITATION: SOMEWHAT MORE THAN NORMAL ACTIVITY
NAUSEA AND VOMITING: NO NAUSEA AND NO VOMITING
VISUAL DISTURBANCES: VERY MILD SENSITIVITY
ANXIETY: *
HEADACHE, FULLNESS IN HEAD: MODERATE
TOTAL SCORE: MILD ITCHING, PINS AND NEEDLES SENSATION, BURNING OR NUMBNESS
PAROXYSMAL SWEATS: NO SWEAT VISIBLE
ANXIETY: *
NAUSEA AND VOMITING: MILD NAUSEA WITH NO VOMITING
TREMOR: *
ANXIETY: *
NAUSEA AND VOMITING: *
HEADACHE, FULLNESS IN HEAD: VERY MILD
SUBSTANCE_ABUSE: 1
HEADACHE, FULLNESS IN HEAD: VERY MILD
TOTAL SCORE: VERY MILD ITCHING, PINS AND NEEDLES SENSATION, BURNING OR NUMBNESS
ANXIETY: MILDLY ANXIOUS
TOTAL SCORE: VERY MILD ITCHING, PINS AND NEEDLES SENSATION, BURNING OR NUMBNESS
HEADACHE, FULLNESS IN HEAD: NOT PRESENT
TREMOR: TREMOR NOT VISIBLE BUT CAN BE FELT, FINGERTIP TO FINGERTIP
HEADACHE, FULLNESS IN HEAD: MODERATE
AGITATION: *
ORIENTATION AND CLOUDING OF SENSORIUM: ORIENTED AND CAN DO SERIAL ADDITIONS
AGITATION: *
NAUSEA AND VOMITING: MILD NAUSEA WITH NO VOMITING
AUDITORY DISTURBANCES: NOT PRESENT
VISUAL DISTURBANCES: MODERATE SENSITIVITY
TOTAL SCORE: VERY MILD ITCHING, PINS AND NEEDLES SENSATION, BURNING OR NUMBNESS
ORIENTATION AND CLOUDING OF SENSORIUM: ORIENTED AND CAN DO SERIAL ADDITIONS
TREMOR: *
AUDITORY DISTURBANCES: VERY MILD HARSHNESS OR ABILITY TO FRIGHTEN
TOTAL SCORE: MILD ITCHING, PINS AND NEEDLES SENSATION, BURNING OR NUMBNESS
TOTAL SCORE: 13
AUDITORY DISTURBANCES: VERY MILD HARSHNESS OR ABILITY TO FRIGHTEN
AUDITORY DISTURBANCES: VERY MILD HARSHNESS OR ABILITY TO FRIGHTEN
TOTAL SCORE: VERY MILD ITCHING, PINS AND NEEDLES SENSATION, BURNING OR NUMBNESS
TREMOR: TREMOR NOT VISIBLE BUT CAN BE FELT, FINGERTIP TO FINGERTIP
PAROXYSMAL SWEATS: NO SWEAT VISIBLE
NAUSEA AND VOMITING: MILD NAUSEA WITH NO VOMITING
PAROXYSMAL SWEATS: NO SWEAT VISIBLE
TOTAL SCORE: 10
ANXIETY: *
TREMOR: TREMOR NOT VISIBLE BUT CAN BE FELT, FINGERTIP TO FINGERTIP
NAUSEA AND VOMITING: MILD NAUSEA WITH NO VOMITING
AGITATION: MODERATELY FIDGETY AND RESTLESS
VISUAL DISTURBANCES: MILD SENSITIVITY
ORIENTATION AND CLOUDING OF SENSORIUM: ORIENTED AND CAN DO SERIAL ADDITIONS
AGITATION: *
AUDITORY DISTURBANCES: VERY MILD HARSHNESS OR ABILITY TO FRIGHTEN
PAROXYSMAL SWEATS: NO SWEAT VISIBLE

## 2019-04-01 ASSESSMENT — ENCOUNTER SYMPTOMS
MYALGIAS: 0
DIZZINESS: 0
VOMITING: 0
SPUTUM PRODUCTION: 0
NAUSEA: 0
CHILLS: 0
WEIGHT LOSS: 0
HEARTBURN: 0
NECK PAIN: 0
PALPITATIONS: 1
NERVOUS/ANXIOUS: 1
COUGH: 1
EYE DISCHARGE: 1
TINGLING: 0
TREMORS: 1
FEVER: 0
INSOMNIA: 1

## 2019-04-01 NOTE — PROGRESS NOTES
Pt refused to let me assess him because I asked if he was going to take his trazadone tonight.  Pt also getting short with CNA. Will try to reassess in an hour.

## 2019-04-01 NOTE — PROGRESS NOTES
[facility] Medicine Daily Progress Note    Date of Service  3/31/2019    Chief Complaint  33 y.o. male admitted 3/28/2019 with tremors , anxiety         Interval Problem Update  Treating alcohol withdrawal    Tremors    Anxiety    General malaise    He has nonproductive cough    He c/o bilateral eye discharge    Consultants/Specialty  none    Code Status  full    Disposition  home    Review of Systems  Review of Systems   Constitutional: Positive for malaise/fatigue. Negative for chills, fever and weight loss.   HENT: Negative for hearing loss and tinnitus.    Eyes: Positive for discharge.   Respiratory: Positive for cough. Negative for sputum production.    Cardiovascular: Positive for palpitations.   Gastrointestinal: Negative for heartburn, nausea and vomiting.   Genitourinary: Negative for dysuria and urgency.   Musculoskeletal: Negative for myalgias and neck pain.   Neurological: Negative for dizziness, tingling and tremors.   Psychiatric/Behavioral: Positive for substance abuse. The patient is nervous/anxious and has insomnia.         Physical Exam  Temp:  [34.6 °C (94.2 °F)-37.3 °C (99.1 °F)] 36.6 °C (97.9 °F)  Pulse:  [72-92] 78  Resp:  [18] 18  BP: (121-142)/(80-97) 121/82  SpO2:  [98 %-99 %] 98 %    Physical Exam   Constitutional: He is oriented to person, place, and time. He appears distressed.   HENT:   Head: Normocephalic and atraumatic.   Mouth/Throat: No oropharyngeal exudate.   Eyes: Pupils are equal, round, and reactive to light. Right eye exhibits discharge. Left eye exhibits discharge.   Neck: No JVD present. No tracheal deviation present. No thyromegaly present.   Cardiovascular: Normal rate and regular rhythm.    Pulmonary/Chest: Breath sounds normal. No respiratory distress. He has no wheezes. He has no rales.   Abdominal: Soft. Bowel sounds are normal. He exhibits no distension. There is no rebound and no guarding.   Musculoskeletal: He exhibits deformity (both hands and feet).   Neurological:  He is alert and oriented to person, place, and time.   tremors   Skin: No rash noted. No erythema. No pallor.   Psychiatric: He has a normal mood and affect. His behavior is normal.       Fluids    Intake/Output Summary (Last 24 hours) at 03/31/19 1944  Last data filed at 03/31/19 0900   Gross per 24 hour   Intake                0 ml   Output             1250 ml   Net            -1250 ml       Laboratory  Recent Labs      03/28/19 2151 03/29/19   0742  03/30/19   0509   WBC  3.5*  5.9  4.5*   RBC  5.03  4.79  4.55*   HEMOGLOBIN  16.4  15.0  14.6   HEMATOCRIT  47.4  45.1  44.3   MCV  94.2  94.2  97.4   MCH  32.6  31.3  32.1   MCHC  34.6  33.3*  33.0*   RDW  45.2  45.2  46.3   PLATELETCT  133*  113*  83*   MPV  10.1  10.1  11.1     Recent Labs      03/28/19 2151 03/29/19   0742  03/30/19   0509   SODIUM  135  140  135   POTASSIUM  3.9  4.2  3.8   CHLORIDE  97  100  99   CO2  25  29  28   GLUCOSE  78  79  81   BUN  7*  5*  5*   CREATININE  0.63  0.61  0.55   CALCIUM  8.4*  8.1*  8.6                   Imaging  DX-[id number] VIEWS   Final Result      Negative two views of the chest.           Assessment/Plan  * Alcohol withdrawal (HCC)- (present on admission)   Assessment & Plan    Unknown last injestion   Cont with CIWA and telemetry monitoring   Monitor and replace lytes as appropriate   Rally bag followed by PO vitamins      Alcohol use disorder, severe, dependence (HCC)- (present on admission)   Assessment & Plan    Encourage cessation     Cough   Assessment & Plan    Check CXR     Acute bacterial conjunctivitis of both eyes   Assessment & Plan    Erythromycin ointment both eyes     Alcohol-induced insomnia (HCC)- (present on admission)   Assessment & Plan    trazadone at night     Tobacco abuse- (present on admission)   Assessment & Plan    Encourage cessation     Leukopenia- (present on admission)   Assessment & Plan    Cont to monitor     Tachycardia- (present on admission)   Assessment & Plan    Treat with  lopressor 25 bid     Ectodermal dysplasia, 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

## 2019-04-01 NOTE — PROGRESS NOTES
[facility] Medicine Daily Progress Note    Date of Service  4/1/2019    Chief Complaint  33 y.o. male admitted 3/28/2019 with tremors , anxiety         Interval Problem Update  Treating alcohol withdrawal    Tremors    Anxiety    General malaise    He has nonproductive cough    bp is elevated    cxr negative    Consultants/Specialty  none    Code Status  full    Disposition  home    Review of Systems  Review of Systems   Constitutional: Positive for malaise/fatigue. Negative for chills, fever and weight loss.   HENT: Negative for hearing loss and tinnitus.    Eyes: Positive for discharge.   Respiratory: Positive for cough. Negative for sputum production.    Cardiovascular: Positive for palpitations.   Gastrointestinal: Negative for heartburn, nausea and vomiting.   Genitourinary: Negative for dysuria and urgency.   Musculoskeletal: Negative for myalgias and neck pain.   Neurological: Positive for tremors (is less). Negative for dizziness and tingling.   Psychiatric/Behavioral: Positive for substance abuse. The patient is nervous/anxious and has insomnia.         Physical Exam  Temp:  [36.2 °C (97.2 °F)-36.8 °C (98.3 °F)] 36.5 °C (97.7 °F)  Pulse:  [75-88] 75  Resp:  [17-18] 18  BP: (121-157)/() 140/101  SpO2:  [97 %-99 %] 97 %    Physical Exam   Constitutional: He is oriented to person, place, and time. He appears distressed.   HENT:   Head: Normocephalic and atraumatic.   Mouth/Throat: No oropharyngeal exudate.   Eyes: Pupils are equal, round, and reactive to light. Right eye exhibits discharge. Left eye exhibits discharge.   Neck: No JVD present. No tracheal deviation present. No thyromegaly present.   Cardiovascular: Normal rate and regular rhythm.    Pulmonary/Chest: Breath sounds normal. No respiratory distress. He has no wheezes. He has no rales.   Abdominal: Soft. Bowel sounds are normal. He exhibits no distension. There is no rebound and no guarding.   Musculoskeletal: He exhibits deformity (both hands  and feet).   Neurological: He is alert and oriented to person, place, and time.   tremors   Skin: No rash noted. No erythema. No pallor.   Psychiatric: He has a normal mood and affect. His behavior is normal.       Fluids    Intake/Output Summary (Last 24 hours) at 04/01/19 0952  Last data filed at 04/01/19 0654   Gross per 24 hour   Intake              360 ml   Output             1000 ml   Net             -640 ml       Laboratory  Recent Labs      03/30/19   0509   WBC  4.5*   RBC  4.55*   HEMOGLOBIN  14.6   HEMATOCRIT  44.3   MCV  97.4   MCH  32.1   MCHC  33.0*   RDW  46.3   PLATELETCT  83*   MPV  11.1     Recent Labs      03/30/19   0509   SODIUM  135   POTASSIUM  3.8   CHLORIDE  99   CO2  28   GLUCOSE  81   BUN  5*   CREATININE  0.55   CALCIUM  8.6                   Imaging  DX-CHEST-2 VIEWS   Final Result      Negative two views of the chest.           Assessment/Plan  * Alcohol withdrawal (HCC)- (present on admission)   Assessment & Plan    Unknown last injestion   Cont with CIWA and telemetry monitoring   Monitor and replace lytes as appropriate   Rally bag followed by PO vitamins      Alcohol use disorder, severe, dependence (HCC)- (present on admission)   Assessment & Plan    Encourage cessation     Essential hypertension- (present on admission)   Assessment & Plan    lopress 50 bid     Cough   Assessment & Plan      Non productive  CXR negative    Robitussin prn     Acute bacterial conjunctivitis of both eyes   Assessment & Plan    Erythromycin ointment both eyes     Alcohol-induced insomnia (HCC)- (present on admission)   Assessment & Plan    trazadone at night     Tobacco abuse- (present on admission)   Assessment & Plan    Encourage cessation     Leukopenia- (present on admission)   Assessment & Plan    Cont to monitor     Tachycardia- (present on admission)   Assessment & Plan    Treat with lopressor 50 bid     Ectodermal dysplasia, 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

## 2019-04-02 LAB
ALBUMIN SERPL BCP-MCNC: 3.9 G/DL (ref 3.2–4.9)
ALBUMIN/GLOB SERPL: 1.1 G/DL
ALP SERPL-CCNC: 102 U/L (ref 30–99)
ALT SERPL-CCNC: 92 U/L (ref 2–50)
ANION GAP SERPL CALC-SCNC: 8 MMOL/L (ref 0–11.9)
AST SERPL-CCNC: 81 U/L (ref 12–45)
BASOPHILS # BLD AUTO: 0.3 % (ref 0–1.8)
BASOPHILS # BLD: 0.02 K/UL (ref 0–0.12)
BILIRUB SERPL-MCNC: 0.4 MG/DL (ref 0.1–1.5)
BUN SERPL-MCNC: 13 MG/DL (ref 8–22)
CALCIUM SERPL-MCNC: 9.9 MG/DL (ref 8.5–10.5)
CHLORIDE SERPL-SCNC: 99 MMOL/L (ref 96–112)
CO2 SERPL-SCNC: 27 MMOL/L (ref 20–33)
CREAT SERPL-MCNC: 0.68 MG/DL (ref 0.5–1.4)
EOSINOPHIL # BLD AUTO: 0.06 K/UL (ref 0–0.51)
EOSINOPHIL NFR BLD: 0.8 % (ref 0–6.9)
ERYTHROCYTE [DISTWIDTH] IN BLOOD BY AUTOMATED COUNT: 45 FL (ref 35.9–50)
GLOBULIN SER CALC-MCNC: 3.5 G/DL (ref 1.9–3.5)
GLUCOSE SERPL-MCNC: 103 MG/DL (ref 65–99)
HCT VFR BLD AUTO: 46.4 % (ref 42–52)
HGB BLD-MCNC: 15 G/DL (ref 14–18)
IMM GRANULOCYTES # BLD AUTO: 0.02 K/UL (ref 0–0.11)
IMM GRANULOCYTES NFR BLD AUTO: 0.3 % (ref 0–0.9)
LYMPHOCYTES # BLD AUTO: 1.7 K/UL (ref 1–4.8)
LYMPHOCYTES NFR BLD: 23.1 % (ref 22–41)
MAGNESIUM SERPL-MCNC: 2 MG/DL (ref 1.5–2.5)
MCH RBC QN AUTO: 31.3 PG (ref 27–33)
MCHC RBC AUTO-ENTMCNC: 32.3 G/DL (ref 33.7–35.3)
MCV RBC AUTO: 96.9 FL (ref 81.4–97.8)
MONOCYTES # BLD AUTO: 1.19 K/UL (ref 0–0.85)
MONOCYTES NFR BLD AUTO: 16.2 % (ref 0–13.4)
NEUTROPHILS # BLD AUTO: 4.37 K/UL (ref 1.82–7.42)
NEUTROPHILS NFR BLD: 59.3 % (ref 44–72)
NRBC # BLD AUTO: 0 K/UL
NRBC BLD-RTO: 0 /100 WBC
PHOSPHATE SERPL-MCNC: 3.5 MG/DL (ref 2.5–4.5)
PLATELET # BLD AUTO: 106 K/UL (ref 164–446)
PMV BLD AUTO: 12.3 FL (ref 9–12.9)
POTASSIUM SERPL-SCNC: 4 MMOL/L (ref 3.6–5.5)
PROT SERPL-MCNC: 7.4 G/DL (ref 6–8.2)
RBC # BLD AUTO: 4.79 M/UL (ref 4.7–6.1)
SODIUM SERPL-SCNC: 134 MMOL/L (ref 135–145)
WBC # BLD AUTO: 7.4 K/UL (ref 4.8–10.8)

## 2019-04-02 PROCEDURE — 700102 HCHG RX REV CODE 250 W/ 637 OVERRIDE(OP): Performed by: HOSPITALIST

## 2019-04-02 PROCEDURE — 80053 COMPREHEN METABOLIC PANEL: CPT

## 2019-04-02 PROCEDURE — A9270 NON-COVERED ITEM OR SERVICE: HCPCS | Performed by: HOSPITALIST

## 2019-04-02 PROCEDURE — 85025 COMPLETE CBC W/AUTO DIFF WBC: CPT

## 2019-04-02 PROCEDURE — 36415 COLL VENOUS BLD VENIPUNCTURE: CPT

## 2019-04-02 PROCEDURE — 700111 HCHG RX REV CODE 636 W/ 250 OVERRIDE (IP): Performed by: HOSPITALIST

## 2019-04-02 PROCEDURE — 99232 SBSQ HOSP IP/OBS MODERATE 35: CPT | Performed by: HOSPITALIST

## 2019-04-02 PROCEDURE — 84100 ASSAY OF PHOSPHORUS: CPT

## 2019-04-02 PROCEDURE — 770006 HCHG ROOM/CARE - MED/SURG/GYN SEMI*

## 2019-04-02 PROCEDURE — 83735 ASSAY OF MAGNESIUM: CPT

## 2019-04-02 RX ORDER — CHLORDIAZEPOXIDE HYDROCHLORIDE 5 MG/1
5 CAPSULE, GELATIN COATED ORAL EVERY 8 HOURS
Status: DISCONTINUED | OUTPATIENT
Start: 2019-04-02 | End: 2019-04-03 | Stop reason: HOSPADM

## 2019-04-02 RX ADMIN — LORAZEPAM 2 MG: 2 TABLET ORAL at 05:58

## 2019-04-02 RX ADMIN — METOPROLOL TARTRATE 50 MG: 50 TABLET ORAL at 18:04

## 2019-04-02 RX ADMIN — LORAZEPAM 1 MG: 1 TABLET ORAL at 02:03

## 2019-04-02 RX ADMIN — HEPARIN SODIUM 5000 UNITS: 5000 INJECTION, SOLUTION INTRAVENOUS; SUBCUTANEOUS at 14:30

## 2019-04-02 RX ADMIN — ERYTHROMYCIN: 5 OINTMENT OPHTHALMIC at 23:39

## 2019-04-02 RX ADMIN — CHLORDIAZEPOXIDE HYDROCHLORIDE 5 MG: 5 CAPSULE ORAL at 18:04

## 2019-04-02 RX ADMIN — ERYTHROMYCIN: 5 OINTMENT OPHTHALMIC at 00:10

## 2019-04-02 RX ADMIN — LORAZEPAM 3 MG: 2 TABLET ORAL at 11:25

## 2019-04-02 RX ADMIN — METOPROLOL TARTRATE 50 MG: 50 TABLET ORAL at 05:50

## 2019-04-02 RX ADMIN — LORAZEPAM 1 MG: 1 TABLET ORAL at 14:30

## 2019-04-02 RX ADMIN — ERYTHROMYCIN: 5 OINTMENT OPHTHALMIC at 18:04

## 2019-04-02 RX ADMIN — ERYTHROMYCIN: 5 OINTMENT OPHTHALMIC at 05:50

## 2019-04-02 RX ADMIN — LORAZEPAM 1 MG: 1 TABLET ORAL at 22:13

## 2019-04-02 RX ADMIN — LORAZEPAM 2 MG: 2 TABLET ORAL at 00:09

## 2019-04-02 RX ADMIN — LORAZEPAM 2 MG: 2 TABLET ORAL at 08:25

## 2019-04-02 RX ADMIN — LORATADINE 10 MG: 10 TABLET ORAL at 05:50

## 2019-04-02 RX ADMIN — CHLORDIAZEPOXIDE HYDROCHLORIDE 5 MG: 5 CAPSULE ORAL at 21:03

## 2019-04-02 RX ADMIN — ERYTHROMYCIN: 5 OINTMENT OPHTHALMIC at 13:48

## 2019-04-02 ASSESSMENT — LIFESTYLE VARIABLES
HEADACHE, FULLNESS IN HEAD: VERY MILD
AUDITORY DISTURBANCES: NOT PRESENT
AUDITORY DISTURBANCES: NOT PRESENT
ORIENTATION AND CLOUDING OF SENSORIUM: ORIENTED AND CAN DO SERIAL ADDITIONS
HEADACHE, FULLNESS IN HEAD: VERY MILD
SUBSTANCE_ABUSE: 1
AUDITORY DISTURBANCES: NOT PRESENT
TOTAL SCORE: VERY MILD ITCHING, PINS AND NEEDLES SENSATION, BURNING OR NUMBNESS
NAUSEA AND VOMITING: MILD NAUSEA WITH NO VOMITING
ORIENTATION AND CLOUDING OF SENSORIUM: ORIENTED AND CAN DO SERIAL ADDITIONS
HEADACHE, FULLNESS IN HEAD: NOT PRESENT
TOTAL SCORE: VERY MILD ITCHING, PINS AND NEEDLES SENSATION, BURNING OR NUMBNESS
AGITATION: NORMAL ACTIVITY
HEADACHE, FULLNESS IN HEAD: MILD
TOTAL SCORE: VERY MILD ITCHING, PINS AND NEEDLES SENSATION, BURNING OR NUMBNESS
TOTAL SCORE: MILD ITCHING, PINS AND NEEDLES SENSATION, BURNING OR NUMBNESS
VISUAL DISTURBANCES: MILD SENSITIVITY
ANXIETY: MODERATELY ANXIOUS OR GUARDED, SO ANXIETY IS INFERRED
VISUAL DISTURBANCES: MILD SENSITIVITY
AGITATION: *
PAROXYSMAL SWEATS: NO SWEAT VISIBLE
TREMOR: *
TOTAL SCORE: VERY MILD ITCHING, PINS AND NEEDLES SENSATION, BURNING OR NUMBNESS
TOTAL SCORE: VERY MILD ITCHING, PINS AND NEEDLES SENSATION, BURNING OR NUMBNESS
AUDITORY DISTURBANCES: NOT PRESENT
TREMOR: *
AUDITORY DISTURBANCES: NOT PRESENT
TOTAL SCORE: 19
ORIENTATION AND CLOUDING OF SENSORIUM: ORIENTED AND CAN DO SERIAL ADDITIONS
AGITATION: NORMAL ACTIVITY
TOTAL SCORE: VERY MILD ITCHING, PINS AND NEEDLES SENSATION, BURNING OR NUMBNESS
TREMOR: *
NAUSEA AND VOMITING: *
ORIENTATION AND CLOUDING OF SENSORIUM: ORIENTED AND CAN DO SERIAL ADDITIONS
AUDITORY DISTURBANCES: NOT PRESENT
TOTAL SCORE: 14
TOTAL SCORE: MILD ITCHING, PINS AND NEEDLES SENSATION, BURNING OR NUMBNESS
NAUSEA AND VOMITING: MILD NAUSEA WITH NO VOMITING
AGITATION: SOMEWHAT MORE THAN NORMAL ACTIVITY
TOTAL SCORE: 13
VISUAL DISTURBANCES: VERY MILD SENSITIVITY
PAROXYSMAL SWEATS: NO SWEAT VISIBLE
PAROXYSMAL SWEATS: NO SWEAT VISIBLE
NAUSEA AND VOMITING: MILD NAUSEA WITH NO VOMITING
ORIENTATION AND CLOUDING OF SENSORIUM: ORIENTED AND CAN DO SERIAL ADDITIONS
ANXIETY: MILDLY ANXIOUS
PAROXYSMAL SWEATS: NO SWEAT VISIBLE
ANXIETY: *
TREMOR: *
TOTAL SCORE: 9
VISUAL DISTURBANCES: MILD SENSITIVITY
NAUSEA AND VOMITING: MILD NAUSEA WITH NO VOMITING
AGITATION: NORMAL ACTIVITY
HEADACHE, FULLNESS IN HEAD: VERY MILD
ANXIETY: *
VISUAL DISTURBANCES: VERY MILD SENSITIVITY
ANXIETY: MODERATELY ANXIOUS OR GUARDED, SO ANXIETY IS INFERRED
TOTAL SCORE: MILD ITCHING, PINS AND NEEDLES SENSATION, BURNING OR NUMBNESS
NAUSEA AND VOMITING: *
HEADACHE, FULLNESS IN HEAD: VERY MILD
AUDITORY DISTURBANCES: NOT PRESENT
ORIENTATION AND CLOUDING OF SENSORIUM: ORIENTED AND CAN DO SERIAL ADDITIONS
ANXIETY: MILDLY ANXIOUS
TOTAL SCORE: 8
AUDITORY DISTURBANCES: NOT PRESENT
TOTAL SCORE: 9
TREMOR: *
AGITATION: *
AUDITORY DISTURBANCES: NOT PRESENT
ANXIETY: *
PAROXYSMAL SWEATS: NO SWEAT VISIBLE
NAUSEA AND VOMITING: *
AGITATION: NORMAL ACTIVITY
ANXIETY: *
PAROXYSMAL SWEATS: NO SWEAT VISIBLE
VISUAL DISTURBANCES: MILD SENSITIVITY
VISUAL DISTURBANCES: MILD SENSITIVITY
ORIENTATION AND CLOUDING OF SENSORIUM: ORIENTED AND CAN DO SERIAL ADDITIONS
PAROXYSMAL SWEATS: NO SWEAT VISIBLE
ORIENTATION AND CLOUDING OF SENSORIUM: ORIENTED AND CAN DO SERIAL ADDITIONS
TREMOR: TREMOR NOT VISIBLE BUT CAN BE FELT, FINGERTIP TO FINGERTIP
TREMOR: TREMOR NOT VISIBLE BUT CAN BE FELT, FINGERTIP TO FINGERTIP
ORIENTATION AND CLOUDING OF SENSORIUM: ORIENTED AND CAN DO SERIAL ADDITIONS
NAUSEA AND VOMITING: MILD NAUSEA WITH NO VOMITING
TOTAL SCORE: 8
VISUAL DISTURBANCES: MILD SENSITIVITY
HEADACHE, FULLNESS IN HEAD: VERY MILD
TOTAL SCORE: 14
TREMOR: TREMOR NOT VISIBLE BUT CAN BE FELT, FINGERTIP TO FINGERTIP
VISUAL DISTURBANCES: NOT PRESENT
PAROXYSMAL SWEATS: NO SWEAT VISIBLE
TREMOR: TREMOR NOT VISIBLE BUT CAN BE FELT, FINGERTIP TO FINGERTIP
NAUSEA AND VOMITING: MILD NAUSEA WITH NO VOMITING
AGITATION: *
AGITATION: SOMEWHAT MORE THAN NORMAL ACTIVITY
HEADACHE, FULLNESS IN HEAD: VERY MILD
PAROXYSMAL SWEATS: NO SWEAT VISIBLE
TOTAL SCORE: 5
ANXIETY: *
HEADACHE, FULLNESS IN HEAD: MODERATE

## 2019-04-02 ASSESSMENT — ENCOUNTER SYMPTOMS
SPUTUM PRODUCTION: 0
PALPITATIONS: 1
TREMORS: 1
TINGLING: 0
WEIGHT LOSS: 0
NAUSEA: 0
EYE DISCHARGE: 1
INSOMNIA: 1
HEARTBURN: 0
NERVOUS/ANXIOUS: 1
NECK PAIN: 0
VOMITING: 0
CHILLS: 0
DIZZINESS: 0
FEVER: 0
COUGH: 1
MYALGIAS: 0

## 2019-04-02 NOTE — PROGRESS NOTES
Assumed care of patient, received bedside report from day shift RN, Pt A&Ox4, on 2L via NC no SOB noted, pt has no complains of pain at this time. Call light within reach, personal belongings within reach.  Bed is locked and in lowest position, Bed Strip alarm is on. Tele monitor on. Hourly rounding in place.

## 2019-04-02 NOTE — CARE PLAN
Problem: Communication  Goal: The ability to communicate needs accurately and effectively will improve  Outcome: PROGRESSING AS EXPECTED  Pt is able to communicate needs effectively and appropriately at this time, pt encouraged to ask RN questions.     Problem: Safety  Goal: Will remain free from falls  Outcome: PROGRESSING AS EXPECTED  Fall risk assessed, fall precautions in place, bed alarm is on, pt verbalizes understanding of fall risk.

## 2019-04-02 NOTE — PROGRESS NOTES
Received report from night shift RN. Discussed plan of care, updated white board. Pt is resting in bed, alert and oriented. Pt denies any pain or distress at this time. Fall precautions in place. All needs met. Bed in lowest position. Call light within reach. Will continue to monitor.

## 2019-04-03 ENCOUNTER — PATIENT OUTREACH (OUTPATIENT)
Dept: HEALTH INFORMATION MANAGEMENT | Facility: OTHER | Age: 34
End: 2019-04-03

## 2019-04-03 VITALS
RESPIRATION RATE: 18 BRPM | OXYGEN SATURATION: 95 % | HEART RATE: 70 BPM | HEIGHT: 69 IN | WEIGHT: 143.3 LBS | SYSTOLIC BLOOD PRESSURE: 122 MMHG | BODY MASS INDEX: 21.22 KG/M2 | TEMPERATURE: 97.5 F | DIASTOLIC BLOOD PRESSURE: 88 MMHG

## 2019-04-03 LAB
ALBUMIN SERPL BCP-MCNC: 3.8 G/DL (ref 3.2–4.9)
ALBUMIN/GLOB SERPL: 1.1 G/DL
ALP SERPL-CCNC: 98 U/L (ref 30–99)
ALT SERPL-CCNC: 87 U/L (ref 2–50)
ANION GAP SERPL CALC-SCNC: 6 MMOL/L (ref 0–11.9)
AST SERPL-CCNC: 68 U/L (ref 12–45)
BASOPHILS # BLD AUTO: 0.3 % (ref 0–1.8)
BASOPHILS # BLD: 0.02 K/UL (ref 0–0.12)
BILIRUB SERPL-MCNC: 0.3 MG/DL (ref 0.1–1.5)
BUN SERPL-MCNC: 12 MG/DL (ref 8–22)
CALCIUM SERPL-MCNC: 9.7 MG/DL (ref 8.5–10.5)
CHLORIDE SERPL-SCNC: 102 MMOL/L (ref 96–112)
CO2 SERPL-SCNC: 30 MMOL/L (ref 20–33)
CREAT SERPL-MCNC: 0.77 MG/DL (ref 0.5–1.4)
EOSINOPHIL # BLD AUTO: 0.05 K/UL (ref 0–0.51)
EOSINOPHIL NFR BLD: 0.7 % (ref 0–6.9)
ERYTHROCYTE [DISTWIDTH] IN BLOOD BY AUTOMATED COUNT: 45.5 FL (ref 35.9–50)
GLOBULIN SER CALC-MCNC: 3.4 G/DL (ref 1.9–3.5)
GLUCOSE SERPL-MCNC: 80 MG/DL (ref 65–99)
HCT VFR BLD AUTO: 45.4 % (ref 42–52)
HGB BLD-MCNC: 14.9 G/DL (ref 14–18)
IMM GRANULOCYTES # BLD AUTO: 0.02 K/UL (ref 0–0.11)
IMM GRANULOCYTES NFR BLD AUTO: 0.3 % (ref 0–0.9)
LYMPHOCYTES # BLD AUTO: 1.91 K/UL (ref 1–4.8)
LYMPHOCYTES NFR BLD: 26.3 % (ref 22–41)
MCH RBC QN AUTO: 32 PG (ref 27–33)
MCHC RBC AUTO-ENTMCNC: 32.8 G/DL (ref 33.7–35.3)
MCV RBC AUTO: 97.4 FL (ref 81.4–97.8)
MONOCYTES # BLD AUTO: 1.34 K/UL (ref 0–0.85)
MONOCYTES NFR BLD AUTO: 18.4 % (ref 0–13.4)
NEUTROPHILS # BLD AUTO: 3.93 K/UL (ref 1.82–7.42)
NEUTROPHILS NFR BLD: 54 % (ref 44–72)
NRBC # BLD AUTO: 0 K/UL
NRBC BLD-RTO: 0 /100 WBC
PLATELET # BLD AUTO: 109 K/UL (ref 164–446)
PMV BLD AUTO: 12 FL (ref 9–12.9)
POTASSIUM SERPL-SCNC: 4.1 MMOL/L (ref 3.6–5.5)
PROT SERPL-MCNC: 7.2 G/DL (ref 6–8.2)
RBC # BLD AUTO: 4.66 M/UL (ref 4.7–6.1)
SODIUM SERPL-SCNC: 138 MMOL/L (ref 135–145)
WBC # BLD AUTO: 7.3 K/UL (ref 4.8–10.8)

## 2019-04-03 PROCEDURE — A9270 NON-COVERED ITEM OR SERVICE: HCPCS | Performed by: HOSPITALIST

## 2019-04-03 PROCEDURE — 80053 COMPREHEN METABOLIC PANEL: CPT

## 2019-04-03 PROCEDURE — 99239 HOSP IP/OBS DSCHRG MGMT >30: CPT | Performed by: HOSPITALIST

## 2019-04-03 PROCEDURE — 85025 COMPLETE CBC W/AUTO DIFF WBC: CPT

## 2019-04-03 PROCEDURE — 36415 COLL VENOUS BLD VENIPUNCTURE: CPT

## 2019-04-03 PROCEDURE — 700102 HCHG RX REV CODE 250 W/ 637 OVERRIDE(OP): Performed by: HOSPITALIST

## 2019-04-03 PROCEDURE — 97535 SELF CARE MNGMENT TRAINING: CPT

## 2019-04-03 RX ORDER — METOPROLOL TARTRATE 50 MG/1
50 TABLET, FILM COATED ORAL 2 TIMES DAILY
Qty: 60 TAB | Refills: 3 | Status: SHIPPED | OUTPATIENT
Start: 2019-04-03 | End: 2022-11-29

## 2019-04-03 RX ORDER — CHLORDIAZEPOXIDE HYDROCHLORIDE 5 MG/1
5 CAPSULE, GELATIN COATED ORAL EVERY 8 HOURS
Qty: 12 CAP | Refills: 0 | Status: SHIPPED | OUTPATIENT
Start: 2019-04-03 | End: 2019-04-07

## 2019-04-03 RX ADMIN — LORAZEPAM 0.5 MG: 0.5 TABLET ORAL at 09:51

## 2019-04-03 RX ADMIN — ERYTHROMYCIN: 5 OINTMENT OPHTHALMIC at 05:35

## 2019-04-03 RX ADMIN — GUAIFENESIN AND DEXTROMETHORPHAN 5 ML: 100; 10 SYRUP ORAL at 09:51

## 2019-04-03 RX ADMIN — CHLORDIAZEPOXIDE HYDROCHLORIDE 5 MG: 5 CAPSULE ORAL at 05:34

## 2019-04-03 RX ADMIN — LORAZEPAM 1 MG: 1 TABLET ORAL at 01:43

## 2019-04-03 RX ADMIN — LORATADINE 10 MG: 10 TABLET ORAL at 05:34

## 2019-04-03 RX ADMIN — ERYTHROMYCIN: 5 OINTMENT OPHTHALMIC at 11:02

## 2019-04-03 RX ADMIN — METOPROLOL TARTRATE 50 MG: 50 TABLET ORAL at 05:34

## 2019-04-03 ASSESSMENT — LIFESTYLE VARIABLES
PAROXYSMAL SWEATS: NO SWEAT VISIBLE
ORIENTATION AND CLOUDING OF SENSORIUM: ORIENTED AND CAN DO SERIAL ADDITIONS
TREMOR: NO TREMOR
ORIENTATION AND CLOUDING OF SENSORIUM: ORIENTED AND CAN DO SERIAL ADDITIONS
TOTAL SCORE: VERY MILD ITCHING, PINS AND NEEDLES SENSATION, BURNING OR NUMBNESS
TREMOR: NO TREMOR
AGITATION: *
ANXIETY: MILDLY ANXIOUS
VISUAL DISTURBANCES: NOT PRESENT
HEADACHE, FULLNESS IN HEAD: NOT PRESENT
NAUSEA AND VOMITING: NO NAUSEA AND NO VOMITING
TOTAL SCORE: 3
TOTAL SCORE: 7
ANXIETY: MILDLY ANXIOUS
AUDITORY DISTURBANCES: NOT PRESENT
NAUSEA AND VOMITING: NO NAUSEA AND NO VOMITING
AUDITORY DISTURBANCES: NOT PRESENT
TOTAL SCORE: VERY MILD ITCHING, PINS AND NEEDLES SENSATION, BURNING OR NUMBNESS
ANXIETY: MODERATELY ANXIOUS OR GUARDED, SO ANXIETY IS INFERRED
HEADACHE, FULLNESS IN HEAD: VERY MILD
VISUAL DISTURBANCES: NOT PRESENT
TOTAL SCORE: 5
VISUAL DISTURBANCES: VERY MILD SENSITIVITY
PAROXYSMAL SWEATS: NO SWEAT VISIBLE
AGITATION: SOMEWHAT MORE THAN NORMAL ACTIVITY
HEADACHE, FULLNESS IN HEAD: VERY MILD
ORIENTATION AND CLOUDING OF SENSORIUM: ORIENTED AND CAN DO SERIAL ADDITIONS
AGITATION: NORMAL ACTIVITY
AUDITORY DISTURBANCES: NOT PRESENT
NAUSEA AND VOMITING: NO NAUSEA AND NO VOMITING
TREMOR: TREMOR NOT VISIBLE BUT CAN BE FELT, FINGERTIP TO FINGERTIP
PAROXYSMAL SWEATS: NO SWEAT VISIBLE

## 2019-04-03 NOTE — THERAPY
Physical Therapy Contact Note    PT consult received and acknowledged. Patient demonstrated steady gait without AD with supervision. Reported no concerns regarding mobility or return to prior living situation. Patient expressed wish to be able to move more freely in room and unit. Discussed with RN and asked patient to call RN if leaving room. No acute PT needs.    Luisa Corrales, PT, DPT  [phone]

## 2019-04-03 NOTE — PROGRESS NOTES
During bedside report pt began to refuse bed alarm, pt stated he was going to leave AMA if the bed alarm wasn't turned off. Educated pt, he continued to refuse.

## 2019-04-03 NOTE — PROGRESS NOTES
Pt given 0.5mg ativan for CIWA of 5. Pt still refusing bed alarm, pt educated to call for assistance before attempting to get out of bed, verbalized understanding.

## 2019-04-03 NOTE — DISCHARGE INSTRUCTIONS
Discharge Instructions    Discharged to home by car with self. Discharged via walking, hospital escort: Refused.  Special equipment needed: Not Applicable    Be sure to schedule a follow-up appointment with your primary care doctor or any specialists as instructed.     Discharge Plan:   Diet Plan: Discussed  Activity Level: Discussed  Smoking Cessation Offered: Patient Refused  Confirmed Follow up Appointment: Appointment Scheduled  Confirmed Symptoms Management: Discussed  Medication Reconciliation Updated: Yes  Pneumococcal Vaccine Administered/Refused: Not given - Patient refused pneumococcal vaccine  Influenza Vaccine Indication: Patient Refuses    I understand that a diet low in cholesterol, fat, and sodium is recommended for good health. Unless I have been given specific instructions below for another diet, I accept this instruction as my diet prescription.       Special Instructions: None    · Is patient discharged on Warfarin / Coumadin?   No           Alcohol Intoxication  Alcohol intoxication happens when a person cannot think clearly or function well (becomes impaired) after drinking alcohol. This condition can happen even after one drink. It can be dangerous, especially if:  · The person drank a lot of alcohol in a short amount of time (binge drinking).  · The person also took certain medicines or drugs.  If the intoxication is very bad, a breathing machine (ventilator) may be needed until the alcohol wears off.  Follow these instructions at home:  General instructions  · Do not drive after drinking alcohol.  · Have someone stay with you. You should not be left alone while you have this condition.  · Make sure you have enough fluid in your body. To do this:  ¨ Drink enough fluid to keep your pee (urine) clear or pale yellow.  ¨ Avoid caffeine. It can make you thirsty.  · Take over-the-counter and prescription medicines only as told by your doctor.  To prevent this condition:  · Limit alcohol intake to no  more than 1 drink a day for nonpregnant women and 2 drinks a day for men. One drink equals 12 oz of beer, 5 oz of wine, or 1½ oz of hard liquor.  · Do not drink alcohol on an empty stomach.  · Avoid drinking alcohol if:  ¨ You are under the legal drinking age.  ¨ You are pregnant or may be pregnant.  ¨ You are taking medicines that you should not take with alcohol.  ¨ Your drinking causes your medical condition to get worse.  ¨ You need to drive or do activities that require your attention.  ¨ You have substance use disorder.  If this condition happens again:  · Get help right away if you or someone you know:  ¨ Has medium or very bad trouble with:  § Movement (coordination).  § Speech.  § Memory.  § Attention.  ¨ Passes out (faints).  ¨ Is confused.  ¨ Is throwing up (vomiting).  · Do not leave someone with this condition alone.  Contact a doctor if:  · You do not feel better after a few days.  · You are having problems at work, at school, or at home because of drinking.  Get help right away if:  · You get shaky when you try to stop drinking.  · You start shaking and you cannot control it (have a seizure).  · You throw up blood. The blood may be bright red or look like coffee grounds.  · You see blood in your poop (stool). The blood may:  ¨ Be bright red.  ¨ Make your poop black and tarry and make it smell bad.  · You start to feel light-headed.  · You pass out.  If you ever feel like you may hurt yourself or others, or have thoughts about taking your own life, get help right away. You can go to your nearest emergency department or call:  · Your local emergency services (911 in the [place]  · A suicide crisis helpline, such as the National Suicide Prevention Lifeline at 1-458.544.2842. This is open 24 hours a day.  This information is not intended to replace advice given to you by your health care provider. Make sure you discuss any questions you have with your health care provider.  Document Released: 06/05/2009  Document Revised: 09/07/2017 Document Reviewed: 09/07/2017  [employer] Interactive Patient Education © 2017 [employer] Inc.              Alcohol Withdrawal  Introduction  When a person who drinks a lot of alcohol stops drinking, he or she may go through alcohol withdrawal. Alcohol withdrawal causes problems. It can make you feel:  · Tired (fatigued).  · Sad (depressed).  · Fearful (anxious).  · Grouchy (irritable).  · Not hungry.  · Sick to your stomach (nauseous).  · Shaky.  It can also make you have:  · Nightmares.  · Trouble sleeping.  · Trouble thinking clearly.  · Mood swings.  · Clammy skin.  · Very bad sweating.  · A very fast heartbeat.  · Shaking that you cannot control (tremor).  · Having a fever.  · A fit of movements that you cannot control (seizure).  · Confusion.  · Throwing up (vomiting).  · Feeling or seeing things that are not there (hallucinations).  Follow these instructions at home:  · Take medicines and vitamins only as told by your doctor.  · Do not drink alcohol.  · Have someone around in case you need help.  · Drink enough fluid to keep your pee (urine) clear or pale yellow.  · Think about joining a group to help you stop drinking.  Contact a doctor if:  · Your problems get worse.  · Your problems do not go away.  · You cannot eat or drink without throwing up.  · You are having a hard time not drinking alcohol.  · You cannot stop drinking alcohol.  Get help right away if:  · You feel your heart beating differently than usual.  · Your chest hurts.  · You have trouble breathing.  · You have very bad problems, like:  ¨ A fever.  ¨ A fit of movements that you cannot control.  ¨ Being very confused.  ¨ Feeling or seeing things that are not there.  This information is not intended to replace advice given to you by your health care provider. Make sure you discuss any questions you have with your health care provider.  Document Released: 06/05/2009 Document Revised: 05/25/2017 Document Reviewed:  10/06/2015  © 2017 Elsevier        Metoprolol tablets  What is this medicine?  METOPROLOL (me TOE proe logee) is a beta-blocker. Beta-blockers reduce the workload on the heart and help it to beat more regularly. This medicine is used to treat high blood pressure and to prevent chest pain. It is also used to after a heart attack and to prevent an additional heart attack from occurring.  This medicine may be used for other purposes; ask your health care provider or pharmacist if you have questions.  COMMON BRAND NAME(S): Lopressor  What should I tell my health care provider before I take this medicine?  They need to know if you have any of these conditions:  -diabetes  -heart or vessel disease like slow heart rate, worsening heart failure, heart block, sick sinus syndrome or Raynaud's disease  -kidney disease  -liver disease  -lung or breathing disease, like asthma or emphysema  -pheochromocytoma  -thyroid disease  -an unusual or allergic reaction to metoprolol, other beta-blockers, medicines, foods, dyes, or preservatives  -pregnant or trying to get pregnant  -breast-feeding  How should I use this medicine?  Take this medicine by mouth with a drink of water. Follow the directions on the prescription label. Take this medicine immediately after meals. Take your doses at regular intervals. Do not take more medicine than directed. Do not stop taking this medicine suddenly. This could lead to serious heart-related effects.  Talk to your pediatrician regarding the use of this medicine in children. Special care may be needed.  Overdosage: If you think you have taken too much of this medicine contact a poison control center or emergency room at once.  NOTE: This medicine is only for you. Do not share this medicine with others.  What if I miss a dose?  If you miss a dose, take it as soon as you can. If it is almost time for your next dose, take only that dose. Do not take double or extra doses.  What may interact with this  medicine?  This medicine may interact with the following medications:  -certain medicines for blood pressure, heart disease, irregular heart beat  -certain medicines for depression like monoamine oxidase (MAO) inhibitors, fluoxetine, or paroxetine  -clonidine  -dobutamine  -epinephrine  -isoproterenol  -reserpine  This list may not describe all possible interactions. Give your health care provider a list of all the medicines, herbs, non-prescription drugs, or dietary supplements you use. Also tell them if you smoke, drink alcohol, or use illegal drugs. Some items may interact with your medicine.  What should I watch for while using this medicine?  Visit your doctor or health care professional for regular check ups. Contact your doctor right away if your symptoms worsen. Check your blood pressure and pulse rate regularly. Ask your health care professional what your blood pressure and pulse rate should be, and when you should contact them.  You may get drowsy or dizzy. Do not drive, use machinery, or do anything that needs mental alertness until you know how this medicine affects you. Do not sit or stand up quickly, especially if you are an older patient. This reduces the risk of dizzy or fainting spells. Contact your doctor if these symptoms continue. Alcohol may interfere with the effect of this medicine. Avoid alcoholic drinks.  What side effects may I notice from receiving this medicine?  Side effects that you should report to your doctor or health care professional as soon as possible:  -allergic reactions like skin rash, itching or hives  -cold or numb hands or feet  -depression  -difficulty breathing  -faint  -fever with sore throat  -irregular heartbeat, chest pain  -rapid weight gain  -swollen legs or ankles  Side effects that usually do not require medical attention (report to your doctor or health care professional if they continue or are bothersome):  -anxiety or nervousness  -change in sex drive or  performance  -dry skin  -headache  -nightmares or trouble sleeping  -short term memory loss  -stomach upset or diarrhea  -unusually tired  This list may not describe all possible side effects. Call your doctor for medical advice about side effects. You may report side effects to FDA at 3-087-FDA-2332.  Where should I keep my medicine?  Keep out of the reach of children.  Store at room temperature between 15 and 30 degrees C (59 and 86 degrees F). Throw away any unused medicine after the expiration date.  NOTE: This sheet is a summary. It may not cover all possible information. If you have questions about this medicine, talk to your doctor, pharmacist, or health care provider.  © 2018 [employer]/Gold Standard (2014-08-22 14:40:36)        Chlordiazepoxide capsules  What is this medicine?  CHLORDIAZEPOXIDE (klor dye az e POX enmanuel) is a benzodiazepine. It is used to treat anxiety and alcohol withdrawal.  This medicine may be used for other purposes; ask your health care provider or pharmacist if you have questions.  COMMON BRAND NAME(S): Librium  What should I tell my health care provider before I take this medicine?  They need to know if you have any of these conditions:  -an alcohol or drug abuse problem  -kidney or liver disease  -suicidal thoughts  -an unusual or allergic reaction to chlordiazepoxide, other benzodiazepines, foods, dyes, or preservatives  -pregnant or trying to get pregnant  -breast-feeding  How should I use this medicine?  Take this medicine by mouth with a glass of water. Follow the directions on the prescription label. Take your medicine at regular intervals. Do not take it more often than directed. Do not stop taking except on your doctor's advice.  A special MedGuide will be given to you by the pharmacist with each prescription and refill. Be sure to read this information carefully each time.  Talk to your pediatrician regarding the use of this medicine in children. While this drug may be  prescribed for children as young as 6 years for selected conditions, precautions do apply.  Overdosage: If you think you have taken too much of this medicine contact a poison control center or emergency room at once.  NOTE: This medicine is only for you. Do not share this medicine with others.  What if I miss a dose?  If you miss a dose, take it as soon as you can. If it is almost time for your next dose, take only that dose. Do not take double or extra doses.  What may interact with this medicine?  Do not take this medication with any of the following medicines:  -narcotic medicines for cough  -sodium oxybate  This medicine may also interact with the following medications:  -alcohol  -antihistamines for allergy, cough and cold  -antiviral medicines for HIV or AIDS  -certain medicines for anxiety or sleep  -certain medicines for depression, like amitriptyline, fluoxetine, sertraline  -certain medicines for seizures like carbamazepine, phenobarbital, phenytoin, primidone  -cimetidine  -general anesthetics like halothane, isoflurane, methoxyflurane, propofol  -local anesthetics like lidocaine, pramoxine, tetracaine  -medicines that relax muscles for surgery  -narcotic medicines for pain  -phenothiazines like chlorpromazine, mesoridazine, prochlorperazine, thioridazine  -warfarin  This list may not describe all possible interactions. Give your health care provider a list of all the medicines, herbs, non-prescription drugs, or dietary supplements you use. Also tell them if you smoke, drink alcohol, or use illegal drugs. Some items may interact with your medicine.  What should I watch for while using this medicine?  Tell your doctor or health care professional if your symptoms do not start to get better or if they get worse.  Do not stop taking except on your doctor's advice. You may develop a severe reaction. Your doctor will tell you how much medicine to take.  You may get drowsy or dizzy. Do not drive, use machinery,  or do anything that needs mental alertness until you know how this medicine affects you. To reduce the risk of dizzy and fainting spells, do not stand or sit up quickly, especially if you are an older patient. Alcohol may increase dizziness and drowsiness. Avoid alcoholic drinks.  If you are taking another medicine that also causes drowsiness, you may have more side effects. Give your health care provider a list of all medicines you use. Your doctor will tell you how much medicine to take. Do not take more medicine than directed. Call emergency for help if you have problems breathing or unusual sleepiness.  What side effects may I notice from receiving this medicine?  Side effects that you should report to your doctor or health care professional as soon as possible:  -allergic reactions like skin rash, itching or hives, swelling of the face, lips, or tongue  -breathing problems  -confusion  -loss of balance or coordination  -signs and symptoms of low blood pressure like dizziness; feeling faint or lightheaded, falls; unusually weak or tired  -suicidal thoughts or other mood changes  Side effects that usually do not require medical attention (report to your doctor or health care professional if they continue or are bothersome):  -constipation  -irritable  -nausea  -tiredness  This list may not describe all possible side effects. Call your doctor for medical advice about side effects. You may report side effects to FDA at 3-891-FDA-4623.  Where should I keep my medicine?  Keep out of the reach of children. This medicine can be abused. Keep your medicine in a safe place to protect it from theft. Do not share this medicine with anyone. Selling or giving away this medicine is dangerous and against the law.  This medicine may cause accidental overdose and death if taken by other adults, children, or pets. Mix any unused medicine with a substance like cat litter or coffee grounds. Then throw the medicine away in a sealed  container like a sealed bag or a coffee can with a lid. Do not use the medicine after the expiration date.  Store at room temperature between 15 and 30 degrees C (59 and 86 degrees F).  NOTE: This sheet is a summary. It may not cover all possible information. If you have questions about this medicine, talk to your doctor, pharmacist, or health care provider.  © 2018 Elsevier/Gold Standard (2016-09-15 15:33:22)        Depression / Suicide Risk    As you are discharged from this [facility] facility, it is important to learn how to keep safe from harming yourself.    Recognize the warning signs:  · Abrupt changes in personality, positive or negative- including increase in energy   · Giving away possessions  · Change in eating patterns- significant weight changes-  positive or negative  · Change in sleeping patterns- unable to sleep or sleeping all the time   · Unwillingness or inability to communicate  · Depression  · Unusual sadness, discouragement and loneliness  · Talk of wanting to die  · Neglect of personal appearance   · Rebelliousness- reckless behavior  · Withdrawal from people/activities they love  · Confusion- inability to concentrate     If you or a loved one observes any of these behaviors or has concerns about self-harm, here's what you can do:  · Talk about it- your feelings and reasons for harming yourself  · Remove any means that you might use to hurt yourself (examples: pills, rope, extension cords, firearm)  · Get professional help from the community (Mental Health, Substance Abuse, psychological counseling)  · Do not be alone:Call your Safe Contact- someone whom you trust who will be there for you.  · Call your local CRISIS HOTLINE 159-4015 or 555-764-5687  · Call your local Children's Mobile Crisis Response Team [place] (169) 368-2713 or www.[employer]  · Call the toll free National Suicide Prevention Hotlines   · National Suicide Prevention Lifeline 438-513-HFCZ (7110)  · National  [facility] 800-SUICIDE (933-6998)

## 2019-04-03 NOTE — PROGRESS NOTES
[facility] Medicine Daily Progress Note    Date of Service  4/2/2019    Chief Complaint  33 y.o. male admitted 3/28/2019 with tremors , anxiety. Admitted for ETOH withdraw         Interval Problem Update  4/2: Patient seen and examined by me today.  Still complains about agitation, tremors, headaches.  Still requiring Ativan and I have started Librium to avoid Ativan.  Okay PT OT evaluation to see if patient will require SNF placement once he has unstable gait. Transfer to medical floor.    Consultants/Specialty  none    Code Status  full    Disposition  PT/OT eval    Review of Systems  Review of Systems   Constitutional: Positive for malaise/fatigue. Negative for chills, fever and weight loss.   HENT: Negative for hearing loss and tinnitus.    Eyes: Positive for discharge.   Respiratory: Positive for cough. Negative for sputum production.    Cardiovascular: Positive for palpitations.   Gastrointestinal: Negative for heartburn, nausea and vomiting.   Genitourinary: Negative for dysuria and urgency.   Musculoskeletal: Negative for myalgias and neck pain.   Neurological: Positive for tremors (is less). Negative for dizziness and tingling.   Psychiatric/Behavioral: Positive for substance abuse. The patient is nervous/anxious and has insomnia.         Physical Exam  Temp:  [35.8 °C (96.5 °F)-37.3 °C (99.1 °F)] 37 °C (98.6 °F)  Pulse:  [72-98] 98  Resp:  [17-18] 18  BP: (117-126)/(83-93) 117/83  SpO2:  [97 %-99 %] 99 %    Physical Exam   Constitutional: He is oriented to person, place, and time. He appears distressed.   HENT:   Head: Normocephalic and atraumatic.   Mouth/Throat: No oropharyngeal exudate.   Eyes: Pupils are equal, round, and reactive to light. Right eye exhibits discharge. Left eye exhibits discharge.   Neck: No JVD present. No tracheal deviation present. No thyromegaly present.   Cardiovascular: Normal rate and regular rhythm.    Pulmonary/Chest: Breath sounds normal. No respiratory distress. He has no  wheezes. He has no rales.   Abdominal: Soft. Bowel sounds are normal. He exhibits no distension. There is no rebound and no guarding.   Musculoskeletal: He exhibits deformity (both hands and feet).   Neurological: He is alert and oriented to person, place, and time.   tremors   Skin: No rash noted. No erythema. No pallor.   Psychiatric: He has a normal mood and affect. His behavior is normal.       Fluids    Intake/Output Summary (Last 24 hours) at 04/02/19 2057  Last data filed at 04/02/19 1330   Gross per 24 hour   Intake              480 ml   Output             1200 ml   Net             -720 ml       Laboratory  Recent Labs      04/02/19   1718   WBC  7.4   RBC  4.79   HEMOGLOBIN  15.0   HEMATOCRIT  46.4   MCV  96.9   MCH  31.3   MCHC  32.3*   RDW  45.0   PLATELETCT  106*   MPV  12.3     Recent Labs      04/02/19   1718   SODIUM  134*   POTASSIUM  4.0   CHLORIDE  99   CO2  27   GLUCOSE  103*   BUN  13   CREATININE  0.68   CALCIUM  9.9                   Imaging  DX-CHEST-2 VIEWS   Final Result      Negative two views of the chest.           Assessment/Plan  * Alcohol withdrawal (HCC)- (present on admission)   Assessment & Plan    Unknown last injestion   Cont with CIWA and telemetry monitoring   Monitor and replace lytes as appropriate   Rally bag followed by PO vitamins   Started librium to avoid ativan     Alcohol use disorder, severe, dependence (HCC)- (present on admission)   Assessment & Plan    Encourage cessation     Essential hypertension- (present on admission)   Assessment & Plan    lopress 50 bid     Cough   Assessment & Plan      Non productive  CXR negative    Robitussin prn     Acute bacterial conjunctivitis of both eyes   Assessment & Plan    Erythromycin ointment both eyes     Alcohol-induced insomnia (HCC)- (present on admission)   Assessment & Plan    trazadone at night     Tobacco abuse- (present on admission)   Assessment & Plan    Encourage cessation     Leukopenia- (present on admission)    Assessment & Plan    Cont to monitor     Tachycardia- (present on admission)   Assessment & Plan    Treat with lopressor 50 bid     Ectodermal dysplasia, 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

## 2019-04-03 NOTE — DISCHARGE SUMMARY
Discharge Summary    CHIEF COMPLAINT ON ADMISSION  Chief Complaint   Patient presents with   • Alcohol Intoxication       Reason for Admission  detox     Admission Date  3/28/2019    CODE STATUS  Full Code    HPI & HOSPITAL COURSE  This is a 33 y.o. male here with Past medical history of alcohol abuse who presents multiple times to the hospital with alcohol withdrawal who presents with alcohol intoxication.  Upon arrival to the emergency room his blood pressure was 139/95, pulse was 125.  He was treated for CIWA protocol for alcohol withdrawal.  He was noted to have elevated LFT levels that down trended throughout his hospital stay.  He was noted to have increased blood alcohol content.  No significant electrolyte abnormalities were noted.  For his tachycardia and blood pressure elevation he was prescribed metoprolol, which helped control it.  He work with physical therapy and determined that he has no unstable gait and he is safely discharged home.       Therefore, he is discharged in good and stable condition to home with close outpatient follow-up.    The patient met 2-midnight criteria for an inpatient stay at the time of discharge.    Discharge Date  4/3/2019    FOLLOW UP ITEMS POST DISCHARGE  Follow up with detox center and [facility] [facility]    DISCHARGE DIAGNOSES  Principal Problem:    Alcohol withdrawal (HCC) POA: Yes  Active Problems:    Alcohol use disorder, severe, dependence (HCC) POA: Yes    Ectodermal dysplasia, congenital POA: Yes    Tachycardia POA: Yes    Leukopenia POA: Yes    Tobacco abuse POA: Yes    Alcohol-induced insomnia (HCC) POA: Yes    Acute bacterial conjunctivitis of both eyes POA: No    Cough POA: Clinically Undetermined    Essential hypertension POA: Yes  Resolved Problems:    * No resolved hospital problems. *      FOLLOW UP  No future appointments.  No follow-up provider specified.    MEDICATIONS ON DISCHARGE     Medication List      START taking these medications      Instructions    chlordiazePOXIDE 5 MG Caps  Commonly known as:  LIBRIUM   Take 1 Cap by mouth every 8 hours for 4 days.  Dose:  5 mg     metoprolol 50 MG Tabs  Commonly known as:  LOPRESSOR   Take 1 Tab by mouth 2 Times a Day.  Dose:  50 mg            Allergies  No Known Allergies    DIET  Orders Placed This Encounter   Procedures   • Diet Order Regular     Standing Status:   Standing     Number of Occurrences:   1     Order Specific Question:   Diet:     Answer:   Regular [1]       ACTIVITY  As tolerated.  Weight bearing as tolerated    CONSULTATIONS  none    PROCEDURES  none    LABORATORY  Lab Results   Component Value Date    SODIUM 138 04/03/2019    POTASSIUM 4.1 04/03/2019    CHLORIDE 102 04/03/2019    CO2 30 04/03/2019    GLUCOSE 80 04/03/2019    BUN 12 04/03/2019    CREATININE 0.77 04/03/2019        Lab Results   Component Value Date    WBC 7.3 04/03/2019    HEMOGLOBIN 14.9 04/03/2019    HEMATOCRIT 45.4 04/03/2019    PLATELETCT 109 (L) 04/03/2019        Total time of the discharge process exceeds 40 minutes.

## 2019-04-03 NOTE — PROGRESS NOTES
Assumed care of patient, received bedside report from day shift RN, Pt A&Ox4, on RA no SOB noted pt has no complains of pain at this time. Call light within reach, personal belongings within reach.  Bed is locked and in lowest position, Bed Strip alarm is on, pt is medical. Hourly rounding in place.

## 2019-04-03 NOTE — PROGRESS NOTES
Received report from night shift RNChristianne. Discussed plan of care, updated white board. Pt is resting in bed, alert and oriented x4. Pt denies any pain or distress at this time. Fall precautions in place. All needs met. Bed in lowest position. Call light within reach. Will continue to monitor.

## 2019-04-03 NOTE — PROGRESS NOTES
Report received from night shift RN, assumed care of pt. Pt A&OX4, pt wanted to leave AMA if bed alarm wasn't turned off, pt given education about fall program rational and continued to refuse. Pt educated to call for assistance before getting out of bed and verbalized understanding, all other fall precautions in place. Plan of care discussed with pt, no questions or needs at this time. Pt medical status, no tele box on. Call light within reach, bed locked and in lowest position, non-skid socks in place. Will continue to monitor with hourly rounding.

## 2019-04-03 NOTE — CARE PLAN
Problem: Venous Thromboembolism (VTW)/Deep Vein Thrombosis (DVT) Prevention:  Goal: Patient will participate in Venous Thrombosis (VTE)/Deep Vein Thrombosis (DVT)Prevention Measures  Outcome: PROGRESSING SLOWER THAN EXPECTED  VTE prophylaxis in place, pt educated on use and importance, pt verbalizes understanding and agrees to comply.      Problem: Knowledge Deficit  Goal: Knowledge of disease process/condition, treatment plan, diagnostic tests, and medications will improve  Outcome: PROGRESSING AS EXPECTED  Pt educated on POC, questions answered in regards to treatment. Pt verbalizes understanding and voices no further questions at this time.

## 2019-04-03 NOTE — PROGRESS NOTES
Pt A&Ox4, given discharge instructions.  Discussed follow-up, diet, activity, symptoms and management and prescriptions provided. Pt given one hard prescription for Librium and verbalized understanding of how to obtain medication along with where to  his metoprolol. All questions answered. Pt has no tele box on, medical status. IV d/c'd, pt ambulated off unit.

## 2019-04-04 ENCOUNTER — PATIENT OUTREACH (OUTPATIENT)
Dept: HEALTH INFORMATION MANAGEMENT | Facility: OTHER | Age: 34
End: 2019-04-04

## 2019-04-04 NOTE — PROGRESS NOTES
4/4/19 9:40am:  CM post discharge outreach attempt.  Patient's telephone number has been disconnected and is no longer in service.  CM unable to complete post discharge call.

## 2019-04-16 ENCOUNTER — HOSPITAL ENCOUNTER (EMERGENCY)
Facility: MEDICAL CENTER | Age: 34
DRG: 897 | End: 2019-04-16
Attending: EMERGENCY MEDICINE
Payer: MEDICARE

## 2019-04-16 VITALS
HEIGHT: 67 IN | DIASTOLIC BLOOD PRESSURE: 61 MMHG | RESPIRATION RATE: 17 BRPM | TEMPERATURE: 97.1 F | OXYGEN SATURATION: 94 % | HEART RATE: 94 BPM | SYSTOLIC BLOOD PRESSURE: 101 MMHG | WEIGHT: 155 LBS | BODY MASS INDEX: 24.33 KG/M2

## 2019-04-16 DIAGNOSIS — F10.920 ACUTE ALCOHOLIC INTOXICATION WITHOUT COMPLICATION (HCC): ICD-10-CM

## 2019-04-16 LAB
ALBUMIN SERPL BCP-MCNC: 4.7 G/DL (ref 3.2–4.9)
ALBUMIN/GLOB SERPL: 1.6 G/DL
ALP SERPL-CCNC: 96 U/L (ref 30–99)
ALT SERPL-CCNC: 108 U/L (ref 2–50)
ANION GAP SERPL CALC-SCNC: 13 MMOL/L (ref 0–11.9)
AST SERPL-CCNC: 147 U/L (ref 12–45)
BASOPHILS # BLD AUTO: 1.1 % (ref 0–1.8)
BASOPHILS # BLD: 0.06 K/UL (ref 0–0.12)
BILIRUB SERPL-MCNC: 0.5 MG/DL (ref 0.1–1.5)
BUN SERPL-MCNC: 7 MG/DL (ref 8–22)
CALCIUM SERPL-MCNC: 8.5 MG/DL (ref 8.5–10.5)
CHLORIDE SERPL-SCNC: 102 MMOL/L (ref 96–112)
CO2 SERPL-SCNC: 25 MMOL/L (ref 20–33)
CREAT SERPL-MCNC: 0.67 MG/DL (ref 0.5–1.4)
EKG IMPRESSION: NORMAL
EOSINOPHIL # BLD AUTO: 0.03 K/UL (ref 0–0.51)
EOSINOPHIL NFR BLD: 0.5 % (ref 0–6.9)
ERYTHROCYTE [DISTWIDTH] IN BLOOD BY AUTOMATED COUNT: 46 FL (ref 35.9–50)
ETHANOL BLD-MCNC: 0.45 G/DL
GLOBULIN SER CALC-MCNC: 3 G/DL (ref 1.9–3.5)
GLUCOSE SERPL-MCNC: 95 MG/DL (ref 65–99)
HCT VFR BLD AUTO: 46.9 % (ref 42–52)
HGB BLD-MCNC: 15.7 G/DL (ref 14–18)
IMM GRANULOCYTES # BLD AUTO: 0.01 K/UL (ref 0–0.11)
IMM GRANULOCYTES NFR BLD AUTO: 0.2 % (ref 0–0.9)
LIPASE SERPL-CCNC: 41 U/L (ref 11–82)
LYMPHOCYTES # BLD AUTO: 1.53 K/UL (ref 1–4.8)
LYMPHOCYTES NFR BLD: 28 % (ref 22–41)
MCH RBC QN AUTO: 32.6 PG (ref 27–33)
MCHC RBC AUTO-ENTMCNC: 33.5 G/DL (ref 33.7–35.3)
MCV RBC AUTO: 97.3 FL (ref 81.4–97.8)
MONOCYTES # BLD AUTO: 0.53 K/UL (ref 0–0.85)
MONOCYTES NFR BLD AUTO: 9.7 % (ref 0–13.4)
NEUTROPHILS # BLD AUTO: 3.3 K/UL (ref 1.82–7.42)
NEUTROPHILS NFR BLD: 60.5 % (ref 44–72)
NRBC # BLD AUTO: 0 K/UL
NRBC BLD-RTO: 0 /100 WBC
PLATELET # BLD AUTO: 291 K/UL (ref 164–446)
PMV BLD AUTO: 10.5 FL (ref 9–12.9)
POTASSIUM SERPL-SCNC: 3.8 MMOL/L (ref 3.6–5.5)
PROT SERPL-MCNC: 7.7 G/DL (ref 6–8.2)
RBC # BLD AUTO: 4.82 M/UL (ref 4.7–6.1)
SODIUM SERPL-SCNC: 140 MMOL/L (ref 135–145)
WBC # BLD AUTO: 5.5 K/UL (ref 4.8–10.8)

## 2019-04-16 PROCEDURE — 99285 EMERGENCY DEPT VISIT HI MDM: CPT

## 2019-04-16 PROCEDURE — 85025 COMPLETE CBC W/AUTO DIFF WBC: CPT

## 2019-04-16 PROCEDURE — 80053 COMPREHEN METABOLIC PANEL: CPT

## 2019-04-16 PROCEDURE — 93005 ELECTROCARDIOGRAM TRACING: CPT | Performed by: EMERGENCY MEDICINE

## 2019-04-16 PROCEDURE — 83690 ASSAY OF LIPASE: CPT

## 2019-04-16 PROCEDURE — 80307 DRUG TEST PRSMV CHEM ANLYZR: CPT

## 2019-04-16 PROCEDURE — 700101 HCHG RX REV CODE 250: Performed by: EMERGENCY MEDICINE

## 2019-04-16 PROCEDURE — 93005 ELECTROCARDIOGRAM TRACING: CPT

## 2019-04-16 PROCEDURE — 96365 THER/PROPH/DIAG IV INF INIT: CPT

## 2019-04-16 PROCEDURE — 700105 HCHG RX REV CODE 258: Performed by: EMERGENCY MEDICINE

## 2019-04-16 RX ORDER — SODIUM CHLORIDE 9 MG/ML
1000 INJECTION, SOLUTION INTRAVENOUS ONCE
Status: COMPLETED | OUTPATIENT
Start: 2019-04-16 | End: 2019-04-16

## 2019-04-16 RX ADMIN — THIAMINE HYDROCHLORIDE 1000 ML: 100 INJECTION, SOLUTION INTRAMUSCULAR; INTRAVENOUS at 10:31

## 2019-04-16 RX ADMIN — SODIUM CHLORIDE 1000 ML: 9 INJECTION, SOLUTION INTRAVENOUS at 16:34

## 2019-04-16 NOTE — DISCHARGE INSTRUCTIONS
Stop drinking alcohol to excess.  You are medically cleared for inpatient detox.  Call [facility] or [facility] for inpatient detox.      You had a borderline or high normal blood pressure reading today.  This does not necessarily mean you have hypertension.  Please followup with your/a primary physician for comprehensive blood pressure evaluation and yearly fasting cholesterol assessment.  131/95

## 2019-04-16 NOTE — ED NOTES
Patient A&O, non monitored HR >110, ERP made aware, new orders received, IVF infusing, patient given water at bedside per ERP.

## 2019-04-17 ENCOUNTER — HOSPITAL ENCOUNTER (INPATIENT)
Facility: MEDICAL CENTER | Age: 34
LOS: 4 days | DRG: 897 | End: 2019-04-22
Attending: EMERGENCY MEDICINE | Admitting: HOSPITALIST
Payer: MEDICARE

## 2019-04-17 DIAGNOSIS — F10.939 ALCOHOL WITHDRAWAL SYNDROME WITH COMPLICATION (HCC): ICD-10-CM

## 2019-04-17 DIAGNOSIS — R45.851 SUICIDAL IDEATION: ICD-10-CM

## 2019-04-17 DIAGNOSIS — F10.920 ALCOHOLIC INTOXICATION WITHOUT COMPLICATION (HCC): ICD-10-CM

## 2019-04-17 LAB — GLUCOSE BLD-MCNC: 96 MG/DL (ref 65–99)

## 2019-04-17 PROCEDURE — 82962 GLUCOSE BLOOD TEST: CPT

## 2019-04-17 PROCEDURE — 99285 EMERGENCY DEPT VISIT HI MDM: CPT

## 2019-04-17 RX ORDER — NAPROXEN 500 MG/1
500 TABLET ORAL ONCE
Status: DISCONTINUED | OUTPATIENT
Start: 2019-04-18 | End: 2019-04-18

## 2019-04-17 NOTE — ED NOTES
PIV removed and bandaged.  Gopal Ceja discharged via ambulation with self.  Discharge instructions given and reviewed, patient educated to follow up with PCP, verbalized understanding.  All personal belongings in possession.  No questions at this time.

## 2019-04-18 LAB
ALBUMIN SERPL BCP-MCNC: 4.4 G/DL (ref 3.2–4.9)
ALBUMIN/GLOB SERPL: 1.1 G/DL
ALP SERPL-CCNC: 97 U/L (ref 30–99)
ALT SERPL-CCNC: 110 U/L (ref 2–50)
ANION GAP SERPL CALC-SCNC: 17 MMOL/L (ref 0–11.9)
APAP SERPL-MCNC: <10 UG/ML (ref 10–30)
AST SERPL-CCNC: 142 U/L (ref 12–45)
BASOPHILS # BLD AUTO: 0.9 % (ref 0–1.8)
BASOPHILS # BLD: 0.05 K/UL (ref 0–0.12)
BILIRUB SERPL-MCNC: 0.4 MG/DL (ref 0.1–1.5)
BUN SERPL-MCNC: 6 MG/DL (ref 8–22)
CALCIUM SERPL-MCNC: 8.8 MG/DL (ref 8.5–10.5)
CHLORIDE SERPL-SCNC: 102 MMOL/L (ref 96–112)
CO2 SERPL-SCNC: 25 MMOL/L (ref 20–33)
CREAT SERPL-MCNC: 0.72 MG/DL (ref 0.5–1.4)
EOSINOPHIL # BLD AUTO: 0.05 K/UL (ref 0–0.51)
EOSINOPHIL NFR BLD: 0.9 % (ref 0–6.9)
ERYTHROCYTE [DISTWIDTH] IN BLOOD BY AUTOMATED COUNT: 48.1 FL (ref 35.9–50)
GLOBULIN SER CALC-MCNC: 3.9 G/DL (ref 1.9–3.5)
GLUCOSE SERPL-MCNC: 71 MG/DL (ref 65–99)
HCT VFR BLD AUTO: 49.2 % (ref 42–52)
HGB BLD-MCNC: 16 G/DL (ref 14–18)
IMM GRANULOCYTES # BLD AUTO: 0.02 K/UL (ref 0–0.11)
IMM GRANULOCYTES NFR BLD AUTO: 0.4 % (ref 0–0.9)
INR PPP: 0.97 (ref 0.87–1.13)
LYMPHOCYTES # BLD AUTO: 2.19 K/UL (ref 1–4.8)
LYMPHOCYTES NFR BLD: 38.8 % (ref 22–41)
MCH RBC QN AUTO: 31.9 PG (ref 27–33)
MCHC RBC AUTO-ENTMCNC: 32.5 G/DL (ref 33.7–35.3)
MCV RBC AUTO: 98.2 FL (ref 81.4–97.8)
MONOCYTES # BLD AUTO: 0.54 K/UL (ref 0–0.85)
MONOCYTES NFR BLD AUTO: 9.6 % (ref 0–13.4)
NEUTROPHILS # BLD AUTO: 2.8 K/UL (ref 1.82–7.42)
NEUTROPHILS NFR BLD: 49.4 % (ref 44–72)
NRBC # BLD AUTO: 0 K/UL
NRBC BLD-RTO: 0 /100 WBC
PLATELET # BLD AUTO: 280 K/UL (ref 164–446)
PMV BLD AUTO: 10.7 FL (ref 9–12.9)
POTASSIUM SERPL-SCNC: 3.8 MMOL/L (ref 3.6–5.5)
PROT SERPL-MCNC: 8.3 G/DL (ref 6–8.2)
PROTHROMBIN TIME: 13 SEC (ref 12–14.6)
RBC # BLD AUTO: 5.01 M/UL (ref 4.7–6.1)
SALICYLATES SERPL-MCNC: 0 MG/DL (ref 15–25)
SODIUM SERPL-SCNC: 144 MMOL/L (ref 135–145)
WBC # BLD AUTO: 5.7 K/UL (ref 4.8–10.8)

## 2019-04-18 PROCEDURE — 96376 TX/PRO/DX INJ SAME DRUG ADON: CPT

## 2019-04-18 PROCEDURE — 80053 COMPREHEN METABOLIC PANEL: CPT

## 2019-04-18 PROCEDURE — 93005 ELECTROCARDIOGRAM TRACING: CPT | Performed by: EMERGENCY MEDICINE

## 2019-04-18 PROCEDURE — 770020 HCHG ROOM/CARE - TELE (206)

## 2019-04-18 PROCEDURE — HZ2ZZZZ DETOXIFICATION SERVICES FOR SUBSTANCE ABUSE TREATMENT: ICD-10-PCS | Performed by: HOSPITALIST

## 2019-04-18 PROCEDURE — 96365 THER/PROPH/DIAG IV INF INIT: CPT

## 2019-04-18 PROCEDURE — 99223 1ST HOSP IP/OBS HIGH 75: CPT | Performed by: HOSPITALIST

## 2019-04-18 PROCEDURE — 80307 DRUG TEST PRSMV CHEM ANLYZR: CPT

## 2019-04-18 PROCEDURE — 96366 THER/PROPH/DIAG IV INF ADDON: CPT

## 2019-04-18 PROCEDURE — 85025 COMPLETE CBC W/AUTO DIFF WBC: CPT

## 2019-04-18 PROCEDURE — 700102 HCHG RX REV CODE 250 W/ 637 OVERRIDE(OP): Performed by: HOSPITALIST

## 2019-04-18 PROCEDURE — 96372 THER/PROPH/DIAG INJ SC/IM: CPT

## 2019-04-18 PROCEDURE — 96375 TX/PRO/DX INJ NEW DRUG ADDON: CPT

## 2019-04-18 PROCEDURE — 85610 PROTHROMBIN TIME: CPT

## 2019-04-18 PROCEDURE — 700111 HCHG RX REV CODE 636 W/ 250 OVERRIDE (IP): Performed by: HOSPITALIST

## 2019-04-18 PROCEDURE — 700105 HCHG RX REV CODE 258: Performed by: HOSPITALIST

## 2019-04-18 PROCEDURE — 700111 HCHG RX REV CODE 636 W/ 250 OVERRIDE (IP): Performed by: EMERGENCY MEDICINE

## 2019-04-18 PROCEDURE — 700101 HCHG RX REV CODE 250: Performed by: EMERGENCY MEDICINE

## 2019-04-18 PROCEDURE — A9270 NON-COVERED ITEM OR SERVICE: HCPCS | Performed by: HOSPITALIST

## 2019-04-18 RX ORDER — LORAZEPAM 1 MG/1
2 TABLET ORAL
Status: DISCONTINUED | OUTPATIENT
Start: 2019-04-18 | End: 2019-04-18

## 2019-04-18 RX ORDER — ONDANSETRON 2 MG/ML
4 INJECTION INTRAMUSCULAR; INTRAVENOUS EVERY 4 HOURS PRN
Status: DISCONTINUED | OUTPATIENT
Start: 2019-04-18 | End: 2019-04-22 | Stop reason: HOSPADM

## 2019-04-18 RX ORDER — LORAZEPAM 2 MG/ML
1 INJECTION INTRAMUSCULAR
Status: DISCONTINUED | OUTPATIENT
Start: 2019-04-18 | End: 2019-04-22 | Stop reason: HOSPADM

## 2019-04-18 RX ORDER — ACETAMINOPHEN 325 MG/1
650 TABLET ORAL EVERY 6 HOURS PRN
Status: DISCONTINUED | OUTPATIENT
Start: 2019-04-18 | End: 2019-04-22 | Stop reason: HOSPADM

## 2019-04-18 RX ORDER — LORAZEPAM 2 MG/ML
2 INJECTION INTRAMUSCULAR
Status: DISCONTINUED | OUTPATIENT
Start: 2019-04-18 | End: 2019-04-18

## 2019-04-18 RX ORDER — METOPROLOL TARTRATE 50 MG/1
50 TABLET, FILM COATED ORAL 2 TIMES DAILY
Status: DISCONTINUED | OUTPATIENT
Start: 2019-04-18 | End: 2019-04-22 | Stop reason: HOSPADM

## 2019-04-18 RX ORDER — LORAZEPAM 2 MG/ML
0.5 INJECTION INTRAMUSCULAR EVERY 4 HOURS PRN
Status: DISCONTINUED | OUTPATIENT
Start: 2019-04-18 | End: 2019-04-22 | Stop reason: HOSPADM

## 2019-04-18 RX ORDER — THIAMINE MONONITRATE (VIT B1) 100 MG
100 TABLET ORAL DAILY
Status: COMPLETED | OUTPATIENT
Start: 2019-04-18 | End: 2019-04-21

## 2019-04-18 RX ORDER — LORAZEPAM 2 MG/1
2 TABLET ORAL
Status: DISCONTINUED | OUTPATIENT
Start: 2019-04-18 | End: 2019-04-22 | Stop reason: HOSPADM

## 2019-04-18 RX ORDER — LORAZEPAM 2 MG/ML
0.5 INJECTION INTRAMUSCULAR EVERY 4 HOURS PRN
Status: DISCONTINUED | OUTPATIENT
Start: 2019-04-18 | End: 2019-04-18

## 2019-04-18 RX ORDER — LORAZEPAM 2 MG/ML
1 INJECTION INTRAMUSCULAR
Status: DISCONTINUED | OUTPATIENT
Start: 2019-04-18 | End: 2019-04-18

## 2019-04-18 RX ORDER — PROMETHAZINE HYDROCHLORIDE 25 MG/1
12.5-25 TABLET ORAL EVERY 4 HOURS PRN
Status: DISCONTINUED | OUTPATIENT
Start: 2019-04-18 | End: 2019-04-22 | Stop reason: HOSPADM

## 2019-04-18 RX ORDER — LORAZEPAM 1 MG/1
1 TABLET ORAL EVERY 4 HOURS PRN
Status: DISCONTINUED | OUTPATIENT
Start: 2019-04-18 | End: 2019-04-18

## 2019-04-18 RX ORDER — LORAZEPAM 1 MG/1
1 TABLET ORAL EVERY 4 HOURS PRN
Status: DISCONTINUED | OUTPATIENT
Start: 2019-04-18 | End: 2019-04-22 | Stop reason: HOSPADM

## 2019-04-18 RX ORDER — LORAZEPAM 2 MG/ML
1.5 INJECTION INTRAMUSCULAR
Status: DISCONTINUED | OUTPATIENT
Start: 2019-04-18 | End: 2019-04-22 | Stop reason: HOSPADM

## 2019-04-18 RX ORDER — LORAZEPAM 2 MG/ML
2 INJECTION INTRAMUSCULAR
Status: DISCONTINUED | OUTPATIENT
Start: 2019-04-18 | End: 2019-04-22 | Stop reason: HOSPADM

## 2019-04-18 RX ORDER — POLYETHYLENE GLYCOL 3350 17 G/17G
1 POWDER, FOR SOLUTION ORAL
Status: DISCONTINUED | OUTPATIENT
Start: 2019-04-18 | End: 2019-04-22 | Stop reason: HOSPADM

## 2019-04-18 RX ORDER — LORAZEPAM 2 MG/1
4 TABLET ORAL
Status: DISCONTINUED | OUTPATIENT
Start: 2019-04-18 | End: 2019-04-22 | Stop reason: HOSPADM

## 2019-04-18 RX ORDER — CLONIDINE HYDROCHLORIDE 0.1 MG/1
0.1 TABLET ORAL EVERY 6 HOURS PRN
Status: DISCONTINUED | OUTPATIENT
Start: 2019-04-18 | End: 2019-04-22 | Stop reason: HOSPADM

## 2019-04-18 RX ORDER — LORAZEPAM 1 MG/1
4 TABLET ORAL
Status: DISCONTINUED | OUTPATIENT
Start: 2019-04-18 | End: 2019-04-18

## 2019-04-18 RX ORDER — LORAZEPAM 2 MG/ML
1.5 INJECTION INTRAMUSCULAR
Status: DISCONTINUED | OUTPATIENT
Start: 2019-04-18 | End: 2019-04-18

## 2019-04-18 RX ORDER — AMOXICILLIN 250 MG
2 CAPSULE ORAL 2 TIMES DAILY
Status: DISCONTINUED | OUTPATIENT
Start: 2019-04-18 | End: 2019-04-22 | Stop reason: HOSPADM

## 2019-04-18 RX ORDER — FOLIC ACID 1 MG/1
1 TABLET ORAL DAILY
Status: COMPLETED | OUTPATIENT
Start: 2019-04-18 | End: 2019-04-21

## 2019-04-18 RX ORDER — ONDANSETRON 4 MG/1
4 TABLET, ORALLY DISINTEGRATING ORAL EVERY 4 HOURS PRN
Status: DISCONTINUED | OUTPATIENT
Start: 2019-04-18 | End: 2019-04-22 | Stop reason: HOSPADM

## 2019-04-18 RX ORDER — SODIUM CHLORIDE 9 MG/ML
INJECTION, SOLUTION INTRAVENOUS CONTINUOUS
Status: DISCONTINUED | OUTPATIENT
Start: 2019-04-18 | End: 2019-04-22 | Stop reason: HOSPADM

## 2019-04-18 RX ORDER — LORAZEPAM 1 MG/1
3 TABLET ORAL
Status: DISCONTINUED | OUTPATIENT
Start: 2019-04-18 | End: 2019-04-18

## 2019-04-18 RX ORDER — BISACODYL 10 MG
10 SUPPOSITORY, RECTAL RECTAL
Status: DISCONTINUED | OUTPATIENT
Start: 2019-04-18 | End: 2019-04-22 | Stop reason: HOSPADM

## 2019-04-18 RX ORDER — LORAZEPAM 1 MG/1
0.5 TABLET ORAL EVERY 4 HOURS PRN
Status: DISCONTINUED | OUTPATIENT
Start: 2019-04-18 | End: 2019-04-18

## 2019-04-18 RX ORDER — FOLIC ACID 1 MG/1
1 TABLET ORAL DAILY
Status: DISCONTINUED | OUTPATIENT
Start: 2019-04-19 | End: 2019-04-18

## 2019-04-18 RX ORDER — THIAMINE MONONITRATE (VIT B1) 100 MG
100 TABLET ORAL DAILY
Status: DISCONTINUED | OUTPATIENT
Start: 2019-04-19 | End: 2019-04-18

## 2019-04-18 RX ORDER — LORAZEPAM 1 MG/1
0.5 TABLET ORAL EVERY 4 HOURS PRN
Status: DISCONTINUED | OUTPATIENT
Start: 2019-04-18 | End: 2019-04-22 | Stop reason: HOSPADM

## 2019-04-18 RX ORDER — PROMETHAZINE HYDROCHLORIDE 25 MG/1
12.5-25 SUPPOSITORY RECTAL EVERY 4 HOURS PRN
Status: DISCONTINUED | OUTPATIENT
Start: 2019-04-18 | End: 2019-04-22 | Stop reason: HOSPADM

## 2019-04-18 RX ADMIN — ONDANSETRON 4 MG: 4 TABLET, ORALLY DISINTEGRATING ORAL at 15:38

## 2019-04-18 RX ADMIN — ENOXAPARIN SODIUM 40 MG: 100 INJECTION SUBCUTANEOUS at 09:36

## 2019-04-18 RX ADMIN — Medication 100 MG: at 09:58

## 2019-04-18 RX ADMIN — ONDANSETRON 4 MG: 4 TABLET, ORALLY DISINTEGRATING ORAL at 09:51

## 2019-04-18 RX ADMIN — LORAZEPAM 2 MG: 2 INJECTION INTRAMUSCULAR; INTRAVENOUS at 05:35

## 2019-04-18 RX ADMIN — THERA TABS 1 TABLET: TAB at 09:58

## 2019-04-18 RX ADMIN — SODIUM CHLORIDE: 9 INJECTION, SOLUTION INTRAVENOUS at 07:45

## 2019-04-18 RX ADMIN — LORAZEPAM 2 MG: 2 TABLET ORAL at 19:12

## 2019-04-18 RX ADMIN — LORAZEPAM 1 MG: 1 TABLET ORAL at 09:50

## 2019-04-18 RX ADMIN — LORAZEPAM 2 MG: 2 INJECTION INTRAMUSCULAR; INTRAVENOUS at 05:18

## 2019-04-18 RX ADMIN — FOLIC ACID 1 MG: 1 TABLET ORAL at 09:58

## 2019-04-18 RX ADMIN — LORAZEPAM 2 MG: 2 TABLET ORAL at 17:08

## 2019-04-18 RX ADMIN — SODIUM CHLORIDE: 9 INJECTION, SOLUTION INTRAVENOUS at 15:55

## 2019-04-18 RX ADMIN — METOPROLOL TARTRATE 50 MG: 50 TABLET ORAL at 10:58

## 2019-04-18 RX ADMIN — METOPROLOL TARTRATE 50 MG: 50 TABLET ORAL at 17:04

## 2019-04-18 RX ADMIN — LORAZEPAM 1 MG: 2 INJECTION INTRAMUSCULAR; INTRAVENOUS at 23:10

## 2019-04-18 RX ADMIN — LORAZEPAM 2 MG: 2 TABLET ORAL at 11:00

## 2019-04-18 RX ADMIN — THIAMINE HYDROCHLORIDE: 100 INJECTION, SOLUTION INTRAMUSCULAR; INTRAVENOUS at 05:10

## 2019-04-18 RX ADMIN — LORAZEPAM 2 MG: 2 INJECTION INTRAMUSCULAR; INTRAVENOUS at 05:50

## 2019-04-18 RX ADMIN — LORAZEPAM 1 MG: 2 INJECTION INTRAMUSCULAR; INTRAVENOUS at 21:02

## 2019-04-18 ASSESSMENT — LIFESTYLE VARIABLES
TREMOR: MODERATE TREMOR WITH ARMS EXTENDED
ANXIETY: MILDLY ANXIOUS
ORIENTATION AND CLOUDING OF SENSORIUM: CANNOT DO SERIAL ADDITIONS OR IS UNCERTAIN ABOUT DATE
HEADACHE, FULLNESS IN HEAD: NOT PRESENT
VISUAL DISTURBANCES: NOT PRESENT
ANXIETY: MILDLY ANXIOUS
PAROXYSMAL SWEATS: *
TOTAL SCORE: 15
TREMOR: *
AGITATION: NORMAL ACTIVITY
PAROXYSMAL SWEATS: NO SWEAT VISIBLE
ORIENTATION AND CLOUDING OF SENSORIUM: ORIENTED AND CAN DO SERIAL ADDITIONS
VISUAL DISTURBANCES: MILD SENSITIVITY
ANXIETY: MILDLY ANXIOUS
NAUSEA AND VOMITING: MILD NAUSEA WITH NO VOMITING
ON A TYPICAL DAY WHEN YOU DRINK ALCOHOL HOW MANY DRINKS DO YOU HAVE: 8
AUDITORY DISTURBANCES: NOT PRESENT
NAUSEA AND VOMITING: *
PAROXYSMAL SWEATS: *
TOTAL SCORE: 7
TREMOR: *
ANXIETY: MILDLY ANXIOUS
HEADACHE, FULLNESS IN HEAD: VERY MILD
TREMOR: MODERATE TREMOR WITH ARMS EXTENDED
HEADACHE, FULLNESS IN HEAD: VERY MILD
HEADACHE, FULLNESS IN HEAD: MILD
AGITATION: MODERATELY FIDGETY AND RESTLESS
TOTAL SCORE: 30
TOTAL SCORE: 14
TOTAL SCORE: 11
AUDITORY DISTURBANCES: MODERATELY SEVERE HALLUCINATIONS
PAROXYSMAL SWEATS: *
TREMOR: TREMOR NOT VISIBLE BUT CAN BE FELT, FINGERTIP TO FINGERTIP
TOTAL SCORE: MILD ITCHING, PINS AND NEEDLES SENSATION, BURNING OR NUMBNESS
TREMOR: *
DOES PATIENT WANT TO TALK TO SOMEONE ABOUT QUITTING: YES
PAROXYSMAL SWEATS: NO SWEAT VISIBLE
AUDITORY DISTURBANCES: NOT PRESENT
TOTAL SCORE: 14
TOTAL SCORE: MILD ITCHING, PINS AND NEEDLES SENSATION, BURNING OR NUMBNESS
ALCOHOL_USE: YES
HEADACHE, FULLNESS IN HEAD: MILD
AUDITORY DISTURBANCES: NOT PRESENT
PAROXYSMAL SWEATS: NO SWEAT VISIBLE
PAROXYSMAL SWEATS: NO SWEAT VISIBLE
AUDITORY DISTURBANCES: MODERATELY SEVERE HALLUCINATIONS
ORIENTATION AND CLOUDING OF SENSORIUM: ORIENTED AND CAN DO SERIAL ADDITIONS
HEADACHE, FULLNESS IN HEAD: MILD
ORIENTATION AND CLOUDING OF SENSORIUM: CANNOT DO SERIAL ADDITIONS OR IS UNCERTAIN ABOUT DATE
AUDITORY DISTURBANCES: MODERATELY SEVERE HALLUCINATIONS
VISUAL DISTURBANCES: MODERATELY SEVERE HALLUCINATIONS
PAROXYSMAL SWEATS: *
AGITATION: NORMAL ACTIVITY
NAUSEA AND VOMITING: INTERMITTENT NAUSEA WITH DRY HEAVES
NAUSEA AND VOMITING: INTERMITTENT NAUSEA WITH DRY HEAVES
HEADACHE, FULLNESS IN HEAD: MODERATE
VISUAL DISTURBANCES: MILD SENSITIVITY
VISUAL DISTURBANCES: MILD SENSITIVITY
TOTAL SCORE: 4
AUDITORY DISTURBANCES: NOT PRESENT
TOTAL SCORE: 4
AGITATION: NORMAL ACTIVITY
TREMOR: *
HAVE YOU EVER FELT YOU SHOULD CUT DOWN ON YOUR DRINKING: YES
TACTILE DISTURBANCES: VERY MILD ITCHING, PINS AND NEEDLES SENSATION, BURNING OR NUMBNESS
PAROXYSMAL SWEATS: NO SWEAT VISIBLE
NAUSEA AND VOMITING: *
PAROXYSMAL SWEATS: NO SWEAT VISIBLE
AGITATION: MODERATELY FIDGETY AND RESTLESS
TOTAL SCORE: MILD ITCHING, PINS AND NEEDLES SENSATION, BURNING OR NUMBNESS
AUDITORY DISTURBANCES: NOT PRESENT
VISUAL DISTURBANCES: NOT PRESENT
HEADACHE, FULLNESS IN HEAD: VERY MILD
ORIENTATION AND CLOUDING OF SENSORIUM: CANNOT DO SERIAL ADDITIONS OR IS UNCERTAIN ABOUT DATE
ANXIETY: MODERATELY ANXIOUS OR GUARDED, SO ANXIETY IS INFERRED
NAUSEA AND VOMITING: *
PAROXYSMAL SWEATS: NO SWEAT VISIBLE
TREMOR: MODERATE TREMOR WITH ARMS EXTENDED
VISUAL DISTURBANCES: MODERATELY SEVERE HALLUCINATIONS
TACTILE DISTURBANCES: MODERATELY SEVERE HALLUCINATIONS
AUDITORY DISTURBANCES: MILD HARSHNESS OR ABILITY TO FRIGHTEN
TREMOR: *
ANXIETY: MILDLY ANXIOUS
ANXIETY: MILDLY ANXIOUS
CONSUMPTION TOTAL: POSITIVE
TOTAL SCORE: 7
HEADACHE, FULLNESS IN HEAD: MILD
HAVE PEOPLE ANNOYED YOU BY CRITICIZING YOUR DRINKING: YES
ANXIETY: MODERATELY ANXIOUS OR GUARDED, SO ANXIETY IS INFERRED
ANXIETY: MILDLY ANXIOUS
VISUAL DISTURBANCES: MODERATELY SEVERE HALLUCINATIONS
VISUAL DISTURBANCES: MODERATELY SEVERE HALLUCINATIONS
TOTAL SCORE: 30
TACTILE DISTURBANCES: SEVERE HALLUCINATIONS
AGITATION: *
TOTAL SCORE: MILD ITCHING, PINS AND NEEDLES SENSATION, BURNING OR NUMBNESS
ORIENTATION AND CLOUDING OF SENSORIUM: CANNOT DO SERIAL ADDITIONS OR IS UNCERTAIN ABOUT DATE
AUDITORY DISTURBANCES: NOT PRESENT
EVER HAD A DRINK FIRST THING IN THE MORNING TO STEADY YOUR NERVES TO GET RID OF A HANGOVER: YES
AGITATION: MODERATELY FIDGETY AND RESTLESS
NAUSEA AND VOMITING: MILD NAUSEA WITH NO VOMITING
DOES PATIENT WANT TO STOP DRINKING: YES
TREMOR: *
ANXIETY: MILDLY ANXIOUS
EVER FELT BAD OR GUILTY ABOUT YOUR DRINKING: YES
AVERAGE NUMBER OF DAYS PER WEEK YOU HAVE A DRINK CONTAINING ALCOHOL: 7
TOTAL SCORE: 14
ORIENTATION AND CLOUDING OF SENSORIUM: ORIENTED AND CAN DO SERIAL ADDITIONS
TACTILE DISTURBANCES: MODERATELY SEVERE HALLUCINATIONS
HEADACHE, FULLNESS IN HEAD: MODERATE
HOW MANY TIMES IN THE PAST YEAR HAVE YOU HAD 5 OR MORE DRINKS IN A DAY: 250
ORIENTATION AND CLOUDING OF SENSORIUM: CANNOT DO SERIAL ADDITIONS OR IS UNCERTAIN ABOUT DATE
ORIENTATION AND CLOUDING OF SENSORIUM: ORIENTED AND CAN DO SERIAL ADDITIONS
ORIENTATION AND CLOUDING OF SENSORIUM: ORIENTED AND CAN DO SERIAL ADDITIONS
TOTAL SCORE: 31
TREMOR: MODERATE TREMOR WITH ARMS EXTENDED
HEADACHE, FULLNESS IN HEAD: MILD
VISUAL DISTURBANCES: MODERATELY SEVERE HALLUCINATIONS
AUDITORY DISTURBANCES: NOT PRESENT
TOTAL SCORE: 14
NAUSEA AND VOMITING: *
AGITATION: NORMAL ACTIVITY
VISUAL DISTURBANCES: NOT PRESENT
TOTAL SCORE: 4
NAUSEA AND VOMITING: *
NAUSEA AND VOMITING: *
ANXIETY: MODERATELY ANXIOUS OR GUARDED, SO ANXIETY IS INFERRED
ORIENTATION AND CLOUDING OF SENSORIUM: ORIENTED AND CAN DO SERIAL ADDITIONS
AGITATION: NORMAL ACTIVITY
AGITATION: SOMEWHAT MORE THAN NORMAL ACTIVITY
AGITATION: SOMEWHAT MORE THAN NORMAL ACTIVITY
NAUSEA AND VOMITING: MILD NAUSEA WITH NO VOMITING

## 2019-04-18 ASSESSMENT — PATIENT HEALTH QUESTIONNAIRE - PHQ9
7. TROUBLE CONCENTRATING ON THINGS, SUCH AS READING THE NEWSPAPER OR WATCHING TELEVISION: NOT AT ALL
SUM OF ALL RESPONSES TO PHQ9 QUESTIONS 1 AND 2: 4
8. MOVING OR SPEAKING SO SLOWLY THAT OTHER PEOPLE COULD HAVE NOTICED. OR THE OPPOSITE, BEING SO FIGETY OR RESTLESS THAT YOU HAVE BEEN MOVING AROUND A LOT MORE THAN USUAL: NOT AT ALL
5. POOR APPETITE OR OVEREATING: NOT AT ALL
4. FEELING TIRED OR HAVING LITTLE ENERGY: SEVERAL DAYS
1. LITTLE INTEREST OR PLEASURE IN DOING THINGS: SEVERAL DAYS
SUM OF ALL RESPONSES TO PHQ QUESTIONS 1-9: 6
3. TROUBLE FALLING OR STAYING ASLEEP OR SLEEPING TOO MUCH: SEVERAL DAYS
2. FEELING DOWN, DEPRESSED, IRRITABLE, OR HOPELESS: NEARLY EVERY DAY
6. FEELING BAD ABOUT YOURSELF - OR THAT YOU ARE A FAILURE OR HAVE LET YOURSELF OR YOUR FAMILY DOWN: NOT AL ALL
9. THOUGHTS THAT YOU WOULD BE BETTER OFF DEAD, OR OF HURTING YOURSELF: NOT AT ALL

## 2019-04-18 ASSESSMENT — COGNITIVE AND FUNCTIONAL STATUS - GENERAL
MOVING FROM LYING ON BACK TO SITTING ON SIDE OF FLAT BED: A LITTLE
HELP NEEDED FOR BATHING: A LITTLE
DAILY ACTIVITIY SCORE: 19
WALKING IN HOSPITAL ROOM: A LITTLE
MOVING TO AND FROM BED TO CHAIR: A LITTLE
SUGGESTED CMS G CODE MODIFIER DAILY ACTIVITY: CK
TURNING FROM BACK TO SIDE WHILE IN FLAT BAD: A LITTLE
CLIMB 3 TO 5 STEPS WITH RAILING: A LITTLE
SUGGESTED CMS G CODE MODIFIER MOBILITY: CK
PERSONAL GROOMING: A LITTLE
DRESSING REGULAR LOWER BODY CLOTHING: A LITTLE
STANDING UP FROM CHAIR USING ARMS: A LITTLE
TOILETING: A LITTLE
DRESSING REGULAR UPPER BODY CLOTHING: A LITTLE
MOBILITY SCORE: 18

## 2019-04-18 NOTE — ED NOTES
Pt resting supine on gurney with even respirations. Pt in gown and on monitor. Will continue to monitor.

## 2019-04-18 NOTE — ED NOTES
"Pt drank two glasses of orange juice without difficulty, when explained to pt that he would have to ambulate to be discharged, he stated \"I want to kill myself!\" When pt was asked if he just wished to stay in the hospital, he responded \"Yes, and I said that so now I have to. I want to get detox while I'm here.\" ERP notified.  "

## 2019-04-18 NOTE — PROGRESS NOTES
Attending Hospitalist today is Dr. Owen starting at 9am.  Please call this physician for orders, updates, or questions.

## 2019-04-18 NOTE — ED NOTES
from [facility] called, patient missed a meeting with the housing authority last week and has 10 days to respond to them with a letter which is up today/tomorrow, if he is to receive their help.  Patient is currently slightly confused and detoxing.  I explained I would pass on the message.

## 2019-04-18 NOTE — ED NOTES
Patient is resting comfortably. Repositioned for comfort, patient is sedated, holding meds for now.

## 2019-04-18 NOTE — CARE PLAN
Problem: Communication  Goal: The ability to communicate needs accurately and effectively will improve  Outcome: PROGRESSING AS EXPECTED  Pt educated on POC,allowed time for pt to ask questions and addressed concerns.     Problem: Safety  Goal: Will remain free from injury  Outcome: PROGRESSING AS EXPECTED  Pt educated to call before getting out of bed, fall precautions are in place.

## 2019-04-18 NOTE — ED PROVIDER NOTES
ED Provider Note    Scribed for Michael Owusu D.O. by Radha Hills. 4/17/2019  10:42 PM    Primary care provider: RUSS Goddard  Means of arrival: Ambulance  History obtained from: Nurse  History limited by: Altered Mental Status     CHIEF COMPLAINT  Chief Complaint   Patient presents with   • Alcohol Intoxication   • ETOH Withdrawal       HPI  Gopal Ceja is a 33 y.o. male with a history of alcohol abuse, depression, and ectrodactyly-ectodermal dysplasia-clefting syndrome who presents to the Emergency Department with alcohol intoxication. Per nurse note, patient endorses heavy daily alcohol use. Patient is unable to provide time of his last drink.  The patient states that he has been drinking heavily recently, denies chest pain, nausea vomiting.  He states that he has been trying to become sober yet has relapsed an alcoholic state.    HPI limited by patient's altered mental status.     REVIEW OF SYSTEMS  Pertinent positives include alcohol intoxication.  Pertinent negatives include loss of sensation or strength in arms legs, nausea, vomiting, shortness of breath, back pain  Difficult to ascertain rest review systems secondary to patient's intoxicated state per    PAST MEDICAL HISTORY  Past Medical History:   Diagnosis Date   • Acute anxiety    • ADHD (attention deficit hyperactivity disorder)    • ADHD (attention deficit hyperactivity disorder)    • Alcohol abuse    • Bipolar affective (HCC)    • Depression    • Ectrodactyly-ectodermal dysplasia-clefting syndrome    • ETOH abuse    • Psychiatric disorder     anxiety/panic disorder     SURGICAL HISTORY  Past Surgical History:   Procedure Laterality Date   • OTHER ORTHOPEDIC SURGERY      hands bilaterally r/t EEDC syndrome        SOCIAL HISTORY  Social History   Substance Use Topics   • Smoking status: Former Smoker     Packs/day: 0.25     Types: Cigarettes   • Smokeless tobacco: Never Used      Comment: Smokes couple of times a month   • Alcohol use  "0.0 oz/week      Comment: drinks 10 earthquakes a day      History   Drug Use No       FAMILY HISTORY  Family History   Problem Relation Age of Onset   • Heart Disease Neg Hx    • Hypertension Neg Hx    • Hyperlipidemia Neg Hx        CURRENT MEDICATIONS  No current facility-administered medications on file prior to encounter.      Current Outpatient Prescriptions on File Prior to Encounter   Medication Sig Dispense Refill   • metoprolol (LOPRESSOR) 50 MG Tab Take 1 Tab by mouth 2 Times a Day. 60 Tab 3     ALLERGIES  No Known Allergies    PHYSICAL EXAM  VITAL SIGNS: /107   Pulse (!) 105   Temp 37 °C (98.6 °F) (Temporal)   Resp 16   Ht 1.727 m (5' 8\")   Wt 68 kg (150 lb)   SpO2 99%   BMI 22.81 kg/m²     Nursing notes and vitals reviewed.  Constitutional: Well developed, Well nourished, Intoxicated.   HENT: Normocephalic, Atraumatic, moist mucous membranes, No oral exudates, no rhinorrhea.  Eyes: PERRLA, EOMI, Conjunctiva normal, No discharge.   Neck: Normal range of motion, No cervical spine tenderness, Supple, No meningeus, No stridor.  Cardiovascular: Good perfusion.   Thorax & Lungs:  No respiratory distress.   Abdomen: Soft, nontender.     Skin: Warm, Dry, No erythema, No rash.    Extremities: No edema, Chronic deformity to bilateral thumbs, Full range of motion, no back or neck tenderness  Neurologic: Intoxicated, Alert and oriented to his name, place and the fact he is drinking alcohol, Normal motor function, No focal deficits noted.  Psychiatric: Intoxicated.     DIAGNOSTIC STUDIES/PROCEDURES    COURSE & MEDICAL DECISION MAKING  Pertinent Labs & Imaging studies reviewed. (See chart for details)    10:42 PM Patient seen and examined at bedside. Ordered Accu chek to evaluate his symptoms.     This is a 33-year-old male that is well-known to us presents with alcohol intoxication.  The patient has no evidence of any focal neurological deficits, denies trauma.  He was conversant with me at one back to " sleep.  Patient does have a breathalyzer of 400.  The patient will be observed here in the emergency department was clinically sober we reevaluated by my partner.  I do not suspect intracranial hemorrhage, meningitis, or traumatic injury.    FINAL IMPRESSION  Alcohol Intoxication  Radha FELIPE (Scribe), am scribing for, and in the presence of, Michael Owusu D.O  Electronically signed by: Radha Hills (Scribe), 4/17/2019  IMichael D.O. personally performed the services described in this documentation, as scribed by Radha Hills in my presence, and it is both accurate and complete. E.     The note accurately reflects work and decisions made by me.  Michael Owusu  4/18/2019  12:04 AM

## 2019-04-18 NOTE — ED PROVIDER NOTES
ED PROVIDER NOTE    Scribed for Jose Snyder M.D. by Bart Almazan. 4/18/2019, 1:47 AM.    This is an addendum to the note on Gopal Ceja. For further details and full chart entry, see the previously signed ED Provider Note written by Dr. Owusu ([facility]).      DIAGNOSTIC STUDIES / PROCEDURES    LABS  Labs Reviewed   SALICYLATE - Abnormal; Notable for the following:        Result Value    Salicylates, Quant. 0 (*)     All other components within normal limits   CBC WITH DIFFERENTIAL - Abnormal; Notable for the following:     MCV 98.2 (*)     MCHC 32.5 (*)     All other components within normal limits    Narrative:     Indicate which anticoagulants the patient is on:->NONE   COMP METABOLIC PANEL - Abnormal; Notable for the following:     Anion Gap 17.0 (*)     Bun 6 (*)     AST(SGOT) 142 (*)     ALT(SGPT) 110 (*)     Total Protein 8.3 (*)     Globulin 3.9 (*)     All other components within normal limits   ACETAMINOPHEN   PROTHROMBIN TIME    Narrative:     Indicate which anticoagulants the patient is on:->NONE   ESTIMATED GFR   ACCU-CHEK GLUCOSE     All labs reviewed by me.    EKG  12 Lead EKG interpreted by me to show:  Indication: Arhythmia   Sinus Tachycardia  Rate 127  Axis: Normal  Intervals: Normal  Normal T waves  Normal ST segments  My impression of this EKG: No STEMI.     COURSE & MEDICAL DECISION MAKING    12:08 AM - I discussed the patient's case with Dr. Owusu ([facility]) who will transfer care of the patient to me at this time.        1:22 AM - Patient was reevaluated at bedside. He is resting comfortably. Patient will continue to be monitored and reevaluated shortly.    4:49 AM - Patient was reevaluated at bedside. He will be placed on PO challenge and road tested.    4:54 AM - Nursing staff reports patient has tolerated crackers without vomiting. Nursing staff states upon trying to get patient to ambulate, he is beginning to state that he is suicidal and wants to stay in the hospital. Ordered EKG,  Salicylate, and Acetaminophen to evaluate.     After this event patient began to withdrawal from alcohol.  Patient with bilateral hand tremors and tongue fasciculations.     Patient medicated numerous times in emergency department with benzos however will likely require ICU level of care to adequately control alcohol withdrawal    Given current level of intoxication, unable to place on hold at this time, despite pt requiring meds to assist w/ ETOH w/d.  Dr Curry aware.     FINAL IMPRESSION   1. Alcoholic intoxication without complication (HCC)    2. Alcohol withdrawal syndrome with complication (HCC)    3. Suicidal ideation         Bart FELIPE (Janee), am scribing for, and in the presence of, Jose Snyder M.D..    Electronically signed by: Bart Almazan (Janee), 4/18/2019    IJose M.D. personally performed the services described in this documentation, as scribed by Bart Almazan in my presence, and it is both accurate and complete.    The note accurately reflects work and decisions made by me.  Jose Snyder  4/18/2019  8:09 AM

## 2019-04-18 NOTE — H&P
Hospital Medicine History & Physical Note    Date of Service  4/18/2019    Primary Care Physician  RUSS Goddard    Consultants  None    Code Status  Full code    Chief Complaint  Withdrawal    History of Presenting Illness  33 y.o. 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  Prior to my visit, he was given Ativan, and is currently somewhat obtunded from the same.  He is therefore unable to provide a history.  He does open his eyes to his name, however he cannot stay awake long enough to tell me anything.  Per prior notes, he has no reported chest pain, nausea or vomiting.    Review of Systems  Review of Systems   Unable to perform ROS: Mental status change       Past Medical History   has a past medical history of Acute anxiety; ADHD (attention deficit hyperactivity disorder); ADHD (attention deficit hyperactivity disorder); Alcohol abuse; Bipolar affective (HCC); Depression; Ectrodactyly-ectodermal dysplasia-clefting syndrome; ETOH abuse; and Psychiatric disorder.    Surgical History   has a past surgical history that includes other orthopedic surgery.     Family History  family history is not on file.     Social History   reports that he has quit smoking. His smoking use included Cigarettes. He smoked 0.25 packs per day. He has never used smokeless tobacco. He reports that he drinks alcohol. He reports that he does not use drugs.    Allergies  No Known Allergies    Medications  Prior to Admission Medications   Prescriptions Last Dose Informant Patient Reported? Taking?   metoprolol (LOPRESSOR) 50 MG Tab   No No   Sig: Take 1 Tab by mouth 2 Times a Day.      Facility-Administered Medications: None       Physical Exam  Temp:  [37 °C (98.6 °F)] 37 °C (98.6 °F)  Pulse:  [] 109  Resp:  [16-20] 18  BP: (111-136)/()  122/68  SpO2:  [92 %-100 %] 100 %    Physical Exam   Constitutional: He appears well-developed and well-nourished. No distress.   HENT:   Head: Normocephalic and atraumatic.   Eyes: Pupils are equal, round, and reactive to light. Conjunctivae are normal.   Neck: Normal range of motion. Neck supple. No tracheal deviation present. No thyromegaly present.   Cardiovascular: Regular rhythm and normal heart sounds.  Exam reveals no gallop and no friction rub.    No murmur heard.  Pulmonary/Chest: Effort normal and breath sounds normal. No respiratory distress. He has no wheezes.   Abdominal: Soft. Bowel sounds are normal. He exhibits no distension and no mass. There is no tenderness. There is no rebound and no guarding.   Musculoskeletal: Normal range of motion. He exhibits no edema.   Lymphadenopathy:     He has no cervical adenopathy.   Neurological: He is alert. No cranial nerve deficit.   Alertable to name, somnolent   Skin: Skin is warm and dry. He is not diaphoretic.   Psychiatric: He has a normal mood and affect.   Nursing note and vitals reviewed.      Laboratory:  Recent Labs      04/16/19   0928  04/18/19   0520   WBC  5.5  5.7   RBC  4.82  5.01   HEMOGLOBIN  15.7  16.0   HEMATOCRIT  46.9  49.2   MCV  97.3  98.2*   MCH  32.6  31.9   MCHC  33.5*  32.5*   RDW  46.0  48.1   PLATELETCT  291  280   MPV  10.5  10.7     Recent Labs      04/16/19   0928  04/18/19   0520   SODIUM  140  144   POTASSIUM  3.8  3.8   CHLORIDE  102  102   CO2  25  25   GLUCOSE  95  71   BUN  7*  6*   CREATININE  0.67  0.72   CALCIUM  8.5  8.8     Recent Labs      04/16/19   0928  04/18/19   0520   ALTSGPT  108*  110*   ASTSGOT  147*  142*   ALKPHOSPHAT  96  97   TBILIRUBIN  0.5  0.4   LIPASE  41   --    GLUCOSE  95  71     Recent Labs      04/18/19   0520   INR  0.97             No results for input(s): TROPONINI in the last 72 hours.    Urinalysis:    No results found     Imaging:  No orders to display         Assessment/Plan:  I anticipate  this patient will require at least two midnights for appropriate medical management, necessitating inpatient admission.    * Alcohol withdrawal (HCC)- (present on admission)   Assessment & Plan    Will admit, monitor closely, placed on the CIWA protocol.  Continue with Ativan as needed.  Close monitoring, as may need to switch to phenobarbital if no real improvement.     Alcohol dependence (HCC)- (present on admission)   Assessment & Plan    Chronic and ongoing.     Alcoholic hepatitis- (present on admission)   Assessment & Plan    Due to intoxication.     Essential hypertension- (present on admission)   Assessment & Plan    Continue current medication regimen.  Does have significant tachycardia.     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  Does have deformities.         VTE prophylaxis: SCD, lovenox

## 2019-04-18 NOTE — PROGRESS NOTES
Pt transported to room on zoll, received bedside report. Pt placed on heart monitor,called monitor room verified pt name,heart rate and rhythm.

## 2019-04-18 NOTE — ED TRIAGE NOTES
"Gopal Ceja  33 y.o.    /107   Pulse (!) 105   Temp 37 °C (98.6 °F) (Temporal)   Resp 16   Ht 1.727 m (5' 8\")   Wt 68 kg (150 lb)   SpO2 99%   BMI 22.81 kg/m²     Chief Complaint   Patient presents with   • Alcohol Intoxication   • ETOH Withdrawal       Pt presents to ED via EMS with c/o ETOH intoxication/withdrawal. Unknown last drink. Pt endorses heavy drinking daily. Pt denies SI/HI. Breathalyzer 0.422 at this time. ABCs intact.    "

## 2019-04-18 NOTE — ASSESSMENT & PLAN NOTE
Continue with CIWA protocol.  Daily multivitamins, folic acid, and thiamine.  Gradually improving.

## 2019-04-18 NOTE — PROGRESS NOTES
Seen this morning. Admitted by my partner. Sleeping but arousable. Chart reviewed, meds reviewed, labs reviewed. No changes.

## 2019-04-19 PROBLEM — D53.9 MACROCYTIC ANEMIA: Status: ACTIVE | Noted: 2019-04-19

## 2019-04-19 LAB
ANION GAP SERPL CALC-SCNC: 6 MMOL/L (ref 0–11.9)
BASOPHILS # BLD AUTO: 0.6 % (ref 0–1.8)
BASOPHILS # BLD: 0.03 K/UL (ref 0–0.12)
BUN SERPL-MCNC: 8 MG/DL (ref 8–22)
CALCIUM SERPL-MCNC: 9 MG/DL (ref 8.5–10.5)
CHLORIDE SERPL-SCNC: 102 MMOL/L (ref 96–112)
CO2 SERPL-SCNC: 30 MMOL/L (ref 20–33)
CREAT SERPL-MCNC: 0.73 MG/DL (ref 0.5–1.4)
EOSINOPHIL # BLD AUTO: 0.03 K/UL (ref 0–0.51)
EOSINOPHIL NFR BLD: 0.6 % (ref 0–6.9)
ERYTHROCYTE [DISTWIDTH] IN BLOOD BY AUTOMATED COUNT: 46 FL (ref 35.9–50)
GLUCOSE SERPL-MCNC: 92 MG/DL (ref 65–99)
HCT VFR BLD AUTO: 42 % (ref 42–52)
HGB BLD-MCNC: 13.7 G/DL (ref 14–18)
IMM GRANULOCYTES # BLD AUTO: 0.01 K/UL (ref 0–0.11)
IMM GRANULOCYTES NFR BLD AUTO: 0.2 % (ref 0–0.9)
LYMPHOCYTES # BLD AUTO: 0.8 K/UL (ref 1–4.8)
LYMPHOCYTES NFR BLD: 15.7 % (ref 22–41)
MAGNESIUM SERPL-MCNC: 1.8 MG/DL (ref 1.5–2.5)
MCH RBC QN AUTO: 32.1 PG (ref 27–33)
MCHC RBC AUTO-ENTMCNC: 32.6 G/DL (ref 33.7–35.3)
MCV RBC AUTO: 98.4 FL (ref 81.4–97.8)
MONOCYTES # BLD AUTO: 0.49 K/UL (ref 0–0.85)
MONOCYTES NFR BLD AUTO: 9.6 % (ref 0–13.4)
NEUTROPHILS # BLD AUTO: 3.74 K/UL (ref 1.82–7.42)
NEUTROPHILS NFR BLD: 73.3 % (ref 44–72)
NRBC # BLD AUTO: 0 K/UL
NRBC BLD-RTO: 0 /100 WBC
PHOSPHATE SERPL-MCNC: 3.3 MG/DL (ref 2.5–4.5)
PLATELET # BLD AUTO: 168 K/UL (ref 164–446)
PMV BLD AUTO: 11.1 FL (ref 9–12.9)
POTASSIUM SERPL-SCNC: 4.3 MMOL/L (ref 3.6–5.5)
RBC # BLD AUTO: 4.27 M/UL (ref 4.7–6.1)
SODIUM SERPL-SCNC: 138 MMOL/L (ref 135–145)
WBC # BLD AUTO: 5.1 K/UL (ref 4.8–10.8)

## 2019-04-19 PROCEDURE — 84100 ASSAY OF PHOSPHORUS: CPT

## 2019-04-19 PROCEDURE — 85025 COMPLETE CBC W/AUTO DIFF WBC: CPT

## 2019-04-19 PROCEDURE — A9270 NON-COVERED ITEM OR SERVICE: HCPCS | Performed by: HOSPITALIST

## 2019-04-19 PROCEDURE — 700105 HCHG RX REV CODE 258: Performed by: HOSPITALIST

## 2019-04-19 PROCEDURE — 99232 SBSQ HOSP IP/OBS MODERATE 35: CPT | Performed by: FAMILY MEDICINE

## 2019-04-19 PROCEDURE — 700111 HCHG RX REV CODE 636 W/ 250 OVERRIDE (IP): Performed by: HOSPITALIST

## 2019-04-19 PROCEDURE — 80048 BASIC METABOLIC PNL TOTAL CA: CPT

## 2019-04-19 PROCEDURE — 83735 ASSAY OF MAGNESIUM: CPT

## 2019-04-19 PROCEDURE — 770020 HCHG ROOM/CARE - TELE (206)

## 2019-04-19 PROCEDURE — 700102 HCHG RX REV CODE 250 W/ 637 OVERRIDE(OP): Performed by: HOSPITALIST

## 2019-04-19 PROCEDURE — 36415 COLL VENOUS BLD VENIPUNCTURE: CPT

## 2019-04-19 RX ADMIN — LORAZEPAM 2 MG: 2 TABLET ORAL at 09:09

## 2019-04-19 RX ADMIN — LORAZEPAM 2 MG: 2 TABLET ORAL at 11:37

## 2019-04-19 RX ADMIN — SODIUM CHLORIDE: 9 INJECTION, SOLUTION INTRAVENOUS at 07:49

## 2019-04-19 RX ADMIN — THERA TABS 1 TABLET: TAB at 05:40

## 2019-04-19 RX ADMIN — Medication 100 MG: at 05:40

## 2019-04-19 RX ADMIN — LORAZEPAM 2 MG: 2 TABLET ORAL at 15:34

## 2019-04-19 RX ADMIN — METOPROLOL TARTRATE 50 MG: 50 TABLET ORAL at 05:41

## 2019-04-19 RX ADMIN — SODIUM CHLORIDE: 9 INJECTION, SOLUTION INTRAVENOUS at 23:23

## 2019-04-19 RX ADMIN — LORAZEPAM 2 MG: 2 TABLET ORAL at 19:05

## 2019-04-19 RX ADMIN — SODIUM CHLORIDE: 9 INJECTION, SOLUTION INTRAVENOUS at 00:08

## 2019-04-19 RX ADMIN — METOPROLOL TARTRATE 50 MG: 50 TABLET ORAL at 19:05

## 2019-04-19 RX ADMIN — FOLIC ACID 1 MG: 1 TABLET ORAL at 05:41

## 2019-04-19 RX ADMIN — LORAZEPAM 2 MG: 2 TABLET ORAL at 13:46

## 2019-04-19 RX ADMIN — LORAZEPAM 3 MG: 1 TABLET ORAL at 21:36

## 2019-04-19 RX ADMIN — LORAZEPAM 1 MG: 2 INJECTION INTRAMUSCULAR; INTRAVENOUS at 05:41

## 2019-04-19 RX ADMIN — ENOXAPARIN SODIUM 40 MG: 100 INJECTION SUBCUTANEOUS at 05:41

## 2019-04-19 RX ADMIN — LORAZEPAM 1 MG: 2 INJECTION INTRAMUSCULAR; INTRAVENOUS at 23:22

## 2019-04-19 RX ADMIN — SODIUM CHLORIDE: 9 INJECTION, SOLUTION INTRAVENOUS at 15:33

## 2019-04-19 RX ADMIN — LORAZEPAM 1 MG: 2 INJECTION INTRAMUSCULAR; INTRAVENOUS at 01:17

## 2019-04-19 ASSESSMENT — LIFESTYLE VARIABLES
NAUSEA AND VOMITING: NO NAUSEA AND NO VOMITING
NAUSEA AND VOMITING: MILD NAUSEA WITH NO VOMITING
ANXIETY: MILDLY ANXIOUS
ORIENTATION AND CLOUDING OF SENSORIUM: ORIENTED AND CAN DO SERIAL ADDITIONS
TOTAL SCORE: 14
VISUAL DISTURBANCES: MODERATE SENSITIVITY
AGITATION: SOMEWHAT MORE THAN NORMAL ACTIVITY
HEADACHE, FULLNESS IN HEAD: VERY MILD
AUDITORY DISTURBANCES: NOT PRESENT
AGITATION: NORMAL ACTIVITY
TOTAL SCORE: MILD ITCHING, PINS AND NEEDLES SENSATION, BURNING OR NUMBNESS
ORIENTATION AND CLOUDING OF SENSORIUM: ORIENTED AND CAN DO SERIAL ADDITIONS
TREMOR: *
ORIENTATION AND CLOUDING OF SENSORIUM: ORIENTED AND CAN DO SERIAL ADDITIONS
PAROXYSMAL SWEATS: BARELY PERCEPTIBLE SWEATING. PALMS MOIST
TREMOR: *
TOTAL SCORE: 13
TREMOR: *
AGITATION: SOMEWHAT MORE THAN NORMAL ACTIVITY
ANXIETY: MILDLY ANXIOUS
AGITATION: SOMEWHAT MORE THAN NORMAL ACTIVITY
NAUSEA AND VOMITING: *
TREMOR: *
PAROXYSMAL SWEATS: *
TREMOR: *
ANXIETY: MILDLY ANXIOUS
ANXIETY: MILDLY ANXIOUS
PAROXYSMAL SWEATS: *
TOTAL SCORE: MODERATELY SEVERE HALLUCINATIONS
AGITATION: NORMAL ACTIVITY
PAROXYSMAL SWEATS: *
TREMOR: *
VISUAL DISTURBANCES: MODERATE SENSITIVITY
TOTAL SCORE: 14
TOTAL SCORE: 14
ORIENTATION AND CLOUDING OF SENSORIUM: ORIENTED AND CAN DO SERIAL ADDITIONS
ANXIETY: MILDLY ANXIOUS
HEADACHE, FULLNESS IN HEAD: MILD
HEADACHE, FULLNESS IN HEAD: MODERATE
ORIENTATION AND CLOUDING OF SENSORIUM: ORIENTED AND CAN DO SERIAL ADDITIONS
TOTAL SCORE: MILD ITCHING, PINS AND NEEDLES SENSATION, BURNING OR NUMBNESS
VISUAL DISTURBANCES: MODERATE SENSITIVITY
ORIENTATION AND CLOUDING OF SENSORIUM: ORIENTED AND CAN DO SERIAL ADDITIONS
ANXIETY: MILDLY ANXIOUS
PAROXYSMAL SWEATS: BARELY PERCEPTIBLE SWEATING. PALMS MOIST
AGITATION: SOMEWHAT MORE THAN NORMAL ACTIVITY
PAROXYSMAL SWEATS: *
NAUSEA AND VOMITING: MILD NAUSEA WITH NO VOMITING
HEADACHE, FULLNESS IN HEAD: MILD
AUDITORY DISTURBANCES: VERY MILD HARSHNESS OR ABILITY TO FRIGHTEN
TOTAL SCORE: MODERATE ITCHING, PINS AND NEEDLES SENSATION, BURNING OR NUMBNESS
HEADACHE, FULLNESS IN HEAD: VERY MILD
TOTAL SCORE: MILD ITCHING, PINS AND NEEDLES SENSATION, BURNING OR NUMBNESS
ORIENTATION AND CLOUDING OF SENSORIUM: ORIENTED AND CAN DO SERIAL ADDITIONS
TREMOR: *
ORIENTATION AND CLOUDING OF SENSORIUM: ORIENTED AND CAN DO SERIAL ADDITIONS
AUDITORY DISTURBANCES: NOT PRESENT
NAUSEA AND VOMITING: NO NAUSEA AND NO VOMITING
VISUAL DISTURBANCES: MODERATE SENSITIVITY
NAUSEA AND VOMITING: MILD NAUSEA WITH NO VOMITING
AUDITORY DISTURBANCES: VERY MILD HARSHNESS OR ABILITY TO FRIGHTEN
VISUAL DISTURBANCES: MODERATE SENSITIVITY
NAUSEA AND VOMITING: NO NAUSEA AND NO VOMITING
TOTAL SCORE: MILD ITCHING, PINS AND NEEDLES SENSATION, BURNING OR NUMBNESS
AGITATION: SOMEWHAT MORE THAN NORMAL ACTIVITY
SUBSTANCE_ABUSE: 0
PAROXYSMAL SWEATS: *
AGITATION: SOMEWHAT MORE THAN NORMAL ACTIVITY
VISUAL DISTURBANCES: MODERATE SENSITIVITY
ORIENTATION AND CLOUDING OF SENSORIUM: ORIENTED AND CAN DO SERIAL ADDITIONS
NAUSEA AND VOMITING: MILD NAUSEA WITH NO VOMITING
TREMOR: *
TOTAL SCORE: 14
AGITATION: SOMEWHAT MORE THAN NORMAL ACTIVITY
PAROXYSMAL SWEATS: BARELY PERCEPTIBLE SWEATING. PALMS MOIST
VISUAL DISTURBANCES: MODERATE SENSITIVITY
TOTAL SCORE: 17
ANXIETY: MILDLY ANXIOUS
AUDITORY DISTURBANCES: NOT PRESENT
TOTAL SCORE: MILD ITCHING, PINS AND NEEDLES SENSATION, BURNING OR NUMBNESS
NAUSEA AND VOMITING: MILD NAUSEA WITH NO VOMITING
TOTAL SCORE: MILD ITCHING, PINS AND NEEDLES SENSATION, BURNING OR NUMBNESS
AUDITORY DISTURBANCES: NOT PRESENT
HEADACHE, FULLNESS IN HEAD: VERY MILD
ANXIETY: *
AUDITORY DISTURBANCES: NOT PRESENT
TOTAL SCORE: 13
VISUAL DISTURBANCES: MODERATELY SEVERE HALLUCINATIONS
TOTAL SCORE: 14
TREMOR: *
TOTAL SCORE: 14
HEADACHE, FULLNESS IN HEAD: MILD
AUDITORY DISTURBANCES: NOT PRESENT
VISUAL DISTURBANCES: MODERATE SENSITIVITY
PAROXYSMAL SWEATS: BARELY PERCEPTIBLE SWEATING. PALMS MOIST
ANXIETY: MILDLY ANXIOUS
HEADACHE, FULLNESS IN HEAD: MODERATE
HEADACHE, FULLNESS IN HEAD: MODERATE
TOTAL SCORE: MODERATE ITCHING, PINS AND NEEDLES SENSATION, BURNING OR NUMBNESS
AUDITORY DISTURBANCES: NOT PRESENT

## 2019-04-19 ASSESSMENT — ENCOUNTER SYMPTOMS
VOMITING: 0
DIZZINESS: 0
HEMOPTYSIS: 0
FEVER: 0
DOUBLE VISION: 0
PALPITATIONS: 0
MYALGIAS: 0
CHILLS: 0
ORTHOPNEA: 0
NERVOUS/ANXIOUS: 1
PHOTOPHOBIA: 0
BRUISES/BLEEDS EASILY: 0
DEPRESSION: 0
COUGH: 0
BLURRED VISION: 0
NAUSEA: 0
HEARTBURN: 0
HEADACHES: 0
NECK PAIN: 0

## 2019-04-19 NOTE — PROGRESS NOTES
"Pt refusing 0300 CIWA assessment. He is sleeping and wants to be \"left alone\". Will reassess during AM med pass.  "

## 2019-04-19 NOTE — PROGRESS NOTES
Hospital Medicine Daily Progress Note    Date of Service  4/19/2019    Chief Complaint  33 y.o. male admitted 4/17/2019 with alcohol intoxication    Hospital Course   This is a 33 years old male who has past medical history of alcohol abuse and prior alcohol withdrawal, history of congenital ectrodactyly ectodermal dysplasia syndrome comes in with acute alcohol intoxication.  Was started on CIWA protocol for alcohol withdrawal along with multivitamins, daily thiamine and folic acid.  Interval Problem Update  Tired looking.  Laying in bed comfortable.  Arousable.  Answers questions and follow commands.  No acute distress noted.    Consultants/Specialty  None    Code Status  Full code    Disposition  Anticipate discharge home once detoxed.    Review of Systems  Review of Systems   Constitutional: Positive for malaise/fatigue. Negative for chills and fever.   HENT: Negative for ear pain, hearing loss and tinnitus.    Eyes: Negative for blurred vision, double vision and photophobia.   Respiratory: Negative for cough and hemoptysis.    Cardiovascular: Negative for chest pain, palpitations and orthopnea.   Gastrointestinal: Negative for heartburn, nausea and vomiting.   Genitourinary: Negative for dysuria and urgency.   Musculoskeletal: Negative for myalgias and neck pain.   Skin: Negative for rash.   Neurological: Negative for dizziness and headaches.   Endo/Heme/Allergies: Does not bruise/bleed easily.   Psychiatric/Behavioral: Negative for depression, substance abuse and suicidal ideas. The patient is nervous/anxious.         Physical Exam  Temp:  [36.4 °C (97.5 °F)-37.8 °C (100 °F)] 36.4 °C (97.5 °F)  Pulse:  [71-92] 84  Resp:  [16] 16  BP: (130-147)/() 134/99  SpO2:  [97 %-99 %] 99 %    Physical Exam   Constitutional: He is oriented to person, place, and time. No distress.   HENT:   Head: Normocephalic and atraumatic.   Mouth/Throat: No oropharyngeal exudate.   Eyes: Pupils are equal, round, and reactive to  light. No scleral icterus.   Neck: Normal range of motion. No tracheal deviation present. No thyromegaly present.   Cardiovascular: Normal rate and regular rhythm.  Exam reveals no gallop and no friction rub.    Pulmonary/Chest: Effort normal. No respiratory distress. He has no wheezes.   Abdominal: Soft. He exhibits no distension. There is no tenderness.   Musculoskeletal: He exhibits no tenderness or deformity.   Neurological: He is alert and oriented to person, place, and time.   Skin: Skin is warm and dry. He is not diaphoretic.   Psychiatric: He has a normal mood and affect.       Fluids    Intake/Output Summary (Last 24 hours) at 04/19/19 1640  Last data filed at 04/19/19 0800   Gross per 24 hour   Intake                0 ml   Output             2650 ml   Net            -2650 ml       Laboratory  Recent Labs      04/18/19   0520  04/19/19   0105   WBC  5.7  5.1   RBC  5.01  4.27*   HEMOGLOBIN  16.0  13.7*   HEMATOCRIT  49.2  42.0   MCV  98.2*  98.4*   MCH  31.9  32.1   MCHC  32.5*  32.6*   RDW  48.1  46.0   PLATELETCT  280  168   MPV  10.7  11.1     Recent Labs      04/18/19   0520  04/19/19   0105   SODIUM  144  138   POTASSIUM  3.8  4.3   CHLORIDE  102  102   CO2  25  30   GLUCOSE  71  92   BUN  6*  8   CREATININE  0.72  0.73   CALCIUM  8.8  9.0     Recent Labs      04/18/19   0520   INR  0.97               Imaging  No orders to display        Assessment/Plan  * Alcohol withdrawal (HCC)- (present on admission)   Assessment & Plan    Continue with CIWA protocol.  Daily multivitamins, folic acid, and thiamine.         Alcohol dependence (HCC)- (present on admission)   Assessment & Plan    Chronic and ongoing.  Patient was counseled.     Alcoholic hepatitis- (present on admission)   Assessment & Plan    Due to intoxication.  Monitor LFTs.     Macrocytic anemia- (present on admission)   Assessment & Plan    Check folic acid and vitamin B12 levels.  Monitor H&H.     Essential hypertension- (present on admission)    Assessment & Plan    Continue current medication regimen.  Does have significant tachycardia.     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  Does have deformities.          VTE prophylaxis: Lovenox

## 2019-04-19 NOTE — PROGRESS NOTES
· 2 RN skin check complete with JORDY Hrerera.  · Devices in place Waffle mattress, Q 2 hour turns, meplex.  · Skin assessed under devices gown, non skid socks.  · Confirmed pressure ulcers found on N/A.  · New potential pressure ulcers noted on N/A. Wound consult placed and wound reported.  · The following interventions in place Waffle mattress, Q 2 hour turns, meplex  Bilateral ears are red and blanching  Bilateral elbows are dry, pink and blanching, middle digits on bilateral hands are missing  Sacrum is pink and blanching with old scabs  Bilateral heels are dry, pink and blanching, mid toes amputated

## 2019-04-19 NOTE — PROGRESS NOTES
Assumed care of pt, he is alert and oriented. Discussed POC with pt and dayshift RN. Discussed safety and the need to call before getting out of bed. Bed is locked in lowest position and call light/personal belongings are within reach. Bed alarm is on and non slip socks in place.

## 2019-04-20 PROCEDURE — A9270 NON-COVERED ITEM OR SERVICE: HCPCS | Performed by: FAMILY MEDICINE

## 2019-04-20 PROCEDURE — A9270 NON-COVERED ITEM OR SERVICE: HCPCS | Performed by: HOSPITALIST

## 2019-04-20 PROCEDURE — 99232 SBSQ HOSP IP/OBS MODERATE 35: CPT | Performed by: FAMILY MEDICINE

## 2019-04-20 PROCEDURE — 700102 HCHG RX REV CODE 250 W/ 637 OVERRIDE(OP): Performed by: HOSPITALIST

## 2019-04-20 PROCEDURE — 700105 HCHG RX REV CODE 258: Performed by: HOSPITALIST

## 2019-04-20 PROCEDURE — 700102 HCHG RX REV CODE 250 W/ 637 OVERRIDE(OP): Performed by: FAMILY MEDICINE

## 2019-04-20 PROCEDURE — 770020 HCHG ROOM/CARE - TELE (206)

## 2019-04-20 RX ORDER — OMEPRAZOLE 20 MG/1
40 CAPSULE, DELAYED RELEASE ORAL DAILY
Status: DISCONTINUED | OUTPATIENT
Start: 2019-04-20 | End: 2019-04-22 | Stop reason: HOSPADM

## 2019-04-20 RX ADMIN — LORAZEPAM 2 MG: 2 TABLET ORAL at 14:21

## 2019-04-20 RX ADMIN — METOPROLOL TARTRATE 50 MG: 50 TABLET ORAL at 17:14

## 2019-04-20 RX ADMIN — LORAZEPAM 0.5 MG: 1 TABLET ORAL at 21:11

## 2019-04-20 RX ADMIN — FOLIC ACID 1 MG: 1 TABLET ORAL at 06:30

## 2019-04-20 RX ADMIN — LORAZEPAM 1 MG: 1 TABLET ORAL at 17:14

## 2019-04-20 RX ADMIN — THERA TABS 1 TABLET: TAB at 06:30

## 2019-04-20 RX ADMIN — LORAZEPAM 1 MG: 1 TABLET ORAL at 06:30

## 2019-04-20 RX ADMIN — OMEPRAZOLE 40 MG: 20 CAPSULE, DELAYED RELEASE ORAL at 14:22

## 2019-04-20 RX ADMIN — Medication 100 MG: at 06:30

## 2019-04-20 RX ADMIN — METOPROLOL TARTRATE 50 MG: 50 TABLET ORAL at 06:30

## 2019-04-20 RX ADMIN — SODIUM CHLORIDE: 9 INJECTION, SOLUTION INTRAVENOUS at 07:44

## 2019-04-20 RX ADMIN — SODIUM CHLORIDE: 9 INJECTION, SOLUTION INTRAVENOUS at 16:02

## 2019-04-20 ASSESSMENT — LIFESTYLE VARIABLES
AGITATION: NORMAL ACTIVITY
TOTAL SCORE: 11
AUDITORY DISTURBANCES: NOT PRESENT
NAUSEA AND VOMITING: NO NAUSEA AND NO VOMITING
AGITATION: SOMEWHAT MORE THAN NORMAL ACTIVITY
TREMOR: TREMOR NOT VISIBLE BUT CAN BE FELT, FINGERTIP TO FINGERTIP
HEADACHE, FULLNESS IN HEAD: MILD
HEADACHE, FULLNESS IN HEAD: MODERATE
TREMOR: *
TREMOR: *
TOTAL SCORE: 7
PAROXYSMAL SWEATS: BARELY PERCEPTIBLE SWEATING. PALMS MOIST
TOTAL SCORE: VERY MILD ITCHING, PINS AND NEEDLES SENSATION, BURNING OR NUMBNESS
PAROXYSMAL SWEATS: BARELY PERCEPTIBLE SWEATING. PALMS MOIST
TOTAL SCORE: 10
AUDITORY DISTURBANCES: VERY MILD HARSHNESS OR ABILITY TO FRIGHTEN
TOTAL SCORE: MILD ITCHING, PINS AND NEEDLES SENSATION, BURNING OR NUMBNESS
TOTAL SCORE: VERY MILD ITCHING, PINS AND NEEDLES SENSATION, BURNING OR NUMBNESS
VISUAL DISTURBANCES: NOT PRESENT
AUDITORY DISTURBANCES: NOT PRESENT
VISUAL DISTURBANCES: NOT PRESENT
VISUAL DISTURBANCES: MILD SENSITIVITY
ANXIETY: MILDLY ANXIOUS
ANXIETY: MILDLY ANXIOUS
ORIENTATION AND CLOUDING OF SENSORIUM: ORIENTED AND CAN DO SERIAL ADDITIONS
NAUSEA AND VOMITING: NO NAUSEA AND NO VOMITING
ANXIETY: MILDLY ANXIOUS
VISUAL DISTURBANCES: NOT PRESENT
NAUSEA AND VOMITING: NO NAUSEA AND NO VOMITING
HEADACHE, FULLNESS IN HEAD: VERY MILD
TOTAL SCORE: 8
TREMOR: *
HEADACHE, FULLNESS IN HEAD: MILD
ORIENTATION AND CLOUDING OF SENSORIUM: ORIENTED AND CAN DO SERIAL ADDITIONS
TOTAL SCORE: 7
AGITATION: NORMAL ACTIVITY
AGITATION: SOMEWHAT MORE THAN NORMAL ACTIVITY
ANXIETY: *
VISUAL DISTURBANCES: NOT PRESENT
AGITATION: NORMAL ACTIVITY
PAROXYSMAL SWEATS: BARELY PERCEPTIBLE SWEATING. PALMS MOIST
PAROXYSMAL SWEATS: *
TREMOR: *
NAUSEA AND VOMITING: NO NAUSEA AND NO VOMITING
AUDITORY DISTURBANCES: NOT PRESENT
ORIENTATION AND CLOUDING OF SENSORIUM: ORIENTED AND CAN DO SERIAL ADDITIONS
PAROXYSMAL SWEATS: NO SWEAT VISIBLE
HEADACHE, FULLNESS IN HEAD: MILD
AGITATION: SOMEWHAT MORE THAN NORMAL ACTIVITY
ORIENTATION AND CLOUDING OF SENSORIUM: ORIENTED AND CAN DO SERIAL ADDITIONS
TOTAL SCORE: VERY MILD ITCHING, PINS AND NEEDLES SENSATION, BURNING OR NUMBNESS
ORIENTATION AND CLOUDING OF SENSORIUM: ORIENTED AND CAN DO SERIAL ADDITIONS
PAROXYSMAL SWEATS: NO SWEAT VISIBLE
VISUAL DISTURBANCES: NOT PRESENT
AUDITORY DISTURBANCES: NOT PRESENT
NAUSEA AND VOMITING: NO NAUSEA AND NO VOMITING
AUDITORY DISTURBANCES: NOT PRESENT
TREMOR: *
ANXIETY: MILDLY ANXIOUS
HEADACHE, FULLNESS IN HEAD: MODERATE
NAUSEA AND VOMITING: NO NAUSEA AND NO VOMITING
ORIENTATION AND CLOUDING OF SENSORIUM: ORIENTED AND CAN DO SERIAL ADDITIONS
TOTAL SCORE: 5
SUBSTANCE_ABUSE: 0
ANXIETY: MILDLY ANXIOUS

## 2019-04-20 ASSESSMENT — ENCOUNTER SYMPTOMS
PALPITATIONS: 0
NAUSEA: 0
MYALGIAS: 0
VOMITING: 0
ORTHOPNEA: 0
DEPRESSION: 0
PHOTOPHOBIA: 0
DOUBLE VISION: 0
BRUISES/BLEEDS EASILY: 0
DIZZINESS: 0
FEVER: 0
EYE PAIN: 0
HEADACHES: 0
ABDOMINAL PAIN: 0
TINGLING: 0
BLURRED VISION: 0
NERVOUS/ANXIOUS: 1
NECK PAIN: 0
HEMOPTYSIS: 0
CHILLS: 0
HEARTBURN: 0
COUGH: 0
CLAUDICATION: 0

## 2019-04-20 NOTE — PROGRESS NOTES
"Pt intermittently refusing CIWA assessment. He states \"I just want to sleep\". I will continue to assess as he will let me. Does not appear to be in any acute distress.  "

## 2019-04-20 NOTE — PROGRESS NOTES
Assumed care of pt at 0700. Patient assessed per flowsheets, no complaints at this time, call bell in reach, safety measures in place. Will continue to monitor.

## 2019-04-20 NOTE — PROGRESS NOTES
Pt refused CIWA at this time, requesting to eat dinner first. Will try again after Pt is done eating.

## 2019-04-20 NOTE — PROGRESS NOTES
Assumed care of pt, he is resting in bed/denies pain. Discussed POC with pt and dayshift RN. Discussed safety and the need to call before getting up. Bed is locked in lowest position and call light/personal belongings are within reach.

## 2019-04-21 ENCOUNTER — PATIENT OUTREACH (OUTPATIENT)
Dept: HEALTH INFORMATION MANAGEMENT | Facility: OTHER | Age: 34
End: 2019-04-21

## 2019-04-21 PROBLEM — F10.930 ALCOHOL WITHDRAWAL SYNDROME WITHOUT COMPLICATION (HCC): Status: ACTIVE | Noted: 2018-10-30

## 2019-04-21 PROBLEM — F10.939 ALCOHOL WITHDRAWAL (HCC): Status: RESOLVED | Noted: 2018-10-30 | Resolved: 2019-04-21

## 2019-04-21 PROBLEM — K70.10 ALCOHOLIC HEPATITIS: Status: RESOLVED | Noted: 2018-10-26 | Resolved: 2019-04-21

## 2019-04-21 PROCEDURE — 700102 HCHG RX REV CODE 250 W/ 637 OVERRIDE(OP): Performed by: HOSPITALIST

## 2019-04-21 PROCEDURE — 770020 HCHG ROOM/CARE - TELE (206)

## 2019-04-21 PROCEDURE — A9270 NON-COVERED ITEM OR SERVICE: HCPCS | Performed by: HOSPITALIST

## 2019-04-21 PROCEDURE — 700111 HCHG RX REV CODE 636 W/ 250 OVERRIDE (IP): Performed by: HOSPITALIST

## 2019-04-21 PROCEDURE — 700102 HCHG RX REV CODE 250 W/ 637 OVERRIDE(OP): Performed by: FAMILY MEDICINE

## 2019-04-21 PROCEDURE — 99232 SBSQ HOSP IP/OBS MODERATE 35: CPT | Performed by: FAMILY MEDICINE

## 2019-04-21 PROCEDURE — A9270 NON-COVERED ITEM OR SERVICE: HCPCS | Performed by: FAMILY MEDICINE

## 2019-04-21 RX ORDER — OMEPRAZOLE 40 MG/1
40 CAPSULE, DELAYED RELEASE ORAL DAILY
Qty: 30 CAP | Refills: 0 | Status: SHIPPED | OUTPATIENT
Start: 2019-04-22 | End: 2022-11-29

## 2019-04-21 RX ADMIN — LORAZEPAM 0.5 MG: 1 TABLET ORAL at 21:49

## 2019-04-21 RX ADMIN — METOPROLOL TARTRATE 50 MG: 50 TABLET ORAL at 17:35

## 2019-04-21 RX ADMIN — THERA TABS 1 TABLET: TAB at 06:19

## 2019-04-21 RX ADMIN — MINERAL OIL AND PETROLATUM 1 APPLICATION: 150; 830 OINTMENT OPHTHALMIC at 21:44

## 2019-04-21 RX ADMIN — FOLIC ACID 1 MG: 1 TABLET ORAL at 06:19

## 2019-04-21 RX ADMIN — LORAZEPAM 0.5 MG: 1 TABLET ORAL at 11:20

## 2019-04-21 RX ADMIN — LORAZEPAM 1 MG: 1 TABLET ORAL at 15:58

## 2019-04-21 RX ADMIN — ACETAMINOPHEN 650 MG: 325 TABLET, FILM COATED ORAL at 15:57

## 2019-04-21 RX ADMIN — LORAZEPAM 0.5 MG: 1 TABLET ORAL at 06:20

## 2019-04-21 RX ADMIN — METOPROLOL TARTRATE 50 MG: 50 TABLET ORAL at 06:20

## 2019-04-21 RX ADMIN — Medication 100 MG: at 06:20

## 2019-04-21 RX ADMIN — ENOXAPARIN SODIUM 40 MG: 100 INJECTION SUBCUTANEOUS at 06:20

## 2019-04-21 RX ADMIN — OMEPRAZOLE 40 MG: 20 CAPSULE, DELAYED RELEASE ORAL at 06:19

## 2019-04-21 ASSESSMENT — LIFESTYLE VARIABLES
AGITATION: NORMAL ACTIVITY
SUBSTANCE_ABUSE: 0
ORIENTATION AND CLOUDING OF SENSORIUM: ORIENTED AND CAN DO SERIAL ADDITIONS
TOTAL SCORE: 3
TOTAL SCORE: 6
AUDITORY DISTURBANCES: NOT PRESENT
AGITATION: NORMAL ACTIVITY
HEADACHE, FULLNESS IN HEAD: MILD
NAUSEA AND VOMITING: NO NAUSEA AND NO VOMITING
NAUSEA AND VOMITING: NO NAUSEA AND NO VOMITING
ORIENTATION AND CLOUDING OF SENSORIUM: ORIENTED AND CAN DO SERIAL ADDITIONS
ORIENTATION AND CLOUDING OF SENSORIUM: ORIENTED AND CAN DO SERIAL ADDITIONS
AGITATION: NORMAL ACTIVITY
NAUSEA AND VOMITING: NO NAUSEA AND NO VOMITING
PAROXYSMAL SWEATS: BARELY PERCEPTIBLE SWEATING. PALMS MOIST
AGITATION: NORMAL ACTIVITY
ANXIETY: MILDLY ANXIOUS
AGITATION: NORMAL ACTIVITY
HEADACHE, FULLNESS IN HEAD: NOT PRESENT
TREMOR: TREMOR NOT VISIBLE BUT CAN BE FELT, FINGERTIP TO FINGERTIP
AUDITORY DISTURBANCES: MILD HARSHNESS OR ABILITY TO FRIGHTEN
PAROXYSMAL SWEATS: BARELY PERCEPTIBLE SWEATING. PALMS MOIST
ORIENTATION AND CLOUDING OF SENSORIUM: ORIENTED AND CAN DO SERIAL ADDITIONS
AUDITORY DISTURBANCES: VERY MILD HARSHNESS OR ABILITY TO FRIGHTEN
HEADACHE, FULLNESS IN HEAD: NOT PRESENT
TREMOR: TREMOR NOT VISIBLE BUT CAN BE FELT, FINGERTIP TO FINGERTIP
VISUAL DISTURBANCES: NOT PRESENT
ANXIETY: MILDLY ANXIOUS
TOTAL SCORE: 5
ANXIETY: MILDLY ANXIOUS
VISUAL DISTURBANCES: NOT PRESENT
TREMOR: TREMOR NOT VISIBLE BUT CAN BE FELT, FINGERTIP TO FINGERTIP
VISUAL DISTURBANCES: NOT PRESENT
TOTAL SCORE: 5
NAUSEA AND VOMITING: MILD NAUSEA WITH NO VOMITING
HEADACHE, FULLNESS IN HEAD: NOT PRESENT
PAROXYSMAL SWEATS: BARELY PERCEPTIBLE SWEATING. PALMS MOIST
TOTAL SCORE: 6
PAROXYSMAL SWEATS: NO SWEAT VISIBLE
AUDITORY DISTURBANCES: MILD HARSHNESS OR ABILITY TO FRIGHTEN
AUDITORY DISTURBANCES: MILD HARSHNESS OR ABILITY TO FRIGHTEN
ANXIETY: MILDLY ANXIOUS
TREMOR: TREMOR NOT VISIBLE BUT CAN BE FELT, FINGERTIP TO FINGERTIP
ANXIETY: MILDLY ANXIOUS
NAUSEA AND VOMITING: NO NAUSEA AND NO VOMITING
ORIENTATION AND CLOUDING OF SENSORIUM: ORIENTED AND CAN DO SERIAL ADDITIONS
VISUAL DISTURBANCES: NOT PRESENT
PAROXYSMAL SWEATS: BARELY PERCEPTIBLE SWEATING. PALMS MOIST
HEADACHE, FULLNESS IN HEAD: VERY MILD
TREMOR: TREMOR NOT VISIBLE BUT CAN BE FELT, FINGERTIP TO FINGERTIP
VISUAL DISTURBANCES: NOT PRESENT

## 2019-04-21 ASSESSMENT — ENCOUNTER SYMPTOMS
TINGLING: 0
PHOTOPHOBIA: 0
BLURRED VISION: 0
DIZZINESS: 0
ORTHOPNEA: 0
TREMORS: 0
MYALGIAS: 0
HEMOPTYSIS: 0
BRUISES/BLEEDS EASILY: 0
DOUBLE VISION: 0
VOMITING: 0
PALPITATIONS: 0
HALLUCINATIONS: 0
NERVOUS/ANXIOUS: 1
NAUSEA: 0
HEARTBURN: 0
FEVER: 0
COUGH: 0
HEADACHES: 0
ABDOMINAL PAIN: 0
DIARRHEA: 0
DEPRESSION: 0
CHILLS: 0
NECK PAIN: 0

## 2019-04-21 NOTE — PROGRESS NOTES
"Received report from Polina at pt bedside. Pt wanting to leave AMA over night per pt report, \" I'm still having hallucinations still \", pt CWIA score was 3-6 over night, POC discussed. Call light and belongings within reach. Bed locked and in low position. Alarm on and fall precautions in place.   "

## 2019-04-21 NOTE — CARE PLAN
Problem: Communication  Goal: The ability to communicate needs accurately and effectively will improve  Outcome: PROGRESSING AS EXPECTED  Pt educated on POC, pt concerned about D/CX today, MD rounded with pt. Addressed pt concerns with MD.     Problem: Safety  Goal: Will remain free from injury  Outcome: PROGRESSING AS EXPECTED  Py educated to call if needs help to BR, pt steady on his feet, call light within reach, low fall precautions are in place.

## 2019-04-21 NOTE — PROGRESS NOTES
Hospital Medicine Daily Progress Note    Date of Service  4/20/2019    Chief Complaint  33 y.o. male admitted 4/17/2019 with alcohol intoxication    Hospital Course   This is a 33 years old male who has past medical history of alcohol abuse and prior alcohol withdrawal, history of congenital ectrodactyly ectodermal dysplasia syndrome comes in with acute alcohol intoxication.  Was started on CIWA protocol for alcohol withdrawal along with multivitamins, daily thiamine and folic acid.  Interval Problem Update  Tired looking.  Laying in bed comfortable.  Arousable.  Answers questions and follow commands.  No acute distress noted.  4/20: More awake interactive.  CIWA scoring is gradually trending down.  Tolerating liquid diet fairly which was advanced.  No nausea or vomiting.  No issues overnight.  Consultants/Specialty  None    Code Status  Full code    Disposition  Anticipate discharge home once detoxed.    Review of Systems  Review of Systems   Constitutional: Positive for malaise/fatigue. Negative for chills and fever.   HENT: Negative for ear discharge, ear pain, hearing loss and tinnitus.    Eyes: Negative for blurred vision, double vision, photophobia and pain.   Respiratory: Negative for cough and hemoptysis.    Cardiovascular: Negative for chest pain, palpitations, orthopnea and claudication.   Gastrointestinal: Negative for abdominal pain, heartburn, nausea and vomiting.   Genitourinary: Negative for dysuria, frequency and urgency.   Musculoskeletal: Negative for myalgias and neck pain.   Skin: Negative for rash.   Neurological: Negative for dizziness, tingling and headaches.   Endo/Heme/Allergies: Does not bruise/bleed easily.   Psychiatric/Behavioral: Negative for depression, substance abuse and suicidal ideas. The patient is nervous/anxious.         Physical Exam  Temp:  [36.4 °C (97.6 °F)-37.6 °C (99.7 °F)] 36.8 °C (98.3 °F)  Pulse:  [62-91] 83  Resp:  [16-17] 16  BP: (121-143)/() 129/92  SpO2:  [92  %-97 %] 97 %    Physical Exam   Constitutional: He is oriented to person, place, and time. No distress.   HENT:   Head: Normocephalic and atraumatic.   Mouth/Throat: No oropharyngeal exudate.   Eyes: Pupils are equal, round, and reactive to light. Right eye exhibits no discharge. Left eye exhibits no discharge.   Neck: Normal range of motion. No tracheal deviation present. No thyromegaly present.   Cardiovascular: Normal rate and regular rhythm.  Exam reveals no gallop and no friction rub.    Pulmonary/Chest: Effort normal. No respiratory distress. He has no wheezes. He has no rales.   Abdominal: Soft. He exhibits no distension. There is no tenderness. There is no rebound.   Musculoskeletal: He exhibits deformity (Loss of third and fourth digits bilaterally.). He exhibits no tenderness.   Neurological: He is alert and oriented to person, place, and time. No cranial nerve deficit.   Skin: Skin is warm and dry. He is not diaphoretic.   Psychiatric: He has a normal mood and affect.       Fluids    Intake/Output Summary (Last 24 hours) at 04/20/19 1924  Last data filed at 04/20/19 1421   Gross per 24 hour   Intake              200 ml   Output             1900 ml   Net            -1700 ml       Laboratory  Recent Labs      04/18/19   0520  04/19/19   0105   WBC  5.7  5.1   RBC  5.01  4.27*   HEMOGLOBIN  16.0  13.7*   HEMATOCRIT  49.2  42.0   MCV  98.2*  98.4*   MCH  31.9  32.1   MCHC  32.5*  32.6*   RDW  48.1  46.0   PLATELETCT  280  168   MPV  10.7  11.1     Recent Labs      04/18/19   0520  04/19/19   0105   SODIUM  144  138   POTASSIUM  3.8  4.3   CHLORIDE  102  102   CO2  25  30   GLUCOSE  71  92   BUN  6*  8   CREATININE  0.72  0.73   CALCIUM  8.8  9.0     Recent Labs      04/18/19   0520   INR  0.97               Imaging  No orders to display        Assessment/Plan  * Alcohol withdrawal (HCC)- (present on admission)   Assessment & Plan    Continue with [facility] protocol.  Daily multivitamins, folic acid, and  thiamine.  Gradually improving.         Alcohol dependence (HCC)- (present on admission)   Assessment & Plan    Chronic and ongoing.  Patient was counseled.     Alcoholic hepatitis- (present on admission)   Assessment & Plan    Due to intoxication.  Monitor LFTs.     Macrocytic anemia- (present on admission)   Assessment & Plan    Check folic acid and vitamin B12 levels.  Monitor H&H.     Essential hypertension- (present on admission)   Assessment & Plan    Continue current medication regimen.  Does have significant tachycardia.     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  Does have deformities.     Plan of care discussed with multidisciplinary team during rounds.    VTE prophylaxis: Lovenox

## 2019-04-22 VITALS
OXYGEN SATURATION: 95 % | HEIGHT: 68 IN | SYSTOLIC BLOOD PRESSURE: 134 MMHG | WEIGHT: 140.87 LBS | DIASTOLIC BLOOD PRESSURE: 100 MMHG | BODY MASS INDEX: 21.35 KG/M2 | TEMPERATURE: 99 F | RESPIRATION RATE: 16 BRPM | HEART RATE: 89 BPM

## 2019-04-22 PROCEDURE — A9270 NON-COVERED ITEM OR SERVICE: HCPCS | Performed by: FAMILY MEDICINE

## 2019-04-22 PROCEDURE — 700111 HCHG RX REV CODE 636 W/ 250 OVERRIDE (IP): Performed by: HOSPITALIST

## 2019-04-22 PROCEDURE — 700102 HCHG RX REV CODE 250 W/ 637 OVERRIDE(OP): Performed by: FAMILY MEDICINE

## 2019-04-22 PROCEDURE — 99239 HOSP IP/OBS DSCHRG MGMT >30: CPT | Performed by: FAMILY MEDICINE

## 2019-04-22 PROCEDURE — 700102 HCHG RX REV CODE 250 W/ 637 OVERRIDE(OP): Performed by: HOSPITALIST

## 2019-04-22 PROCEDURE — A9270 NON-COVERED ITEM OR SERVICE: HCPCS | Performed by: HOSPITALIST

## 2019-04-22 RX ADMIN — MINERAL OIL AND PETROLATUM 1 APPLICATION: 150; 830 OINTMENT OPHTHALMIC at 05:17

## 2019-04-22 RX ADMIN — LORAZEPAM 0.5 MG: 1 TABLET ORAL at 05:17

## 2019-04-22 RX ADMIN — METOPROLOL TARTRATE 50 MG: 50 TABLET ORAL at 05:18

## 2019-04-22 RX ADMIN — OMEPRAZOLE 40 MG: 20 CAPSULE, DELAYED RELEASE ORAL at 05:17

## 2019-04-22 RX ADMIN — LORAZEPAM 0.5 MG: 1 TABLET ORAL at 01:09

## 2019-04-22 RX ADMIN — LORAZEPAM 1 MG: 1 TABLET ORAL at 09:32

## 2019-04-22 ASSESSMENT — LIFESTYLE VARIABLES
AGITATION: *
HEADACHE, FULLNESS IN HEAD: VERY MILD
AUDITORY DISTURBANCES: NOT PRESENT
ANXIETY: MILDLY ANXIOUS
VISUAL DISTURBANCES: VERY MILD SENSITIVITY
PAROXYSMAL SWEATS: BARELY PERCEPTIBLE SWEATING. PALMS MOIST
VISUAL DISTURBANCES: NOT PRESENT
NAUSEA AND VOMITING: NO NAUSEA AND NO VOMITING
NAUSEA AND VOMITING: NO NAUSEA AND NO VOMITING
TOTAL SCORE: VERY MILD ITCHING, PINS AND NEEDLES SENSATION, BURNING OR NUMBNESS
ORIENTATION AND CLOUDING OF SENSORIUM: ORIENTED AND CAN DO SERIAL ADDITIONS
TREMOR: NO TREMOR
TREMOR: TREMOR NOT VISIBLE BUT CAN BE FELT, FINGERTIP TO FINGERTIP
HEADACHE, FULLNESS IN HEAD: VERY MILD
ORIENTATION AND CLOUDING OF SENSORIUM: ORIENTED AND CAN DO SERIAL ADDITIONS
TOTAL SCORE: 5
HEADACHE, FULLNESS IN HEAD: NOT PRESENT
VISUAL DISTURBANCES: NOT PRESENT
AUDITORY DISTURBANCES: NOT PRESENT
PAROXYSMAL SWEATS: BARELY PERCEPTIBLE SWEATING. PALMS MOIST
AUDITORY DISTURBANCES: VERY MILD HARSHNESS OR ABILITY TO FRIGHTEN
TOTAL SCORE: 5
NAUSEA AND VOMITING: NO NAUSEA AND NO VOMITING
ORIENTATION AND CLOUDING OF SENSORIUM: ORIENTED AND CAN DO SERIAL ADDITIONS
ANXIETY: *
ANXIETY: *
TOTAL SCORE: 8
TREMOR: NO TREMOR
PAROXYSMAL SWEATS: BARELY PERCEPTIBLE SWEATING. PALMS MOIST
AGITATION: SOMEWHAT MORE THAN NORMAL ACTIVITY
AGITATION: SOMEWHAT MORE THAN NORMAL ACTIVITY

## 2019-04-22 NOTE — DISCHARGE INSTRUCTIONS
Discharge Instructions    Discharged to home by car with relative. Discharged via walking, hospital escort: Yes.  Special equipment needed: Not Applicable    Be sure to schedule a follow-up appointment with your primary care doctor or any specialists as instructed.     Discharge Plan:   Diet Plan: Discussed  Activity Level: Discussed  Confirmed Follow up Appointment: Appointment Scheduled  Confirmed Symptoms Management: Discussed  Medication Reconciliation Updated: Yes  Pneumococcal Vaccine Administered/Refused: Not given - Patient refused pneumococcal vaccine  Influenza Vaccine Indication: Patient Refuses    I understand that a diet low in cholesterol, fat, and sodium is recommended for good health. Unless I have been given specific instructions below for another diet, I accept this instruction as my diet prescription.   Other diet: regular    Special Instructions: None    · Is patient discharged on Warfarin / Coumadin?   No     Depression / Suicide Risk    As you are discharged from this [facility] facility, it is important to learn how to keep safe from harming yourself.    Recognize the warning signs:  · Abrupt changes in personality, positive or negative- including increase in energy   · Giving away possessions  · Change in eating patterns- significant weight changes-  positive or negative  · Change in sleeping patterns- unable to sleep or sleeping all the time   · Unwillingness or inability to communicate  · Depression  · Unusual sadness, discouragement and loneliness  · Talk of wanting to die  · Neglect of personal appearance   · Rebelliousness- reckless behavior  · Withdrawal from people/activities they love  · Confusion- inability to concentrate     If you or a loved one observes any of these behaviors or has concerns about self-harm, here's what you can do:  · Talk about it- your feelings and reasons for harming yourself  · Remove any means that you might use to hurt yourself (examples: pills, rope,  extension cords, firearm)  · Get professional help from the community (Mental Health, Substance Abuse, psychological counseling)  · Do not be alone:Call your Safe Contact- someone whom you trust who will be there for you.  · Call your local CRISIS HOTLINE 184-3942 or 512-109-5003  · Call your local Children's Mobile Crisis Response Team [place] (520) 888-8209 or www.[place]  · Call the toll free National Suicide Prevention Hotlines   · National Suicide Prevention Lifeline 271-436-BYJC (2176)  · [facility] Hope Line Network 800-SUICIDE (627-4849)      Alcohol Withdrawal  Introduction  When a person who drinks a lot of alcohol stops drinking, he or she may go through alcohol withdrawal. Alcohol withdrawal causes problems. It can make you feel:  · Tired (fatigued).  · Sad (depressed).  · Fearful (anxious).  · Grouchy (irritable).  · Not hungry.  · Sick to your stomach (nauseous).  · Shaky.  It can also make you have:  · Nightmares.  · Trouble sleeping.  · Trouble thinking clearly.  · Mood swings.  · Clammy skin.  · Very bad sweating.  · A very fast heartbeat.  · Shaking that you cannot control (tremor).  · Having a fever.  · A fit of movements that you cannot control (seizure).  · Confusion.  · Throwing up (vomiting).  · Feeling or seeing things that are not there (hallucinations).  Follow these instructions at home:  · Take medicines and vitamins only as told by your doctor.  · Do not drink alcohol.  · Have someone around in case you need help.  · Drink enough fluid to keep your pee (urine) clear or pale yellow.  · Think about joining a group to help you stop drinking.  Contact a doctor if:  · Your problems get worse.  · Your problems do not go away.  · You cannot eat or drink without throwing up.  · You are having a hard time not drinking alcohol.  · You cannot stop drinking alcohol.  Get help right away if:  · You feel your heart beating differently than usual.  · Your chest hurts.  · You have trouble  breathing.  · You have very bad problems, like:  ¨ A fever.  ¨ A fit of movements that you cannot control.  ¨ Being very confused.  ¨ Feeling or seeing things that are not there.  This information is not intended to replace advice given to you by your health care provider. Make sure you discuss any questions you have with your health care provider.  Document Released: 06/05/2009 Document Revised: 05/25/2017 Document Reviewed: 10/06/2015  © 2017 [employer]

## 2019-04-22 NOTE — PROGRESS NOTES
Assumed care of patient, bedside report received from JORDY Medina. Updated on POC, call light within reach and fall precautions in place. Bed locked and in lowest position. Patient instructed to call for assistance before getting out of bed. All questions answered, no other needs at this time.

## 2019-04-22 NOTE — CARE PLAN
Problem: Safety  Goal: Will remain free from falls  Outcome: PROGRESSING AS EXPECTED  Pt verbalizes understanding of fall precautions and calls for assistance appropriately. Bed in lowest position and locked. Pt wearing non-slip socks, call light within reach.    Problem: Pain Management  Goal: Pain level will decrease to patient's comfort goal  Outcome: PROGRESSING AS EXPECTED  Pt not experiencing any pain at this time per self-report. Pt currently at stated comfort goal.

## 2019-04-22 NOTE — PROGRESS NOTES
Hospital Medicine Daily Progress Note    Date of Service  4/21/2019    Chief Complaint  33 y.o. male admitted 4/17/2019 with alcohol intoxication    Hospital Course   This is a 33 years old male who has past medical history of alcohol abuse and prior alcohol withdrawal, history of congenital ectrodactyly ectodermal dysplasia syndrome comes in with acute alcohol intoxication.  Was started on CIWA protocol for alcohol withdrawal along with multivitamins, daily thiamine and folic acid.  Interval Problem Update  Tired looking.  Laying in bed comfortable.  Arousable.  Answers questions and follow commands.  No acute distress noted.  4/20: More awake interactive.  CIWA scoring is gradually trending down.  Tolerating liquid diet fairly which was advanced.  No nausea or vomiting.  No issues overnight.  4/21: Patient stated that he is hearing voices when he closes his eyes.  He also thought that he had a seizure when he woke up lasted for a few seconds which she was aware of it.  Felt that patient make excuses to stay in the hospital.  Still requesting Ativan.  P.o. intake has improved with advance diet.  No issues overnight per staff.  Consultants/Specialty  None    Code Status  Full code    Disposition  Anticipate discharge home once detoxed (tomorrow a.m).    Review of Systems  Review of Systems   Constitutional: Positive for malaise/fatigue. Negative for chills and fever.   HENT: Negative for ear discharge, ear pain, hearing loss, nosebleeds and tinnitus.    Eyes: Negative for blurred vision, double vision and photophobia.   Respiratory: Negative for cough and hemoptysis.    Cardiovascular: Negative for chest pain, palpitations and orthopnea.   Gastrointestinal: Negative for abdominal pain, diarrhea, heartburn, nausea and vomiting.   Genitourinary: Negative for dysuria, frequency, hematuria and urgency.   Musculoskeletal: Negative for myalgias and neck pain.   Skin: Negative for rash.   Neurological: Negative for  dizziness, tingling, tremors and headaches.   Endo/Heme/Allergies: Negative for environmental allergies. Does not bruise/bleed easily.   Psychiatric/Behavioral: Negative for depression, hallucinations, substance abuse and suicidal ideas. The patient is nervous/anxious.         Physical Exam  Temp:  [36.9 °C (98.4 °F)-37.4 °C (99.3 °F)] 37 °C (98.6 °F)  Pulse:  [73-84] 82  Resp:  [15-16] 15  BP: (131-160)/() 131/98  SpO2:  [96 %-99 %] 96 %    Physical Exam   Constitutional: He is oriented to person, place, and time. No distress.   HENT:   Head: Normocephalic and atraumatic.   Mouth/Throat: No oropharyngeal exudate.   Eyes: Pupils are equal, round, and reactive to light. No scleral icterus.   Neck: Normal range of motion. No tracheal deviation present. No thyromegaly present.   Cardiovascular: Normal rate and regular rhythm.  Exam reveals no gallop and no friction rub.    Pulmonary/Chest: Effort normal. No respiratory distress. He has no wheezes.   Abdominal: Soft. He exhibits no distension. There is no tenderness.   Musculoskeletal: He exhibits deformity (Loss of third and fourth digits bilaterally.). He exhibits no tenderness.   Neurological: He is alert and oriented to person, place, and time. No cranial nerve deficit.   Skin: Skin is warm and dry. He is not diaphoretic.   Psychiatric: He has a normal mood and affect. His behavior is normal.       Fluids    Intake/Output Summary (Last 24 hours) at 04/21/19 1747  Last data filed at 04/21/19 1335   Gross per 24 hour   Intake              676 ml   Output              600 ml   Net               76 ml       Laboratory  Recent Labs      04/19/19   0105   WBC  5.1   RBC  4.27*   HEMOGLOBIN  13.7*   HEMATOCRIT  42.0   MCV  98.4*   MCH  32.1   MCHC  32.6*   RDW  46.0   PLATELETCT  168   MPV  11.1     Recent Labs      04/19/19   0105   SODIUM  138   POTASSIUM  4.3   CHLORIDE  102   CO2  30   GLUCOSE  92   BUN  8   CREATININE  0.73   CALCIUM  9.0                    Imaging  No orders to display        Assessment/Plan  * Alcohol withdrawal syndrome without complication (HCC)- (present on admission)   Assessment & Plan    Continue with CIWA protocol.  Daily multivitamins, folic acid, and thiamine.  Gradually improving.         Alcohol dependence (HCC)- (present on admission)   Assessment & Plan    Chronic and ongoing.  Patient was counseled.     Alcoholic hepatitis without ascites- (present on admission)   Assessment & Plan    Due to intoxication.  Monitor LFTs.     Macrocytic anemia- (present on admission)   Assessment & Plan    Check folic acid and vitamin B12 levels.  Monitor H&H.     Essential hypertension- (present on admission)   Assessment & Plan    Continue current medication regimen.  Does have significant tachycardia.     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  Does have deformities in upper extremities.     Plan of care discussed with multidisciplinary team during rounds.    VTE prophylaxis: Lovenox

## 2019-04-22 NOTE — PROGRESS NOTES
Patient discharged home. Patient AOX 4.  IV and tele box removed, discharge instructions provided to patient and reviewed with them. All questions answered, patient provided copy of discharge instructions and instructed on when to F/U with MD. Patient walked off unit. All belongings returned to patient. Patient escorted out by security after behaving inappropriately toward staff RN.

## 2019-04-23 NOTE — DISCHARGE SUMMARY
Discharge Summary    CHIEF COMPLAINT ON ADMISSION  Chief Complaint   Patient presents with   • Alcohol Intoxication   • ETOH Withdrawal       Reason for Admission  EMS     Admission Date  4/17/2019    CODE STATUS  Prior    HPI & HOSPITAL COURSE  This is a 33 years old male who has past medical history of alcohol abuse and prior alcohol withdrawal, history of congenital ectrodactyly ectodermal dysplasia syndrome comes in with acute alcohol intoxication.  Was started on CIWA protocol for alcohol withdrawal along with multivitamins, daily thiamine and folic acid.  Patient was hemodynamically and clinically stable.  His acute hepatitis resolved over hospital course.  He was detoxed from alcohol with negative CIWA protocol.  Apparently patient was making excuses to stay in the hospital but felt that he reached his maximum benefit of this hospital stay.  He was cleared for medical standpoint to be discharged from the hospital.      Therefore, he is discharged in fair and stable condition to home with close outpatient follow-up.    The patient met 2-midnight criteria for an inpatient stay at the time of discharge.    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 *      FOLLOW UP  No future appointments.  Dalton Pagan, A.PRogerioNRogerio  [address]  [address]  [facility] [address]  [phone]      As needed    [facility], Emergency Dept  [address] [facility] [id number] [phone]    If symptoms worsen    BHAVANA Gutierrez  [address]  [address]  [facility]  [address]  [phone]      Please walk in to schedule your appointment. Thank you       MEDICATIONS ON DISCHARGE     Medication List      START taking these medications      Instructions   omeprazole 40 MG delayed-release capsule  Commonly known as:  PRILOSEC   Take 1 Cap by mouth every day.  Dose:  40 mg        CONTINUE taking these medications      Instructions   metoprolol 50 MG Tabs  Commonly known as:  LOPRESSOR   Take 1 Tab by mouth 2 Times a Day.  Dose:  50 mg            Allergies  No Known Allergies    DIET  No orders of the defined types were placed in this encounter.      ACTIVITY  As tolerated.  Weight bearing as tolerated    CONSULTATIONS  None    PROCEDURES  None  LABORATORY  Lab Results   Component Value Date    SODIUM 138 04/19/2019    POTASSIUM 4.3 04/19/2019    CHLORIDE 102 04/19/2019    CO2 30 04/19/2019    GLUCOSE 92 04/19/2019    BUN 8 04/19/2019    CREATININE 0.73 04/19/2019        Lab Results   Component Value Date    WBC 5.1 04/19/2019    HEMOGLOBIN 13.7 (L) 04/19/2019    HEMATOCRIT 42.0 04/19/2019    PLATELETCT 168 04/19/2019        Total time of the discharge process exceeds 38 minutes.

## 2019-06-08 LAB — EKG IMPRESSION: NORMAL

## 2019-07-29 NOTE — PROGRESS NOTES
Pt transferring to higher level of care, pt Q1hr CIWA, pt in need of heart monitor. Pt has scheduled dose of Valium and on CIWA receiving hourly doses of Ativan.    Expected Date Of Service: 07/29/2019 Bill For Surgical Tray: no Billing Type: Third-Party Bill

## 2019-08-13 ENCOUNTER — HOSPITAL ENCOUNTER (EMERGENCY)
Facility: MEDICAL CENTER | Age: 34
End: 2019-08-13
Attending: EMERGENCY MEDICINE
Payer: MEDICARE

## 2019-08-13 VITALS
DIASTOLIC BLOOD PRESSURE: 94 MMHG | OXYGEN SATURATION: 95 % | HEIGHT: 68 IN | HEART RATE: 112 BPM | TEMPERATURE: 98.5 F | BODY MASS INDEX: 21.98 KG/M2 | RESPIRATION RATE: 16 BRPM | SYSTOLIC BLOOD PRESSURE: 137 MMHG | WEIGHT: 145 LBS

## 2019-08-13 DIAGNOSIS — F10.10 ALCOHOL ABUSE: ICD-10-CM

## 2019-08-13 PROCEDURE — 99284 EMERGENCY DEPT VISIT MOD MDM: CPT

## 2019-08-13 ASSESSMENT — LIFESTYLE VARIABLES: DO YOU DRINK ALCOHOL: YES

## 2019-08-13 NOTE — ED TRIAGE NOTES
"Chief Complaint   Patient presents with   • Detox     Pt wants to detox from alcohol. Drinks ~5 earthquakes a day. Last drink this am. C/o pain all over.     /94   Pulse (!) 112   Temp 36.9 °C (98.5 °F) (Temporal)   Resp 16   Ht 1.727 m (5' 8\")   Wt 65.8 kg (145 lb)   SpO2 95%   BMI 22.05 kg/m²     Pt brought in by KAYLAN, fs 118 pta. Placed in room GR 35. Complaints and vitals as above. Pt on monitors, all alarms audible. Chart flagged for ERP to see.  "

## 2019-08-13 NOTE — ED NOTES
Pt requested to leave. ERP notified. Pt AOx4, steady on his feet. Resources provided with discharge paperwork. Pt ambulated out to front lobby without assistance.

## 2019-08-13 NOTE — ED PROVIDER NOTES
"ED Provider Note    CHIEF COMPLAINT  Chief Complaint   Patient presents with   • Detox     Pt wants to detox from alcohol. Drinks ~5 earthquakes a day. Last drink this am. C/o pain all over.       HPI  Gopal Ceja is a 34 y.o. male who presents requesting resources for alcohol detox.  States she is an alcoholic and has been drinking heavily recently.  He has no other focal complaints, denies chest pain and abdominal pain and vomiting.  Well-known to this department with many presentations for the same.    REVIEW OF SYSTEMS  Negative for fever, rash, chest pain, dyspnea, abdominal pain.    PAST MEDICAL HISTORY   has a past medical history of Acute anxiety, ADHD (attention deficit hyperactivity disorder), ADHD (attention deficit hyperactivity disorder), Alcohol abuse, Bipolar affective (HCC), Depression, Ectrodactyly-ectodermal dysplasia-clefting syndrome, ETOH abuse, and Psychiatric disorder.    SOCIAL HISTORY  Social History     Tobacco Use   • Smoking status: Former Smoker     Packs/day: 0.25     Types: Cigarettes   • Smokeless tobacco: Never Used   • Tobacco comment: Smokes couple of times a month   Substance and Sexual Activity   • Alcohol use: Yes     Alcohol/week: 0.0 oz     Comment: 5 earthquakes a day   • Drug use: No   • Sexual activity: Not on file       SURGICAL HISTORY   has a past surgical history that includes other orthopedic surgery.    CURRENT MEDICATIONS  I personally reviewed the medication list in the charting documentation.     ALLERGIES  No Known Allergies    PHYSICAL EXAM  VITAL SIGNS: /94   Pulse (!) 112   Temp 36.9 °C (98.5 °F) (Temporal)   Resp 16   Ht 1.727 m (5' 8\")   Wt 65.8 kg (145 lb)   SpO2 95%   BMI 22.05 kg/m²   Constitutional: Alert in no apparent distress.  Sleeping comfortably, easily arousable  HENT: No signs of acute trauma.   Eyes: Conjunctiva normal, Non-icteric.   Chest: Normal nonlabored respirations  Skin: No erythema, No rash.   Musculoskeletal: Good range of " motion of all joints, chronic deformities of the hands  Neurologic: Alert, No focal deficits noted.   Psychiatric: Affect normal, Judgment normal.    COURSE & MEDICAL DECISION MAKING  Pertinent Labs & Imaging studies reviewed. (See chart for details)    Encounter Summary: This is a 34 y.o. male with history of alcohol abuse, presenting requesting resources for detox, provided with the standard paperwork that he usually receives.  He reports his last drink was just prior to arrival, I do not think he is withdrawing.  He is stable and appropriate for discharge to follow-up on his own with 1 of these detox facilities.      DISPOSITION: Discharge Home      FINAL IMPRESSION  1. Alcohol abuse        This dictation was created using voice recognition software. The accuracy of the dictation is limited to the abilities of the software. I expect there may be some errors of grammar and possibly content. The nursing notes were reviewed and certain aspects of this information were incorporated into this note.    Electronically signed by: Tolu Crandall, 8/13/2019 3:13 PM

## 2019-08-13 NOTE — DISCHARGE PLANNING
Alert Team  Alert Team  Of note, from chart review:  Pt is a frequent utilizer of this ER.  He has had over 170 ER visits since first seen in 2012.   Most recent:  4/17-4/22/19- Five days on inpatient medical unit for etoh withdrawal.  3/28-4/3/19- Six days on inpatient medical unit for etoh withdrawal.  2/23-2/27/19- Four days on inpatient medical unit for etoh withdrawal.  1/9-1/14/19- Five days on inpatient medical unit for etoh withdrawal.  11/27-12/2/18- Five days on inpatient medical unit for etoh withdrawal.  10/29-11/9/18- Eight days on inpatient medical unit for etoh withdrawal.  He had overdosed on the librium prescribed to him upon DC same morning.  Said he did it to get high, not SI.  10/25-10/29/18- Four days on inpatient medical unit for etoh withdrawal.    Pt presented today requesting detox.  Pt medically cleared to DC, per ERP.  Pt will be provided with resource list for chemical dependency and strongly encouraged to cease alcohol use and f/u with rehabilitative services.

## 2019-08-18 ENCOUNTER — HOSPITAL ENCOUNTER (EMERGENCY)
Facility: MEDICAL CENTER | Age: 34
End: 2019-08-19
Attending: EMERGENCY MEDICINE
Payer: MEDICARE

## 2019-08-18 DIAGNOSIS — F10.929 ALCOHOLIC INTOXICATION WITH COMPLICATION (HCC): ICD-10-CM

## 2019-08-18 PROCEDURE — 99284 EMERGENCY DEPT VISIT MOD MDM: CPT

## 2019-08-18 SDOH — HEALTH STABILITY: MENTAL HEALTH: HOW OFTEN DO YOU HAVE A DRINK CONTAINING ALCOHOL?: 4 OR MORE TIMES A WEEK

## 2019-08-18 SDOH — HEALTH STABILITY: MENTAL HEALTH: HOW MANY STANDARD DRINKS CONTAINING ALCOHOL DO YOU HAVE ON A TYPICAL DAY?: 10 OR MORE

## 2019-08-19 VITALS
RESPIRATION RATE: 16 BRPM | WEIGHT: 158.73 LBS | TEMPERATURE: 98.8 F | BODY MASS INDEX: 24.06 KG/M2 | HEART RATE: 103 BPM | DIASTOLIC BLOOD PRESSURE: 82 MMHG | OXYGEN SATURATION: 89 % | SYSTOLIC BLOOD PRESSURE: 123 MMHG | HEIGHT: 68 IN

## 2019-08-19 NOTE — ED TRIAGE NOTES
"Chief Complaint   Patient presents with   • Alcohol Intoxication     pt [facility] [place] [facility] [place], pt states he had \"8 earthquakes today\"; wants to detox, pt unable to state time of last drink       Ht 1.727 m (5' 8\")   Wt 72 kg (158 lb 11.7 oz)   BMI 24.14 kg/m²     Pt BIB [facility], no interventions done en route to ED by EMS   "

## 2019-08-19 NOTE — ED NOTES
Pt got up from [facility] and walked out. Pt refused discharge paperwork. ERP made aware. Pt walked out with steady gait.

## 2019-08-19 NOTE — PROGRESS NOTES
"Patient taken to bathroom. He immediately stood on toilet. We asked he get down and he refused and became verbally aggressive stating \"You better fucking watch youself.\" I told if he was unwilling to sit on the toilet, he had to go back to his room. He remained verbally aggressive but walked back to room with me and ERT.   "

## 2019-08-19 NOTE — ED PROVIDER NOTES
"ED Provider Note    ER Provider Note         CHIEF COMPLAINT  Chief Complaint   Patient presents with   • Alcohol Intoxication     pt BIB EMS [place], pt states he had \"8 earthquakes today\"; wants to detox, pt unable to state time of last drink         HPI  Gopal Ceja is a 34 y.o. male who presents to the Emergency Department with alcohol intoxication.  The patient says he has drank 8 earthquakes a day.  He says he wants to detox.  He denies any trauma.  He denies any fevers or chills.  He denies wanting to hurt himself or anyone else.    REVIEW OF SYSTEMS  See HPI for further details. All other systems are negative.     PAST MEDICAL HISTORY   has a past medical history of Acute anxiety, ADHD (attention deficit hyperactivity disorder), ADHD (attention deficit hyperactivity disorder), Alcohol abuse, Bipolar affective (HCC), Depression, Ectrodactyly-ectodermal dysplasia-clefting syndrome, ETOH abuse, and Psychiatric disorder.    SURGICAL HISTORY   has a past surgical history that includes other orthopedic surgery.    SOCIAL HISTORY  Social History     Tobacco Use   • Smoking status: Former Smoker     Packs/day: 0.25     Types: Cigarettes   • Smokeless tobacco: Never Used   • Tobacco comment: Smokes couple of times a month   Substance Use Topics   • Alcohol use: Yes     Alcohol/week: 0.0 oz     Frequency: 4 or more times a week     Drinks per session: 10 or more     Comment: 8 earthquakes/day   • Drug use: No      Social History     Substance and Sexual Activity   Drug Use No       FAMILY HISTORY  Family History   Problem Relation Age of Onset   • Heart Disease Neg Hx    • Hypertension Neg Hx    • Hyperlipidemia Neg Hx        CURRENT MEDICATIONS  Home Medications    **Home medications have not yet been reviewed for this encounter**         ALLERGIES  No Known Allergies    PHYSICAL EXAM  VITAL SIGNS: /82   Pulse (!) 103   Temp 37.1 °C (98.8 °F) (Temporal)   Resp 16   Ht 1.727 m " "(5' 8\")   Wt 72 kg (158 lb 11.7 oz)   SpO2 89%   BMI 24.14 kg/m²      Constitutional: Intoxicated and in no apparent distress.  HENT: No signs of trauma, Bilateral external ears normal, Nose normal.   Eyes: Pupils are equal and reactive, Conjunctiva normal, Non-icteric.   Neck: Normal range of motion, No tenderness, Supple, No stridor.   Lymphatic: No lymphadenopathy noted.   Cardiovascular: Regular rate and rhythm, no murmurs.   Thorax & Lungs: Normal breath sounds, No respiratory distress, No wheezing, No chest tenderness.   Abdomen: Bowel sounds normal, Soft, No tenderness, No masses, No pulsatile masses. No peritoneal signs.  Skin: Warm, Dry, No erythema, No rash.   Back: No bony tenderness, No CVA tenderness.   Extremities: Intact distal pulses, No edema, No tenderness, No cyanosis.  Musculoskeletal: Good range of motion in all major joints. No tenderness to palpation or major deformities noted.   Neurologic: Slurring words but moving all extremities.  Psychiatric: Intoxicated and following simple commands.        COURSE & MEDICAL DECISION MAKING  Pertinent Labs & Imaging studies reviewed. (See chart for details)    This is a 34 y.o. male that presents with significant alcohol intoxication.  There is no evidence of trauma on exam.  At this time the patient is too intoxicated to be discharged.  He is not showing evidence of withdrawal at this time.  We will feed him food and then discharge him when he is clinically sober.  12:02 AM - Patient seen and examined at bedside.       The patient is now clinically sober.  We discussed his dangers of alcohol use.  I asked him why he has been doing this and he says that he has been sad.  I talked him about getting resources and help.  He says he is not necessarily interested at this time.  We will discharge him home with strict return precautions and follow-up.    FINAL IMPRESSION  1. Alcoholic intoxication with complication (HCC)              Electronically signed by: " Dalton Enamorado, 8/19/2019

## 2019-08-19 NOTE — ED NOTES
Pt alert and oriented, pt intoxicated. Refusing to void in urinal and requesting a cathter. Pt made aware catheter is not indicated and urine sample is needed.

## 2019-08-19 NOTE — ED NOTES
Pt sleeping, wakes up to verbal stimulation, pt unsteady on  his feet at this time, will continue to monitor.

## 2019-09-26 ENCOUNTER — ANESTHESIA EVENT (OUTPATIENT)
Dept: SURGERY | Facility: MEDICAL CENTER | Age: 34
DRG: 329 | End: 2019-09-26
Payer: MEDICARE

## 2019-09-26 ENCOUNTER — ANESTHESIA (OUTPATIENT)
Dept: SURGERY | Facility: MEDICAL CENTER | Age: 34
DRG: 329 | End: 2019-09-26
Payer: MEDICARE

## 2019-09-26 ENCOUNTER — APPOINTMENT (OUTPATIENT)
Dept: RADIOLOGY | Facility: MEDICAL CENTER | Age: 34
DRG: 329 | End: 2019-09-26
Attending: SURGERY
Payer: MEDICARE

## 2019-09-26 ENCOUNTER — HOSPITAL ENCOUNTER (INPATIENT)
Facility: MEDICAL CENTER | Age: 34
LOS: 10 days | DRG: 329 | End: 2019-10-06
Attending: EMERGENCY MEDICINE | Admitting: SURGERY
Payer: MEDICARE

## 2019-09-26 DIAGNOSIS — S31.109A: ICD-10-CM

## 2019-09-26 PROBLEM — S01.01XA SCALP LACERATION: Status: ACTIVE | Noted: 2019-09-26

## 2019-09-26 PROBLEM — S31.119A STAB WOUND TO THE ABDOMEN, INITIAL ENCOUNTER: Status: ACTIVE | Noted: 2019-09-26

## 2019-09-26 PROBLEM — F10.929 ACUTE ALCOHOL INTOXICATION (HCC): Status: ACTIVE | Noted: 2019-09-26

## 2019-09-26 PROBLEM — F99 PSYCHIATRIC DISORDER: Status: ACTIVE | Noted: 2019-09-26

## 2019-09-26 PROBLEM — T14.90XA TRAUMA: Status: ACTIVE | Noted: 2019-09-26

## 2019-09-26 PROBLEM — J95.821 ACUTE RESPIRATORY FAILURE FOLLOWING TRAUMA AND SURGERY (HCC): Status: ACTIVE | Noted: 2019-09-26

## 2019-09-26 PROBLEM — Q82.4: Status: ACTIVE | Noted: 2019-09-26

## 2019-09-26 PROBLEM — Z78.9 NO CONTRAINDICATION TO DEEP VEIN THROMBOSIS (DVT) PROPHYLAXIS: Status: ACTIVE | Noted: 2019-09-26

## 2019-09-26 LAB
ABO + RH BLD: NORMAL
ABO GROUP BLD: NORMAL
ACTION RANGE TRIGGERED IACRT: YES
ALBUMIN SERPL BCP-MCNC: 4.1 G/DL (ref 3.2–4.9)
ALBUMIN/GLOB SERPL: 1.4 G/DL
ALP SERPL-CCNC: 91 U/L (ref 30–99)
ALT SERPL-CCNC: 13 U/L (ref 2–50)
ANION GAP SERPL CALC-SCNC: 13 MMOL/L (ref 0–11.9)
APTT PPP: 28.2 SEC (ref 24.7–36)
AST SERPL-CCNC: 28 U/L (ref 12–45)
BASE EXCESS BLDA CALC-SCNC: -7 MMOL/L (ref -4–3)
BASE EXCESS BLDA CALC-SCNC: -9 MMOL/L (ref -4–3)
BILIRUB SERPL-MCNC: 0.6 MG/DL (ref 0.1–1.5)
BLD GP AB SCN SERPL QL: NORMAL
BODY TEMPERATURE: ABNORMAL CENTIGRADE
BODY TEMPERATURE: ABNORMAL DEGREES
BUN SERPL-MCNC: 14 MG/DL (ref 8–22)
CALCIUM SERPL-MCNC: 8.5 MG/DL (ref 8.5–10.5)
CFT BLD TEG: 5.3 MIN (ref 5–10)
CHLORIDE SERPL-SCNC: 102 MMOL/L (ref 96–112)
CLOT ANGLE BLD TEG: 65.4 DEGREES (ref 53–72)
CLOT LYSIS 30M P MA LENFR BLD TEG: 0 % (ref 0–8)
CO2 BLDA-SCNC: 19 MMOL/L (ref 20–33)
CO2 SERPL-SCNC: 20 MMOL/L (ref 20–33)
CORTIS SERPL-MCNC: 25.7 UG/DL (ref 0–23)
CREAT SERPL-MCNC: 1.24 MG/DL (ref 0.5–1.4)
CT.EXTRINSIC BLD ROTEM: 1.8 MIN (ref 1–3)
ERYTHROCYTE [DISTWIDTH] IN BLOOD BY AUTOMATED COUNT: 43.8 FL (ref 35.9–50)
ETHANOL BLD-MCNC: 0.21 G/DL
GLOBULIN SER CALC-MCNC: 2.9 G/DL (ref 1.9–3.5)
GLUCOSE BLD-MCNC: 108 MG/DL (ref 65–99)
GLUCOSE BLD-MCNC: 97 MG/DL (ref 65–99)
GLUCOSE SERPL-MCNC: 104 MG/DL (ref 65–99)
HCO3 BLDA-SCNC: 17.4 MMOL/L (ref 17–25)
HCO3 BLDA-SCNC: 20 MMOL/L (ref 17–25)
HCT VFR BLD AUTO: 44 % (ref 42–52)
HGB BLD-MCNC: 11.2 G/DL (ref 14–18)
HGB BLD-MCNC: 14.8 G/DL (ref 14–18)
HOROWITZ INDEX BLDA+IHG-RTO: 443 MM[HG]
INR PPP: 1.06 (ref 0.87–1.13)
INST. QUALIFIED PATIENT IIQPT: YES
LACTATE BLD-SCNC: 2.6 MMOL/L (ref 0.5–2)
LACTATE BLD-SCNC: 3.8 MMOL/L (ref 0.5–2)
MCF BLD TEG: 69.5 MM (ref 50–70)
MCH RBC QN AUTO: 31.6 PG (ref 27–33)
MCHC RBC AUTO-ENTMCNC: 33.6 G/DL (ref 33.7–35.3)
MCV RBC AUTO: 93.8 FL (ref 81.4–97.8)
O2/TOTAL GAS SETTING VFR VENT: 40 %
PA AA BLD-ACNC: 0.7 %
PA ADP BLD-ACNC: 30.9 %
PATHOLOGY CONSULT NOTE: NORMAL
PCO2 BLDA: 39.6 MMHG (ref 26–37)
PCO2 BLDA: 44.1 MMHG (ref 26–37)
PCO2 TEMP ADJ BLDA: 37.5 MMHG (ref 26–37)
PH BLDA: 7.25 [PH] (ref 7.4–7.5)
PH BLDA: 7.26 [PH] (ref 7.4–7.5)
PH TEMP ADJ BLDA: 7.27 [PH] (ref 7.4–7.5)
PLATELET # BLD AUTO: 219 K/UL (ref 164–446)
PMV BLD AUTO: 11.2 FL (ref 9–12.9)
PO2 BLDA: 177 MMHG (ref 64–87)
PO2 BLDA: 251 MMHG (ref 64–87)
PO2 TEMP ADJ BLDA: 170 MMHG (ref 64–87)
POTASSIUM SERPL-SCNC: 3.9 MMOL/L (ref 3.6–5.5)
PROT SERPL-MCNC: 7 G/DL (ref 6–8.2)
PROTHROMBIN TIME: 14 SEC (ref 12–14.6)
RBC # BLD AUTO: 4.69 M/UL (ref 4.7–6.1)
RH BLD: NORMAL
SAO2 % BLDA: 99 % (ref 93–99)
SAO2 % BLDA: 99 % (ref 93–99)
SODIUM SERPL-SCNC: 135 MMOL/L (ref 135–145)
SPECIMEN DRAWN FROM PATIENT: ABNORMAL
TEG ALGORITHM TGALG: NORMAL
TRIGL SERPL-MCNC: 97 MG/DL (ref 0–149)
WBC # BLD AUTO: 8.5 K/UL (ref 4.8–10.8)

## 2019-09-26 PROCEDURE — 83605 ASSAY OF LACTIC ACID: CPT

## 2019-09-26 PROCEDURE — 85730 THROMBOPLASTIN TIME PARTIAL: CPT

## 2019-09-26 PROCEDURE — A9270 NON-COVERED ITEM OR SERVICE: HCPCS | Performed by: NURSE PRACTITIONER

## 2019-09-26 PROCEDURE — 700101 HCHG RX REV CODE 250: Performed by: EMERGENCY MEDICINE

## 2019-09-26 PROCEDURE — 44120 REMOVAL OF SMALL INTESTINE: CPT | Performed by: SURGERY

## 2019-09-26 PROCEDURE — 501443 HCHG STAPLER, ROTIC. FLEX: Performed by: SURGERY

## 2019-09-26 PROCEDURE — 31500 INSERT EMERGENCY AIRWAY: CPT

## 2019-09-26 PROCEDURE — 85027 COMPLETE CBC AUTOMATED: CPT

## 2019-09-26 PROCEDURE — 700111 HCHG RX REV CODE 636 W/ 250 OVERRIDE (IP): Performed by: EMERGENCY MEDICINE

## 2019-09-26 PROCEDURE — 82962 GLUCOSE BLOOD TEST: CPT

## 2019-09-26 PROCEDURE — 80307 DRUG TEST PRSMV CHEM ANLYZR: CPT

## 2019-09-26 PROCEDURE — 84478 ASSAY OF TRIGLYCERIDES: CPT

## 2019-09-26 PROCEDURE — 88307 TISSUE EXAM BY PATHOLOGIST: CPT

## 2019-09-26 PROCEDURE — 82533 TOTAL CORTISOL: CPT

## 2019-09-26 PROCEDURE — 700105 HCHG RX REV CODE 258: Performed by: NURSE PRACTITIONER

## 2019-09-26 PROCEDURE — 0DBA0ZZ EXCISION OF JEJUNUM, OPEN APPROACH: ICD-10-PCS | Performed by: SURGERY

## 2019-09-26 PROCEDURE — 160029 HCHG SURGERY MINUTES - 1ST 30 MINS LEVEL 4: Performed by: SURGERY

## 2019-09-26 PROCEDURE — 44121 REMOVAL OF SMALL INTESTINE: CPT | Mod: 59 | Performed by: SURGERY

## 2019-09-26 PROCEDURE — 160009 HCHG ANES TIME/MIN: Performed by: SURGERY

## 2019-09-26 PROCEDURE — 0DBB0ZZ EXCISION OF ILEUM, OPEN APPROACH: ICD-10-PCS | Performed by: SURGERY

## 2019-09-26 PROCEDURE — 85018 HEMOGLOBIN: CPT

## 2019-09-26 PROCEDURE — 12034 INTMD RPR S/TR/EXT 7.6-12.5: CPT | Mod: 59 | Performed by: SURGERY

## 2019-09-26 PROCEDURE — 700111 HCHG RX REV CODE 636 W/ 250 OVERRIDE (IP): Performed by: ANESTHESIOLOGY

## 2019-09-26 PROCEDURE — 13101 CMPLX RPR TRUNK 2.6-7.5 CM: CPT | Mod: 59 | Performed by: SURGERY

## 2019-09-26 PROCEDURE — 80053 COMPREHEN METABOLIC PANEL: CPT

## 2019-09-26 PROCEDURE — 86901 BLOOD TYPING SEROLOGIC RH(D): CPT

## 2019-09-26 PROCEDURE — 96375 TX/PRO/DX INJ NEW DRUG ADDON: CPT

## 2019-09-26 PROCEDURE — 85347 COAGULATION TIME ACTIVATED: CPT

## 2019-09-26 PROCEDURE — 84132 ASSAY OF SERUM POTASSIUM: CPT

## 2019-09-26 PROCEDURE — 0DQV0ZZ REPAIR MESENTERY, OPEN APPROACH: ICD-10-PCS | Performed by: SURGERY

## 2019-09-26 PROCEDURE — 501445 HCHG STAPLER, SKIN DISP: Performed by: SURGERY

## 2019-09-26 PROCEDURE — 71045 X-RAY EXAM CHEST 1 VIEW: CPT

## 2019-09-26 PROCEDURE — 0BP1XDZ REMOVAL OF INTRALUMINAL DEVICE FROM TRACHEA, EXTERNAL APPROACH: ICD-10-PCS | Performed by: SURGERY

## 2019-09-26 PROCEDURE — 0WQF0ZZ REPAIR ABDOMINAL WALL, OPEN APPROACH: ICD-10-PCS | Performed by: SURGERY

## 2019-09-26 PROCEDURE — 82947 ASSAY GLUCOSE BLOOD QUANT: CPT

## 2019-09-26 PROCEDURE — 86850 RBC ANTIBODY SCREEN: CPT

## 2019-09-26 PROCEDURE — 700101 HCHG RX REV CODE 250: Performed by: PHARMACIST

## 2019-09-26 PROCEDURE — 86900 BLOOD TYPING SEROLOGIC ABO: CPT

## 2019-09-26 PROCEDURE — 94002 VENT MGMT INPAT INIT DAY: CPT

## 2019-09-26 PROCEDURE — 85610 PROTHROMBIN TIME: CPT

## 2019-09-26 PROCEDURE — 85576 BLOOD PLATELET AGGREGATION: CPT | Mod: 91

## 2019-09-26 PROCEDURE — C1765 ADHESION BARRIER: HCPCS | Performed by: SURGERY

## 2019-09-26 PROCEDURE — 94150 VITAL CAPACITY TEST: CPT

## 2019-09-26 PROCEDURE — 502704 HCHG DEVICE, LIGASURE IMPACT: Performed by: SURGERY

## 2019-09-26 PROCEDURE — 700101 HCHG RX REV CODE 250: Performed by: NURSE PRACTITIONER

## 2019-09-26 PROCEDURE — 700105 HCHG RX REV CODE 258: Performed by: ANESTHESIOLOGY

## 2019-09-26 PROCEDURE — 700101 HCHG RX REV CODE 250: Performed by: ANESTHESIOLOGY

## 2019-09-26 PROCEDURE — 5A1935Z RESPIRATORY VENTILATION, LESS THAN 24 CONSECUTIVE HOURS: ICD-10-PCS | Performed by: EMERGENCY MEDICINE

## 2019-09-26 PROCEDURE — 96374 THER/PROPH/DIAG INJ IV PUSH: CPT

## 2019-09-26 PROCEDURE — 99291 CRITICAL CARE FIRST HOUR: CPT | Mod: 25 | Performed by: SURGERY

## 2019-09-26 PROCEDURE — 700105 HCHG RX REV CODE 258: Performed by: EMERGENCY MEDICINE

## 2019-09-26 PROCEDURE — 160041 HCHG SURGERY MINUTES - EA ADDL 1 MIN LEVEL 4: Performed by: SURGERY

## 2019-09-26 PROCEDURE — 82803 BLOOD GASES ANY COMBINATION: CPT | Mod: 91

## 2019-09-26 PROCEDURE — 85384 FIBRINOGEN ACTIVITY: CPT

## 2019-09-26 PROCEDURE — 700111 HCHG RX REV CODE 636 W/ 250 OVERRIDE (IP): Performed by: NURSE PRACTITIONER

## 2019-09-26 PROCEDURE — 13102 CMPLX RPR TRUNK ADDL 5CM/<: CPT | Mod: 59 | Performed by: SURGERY

## 2019-09-26 PROCEDURE — 0JQ80ZZ REPAIR ABDOMEN SUBCUTANEOUS TISSUE AND FASCIA, OPEN APPROACH: ICD-10-PCS | Performed by: SURGERY

## 2019-09-26 PROCEDURE — 770022 HCHG ROOM/CARE - ICU (200)

## 2019-09-26 PROCEDURE — 84295 ASSAY OF SERUM SODIUM: CPT

## 2019-09-26 PROCEDURE — 85014 HEMATOCRIT: CPT

## 2019-09-26 PROCEDURE — 501838 HCHG SUTURE GENERAL: Performed by: SURGERY

## 2019-09-26 PROCEDURE — 700111 HCHG RX REV CODE 636 W/ 250 OVERRIDE (IP): Performed by: SURGERY

## 2019-09-26 PROCEDURE — 700102 HCHG RX REV CODE 250 W/ 637 OVERRIDE(OP): Performed by: NURSE PRACTITIONER

## 2019-09-26 PROCEDURE — 502652 HCHG STAPLES, ETHICON ECR60: Performed by: SURGERY

## 2019-09-26 PROCEDURE — 44604 SUTURE LARGE INTESTINE: CPT | Mod: 59 | Performed by: SURGERY

## 2019-09-26 PROCEDURE — 700105 HCHG RX REV CODE 258: Performed by: SURGERY

## 2019-09-26 PROCEDURE — 0BH17EZ INSERTION OF ENDOTRACHEAL AIRWAY INTO TRACHEA, VIA NATURAL OR ARTIFICIAL OPENING: ICD-10-PCS | Performed by: EMERGENCY MEDICINE

## 2019-09-26 PROCEDURE — 99291 CRITICAL CARE FIRST HOUR: CPT

## 2019-09-26 PROCEDURE — 82330 ASSAY OF CALCIUM: CPT

## 2019-09-26 PROCEDURE — G0390 TRAUMA RESPONS W/HOSP CRITI: HCPCS

## 2019-09-26 PROCEDURE — 700105 HCHG RX REV CODE 258: Performed by: PHARMACIST

## 2019-09-26 PROCEDURE — 160048 HCHG OR STATISTICAL LEVEL 1-5: Performed by: SURGERY

## 2019-09-26 PROCEDURE — 700112 HCHG RX REV CODE 229: Performed by: NURSE PRACTITIONER

## 2019-09-26 RX ORDER — PHENOBARBITAL SODIUM 130 MG/ML
130 INJECTION INTRAMUSCULAR; INTRAVENOUS
Status: DISCONTINUED | OUTPATIENT
Start: 2019-09-26 | End: 2019-09-27

## 2019-09-26 RX ORDER — SODIUM CHLORIDE, SODIUM GLUCONATE, SODIUM ACETATE, POTASSIUM CHLORIDE AND MAGNESIUM CHLORIDE 526; 502; 368; 37; 30 MG/100ML; MG/100ML; MG/100ML; MG/100ML; MG/100ML
INJECTION, SOLUTION INTRAVENOUS
Status: DISCONTINUED | OUTPATIENT
Start: 2019-09-26 | End: 2019-09-26 | Stop reason: SURG

## 2019-09-26 RX ORDER — CELECOXIB 200 MG/1
200 CAPSULE ORAL 2 TIMES DAILY
Status: COMPLETED | OUTPATIENT
Start: 2019-09-26 | End: 2019-10-01

## 2019-09-26 RX ORDER — FOLIC ACID 1 MG/1
1 TABLET ORAL DAILY
Status: COMPLETED | OUTPATIENT
Start: 2019-09-27 | End: 2019-09-30

## 2019-09-26 RX ORDER — MORPHINE SULFATE 10 MG/ML
1-4 INJECTION, SOLUTION INTRAMUSCULAR; INTRAVENOUS
Status: DISCONTINUED | OUTPATIENT
Start: 2019-09-26 | End: 2019-10-01

## 2019-09-26 RX ORDER — ENEMA 19; 7 G/133ML; G/133ML
1 ENEMA RECTAL
Status: DISCONTINUED | OUTPATIENT
Start: 2019-09-26 | End: 2019-09-27

## 2019-09-26 RX ORDER — SODIUM CHLORIDE, SODIUM LACTATE, POTASSIUM CHLORIDE, CALCIUM CHLORIDE 600; 310; 30; 20 MG/100ML; MG/100ML; MG/100ML; MG/100ML
INJECTION, SOLUTION INTRAVENOUS CONTINUOUS
Status: DISCONTINUED | OUTPATIENT
Start: 2019-09-26 | End: 2019-09-29

## 2019-09-26 RX ORDER — PHENYLEPHRINE HYDROCHLORIDE 10 MG/ML
INJECTION, SOLUTION INTRAMUSCULAR; INTRAVENOUS; SUBCUTANEOUS PRN
Status: DISCONTINUED | OUTPATIENT
Start: 2019-09-26 | End: 2019-09-26 | Stop reason: SURG

## 2019-09-26 RX ORDER — CEFOTETAN DISODIUM 2 G/20ML
INJECTION, POWDER, FOR SOLUTION INTRAMUSCULAR; INTRAVENOUS PRN
Status: DISCONTINUED | OUTPATIENT
Start: 2019-09-26 | End: 2019-09-26 | Stop reason: SURG

## 2019-09-26 RX ORDER — AMOXICILLIN 250 MG
1 CAPSULE ORAL
Status: DISCONTINUED | OUTPATIENT
Start: 2019-09-26 | End: 2019-10-04

## 2019-09-26 RX ORDER — ROCURONIUM BROMIDE 10 MG/ML
INJECTION, SOLUTION INTRAVENOUS
Status: COMPLETED | OUTPATIENT
Start: 2019-09-26 | End: 2019-09-26

## 2019-09-26 RX ORDER — SUCCINYLCHOLINE CHLORIDE 20 MG/ML
INJECTION INTRAMUSCULAR; INTRAVENOUS
Status: COMPLETED | OUTPATIENT
Start: 2019-09-26 | End: 2019-09-26

## 2019-09-26 RX ORDER — FAMOTIDINE 20 MG/1
20 TABLET, FILM COATED ORAL 2 TIMES DAILY
Status: DISCONTINUED | OUTPATIENT
Start: 2019-09-26 | End: 2019-09-27

## 2019-09-26 RX ORDER — PHENOBARBITAL SODIUM 130 MG/ML
260 INJECTION INTRAMUSCULAR; INTRAVENOUS
Status: DISCONTINUED | OUTPATIENT
Start: 2019-09-26 | End: 2019-09-27

## 2019-09-26 RX ORDER — SODIUM CHLORIDE, SODIUM LACTATE, POTASSIUM CHLORIDE, AND CALCIUM CHLORIDE .6; .31; .03; .02 G/100ML; G/100ML; G/100ML; G/100ML
1000 INJECTION, SOLUTION INTRAVENOUS ONCE
Status: COMPLETED | OUTPATIENT
Start: 2019-09-26 | End: 2019-09-26

## 2019-09-26 RX ORDER — SODIUM CHLORIDE 9 MG/ML
INJECTION, SOLUTION INTRAVENOUS
Status: COMPLETED | OUTPATIENT
Start: 2019-09-26 | End: 2019-09-26

## 2019-09-26 RX ORDER — AMOXICILLIN 250 MG
1 CAPSULE ORAL NIGHTLY
Status: DISCONTINUED | OUTPATIENT
Start: 2019-09-26 | End: 2019-09-27

## 2019-09-26 RX ORDER — ACETAMINOPHEN 500 MG
1000 TABLET ORAL EVERY 6 HOURS
Status: DISPENSED | OUTPATIENT
Start: 2019-09-26 | End: 2019-10-01

## 2019-09-26 RX ORDER — THIAMINE MONONITRATE (VIT B1) 100 MG
100 TABLET ORAL DAILY
Status: COMPLETED | OUTPATIENT
Start: 2019-09-27 | End: 2019-09-30

## 2019-09-26 RX ORDER — BISACODYL 10 MG
10 SUPPOSITORY, RECTAL RECTAL
Status: DISCONTINUED | OUTPATIENT
Start: 2019-09-26 | End: 2019-10-04

## 2019-09-26 RX ORDER — CLONIDINE HYDROCHLORIDE 0.1 MG/1
0.1 TABLET ORAL
Status: DISCONTINUED | OUTPATIENT
Start: 2019-09-26 | End: 2019-10-06 | Stop reason: HOSPADM

## 2019-09-26 RX ORDER — OXYCODONE HYDROCHLORIDE 10 MG/1
10 TABLET ORAL
Status: DISCONTINUED | OUTPATIENT
Start: 2019-09-26 | End: 2019-10-06 | Stop reason: HOSPADM

## 2019-09-26 RX ORDER — NOREPINEPHRINE BITARTRATE 1 MG/ML
INJECTION, SOLUTION INTRAVENOUS
Status: ACTIVE
Start: 2019-09-26 | End: 2019-09-26

## 2019-09-26 RX ORDER — ONDANSETRON 2 MG/ML
4 INJECTION INTRAMUSCULAR; INTRAVENOUS EVERY 4 HOURS PRN
Status: DISCONTINUED | OUTPATIENT
Start: 2019-09-26 | End: 2019-10-01

## 2019-09-26 RX ORDER — OXYCODONE HYDROCHLORIDE 5 MG/1
5 TABLET ORAL
Status: DISCONTINUED | OUTPATIENT
Start: 2019-09-26 | End: 2019-10-06 | Stop reason: HOSPADM

## 2019-09-26 RX ORDER — DOCUSATE SODIUM 100 MG/1
100 CAPSULE, LIQUID FILLED ORAL 2 TIMES DAILY
Status: DISCONTINUED | OUTPATIENT
Start: 2019-09-26 | End: 2019-10-06 | Stop reason: HOSPADM

## 2019-09-26 RX ORDER — POLYETHYLENE GLYCOL 3350 17 G/17G
1 POWDER, FOR SOLUTION ORAL 2 TIMES DAILY
Status: DISCONTINUED | OUTPATIENT
Start: 2019-09-26 | End: 2019-09-27

## 2019-09-26 RX ORDER — MIDAZOLAM HYDROCHLORIDE 1 MG/ML
INJECTION INTRAMUSCULAR; INTRAVENOUS
Status: COMPLETED | OUTPATIENT
Start: 2019-09-26 | End: 2019-09-26

## 2019-09-26 RX ADMIN — SODIUM CHLORIDE, SODIUM GLUCONATE, SODIUM ACETATE, POTASSIUM CHLORIDE AND MAGNESIUM CHLORIDE: 526; 502; 368; 37; 30 INJECTION, SOLUTION INTRAVENOUS at 10:11

## 2019-09-26 RX ADMIN — SODIUM CHLORIDE 1000 ML: 9 INJECTION, SOLUTION INTRAVENOUS at 10:07

## 2019-09-26 RX ADMIN — FENTANYL CITRATE 50 MCG: 50 INJECTION INTRAMUSCULAR; INTRAVENOUS at 13:18

## 2019-09-26 RX ADMIN — ACETAMINOPHEN 1000 MG: 500 TABLET ORAL at 17:15

## 2019-09-26 RX ADMIN — PHENYLEPHRINE HYDROCHLORIDE 100 MCG: 10 INJECTION INTRAVENOUS at 10:32

## 2019-09-26 RX ADMIN — CELECOXIB 200 MG: 200 CAPSULE ORAL at 17:15

## 2019-09-26 RX ADMIN — FENTANYL CITRATE 100 MCG: 50 INJECTION INTRAMUSCULAR; INTRAVENOUS at 23:59

## 2019-09-26 RX ADMIN — SODIUM CHLORIDE, POTASSIUM CHLORIDE, SODIUM LACTATE AND CALCIUM CHLORIDE 1000 ML: 600; 310; 30; 20 INJECTION, SOLUTION INTRAVENOUS at 12:41

## 2019-09-26 RX ADMIN — FENTANYL CITRATE 100 MCG: 50 INJECTION INTRAMUSCULAR; INTRAVENOUS at 18:50

## 2019-09-26 RX ADMIN — SODIUM CHLORIDE 2 G: 900 INJECTION INTRAVENOUS at 10:05

## 2019-09-26 RX ADMIN — CEFOTETAN DISODIUM 2 G: 2 INJECTION, POWDER, FOR SOLUTION INTRAMUSCULAR; INTRAVENOUS at 10:00

## 2019-09-26 RX ADMIN — FENTANYL CITRATE 100 MCG: 50 INJECTION, SOLUTION INTRAMUSCULAR; INTRAVENOUS at 09:55

## 2019-09-26 RX ADMIN — THIAMINE HYDROCHLORIDE: 100 INJECTION, SOLUTION INTRAMUSCULAR; INTRAVENOUS at 16:22

## 2019-09-26 RX ADMIN — SODIUM CHLORIDE, POTASSIUM CHLORIDE, SODIUM LACTATE AND CALCIUM CHLORIDE: 600; 310; 30; 20 INJECTION, SOLUTION INTRAVENOUS at 12:20

## 2019-09-26 RX ADMIN — ROCURONIUM BROMIDE 50 MG: 10 INJECTION, SOLUTION INTRAVENOUS at 10:51

## 2019-09-26 RX ADMIN — FAMOTIDINE 20 MG: 20 TABLET, FILM COATED ORAL at 17:15

## 2019-09-26 RX ADMIN — MIDAZOLAM 5 MG: 1 INJECTION INTRAMUSCULAR; INTRAVENOUS at 10:08

## 2019-09-26 RX ADMIN — ROCURONIUM BROMIDE 50 MG: 10 INJECTION, SOLUTION INTRAVENOUS at 10:08

## 2019-09-26 RX ADMIN — FENTANYL CITRATE 100 MCG: 50 INJECTION INTRAMUSCULAR; INTRAVENOUS at 14:18

## 2019-09-26 RX ADMIN — PROPOFOL 80 MG: 10 INJECTION, EMULSION INTRAVENOUS at 09:58

## 2019-09-26 RX ADMIN — ONDANSETRON 4 MG: 2 INJECTION INTRAMUSCULAR; INTRAVENOUS at 21:14

## 2019-09-26 RX ADMIN — SUCCINYLCHOLINE CHLORIDE 200 MG: 20 INJECTION, SOLUTION INTRAMUSCULAR; INTRAVENOUS at 09:59

## 2019-09-26 RX ADMIN — PHENOBARBITAL SODIUM 130 MG: 130 INJECTION INTRAMUSCULAR; INTRAVENOUS at 19:54

## 2019-09-26 RX ADMIN — FENTANYL CITRATE 50 MCG: 50 INJECTION INTRAMUSCULAR; INTRAVENOUS at 15:34

## 2019-09-26 RX ADMIN — PHENYLEPHRINE HYDROCHLORIDE 200 MCG: 10 INJECTION INTRAVENOUS at 11:11

## 2019-09-26 RX ADMIN — DOCUSATE SODIUM 100 MG: 100 CAPSULE, LIQUID FILLED ORAL at 17:16

## 2019-09-26 RX ADMIN — MIDAZOLAM HYDROCHLORIDE 2 MG: 1 INJECTION, SOLUTION INTRAMUSCULAR; INTRAVENOUS at 10:21

## 2019-09-26 RX ADMIN — OXYCODONE HYDROCHLORIDE 10 MG: 10 TABLET ORAL at 17:15

## 2019-09-26 RX ADMIN — PHENYLEPHRINE HYDROCHLORIDE 100 MCG: 10 INJECTION INTRAVENOUS at 10:36

## 2019-09-26 RX ADMIN — FENTANYL CITRATE 50 MCG: 50 INJECTION, SOLUTION INTRAMUSCULAR; INTRAVENOUS at 10:53

## 2019-09-26 RX ADMIN — FENTANYL CITRATE 100 MCG: 50 INJECTION INTRAMUSCULAR; INTRAVENOUS at 22:31

## 2019-09-26 RX ADMIN — OXYCODONE HYDROCHLORIDE 10 MG: 10 TABLET ORAL at 20:28

## 2019-09-26 RX ADMIN — PHENYLEPHRINE HYDROCHLORIDE 100 MCG: 10 INJECTION INTRAVENOUS at 10:40

## 2019-09-26 RX ADMIN — ROCURONIUM BROMIDE 50 MG: 10 INJECTION, SOLUTION INTRAVENOUS at 11:47

## 2019-09-26 RX ADMIN — SODIUM CHLORIDE 1000 ML: 9 INJECTION, SOLUTION INTRAVENOUS at 10:03

## 2019-09-26 RX ADMIN — FENTANYL CITRATE 200 MCG: 50 INJECTION, SOLUTION INTRAMUSCULAR; INTRAVENOUS at 11:47

## 2019-09-26 ASSESSMENT — LIFESTYLE VARIABLES
AGITATION: NORMAL ACTIVITY
HEADACHE, FULLNESS IN HEAD: NOT PRESENT
AUDITORY DISTURBANCES: NOT PRESENT
TREMOR: MODERATE TREMOR WITH ARMS EXTENDED
TOTAL SCORE: 12
ORIENTATION AND CLOUDING OF SENSORIUM: ORIENTED AND CAN DO SERIAL ADDITIONS
HEADACHE, FULLNESS IN HEAD: NOT PRESENT
VISUAL DISTURBANCES: NOT PRESENT
PAROXYSMAL SWEATS: NO SWEAT VISIBLE
ORIENTATION AND CLOUDING OF SENSORIUM: ORIENTED AND CAN DO SERIAL ADDITIONS
VISUAL DISTURBANCES: MILD SENSITIVITY
EVER_SMOKED: NEVER
AUDITORY DISTURBANCES: NOT PRESENT
TOTAL SCORE: 4
TREMOR: *
ANXIETY: NO ANXIETY (AT EASE)
PAROXYSMAL SWEATS: BARELY PERCEPTIBLE SWEATING. PALMS MOIST
ANXIETY: MODERATELY ANXIOUS OR GUARDED, SO ANXIETY IS INFERRED
AGITATION: SOMEWHAT MORE THAN NORMAL ACTIVITY
NAUSEA AND VOMITING: MILD NAUSEA WITH NO VOMITING
NAUSEA AND VOMITING: NO NAUSEA AND NO VOMITING

## 2019-09-26 ASSESSMENT — COPD QUESTIONNAIRES
DO YOU EVER COUGH UP ANY MUCUS OR PHLEGM?: NO/ONLY WITH OCCASIONAL COLDS OR INFECTIONS
HAVE YOU SMOKED AT LEAST 100 CIGARETTES IN YOUR ENTIRE LIFE: NO/DON'T KNOW
DURING THE PAST 4 WEEKS HOW MUCH DID YOU FEEL SHORT OF BREATH: NONE/LITTLE OF THE TIME
COPD SCREENING SCORE: 0

## 2019-09-26 ASSESSMENT — PAIN SCALES - GENERAL: PAIN_LEVEL: 0

## 2019-09-26 ASSESSMENT — PULMONARY FUNCTION TESTS: FVC: 1.3
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
## 2019-09-26 NOTE — RESPIRATORY CARE
Pt extubated at this time per MD order. Pt extubated to 3 lpm NC sats 98%. Pt passed al lpre extubation tests. Pt had good cough/gag as well as cuff leak. NIF -51. No respiratory distress or stridor noted post extubation. wctm pt
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
## 2019-09-26 NOTE — PROGRESS NOTES
RN Spoke with [place] [employer]  Roberto Carlos Paredes (Badge #). Pt to be arrested upon discharge. Bedside nurse to contact [place] [employer]/ Guider upon imminent discharge.     Roberto Carlos Paredes phone number .

## 2019-09-26 NOTE — ASSESSMENT & PLAN NOTE
Admission BA 0.21.  Long standing history of alcohol abuse with recurrent admission for withdrawal management.  Phenobarbital protocol.  Rally bag and multivitamins.  9/27 Brief intervention completed.
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
## 2019-09-26 NOTE — ASSESSMENT & PLAN NOTE
Premorbid condition per chart review.  No medications listed.  Psychology consult placed per patient request

## 2019-09-26 NOTE — ANESTHESIA PROCEDURE NOTES
Arterial Line  Performed by: Mandie Woodruff M.D.  Authorized by: Mandie Woodruff M.D.     Start Time:  9/26/2019 11:00 AM  End Time:  9/26/2019 11:05 AM  Localization: ultrasound guidance  Image captured, interpreted and electronically stored.  Patient Location:  OR  Indication: continuous blood pressure monitoring    Catheter Size:  20 G  Seldinger Technique?: Yes    Laterality:  Left  Site:  Radial artery  Line Secured:  Antimicrobial disc and transparent dressing  Events: patient tolerated procedure well with no complications

## 2019-09-26 NOTE — PROGRESS NOTES
Patient packed up for OR, placed on monitor. Taken to OR with TNTL, ICU nurse,and  APN. Plan for Transport to ICU after

## 2019-09-26 NOTE — FLOWSHEET NOTE
Respiratory Trauma Red Note    Intubation Yes  Positive Color Change on EZCap? Yes  Difficult Airway No  Number of attempts 1  Evidence of aspiration No  Airway ETT Oral 7.5-Secured At  (cm): 23 (09/26/19 1000)  Patel Vent Mode: (S)CMV (09/26/19 1002)     Rate (breaths/min): 20 (09/26/19 1002)  Vt Target (mL): 340 (09/26/19 1002)  FiO2: 60 (09/26/19 1002)  PEEP/CPAP: 8 (09/26/19 1002)       Events/Summary/Plan: Pt intubated by ERP w/ 7.5 ETT @ 23.  Positive color capnography change w/ equal bilateral breath sounds noted.  Placed pt on mechanical ventilation.  (09/26/19 1002)
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
## 2019-09-26 NOTE — ANESTHESIA PROCEDURE NOTES
Central Venous Line  Performed by: Mandie Woodruff M.D.  Authorized by: Mandie Woodruff M.D.     Start Time:  9/26/2019 11:40 AM  End Time:  9/26/2019 11:50 AM  Patient Location:  OR  Indication: central venous access    provider hand hygiene performed prior to central venous catheter insertion, all 5 sterile barriers used (gloves, gown, cap, mask, large sterile drape) during central venous catheter insertion and skin prep agent completely dried prior to procedure    Medical Reason for Not Performing Maximal Sterile Barrier Technique: No    Patient Position:  Supine  Laterality:  Right  Site:  Internal jugular  Prep:  Chlorhexidine  Catheter Size:  7 Fr  Catheter Length (cm):  20  Number of Lumens:  Triple lumen  target vein identified    Seldinger Technique?: Yes    Ultrasound-Guided: ultrasound-guided  Image captured, interpreted and electronically stored.  Sterile Gel and Probe Cover Used for Ultrasound?: Yes    Intravenous Verification: verified by ultrasound    all ports aspirated, all ports flushed easily, guidewire was removed intact, biopatch was applied, line was sutured in place and dressing was applied    Events: patient tolerated procedure well with no complications    PA Catheter Placed?: No
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
## 2019-09-26 NOTE — ANESTHESIA QCDR
2019 [facility] Clinical Data Registry (for Quality Improvement)     Postoperative nausea/vomiting risk protocol (Adult = 18 yrs and Pediatric 3-17 yrs)- (430 and 463)  General inhalation anesthetic (NOT TIVA) with PONV risk factors: No  Provision of anti-emetic therapy with at least 2 different classes of agents: N/A  Patient DID NOT receive anti-emetic therapy and reason is documented in Medical Record: N/A    Multimodal Pain Management- (AQI59)  Patient undergoing Elective Surgery (i.e. Outpatient, or ASC, or Prescheduled Surgery prior to Hospital Admission): No  Use of Multimodal Pain Management, two or more drugs and/or interventions, NOT including systemic opioids: N/A  Exception: Documented allergy to multiple classes of analgesics: N/A    PACU assessment of acute postoperative pain prior to Anesthesia Care End- Applies to Patients Age = 18- (ABG7)  Initial PACU pain score is which of the following: < 7/10  Patient unable to report pain score: N/A    Post-anesthetic transfer of care checklist/protocol to PACU/ICU- (426 and 427)  Upon conclusion of case, patient transferred to which of the following locations: ICU  Use of transfer checklist/protocol: Yes  Exclusion: Service Performed in Patient Hospital Room (and thus did not require transfer): N/A    PACU Reintubation- (AQI31)  General anesthesia requiring endotracheal intubation (ETT) along with subsequent extubation in OR or PACU: No  Required reintubation in the PACU: N/A  Extubation was a planned trial documented in the medical record prior to removal of the original airway device: N/A    Unplanned admission to ICU related to anesthesia service up through end of PACU care- (MD51)  Unplanned admission to ICU (not initially anticipated at anesthesia start time): No

## 2019-09-26 NOTE — ASSESSMENT & PLAN NOTE
Intubated in the trauma bay.  9/26 Extubated post surgery.  Supplemental oxygen to maintain O2 saturations greater than 95%  Aggressive pulmonary hygiene. Serial chest radiographs.
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
## 2019-09-27 ENCOUNTER — APPOINTMENT (OUTPATIENT)
Dept: RADIOLOGY | Facility: MEDICAL CENTER | Age: 34
DRG: 329 | End: 2019-09-27
Attending: NURSE PRACTITIONER
Payer: MEDICARE

## 2019-09-27 LAB
ALBUMIN SERPL BCP-MCNC: 3 G/DL (ref 3.2–4.9)
ALBUMIN/GLOB SERPL: 1.6 G/DL
ALP SERPL-CCNC: 57 U/L (ref 30–99)
ALT SERPL-CCNC: 12 U/L (ref 2–50)
ANION GAP SERPL CALC-SCNC: 4 MMOL/L (ref 0–11.9)
AST SERPL-CCNC: 29 U/L (ref 12–45)
BASOPHILS # BLD AUTO: 0.3 % (ref 0–1.8)
BASOPHILS # BLD: 0.05 K/UL (ref 0–0.12)
BILIRUB SERPL-MCNC: 1.2 MG/DL (ref 0.1–1.5)
BUN SERPL-MCNC: 13 MG/DL (ref 8–22)
CALCIUM SERPL-MCNC: 7.4 MG/DL (ref 8.5–10.5)
CHLORIDE SERPL-SCNC: 104 MMOL/L (ref 96–112)
CO2 SERPL-SCNC: 25 MMOL/L (ref 20–33)
CREAT SERPL-MCNC: 0.77 MG/DL (ref 0.5–1.4)
EOSINOPHIL # BLD AUTO: 0.02 K/UL (ref 0–0.51)
EOSINOPHIL NFR BLD: 0.1 % (ref 0–6.9)
ERYTHROCYTE [DISTWIDTH] IN BLOOD BY AUTOMATED COUNT: 45.3 FL (ref 35.9–50)
GLOBULIN SER CALC-MCNC: 1.9 G/DL (ref 1.9–3.5)
GLUCOSE BLD-MCNC: 103 MG/DL (ref 65–99)
GLUCOSE BLD-MCNC: 117 MG/DL (ref 65–99)
GLUCOSE SERPL-MCNC: 109 MG/DL (ref 65–99)
HCT VFR BLD AUTO: 31.5 % (ref 42–52)
HGB BLD-MCNC: 10.2 G/DL (ref 14–18)
HGB BLD-MCNC: 10.5 G/DL (ref 14–18)
IMM GRANULOCYTES # BLD AUTO: 0.04 K/UL (ref 0–0.11)
IMM GRANULOCYTES NFR BLD AUTO: 0.3 % (ref 0–0.9)
LACTATE BLD-SCNC: 0.9 MMOL/L (ref 0.5–2)
LYMPHOCYTES # BLD AUTO: 1.93 K/UL (ref 1–4.8)
LYMPHOCYTES NFR BLD: 12.2 % (ref 22–41)
MAGNESIUM SERPL-MCNC: 2.2 MG/DL (ref 1.5–2.5)
MCH RBC QN AUTO: 30.6 PG (ref 27–33)
MCHC RBC AUTO-ENTMCNC: 32.4 G/DL (ref 33.7–35.3)
MCV RBC AUTO: 94.6 FL (ref 81.4–97.8)
MONOCYTES # BLD AUTO: 1.44 K/UL (ref 0–0.85)
MONOCYTES NFR BLD AUTO: 9.1 % (ref 0–13.4)
NEUTROPHILS # BLD AUTO: 12.28 K/UL (ref 1.82–7.42)
NEUTROPHILS NFR BLD: 78 % (ref 44–72)
NRBC # BLD AUTO: 0 K/UL
NRBC BLD-RTO: 0 /100 WBC
PHOSPHATE SERPL-MCNC: 3.5 MG/DL (ref 2.5–4.5)
PLATELET # BLD AUTO: 151 K/UL (ref 164–446)
PMV BLD AUTO: 11.3 FL (ref 9–12.9)
POTASSIUM SERPL-SCNC: 3.9 MMOL/L (ref 3.6–5.5)
PROT SERPL-MCNC: 4.9 G/DL (ref 6–8.2)
RBC # BLD AUTO: 3.33 M/UL (ref 4.7–6.1)
SODIUM SERPL-SCNC: 133 MMOL/L (ref 135–145)
WBC # BLD AUTO: 15.8 K/UL (ref 4.8–10.8)

## 2019-09-27 PROCEDURE — 94668 MNPJ CHEST WALL SBSQ: CPT

## 2019-09-27 PROCEDURE — 84100 ASSAY OF PHOSPHORUS: CPT

## 2019-09-27 PROCEDURE — 700102 HCHG RX REV CODE 250 W/ 637 OVERRIDE(OP): Performed by: NURSE PRACTITIONER

## 2019-09-27 PROCEDURE — 700102 HCHG RX REV CODE 250 W/ 637 OVERRIDE(OP): Performed by: SURGERY

## 2019-09-27 PROCEDURE — A9270 NON-COVERED ITEM OR SERVICE: HCPCS | Performed by: NURSE PRACTITIONER

## 2019-09-27 PROCEDURE — 51798 US URINE CAPACITY MEASURE: CPT

## 2019-09-27 PROCEDURE — 700105 HCHG RX REV CODE 258: Performed by: NURSE PRACTITIONER

## 2019-09-27 PROCEDURE — 82962 GLUCOSE BLOOD TEST: CPT

## 2019-09-27 PROCEDURE — 700111 HCHG RX REV CODE 636 W/ 250 OVERRIDE (IP): Performed by: SURGERY

## 2019-09-27 PROCEDURE — 770006 HCHG ROOM/CARE - MED/SURG/GYN SEMI*

## 2019-09-27 PROCEDURE — 99233 SBSQ HOSP IP/OBS HIGH 50: CPT | Mod: 24 | Performed by: SURGERY

## 2019-09-27 PROCEDURE — 700111 HCHG RX REV CODE 636 W/ 250 OVERRIDE (IP): Performed by: NURSE PRACTITIONER

## 2019-09-27 PROCEDURE — 83605 ASSAY OF LACTIC ACID: CPT

## 2019-09-27 PROCEDURE — 700112 HCHG RX REV CODE 229: Performed by: NURSE PRACTITIONER

## 2019-09-27 PROCEDURE — 80053 COMPREHEN METABOLIC PANEL: CPT

## 2019-09-27 PROCEDURE — 94667 MNPJ CHEST WALL 1ST: CPT

## 2019-09-27 PROCEDURE — 85025 COMPLETE CBC W/AUTO DIFF WBC: CPT

## 2019-09-27 PROCEDURE — 83735 ASSAY OF MAGNESIUM: CPT

## 2019-09-27 PROCEDURE — 85018 HEMOGLOBIN: CPT

## 2019-09-27 PROCEDURE — A9270 NON-COVERED ITEM OR SERVICE: HCPCS | Performed by: SURGERY

## 2019-09-27 PROCEDURE — HZ2ZZZZ DETOXIFICATION SERVICES FOR SUBSTANCE ABUSE TREATMENT: ICD-10-PCS | Performed by: SURGERY

## 2019-09-27 PROCEDURE — 71045 X-RAY EXAM CHEST 1 VIEW: CPT

## 2019-09-27 RX ORDER — LORAZEPAM 2 MG/ML
0.5 INJECTION INTRAMUSCULAR EVERY 4 HOURS PRN
Status: DISCONTINUED | OUTPATIENT
Start: 2019-09-27 | End: 2019-10-01

## 2019-09-27 RX ORDER — LORAZEPAM 1 MG/1
2 TABLET ORAL
Status: DISCONTINUED | OUTPATIENT
Start: 2019-09-27 | End: 2019-10-01

## 2019-09-27 RX ORDER — MORPHINE SULFATE 15 MG/1
15 TABLET, FILM COATED, EXTENDED RELEASE ORAL EVERY 12 HOURS
Status: DISCONTINUED | OUTPATIENT
Start: 2019-09-27 | End: 2019-10-03

## 2019-09-27 RX ORDER — LORAZEPAM 1 MG/1
3 TABLET ORAL
Status: DISCONTINUED | OUTPATIENT
Start: 2019-09-27 | End: 2019-10-01

## 2019-09-27 RX ORDER — LORAZEPAM 2 MG/ML
2 INJECTION INTRAMUSCULAR
Status: DISCONTINUED | OUTPATIENT
Start: 2019-09-27 | End: 2019-10-01

## 2019-09-27 RX ORDER — LORAZEPAM 1 MG/1
1 TABLET ORAL EVERY 4 HOURS PRN
Status: DISCONTINUED | OUTPATIENT
Start: 2019-09-27 | End: 2019-10-01

## 2019-09-27 RX ORDER — LORAZEPAM 1 MG/1
4 TABLET ORAL
Status: DISCONTINUED | OUTPATIENT
Start: 2019-09-27 | End: 2019-10-01

## 2019-09-27 RX ORDER — LORAZEPAM 2 MG/ML
1.5 INJECTION INTRAMUSCULAR
Status: DISCONTINUED | OUTPATIENT
Start: 2019-09-27 | End: 2019-10-01

## 2019-09-27 RX ORDER — GABAPENTIN 100 MG/1
100 CAPSULE ORAL 3 TIMES DAILY
Status: DISCONTINUED | OUTPATIENT
Start: 2019-09-27 | End: 2019-10-01

## 2019-09-27 RX ORDER — LORAZEPAM 2 MG/ML
1 INJECTION INTRAMUSCULAR
Status: DISCONTINUED | OUTPATIENT
Start: 2019-09-27 | End: 2019-10-01

## 2019-09-27 RX ORDER — LORAZEPAM 1 MG/1
0.5 TABLET ORAL EVERY 4 HOURS PRN
Status: DISCONTINUED | OUTPATIENT
Start: 2019-09-27 | End: 2019-10-03

## 2019-09-27 RX ADMIN — FENTANYL CITRATE 100 MCG: 50 INJECTION INTRAMUSCULAR; INTRAVENOUS at 05:45

## 2019-09-27 RX ADMIN — MORPHINE SULFATE 15 MG: 15 TABLET, EXTENDED RELEASE ORAL at 10:41

## 2019-09-27 RX ADMIN — MORPHINE SULFATE 15 MG: 15 TABLET, EXTENDED RELEASE ORAL at 17:54

## 2019-09-27 RX ADMIN — GABAPENTIN 100 MG: 100 CAPSULE ORAL at 17:53

## 2019-09-27 RX ADMIN — ENOXAPARIN SODIUM 30 MG: 100 INJECTION SUBCUTANEOUS at 05:46

## 2019-09-27 RX ADMIN — THERA TABS 1 TABLET: TAB at 05:46

## 2019-09-27 RX ADMIN — FAMOTIDINE 20 MG: 10 INJECTION, SOLUTION INTRAVENOUS at 06:00

## 2019-09-27 RX ADMIN — ENOXAPARIN SODIUM 30 MG: 100 INJECTION SUBCUTANEOUS at 17:53

## 2019-09-27 RX ADMIN — Medication 100 MG: at 05:46

## 2019-09-27 RX ADMIN — FOLIC ACID 1 MG: 1 TABLET ORAL at 05:46

## 2019-09-27 RX ADMIN — MORPHINE SULFATE 4 MG: 10 INJECTION INTRAVENOUS at 21:04

## 2019-09-27 RX ADMIN — ACETAMINOPHEN 1000 MG: 500 TABLET ORAL at 17:53

## 2019-09-27 RX ADMIN — MORPHINE SULFATE 4 MG: 10 INJECTION INTRAVENOUS at 14:56

## 2019-09-27 RX ADMIN — FENTANYL CITRATE 100 MCG: 50 INJECTION INTRAMUSCULAR; INTRAVENOUS at 01:58

## 2019-09-27 RX ADMIN — ACETAMINOPHEN 1000 MG: 500 TABLET ORAL at 23:07

## 2019-09-27 RX ADMIN — ACETAMINOPHEN 1000 MG: 500 TABLET ORAL at 13:10

## 2019-09-27 RX ADMIN — ACETAMINOPHEN 1000 MG: 500 TABLET ORAL at 00:06

## 2019-09-27 RX ADMIN — ACETAMINOPHEN 1000 MG: 500 TABLET ORAL at 05:45

## 2019-09-27 RX ADMIN — POLYETHYLENE GLYCOL 3350 1 PACKET: 17 POWDER, FOR SOLUTION ORAL at 05:45

## 2019-09-27 RX ADMIN — DOCUSATE SODIUM 100 MG: 100 CAPSULE, LIQUID FILLED ORAL at 05:46

## 2019-09-27 RX ADMIN — OXYCODONE HYDROCHLORIDE 10 MG: 10 TABLET ORAL at 12:59

## 2019-09-27 RX ADMIN — SODIUM CHLORIDE, POTASSIUM CHLORIDE, SODIUM LACTATE AND CALCIUM CHLORIDE: 600; 310; 30; 20 INJECTION, SOLUTION INTRAVENOUS at 18:36

## 2019-09-27 RX ADMIN — OXYCODONE HYDROCHLORIDE 10 MG: 10 TABLET ORAL at 18:36

## 2019-09-27 RX ADMIN — DOCUSATE SODIUM 100 MG: 100 CAPSULE, LIQUID FILLED ORAL at 17:53

## 2019-09-27 RX ADMIN — CELECOXIB 200 MG: 200 CAPSULE ORAL at 17:53

## 2019-09-27 RX ADMIN — OXYCODONE HYDROCHLORIDE 10 MG: 10 TABLET ORAL at 04:37

## 2019-09-27 RX ADMIN — CELECOXIB 200 MG: 200 CAPSULE ORAL at 05:45

## 2019-09-27 RX ADMIN — GABAPENTIN 100 MG: 100 CAPSULE ORAL at 13:10

## 2019-09-27 ASSESSMENT — LIFESTYLE VARIABLES
AUDITORY DISTURBANCES: NOT PRESENT
TREMOR: NO TREMOR
VISUAL DISTURBANCES: MILD SENSITIVITY
SUBSTANCE_ABUSE: 1
ANXIETY: MILDLY ANXIOUS
AGITATION: NORMAL ACTIVITY
TOTAL SCORE: 4
AUDITORY DISTURBANCES: NOT PRESENT
ANXIETY: *
HEADACHE, FULLNESS IN HEAD: MODERATE
PAROXYSMAL SWEATS: NO SWEAT VISIBLE
PAROXYSMAL SWEATS: NO SWEAT VISIBLE
ORIENTATION AND CLOUDING OF SENSORIUM: ORIENTED AND CAN DO SERIAL ADDITIONS
AGITATION: NORMAL ACTIVITY
TOTAL SCORE: VERY MILD ITCHING, PINS AND NEEDLES SENSATION, BURNING OR NUMBNESS
NAUSEA AND VOMITING: NO NAUSEA AND NO VOMITING
TREMOR: NO TREMOR
ORIENTATION AND CLOUDING OF SENSORIUM: ORIENTED AND CAN DO SERIAL ADDITIONS
NAUSEA AND VOMITING: NO NAUSEA AND NO VOMITING
HEADACHE, FULLNESS IN HEAD: NOT PRESENT
TOTAL SCORE: MODERATE ITCHING, PINS AND NEEDLES SENSATION, BURNING OR NUMBNESS
VISUAL DISTURBANCES: MILD SENSITIVITY
TOTAL SCORE: 10

## 2019-09-27 ASSESSMENT — ENCOUNTER SYMPTOMS
NAUSEA: 0
DIZZINESS: 0
SHORTNESS OF BREATH: 0
VOMITING: 0
CHILLS: 0
DOUBLE VISION: 0
FOCAL WEAKNESS: 0
NECK PAIN: 0
ABDOMINAL PAIN: 1
BLURRED VISION: 0
SPEECH CHANGE: 0
MYALGIAS: 0
SENSORY CHANGE: 0
HEADACHES: 0
BACK PAIN: 0
TINGLING: 0
FEVER: 0

## 2019-09-27 NOTE — CARE PLAN
Problem: Nutritional:  Goal: Achieve adequate nutritional intake  Description  Advance from clears as appropriate.   Patient will consume 50% of meals and supplements.   Outcome: NOT MET
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
## 2019-09-27 NOTE — PROGRESS NOTES
"TRAUMA TERTIARY SURVEY     Mental status adequate for full examination?: Yes    Spine cleared (radiologically and/or clinically): Yes    PHYSICAL EXAMINATION:  Vitals: /75   Pulse (!) 105   Temp (!) 35.8 °C (96.4 °F) (Temporal)   Resp (!) 21   Ht 1.727 m (5' 8\")   Wt 78 kg (171 lb 15.3 oz)   SpO2 99%   BMI 26.15 kg/m²   Constitutional:     General Appearance: appears stated age, is well developed and well nourished, is in mild distress.  HEENT:     No significant external craniofacial trauma. The pupils are equal, round, and reactive to light bilaterally. The extraocular muscles are intact bilaterally. The nares and oropharynx are clear. The midface and jaw are stable. No malocclusion is evident.  Neck:    The cervical spine is supple and non tender. Normal range of motion. No posterior midline cervical-spine tenderness, no evidence of intoxication, normal level of alertness (Marion Coma Scale 15), no focal neurologic deficit, and no painful distracting injuries. The trachea is midline. No significant abrasions, lacerations, contusions, punctures, or swelling. No crepitance. No jugulovenous distension.  Respiratory:   Inspection: Unlabored respirations, no intercostal retractions, paradoxical motion, or accessory muscle use.   Palpation:  The chest is nontender. The clavicles are non deformed bilaterally.  Cardiovascular:   Peripheral Pulses: Normal.   Abdomen:   Abdomen distended and tender to palpation. Bridged Prevena dressing functional.  Genitourinary:   (MALE): Not visulized. Cabrera catheter in place.  Musculoskeletal:   The pelvis is stable.  No significant angulation, deformity, or soft tissue injury involving the upper and lower extremities. Normal range of motion.  History of ectrodactyly-ectodermal dysplasia-clefting syndrome.  Back:   The thoracolumbar spine was examined. Examination is remarkable for no significant tenderness, swelling, or deformity in the thoracolumbar region.  Skin:   The " skin is warm and dry.  Neurologic:    Bryson Coma Scale (GCS) 15 E4V5M6. Neurologic examination revealed no focal deficits noted, mental status intact.  Psychiatric:   The patient anxious and tearful.    IMAGING:  DX-CHEST-PORTABLE (1 VIEW)   Final Result      No significant change compared with 9/26.      DX-CHEST-FOR LINE PLACEMENT Perform procedure in: Patient's Room   Final Result      1.  Satisfactory appearance of the ET tube and right IJ catheter. There is no visible pneumothorax.         DX-CHEST-LIMITED (1 VIEW)   Final Result      Limited examination as the right lung base was not entirely included.      Endotracheal tube projects at the level of the clavicular heads.         US-ABORTED US PROCEDURE    (Results Pending)     All current laboratory studies/radiology exams reviewed: Yes    Completed Consultations:  na     Pending Consultations:  na    Newly Identified Diagnoses and Injuries:  none    TOTAL RAP SCORE:  Total Score: 2      ETOH Screening     Assessment complete date: 9/27/2019  Intervention: yes. Patient response to intervention: History of DTs. Drank the day of the injury, last time was about a month before. Denies tobacco, marijuana or illicit drug use..   Patient does not demonstrate understanding of intervention. Patient does not agree to follow-up.   has not been contacted. Follow up with: PCP  Total ETOH intervention time: 15 - 30 mintues
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
## 2019-09-27 NOTE — CARE PLAN
Respiratory Update    Treatment modality:     PEP QID    Pt tolerating current treatments well with no adverse reactions.

## 2019-09-27 NOTE — CARE PLAN
Problem: Knowledge Deficit  Goal: Knowledge of disease process/condition, treatment plan, diagnostic tests, and medications will improve  Intervention: Explain information regarding disease process/condition, treatment plan, diagnostic tests, and medications and document in education  Note:   Patient educated on plan of care, ICU policies, medications, and post-surgical management. Reinforcement needed.      Problem: Pain Management  Goal: Pain level will decrease to patient's comfort goal  Note:   Pain assessed with each encounter using 0-10 scale. Medication administered as prescribed and safe. Patient educated on safe medication administration and non-pharmacological measures, reinforcement needed. Minimal learning evidenced.

## 2019-09-28 ENCOUNTER — APPOINTMENT (OUTPATIENT)
Dept: RADIOLOGY | Facility: MEDICAL CENTER | Age: 34
DRG: 329 | End: 2019-09-28
Attending: NURSE PRACTITIONER
Payer: MEDICARE

## 2019-09-28 LAB
ACTION RANGE TRIGGERED IACRT: YES
ANION GAP SERPL CALC-SCNC: 4 MMOL/L (ref 0–11.9)
BASE EXCESS BLDA CALC-SCNC: -10 MMOL/L (ref -4–3)
BASOPHILS # BLD AUTO: 0.3 % (ref 0–1.8)
BASOPHILS # BLD: 0.03 K/UL (ref 0–0.12)
BODY TEMPERATURE: ABNORMAL DEGREES
BUN SERPL-MCNC: 11 MG/DL (ref 8–22)
CA-I BLD ISE-SCNC: 0.97 MMOL/L (ref 1.1–1.3)
CALCIUM SERPL-MCNC: 7.8 MG/DL (ref 8.5–10.5)
CHLORIDE SERPL-SCNC: 104 MMOL/L (ref 96–112)
CO2 BLDA-SCNC: 18 MMOL/L (ref 20–33)
CO2 SERPL-SCNC: 27 MMOL/L (ref 20–33)
CREAT SERPL-MCNC: 0.74 MG/DL (ref 0.5–1.4)
EOSINOPHIL # BLD AUTO: 0.14 K/UL (ref 0–0.51)
EOSINOPHIL NFR BLD: 1.3 % (ref 0–6.9)
ERYTHROCYTE [DISTWIDTH] IN BLOOD BY AUTOMATED COUNT: 44.6 FL (ref 35.9–50)
GLUCOSE BLD-MCNC: 100 MG/DL (ref 65–99)
GLUCOSE SERPL-MCNC: 98 MG/DL (ref 65–99)
HCO3 BLDA-SCNC: 17.1 MMOL/L (ref 17–25)
HCT VFR BLD AUTO: 25.8 % (ref 42–52)
HCT VFR BLD CALC: 31 % (ref 42–52)
HGB BLD-MCNC: 10.5 G/DL (ref 14–18)
HGB BLD-MCNC: 8.4 G/DL (ref 14–18)
IMM GRANULOCYTES # BLD AUTO: 0.04 K/UL (ref 0–0.11)
IMM GRANULOCYTES NFR BLD AUTO: 0.4 % (ref 0–0.9)
INST. QUALIFIED PATIENT IIQPT: YES
LYMPHOCYTES # BLD AUTO: 1.32 K/UL (ref 1–4.8)
LYMPHOCYTES NFR BLD: 12.2 % (ref 22–41)
MCH RBC QN AUTO: 30.2 PG (ref 27–33)
MCHC RBC AUTO-ENTMCNC: 31.9 G/DL (ref 33.7–35.3)
MCV RBC AUTO: 94.8 FL (ref 81.4–97.8)
MONOCYTES # BLD AUTO: 1 K/UL (ref 0–0.85)
MONOCYTES NFR BLD AUTO: 9.3 % (ref 0–13.4)
NEUTROPHILS # BLD AUTO: 8.27 K/UL (ref 1.82–7.42)
NEUTROPHILS NFR BLD: 76.5 % (ref 44–72)
NRBC # BLD AUTO: 0 K/UL
NRBC BLD-RTO: 0 /100 WBC
PCO2 BLDA: 41.6 MMHG (ref 26–37)
PH BLDA: 7.22 [PH] (ref 7.4–7.5)
PLATELET # BLD AUTO: 135 K/UL (ref 164–446)
PMV BLD AUTO: 11.4 FL (ref 9–12.9)
PO2 BLDA: 176 MMHG (ref 64–87)
POTASSIUM BLD-SCNC: 3.9 MMOL/L (ref 3.6–5.5)
POTASSIUM SERPL-SCNC: 3.9 MMOL/L (ref 3.6–5.5)
RBC # BLD AUTO: 2.68 M/UL (ref 4.7–6.1)
SAO2 % BLDA: 99 % (ref 93–99)
SODIUM BLD-SCNC: 141 MMOL/L (ref 135–145)
SODIUM SERPL-SCNC: 135 MMOL/L (ref 135–145)
SPECIMEN DRAWN FROM PATIENT: ABNORMAL
WBC # BLD AUTO: 10.8 K/UL (ref 4.8–10.8)

## 2019-09-28 PROCEDURE — 85025 COMPLETE CBC W/AUTO DIFF WBC: CPT

## 2019-09-28 PROCEDURE — A9270 NON-COVERED ITEM OR SERVICE: HCPCS | Performed by: SURGERY

## 2019-09-28 PROCEDURE — A9270 NON-COVERED ITEM OR SERVICE: HCPCS | Performed by: NURSE PRACTITIONER

## 2019-09-28 PROCEDURE — 700102 HCHG RX REV CODE 250 W/ 637 OVERRIDE(OP): Performed by: SURGERY

## 2019-09-28 PROCEDURE — 80048 BASIC METABOLIC PNL TOTAL CA: CPT

## 2019-09-28 PROCEDURE — 97161 PT EVAL LOW COMPLEX 20 MIN: CPT

## 2019-09-28 PROCEDURE — 700102 HCHG RX REV CODE 250 W/ 637 OVERRIDE(OP): Performed by: NURSE PRACTITIONER

## 2019-09-28 PROCEDURE — 700111 HCHG RX REV CODE 636 W/ 250 OVERRIDE (IP): Performed by: NURSE PRACTITIONER

## 2019-09-28 PROCEDURE — 770006 HCHG ROOM/CARE - MED/SURG/GYN SEMI*

## 2019-09-28 PROCEDURE — 700105 HCHG RX REV CODE 258: Performed by: NURSE PRACTITIONER

## 2019-09-28 PROCEDURE — 700112 HCHG RX REV CODE 229: Performed by: NURSE PRACTITIONER

## 2019-09-28 RX ORDER — BUSPIRONE HYDROCHLORIDE 10 MG/1
20 TABLET ORAL EVERY EVENING
COMMUNITY
End: 2022-11-29

## 2019-09-28 RX ORDER — PROPRANOLOL HYDROCHLORIDE 20 MG/1
20 TABLET ORAL 2 TIMES DAILY
COMMUNITY

## 2019-09-28 RX ORDER — DEXTROAMPHETAMINE SACCHARATE, AMPHETAMINE ASPARTATE, DEXTROAMPHETAMINE SULFATE AND AMPHETAMINE SULFATE 5; 5; 5; 5 MG/1; MG/1; MG/1; MG/1
30 TABLET ORAL DAILY
COMMUNITY
End: 2022-11-29

## 2019-09-28 RX ORDER — BACLOFEN 10 MG/1
10 TABLET ORAL 3 TIMES DAILY
COMMUNITY
End: 2022-11-29

## 2019-09-28 RX ADMIN — DOCUSATE SODIUM 100 MG: 100 CAPSULE, LIQUID FILLED ORAL at 05:54

## 2019-09-28 RX ADMIN — MORPHINE SULFATE 15 MG: 15 TABLET, EXTENDED RELEASE ORAL at 05:54

## 2019-09-28 RX ADMIN — OXYCODONE HYDROCHLORIDE 10 MG: 10 TABLET ORAL at 14:53

## 2019-09-28 RX ADMIN — SODIUM CHLORIDE, POTASSIUM CHLORIDE, SODIUM LACTATE AND CALCIUM CHLORIDE: 600; 310; 30; 20 INJECTION, SOLUTION INTRAVENOUS at 14:18

## 2019-09-28 RX ADMIN — Medication 100 MG: at 05:54

## 2019-09-28 RX ADMIN — CELECOXIB 200 MG: 200 CAPSULE ORAL at 17:06

## 2019-09-28 RX ADMIN — FOLIC ACID 1 MG: 1 TABLET ORAL at 05:54

## 2019-09-28 RX ADMIN — ACETAMINOPHEN 1000 MG: 500 TABLET ORAL at 17:06

## 2019-09-28 RX ADMIN — OXYCODONE HYDROCHLORIDE 10 MG: 10 TABLET ORAL at 04:24

## 2019-09-28 RX ADMIN — LORAZEPAM 1 MG: 1 TABLET ORAL at 11:38

## 2019-09-28 RX ADMIN — ACETAMINOPHEN 1000 MG: 500 TABLET ORAL at 05:54

## 2019-09-28 RX ADMIN — LORAZEPAM 0.5 MG: 1 TABLET ORAL at 20:07

## 2019-09-28 RX ADMIN — LORAZEPAM 1 MG: 1 TABLET ORAL at 00:04

## 2019-09-28 RX ADMIN — DOCUSATE SODIUM 100 MG: 100 CAPSULE, LIQUID FILLED ORAL at 17:06

## 2019-09-28 RX ADMIN — GABAPENTIN 100 MG: 100 CAPSULE ORAL at 11:34

## 2019-09-28 RX ADMIN — SODIUM CHLORIDE, POTASSIUM CHLORIDE, SODIUM LACTATE AND CALCIUM CHLORIDE: 600; 310; 30; 20 INJECTION, SOLUTION INTRAVENOUS at 03:46

## 2019-09-28 RX ADMIN — OXYCODONE HYDROCHLORIDE 10 MG: 10 TABLET ORAL at 20:07

## 2019-09-28 RX ADMIN — SODIUM CHLORIDE, POTASSIUM CHLORIDE, SODIUM LACTATE AND CALCIUM CHLORIDE: 600; 310; 30; 20 INJECTION, SOLUTION INTRAVENOUS at 20:10

## 2019-09-28 RX ADMIN — MORPHINE SULFATE 15 MG: 15 TABLET, EXTENDED RELEASE ORAL at 17:06

## 2019-09-28 RX ADMIN — CELECOXIB 200 MG: 200 CAPSULE ORAL at 05:54

## 2019-09-28 RX ADMIN — THERA TABS 1 TABLET: TAB at 05:54

## 2019-09-28 RX ADMIN — GABAPENTIN 100 MG: 100 CAPSULE ORAL at 05:54

## 2019-09-28 RX ADMIN — OXYCODONE HYDROCHLORIDE 10 MG: 10 TABLET ORAL at 08:12

## 2019-09-28 RX ADMIN — GABAPENTIN 100 MG: 100 CAPSULE ORAL at 17:06

## 2019-09-28 RX ADMIN — OXYCODONE HYDROCHLORIDE 10 MG: 10 TABLET ORAL at 11:34

## 2019-09-28 RX ADMIN — ENOXAPARIN SODIUM 30 MG: 100 INJECTION SUBCUTANEOUS at 17:06

## 2019-09-28 ASSESSMENT — LIFESTYLE VARIABLES
TREMOR: TREMOR NOT VISIBLE BUT CAN BE FELT, FINGERTIP TO FINGERTIP
TOTAL SCORE: 5
AGITATION: NORMAL ACTIVITY
NAUSEA AND VOMITING: NO NAUSEA AND NO VOMITING
TOTAL SCORE: VERY MILD ITCHING, PINS AND NEEDLES SENSATION, BURNING OR NUMBNESS
EVER HAD A DRINK FIRST THING IN THE MORNING TO STEADY YOUR NERVES TO GET RID OF A HANGOVER: NO
ANXIETY: MILDLY ANXIOUS
AGITATION: NORMAL ACTIVITY
ANXIETY: MILDLY ANXIOUS
ANXIETY: MILDLY ANXIOUS
TOTAL SCORE: 8
HAVE YOU EVER FELT YOU SHOULD CUT DOWN ON YOUR DRINKING: YES
HAVE YOU EVER FELT YOU SHOULD CUT DOWN ON YOUR DRINKING: YES
TREMOR: TREMOR NOT VISIBLE BUT CAN BE FELT, FINGERTIP TO FINGERTIP
HEADACHE, FULLNESS IN HEAD: MILD
PAROXYSMAL SWEATS: BARELY PERCEPTIBLE SWEATING. PALMS MOIST
TOTAL SCORE: 3
AUDITORY DISTURBANCES: NOT PRESENT
VISUAL DISTURBANCES: NOT PRESENT
TOTAL SCORE: 7
CONSUMPTION TOTAL: POSITIVE
TREMOR: TREMOR NOT VISIBLE BUT CAN BE FELT, FINGERTIP TO FINGERTIP
PAROXYSMAL SWEATS: BARELY PERCEPTIBLE SWEATING. PALMS MOIST
CONSUMPTION TOTAL: INCOMPLETE
EVER FELT BAD OR GUILTY ABOUT YOUR DRINKING: YES
EVER HAD A DRINK FIRST THING IN THE MORNING TO STEADY YOUR NERVES TO GET RID OF A HANGOVER: NO
AGITATION: NORMAL ACTIVITY
HEADACHE, FULLNESS IN HEAD: MILD
TOTAL SCORE: 4
ORIENTATION AND CLOUDING OF SENSORIUM: ORIENTED AND CAN DO SERIAL ADDITIONS
HEADACHE, FULLNESS IN HEAD: MILD
AGITATION: NORMAL ACTIVITY
AUDITORY DISTURBANCES: NOT PRESENT
PAROXYSMAL SWEATS: NO SWEAT VISIBLE
VISUAL DISTURBANCES: NOT PRESENT
ALCOHOL_USE: YES
PAROXYSMAL SWEATS: NO SWEAT VISIBLE
HEADACHE, FULLNESS IN HEAD: MILD
NAUSEA AND VOMITING: NO NAUSEA AND NO VOMITING
AUDITORY DISTURBANCES: NOT PRESENT
NAUSEA AND VOMITING: MILD NAUSEA WITH NO VOMITING
AUDITORY DISTURBANCES: NOT PRESENT
TREMOR: TREMOR NOT VISIBLE BUT CAN BE FELT, FINGERTIP TO FINGERTIP
VISUAL DISTURBANCES: NOT PRESENT
TOTAL SCORE: MODERATE ITCHING, PINS AND NEEDLES SENSATION, BURNING OR NUMBNESS
NAUSEA AND VOMITING: NO NAUSEA AND NO VOMITING
HOW MANY TIMES IN THE PAST YEAR HAVE YOU HAD 5 OR MORE DRINKS IN A DAY: 2
TOTAL SCORE: 3
EVER FELT BAD OR GUILTY ABOUT YOUR DRINKING: YES
TREMOR: NO TREMOR
PAROXYSMAL SWEATS: NO SWEAT VISIBLE
NAUSEA AND VOMITING: NO NAUSEA AND NO VOMITING
TOTAL SCORE: 3
TOTAL SCORE: MILD ITCHING, PINS AND NEEDLES SENSATION, BURNING OR NUMBNESS
EVER_SMOKED: NEVER
ANXIETY: MILDLY ANXIOUS
TOTAL SCORE: 6
VISUAL DISTURBANCES: NOT PRESENT
AGITATION: NORMAL ACTIVITY
ANXIETY: NO ANXIETY (AT EASE)
ORIENTATION AND CLOUDING OF SENSORIUM: ORIENTED AND CAN DO SERIAL ADDITIONS
ORIENTATION AND CLOUDING OF SENSORIUM: ORIENTED AND CAN DO SERIAL ADDITIONS
HEADACHE, FULLNESS IN HEAD: MILD
AUDITORY DISTURBANCES: NOT PRESENT
ON A TYPICAL DAY WHEN YOU DRINK ALCOHOL HOW MANY DRINKS DO YOU HAVE: 1
ORIENTATION AND CLOUDING OF SENSORIUM: ORIENTED AND CAN DO SERIAL ADDITIONS
SUBSTANCE_ABUSE: 1
HAVE PEOPLE ANNOYED YOU BY CRITICIZING YOUR DRINKING: YES
VISUAL DISTURBANCES: NOT PRESENT
HAVE PEOPLE ANNOYED YOU BY CRITICIZING YOUR DRINKING: YES
ORIENTATION AND CLOUDING OF SENSORIUM: ORIENTED AND CAN DO SERIAL ADDITIONS
AVERAGE NUMBER OF DAYS PER WEEK YOU HAVE A DRINK CONTAINING ALCOHOL: 2
TOTAL SCORE: MILD ITCHING, PINS AND NEEDLES SENSATION, BURNING OR NUMBNESS
TOTAL SCORE: VERY MILD ITCHING, PINS AND NEEDLES SENSATION, BURNING OR NUMBNESS
ALCOHOL_USE: YES

## 2019-09-28 ASSESSMENT — COGNITIVE AND FUNCTIONAL STATUS - GENERAL
DRESSING REGULAR UPPER BODY CLOTHING: A LITTLE
MOVING FROM LYING ON BACK TO SITTING ON SIDE OF FLAT BED: A LITTLE
SUGGESTED CMS G CODE MODIFIER MOBILITY: CK
CLIMB 3 TO 5 STEPS WITH RAILING: A LOT
SUGGESTED CMS G CODE MODIFIER DAILY ACTIVITY: CK
MOVING TO AND FROM BED TO CHAIR: A LITTLE
MOBILITY SCORE: 17
TURNING FROM BACK TO SIDE WHILE IN FLAT BAD: A LITTLE
MOBILITY SCORE: 16
MOVING FROM LYING ON BACK TO SITTING ON SIDE OF FLAT BED: A LOT
TOILETING: A LITTLE
MOVING TO AND FROM BED TO CHAIR: A LITTLE
SUGGESTED CMS G CODE MODIFIER MOBILITY: CK
TURNING FROM BACK TO SIDE WHILE IN FLAT BAD: A LITTLE
WALKING IN HOSPITAL ROOM: A LITTLE
EATING MEALS: A LITTLE
STANDING UP FROM CHAIR USING ARMS: A LITTLE
STANDING UP FROM CHAIR USING ARMS: A LITTLE
PERSONAL GROOMING: A LITTLE
DRESSING REGULAR LOWER BODY CLOTHING: A LITTLE
HELP NEEDED FOR BATHING: A LITTLE
WALKING IN HOSPITAL ROOM: A LITTLE
CLIMB 3 TO 5 STEPS WITH RAILING: A LOT
DAILY ACTIVITIY SCORE: 18

## 2019-09-28 ASSESSMENT — ENCOUNTER SYMPTOMS
ABDOMINAL PAIN: 1
CARDIOVASCULAR NEGATIVE: 1
RESPIRATORY NEGATIVE: 1
NEUROLOGICAL NEGATIVE: 1
MUSCULOSKELETAL NEGATIVE: 1

## 2019-09-28 ASSESSMENT — GAIT ASSESSMENTS
ASSISTIVE DEVICE: FRONT WHEEL WALKER
GAIT LEVEL OF ASSIST: CONTACT GUARD ASSIST
DEVIATION: ANTALGIC;BRADYKINETIC
DISTANCE (FEET): 25
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
## 2019-09-28 NOTE — PROGRESS NOTES
Assumed care of pt.  AAOx4.  Mildly anxious this morning, CIWA assessment in place, pt scored at 7 this morning. Will reassess Q4.  MLI with prevena in place, CDI.  Clear liquid diet in place, no c/o n/v.  LBM PTA, + flatus this morning per pt, + void.  POC reviewed with pt.  Call light within reach, pt educated to call for assistance as needed.  Hourly rounding in place.
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
## 2019-09-28 NOTE — CARE PLAN
Problem: Safety  Goal: Will remain free from falls  Outcome: PROGRESSING AS EXPECTED     Pt is a moderate fall risk, bed alarm on    Problem: Venous Thromboembolism (VTW)/Deep Vein Thrombosis (DVT) Prevention:  Goal: Patient will participate in Venous Thrombosis (VTE)/Deep Vein Thrombosis (DVT)Prevention Measures  Outcome: PROGRESSING AS EXPECTED     SCDs on

## 2019-09-28 NOTE — CARE PLAN
Problem: Communication  Goal: The ability to communicate needs accurately and effectively will improve  Outcome: PROGRESSING AS EXPECTED  POC reviewed with pt. All questions answered at this time.      Problem: Pain Management  Goal: Pain level will decrease to patient's comfort goal  Outcome: PROGRESSING AS EXPECTED  Pain well controlled with current regimen. Pt educated on regimen and to call for intervention as needed.
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
## 2019-09-28 NOTE — PROGRESS NOTES
2 RN skin check complete  Pervena wound vac in place to abdominal incision with binder in place  RUE bruising  Scattered abrasions DEE DEE  Preventative mepilex on sacrum

## 2019-09-28 NOTE — PROGRESS NOTES
Received report from day shift RN. Assumed patient care  AOx4  Pain rated at 6/10, medicated per MAR  3 L of O2 via nasal cannula  Midline incision with prevena in place, CDI  No complaints of nausea at this time, clear liquid diet  CIWA in place  Ambulates x1 assist with FWW  SCDs on  Call light within reach  Bed locked and in low position  POC discussed with patient  All needs met at this time.

## 2019-09-29 ENCOUNTER — APPOINTMENT (OUTPATIENT)
Dept: RADIOLOGY | Facility: MEDICAL CENTER | Age: 34
DRG: 329 | End: 2019-09-29
Attending: NURSE PRACTITIONER
Payer: MEDICARE

## 2019-09-29 PROBLEM — D64.9 ANEMIA FOLLOWING SURGERY: Status: ACTIVE | Noted: 2019-09-29

## 2019-09-29 LAB
BASOPHILS # BLD AUTO: 0.3 % (ref 0–1.8)
BASOPHILS # BLD: 0.02 K/UL (ref 0–0.12)
EOSINOPHIL # BLD AUTO: 0.24 K/UL (ref 0–0.51)
EOSINOPHIL NFR BLD: 3.2 % (ref 0–6.9)
ERYTHROCYTE [DISTWIDTH] IN BLOOD BY AUTOMATED COUNT: 44.9 FL (ref 35.9–50)
HCT VFR BLD AUTO: 24 % (ref 42–52)
HGB BLD-MCNC: 7.8 G/DL (ref 14–18)
HGB RETIC QN AUTO: 32.6 PG/CELL (ref 29–35)
IMM GRANULOCYTES # BLD AUTO: 0.04 K/UL (ref 0–0.11)
IMM GRANULOCYTES NFR BLD AUTO: 0.5 % (ref 0–0.9)
IMM RETICS NFR: 8.4 % (ref 9.3–17.4)
IRON SATN MFR SERPL: 6 % (ref 15–55)
IRON SERPL-MCNC: 11 UG/DL (ref 50–180)
LYMPHOCYTES # BLD AUTO: 1.1 K/UL (ref 1–4.8)
LYMPHOCYTES NFR BLD: 14.6 % (ref 22–41)
MCH RBC QN AUTO: 31 PG (ref 27–33)
MCHC RBC AUTO-ENTMCNC: 32.5 G/DL (ref 33.7–35.3)
MCV RBC AUTO: 95.2 FL (ref 81.4–97.8)
MONOCYTES # BLD AUTO: 0.57 K/UL (ref 0–0.85)
MONOCYTES NFR BLD AUTO: 7.6 % (ref 0–13.4)
NEUTROPHILS # BLD AUTO: 5.57 K/UL (ref 1.82–7.42)
NEUTROPHILS NFR BLD: 73.8 % (ref 44–72)
NRBC # BLD AUTO: 0 K/UL
NRBC BLD-RTO: 0 /100 WBC
PLATELET # BLD AUTO: 145 K/UL (ref 164–446)
PMV BLD AUTO: 11.3 FL (ref 9–12.9)
RBC # BLD AUTO: 2.52 M/UL (ref 4.7–6.1)
RETICS # AUTO: 0.04 M/UL (ref 0.04–0.06)
RETICS/RBC NFR: 1.8 % (ref 0.8–2.1)
TIBC SERPL-MCNC: 183 UG/DL (ref 250–450)
WBC # BLD AUTO: 7.5 K/UL (ref 4.8–10.8)

## 2019-09-29 PROCEDURE — A9270 NON-COVERED ITEM OR SERVICE: HCPCS | Performed by: NURSE PRACTITIONER

## 2019-09-29 PROCEDURE — 700102 HCHG RX REV CODE 250 W/ 637 OVERRIDE(OP): Performed by: NURSE PRACTITIONER

## 2019-09-29 PROCEDURE — 770006 HCHG ROOM/CARE - MED/SURG/GYN SEMI*

## 2019-09-29 PROCEDURE — 85046 RETICYTE/HGB CONCENTRATE: CPT

## 2019-09-29 PROCEDURE — 36415 COLL VENOUS BLD VENIPUNCTURE: CPT

## 2019-09-29 PROCEDURE — 700102 HCHG RX REV CODE 250 W/ 637 OVERRIDE(OP): Performed by: SURGERY

## 2019-09-29 PROCEDURE — 700105 HCHG RX REV CODE 258: Performed by: NURSE PRACTITIONER

## 2019-09-29 PROCEDURE — 700111 HCHG RX REV CODE 636 W/ 250 OVERRIDE (IP): Performed by: NURSE PRACTITIONER

## 2019-09-29 PROCEDURE — 700111 HCHG RX REV CODE 636 W/ 250 OVERRIDE (IP): Mod: JG | Performed by: SURGERY

## 2019-09-29 PROCEDURE — 700105 HCHG RX REV CODE 258: Performed by: SURGERY

## 2019-09-29 PROCEDURE — 97165 OT EVAL LOW COMPLEX 30 MIN: CPT

## 2019-09-29 PROCEDURE — 85025 COMPLETE CBC W/AUTO DIFF WBC: CPT

## 2019-09-29 PROCEDURE — 83540 ASSAY OF IRON: CPT

## 2019-09-29 PROCEDURE — 83550 IRON BINDING TEST: CPT

## 2019-09-29 PROCEDURE — A9270 NON-COVERED ITEM OR SERVICE: HCPCS | Performed by: SURGERY

## 2019-09-29 RX ORDER — DIPHENHYDRAMINE HCL 25 MG
25 TABLET ORAL ONCE
Status: COMPLETED | OUTPATIENT
Start: 2019-09-29 | End: 2019-09-29

## 2019-09-29 RX ORDER — DIPHENHYDRAMINE HYDROCHLORIDE 50 MG/ML
25 INJECTION INTRAMUSCULAR; INTRAVENOUS ONCE
Status: COMPLETED | OUTPATIENT
Start: 2019-09-29 | End: 2019-09-29

## 2019-09-29 RX ORDER — ACETAMINOPHEN 325 MG/1
650 TABLET ORAL ONCE
Status: COMPLETED | OUTPATIENT
Start: 2019-09-29 | End: 2019-09-29

## 2019-09-29 RX ADMIN — THERA TABS 1 TABLET: TAB at 05:11

## 2019-09-29 RX ADMIN — DIPHENHYDRAMINE HCL 25 MG: 25 TABLET ORAL at 19:30

## 2019-09-29 RX ADMIN — SODIUM CHLORIDE 25 MG: 9 INJECTION, SOLUTION INTRAVENOUS at 19:56

## 2019-09-29 RX ADMIN — LORAZEPAM 1 MG: 1 TABLET ORAL at 00:25

## 2019-09-29 RX ADMIN — ACETAMINOPHEN 1000 MG: 500 TABLET ORAL at 11:43

## 2019-09-29 RX ADMIN — Medication 100 MG: at 05:11

## 2019-09-29 RX ADMIN — ACETAMINOPHEN 650 MG: 325 TABLET, FILM COATED ORAL at 18:39

## 2019-09-29 RX ADMIN — GABAPENTIN 100 MG: 100 CAPSULE ORAL at 16:45

## 2019-09-29 RX ADMIN — ACETAMINOPHEN 1000 MG: 500 TABLET ORAL at 00:25

## 2019-09-29 RX ADMIN — SODIUM CHLORIDE, POTASSIUM CHLORIDE, SODIUM LACTATE AND CALCIUM CHLORIDE: 600; 310; 30; 20 INJECTION, SOLUTION INTRAVENOUS at 05:11

## 2019-09-29 RX ADMIN — MORPHINE SULFATE 15 MG: 15 TABLET, EXTENDED RELEASE ORAL at 16:45

## 2019-09-29 RX ADMIN — ACETAMINOPHEN 1000 MG: 500 TABLET ORAL at 16:45

## 2019-09-29 RX ADMIN — MORPHINE SULFATE 15 MG: 15 TABLET, EXTENDED RELEASE ORAL at 05:11

## 2019-09-29 RX ADMIN — OXYCODONE HYDROCHLORIDE 10 MG: 10 TABLET ORAL at 00:24

## 2019-09-29 RX ADMIN — GABAPENTIN 100 MG: 100 CAPSULE ORAL at 11:43

## 2019-09-29 RX ADMIN — CELECOXIB 200 MG: 200 CAPSULE ORAL at 16:45

## 2019-09-29 RX ADMIN — ENOXAPARIN SODIUM 30 MG: 100 INJECTION SUBCUTANEOUS at 16:43

## 2019-09-29 RX ADMIN — LORAZEPAM 1 MG: 1 TABLET ORAL at 19:26

## 2019-09-29 RX ADMIN — SODIUM CHLORIDE 1950 MG: 9 INJECTION, SOLUTION INTRAVENOUS at 21:49

## 2019-09-29 RX ADMIN — OXYCODONE HYDROCHLORIDE 10 MG: 10 TABLET ORAL at 11:43

## 2019-09-29 RX ADMIN — GABAPENTIN 100 MG: 100 CAPSULE ORAL at 05:11

## 2019-09-29 RX ADMIN — OXYCODONE HYDROCHLORIDE 10 MG: 10 TABLET ORAL at 08:06

## 2019-09-29 RX ADMIN — ENOXAPARIN SODIUM 30 MG: 100 INJECTION SUBCUTANEOUS at 05:11

## 2019-09-29 RX ADMIN — OXYCODONE HYDROCHLORIDE 10 MG: 10 TABLET ORAL at 19:27

## 2019-09-29 RX ADMIN — LORAZEPAM 1 MG: 1 TABLET ORAL at 08:16

## 2019-09-29 RX ADMIN — CELECOXIB 200 MG: 200 CAPSULE ORAL at 05:11

## 2019-09-29 RX ADMIN — FOLIC ACID 1 MG: 1 TABLET ORAL at 05:11

## 2019-09-29 RX ADMIN — ACETAMINOPHEN 1000 MG: 500 TABLET ORAL at 05:11

## 2019-09-29 ASSESSMENT — LIFESTYLE VARIABLES
PAROXYSMAL SWEATS: NO SWEAT VISIBLE
NAUSEA AND VOMITING: MILD NAUSEA WITH NO VOMITING
PAROXYSMAL SWEATS: BARELY PERCEPTIBLE SWEATING. PALMS MOIST
TOTAL SCORE: 8
AUDITORY DISTURBANCES: NOT PRESENT
HEADACHE, FULLNESS IN HEAD: MILD
PAROXYSMAL SWEATS: NO SWEAT VISIBLE
AUDITORY DISTURBANCES: NOT PRESENT
ORIENTATION AND CLOUDING OF SENSORIUM: ORIENTED AND CAN DO SERIAL ADDITIONS
TOTAL SCORE: 2
TOTAL SCORE: 5
TOTAL SCORE: VERY MILD ITCHING, PINS AND NEEDLES SENSATION, BURNING OR NUMBNESS
AUDITORY DISTURBANCES: NOT PRESENT
ORIENTATION AND CLOUDING OF SENSORIUM: ORIENTED AND CAN DO SERIAL ADDITIONS
ANXIETY: NO ANXIETY (AT EASE)
TOTAL SCORE: VERY MILD ITCHING, PINS AND NEEDLES SENSATION, BURNING OR NUMBNESS
TOTAL SCORE: MODERATE ITCHING, PINS AND NEEDLES SENSATION, BURNING OR NUMBNESS
SUBSTANCE_ABUSE: 1
ANXIETY: *
ORIENTATION AND CLOUDING OF SENSORIUM: ORIENTED AND CAN DO SERIAL ADDITIONS
AGITATION: NORMAL ACTIVITY
VISUAL DISTURBANCES: NOT PRESENT
ANXIETY: MILDLY ANXIOUS
NAUSEA AND VOMITING: NO NAUSEA AND NO VOMITING
NAUSEA AND VOMITING: NO NAUSEA AND NO VOMITING
ANXIETY: MILDLY ANXIOUS
TREMOR: TREMOR NOT VISIBLE BUT CAN BE FELT, FINGERTIP TO FINGERTIP
AGITATION: NORMAL ACTIVITY
VISUAL DISTURBANCES: NOT PRESENT
NAUSEA AND VOMITING: NO NAUSEA AND NO VOMITING
TREMOR: *
ANXIETY: MILDLY ANXIOUS
TOTAL SCORE: VERY MILD ITCHING, PINS AND NEEDLES SENSATION, BURNING OR NUMBNESS
PAROXYSMAL SWEATS: NO SWEAT VISIBLE
HEADACHE, FULLNESS IN HEAD: MILD
AGITATION: NORMAL ACTIVITY
TOTAL SCORE: MILD ITCHING, PINS AND NEEDLES SENSATION, BURNING OR NUMBNESS
HEADACHE, FULLNESS IN HEAD: MILD
PAROXYSMAL SWEATS: BARELY PERCEPTIBLE SWEATING. PALMS MOIST
HEADACHE, FULLNESS IN HEAD: VERY MILD
TREMOR: NO TREMOR
AGITATION: NORMAL ACTIVITY
NAUSEA AND VOMITING: NO NAUSEA AND NO VOMITING
ORIENTATION AND CLOUDING OF SENSORIUM: ORIENTED AND CAN DO SERIAL ADDITIONS
AGITATION: NORMAL ACTIVITY
VISUAL DISTURBANCES: NOT PRESENT
VISUAL DISTURBANCES: NOT PRESENT
AUDITORY DISTURBANCES: NOT PRESENT
TREMOR: TREMOR NOT VISIBLE BUT CAN BE FELT, FINGERTIP TO FINGERTIP
TREMOR: *
AUDITORY DISTURBANCES: NOT PRESENT
ORIENTATION AND CLOUDING OF SENSORIUM: ORIENTED AND CAN DO SERIAL ADDITIONS
AGITATION: NORMAL ACTIVITY
VISUAL DISTURBANCES: NOT PRESENT
HEADACHE, FULLNESS IN HEAD: VERY MILD
PAROXYSMAL SWEATS: NO SWEAT VISIBLE
TREMOR: TREMOR NOT VISIBLE BUT CAN BE FELT, FINGERTIP TO FINGERTIP
TOTAL SCORE: VERY MILD ITCHING, PINS AND NEEDLES SENSATION, BURNING OR NUMBNESS
TOTAL SCORE: 4
TOTAL SCORE: 8
NAUSEA AND VOMITING: NO NAUSEA AND NO VOMITING
TOTAL SCORE: 8
ANXIETY: MILDLY ANXIOUS
ORIENTATION AND CLOUDING OF SENSORIUM: ORIENTED AND CAN DO SERIAL ADDITIONS
VISUAL DISTURBANCES: NOT PRESENT
HEADACHE, FULLNESS IN HEAD: MILD
AUDITORY DISTURBANCES: NOT PRESENT

## 2019-09-29 ASSESSMENT — COGNITIVE AND FUNCTIONAL STATUS - GENERAL
SUGGESTED CMS G CODE MODIFIER DAILY ACTIVITY: CJ
TOILETING: A LITTLE
DRESSING REGULAR LOWER BODY CLOTHING: A LITTLE
HELP NEEDED FOR BATHING: A LITTLE
DAILY ACTIVITIY SCORE: 21

## 2019-09-29 ASSESSMENT — ENCOUNTER SYMPTOMS
ABDOMINAL PAIN: 1
MUSCULOSKELETAL NEGATIVE: 1
NEUROLOGICAL NEGATIVE: 1
ROS GI COMMENTS: BM 9/28
CARDIOVASCULAR NEGATIVE: 1
RESPIRATORY NEGATIVE: 1

## 2019-09-29 ASSESSMENT — ACTIVITIES OF DAILY LIVING (ADL): TOILETING: INDEPENDENT
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
## 2019-09-29 NOTE — ED NOTES
Nursing report to Devika SPENCE.    Vitals not transferring into radiant  See ICU flowsheet for vitals

## 2019-09-29 NOTE — CARE PLAN
Problem: Communication  Goal: The ability to communicate needs accurately and effectively will improve  Outcome: PROGRESSING AS EXPECTED     Problem: Safety  Goal: Will remain free from injury  Outcome: PROGRESSING AS EXPECTED     Problem: Bowel/Gastric:  Goal: Will not experience complications related to bowel motility  Outcome: PROGRESSING AS EXPECTED     Problem: Knowledge Deficit  Goal: Knowledge of the prescribed therapeutic regimen will improve  Outcome: PROGRESSING AS EXPECTED     Problem: Respiratory:  Goal: Respiratory status will improve  Outcome: PROGRESSING AS EXPECTED     Problem: Psychosocial Needs:  Goal: Level of anxiety will decrease  Outcome: PROGRESSING AS EXPECTED

## 2019-09-29 NOTE — PROGRESS NOTES
Bedside report received.  Assessment complete.  A&O x 4. Patient calls appropriately.  CIWA assessments- current score= 4  Patient ambulates with 1 assist and FWW. Bed alarm ON.   Patient has 8/10 pain. PRN meds given  Denies N&V. Tolerating full liquid diet.  Prevena in place on midline abdominal incision.  + void, + flatus, + BM X2 loose BM during night shift 9/29.  SOB with ambulation and pain. Remains on 1 L oxygen.  SCD's on.  Vitals stable.  Review plan with of care with patient. Call light and personal belongings with in reach. Hourly rounding in place. All needs met at this time.    Pt very concerned about his son who is in custody of CPS. Wants to know how his son is doing.  on case.

## 2019-09-30 LAB
BASOPHILS # BLD AUTO: 0.2 % (ref 0–1.8)
BASOPHILS # BLD: 0.02 K/UL (ref 0–0.12)
EOSINOPHIL # BLD AUTO: 0.28 K/UL (ref 0–0.51)
EOSINOPHIL NFR BLD: 3 % (ref 0–6.9)
ERYTHROCYTE [DISTWIDTH] IN BLOOD BY AUTOMATED COUNT: 45.1 FL (ref 35.9–50)
HCT VFR BLD AUTO: 25.3 % (ref 42–52)
HGB BLD-MCNC: 8.4 G/DL (ref 14–18)
IMM GRANULOCYTES # BLD AUTO: 0.03 K/UL (ref 0–0.11)
IMM GRANULOCYTES NFR BLD AUTO: 0.3 % (ref 0–0.9)
LYMPHOCYTES # BLD AUTO: 1.47 K/UL (ref 1–4.8)
LYMPHOCYTES NFR BLD: 15.5 % (ref 22–41)
MCH RBC QN AUTO: 31.1 PG (ref 27–33)
MCHC RBC AUTO-ENTMCNC: 33.2 G/DL (ref 33.7–35.3)
MCV RBC AUTO: 93.7 FL (ref 81.4–97.8)
MONOCYTES # BLD AUTO: 0.75 K/UL (ref 0–0.85)
MONOCYTES NFR BLD AUTO: 7.9 % (ref 0–13.4)
NEUTROPHILS # BLD AUTO: 6.93 K/UL (ref 1.82–7.42)
NEUTROPHILS NFR BLD: 73.1 % (ref 44–72)
NRBC # BLD AUTO: 0 K/UL
NRBC BLD-RTO: 0 /100 WBC
PLATELET # BLD AUTO: 214 K/UL (ref 164–446)
PMV BLD AUTO: 10.4 FL (ref 9–12.9)
RBC # BLD AUTO: 2.7 M/UL (ref 4.7–6.1)
WBC # BLD AUTO: 9.5 K/UL (ref 4.8–10.8)

## 2019-09-30 PROCEDURE — A9270 NON-COVERED ITEM OR SERVICE: HCPCS | Performed by: NURSE PRACTITIONER

## 2019-09-30 PROCEDURE — 36415 COLL VENOUS BLD VENIPUNCTURE: CPT

## 2019-09-30 PROCEDURE — 700111 HCHG RX REV CODE 636 W/ 250 OVERRIDE (IP): Performed by: NURSE PRACTITIONER

## 2019-09-30 PROCEDURE — 700112 HCHG RX REV CODE 229: Performed by: NURSE PRACTITIONER

## 2019-09-30 PROCEDURE — 700102 HCHG RX REV CODE 250 W/ 637 OVERRIDE(OP): Performed by: SURGERY

## 2019-09-30 PROCEDURE — A9270 NON-COVERED ITEM OR SERVICE: HCPCS | Performed by: SURGERY

## 2019-09-30 PROCEDURE — 770006 HCHG ROOM/CARE - MED/SURG/GYN SEMI*

## 2019-09-30 PROCEDURE — 700102 HCHG RX REV CODE 250 W/ 637 OVERRIDE(OP): Performed by: NURSE PRACTITIONER

## 2019-09-30 PROCEDURE — 85025 COMPLETE CBC W/AUTO DIFF WBC: CPT

## 2019-09-30 RX ADMIN — ACETAMINOPHEN 1000 MG: 500 TABLET ORAL at 04:18

## 2019-09-30 RX ADMIN — ACETAMINOPHEN 1000 MG: 500 TABLET ORAL at 11:53

## 2019-09-30 RX ADMIN — MORPHINE SULFATE 15 MG: 15 TABLET, EXTENDED RELEASE ORAL at 17:41

## 2019-09-30 RX ADMIN — LORAZEPAM 1 MG: 1 TABLET ORAL at 00:21

## 2019-09-30 RX ADMIN — THERA TABS 1 TABLET: TAB at 04:18

## 2019-09-30 RX ADMIN — GABAPENTIN 100 MG: 100 CAPSULE ORAL at 11:53

## 2019-09-30 RX ADMIN — ENOXAPARIN SODIUM 30 MG: 100 INJECTION SUBCUTANEOUS at 17:41

## 2019-09-30 RX ADMIN — OXYCODONE HYDROCHLORIDE 10 MG: 10 TABLET ORAL at 11:53

## 2019-09-30 RX ADMIN — ACETAMINOPHEN 1000 MG: 500 TABLET ORAL at 23:53

## 2019-09-30 RX ADMIN — MORPHINE SULFATE 15 MG: 15 TABLET, EXTENDED RELEASE ORAL at 04:18

## 2019-09-30 RX ADMIN — ACETAMINOPHEN 1000 MG: 500 TABLET ORAL at 17:41

## 2019-09-30 RX ADMIN — OXYCODONE HYDROCHLORIDE 10 MG: 10 TABLET ORAL at 00:21

## 2019-09-30 RX ADMIN — OXYCODONE HYDROCHLORIDE 10 MG: 10 TABLET ORAL at 14:47

## 2019-09-30 RX ADMIN — GABAPENTIN 100 MG: 100 CAPSULE ORAL at 17:41

## 2019-09-30 RX ADMIN — LORAZEPAM 1 MG: 1 TABLET ORAL at 04:18

## 2019-09-30 RX ADMIN — OXYCODONE HYDROCHLORIDE 10 MG: 10 TABLET ORAL at 20:03

## 2019-09-30 RX ADMIN — DOCUSATE SODIUM 100 MG: 100 CAPSULE, LIQUID FILLED ORAL at 17:41

## 2019-09-30 RX ADMIN — GABAPENTIN 100 MG: 100 CAPSULE ORAL at 04:18

## 2019-09-30 RX ADMIN — FOLIC ACID 1 MG: 1 TABLET ORAL at 04:18

## 2019-09-30 RX ADMIN — CELECOXIB 200 MG: 200 CAPSULE ORAL at 17:41

## 2019-09-30 RX ADMIN — Medication 100 MG: at 04:18

## 2019-09-30 RX ADMIN — OXYCODONE HYDROCHLORIDE 10 MG: 10 TABLET ORAL at 23:53

## 2019-09-30 RX ADMIN — CELECOXIB 200 MG: 200 CAPSULE ORAL at 04:18

## 2019-09-30 ASSESSMENT — LIFESTYLE VARIABLES
PAROXYSMAL SWEATS: BARELY PERCEPTIBLE SWEATING. PALMS MOIST
NAUSEA AND VOMITING: NO NAUSEA AND NO VOMITING
NAUSEA AND VOMITING: MILD NAUSEA WITH NO VOMITING
ANXIETY: MILDLY ANXIOUS
AUDITORY DISTURBANCES: NOT PRESENT
AGITATION: NORMAL ACTIVITY
ORIENTATION AND CLOUDING OF SENSORIUM: ORIENTED AND CAN DO SERIAL ADDITIONS
VISUAL DISTURBANCES: NOT PRESENT
AGITATION: NORMAL ACTIVITY
TOTAL SCORE: MILD ITCHING, PINS AND NEEDLES SENSATION, BURNING OR NUMBNESS
TOTAL SCORE: 4
ANXIETY: MILDLY ANXIOUS
AUDITORY DISTURBANCES: NOT PRESENT
ANXIETY: *
HEADACHE, FULLNESS IN HEAD: VERY MILD
ORIENTATION AND CLOUDING OF SENSORIUM: ORIENTED AND CAN DO SERIAL ADDITIONS
TOTAL SCORE: MILD ITCHING, PINS AND NEEDLES SENSATION, BURNING OR NUMBNESS
TREMOR: NO TREMOR
HEADACHE, FULLNESS IN HEAD: VERY MILD
HEADACHE, FULLNESS IN HEAD: MILD
HEADACHE, FULLNESS IN HEAD: MILD
VISUAL DISTURBANCES: NOT PRESENT
HEADACHE, FULLNESS IN HEAD: VERY MILD
HEADACHE, FULLNESS IN HEAD: VERY MILD
AGITATION: NORMAL ACTIVITY
ORIENTATION AND CLOUDING OF SENSORIUM: ORIENTED AND CAN DO SERIAL ADDITIONS
TOTAL SCORE: VERY MILD ITCHING, PINS AND NEEDLES SENSATION, BURNING OR NUMBNESS
SUBSTANCE_ABUSE: 1
PAROXYSMAL SWEATS: BARELY PERCEPTIBLE SWEATING. PALMS MOIST
ANXIETY: MILDLY ANXIOUS
TOTAL SCORE: VERY MILD ITCHING, PINS AND NEEDLES SENSATION, BURNING OR NUMBNESS
ORIENTATION AND CLOUDING OF SENSORIUM: ORIENTED AND CAN DO SERIAL ADDITIONS
ANXIETY: MILDLY ANXIOUS
VISUAL DISTURBANCES: NOT PRESENT
TOTAL SCORE: 4
AUDITORY DISTURBANCES: NOT PRESENT
ORIENTATION AND CLOUDING OF SENSORIUM: ORIENTED AND CAN DO SERIAL ADDITIONS
NAUSEA AND VOMITING: MILD NAUSEA WITH NO VOMITING
NAUSEA AND VOMITING: NO NAUSEA AND NO VOMITING
AGITATION: NORMAL ACTIVITY
TOTAL SCORE: VERY MILD ITCHING, PINS AND NEEDLES SENSATION, BURNING OR NUMBNESS
TREMOR: NO TREMOR
TOTAL SCORE: 3
PAROXYSMAL SWEATS: NO SWEAT VISIBLE
PAROXYSMAL SWEATS: NO SWEAT VISIBLE
ORIENTATION AND CLOUDING OF SENSORIUM: ORIENTED AND CAN DO SERIAL ADDITIONS
TOTAL SCORE: 8
TREMOR: TREMOR NOT VISIBLE BUT CAN BE FELT, FINGERTIP TO FINGERTIP
VISUAL DISTURBANCES: NOT PRESENT
TOTAL SCORE: 8
VISUAL DISTURBANCES: NOT PRESENT
AUDITORY DISTURBANCES: NOT PRESENT
TREMOR: NO TREMOR
PAROXYSMAL SWEATS: NO SWEAT VISIBLE
AUDITORY DISTURBANCES: NOT PRESENT
AGITATION: NORMAL ACTIVITY
TOTAL SCORE: VERY MILD ITCHING, PINS AND NEEDLES SENSATION, BURNING OR NUMBNESS
TREMOR: *
VISUAL DISTURBANCES: NOT PRESENT
PAROXYSMAL SWEATS: NO SWEAT VISIBLE
TREMOR: NO TREMOR
AGITATION: NORMAL ACTIVITY
ANXIETY: MILDLY ANXIOUS
NAUSEA AND VOMITING: MILD NAUSEA WITH NO VOMITING
AUDITORY DISTURBANCES: NOT PRESENT
TOTAL SCORE: 4
NAUSEA AND VOMITING: NO NAUSEA AND NO VOMITING

## 2019-09-30 ASSESSMENT — ENCOUNTER SYMPTOMS
CARDIOVASCULAR NEGATIVE: 1
ABDOMINAL PAIN: 1
RESPIRATORY NEGATIVE: 1
ROS GI COMMENTS: BM 9/30
NEUROLOGICAL NEGATIVE: 1
MUSCULOSKELETAL NEGATIVE: 1
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
## 2019-09-30 NOTE — CARE PLAN
Problem: Communication  Goal: The ability to communicate needs accurately and effectively will improve  Outcome: PROGRESSING AS EXPECTED     Problem: Safety  Goal: Will remain free from falls  Outcome: PROGRESSING AS EXPECTED     Problem: Bowel/Gastric:  Goal: Will not experience complications related to bowel motility  Outcome: PROGRESSING AS EXPECTED     Problem: Knowledge Deficit  Goal: Knowledge of the prescribed therapeutic regimen will improve  Outcome: PROGRESSING AS EXPECTED     Problem: Pain Management  Goal: Pain level will decrease to patient's comfort goal  Outcome: PROGRESSING AS EXPECTED     Problem: Mobility  Goal: Risk for activity intolerance will decrease  Outcome: PROGRESSING AS EXPECTED     Problem: Psychosocial Needs:  Goal: Level of anxiety will decrease  Outcome: PROGRESSING AS EXPECTED

## 2019-09-30 NOTE — PROGRESS NOTES
Pt tolerated test dose of iron dextran with no reactions.    pharmacy called for therapeutic dose

## 2019-09-30 NOTE — PROGRESS NOTES
IV Iron Per Pharmacy Note    Patient Lean Body Weight:  70 kg  Reason for Iron Replacement: Acute blood loss    Lab Results   Component Value Date/Time    WBC 7.5 09/29/2019 04:17 AM    RBC 2.52 (L) 09/29/2019 04:17 AM    HEMOGLOBIN 7.8 (L) 09/29/2019 04:17 AM    HEMATOCRIT 24.0 (L) 09/29/2019 04:17 AM    MCV 95.2 09/29/2019 04:17 AM    MCH 31.0 09/29/2019 04:17 AM    MCHC 32.5 (L) 09/29/2019 04:17 AM    MPV 11.3 09/29/2019 04:17 AM       Recent Labs     09/29/19  0417   IRON 11*         Recent Labs     09/28/19  0350   CREATININE 0.74          Assessment/Plan:  1. IV Iron Indicated.   2. Give Iron Dextran 25 mg IV test dose following diphenhydramine/acetaminophen premeds over 30 minutes per protocol.  3. If no reaction (Anaphylaxis, Hypotension/Hypertension, N/V/D, Chest pain/Back Pain, Urticaria/Pruritis) in the next hour, proceed to full dose. Nursing to call the pharmacy IV room at ext. [phone] for full dose.  4. Full dose: Iron Dextran 1,950 mg IV over 4 hours. Continue to monitor for delayed ADR including: Arthralgia/myalgia, Headache/backache, chills/dizziness/malaise, moderate to high fever and n/v.      Renetta Owen, PharmD

## 2019-09-30 NOTE — PROGRESS NOTES
Assumed care of pt.  AAOx4.  Rating pain at 6/10, declining intervention at this time.   Mildly anxious this morning, CIWA assessment in place, pt scored at 4 this morning.   MLI with prevena in place, CDI.  Full liquid diet in place, no c/o n/v.  LBM 9/30, + flatus, + void.  POC reviewed with pt.  Call light within reach, pt educated to call for assistance as needed.  Hourly rounding in place.

## 2019-09-30 NOTE — PROGRESS NOTES
"This RN to bedside to administer pt's iron replacement IV therapy.  Pt with questions concerning lab work and necessity of iron replacement.   This RN informed pt of current labs, pt agitated and asking if he can receive therapy tomorrow.  Pt educated on benefit of iron therapy.  Pt states, \"If you would give me real food then my lab work wouldn't be bad. Why can't I eat normal food?\"  Pt educated on distended abdomen and plan from MD to await more bowel function at this time prior to advancement in diet.\"  Pt states while growing more agitated, \"Why did no one inform me of my blood count dropping? Can I speak with the doctor? I could just leave and then go eat regular food.\"  ASHA Bower notified.  APRN, NOC , and this RN at bedside to speak with pt.  Above questions from pt addressed with ASHA.   Pt now agreeable to take iron replacement therapy.     "

## 2019-09-30 NOTE — CARE PLAN
Problem: Nutritional:  Goal: Achieve adequate nutritional intake  Description  Advance from clears as appropriate.   Patient will consume 50% of meals and supplements.   Outcome: PROGRESSING AS EXPECTED    Pt tolerated clears, having + flatus and BM. Pt reports % of meals, and agrees to switch Boost Breeze to Boost plus BID.    RD to follow.

## 2019-09-30 NOTE — PROGRESS NOTES
Bedside report received.  Assessment complete.  A&O x 4. Patient calls appropriately. CIWA protocol- Restless during shift- related to pain  Patient ambulates with stand by assist. Bed alarm off.   Patient has 8-10/10 pain in Abdomen  Denies N&V. Tolerating clear liquid diet. Abdomen distention increasing, taut, tender  Surgical vac in place on abdomen.  + void, + flatus, + BM- multiple loose BM 9/30.  Patient denies SOB. Room Air  SCD's off. Walks around unit  Vitals stable.  New IV placed in RUE  Iron dextran administered during shift  Review plan with of care with patient. Call light and personal belongings with in reach. Hourly rounding in place. All needs met at this time.

## 2019-10-01 ENCOUNTER — APPOINTMENT (OUTPATIENT)
Dept: RADIOLOGY | Facility: MEDICAL CENTER | Age: 34
DRG: 329 | End: 2019-10-01
Attending: NURSE PRACTITIONER
Payer: MEDICARE

## 2019-10-01 PROCEDURE — 700111 HCHG RX REV CODE 636 W/ 250 OVERRIDE (IP): Performed by: NURSE PRACTITIONER

## 2019-10-01 PROCEDURE — A9270 NON-COVERED ITEM OR SERVICE: HCPCS | Performed by: SURGERY

## 2019-10-01 PROCEDURE — 700102 HCHG RX REV CODE 250 W/ 637 OVERRIDE(OP): Performed by: NURSE PRACTITIONER

## 2019-10-01 PROCEDURE — 74018 RADEX ABDOMEN 1 VIEW: CPT

## 2019-10-01 PROCEDURE — 770006 HCHG ROOM/CARE - MED/SURG/GYN SEMI*

## 2019-10-01 PROCEDURE — 97530 THERAPEUTIC ACTIVITIES: CPT

## 2019-10-01 PROCEDURE — A9270 NON-COVERED ITEM OR SERVICE: HCPCS | Performed by: NURSE PRACTITIONER

## 2019-10-01 PROCEDURE — 700102 HCHG RX REV CODE 250 W/ 637 OVERRIDE(OP): Performed by: SURGERY

## 2019-10-01 RX ORDER — ACETAMINOPHEN 325 MG/1
650 TABLET ORAL EVERY 6 HOURS
Status: DISCONTINUED | OUTPATIENT
Start: 2019-10-01 | End: 2019-10-06 | Stop reason: HOSPADM

## 2019-10-01 RX ORDER — ONDANSETRON 4 MG/1
4 TABLET, ORALLY DISINTEGRATING ORAL EVERY 4 HOURS PRN
Status: DISCONTINUED | OUTPATIENT
Start: 2019-10-01 | End: 2019-10-06 | Stop reason: HOSPADM

## 2019-10-01 RX ORDER — GABAPENTIN 100 MG/1
200 CAPSULE ORAL 3 TIMES DAILY
Status: DISCONTINUED | OUTPATIENT
Start: 2019-10-01 | End: 2019-10-06 | Stop reason: HOSPADM

## 2019-10-01 RX ADMIN — OXYCODONE HYDROCHLORIDE 10 MG: 10 TABLET ORAL at 15:10

## 2019-10-01 RX ADMIN — MORPHINE SULFATE 15 MG: 15 TABLET, EXTENDED RELEASE ORAL at 17:10

## 2019-10-01 RX ADMIN — ACETAMINOPHEN 650 MG: 325 TABLET, FILM COATED ORAL at 17:10

## 2019-10-01 RX ADMIN — ENOXAPARIN SODIUM 30 MG: 100 INJECTION SUBCUTANEOUS at 17:10

## 2019-10-01 RX ADMIN — OXYCODONE HYDROCHLORIDE 10 MG: 10 TABLET ORAL at 23:40

## 2019-10-01 RX ADMIN — OXYCODONE HYDROCHLORIDE 10 MG: 10 TABLET ORAL at 08:28

## 2019-10-01 RX ADMIN — GABAPENTIN 100 MG: 100 CAPSULE ORAL at 11:25

## 2019-10-01 RX ADMIN — GABAPENTIN 200 MG: 100 CAPSULE ORAL at 17:10

## 2019-10-01 RX ADMIN — ONDANSETRON 4 MG: 4 TABLET, ORALLY DISINTEGRATING ORAL at 13:45

## 2019-10-01 RX ADMIN — MORPHINE SULFATE 15 MG: 15 TABLET, EXTENDED RELEASE ORAL at 04:01

## 2019-10-01 RX ADMIN — ACETAMINOPHEN 650 MG: 325 TABLET, FILM COATED ORAL at 23:41

## 2019-10-01 RX ADMIN — ACETAMINOPHEN 1000 MG: 500 TABLET ORAL at 04:01

## 2019-10-01 RX ADMIN — ACETAMINOPHEN 650 MG: 325 TABLET, FILM COATED ORAL at 13:46

## 2019-10-01 RX ADMIN — OXYCODONE HYDROCHLORIDE 10 MG: 10 TABLET ORAL at 11:25

## 2019-10-01 RX ADMIN — CELECOXIB 200 MG: 200 CAPSULE ORAL at 04:01

## 2019-10-01 RX ADMIN — GABAPENTIN 100 MG: 100 CAPSULE ORAL at 04:01

## 2019-10-01 RX ADMIN — OXYCODONE HYDROCHLORIDE 10 MG: 10 TABLET ORAL at 20:32

## 2019-10-01 ASSESSMENT — LIFESTYLE VARIABLES
TOTAL SCORE: VERY MILD ITCHING, PINS AND NEEDLES SENSATION, BURNING OR NUMBNESS
ANXIETY: MILDLY ANXIOUS
HEADACHE, FULLNESS IN HEAD: VERY MILD
TREMOR: *
HEADACHE, FULLNESS IN HEAD: VERY MILD
TREMOR: NO TREMOR
ORIENTATION AND CLOUDING OF SENSORIUM: ORIENTED AND CAN DO SERIAL ADDITIONS
ORIENTATION AND CLOUDING OF SENSORIUM: ORIENTED AND CAN DO SERIAL ADDITIONS
ANXIETY: NO ANXIETY (AT EASE)
TREMOR: NO TREMOR
PAROXYSMAL SWEATS: NO SWEAT VISIBLE
TOTAL SCORE: VERY MILD ITCHING, PINS AND NEEDLES SENSATION, BURNING OR NUMBNESS
ORIENTATION AND CLOUDING OF SENSORIUM: ORIENTED AND CAN DO SERIAL ADDITIONS
TOTAL SCORE: 4
NAUSEA AND VOMITING: NO NAUSEA AND NO VOMITING
NAUSEA AND VOMITING: MILD NAUSEA WITH NO VOMITING
AUDITORY DISTURBANCES: NOT PRESENT
NAUSEA AND VOMITING: NO NAUSEA AND NO VOMITING
HEADACHE, FULLNESS IN HEAD: VERY MILD
NAUSEA AND VOMITING: NO NAUSEA AND NO VOMITING
AGITATION: NORMAL ACTIVITY
ANXIETY: MILDLY ANXIOUS
AUDITORY DISTURBANCES: NOT PRESENT
AUDITORY DISTURBANCES: NOT PRESENT
TOTAL SCORE: 4
TOTAL SCORE: VERY MILD ITCHING, PINS AND NEEDLES SENSATION, BURNING OR NUMBNESS
PAROXYSMAL SWEATS: NO SWEAT VISIBLE
PAROXYSMAL SWEATS: NO SWEAT VISIBLE
VISUAL DISTURBANCES: NOT PRESENT
TOTAL SCORE: 5
VISUAL DISTURBANCES: NOT PRESENT
TREMOR: TREMOR NOT VISIBLE BUT CAN BE FELT, FINGERTIP TO FINGERTIP
VISUAL DISTURBANCES: NOT PRESENT
VISUAL DISTURBANCES: NOT PRESENT
AGITATION: NORMAL ACTIVITY
TOTAL SCORE: VERY MILD ITCHING, PINS AND NEEDLES SENSATION, BURNING OR NUMBNESS
AGITATION: NORMAL ACTIVITY
ORIENTATION AND CLOUDING OF SENSORIUM: ORIENTED AND CAN DO SERIAL ADDITIONS
AUDITORY DISTURBANCES: NOT PRESENT
ANXIETY: MILDLY ANXIOUS
SUBSTANCE_ABUSE: 1
AGITATION: NORMAL ACTIVITY
TOTAL SCORE: 2
PAROXYSMAL SWEATS: NO SWEAT VISIBLE
HEADACHE, FULLNESS IN HEAD: VERY MILD

## 2019-10-01 ASSESSMENT — COGNITIVE AND FUNCTIONAL STATUS - GENERAL
CLIMB 3 TO 5 STEPS WITH RAILING: A LITTLE
SUGGESTED CMS G CODE MODIFIER MOBILITY: CJ
MOVING TO AND FROM BED TO CHAIR: A LITTLE
MOBILITY SCORE: 22

## 2019-10-01 ASSESSMENT — GAIT ASSESSMENTS
GAIT LEVEL OF ASSIST: SUPERVISED
DISTANCE (FEET): 250
ASSISTIVE DEVICE: FRONT WHEEL WALKER

## 2019-10-01 ASSESSMENT — ENCOUNTER SYMPTOMS
MUSCULOSKELETAL NEGATIVE: 1
NEUROLOGICAL NEGATIVE: 1
ROS GI COMMENTS: BM 10/1
ABDOMINAL PAIN: 1
RESPIRATORY NEGATIVE: 1
CARDIOVASCULAR NEGATIVE: 1

## 2019-10-01 NOTE — CARE PLAN
Problem: Safety  Goal: Will remain free from falls  Outcome: PROGRESSING AS EXPECTED     Problem: Venous Thromboembolism (VTW)/Deep Vein Thrombosis (DVT) Prevention:  Goal: Patient will participate in Venous Thrombosis (VTE)/Deep Vein Thrombosis (DVT)Prevention Measures  Outcome: PROGRESSING AS EXPECTED     Problem: Pain Management  Goal: Pain level will decrease to patient's comfort goal  Outcome: PROGRESSING AS EXPECTED     Problem: Mobility  Goal: Risk for activity intolerance will decrease  Outcome: PROGRESSING AS EXPECTED     Problem: Psychosocial Needs:  Goal: Level of anxiety will decrease  Outcome: PROGRESSING AS EXPECTED

## 2019-10-01 NOTE — PROGRESS NOTES
Bedside report received.  Assessment complete.  A&O x 4. Patient calls appropriately. [facility] protocol- current score 3  Patient ambulates with stand by assist. Bed alarm off.   Patient has 8-10/10 pain in Abdomen  Denies N&V. Tolerating clear liquid diet. Abdomen distention, taut, tender  Surgical vac in place on abdomen.  + void, + flatus, + BM- multiple loose BM 10/1.  Patient denies SOB. Room Air  SCD's off. Walks around unit  Vitals stable.  Review plan with of care with patient. Call light and personal belongings with in reach. Hourly rounding in place. All needs met at this time.

## 2019-10-01 NOTE — PROGRESS NOTES
Assumed care of pt.  AAOx4.  Rating pain at 9/10, medicated per MAR.    Mildly anxious this morning, CIWA assessment in place, pt scored at 5 this morning.   MLI with prevena in place, CDI.  Full liquid diet in place, no c/o n/v.  LBM 10/1, + flatus, + void.  POC reviewed with pt.  Call light within reach, pt educated to call for assistance as needed.  Hourly rounding in place.
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
## 2019-10-01 NOTE — THERAPY
"Physical Therapy Treatment completed.   Bed Mobility:  Supine to Sit: Supervised  Transfers: Sit to Stand: Supervised  Gait: Level Of Assist: Supervised with Front-Wheel Walker         Discharge Recommendations: Equipment: Front-Wheel Walker. Post-acute therapy Discharge to home with outpatient or home health for additional skilled therapy services.    Today, pt shows inconsistent need to use FWW during ambulation, leaning hard on it to walk in hallway, but reports no need for it to walk back to bed from bathroom. Pt declined to practice log roll to protect sore abdomen for getting out of flat bed which he has at home, instead pt used button to move HOB up. Pt should continue to ambulate in halls daily using FWW with nsg supervision. PT to follow as needed.      See \"Rehab Therapy-Acute\" Patient Summary Report for complete documentation.       "

## 2019-10-02 PROBLEM — J95.821 ACUTE RESPIRATORY FAILURE FOLLOWING TRAUMA AND SURGERY (HCC): Status: RESOLVED | Noted: 2019-09-26 | Resolved: 2019-10-02

## 2019-10-02 PROCEDURE — A9270 NON-COVERED ITEM OR SERVICE: HCPCS | Performed by: NURSE PRACTITIONER

## 2019-10-02 PROCEDURE — 700102 HCHG RX REV CODE 250 W/ 637 OVERRIDE(OP): Performed by: NURSE PRACTITIONER

## 2019-10-02 PROCEDURE — 700102 HCHG RX REV CODE 250 W/ 637 OVERRIDE(OP): Performed by: SURGERY

## 2019-10-02 PROCEDURE — 770006 HCHG ROOM/CARE - MED/SURG/GYN SEMI*

## 2019-10-02 PROCEDURE — 700112 HCHG RX REV CODE 229: Performed by: NURSE PRACTITIONER

## 2019-10-02 PROCEDURE — 700111 HCHG RX REV CODE 636 W/ 250 OVERRIDE (IP): Performed by: NURSE PRACTITIONER

## 2019-10-02 PROCEDURE — A9270 NON-COVERED ITEM OR SERVICE: HCPCS | Performed by: SURGERY

## 2019-10-02 RX ADMIN — DOCUSATE SODIUM 100 MG: 100 CAPSULE, LIQUID FILLED ORAL at 05:28

## 2019-10-02 RX ADMIN — OXYCODONE HYDROCHLORIDE 10 MG: 10 TABLET ORAL at 07:47

## 2019-10-02 RX ADMIN — GABAPENTIN 200 MG: 100 CAPSULE ORAL at 11:22

## 2019-10-02 RX ADMIN — GABAPENTIN 200 MG: 100 CAPSULE ORAL at 17:06

## 2019-10-02 RX ADMIN — OXYCODONE HYDROCHLORIDE 10 MG: 10 TABLET ORAL at 23:56

## 2019-10-02 RX ADMIN — ACETAMINOPHEN 650 MG: 325 TABLET, FILM COATED ORAL at 17:06

## 2019-10-02 RX ADMIN — ACETAMINOPHEN 650 MG: 325 TABLET, FILM COATED ORAL at 23:56

## 2019-10-02 RX ADMIN — DOCUSATE SODIUM 100 MG: 100 CAPSULE, LIQUID FILLED ORAL at 17:06

## 2019-10-02 RX ADMIN — GABAPENTIN 200 MG: 100 CAPSULE ORAL at 05:28

## 2019-10-02 RX ADMIN — ONDANSETRON 4 MG: 4 TABLET, ORALLY DISINTEGRATING ORAL at 06:18

## 2019-10-02 RX ADMIN — OXYCODONE HYDROCHLORIDE 10 MG: 10 TABLET ORAL at 17:06

## 2019-10-02 RX ADMIN — MORPHINE SULFATE 15 MG: 15 TABLET, EXTENDED RELEASE ORAL at 05:28

## 2019-10-02 RX ADMIN — ACETAMINOPHEN 650 MG: 325 TABLET, FILM COATED ORAL at 11:22

## 2019-10-02 RX ADMIN — OXYCODONE HYDROCHLORIDE 10 MG: 10 TABLET ORAL at 11:01

## 2019-10-02 RX ADMIN — MORPHINE SULFATE 15 MG: 15 TABLET, EXTENDED RELEASE ORAL at 17:06

## 2019-10-02 RX ADMIN — OXYCODONE HYDROCHLORIDE 10 MG: 10 TABLET ORAL at 02:39

## 2019-10-02 RX ADMIN — ACETAMINOPHEN 650 MG: 325 TABLET, FILM COATED ORAL at 05:28

## 2019-10-02 RX ADMIN — ENOXAPARIN SODIUM 30 MG: 100 INJECTION SUBCUTANEOUS at 11:18

## 2019-10-02 RX ADMIN — ENOXAPARIN SODIUM 30 MG: 100 INJECTION SUBCUTANEOUS at 23:56

## 2019-10-02 RX ADMIN — OXYCODONE HYDROCHLORIDE 10 MG: 10 TABLET ORAL at 20:42

## 2019-10-02 RX ADMIN — OXYCODONE HYDROCHLORIDE 10 MG: 10 TABLET ORAL at 14:02

## 2019-10-02 ASSESSMENT — ENCOUNTER SYMPTOMS
HEADACHES: 0
VOMITING: 0
BLURRED VISION: 0
SHORTNESS OF BREATH: 0
MYALGIAS: 1
ABDOMINAL PAIN: 1
COUGH: 0
DIZZINESS: 0
DIARRHEA: 0
NAUSEA: 0

## 2019-10-02 ASSESSMENT — LIFESTYLE VARIABLES: SUBSTANCE_ABUSE: 1

## 2019-10-02 NOTE — PROGRESS NOTES
Report received from day RN, assumed Care.   Patient is AOx4, responds appropriately.      Pain 8/10, medicated per MAR  Patient is tolerating full liquid diet, denies nausea/vomiting. + flatus (last BM 10/1), + void  SBA for ambulation  IS max pull 1500    MLI with prevena in place attached to LLQ wound, dressing CDI.    Plan of care discussed, all questions answered.    Explained importance of calling before getting OOB and pt verbalizes understanding.       Call light and belongings within reach, bed in lowest locked position.  Hourly rounding in place, all needs met at this time

## 2019-10-02 NOTE — CARE PLAN
Problem: Safety  Goal: Will remain free from injury  Outcome: PROGRESSING AS EXPECTED  Intervention: Provide assistance with mobility  Note:   Educated pt to call for assistance before getting OOB     Problem: Pain Management  Goal: Pain level will decrease to patient's comfort goal  Outcome: PROGRESSING AS EXPECTED  Note:   Educated pt to verbalize when pain is not tolerable, assessing pain Q4 per protocol

## 2019-10-02 NOTE — PROGRESS NOTES
"Assumed care of patient from night shift RN.  Patient is alert and oriented times 4, states pain of 9/10, medicated per MAR.  VSS /97   Pulse 100   Temp 36.4 °C (97.6 °F) (Temporal)   Resp 17   Ht 1.727 m (5' 8\")   Wt 82 kg (180 lb 12.4 oz)   SpO2 95%   BMI 27.49 kg/m²   PIV in the BENNY, patent and saline locked.  On RA with saturations in the mid 90a,  in use.  Last BM 10/1, urinating without difficulty.  Full liquid diet with toast and crackers ok, tolerating well.  Midline incision with Prevena wound vac.  Incision to the LLQ, prevena in place.  R head laceration, healing and DEE DEE.  Cleft hands and feet noted.  Patient is a SBA, demonstrates steady gait, minimal assistance needed.  POC discussed for the day, including frequent ambulation with staff.  Bed is locked and in the lowest position, call light is within reach.  All needs are met at this time, hourly rounding is in place.    "

## 2019-10-02 NOTE — CARE PLAN
Problem: Safety  Goal: Will remain free from injury  Outcome: PROGRESSING AS EXPECTED  Note:   Patient educated to dangle at bedside prior to standing     Problem: Pain Management  Goal: Pain level will decrease to patient's comfort goal  Outcome: PROGRESSING AS EXPECTED  Note:   Medicated per MAR for pain of 9/10
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
## 2019-10-03 ENCOUNTER — APPOINTMENT (OUTPATIENT)
Dept: RADIOLOGY | Facility: MEDICAL CENTER | Age: 34
DRG: 329 | End: 2019-10-03
Attending: NURSE PRACTITIONER
Payer: MEDICARE

## 2019-10-03 PROBLEM — H10.9 CONJUNCTIVITIS: Status: ACTIVE | Noted: 2019-10-03

## 2019-10-03 PROCEDURE — 74177 CT ABD & PELVIS W/CONTRAST: CPT

## 2019-10-03 PROCEDURE — A9270 NON-COVERED ITEM OR SERVICE: HCPCS | Performed by: NURSE PRACTITIONER

## 2019-10-03 PROCEDURE — 700111 HCHG RX REV CODE 636 W/ 250 OVERRIDE (IP): Performed by: NURSE PRACTITIONER

## 2019-10-03 PROCEDURE — 700102 HCHG RX REV CODE 250 W/ 637 OVERRIDE(OP): Performed by: NURSE PRACTITIONER

## 2019-10-03 PROCEDURE — 770006 HCHG ROOM/CARE - MED/SURG/GYN SEMI*

## 2019-10-03 PROCEDURE — A9270 NON-COVERED ITEM OR SERVICE: HCPCS | Performed by: SURGERY

## 2019-10-03 PROCEDURE — 700112 HCHG RX REV CODE 229: Performed by: NURSE PRACTITIONER

## 2019-10-03 PROCEDURE — 97535 SELF CARE MNGMENT TRAINING: CPT

## 2019-10-03 PROCEDURE — 700117 HCHG RX CONTRAST REV CODE 255: Performed by: NURSE PRACTITIONER

## 2019-10-03 PROCEDURE — 700101 HCHG RX REV CODE 250: Performed by: NURSE PRACTITIONER

## 2019-10-03 PROCEDURE — 700102 HCHG RX REV CODE 250 W/ 637 OVERRIDE(OP): Performed by: SURGERY

## 2019-10-03 RX ORDER — ERYTHROMYCIN 5 MG/G
OINTMENT OPHTHALMIC EVERY 6 HOURS
Status: DISCONTINUED | OUTPATIENT
Start: 2019-10-03 | End: 2019-10-06

## 2019-10-03 RX ADMIN — GABAPENTIN 200 MG: 100 CAPSULE ORAL at 06:30

## 2019-10-03 RX ADMIN — ACETAMINOPHEN 650 MG: 325 TABLET, FILM COATED ORAL at 23:40

## 2019-10-03 RX ADMIN — MORPHINE SULFATE 15 MG: 15 TABLET, EXTENDED RELEASE ORAL at 06:30

## 2019-10-03 RX ADMIN — OXYCODONE HYDROCHLORIDE 10 MG: 10 TABLET ORAL at 06:30

## 2019-10-03 RX ADMIN — ERYTHROMYCIN: 5 OINTMENT OPHTHALMIC at 17:08

## 2019-10-03 RX ADMIN — ERYTHROMYCIN: 5 OINTMENT OPHTHALMIC at 13:04

## 2019-10-03 RX ADMIN — ACETAMINOPHEN 650 MG: 325 TABLET, FILM COATED ORAL at 06:30

## 2019-10-03 RX ADMIN — OXYCODONE HYDROCHLORIDE 5 MG: 5 TABLET ORAL at 20:01

## 2019-10-03 RX ADMIN — ACETAMINOPHEN 650 MG: 325 TABLET, FILM COATED ORAL at 17:07

## 2019-10-03 RX ADMIN — IOHEXOL 25 ML: 240 INJECTION, SOLUTION INTRATHECAL; INTRAVASCULAR; INTRAVENOUS; ORAL at 14:06

## 2019-10-03 RX ADMIN — ERYTHROMYCIN: 5 OINTMENT OPHTHALMIC at 23:41

## 2019-10-03 RX ADMIN — OXYCODONE HYDROCHLORIDE 5 MG: 5 TABLET ORAL at 12:38

## 2019-10-03 RX ADMIN — OXYCODONE HYDROCHLORIDE 5 MG: 5 TABLET ORAL at 09:41

## 2019-10-03 RX ADMIN — OXYCODONE HYDROCHLORIDE 5 MG: 5 TABLET ORAL at 18:38

## 2019-10-03 RX ADMIN — ENOXAPARIN SODIUM 30 MG: 100 INJECTION SUBCUTANEOUS at 23:40

## 2019-10-03 RX ADMIN — OXYCODONE HYDROCHLORIDE 5 MG: 5 TABLET ORAL at 15:38

## 2019-10-03 RX ADMIN — OXYCODONE HYDROCHLORIDE 10 MG: 10 TABLET ORAL at 23:40

## 2019-10-03 RX ADMIN — ENOXAPARIN SODIUM 30 MG: 100 INJECTION SUBCUTANEOUS at 11:40

## 2019-10-03 RX ADMIN — GABAPENTIN 200 MG: 100 CAPSULE ORAL at 11:42

## 2019-10-03 RX ADMIN — OXYCODONE HYDROCHLORIDE 10 MG: 10 TABLET ORAL at 03:27

## 2019-10-03 RX ADMIN — ONDANSETRON 4 MG: 4 TABLET, ORALLY DISINTEGRATING ORAL at 11:42

## 2019-10-03 RX ADMIN — DOCUSATE SODIUM 100 MG: 100 CAPSULE, LIQUID FILLED ORAL at 17:07

## 2019-10-03 RX ADMIN — IOHEXOL 100 ML: 350 INJECTION, SOLUTION INTRAVENOUS at 14:05

## 2019-10-03 RX ADMIN — GABAPENTIN 200 MG: 100 CAPSULE ORAL at 17:07

## 2019-10-03 RX ADMIN — ACETAMINOPHEN 650 MG: 325 TABLET, FILM COATED ORAL at 11:42

## 2019-10-03 RX ADMIN — DOCUSATE SODIUM 100 MG: 100 CAPSULE, LIQUID FILLED ORAL at 06:30

## 2019-10-03 ASSESSMENT — ENCOUNTER SYMPTOMS
NAUSEA: 0
BLURRED VISION: 0
HEADACHES: 0
COUGH: 0
SHORTNESS OF BREATH: 0
DIARRHEA: 0
ABDOMINAL PAIN: 1
MYALGIAS: 1
DIZZINESS: 0
VOMITING: 0

## 2019-10-03 ASSESSMENT — COGNITIVE AND FUNCTIONAL STATUS - GENERAL
TOILETING: A LITTLE
DRESSING REGULAR UPPER BODY CLOTHING: A LITTLE
SUGGESTED CMS G CODE MODIFIER DAILY ACTIVITY: CK
DRESSING REGULAR LOWER BODY CLOTHING: A LITTLE
DAILY ACTIVITIY SCORE: 19
HELP NEEDED FOR BATHING: A LOT

## 2019-10-03 ASSESSMENT — LIFESTYLE VARIABLES: SUBSTANCE_ABUSE: 1

## 2019-10-03 NOTE — CARE PLAN
Problem: Safety  Goal: Will remain free from injury  Outcome: PROGRESSING AS EXPECTED  Note:   Patient educated to dangle at bedside prior to standing     Problem: Pain Management  Goal: Pain level will decrease to patient's comfort goal  Outcome: PROGRESSING AS EXPECTED  Note:   Medicated per MAR for pain of 8/10

## 2019-10-03 NOTE — PROGRESS NOTES
"Assumed care of patient from night shift RN.  Patient is alert and oriented times 4, states pain of 8/10, medicated per MAR.  VSS /82   Pulse 98   Temp 36.4 °C (97.5 °F) (Temporal)   Resp 18   Ht 1.727 m (5' 8\")   Wt 82 kg (180 lb 12.4 oz)   SpO2 94%   BMI 27.49 kg/m²   PIV in the BENNY, patent and saline locked.  On RA with saturations in the mid 90s.  Last BM 10/1, urinating without difficulty.  Full liquid diet, tolerating well, poor appetite.  Encouraged oral intake.  Midline incision, well approximated with staples and DEE DEE.  Transverse incision, well approximated with staples and DEE DEE.  Staple to the LUQ, DEE DEE.  Abdominal binder in use.  Clefting to the hands/feet.  Patient is up with a SBA, demonstrates steady gait.  Minimal assistance needed.  POC discussed for the day, frequent ambulation.  Bed is locked and in the lowest position, call light is within reach.  All needs are met at this time, hourly rounding is in place.  "

## 2019-10-03 NOTE — CARE PLAN
Problem: Nutritional:  Goal: Achieve adequate nutritional intake  Description  Advance from clears as appropriate.   Patient will consume 50% of meals and supplements.   Outcome: PROGRESSING SLOWER THAN EXPECTED    Please see RD note.
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
## 2019-10-03 NOTE — DIETARY
Nutrition Services: Update   Met w/ pt at bedside. Reports PO of ~50%. States he is drinking Boost  BID. Agreeable to increasing Boost to TID. Says he doesn't have much of an appetite because he can only have full liquids. Denies nausea, but has continued abdominal pain. Last BM 10/1 (diarhhea). Per surgery note:  abdominal xray on 10/1 revealed probable small ileus. Pending abdominal CT today.     RD following

## 2019-10-03 NOTE — PROGRESS NOTES
Assumed care of pt at 1900, report given.  A+O x 4, VSS, and RA.  Pt has midline and transverse surgical incision; covered with prevena plus wound vac; tolerating well, no drainage.  Pt has healing head laceration; tolerating well.  Pt has BU+LE birth defects; baseline.  Pt complaining of pain; medicated per MAR and tolerating well.  Pt tolerating full liquid diet; denies N/V at this time.   +void  -BM (10/1/19 small and loose)  Pt ambulating with a standby assist to restroom and down halls; tolerating well.   Pt updated on POC and all questions answered at this time.  Bed in lowest position, call light within reach, and no current needs.

## 2019-10-03 NOTE — THERAPY
"Occupational Therapy Treatment completed with focus on ADLs and patient education.  Functional Status: Pt seen for OT session. Reports fatigue this session d/t ambulation with nsg prior to session and lack of appetite/eating the last 2 days. Req max A for donning/doffing abdominal binder. Pt educated on log roll for comfort; completed supine>sit with min A not using technique w/ c/o pain. C/o pain seated EOB; recommended chair instead for back support. Sit>stand with SPV, continues to have hunched posture d/t abdominal pain. Functional ambulation to chair (5 steps) w/o AD and SPV. Educated on LB dressing with adaptive technique; req max single step and repeated instructions; completed with min A. Req multiple RBs d/t fatigue and pain. Seated grooming with SPV/setup, declined standing d/t fatigue. Became diaphoretic at end of session. BTB with SPV, and sit>supine with log roll (req 1 step instructions) for comfort with SPV. Left supine in bed. Continues to be limited by decreased functional mobility, activity tolerance, cognition, strength, balance, and pain which are currently affecting pt's ability to complete ADLs/IADLs at baseline. Currently recommend acute OT, and per chart, pt will be transferring to police custody at d/c. Pt continues to req assistance for ADLs, but likely will only req SPV for ADLs d/t d/c location.    Plan of Care: Will benefit from Occupational Therapy 3 times per week  Discharge Recommendations:  Equipment Will Continue to Assess for Equipment Needs. Post-acute therapy: Per chart, pt will be transferring to police custody at d/c. Pt will likely only be able to complete ADLs w/o assistance.     See \"Rehab Therapy-Acute\" Patient Summary Report for complete documentation.   "

## 2019-10-03 NOTE — PROGRESS NOTES
"/82   Pulse 98   Temp 36.4 °C (97.5 °F) (Temporal)   Resp 18   Ht 1.727 m (5' 8\")   Wt 82 kg (180 lb 12.4 oz)   SpO2 94%   BMI 27.49 kg/m²     CT abdomen/pelvis reviewed  Trace pneumoperitoneum and mild ileus  Continue current plan of care  "

## 2019-10-04 LAB
ANION GAP SERPL CALC-SCNC: 9 MMOL/L (ref 0–11.9)
BASOPHILS # BLD AUTO: 0.3 % (ref 0–1.8)
BASOPHILS # BLD: 0.04 K/UL (ref 0–0.12)
BUN SERPL-MCNC: 12 MG/DL (ref 8–22)
CALCIUM SERPL-MCNC: 9.1 MG/DL (ref 8.5–10.5)
CHLORIDE SERPL-SCNC: 99 MMOL/L (ref 96–112)
CO2 SERPL-SCNC: 26 MMOL/L (ref 20–33)
CREAT SERPL-MCNC: 0.72 MG/DL (ref 0.5–1.4)
EOSINOPHIL # BLD AUTO: 0.32 K/UL (ref 0–0.51)
EOSINOPHIL NFR BLD: 2.2 % (ref 0–6.9)
ERYTHROCYTE [DISTWIDTH] IN BLOOD BY AUTOMATED COUNT: 47.5 FL (ref 35.9–50)
GLUCOSE SERPL-MCNC: 111 MG/DL (ref 65–99)
HCT VFR BLD AUTO: 31.6 % (ref 42–52)
HGB BLD-MCNC: 10 G/DL (ref 14–18)
IMM GRANULOCYTES # BLD AUTO: 0.11 K/UL (ref 0–0.11)
IMM GRANULOCYTES NFR BLD AUTO: 0.8 % (ref 0–0.9)
LYMPHOCYTES # BLD AUTO: 1.45 K/UL (ref 1–4.8)
LYMPHOCYTES NFR BLD: 9.9 % (ref 22–41)
MCH RBC QN AUTO: 30.3 PG (ref 27–33)
MCHC RBC AUTO-ENTMCNC: 31.6 G/DL (ref 33.7–35.3)
MCV RBC AUTO: 95.8 FL (ref 81.4–97.8)
MONOCYTES # BLD AUTO: 1.59 K/UL (ref 0–0.85)
MONOCYTES NFR BLD AUTO: 10.9 % (ref 0–13.4)
NEUTROPHILS # BLD AUTO: 11.07 K/UL (ref 1.82–7.42)
NEUTROPHILS NFR BLD: 75.9 % (ref 44–72)
NRBC # BLD AUTO: 0 K/UL
NRBC BLD-RTO: 0 /100 WBC
PLATELET # BLD AUTO: 512 K/UL (ref 164–446)
PMV BLD AUTO: 10.2 FL (ref 9–12.9)
POTASSIUM SERPL-SCNC: 4.3 MMOL/L (ref 3.6–5.5)
RBC # BLD AUTO: 3.3 M/UL (ref 4.7–6.1)
SODIUM SERPL-SCNC: 134 MMOL/L (ref 135–145)
WBC # BLD AUTO: 14.6 K/UL (ref 4.8–10.8)

## 2019-10-04 PROCEDURE — 85025 COMPLETE CBC W/AUTO DIFF WBC: CPT

## 2019-10-04 PROCEDURE — 700102 HCHG RX REV CODE 250 W/ 637 OVERRIDE(OP): Performed by: NURSE PRACTITIONER

## 2019-10-04 PROCEDURE — A9270 NON-COVERED ITEM OR SERVICE: HCPCS | Performed by: NURSE PRACTITIONER

## 2019-10-04 PROCEDURE — 700112 HCHG RX REV CODE 229: Performed by: NURSE PRACTITIONER

## 2019-10-04 PROCEDURE — 700102 HCHG RX REV CODE 250 W/ 637 OVERRIDE(OP): Performed by: SURGERY

## 2019-10-04 PROCEDURE — 770006 HCHG ROOM/CARE - MED/SURG/GYN SEMI*

## 2019-10-04 PROCEDURE — 99223 1ST HOSP IP/OBS HIGH 75: CPT | Mod: GC | Performed by: PSYCHIATRY & NEUROLOGY

## 2019-10-04 PROCEDURE — 80048 BASIC METABOLIC PNL TOTAL CA: CPT

## 2019-10-04 PROCEDURE — 97530 THERAPEUTIC ACTIVITIES: CPT

## 2019-10-04 PROCEDURE — 700111 HCHG RX REV CODE 636 W/ 250 OVERRIDE (IP): Performed by: NURSE PRACTITIONER

## 2019-10-04 PROCEDURE — A9270 NON-COVERED ITEM OR SERVICE: HCPCS | Performed by: SURGERY

## 2019-10-04 PROCEDURE — 97535 SELF CARE MNGMENT TRAINING: CPT

## 2019-10-04 PROCEDURE — 36415 COLL VENOUS BLD VENIPUNCTURE: CPT

## 2019-10-04 RX ORDER — IBUPROFEN 800 MG/1
800 TABLET ORAL EVERY 8 HOURS
Status: DISCONTINUED | OUTPATIENT
Start: 2019-10-04 | End: 2019-10-06 | Stop reason: HOSPADM

## 2019-10-04 RX ORDER — BISACODYL 10 MG
10 SUPPOSITORY, RECTAL RECTAL
Status: DISCONTINUED | OUTPATIENT
Start: 2019-10-04 | End: 2019-10-06 | Stop reason: HOSPADM

## 2019-10-04 RX ORDER — AMOXICILLIN 250 MG
2 CAPSULE ORAL 2 TIMES DAILY
Status: DISCONTINUED | OUTPATIENT
Start: 2019-10-04 | End: 2019-10-06 | Stop reason: HOSPADM

## 2019-10-04 RX ORDER — POLYETHYLENE GLYCOL 3350 17 G/17G
1 POWDER, FOR SOLUTION ORAL
Status: DISCONTINUED | OUTPATIENT
Start: 2019-10-04 | End: 2019-10-06 | Stop reason: HOSPADM

## 2019-10-04 RX ORDER — MORPHINE SULFATE 4 MG/ML
2 INJECTION, SOLUTION INTRAMUSCULAR; INTRAVENOUS ONCE
Status: COMPLETED | OUTPATIENT
Start: 2019-10-04 | End: 2019-10-04

## 2019-10-04 RX ADMIN — ENOXAPARIN SODIUM 30 MG: 100 INJECTION SUBCUTANEOUS at 23:10

## 2019-10-04 RX ADMIN — DOCUSATE SODIUM 100 MG: 100 CAPSULE, LIQUID FILLED ORAL at 06:31

## 2019-10-04 RX ADMIN — POLYETHYLENE GLYCOL 3350 1 PACKET: 17 POWDER, FOR SOLUTION ORAL at 17:45

## 2019-10-04 RX ADMIN — OXYCODONE HYDROCHLORIDE 10 MG: 10 TABLET ORAL at 19:44

## 2019-10-04 RX ADMIN — ONDANSETRON 4 MG: 4 TABLET, ORALLY DISINTEGRATING ORAL at 10:01

## 2019-10-04 RX ADMIN — IBUPROFEN 800 MG: 800 TABLET ORAL at 15:08

## 2019-10-04 RX ADMIN — DOCUSATE SODIUM 100 MG: 100 CAPSULE, LIQUID FILLED ORAL at 17:45

## 2019-10-04 RX ADMIN — OXYCODONE HYDROCHLORIDE 10 MG: 10 TABLET ORAL at 09:30

## 2019-10-04 RX ADMIN — MAGNESIUM HYDROXIDE 30 ML: 400 SUSPENSION ORAL at 17:45

## 2019-10-04 RX ADMIN — GABAPENTIN 200 MG: 100 CAPSULE ORAL at 12:41

## 2019-10-04 RX ADMIN — ERYTHROMYCIN: 5 OINTMENT OPHTHALMIC at 06:31

## 2019-10-04 RX ADMIN — ENOXAPARIN SODIUM 30 MG: 100 INJECTION SUBCUTANEOUS at 12:41

## 2019-10-04 RX ADMIN — GABAPENTIN 200 MG: 100 CAPSULE ORAL at 06:31

## 2019-10-04 RX ADMIN — ACETAMINOPHEN 650 MG: 325 TABLET, FILM COATED ORAL at 12:41

## 2019-10-04 RX ADMIN — MORPHINE SULFATE 2 MG: 4 INJECTION INTRAVENOUS at 02:14

## 2019-10-04 RX ADMIN — ONDANSETRON 4 MG: 4 TABLET, ORALLY DISINTEGRATING ORAL at 18:32

## 2019-10-04 RX ADMIN — OXYCODONE HYDROCHLORIDE 10 MG: 10 TABLET ORAL at 06:31

## 2019-10-04 RX ADMIN — OXYCODONE HYDROCHLORIDE 10 MG: 10 TABLET ORAL at 16:07

## 2019-10-04 RX ADMIN — GABAPENTIN 200 MG: 100 CAPSULE ORAL at 17:45

## 2019-10-04 RX ADMIN — OXYCODONE HYDROCHLORIDE 10 MG: 10 TABLET ORAL at 23:15

## 2019-10-04 RX ADMIN — OXYCODONE HYDROCHLORIDE 10 MG: 10 TABLET ORAL at 12:41

## 2019-10-04 RX ADMIN — SENNOSIDES, DOCUSATE SODIUM 2 TABLET: 50; 8.6 TABLET, FILM COATED ORAL at 17:45

## 2019-10-04 RX ADMIN — ACETAMINOPHEN 650 MG: 325 TABLET, FILM COATED ORAL at 06:31

## 2019-10-04 RX ADMIN — IBUPROFEN 800 MG: 800 TABLET ORAL at 22:07

## 2019-10-04 RX ADMIN — OXYCODONE HYDROCHLORIDE 10 MG: 10 TABLET ORAL at 03:11

## 2019-10-04 RX ADMIN — ERYTHROMYCIN: 5 OINTMENT OPHTHALMIC at 17:49

## 2019-10-04 RX ADMIN — ACETAMINOPHEN 650 MG: 325 TABLET, FILM COATED ORAL at 23:10

## 2019-10-04 RX ADMIN — ERYTHROMYCIN: 5 OINTMENT OPHTHALMIC at 12:44

## 2019-10-04 RX ADMIN — ACETAMINOPHEN 650 MG: 325 TABLET, FILM COATED ORAL at 17:45

## 2019-10-04 ASSESSMENT — ENCOUNTER SYMPTOMS
VOMITING: 0
SHORTNESS OF BREATH: 0
BLURRED VISION: 0
MYALGIAS: 1
ABDOMINAL PAIN: 1
NAUSEA: 0
DIARRHEA: 0
HEADACHES: 0
COUGH: 0
DIZZINESS: 0

## 2019-10-04 ASSESSMENT — COGNITIVE AND FUNCTIONAL STATUS - GENERAL
HELP NEEDED FOR BATHING: A LOT
PERSONAL GROOMING: A LITTLE
DRESSING REGULAR LOWER BODY CLOTHING: A LITTLE
DAILY ACTIVITIY SCORE: 18
SUGGESTED CMS G CODE MODIFIER DAILY ACTIVITY: CK
TOILETING: A LITTLE
DRESSING REGULAR UPPER BODY CLOTHING: A LITTLE

## 2019-10-04 ASSESSMENT — LIFESTYLE VARIABLES: SUBSTANCE_ABUSE: 1
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
## 2019-10-04 NOTE — CARE PLAN
Problem: Bowel/Gastric:  Goal: Normal bowel function is maintained or improved  Outcome: PROGRESSING AS EXPECTED  Note:   Pt educated on importance of ambulation and hydration  Bowel regimen medications ordered  Pt educated on medications      Problem: Mobility  Goal: Risk for activity intolerance will decrease  Outcome: PROGRESSING AS EXPECTED  Note:   Pt up ambulating  Pt up to shower

## 2019-10-04 NOTE — PROGRESS NOTES
Pt complaining of persistent 9/10 pain despite PRN Oxy and other non pharmalogical interventions. Pt requesting this RN to call physician to get additional pain medication. This RN called ASHA Chicas. Order received for one time IV Morphine dose.

## 2019-10-04 NOTE — THERAPY
"Occupational Therapy Treatment completed with focus on ADLs, ADL transfers and patient education.  Functional Status:  Pt was seen for Occupational Therapy treatment today, see Therapy Kardex for details.Treatment included education in breath control with activity and at rest, self pacing techs and energy conservation for pain management. Educated pt in safety awareness techs as well. Psychosocial intervention addressed. EDS per pt resulting in absent 2nd & 3rd digits and toes, both R & L. Pt instructed in how to don abdominal binder. Pt needed Total A to attempt. Pt is limited by pain. Pt seen for seated shower and dressing. MD requested shower. Pt demo supervision for ambulating ADL's without AD. Pt demo Min A to set up shower. Pt focused on pain and staples stating, \"I'm not gong to do this\". Informed pt his MD requested shower. Pt agreed. Min A for seated shower with extended time. Pt needed much encouragement to complete task. Focused on pain even when pain meds were give 45 +-mins prior to therapy. Pt stood with SBA/CGA for washing nikita area, holding onto grab bar.  Pt needed assist to wash back and legs. Pt able to doff clothing prior to shower seated base using tailor tech to remove socks. Pt demonstrated supervision for UB dressing to change gown, Min A to don underpants and pants but Max A for sock donning with LB dressing. Pt required CGA for clothing management in standing. Supervision for all bed mobility. Able to stand at sink for oral hygiene and grooming. Pt is limited by pain. Pt also demonstrated  supervision for Ambulating ADL's without AD. Pt was left up in bed, call light in reach, bedside table in reach and nursing is aware. RN updated on OT findings and recommendations.  Continue Occupational Therapy services as per plan.    Plan of Care: Will benefit from Occupational Therapy 3 times per week  Discharge Recommendations:  Equipment Will Continue to Assess for Equipment Needs. Post-acute " "therapy: Currently recommend acute OT, and per chart, pt will be transferring to police custody at d/c. Pt continues to req assistance for ADL's, but likely will only require SPV for ADL's due to d/c location.    See \"Rehab Therapy-Acute\" Patient Summary Report for complete documentation.   "

## 2019-10-04 NOTE — PROGRESS NOTES
Bedside report received. Assessment completed.  Pt is A&O x4. Pt on room air.   Pain 8/10. Medicating PRN per MAR  + nausea. Medicating PRN per MAR   - numbness, - tingling.  Midline abdominal incision DEE DEE, approximated with staples  Transverse incision on left quadrant of abdomen, DEE DEE, approximated with staples  LLQ transverse incision, DEE DEE approximated with staples.   Last BM 10/1. - flatus, +void.  Full liquid diet. Tolerates well.   Pt up self. Tolerates well.   Call light within reach. All needs met at this time. Fall Precautions and hourly rounding in place.

## 2019-10-04 NOTE — PROGRESS NOTES
Assumed care of pt at 1900, report given.  A+O x 4, VSS, and RA.  Pt has midline and transverse surgical incision; approximated with staples and DEE DEE; no drainage.  Pt has healing head laceration; tolerating well.  Pt has BU+LE birth defects; baseline.  Pt complaining of pain; medicated per MAR and tolerating well.  Pt tolerating full liquid diet; denies N/V at this time.   +void  -BM (10/1/19; CT consistant with ileus)   Pt ambulating with a standby assist to restroom and down halls; tolerating well.   Pt updated on POC and all questions answered at this time.  Bed in lowest position, call light within reach, and no current needs.
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
## 2019-10-04 NOTE — CARE PLAN
Problem: Communication  Goal: The ability to communicate needs accurately and effectively will improve  Outcome: PROGRESSING AS EXPECTED    Pt updated on POC and all questions answered at this time. Hourly rounding in place.      Problem: Safety  Goal: Will remain free from injury  Outcome: PROGRESSING AS EXPECTED    Proper fall precautions in place. Call light within reach and encouraged to use. Hourly rounding in practice.

## 2019-10-05 LAB
ANION GAP SERPL CALC-SCNC: 8 MMOL/L (ref 0–11.9)
BASOPHILS # BLD AUTO: 0.3 % (ref 0–1.8)
BASOPHILS # BLD: 0.04 K/UL (ref 0–0.12)
BUN SERPL-MCNC: 17 MG/DL (ref 8–22)
CALCIUM SERPL-MCNC: 9.1 MG/DL (ref 8.5–10.5)
CHLORIDE SERPL-SCNC: 100 MMOL/L (ref 96–112)
CO2 SERPL-SCNC: 28 MMOL/L (ref 20–33)
CREAT SERPL-MCNC: 0.78 MG/DL (ref 0.5–1.4)
EOSINOPHIL # BLD AUTO: 0.43 K/UL (ref 0–0.51)
EOSINOPHIL NFR BLD: 3.4 % (ref 0–6.9)
ERYTHROCYTE [DISTWIDTH] IN BLOOD BY AUTOMATED COUNT: 47.8 FL (ref 35.9–50)
GLUCOSE SERPL-MCNC: 87 MG/DL (ref 65–99)
HCT VFR BLD AUTO: 30.7 % (ref 42–52)
HGB BLD-MCNC: 9.9 G/DL (ref 14–18)
IMM GRANULOCYTES # BLD AUTO: 0.12 K/UL (ref 0–0.11)
IMM GRANULOCYTES NFR BLD AUTO: 1 % (ref 0–0.9)
LYMPHOCYTES # BLD AUTO: 2.07 K/UL (ref 1–4.8)
LYMPHOCYTES NFR BLD: 16.5 % (ref 22–41)
MCH RBC QN AUTO: 31 PG (ref 27–33)
MCHC RBC AUTO-ENTMCNC: 32.2 G/DL (ref 33.7–35.3)
MCV RBC AUTO: 96.2 FL (ref 81.4–97.8)
MONOCYTES # BLD AUTO: 1.47 K/UL (ref 0–0.85)
MONOCYTES NFR BLD AUTO: 11.7 % (ref 0–13.4)
NEUTROPHILS # BLD AUTO: 8.43 K/UL (ref 1.82–7.42)
NEUTROPHILS NFR BLD: 67.1 % (ref 44–72)
NRBC # BLD AUTO: 0 K/UL
NRBC BLD-RTO: 0 /100 WBC
PLATELET # BLD AUTO: 586 K/UL (ref 164–446)
PMV BLD AUTO: 10.3 FL (ref 9–12.9)
POTASSIUM SERPL-SCNC: 4.5 MMOL/L (ref 3.6–5.5)
RBC # BLD AUTO: 3.19 M/UL (ref 4.7–6.1)
SODIUM SERPL-SCNC: 136 MMOL/L (ref 135–145)
WBC # BLD AUTO: 12.6 K/UL (ref 4.8–10.8)

## 2019-10-05 PROCEDURE — 700111 HCHG RX REV CODE 636 W/ 250 OVERRIDE (IP): Performed by: NURSE PRACTITIONER

## 2019-10-05 PROCEDURE — 700102 HCHG RX REV CODE 250 W/ 637 OVERRIDE(OP): Performed by: SURGERY

## 2019-10-05 PROCEDURE — A9270 NON-COVERED ITEM OR SERVICE: HCPCS | Performed by: NURSE PRACTITIONER

## 2019-10-05 PROCEDURE — 700102 HCHG RX REV CODE 250 W/ 637 OVERRIDE(OP): Performed by: NURSE PRACTITIONER

## 2019-10-05 PROCEDURE — 36415 COLL VENOUS BLD VENIPUNCTURE: CPT

## 2019-10-05 PROCEDURE — 770006 HCHG ROOM/CARE - MED/SURG/GYN SEMI*

## 2019-10-05 PROCEDURE — 85025 COMPLETE CBC W/AUTO DIFF WBC: CPT

## 2019-10-05 PROCEDURE — A9270 NON-COVERED ITEM OR SERVICE: HCPCS | Performed by: SURGERY

## 2019-10-05 PROCEDURE — 80048 BASIC METABOLIC PNL TOTAL CA: CPT

## 2019-10-05 RX ADMIN — GABAPENTIN 200 MG: 100 CAPSULE ORAL at 05:00

## 2019-10-05 RX ADMIN — OXYCODONE HYDROCHLORIDE 10 MG: 10 TABLET ORAL at 12:36

## 2019-10-05 RX ADMIN — GABAPENTIN 200 MG: 100 CAPSULE ORAL at 17:58

## 2019-10-05 RX ADMIN — OXYCODONE HYDROCHLORIDE 10 MG: 10 TABLET ORAL at 08:58

## 2019-10-05 RX ADMIN — ACETAMINOPHEN 650 MG: 325 TABLET, FILM COATED ORAL at 17:58

## 2019-10-05 RX ADMIN — ONDANSETRON 4 MG: 4 TABLET, ORALLY DISINTEGRATING ORAL at 06:02

## 2019-10-05 RX ADMIN — IBUPROFEN 800 MG: 800 TABLET ORAL at 04:59

## 2019-10-05 RX ADMIN — IBUPROFEN 800 MG: 800 TABLET ORAL at 13:34

## 2019-10-05 RX ADMIN — ACETAMINOPHEN 650 MG: 325 TABLET, FILM COATED ORAL at 04:59

## 2019-10-05 RX ADMIN — IBUPROFEN 800 MG: 800 TABLET ORAL at 22:34

## 2019-10-05 RX ADMIN — ENOXAPARIN SODIUM 30 MG: 100 INJECTION SUBCUTANEOUS at 13:34

## 2019-10-05 RX ADMIN — OXYCODONE HYDROCHLORIDE 10 MG: 10 TABLET ORAL at 02:33

## 2019-10-05 RX ADMIN — GABAPENTIN 200 MG: 100 CAPSULE ORAL at 13:34

## 2019-10-05 RX ADMIN — OXYCODONE HYDROCHLORIDE 10 MG: 10 TABLET ORAL at 19:30

## 2019-10-05 RX ADMIN — ACETAMINOPHEN 650 MG: 325 TABLET, FILM COATED ORAL at 13:34

## 2019-10-05 RX ADMIN — OXYCODONE HYDROCHLORIDE 10 MG: 10 TABLET ORAL at 06:02

## 2019-10-05 RX ADMIN — OXYCODONE HYDROCHLORIDE 10 MG: 10 TABLET ORAL at 15:38

## 2019-10-05 RX ADMIN — OXYCODONE HYDROCHLORIDE 10 MG: 10 TABLET ORAL at 22:34

## 2019-10-05 ASSESSMENT — ENCOUNTER SYMPTOMS
HEADACHES: 0
NAUSEA: 0
ABDOMINAL PAIN: 1
DIARRHEA: 0
ROS GI COMMENTS: BM 10/4
DIZZINESS: 0
COUGH: 0
SHORTNESS OF BREATH: 0
MYALGIAS: 1
VOMITING: 0
BLURRED VISION: 0

## 2019-10-05 ASSESSMENT — LIFESTYLE VARIABLES: SUBSTANCE_ABUSE: 1

## 2019-10-05 NOTE — PROGRESS NOTES
Bedside report received. Assessment completed.  Pt is A&O x4. Pt on room air.   Pain 7/10. PRN pain medication available. Pt currently resting comfortably.   Denies nausea.   - numbness, + tingling in abdominal region.  Midline abdominal incision DEE DEE, approximated with staples  Transverse incision on left quadrant of abdomen, DEE DEE, approximated with staples  LLQ transverse incision, DEE DEE approximated with staples.     Last BM 10/5. +flatus, +void.  Full liquid diet. Tolerates well.   Pt up self. Tolerates well.   Call light within reach. All needs met at this time. Fall Precautions and hourly rounding in place.
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
## 2019-10-05 NOTE — PROGRESS NOTES
Pt A&O X4,amb  IV removed d/t leaking and painful  VSS  Pt amb to bathroom    Will continue to monitor.

## 2019-10-05 NOTE — CARE PLAN
Problem: Pain Management  Goal: Pain level will decrease to patient's comfort goal  Outcome: PROGRESSING AS EXPECTED  Note:   Pt receiving PRN pain medication  Pt using ice on abdomen to help with pain      Problem: Psychosocial Needs:  Goal: Level of anxiety will decrease  Outcome: PROGRESSING AS EXPECTED  Note:   Pt able to voice concerns  Asks questions appropriately, all questions answered at this time

## 2019-10-05 NOTE — CARE PLAN
Call light within reach. Pt calls appropriately.       Problem: Communication  Goal: The ability to communicate needs accurately and effectively will improve  Outcome: PROGRESSING AS EXPECTED     Problem: Safety  Goal: Will remain free from injury  Outcome: PROGRESSING AS EXPECTED

## 2019-10-06 VITALS
SYSTOLIC BLOOD PRESSURE: 110 MMHG | BODY MASS INDEX: 27.4 KG/M2 | OXYGEN SATURATION: 95 % | DIASTOLIC BLOOD PRESSURE: 77 MMHG | HEART RATE: 99 BPM | TEMPERATURE: 97.2 F | WEIGHT: 180.78 LBS | RESPIRATION RATE: 17 BRPM | HEIGHT: 68 IN

## 2019-10-06 PROBLEM — H10.9 CONJUNCTIVITIS: Status: RESOLVED | Noted: 2019-10-03 | Resolved: 2019-10-06

## 2019-10-06 PROCEDURE — 700111 HCHG RX REV CODE 636 W/ 250 OVERRIDE (IP): Performed by: NURSE PRACTITIONER

## 2019-10-06 PROCEDURE — A9270 NON-COVERED ITEM OR SERVICE: HCPCS | Performed by: NURSE PRACTITIONER

## 2019-10-06 PROCEDURE — 700102 HCHG RX REV CODE 250 W/ 637 OVERRIDE(OP): Performed by: NURSE PRACTITIONER

## 2019-10-06 PROCEDURE — A9270 NON-COVERED ITEM OR SERVICE: HCPCS | Performed by: SURGERY

## 2019-10-06 PROCEDURE — 700102 HCHG RX REV CODE 250 W/ 637 OVERRIDE(OP): Performed by: SURGERY

## 2019-10-06 RX ORDER — IBUPROFEN 800 MG/1
800 TABLET ORAL EVERY 8 HOURS PRN
COMMUNITY
Start: 2019-10-06 | End: 2022-11-29

## 2019-10-06 RX ORDER — ACETAMINOPHEN 325 MG/1
650 TABLET ORAL EVERY 6 HOURS PRN
COMMUNITY
Start: 2019-10-06 | End: 2022-11-29

## 2019-10-06 RX ORDER — PSEUDOEPHEDRINE HCL 30 MG
100 TABLET ORAL 2 TIMES DAILY
Qty: 60 CAP | COMMUNITY
Start: 2019-10-06 | End: 2022-11-29

## 2019-10-06 RX ORDER — OXYCODONE HYDROCHLORIDE 10 MG/1
5-10 TABLET ORAL
Qty: 28 TAB | Refills: 0 | Status: SHIPPED | OUTPATIENT
Start: 2019-10-06 | End: 2019-10-06

## 2019-10-06 RX ORDER — OXYCODONE HYDROCHLORIDE 10 MG/1
5-10 TABLET ORAL
Qty: 28 TAB | Refills: 0 | Status: SHIPPED | OUTPATIENT
Start: 2019-10-06 | End: 2019-10-13

## 2019-10-06 RX ADMIN — OXYCODONE HYDROCHLORIDE 10 MG: 10 TABLET ORAL at 10:57

## 2019-10-06 RX ADMIN — ENOXAPARIN SODIUM 30 MG: 100 INJECTION SUBCUTANEOUS at 02:14

## 2019-10-06 RX ADMIN — ACETAMINOPHEN 650 MG: 325 TABLET, FILM COATED ORAL at 00:13

## 2019-10-06 RX ADMIN — OXYCODONE HYDROCHLORIDE 10 MG: 10 TABLET ORAL at 02:13

## 2019-10-06 RX ADMIN — ONDANSETRON 4 MG: 4 TABLET, ORALLY DISINTEGRATING ORAL at 09:56

## 2019-10-06 RX ADMIN — GABAPENTIN 200 MG: 100 CAPSULE ORAL at 04:58

## 2019-10-06 RX ADMIN — OXYCODONE HYDROCHLORIDE 10 MG: 10 TABLET ORAL at 14:40

## 2019-10-06 RX ADMIN — OXYCODONE HYDROCHLORIDE 10 MG: 10 TABLET ORAL at 07:57

## 2019-10-06 RX ADMIN — ACETAMINOPHEN 650 MG: 325 TABLET, FILM COATED ORAL at 04:58

## 2019-10-06 RX ADMIN — IBUPROFEN 800 MG: 800 TABLET ORAL at 04:58

## 2019-10-06 RX ADMIN — ACETAMINOPHEN 650 MG: 325 TABLET, FILM COATED ORAL at 12:01

## 2019-10-06 RX ADMIN — GABAPENTIN 200 MG: 100 CAPSULE ORAL at 12:01

## 2019-10-06 ASSESSMENT — ENCOUNTER SYMPTOMS
DIARRHEA: 0
NAUSEA: 0
MYALGIAS: 1
BLURRED VISION: 0
HEADACHES: 0
VOMITING: 0
DIZZINESS: 0
ABDOMINAL PAIN: 1
ROS GI COMMENTS: BM 10/6
COUGH: 0
SHORTNESS OF BREATH: 0

## 2019-10-06 ASSESSMENT — LIFESTYLE VARIABLES: SUBSTANCE_ABUSE: 1

## 2019-10-06 NOTE — PROGRESS NOTES
Bedside report received. Assessment completed.  Pt is A&O x4. Pt on room air.   Pain 7/10. Medicating PRN per MAR  Denies nausea.   - numbness, - tingling.  Midline abdominal incision DEE DEE, approximated with staples  Transverse incision on left quadrant of abdomen, DEE DEE, approximated with staples  LLQ transverse incision, DEE DEE approximated with staples.      Last BM 10/5. +flatus, +void.  Regular diet. Tolerates well.   Pt up self. Tolerates well.   Call light within reach. All needs met at this time. Fall Precautions and hourly rounding in place.
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
## 2019-10-06 NOTE — DISCHARGE INSTRUCTIONS
Discharge Instructions    Discharged to home by car with friend. Discharged via wheelchair, hospital escort: Yes.  Special equipment needed: Not Applicable    Be sure to schedule a follow-up appointment with your primary care doctor or any specialists as instructed.     Discharge Plan:   Diet Plan: Discussed  Activity Level: Discussed  Confirmed Follow up Appointment: Patient to Call and Schedule Appointment  Confirmed Symptoms Management: Discussed  Medication Reconciliation Updated: Yes  Influenza Vaccine Indication: Patient Refuses    I understand that a diet low in cholesterol, fat, and sodium is recommended for good health. Unless I have been given specific instructions below for another diet, I accept this instruction as my diet prescription.   Other diet: regular diet      - Call or seek medical attention for questions or concerns   - Follow up with Dr. Llanes in 1 weeks time   - Follow up with primary care provider within one weeks time   - Resume regular diet   - May take over the counter acetaminophen or ibuprofen as needed for pain   - Continue daily over the counter stool softener while on narcotics   - No operation of machinery or motorized vehicles while under the influence of narcotics   - No alcohol, marijuana or illicit drug use while under the influence of narcotics   - No swimming, hot tubs, baths or wound submersion until cleared by outpatient provider. May shower   - No contact sports, strenuous activities, or heavy lifting until cleared by outpatient provider   - If respiratory decompensation, uncontrolled pain, or signs or symptoms of infection occur seek medical attention      Staple Wound Closure  Staples are used to help a wound heal faster by holding the edges of the wound together.  HOME CARE  · Keep the area around the staples clean and dry.  · Rest and raise (elevate) the injured part above the level of your heart.  · See your doctor for a follow-up check of the wound.  · See your doctor  to have the staples removed.  · Clean the wound daily with water.  · Do not soak the wound in water for long periods of time.  · Let air reach the wound as it heals.  GET HELP RIGHT AWAY IF:   · You have redness or puffiness around the wound.  · You have a red line going away from the wound.  · You have more pain or tenderness.  · You have yellowish-white fluid (pus) coming from the wound.  · Your wound does not stay together after the staples have been taken out.  · You see something coming out of the wound, such as wood or glass.  · You have problems moving the injured area.  · You have a fever or lasting symptoms for more than 2-3 days.  · You have a fever and your symptoms suddenly get worse.  MAKE SURE YOU:   · Understand these instructions.  · Will watch this condition.  · Will get help right away if you are not doing well or get worse.  Document Released: 09/26/2009 Document Revised: 09/11/2013 Document Reviewed: 06/30/2013  [employer]® Patient Information ©2014 [employer].      Depression / Suicide Risk    As you are discharged from this [facility] facility, it is important to learn how to keep safe from harming yourself.    Recognize the warning signs:  · Abrupt changes in personality, positive or negative- including increase in energy   · Giving away possessions  · Change in eating patterns- significant weight changes-  positive or negative  · Change in sleeping patterns- unable to sleep or sleeping all the time   · Unwillingness or inability to communicate  · Depression  · Unusual sadness, discouragement and loneliness  · Talk of wanting to die  · Neglect of personal appearance   · Rebelliousness- reckless behavior  · Withdrawal from people/activities they love  · Confusion- inability to concentrate     If you or a loved one observes any of these behaviors or has concerns about self-harm, here's what you can do:  · Talk about it- your feelings and reasons for harming yourself  · Remove any means that  you might use to hurt yourself (examples: pills, rope, extension cords, firearm)  · Get professional help from the community (Mental Health, Substance Abuse, psychological counseling)  · Do not be alone:Call your Safe Contact- someone whom you trust who will be there for you.  · Call your local CRISIS HOTLINE 171-9062 or 268-959-9359  · Call your local Children's Mobile Crisis Response Team [place] (558) 809-3446 or www.betNOW  · Call the toll free National Suicide Prevention Hotlines   · National Suicide Prevention Lifeline 145-914-DZHB (0314)  · National Hope Line Network 800-SUICIDE (745-0981)

## 2019-10-06 NOTE — PROGRESS NOTES
Pt A&O X4,amb  VSS  Pt amb to bathroom  C/O pain; PRN pain meds given     Will continue to monitor.

## 2019-10-06 NOTE — PROGRESS NOTES
Discharging Patient home per physician order.  Discharged with bus pass.  Demonstrated understanding of discharge instructions, follow up appointments, home medications, prescriptions, home care for surgical wound.  Ambulating without assistance, voiding without difficulty, pain well controlled, tolerating oral medications, oxygen saturation greater than 90%, tolerating diet.  Educational handouts were given and discussed.  Verbalized understanding of discharge instructions and educational handouts.  All questions answered.  Belongings with patient at time of discharge. Staples were removed per APRN order. Steri strips applied

## 2020-03-10 NOTE — PROGRESS NOTES
Assumed care of pt.  AAOx4.  Mildly anxious this morning, CIWA assessment in place, pt scored at 8 this morning. Medicated per regimen. Will reassess Q4.  MLI with prevena in place, CDI.  Full liquid diet in place, no c/o n/v.  LBM 9/29, + flatus, + void.  POC reviewed with pt.  Call light within reach, pt educated to call for assistance as needed.  Hourly rounding in place.    No

## 2020-07-23 NOTE — PROGRESS NOTES
Monitor Summary  SR [id number]  .16/.08/.36   Patient calling stating that she received a call yesterday from Amara BAUM and would like to speak to her or her nurse.  Please call back.

## 2021-05-10 NOTE — ADDENDUM NOTE
Encounter addended by: Velma Richardson M.D. on: 5/9/2021 5:31 PM   Actions taken: Delete clinical note

## 2021-05-18 NOTE — ED NOTES
Chief Complaint   Patient presents with   • Detox     Pt reports he is here for ETOH detox and that he has been having hallucinations   • Hallucinations   Pt bib [facility] for above chief complaint.     
Erythromycin ointment applied to bilateral eyes by ERP.  Pt did not want to wait for discharge instructions.  Pt ambulated out of ER.    
Pt wanting to leave.  ERP at bedside for assessment.   
No

## 2022-04-07 NOTE — ED PROVIDER NOTES
ED Provider Note    Scribed for Evan Hicks M.D. by Crow Ridley. 4/16/2019, 8:49 AM.    Primary care provider: RUSS Goddard  Means of arrival: Ambulance  History obtained from: Patient  History limited by: None    CHIEF COMPLAINT  Chief Complaint   Patient presents with   • Alcohol Intoxication       HPI  Gopal Ceja is a 33 y.o. male who presents, of his own volition, for alcohol withdrawal. He has a history of withdrawal seizures. The patient states he drinks 8 can of beer daily and is unable to provide the time of his last drink. At this time, the patient reports visual hallucinations. He also used methamphetamines last night. He denies any fevers, headache, neck pain, or cough.    REVIEW OF SYSTEMS  Pertinent positives include alcohol intoxication and visual hallucinations.   Pertinent negatives include no fevers, headache, neck pain, cough.    All other systems reviewed and negative.        PAST MEDICAL HISTORY   has a past medical history of Acute anxiety; ADHD (attention deficit hyperactivity disorder); ADHD (attention deficit hyperactivity disorder); Alcohol abuse; Bipolar affective (HCC); Depression; Ectrodactyly-ectodermal dysplasia-clefting syndrome; ETOH abuse; and Psychiatric disorder.    SURGICAL HISTORY   has a past surgical history that includes other orthopedic surgery.    SOCIAL HISTORY  Social History     Social History Main Topics   • Smoking status: Former Smoker     Packs/day: 0.25     Types: Cigarettes   • Smokeless tobacco: Never Used      Comment: Smokes couple of times a month   • Alcohol use 0.0 oz/week      Comment: drinks 10 earthquakes a day   • Drug use: No   • Sexual activity: Not on file       FAMILY HISTORY  Family History   Problem Relation Age of Onset   • Heart Disease Neg Hx    • Hypertension Neg Hx    • Hyperlipidemia Neg Hx         CURRENT MEDICATIONS  Reviewed.  See Encounter Summary.   No current facility-administered medications on file prior to encounter.   "    Current Outpatient Prescriptions on File Prior to Encounter   Medication Sig Dispense Refill   • metoprolol (LOPRESSOR) 50 MG Tab Take 1 Tab by mouth 2 Times a Day. 60 Tab 3        ALLERGIES  No Known Allergies    PHYSICAL EXAM  VITAL SIGNS: /76   Pulse (!) 102   Temp 36.2 °C (97.1 °F) (Temporal)   Resp 17   Ht 1.702 m (5' 7\")   Wt 70.3 kg (155 lb)   SpO2 96%   BMI 24.28 kg/m² tachycardic, high normal blood pressure  Constitutional: Intoxicated. Well developed, Well nourished, No acute distress, intoxicated, somnolent poor historian.   HENT: Moist mucous membranes, Nose is normal in appearance without rhinorrhea, external ears are normal  Eyes: Anicteric,  pupils are equal round and reactive, there is no conjunctival drainage or pallor   Neck: The trachea is midline, there is no obvious mass or meningeal signs  Cardiovascular:  Normal perfusion.   Regular rate and rhythm without murmurs gallops or rubs  Thorax & Lungs: Respiratory rate and effort are normal. There is normal chest excursion with respiration. No wheezes rhonchi or rales noted.  Abdomen: Diffuse abdominal tenderness. Abdomen is normal in appearance, no gross peritoneal signs, normal bowel sounds, no pain with cough, nonpulsatile  Musculoskeletal: Congenital hand deformities but otherwise no deformities noted in all 4 extremities. No edema present.   Skin: Skin is warm, visualized skin shows no erythema, no rash.  Neurologic:  Cranial nerves II through XII are intact there is no focal abnormality noted.  Psychiatric: Normal mood and mentation.      LABS  Results for orders placed or performed during the hospital encounter of 04/16/19   CBC WITH DIFFERENTIAL   Result Value Ref Range    WBC 5.5 4.8 - 10.8 K/uL    RBC 4.82 4.70 - 6.10 M/uL    Hemoglobin 15.7 14.0 - 18.0 g/dL    Hematocrit 46.9 42.0 - 52.0 %    MCV 97.3 81.4 - 97.8 fL    MCH 32.6 27.0 - 33.0 pg    MCHC 33.5 (L) 33.7 - 35.3 g/dL    RDW 46.0 35.9 - 50.0 fL    Platelet Count " 291 164 - 446 K/uL    MPV 10.5 9.0 - 12.9 fL    Neutrophils-Polys 60.50 44.00 - 72.00 %    Lymphocytes 28.00 22.00 - 41.00 %    Monocytes 9.70 0.00 - 13.40 %    Eosinophils 0.50 0.00 - 6.90 %    Basophils 1.10 0.00 - 1.80 %    Immature Granulocytes 0.20 0.00 - 0.90 %    Nucleated RBC 0.00 /100 WBC    Neutrophils (Absolute) 3.30 1.82 - 7.42 K/uL    Lymphs (Absolute) 1.53 1.00 - 4.80 K/uL    Monos (Absolute) 0.53 0.00 - 0.85 K/uL    Eos (Absolute) 0.03 0.00 - 0.51 K/uL    Baso (Absolute) 0.06 0.00 - 0.12 K/uL    Immature Granulocytes (abs) 0.01 0.00 - 0.11 K/uL    NRBC (Absolute) 0.00 K/uL   Comp Metabolic Panel   Result Value Ref Range    Sodium 140 135 - 145 mmol/L    Potassium 3.8 3.6 - 5.5 mmol/L    Chloride 102 96 - 112 mmol/L    Co2 25 20 - 33 mmol/L    Anion Gap 13.0 (H) 0.0 - 11.9    Glucose 95 65 - 99 mg/dL    Bun 7 (L) 8 - 22 mg/dL    Creatinine 0.67 0.50 - 1.40 mg/dL    Calcium 8.5 8.5 - 10.5 mg/dL    AST(SGOT) 147 (H) 12 - 45 U/L    ALT(SGPT) 108 (H) 2 - 50 U/L    Alkaline Phosphatase 96 30 - 99 U/L    Total Bilirubin 0.5 0.1 - 1.5 mg/dL    Albumin 4.7 3.2 - 4.9 g/dL    Total Protein 7.7 6.0 - 8.2 g/dL    Globulin 3.0 1.9 - 3.5 g/dL    A-G Ratio 1.6 g/dL   LIPASE   Result Value Ref Range    Lipase 41 11 - 82 U/L   DIAGNOSTIC ALCOHOL   Result Value Ref Range    Diagnostic Alcohol 0.45 (H) 0.00 g/dL       All labs reviewed by me.       The radiologist's interpretations of all radiological studies have been reviewed by me.         COURSE & MEDICAL DECISION MAKING  Nursing notes and vital signs were reviewed.  8:45 AM Patient seen and examined at bedside. The patient presents with alcohol intoxication and the differential diagnosis includes but is not limited to alcohol intoxication, alcoholic ketoacidosis, pancreatitis, doubt appendicitis or diverticulitis. Ordered for CBC with differential, CMP, lipase, diagnostic alcohol, estimated GFR and EKG to evaluate. Patient will be treated with IV fluids for his  symptoms.      Interventions: Indication IV fluids intoxication, abdominal pain and tachycardia  Medications   detox IV 1000 mL (D5LR + magnesium 1 g + thiamine 100 mg + folic acid 1 mg) infusion 1,000 mL (0 mL Intravenous Stopped 4/16/19 1407)   NS infusion 1,000 mL (1,000 mL Intravenous New Bag 4/16/19 1634)     Improved hydration on repeat assessment, oral rehydration not adequate given extreme intoxication    11:20 AM - I reevaluated the patient at bedside. He is resting comfortably.     Obtained and reviewed past medical records which indicated the patient was admitted end of march and 1st of April and has a history of alcoholic liver disease.    This patient presents with abdominal pain and acute severe alcohol intoxication.  This is this patient's frequent pattern.  Fortunately there is no evidence of abdominal process such as appendicitis, diverticulitis, pancreatitis or alcoholic ketoacidosis    Plan:  Abstain from alcohol  Alcohol problem handout given  Medically cleared for inpatient detox  Follow-up [facility]    Dalton Pagan, A.[email or web address]  [address] [facility] [address] [place] [id number] [phone]    Schedule an appointment as soon as possible for a visit   As needed        FINAL IMPRESSION  1. Acute alcoholic intoxication without complication (HCC)    2.      Abdominal pain generalized     ICrow (Scribe), am scribing for, and in the presence of, Evan Hicks M.D..    Electronically signed by: Crow Ridley (Scribe), 4/16/2019    Evan FELIPE M.D. personally performed the services described in this documentation, as scribed by Crow Ridley in my presence, and it is both accurate and complete.    The note accurately reflects work and decisions made by me.  Evan Hicks  4/16/2019  4:55 PM      C              Cyclosporine Counseling:  I discussed with the patient the risks of cyclosporine including but not limited to hypertension, gingival hyperplasia,myelosuppression, immunosuppression, liver damage, kidney damage, neurotoxicity, lymphoma, and serious infections. The patient understands that monitoring is required including baseline blood pressure, CBC, CMP, lipid panel and uric acid, and then 1-2 times monthly CMP and blood pressure.

## 2022-04-24 ENCOUNTER — HOSPITAL ENCOUNTER (EMERGENCY)
Facility: MEDICAL CENTER | Age: 37
End: 2022-04-24
Attending: EMERGENCY MEDICINE
Payer: MEDICARE

## 2022-04-24 VITALS
BODY MASS INDEX: 24.52 KG/M2 | WEIGHT: 161.82 LBS | HEIGHT: 68 IN | TEMPERATURE: 97.7 F | HEART RATE: 109 BPM | OXYGEN SATURATION: 93 % | SYSTOLIC BLOOD PRESSURE: 160 MMHG | DIASTOLIC BLOOD PRESSURE: 97 MMHG | RESPIRATION RATE: 20 BRPM

## 2022-04-24 DIAGNOSIS — F10.10 ALCOHOL ABUSE: ICD-10-CM

## 2022-04-24 DIAGNOSIS — F15.10 METHAMPHETAMINE ABUSE (HCC): ICD-10-CM

## 2022-04-24 PROCEDURE — 99284 EMERGENCY DEPT VISIT MOD MDM: CPT

## 2022-04-24 NOTE — ED TRIAGE NOTES
"Chief Complaint   Patient presents with   • Alcohol Intoxication     Pt reports he has been drinking 8-10 earthquakes since being released from senior living. Also admitted to using meth yesterday. Pt tearful, asking for detox. Denies SI/HI.     /97   Pulse (!) 122   Temp 36.2 °C (97.2 °F) (Temporal)   Resp (!) 22   Ht 1.727 m (5' 8\")   Wt 73.4 kg (161 lb 13.1 oz)   SpO2 95%   BMI 24.60 kg/m²     Pt ambulated into triage, mask in place. NAD, encouraged to return to the triage nurse or tech with any new complaints or symptoms.  "

## 2022-04-25 NOTE — PROGRESS NOTES
Pt agitated, requesting pt advocate.  Would like to talk to pt advocate,  and SW regarding legal hold.  Carolyn ZULETA notified.   Alert and interactive, no focal deficits

## 2022-04-25 NOTE — DISCHARGE INSTRUCTIONS
Use the resources provided by our peer support team to get help for your substance abuse problems. Recovery requires hard work and commitment. There are no quick fixes or easy solutions.      Alcoholics Anonymous       [address]      [phone]    Alcoholics Anonymous     [address]     [phone]

## 2022-06-18 NOTE — CARE PLAN
Problem: Pain Management  Goal: Pain level will decrease to patient's comfort goal  Outcome: PROGRESSING AS EXPECTED  Pt's pain will decrease to comfort goal through rest, repositioning, a quiet environment, and PRN pharmacologic analgesia.        Problem: Skin Integrity  Goal: Risk for impaired skin integrity will decrease  Outcome: PROGRESSING AS EXPECTED  Skin will be assessed frequently for breakdown and pt will remain clean, dry, and intact. All listed wounds will be assessed.     75F presents to ED for sharp left chest pain that started this morning and woke patient up from sleep. Per pt, yesterday she twisted her neck and felt left neck pain and associated left arm pain. Pt denies SOB, chest pressure, dizziness/lightheadedness, N/V. PMH HTN.

## 2022-08-19 NOTE — ED NOTES
Nursing Transfer Note      8/18/2022     Reason patient is being transferred: back from procedure    Transfer To: room 306    Transfer via bed    Transfer with cardiac monitoring    Transported by RN    Medicines sent: none    Any special needs or follow-up needed: chest tube    Chart send with patient: Yes    Notified: spouse    Patient reassessed at: 1610 08/18/2022    Upon arrival to floor: cardiac monitor applied, patient oriented to room, call bell in reach and bed in lowest position    Chest tube site dressing has quarter size serosanguineous drainage.   Spoke to CRISTHIAN RN, T7. Aware that patient is ready for transport.

## 2022-09-21 NOTE — ED NOTES
Call complete to   Confirm appt   Discussed time,day, location, and provider Pt laying on gurney, eyes closed, even respirations, remains on cardiac monitor.

## 2022-10-05 NOTE — ED AVS SNAPSHOT
[employer] Access Code: [email or web address]  Expires: 2/25/2017 12:35 PM    [employer] Mobile  A secure, online tool to manage your health information     [employer]’s [employer] Mobile® is a secure, online tool that connects you to your personalized health information from the privacy of your home -- day or night - making it very easy for you to manage your healthcare. Once the activation process is completed, you can even access your medical information using the United Mobile erwin, which is available for free in the Apple Erwin store or Google Play store.     United Mobile provides the following levels of access (as shown below):   My Chart Features   [facility] Primary Care Doctor [facility]  Specialists [facility]  Urgent  Care Non-[facility]  Primary Care  Doctor   Email your healthcare team securely and privately 24/7 X X X X   Manage appointments: schedule your next appointment; view details of past/upcoming appointments X      Request prescription refills. X      View recent personal medical records, including lab and immunizations X X X X   View health record, including health history, allergies, medications X X X X   Read reports about your outpatient visits, procedures, consult and ER notes X X X X   See your discharge summary, which is a recap of your hospital and/or ER visit that includes your diagnosis, lab results, and care plan. X X       How to register for United Mobile:  1. Go to  [email or web address].  2. Click on the Sign Up Now box, which takes you to the New Member Sign Up page. You will need to provide the following information:  a. Enter your [employer] Mobile Access Code exactly as it appears at the top of this page. (You will not need to use this code after you’ve completed the sign-up process. If you do not sign up before the expiration date, you must request a new code.)   b. Enter your date of birth.   c. Enter your home email address.   d. Click Submit, and follow the next screen’s instructions.  3. Create a United Mobile ID. This will be your [employer]  login ID and cannot be changed, so think of one that is secure and easy to remember.  4. Create a [employer] password. You can change your password at any time.  5. Enter your Password Reset Question and Answer. This can be used at a later time if you forget your password.   6. Enter your e-mail address. This allows you to receive e-mail notifications when new information is available in [employer].  7. Click Sign Up. You can now view your health information.    For assistance activating your [employer] account, call [phone]         Admission

## 2022-11-25 ENCOUNTER — HOSPITAL ENCOUNTER (EMERGENCY)
Facility: MEDICAL CENTER | Age: 37
End: 2022-11-25
Attending: EMERGENCY MEDICINE
Payer: MEDICARE

## 2022-11-25 VITALS
DIASTOLIC BLOOD PRESSURE: 61 MMHG | SYSTOLIC BLOOD PRESSURE: 116 MMHG | TEMPERATURE: 97.8 F | WEIGHT: 183.2 LBS | OXYGEN SATURATION: 92 % | HEART RATE: 100 BPM | BODY MASS INDEX: 27.77 KG/M2 | HEIGHT: 68 IN | RESPIRATION RATE: 16 BRPM

## 2022-11-25 DIAGNOSIS — F10.10 ALCOHOL ABUSE: ICD-10-CM

## 2022-11-25 PROCEDURE — A9270 NON-COVERED ITEM OR SERVICE: HCPCS | Performed by: EMERGENCY MEDICINE

## 2022-11-25 PROCEDURE — 700102 HCHG RX REV CODE 250 W/ 637 OVERRIDE(OP): Performed by: EMERGENCY MEDICINE

## 2022-11-25 PROCEDURE — 99284 EMERGENCY DEPT VISIT MOD MDM: CPT

## 2022-11-25 RX ORDER — LORAZEPAM 2 MG/1
2 TABLET ORAL ONCE
Status: COMPLETED | OUTPATIENT
Start: 2022-11-25 | End: 2022-11-25

## 2022-11-25 RX ADMIN — LORAZEPAM 2 MG: 2 TABLET ORAL at 17:10

## 2022-11-25 NOTE — ED TRIAGE NOTES
Chief Complaint   Patient presents with    ETOH Withdrawal     Pt presents to ED for ETOH withdrawal symptoms. Pt unsure when his last drink was. Hx of prior withdrawals. Pt states he's having auditory and visual hallucinations.     Pt AOx2 to self and place. States he does not recall what day it is nor the events of the past few days. Charge RN informed.    Pt educated upon triage process and told to inform  staff of any changes in condition so that Pt may be reassessed. No further questions at this time. Pt sitting out in lobby.

## 2022-11-26 NOTE — ED PROVIDER NOTES
ED Provider Note    Scribed for Lacie Tristan M.D. by Stefany Pichardo. 11/25/2022, 4:10 PM.    Primary care provider: None.  Means of arrival: Walk in  History obtained from: patient   History limited by: none    CHIEF COMPLAINT  Chief Complaint   Patient presents with    ETOH Withdrawal     Pt presents to ED for ETOH withdrawal symptoms. Pt unsure when his last drink was. Hx of prior withdrawals. Pt states he's having auditory and visual hallucinations.       HPI  Gopal Ceja is a 37 y.o. male who presents to the Emergency Department for ETOH withdrawal onset today. Patient believes his last drink was yesterday. He complains of auditory and visual hallucinations. Denies SI or HI. Patient is currently on probation. He is requesting help with ETOH detox    REVIEW OF SYSTEMS  Pertinent positives include auditory and visual hallucinations. Pertinent negatives include no SI or HI.     PAST MEDICAL HISTORY   has a past medical history of Acute anxiety, ADHD (attention deficit hyperactivity disorder), ADHD (attention deficit hyperactivity disorder), Alcohol abuse, Bipolar affective (HCC), Depression, Ectrodactyly-ectodermal dysplasia-clefting syndrome, ETOH abuse, and Psychiatric disorder.    SURGICAL HISTORY   has a past surgical history that includes other orthopedic surgery and exploratory of abdomen (N/A, 9/26/2019).    SOCIAL HISTORY  Social History     Tobacco Use    Smoking status: Never    Smokeless tobacco: Never    Tobacco comments:     Smokes couple of times a month   Vaping Use    Vaping Use: Never used   Substance Use Topics    Alcohol use: Yes     Alcohol/week: 0.0 oz     Comment: 8-10 earthquakes - last drink today    Drug use: Yes     Comment: meth yesterday      Social History     Substance and Sexual Activity   Drug Use Yes    Comment: meth yesterday       FAMILY HISTORY  Family History   Problem Relation Age of Onset    Heart Disease Neg Hx     Hypertension Neg Hx     Hyperlipidemia Neg Hx   "      CURRENT MEDICATIONS  Current Outpatient Medications   Medication Instructions    acetaminophen (TYLENOL) 650 mg, Oral, EVERY 6 HOURS PRN    amphetamine-dextroamphetamine (ADDERALL) 20 MG Tab 30 mg, Oral, DAILY    baclofen (LIORESAL) 10 mg, Oral, 3 TIMES DAILY    busPIRone (BUSPAR) 20 mg, Oral, EVERY EVENING    docusate sodium 100 mg, Oral, 2 TIMES DAILY    ibuprofen (MOTRIN) 800 mg, Oral, EVERY 8 HOURS PRN    metoprolol tartrate (LOPRESSOR) 50 mg, Oral, 2 TIMES DAILY    omeprazole (PRILOSEC) 40 mg, Oral, DAILY    propranolol (INDERAL) 20 mg, Oral, 2 TIMES DAILY      ALLERGIES  No Known Allergies    PHYSICAL EXAM  VITAL SIGNS: BP (!) 151/101   Pulse (!) 112   Temp 36.4 °C (97.5 °F) (Tympanic)   Resp 14   Ht 1.727 m (5' 8\")   Wt 83.1 kg (183 lb 3.2 oz)   SpO2 91%   BMI 27.86 kg/m²   Constitutional: Alert in no apparent distress. Well apearing  HENT: Normocephalic, Atraumatic, Bilateral external ears normal. Nose normal.   Eyes:  Conjunctiva normal, non-icteric.   Lungs: Non-labored respirations  Skin: Warm, Dry, No erythema, No rash.   Neurologic: Alert, Grossly non-focal.   Psychiatric: Depressed affect    COURSE & MEDICAL DECISION MAKING  Pertinent Labs & Imaging studies reviewed. (See chart for details)    4:10 PM - Patient seen and examined at bedside.  This patient is well-known to myself in this emergency department.  He previously was here almost every day before he went to retirement for 3 years.  He seems like he recently got out as he has been to [facility] a few times and now is here.  Patient has made no attempts at appropriate detox in the past I highly doubt that he is significantly motivated at this time.    We will attempt to contact the patients .    5:16 PM - Patient was seen by peer support who was able to get him a bed for detox. Patient will be treated with ativan 2 mg for his symptoms. Discussed return precautions. Patient will be discharged at this time. He verbalizes " agreement with discharge and plan of care.     The patient will return for new or worsening symptoms and is stable at the time of discharge. Patient was given return precautions. Patient and/or family member verbalizes understanding and will comply.    DISPOSITION:  Patient will be discharged home in stable condition.    FOLLOW UP:  [facility], Emergency Dept  [address] [facility] [id number] [phone]    Return to the emergency department for worsening symptoms.      FINAL IMPRESSION  1. Alcohol abuse         This dictation has been created using voice recognition software and/or scribes. The accuracy of the dictation is limited by the abilities of the software and the expertise of the scribes. I expect there may be some errors of grammar and possibly content. I made every attempt to manually correct the errors within my dictation. However, errors related to voice recognition software and/or scribes may still exist and should be interpreted within the appropriate context.     Stefany FELIPE (Scribe), am scribing for, and in the presence of, Lacie Tristan M.D..    Electronically signed by: Stefany Pichardo (Scribmary kay), 11/25/2022    Lacie FELIPE M.D. personally performed the services described in this documentation, as scribed by Stefany Pichardo in my presence, and it is both accurate and complete.    The note accurately reflects work and decisions made by me.  Lacie Tristan M.D.  11/25/2022  9:40 PM

## 2022-11-26 NOTE — ED NOTES
Pt requesting to talk to  because he would like help with etoh detox but does not know who it is or how to contact but did provide number on his ankle bracelet. Attempted to call [phone] and left message.

## 2022-11-26 NOTE — ED NOTES
Pt to rm g37 pt states he has been drinking off/on since getting out of custodial but has not told his

## 2022-11-26 NOTE — ED NOTES
.Patient discharged with peer support in stable condition per orders. Wristband removed per protocol. Patient verbalized understanding of all discharge instructions. All belongings accounted for.

## 2022-11-28 ENCOUNTER — HOSPITAL ENCOUNTER (EMERGENCY)
Facility: MEDICAL CENTER | Age: 37
End: 2022-11-29
Attending: EMERGENCY MEDICINE
Payer: MEDICARE

## 2022-11-28 DIAGNOSIS — F10.929 ACUTE ALCOHOLIC INTOXICATION WITH COMPLICATION (HCC): ICD-10-CM

## 2022-11-28 LAB
ALBUMIN SERPL BCP-MCNC: 4.2 G/DL (ref 3.2–4.9)
ALBUMIN/GLOB SERPL: 1.2 G/DL
ALP SERPL-CCNC: 120 U/L (ref 30–99)
ALT SERPL-CCNC: 24 U/L (ref 2–50)
ANION GAP SERPL CALC-SCNC: 17 MMOL/L (ref 7–16)
AST SERPL-CCNC: 61 U/L (ref 12–45)
BASOPHILS # BLD AUTO: 0.2 % (ref 0–1.8)
BASOPHILS # BLD: 0.03 K/UL (ref 0–0.12)
BILIRUB SERPL-MCNC: 0.3 MG/DL (ref 0.1–1.5)
BUN SERPL-MCNC: 12 MG/DL (ref 8–22)
CALCIUM SERPL-MCNC: 8.8 MG/DL (ref 8.5–10.5)
CHLORIDE SERPL-SCNC: 103 MMOL/L (ref 96–112)
CO2 SERPL-SCNC: 21 MMOL/L (ref 20–33)
CREAT SERPL-MCNC: 0.77 MG/DL (ref 0.5–1.4)
EOSINOPHIL # BLD AUTO: 0.03 K/UL (ref 0–0.51)
EOSINOPHIL NFR BLD: 0.2 % (ref 0–6.9)
ERYTHROCYTE [DISTWIDTH] IN BLOOD BY AUTOMATED COUNT: 42.2 FL (ref 35.9–50)
GFR SERPLBLD CREATININE-BSD FMLA CKD-EPI: 119 ML/MIN/1.73 M 2
GLOBULIN SER CALC-MCNC: 3.6 G/DL (ref 1.9–3.5)
GLUCOSE SERPL-MCNC: 108 MG/DL (ref 65–99)
HCT VFR BLD AUTO: 47.2 % (ref 42–52)
HGB BLD-MCNC: 16.6 G/DL (ref 14–18)
IMM GRANULOCYTES # BLD AUTO: 0.04 K/UL (ref 0–0.11)
IMM GRANULOCYTES NFR BLD AUTO: 0.3 % (ref 0–0.9)
LIPASE SERPL-CCNC: 15 U/L (ref 11–82)
LYMPHOCYTES # BLD AUTO: 3.22 K/UL (ref 1–4.8)
LYMPHOCYTES NFR BLD: 26.4 % (ref 22–41)
MCH RBC QN AUTO: 31.4 PG (ref 27–33)
MCHC RBC AUTO-ENTMCNC: 35.2 G/DL (ref 33.7–35.3)
MCV RBC AUTO: 89.4 FL (ref 81.4–97.8)
MONOCYTES # BLD AUTO: 0.93 K/UL (ref 0–0.85)
MONOCYTES NFR BLD AUTO: 7.6 % (ref 0–13.4)
NEUTROPHILS # BLD AUTO: 7.95 K/UL (ref 1.82–7.42)
NEUTROPHILS NFR BLD: 65.3 % (ref 44–72)
NRBC # BLD AUTO: 0 K/UL
NRBC BLD-RTO: 0 /100 WBC
PLATELET # BLD AUTO: 303 K/UL (ref 164–446)
PMV BLD AUTO: 10.4 FL (ref 9–12.9)
POC BREATHALIZER: 0.25 PERCENT (ref 0–0.01)
POTASSIUM SERPL-SCNC: 4 MMOL/L (ref 3.6–5.5)
PROT SERPL-MCNC: 7.8 G/DL (ref 6–8.2)
RBC # BLD AUTO: 5.28 M/UL (ref 4.7–6.1)
SODIUM SERPL-SCNC: 141 MMOL/L (ref 135–145)
WBC # BLD AUTO: 12.2 K/UL (ref 4.8–10.8)

## 2022-11-28 PROCEDURE — 80053 COMPREHEN METABOLIC PANEL: CPT

## 2022-11-28 PROCEDURE — 99285 EMERGENCY DEPT VISIT HI MDM: CPT

## 2022-11-28 PROCEDURE — 302970 POC BREATHALIZER: Performed by: EMERGENCY MEDICINE

## 2022-11-28 PROCEDURE — 83690 ASSAY OF LIPASE: CPT

## 2022-11-28 PROCEDURE — 700101 HCHG RX REV CODE 250: Performed by: EMERGENCY MEDICINE

## 2022-11-28 PROCEDURE — 85025 COMPLETE CBC W/AUTO DIFF WBC: CPT

## 2022-11-28 PROCEDURE — 96365 THER/PROPH/DIAG IV INF INIT: CPT

## 2022-11-28 PROCEDURE — 36415 COLL VENOUS BLD VENIPUNCTURE: CPT

## 2022-11-28 PROCEDURE — 302970 POC BREATHALIZER

## 2022-11-28 RX ADMIN — THIAMINE HYDROCHLORIDE: 100 INJECTION, SOLUTION INTRAMUSCULAR; INTRAVENOUS at 22:54

## 2022-11-29 VITALS
OXYGEN SATURATION: 97 % | BODY MASS INDEX: 28.25 KG/M2 | HEART RATE: 101 BPM | DIASTOLIC BLOOD PRESSURE: 89 MMHG | RESPIRATION RATE: 18 BRPM | HEIGHT: 67 IN | WEIGHT: 180 LBS | SYSTOLIC BLOOD PRESSURE: 121 MMHG | TEMPERATURE: 98.6 F

## 2022-11-29 LAB — POC BREATHALIZER: 0.11 PERCENT (ref 0–0.01)

## 2022-11-29 PROCEDURE — 700102 HCHG RX REV CODE 250 W/ 637 OVERRIDE(OP): Performed by: EMERGENCY MEDICINE

## 2022-11-29 PROCEDURE — 700102 HCHG RX REV CODE 250 W/ 637 OVERRIDE(OP): Performed by: STUDENT IN AN ORGANIZED HEALTH CARE EDUCATION/TRAINING PROGRAM

## 2022-11-29 PROCEDURE — 302970 POC BREATHALIZER: Performed by: STUDENT IN AN ORGANIZED HEALTH CARE EDUCATION/TRAINING PROGRAM

## 2022-11-29 PROCEDURE — 96366 THER/PROPH/DIAG IV INF ADDON: CPT

## 2022-11-29 PROCEDURE — A9270 NON-COVERED ITEM OR SERVICE: HCPCS | Performed by: EMERGENCY MEDICINE

## 2022-11-29 PROCEDURE — A9270 NON-COVERED ITEM OR SERVICE: HCPCS | Performed by: STUDENT IN AN ORGANIZED HEALTH CARE EDUCATION/TRAINING PROGRAM

## 2022-11-29 RX ORDER — DEXTROAMPHETAMINE SACCHARATE, AMPHETAMINE ASPARTATE MONOHYDRATE, DEXTROAMPHETAMINE SULFATE AND AMPHETAMINE SULFATE 6.25; 6.25; 6.25; 6.25 MG/1; MG/1; MG/1; MG/1
25 CAPSULE, EXTENDED RELEASE ORAL DAILY
Status: ON HOLD | COMMUNITY
Start: 2022-10-05 | End: 2023-03-11

## 2022-11-29 RX ORDER — DULOXETIN HYDROCHLORIDE 20 MG/1
20 CAPSULE, DELAYED RELEASE ORAL DAILY
COMMUNITY

## 2022-11-29 RX ORDER — DIAZEPAM 5 MG/1
5 TABLET ORAL ONCE
Status: COMPLETED | OUTPATIENT
Start: 2022-11-29 | End: 2022-11-29

## 2022-11-29 RX ORDER — LORAZEPAM 1 MG/1
1 TABLET ORAL ONCE
Status: COMPLETED | OUTPATIENT
Start: 2022-11-29 | End: 2022-11-29

## 2022-11-29 RX ORDER — CLONAZEPAM 1 MG/1
1 TABLET ORAL 2 TIMES DAILY PRN
COMMUNITY

## 2022-11-29 RX ADMIN — DIAZEPAM 5 MG: 5 TABLET ORAL at 05:48

## 2022-11-29 RX ADMIN — LORAZEPAM 1 MG: 1 TABLET ORAL at 08:42

## 2022-11-29 RX ADMIN — DIAZEPAM 5 MG: 5 TABLET ORAL at 02:37

## 2022-11-29 NOTE — ED NOTES
Pt resting comfortably in bed, pt has equal rise and fall of the chest. Pt has 1on1 sitter in view.

## 2022-11-29 NOTE — ED NOTES
Med Rec completed per patient   Allergies reviewed  No ORAL antibiotics in last 30 days    Patient states that he has been out of all of his medications for about 2 weeks

## 2022-11-29 NOTE — ED NOTES
Pt resting comfortably in bed, pt has no needs at the current time. Pt has equal rise and fall of chest. 1on1 sitter at the door.

## 2022-11-29 NOTE — ED TRIAGE NOTES
PT presents to the Ed by EMS for Suicidal ideation with a plan to jump off a building. [place] that he wants to kill himself. Pt very tearful upon arrival and just keeps repeating that he is sorry and does not want to live. Pt breathing is easy and non labored at this time. Pt does not give any specific complaints other then he does not feel well. Pt skin is w/d    Belongings removed from the pt and secured at this time. Pt placed into a gown. Room cleared out and made safe. Pt has 1on1 sitter in site of the pt.

## 2022-11-29 NOTE — ED NOTES
Pt resting comfortably in bed, pt has no needs at the current time. Pt has 1on1 sitter at the door.

## 2022-11-29 NOTE — ED NOTES
Assumed care of pt, report received. Pt requesting more meds for detox. Explained he got a dose at 0548.   Awaiting reassessment when clinically sober.

## 2022-11-29 NOTE — ED NOTES
Pt resting comfortably in bed, pt has 1on1 sitter at the door. Pt has no needs at the current time. Equal rise and fall of chest.

## 2022-11-29 NOTE — ED NOTES
Pt medicated per MAR. Pt has no other needs at the current time. Pt has equal rise and fall of chest
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
## 2022-11-29 NOTE — ED NOTES
Pt assisted with ambulation to the  and then back into bed. Pt ambulated with a steady gait. Pt has 1 on 1 sitter in constant view of pt.

## 2022-11-29 NOTE — ED NOTES
Pt resting comfortably in bed, pt has no needs at the current time. Pt has equal rise and fall of chest. Pt has sitter within view.

## 2022-11-29 NOTE — ED NOTES
Reviewed discharge instructions, pt verbalized understanding of instructions. Encouraged to call his grandmother. States he will follow-up as needed. Denies further questions at this time. Pt ambulatory out of ER with steady gait.

## 2022-11-29 NOTE — ED NOTES
PT resting comfortably in bed, Pt has equal rise and fall of chest. No needs at the current time. Pt has 1on1 sitter at the door.

## 2022-11-29 NOTE — DISCHARGE SUMMARY
"  ED Observation Discharge Summary    Patient:Gopal Ceja  Patient : 1985  Patient MRN: [id number]  Patient PCP: Pcp Pt States None    Admit Date: 2022  Discharge Date and Time: 22 8:21 AM  Discharge Diagnosis: Alcohol intoxication  Discharge Attending: Michael Owusu D.O.  Discharge Service: ED Observation    ED Course  Gopal is a 37 y.o. male who was evaluated at [facility] for suicidal nation alcohol intoxication.  Here in the emergency department, the patient had time to metabolize his alcohol.  He did receive Valium, detox bag, Ativan.  Reevaluation, he is clinically sober, slightly tachycardic heart rate 101 but states he is feeling much better would like to go.  He denies suicidal ideation and suicide attempt.  Patient does not want to go back to rehab facility for his alcohol problem.    Discharge Exam:  /84   Pulse (!) 104   Temp 36.9 °C (98.4 °F) (Temporal)   Resp 18   Ht 1.702 m (5' 7\")   Wt 81.6 kg (180 lb)   SpO2 97%   BMI 28.19 kg/m² .    Constitutional: Disheveled, awake and alert. Nontoxic  HENT:  Grossly normal  Eyes: Grossly normal  Neck: Normal range of motion  Cardiovascular: Normal heart rate   Thorax & Lungs: No respiratory distress  Abdomen: Nontender  Skin:  No pathologic rash.   Extremities: Well perfused  Psychiatric: Affect normal    Labs  Results for orders placed or performed during the hospital encounter of 22   CBC WITH DIFFERENTIAL   Result Value Ref Range    WBC 12.2 (H) 4.8 - 10.8 K/uL    RBC 5.28 4.70 - 6.10 M/uL    Hemoglobin 16.6 14.0 - 18.0 g/dL    Hematocrit 47.2 42.0 - 52.0 %    MCV 89.4 81.4 - 97.8 fL    MCH 31.4 27.0 - 33.0 pg    MCHC 35.2 33.7 - 35.3 g/dL    RDW 42.2 35.9 - 50.0 fL    Platelet Count 303 164 - 446 K/uL    MPV 10.4 9.0 - 12.9 fL    Neutrophils-Polys 65.30 44.00 - 72.00 %    Lymphocytes 26.40 22.00 - 41.00 %    Monocytes 7.60 0.00 - 13.40 %    Eosinophils 0.20 0.00 - 6.90 %    Basophils " 0.20 0.00 - 1.80 %    Immature Granulocytes 0.30 0.00 - 0.90 %    Nucleated RBC 0.00 /100 WBC    Neutrophils (Absolute) 7.95 (H) 1.82 - 7.42 K/uL    Lymphs (Absolute) 3.22 1.00 - 4.80 K/uL    Monos (Absolute) 0.93 (H) 0.00 - 0.85 K/uL    Eos (Absolute) 0.03 0.00 - 0.51 K/uL    Baso (Absolute) 0.03 0.00 - 0.12 K/uL    Immature Granulocytes (abs) 0.04 0.00 - 0.11 K/uL    NRBC (Absolute) 0.00 K/uL   COMP METABOLIC PANEL   Result Value Ref Range    Sodium 141 135 - 145 mmol/L    Potassium 4.0 3.6 - 5.5 mmol/L    Chloride 103 96 - 112 mmol/L    Co2 21 20 - 33 mmol/L    Anion Gap 17.0 (H) 7.0 - 16.0    Glucose 108 (H) 65 - 99 mg/dL    Bun 12 8 - 22 mg/dL    Creatinine 0.77 0.50 - 1.40 mg/dL    Calcium 8.8 8.5 - 10.5 mg/dL    AST(SGOT) 61 (H) 12 - 45 U/L    ALT(SGPT) 24 2 - 50 U/L    Alkaline Phosphatase 120 (H) 30 - 99 U/L    Total Bilirubin 0.3 0.1 - 1.5 mg/dL    Albumin 4.2 3.2 - 4.9 g/dL    Total Protein 7.8 6.0 - 8.2 g/dL    Globulin 3.6 (H) 1.9 - 3.5 g/dL    A-G Ratio 1.2 g/dL   LIPASE   Result Value Ref Range    Lipase 15 11 - 82 U/L   ESTIMATED GFR   Result Value Ref Range    GFR (CKD-EPI) 119 >60 mL/min/1.73 m 2   POC BREATHALIZER   Result Value Ref Range    POC Breathalizer 0.253 (A) 0.00 - 0.01 Percent   POC BREATHALIZER   Result Value Ref Range    POC Breathalizer 0.113 (A) 0.00 - 0.01 Percent       Radiology  No orders to display       Disposition: Discharge    Follow up: Primary care    Medications:     Discharge Condition: Stable    Electronically signed by: Michael Owusu D.O., 11/29/2022 8:21 AM

## 2022-11-29 NOTE — ED NOTES
Pt resting in bed with eyes closed. Pt has equal rise and fall of chest. Pt has 1 on 1 sitter within view of the pt.

## 2023-01-05 ENCOUNTER — APPOINTMENT (OUTPATIENT)
Dept: RADIOLOGY | Facility: MEDICAL CENTER | Age: 38
DRG: 897 | End: 2023-01-05
Attending: EMERGENCY MEDICINE
Payer: MEDICARE

## 2023-01-05 ENCOUNTER — HOSPITAL ENCOUNTER (INPATIENT)
Facility: MEDICAL CENTER | Age: 38
LOS: 1 days | DRG: 897 | End: 2023-01-06
Attending: EMERGENCY MEDICINE | Admitting: HOSPITALIST
Payer: MEDICARE

## 2023-01-05 DIAGNOSIS — F19.90 IVDU (INTRAVENOUS DRUG USER): ICD-10-CM

## 2023-01-05 DIAGNOSIS — F10.10 ALCOHOL ABUSE: ICD-10-CM

## 2023-01-05 DIAGNOSIS — J10.1 INFLUENZA A: ICD-10-CM

## 2023-01-05 DIAGNOSIS — R05.1 ACUTE COUGH: ICD-10-CM

## 2023-01-05 DIAGNOSIS — F10.930 ALCOHOL WITHDRAWAL SYNDROME WITHOUT COMPLICATION (HCC): ICD-10-CM

## 2023-01-05 DIAGNOSIS — A41.9 SEPSIS, DUE TO UNSPECIFIED ORGANISM, UNSPECIFIED WHETHER ACUTE ORGAN DYSFUNCTION PRESENT (HCC): ICD-10-CM

## 2023-01-05 DIAGNOSIS — F10.932 ALCOHOL WITHDRAWAL HALLUCINOSIS (HCC): ICD-10-CM

## 2023-01-05 PROBLEM — R50.9 FEVER: Status: ACTIVE | Noted: 2023-01-05

## 2023-01-05 LAB
ALBUMIN SERPL BCP-MCNC: 4.4 G/DL (ref 3.2–4.9)
ALBUMIN/GLOB SERPL: 1 G/DL
ALP SERPL-CCNC: 132 U/L (ref 30–99)
ALT SERPL-CCNC: 16 U/L (ref 2–50)
ANION GAP SERPL CALC-SCNC: 17 MMOL/L (ref 7–16)
AST SERPL-CCNC: 32 U/L (ref 12–45)
BASOPHILS # BLD AUTO: 0.3 % (ref 0–1.8)
BASOPHILS # BLD: 0.03 K/UL (ref 0–0.12)
BILIRUB SERPL-MCNC: 0.7 MG/DL (ref 0.1–1.5)
BUN SERPL-MCNC: 9 MG/DL (ref 8–22)
CALCIUM ALBUM COR SERPL-MCNC: 9.1 MG/DL (ref 8.5–10.5)
CALCIUM SERPL-MCNC: 9.4 MG/DL (ref 8.4–10.2)
CHLORIDE SERPL-SCNC: 94 MMOL/L (ref 96–112)
CO2 SERPL-SCNC: 22 MMOL/L (ref 20–33)
CREAT SERPL-MCNC: 0.77 MG/DL (ref 0.5–1.4)
EOSINOPHIL # BLD AUTO: 0.01 K/UL (ref 0–0.51)
EOSINOPHIL NFR BLD: 0.1 % (ref 0–6.9)
ERYTHROCYTE [DISTWIDTH] IN BLOOD BY AUTOMATED COUNT: 41.9 FL (ref 35.9–50)
FLUAV RNA SPEC QL NAA+PROBE: POSITIVE
FLUBV RNA SPEC QL NAA+PROBE: NEGATIVE
GFR SERPLBLD CREATININE-BSD FMLA CKD-EPI: 118 ML/MIN/1.73 M 2
GLOBULIN SER CALC-MCNC: 4.3 G/DL (ref 1.9–3.5)
GLUCOSE SERPL-MCNC: 81 MG/DL (ref 65–99)
HCT VFR BLD AUTO: 47 % (ref 42–52)
HGB BLD-MCNC: 16.1 G/DL (ref 14–18)
IMM GRANULOCYTES # BLD AUTO: 0.03 K/UL (ref 0–0.11)
IMM GRANULOCYTES NFR BLD AUTO: 0.3 % (ref 0–0.9)
LACTATE SERPL-SCNC: 2.7 MMOL/L (ref 0.5–2)
LIPASE SERPL-CCNC: 13 U/L (ref 7–58)
LYMPHOCYTES # BLD AUTO: 0.62 K/UL (ref 1–4.8)
LYMPHOCYTES NFR BLD: 5.2 % (ref 22–41)
MCH RBC QN AUTO: 31 PG (ref 27–33)
MCHC RBC AUTO-ENTMCNC: 34.3 G/DL (ref 33.7–35.3)
MCV RBC AUTO: 90.6 FL (ref 81.4–97.8)
MONOCYTES # BLD AUTO: 1.54 K/UL (ref 0–0.85)
MONOCYTES NFR BLD AUTO: 13 % (ref 0–13.4)
NEUTROPHILS # BLD AUTO: 9.64 K/UL (ref 1.82–7.42)
NEUTROPHILS NFR BLD: 81.1 % (ref 44–72)
NRBC # BLD AUTO: 0 K/UL
NRBC BLD-RTO: 0 /100 WBC
PLATELET # BLD AUTO: 263 K/UL (ref 164–446)
PMV BLD AUTO: 10.4 FL (ref 9–12.9)
POTASSIUM SERPL-SCNC: 4.1 MMOL/L (ref 3.6–5.5)
PROCALCITONIN SERPL-MCNC: 0.07 NG/ML
PROT SERPL-MCNC: 8.7 G/DL (ref 6–8.2)
RBC # BLD AUTO: 5.19 M/UL (ref 4.7–6.1)
RSV RNA SPEC QL NAA+PROBE: NEGATIVE
SARS-COV-2 RNA RESP QL NAA+PROBE: NOTDETECTED
SODIUM SERPL-SCNC: 133 MMOL/L (ref 135–145)
SPECIMEN SOURCE: ABNORMAL
WBC # BLD AUTO: 11.9 K/UL (ref 4.8–10.8)

## 2023-01-05 PROCEDURE — 87040 BLOOD CULTURE FOR BACTERIA: CPT | Mod: 91

## 2023-01-05 PROCEDURE — 99285 EMERGENCY DEPT VISIT HI MDM: CPT

## 2023-01-05 PROCEDURE — 700102 HCHG RX REV CODE 250 W/ 637 OVERRIDE(OP): Performed by: HOSPITALIST

## 2023-01-05 PROCEDURE — 306637 HCHG MISC ORTHO ITEM RC 0274

## 2023-01-05 PROCEDURE — A9270 NON-COVERED ITEM OR SERVICE: HCPCS | Performed by: HOSPITALIST

## 2023-01-05 PROCEDURE — 700105 HCHG RX REV CODE 258: Performed by: EMERGENCY MEDICINE

## 2023-01-05 PROCEDURE — 84145 PROCALCITONIN (PCT): CPT

## 2023-01-05 PROCEDURE — 83605 ASSAY OF LACTIC ACID: CPT

## 2023-01-05 PROCEDURE — 96375 TX/PRO/DX INJ NEW DRUG ADDON: CPT

## 2023-01-05 PROCEDURE — 71045 X-RAY EXAM CHEST 1 VIEW: CPT

## 2023-01-05 PROCEDURE — 36415 COLL VENOUS BLD VENIPUNCTURE: CPT

## 2023-01-05 PROCEDURE — C9803 HOPD COVID-19 SPEC COLLECT: HCPCS | Performed by: EMERGENCY MEDICINE

## 2023-01-05 PROCEDURE — 0241U HCHG SARS-COV-2 COVID-19 NFCT DS RESP RNA 4 TRGT MIC: CPT

## 2023-01-05 PROCEDURE — 96366 THER/PROPH/DIAG IV INF ADDON: CPT

## 2023-01-05 PROCEDURE — 85025 COMPLETE CBC W/AUTO DIFF WBC: CPT

## 2023-01-05 PROCEDURE — 80053 COMPREHEN METABOLIC PANEL: CPT

## 2023-01-05 PROCEDURE — 700111 HCHG RX REV CODE 636 W/ 250 OVERRIDE (IP): Performed by: EMERGENCY MEDICINE

## 2023-01-05 PROCEDURE — 83690 ASSAY OF LIPASE: CPT

## 2023-01-05 PROCEDURE — 99223 1ST HOSP IP/OBS HIGH 75: CPT | Mod: AI | Performed by: HOSPITALIST

## 2023-01-05 PROCEDURE — 8E0ZXY6 ISOLATION: ICD-10-PCS | Performed by: EMERGENCY MEDICINE

## 2023-01-05 PROCEDURE — 96376 TX/PRO/DX INJ SAME DRUG ADON: CPT

## 2023-01-05 PROCEDURE — 96365 THER/PROPH/DIAG IV INF INIT: CPT

## 2023-01-05 RX ORDER — SODIUM CHLORIDE 9 MG/ML
1000 INJECTION, SOLUTION INTRAVENOUS ONCE
Status: COMPLETED | OUTPATIENT
Start: 2023-01-05 | End: 2023-01-05

## 2023-01-05 RX ORDER — BISACODYL 10 MG
10 SUPPOSITORY, RECTAL RECTAL
Status: DISCONTINUED | OUTPATIENT
Start: 2023-01-05 | End: 2023-01-06 | Stop reason: HOSPADM

## 2023-01-05 RX ORDER — FOLIC ACID 1 MG/1
1 TABLET ORAL DAILY
Status: DISCONTINUED | OUTPATIENT
Start: 2023-01-06 | End: 2023-01-06 | Stop reason: HOSPADM

## 2023-01-05 RX ORDER — PROPRANOLOL HYDROCHLORIDE 20 MG/1
20 TABLET ORAL 2 TIMES DAILY
Status: DISCONTINUED | OUTPATIENT
Start: 2023-01-05 | End: 2023-01-06 | Stop reason: HOSPADM

## 2023-01-05 RX ORDER — DULOXETIN HYDROCHLORIDE 20 MG/1
20 CAPSULE, DELAYED RELEASE ORAL DAILY
Status: DISCONTINUED | OUTPATIENT
Start: 2023-01-06 | End: 2023-01-06 | Stop reason: HOSPADM

## 2023-01-05 RX ORDER — ONDANSETRON 2 MG/ML
4 INJECTION INTRAMUSCULAR; INTRAVENOUS ONCE
Status: COMPLETED | OUTPATIENT
Start: 2023-01-05 | End: 2023-01-05

## 2023-01-05 RX ORDER — LORAZEPAM 1 MG/1
2 TABLET ORAL
Status: DISCONTINUED | OUTPATIENT
Start: 2023-01-05 | End: 2023-01-06 | Stop reason: HOSPADM

## 2023-01-05 RX ORDER — AMOXICILLIN 250 MG
2 CAPSULE ORAL 2 TIMES DAILY
Status: DISCONTINUED | OUTPATIENT
Start: 2023-01-06 | End: 2023-01-06 | Stop reason: HOSPADM

## 2023-01-05 RX ORDER — ACETAMINOPHEN 325 MG/1
650 TABLET ORAL EVERY 6 HOURS PRN
Status: DISCONTINUED | OUTPATIENT
Start: 2023-01-05 | End: 2023-01-06 | Stop reason: HOSPADM

## 2023-01-05 RX ORDER — PHENOBARBITAL SODIUM 130 MG/ML
130 INJECTION, SOLUTION INTRAMUSCULAR; INTRAVENOUS ONCE
Status: COMPLETED | OUTPATIENT
Start: 2023-01-05 | End: 2023-01-05

## 2023-01-05 RX ORDER — GAUZE BANDAGE 2" X 2"
100 BANDAGE TOPICAL DAILY
Status: DISCONTINUED | OUTPATIENT
Start: 2023-01-06 | End: 2023-01-06 | Stop reason: HOSPADM

## 2023-01-05 RX ORDER — OSELTAMIVIR PHOSPHATE 75 MG/1
75 CAPSULE ORAL 2 TIMES DAILY
Status: DISCONTINUED | OUTPATIENT
Start: 2023-01-05 | End: 2023-01-06 | Stop reason: HOSPADM

## 2023-01-05 RX ORDER — LORAZEPAM 1 MG/1
3 TABLET ORAL
Status: DISCONTINUED | OUTPATIENT
Start: 2023-01-05 | End: 2023-01-06 | Stop reason: HOSPADM

## 2023-01-05 RX ORDER — LORAZEPAM 2 MG/ML
2 INJECTION INTRAMUSCULAR
Status: DISCONTINUED | OUTPATIENT
Start: 2023-01-05 | End: 2023-01-06 | Stop reason: HOSPADM

## 2023-01-05 RX ORDER — LORAZEPAM 0.5 MG/1
0.5 TABLET ORAL EVERY 4 HOURS PRN
Status: DISCONTINUED | OUTPATIENT
Start: 2023-01-05 | End: 2023-01-06 | Stop reason: HOSPADM

## 2023-01-05 RX ORDER — LORAZEPAM 2 MG/ML
1 INJECTION INTRAMUSCULAR
Status: DISCONTINUED | OUTPATIENT
Start: 2023-01-05 | End: 2023-01-06 | Stop reason: HOSPADM

## 2023-01-05 RX ORDER — LORAZEPAM 1 MG/1
4 TABLET ORAL
Status: DISCONTINUED | OUTPATIENT
Start: 2023-01-05 | End: 2023-01-06 | Stop reason: HOSPADM

## 2023-01-05 RX ORDER — LORAZEPAM 1 MG/1
1 TABLET ORAL EVERY 4 HOURS PRN
Status: DISCONTINUED | OUTPATIENT
Start: 2023-01-05 | End: 2023-01-06 | Stop reason: HOSPADM

## 2023-01-05 RX ORDER — ENOXAPARIN SODIUM 100 MG/ML
40 INJECTION SUBCUTANEOUS DAILY
Status: DISCONTINUED | OUTPATIENT
Start: 2023-01-06 | End: 2023-01-06 | Stop reason: HOSPADM

## 2023-01-05 RX ORDER — LORAZEPAM 2 MG/ML
0.5 INJECTION INTRAMUSCULAR EVERY 4 HOURS PRN
Status: DISCONTINUED | OUTPATIENT
Start: 2023-01-05 | End: 2023-01-06 | Stop reason: HOSPADM

## 2023-01-05 RX ORDER — POLYETHYLENE GLYCOL 3350 17 G/17G
1 POWDER, FOR SOLUTION ORAL
Status: DISCONTINUED | OUTPATIENT
Start: 2023-01-05 | End: 2023-01-06 | Stop reason: HOSPADM

## 2023-01-05 RX ORDER — LORAZEPAM 2 MG/ML
1.5 INJECTION INTRAMUSCULAR
Status: DISCONTINUED | OUTPATIENT
Start: 2023-01-05 | End: 2023-01-06 | Stop reason: HOSPADM

## 2023-01-05 RX ORDER — CLONAZEPAM 0.5 MG/1
1 TABLET ORAL 2 TIMES DAILY PRN
Status: DISCONTINUED | OUTPATIENT
Start: 2023-01-05 | End: 2023-01-06 | Stop reason: HOSPADM

## 2023-01-05 RX ADMIN — VANCOMYCIN HYDROCHLORIDE 2000 MG: 1 INJECTION, POWDER, LYOPHILIZED, FOR SOLUTION INTRAVENOUS at 20:57

## 2023-01-05 RX ADMIN — PHENOBARBITAL SODIUM 130 MG: 130 INJECTION INTRAMUSCULAR at 20:40

## 2023-01-05 RX ADMIN — PHENOBARBITAL SODIUM 130 MG: 130 INJECTION INTRAMUSCULAR at 19:33

## 2023-01-05 RX ADMIN — ACETAMINOPHEN 650 MG: 325 TABLET, FILM COATED ORAL at 20:48

## 2023-01-05 RX ADMIN — OSELTAMIVIR PHOSPHATE 75 MG: 75 CAPSULE ORAL at 22:05

## 2023-01-05 RX ADMIN — ONDANSETRON 4 MG: 2 INJECTION INTRAMUSCULAR; INTRAVENOUS at 19:33

## 2023-01-05 RX ADMIN — SODIUM CHLORIDE 1000 ML: 9 INJECTION, SOLUTION INTRAVENOUS at 19:36

## 2023-01-05 ASSESSMENT — ENCOUNTER SYMPTOMS
COUGH: 1
EYE DISCHARGE: 0
FLANK PAIN: 0
VOMITING: 0
SHORTNESS OF BREATH: 1
CHILLS: 1
FOCAL WEAKNESS: 0
NERVOUS/ANXIOUS: 1
ABDOMINAL PAIN: 0
MYALGIAS: 0
TREMORS: 1
EYE REDNESS: 0
BRUISES/BLEEDS EASILY: 0
HALLUCINATIONS: 1
STRIDOR: 0
FEVER: 1

## 2023-01-05 ASSESSMENT — FIBROSIS 4 INDEX
FIB4 SCORE: 1.1
FIB4 SCORE: 1.48

## 2023-01-05 ASSESSMENT — PAIN DESCRIPTION - PAIN TYPE: TYPE: ACUTE PAIN

## 2023-01-06 VITALS
WEIGHT: 190.7 LBS | SYSTOLIC BLOOD PRESSURE: 134 MMHG | HEIGHT: 68 IN | TEMPERATURE: 100.1 F | DIASTOLIC BLOOD PRESSURE: 75 MMHG | HEART RATE: 121 BPM | OXYGEN SATURATION: 95 % | RESPIRATION RATE: 20 BRPM | BODY MASS INDEX: 28.9 KG/M2

## 2023-01-06 LAB
ALBUMIN SERPL BCP-MCNC: 3.8 G/DL (ref 3.2–4.9)
ALBUMIN/GLOB SERPL: 1.2 G/DL
ALP SERPL-CCNC: 112 U/L (ref 30–99)
ALT SERPL-CCNC: 12 U/L (ref 2–50)
ANION GAP SERPL CALC-SCNC: 16 MMOL/L (ref 7–16)
AST SERPL-CCNC: 29 U/L (ref 12–45)
BILIRUB SERPL-MCNC: 0.9 MG/DL (ref 0.1–1.5)
BUN SERPL-MCNC: 9 MG/DL (ref 8–22)
CALCIUM ALBUM COR SERPL-MCNC: 8.7 MG/DL (ref 8.5–10.5)
CALCIUM SERPL-MCNC: 8.5 MG/DL (ref 8.4–10.2)
CHLORIDE SERPL-SCNC: 98 MMOL/L (ref 96–112)
CO2 SERPL-SCNC: 20 MMOL/L (ref 20–33)
CREAT SERPL-MCNC: 0.88 MG/DL (ref 0.5–1.4)
ERYTHROCYTE [DISTWIDTH] IN BLOOD BY AUTOMATED COUNT: 43.5 FL (ref 35.9–50)
GFR SERPLBLD CREATININE-BSD FMLA CKD-EPI: 114 ML/MIN/1.73 M 2
GLOBULIN SER CALC-MCNC: 3.2 G/DL (ref 1.9–3.5)
GLUCOSE SERPL-MCNC: 79 MG/DL (ref 65–99)
HCT VFR BLD AUTO: 44.8 % (ref 42–52)
HGB BLD-MCNC: 15.1 G/DL (ref 14–18)
MAGNESIUM SERPL-MCNC: 1.9 MG/DL (ref 1.5–2.5)
MCH RBC QN AUTO: 31 PG (ref 27–33)
MCHC RBC AUTO-ENTMCNC: 33.7 G/DL (ref 33.7–35.3)
MCV RBC AUTO: 92 FL (ref 81.4–97.8)
PLATELET # BLD AUTO: 216 K/UL (ref 164–446)
PMV BLD AUTO: 10.6 FL (ref 9–12.9)
POTASSIUM SERPL-SCNC: 4.5 MMOL/L (ref 3.6–5.5)
PROT SERPL-MCNC: 7 G/DL (ref 6–8.2)
RBC # BLD AUTO: 4.87 M/UL (ref 4.7–6.1)
SODIUM SERPL-SCNC: 134 MMOL/L (ref 135–145)
WBC # BLD AUTO: 7.1 K/UL (ref 4.8–10.8)

## 2023-01-06 PROCEDURE — 99239 HOSP IP/OBS DSCHRG MGMT >30: CPT | Performed by: HOSPITALIST

## 2023-01-06 PROCEDURE — 36415 COLL VENOUS BLD VENIPUNCTURE: CPT

## 2023-01-06 PROCEDURE — 80053 COMPREHEN METABOLIC PANEL: CPT

## 2023-01-06 PROCEDURE — 700102 HCHG RX REV CODE 250 W/ 637 OVERRIDE(OP): Performed by: HOSPITALIST

## 2023-01-06 PROCEDURE — A9270 NON-COVERED ITEM OR SERVICE: HCPCS | Performed by: HOSPITALIST

## 2023-01-06 PROCEDURE — 85027 COMPLETE CBC AUTOMATED: CPT

## 2023-01-06 PROCEDURE — 83735 ASSAY OF MAGNESIUM: CPT

## 2023-01-06 RX ORDER — FOLIC ACID 1 MG/1
1 TABLET ORAL DAILY
Qty: 30 TABLET | Refills: 1 | Status: ON HOLD | OUTPATIENT
Start: 2023-01-07 | End: 2023-03-11

## 2023-01-06 RX ORDER — LANOLIN ALCOHOL/MO/W.PET/CERES
100 CREAM (GRAM) TOPICAL DAILY
Qty: 30 TABLET | Refills: 1 | Status: ON HOLD | OUTPATIENT
Start: 2023-01-07 | End: 2023-03-11

## 2023-01-06 RX ORDER — OSELTAMIVIR PHOSPHATE 75 MG/1
75 CAPSULE ORAL 2 TIMES DAILY
Qty: 10 CAPSULE | Refills: 0 | Status: SHIPPED | OUTPATIENT
Start: 2023-01-06 | End: 2023-01-10

## 2023-01-06 RX ORDER — CHLORDIAZEPOXIDE HYDROCHLORIDE 10 MG/1
CAPSULE, GELATIN COATED ORAL
Qty: 30 CAPSULE | Refills: 0 | Status: SHIPPED | OUTPATIENT
Start: 2023-01-06 | End: 2023-01-20

## 2023-01-06 RX ORDER — ACETAMINOPHEN 325 MG/1
650 TABLET ORAL EVERY 6 HOURS PRN
Qty: 100 TABLET | Refills: 0 | Status: ON HOLD | OUTPATIENT
Start: 2023-01-06 | End: 2023-03-11

## 2023-01-06 RX ADMIN — THERA TABS 1 TABLET: TAB at 06:30

## 2023-01-06 RX ADMIN — DULOXETINE HYDROCHLORIDE 20 MG: 20 CAPSULE, DELAYED RELEASE ORAL at 06:29

## 2023-01-06 RX ADMIN — LORAZEPAM 1 MG: 1 TABLET ORAL at 04:19

## 2023-01-06 RX ADMIN — PROPRANOLOL HYDROCHLORIDE 20 MG: 20 TABLET ORAL at 06:30

## 2023-01-06 RX ADMIN — THIAMINE HCL TAB 100 MG 100 MG: 100 TAB at 06:30

## 2023-01-06 RX ADMIN — ACETAMINOPHEN 650 MG: 325 TABLET, FILM COATED ORAL at 04:19

## 2023-01-06 RX ADMIN — LORAZEPAM 0.5 MG: 0.5 TABLET ORAL at 00:19

## 2023-01-06 RX ADMIN — FOLIC ACID 1 MG: 1 TABLET ORAL at 06:30

## 2023-01-06 ASSESSMENT — LIFESTYLE VARIABLES
AGITATION: NORMAL ACTIVITY
AGITATION: NORMAL ACTIVITY
ORIENTATION AND CLOUDING OF SENSORIUM: ORIENTED AND CAN DO SERIAL ADDITIONS
TREMOR: NO TREMOR
NAUSEA AND VOMITING: MILD NAUSEA WITH NO VOMITING
TOTAL SCORE: 5
VISUAL DISTURBANCES: VERY MILD SENSITIVITY
AUDITORY DISTURBANCES: NOT PRESENT
TOTAL SCORE: VERY MILD ITCHING, PINS AND NEEDLES SENSATION, BURNING OR NUMBNESS
AUDITORY DISTURBANCES: NOT PRESENT
ORIENTATION AND CLOUDING OF SENSORIUM: ORIENTED AND CAN DO SERIAL ADDITIONS
NAUSEA AND VOMITING: MILD NAUSEA WITH NO VOMITING
PAROXYSMAL SWEATS: NO SWEAT VISIBLE
VISUAL DISTURBANCES: VERY MILD SENSITIVITY
HEADACHE, FULLNESS IN HEAD: MILD
TOTAL SCORE: 8
HEADACHE, FULLNESS IN HEAD: VERY MILD
TOTAL SCORE: MILD ITCHING, PINS AND NEEDLES SENSATION, BURNING OR NUMBNESS
ANXIETY: MILDLY ANXIOUS
ANXIETY: MILDLY ANXIOUS
TREMOR: TREMOR NOT VISIBLE BUT CAN BE FELT, FINGERTIP TO FINGERTIP
PAROXYSMAL SWEATS: NO SWEAT VISIBLE
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
## 2023-01-06 NOTE — ED TRIAGE NOTES
"Chief Complaint   Patient presents with    Cough     BIB [facility] from [facility] for unproductive cough x 2 days, body aches      Detox     Called EMS d/t ETOH detox and methamphetamine withdrawal, here today to detox  States usually drinks 8 \"earthquakes\" a day, last use of meth was \"a day or two ago\"  Denies SI/HI  Reports feeling anxious, panicking and weird at this time   Ht 1.727 m (5' 8\")   Wt 81.6 kg (180 lb)   BMI 27.37 kg/m²   "

## 2023-01-06 NOTE — ASSESSMENT & PLAN NOTE
With tremors and visual loose [place]  CIWA protocol   Continuous cardiac monitoring  Monitor electrolytes and replace accordingly, particularly magnesium, potassium and phosphorus  Fall, seizure, aspiration precautions.  Thiamine folic acid and multivitamin.

## 2023-01-06 NOTE — ED NOTES
[employer] information given to pt, all belongings with Pt  Pt aware has a place at [facility]  D/c pt home.   Aware of Rx sent to [facility]  Pt escorted out to [employer] vehicle  Aware to return for any changes

## 2023-01-06 NOTE — ED NOTES
Pt asking to go to any homeless shelter and needs transport. Leana notified in case management. Wait dispo, report to Andrew.

## 2023-01-06 NOTE — ED NOTES
Breakfast given, Dr. Coker at the bedside. Pt to be discharged.  called as this pt has no transportation. He is aware that he will be discharged.

## 2023-01-06 NOTE — ED NOTES
0300 am labs sent to lab.     Pt taken to the bathroom with standby assistance.     Pt in bed comfortably call bell within reach no further needs at this time.

## 2023-01-06 NOTE — ASSESSMENT & PLAN NOTE
Has fevers and chills  Started on vanco in the emergency room as the patient has history of IV drug use, continue for now follow on cultures and sensitivities

## 2023-01-06 NOTE — DISCHARGE PLANNING
note:  CM received a call from RN that pt needs to go to a shelter and needs transportation assistance.     CM called [employer] and pt does not have transportation benefits.       [employer] TRANSPORTATION SCHEDULED:      Estimated price $20.99  Automated calling enabled  Note to    at ER entrance  Jose Roberto  MRN [id number]  Kelvin Warren  4.97  [facility]  [id number]  Requested by Leana Stuart  Dispatch time: 9:12 AM [facility]  Trip ID:  [id number]      Notified JORDY King that pt pick is at [phone].  Going to [address]

## 2023-01-06 NOTE — ED NOTES
Seizure pads are in place, NS running, medications given and blood sent to lab  Placed on portable cardiac monitor

## 2023-01-06 NOTE — DISCHARGE SUMMARY
"Discharge Summary    CHIEF COMPLAINT ON ADMISSION  Chief Complaint   Patient presents with    Cough     BIB [facility] from [facility] for unproductive cough x 2 days, body aches      Detox     Called EMS d/t ETOH detox and methamphetamine withdrawal, here today to detox  States usually drinks 8 \"earthquakes\" a day, last use of meth was \"a day or two ago\"  Denies SI/HI  Reports feeling anxious, panicking and weird at this time       Reason for Admission  Cough     Admission Date  1/5/2023    CODE STATUS  Full Code    HPI & HOSPITAL COURSE  Mr. Gopal De Souza is a 37-year-old gentleman comes into the hospital planing generalized body aches and pains.  Patient is not vaccinated for the flu.  Patient has influenza A infection.  Patient is not requiring supplemental oxygen.  Vitals at this point are stable.  Patient does have a chronic use of alcohol and methamphetamine he uses methamphetamine twice daily alcohol about 8 times per day the patient says he is not sure if he wants to stop methamphetamine would like to stop alcohol.  At this point replacement thiamine folate acid multivitamin we have also added Librium to his treatment to help with withdrawals.  The patient otherwise is stable and can discharge home with outpatient follow-ups and monitoring.  He is alert awake oriented x3 and understands his discharge plan instructions.    Therefore, he is discharged in good and stable condition to home with close outpatient follow-up.    The patient recovered much more quickly than anticipated on admission.    Discharge Date  1/6/2023    FOLLOW UP ITEMS POST DISCHARGE  Follow-up with primary care physician 2 to 3 days    DISCHARGE DIAGNOSES  Principal Problem:    Alcohol dependence with withdrawal (HCC) POA: Yes  Active Problems:    Hyponatremia POA: Yes    Fever POA: Yes    IV drug user POA: Yes    Influenza A POA: Yes  Resolved Problems:    * No resolved hospital problems. *      FOLLOW UP  No future appointments.  No " follow-up provider specified.    MEDICATIONS ON DISCHARGE     Medication List        START taking these medications        Instructions   acetaminophen 325 MG Tabs  Commonly known as: Tylenol   Take 2 Tablets by mouth every 6 hours as needed for Fever or Mild Pain.  Dose: 650 mg     chlordiazepoxide 10 MG capsule  Commonly known as: LIBRIUM   Take 1 tablet three times daily for 5 days than take 1 tablet 2 times daily for 5 days than take 1 tablet daily for 5 days than stop     folic acid 1 MG Tabs  Start taking on: January 7, 2023  Commonly known as: FOLVITE   Take 1 Tablet by mouth every day.  Dose: 1 mg     multivitamin Tabs  Start taking on: January 7, 2023   Take 1 Tablet by mouth every day.  Dose: 1 Tablet     oseltamivir 75 MG Caps  Commonly known as: TAMIFLU   Take 1 Capsule by mouth 2 times a day for 8 doses.  Dose: 75 mg     thiamine 100 MG tablet  Start taking on: January 7, 2023  Commonly known as: THIAMINE   Take 1 Tablet by mouth every day.  Dose: 100 mg            CONTINUE taking these medications        Instructions   amphetamine-dextroamphetamine 25 MG XR capsule  Commonly known as: ADDERALL XR   Take 25 mg by mouth every day.  Dose: 25 mg     clonazePAM 1 MG Tabs  Commonly known as: KLONOPIN   Take 1 mg by mouth 2 times a day as needed. Indications: Feeling Anxious  Dose: 1 mg     DULoxetine 20 MG Cpep  Commonly known as: CYMBALTA   Take 20 mg by mouth every day.  Dose: 20 mg     propranolol 20 MG Tabs  Commonly known as: INDERAL   Take 20 mg by mouth 2 times a day.  Dose: 20 mg              Allergies  No Known Allergies    DIET  Orders Placed This Encounter   Procedures    Diet Order Diet: Regular     Standing Status:   Standing     Number of Occurrences:   1     Order Specific Question:   Diet:     Answer:   Regular [1]       ACTIVITY  As tolerated.  Weight bearing as tolerated    CONSULTATIONS  None    PROCEDURES  None    LABORATORY  Lab Results   Component Value Date    SODIUM 134 (L) 01/06/2023     POTASSIUM 4.5 01/06/2023    CHLORIDE 98 01/06/2023    CO2 20 01/06/2023    GLUCOSE 79 01/06/2023    BUN 9 01/06/2023    CREATININE 0.88 01/06/2023    GLOMRATE 98 11/01/2022        Lab Results   Component Value Date    WBC 7.1 01/06/2023    HEMOGLOBIN 15.1 01/06/2023    HEMATOCRIT 44.8 01/06/2023    PLATELETCT 216 01/06/2023        Total time of the discharge process exceeds 38 minutes.

## 2023-01-06 NOTE — H&P
"Hospital Medicine History & Physical Note    Date of Service  1/5/2023    Primary Care Physician  Pcp Pt States None    Consultants  None     Code Status  Full Code    Chief Complaint  Chief Complaint   Patient presents with    Cough     BIB [facility] from [facility] for unproductive cough x 2 days, body aches      Detox     Called EMS d/t ETOH detox and methamphetamine withdrawal, here today to detox  States usually drinks 8 \"earthquakes\" a day, last use of meth was \"a day or two ago\"  Denies SI/HI  Reports feeling anxious, panicking and weird at this time     History of Presenting Illness  Gopal Ceja is a 37 y.o. male with a past medical history of intravenous drug use, and alcohol dependence who presented 1/5/2023 with generalized weakness tremors and cough.  Patient reports that he has not been feeling well over the past 3 days.  Has been having progressively worsening generalized weakness and shortness of breath.  Shortness of breath is mostly with exertion, he also has cough but no sputum production.  He denies noticing any lower extremity pain redness or swelling.  He drinks alcohol up to 8 times a day and takes methamphetamine about 2 times a day.     I discussed the plan of care with emergency department physician, and the patient.    Review of Systems  Review of Systems   Constitutional:  Positive for chills, fever and malaise/fatigue.   Eyes:  Negative for discharge and redness.   Respiratory:  Positive for cough and shortness of breath. Negative for stridor.    Cardiovascular:  Negative for chest pain and leg swelling.   Gastrointestinal:  Negative for abdominal pain and vomiting.   Genitourinary:  Negative for flank pain.   Musculoskeletal:  Negative for myalgias.   Skin: Negative.    Neurological:  Positive for tremors. Negative for focal weakness.   Endo/Heme/Allergies:  Does not bruise/bleed easily.   Psychiatric/Behavioral:  Positive for hallucinations. The patient is nervous/anxious.  "     Past Medical History   has a past medical history of Acute anxiety, ADHD (attention deficit hyperactivity disorder), ADHD (attention deficit hyperactivity disorder), Alcohol abuse, Bipolar affective (HCC), Depression, Ectrodactyly-ectodermal dysplasia-clefting syndrome, ETOH abuse, and Psychiatric disorder.    Surgical History   has a past surgical history that includes other orthopedic surgery and pr exploratory of abdomen (N/A, 9/26/2019).     Family History  family history includes Hypertension in his mother.      Social History   reports that he has never smoked. He has never used smokeless tobacco. He reports current alcohol use. He reports current drug use. Drug: Inhaled.    Allergies  No Known Allergies    Medications  Prior to Admission Medications   Prescriptions Last Dose Informant Patient Reported? Taking?   DULoxetine (CYMBALTA) 20 MG Cap DR Particles  Patient Yes No   Sig: Take 20 mg by mouth every day.   amphetamine-dextroamphetamine (ADDERALL XR) 25 MG XR capsule  Patient Yes No   Sig: Take 25 mg by mouth every day.   clonazePAM (KLONOPIN) 1 MG Tab  Patient Yes No   Sig: Take 1 mg by mouth 2 times a day as needed. Indications: Feeling Anxious   propranolol (INDERAL) 20 MG Tab  Patient Yes No   Sig: Take 20 mg by mouth 2 times a day.      Facility-Administered Medications: None     Physical Exam  Temp:  [37.8 °C (100 °F)-38 °C (100.4 °F)] 37.8 °C (100 °F)  Pulse:  [104-118] 104  Resp:  [18-22] 18  BP: (114-141)/(58-92) 116/58  SpO2:  [92 %-98 %] 92 %  Blood Pressure: (!) 141/92   Temperature: 38 °C (100.4 °F)   Pulse: (!) 118   Respiration: (!) 22   Pulse Oximetry: 98 %     Physical Exam  Constitutional:       General: He is not in acute distress.     Appearance: He is ill-appearing. He is not diaphoretic.   HENT:      Head: Atraumatic.      Right Ear: External ear normal.      Left Ear: External ear normal.      Nose: No congestion or rhinorrhea.      Mouth/Throat:      Mouth: Mucous membranes are  moist.   Eyes:      General: No scleral icterus.        Right eye: No discharge.         Left eye: No discharge.      Pupils: Pupils are equal, round, and reactive to light.   Cardiovascular:      Rate and Rhythm: Normal rate and regular rhythm.   Pulmonary:      Effort: Pulmonary effort is normal.   Abdominal:      General: There is no distension.      Tenderness: There is no abdominal tenderness. There is no guarding.      Comments: Protuberant abdomen   Musculoskeletal:      Cervical back: Neck supple. No rigidity. No muscular tenderness.      Right lower leg: No edema.      Left lower leg: No edema.      Comments: Ankle monitor in place    Skin:     Coloration: Skin is not jaundiced or pale.   Neurological:      Mental Status: He is alert.      Coordination: Coordination abnormal.      Comments: Intermittently and variably oriented x1-3  Experiencing visual hallucinations of jumping monkeys   Psychiatric:      Comments: Anxious   Experiencing visual hallucinations       Laboratory:  Recent Labs     01/05/23 1925   WBC 11.9*   RBC 5.19   HEMOGLOBIN 16.1   HEMATOCRIT 47.0   MCV 90.6   MCH 31.0   MCHC 34.3   RDW 41.9   PLATELETCT 263   MPV 10.4     Recent Labs     01/05/23 1925   SODIUM 133*   POTASSIUM 4.1   CHLORIDE 94*   CO2 22   GLUCOSE 81   BUN 9   CREATININE 0.77   CALCIUM 9.4     Recent Labs     01/05/23 1925   ALTSGPT 16   ASTSGOT 32   ALKPHOSPHAT 132*   TBILIRUBIN 0.7   LIPASE 13   GLUCOSE 81         No results for input(s): NTPROBNP in the last 72 hours.      No results for input(s): TROPONINT in the last 72 hours.    Imaging:  DX-CHEST-PORTABLE (1 VIEW)   Final Result      No acute cardiopulmonary abnormality.           Assessment/Plan:  Justification for Admission Status  I anticipate this patient will require at least two midnights for appropriate medical management, necessitating inpatient admission because patient has alcohol withdrawal     * Alcohol dependence with withdrawal (HCC)- (present on  admission)  Assessment & Plan  With tremors and visual loose Nations  [facility] protocol   Continuous cardiac monitoring  Monitor electrolytes and replace accordingly, particularly magnesium, potassium and phosphorus  Fall, seizure, aspiration precautions.  Thiamine folic acid and multivitamin.     Influenza A- (present on admission)  Assessment & Plan  Started on Tamiflu     Fever- (present on admission)  Assessment & Plan  Secondary to Influenza  Started on Tamiflu      IV drug user- (present on admission)  Assessment & Plan  Has fevers and chills  Started on vanco in the emergency room as the patient has history of IV drug use, continue for now follow on cultures and sensitivities    Hyponatremia- (present on admission)  Assessment & Plan  Hypovolemic hyponatremia  I expect Na to improve with IVF     VTE prophylaxis: SCDs/TEDs and enoxaparin ppx

## 2023-01-06 NOTE — ED NOTES
Medication history updated per patient. Patient states he has not taken any of his home medications in at least a week. Patient denies recent antibiotics or over the counter medication use.    Jonathan Escobar, ElvinD, BCPS

## 2023-01-10 LAB
BACTERIA BLD CULT: NORMAL
BACTERIA BLD CULT: NORMAL
SIGNIFICANT IND 70042: NORMAL
SIGNIFICANT IND 70042: NORMAL
SITE SITE: NORMAL
SITE SITE: NORMAL
SOURCE SOURCE: NORMAL
SOURCE SOURCE: NORMAL

## 2023-01-22 ENCOUNTER — HOSPITAL ENCOUNTER (EMERGENCY)
Facility: MEDICAL CENTER | Age: 38
End: 2023-01-22
Attending: EMERGENCY MEDICINE
Payer: MEDICARE

## 2023-01-22 VITALS
OXYGEN SATURATION: 96 % | WEIGHT: 190.04 LBS | BODY MASS INDEX: 28.8 KG/M2 | TEMPERATURE: 98.6 F | DIASTOLIC BLOOD PRESSURE: 84 MMHG | RESPIRATION RATE: 16 BRPM | HEIGHT: 68 IN | SYSTOLIC BLOOD PRESSURE: 132 MMHG | HEART RATE: 98 BPM

## 2023-01-22 VITALS
RESPIRATION RATE: 16 BRPM | OXYGEN SATURATION: 97 % | HEART RATE: 77 BPM | HEIGHT: 68 IN | SYSTOLIC BLOOD PRESSURE: 119 MMHG | BODY MASS INDEX: 28.79 KG/M2 | DIASTOLIC BLOOD PRESSURE: 76 MMHG | WEIGHT: 190 LBS | TEMPERATURE: 98.1 F

## 2023-01-22 DIAGNOSIS — F10.920 ALCOHOLIC INTOXICATION WITHOUT COMPLICATION (HCC): ICD-10-CM

## 2023-01-22 DIAGNOSIS — F10.10 ALCOHOL ABUSE: ICD-10-CM

## 2023-01-22 DIAGNOSIS — F32.A CHRONIC DEPRESSION: ICD-10-CM

## 2023-01-22 LAB — POC BREATHALIZER: 0.15 PERCENT (ref 0–0.01)

## 2023-01-22 PROCEDURE — 99285 EMERGENCY DEPT VISIT HI MDM: CPT

## 2023-01-22 PROCEDURE — 99284 EMERGENCY DEPT VISIT MOD MDM: CPT

## 2023-01-22 PROCEDURE — 302970 POC BREATHALIZER: Performed by: EMERGENCY MEDICINE

## 2023-01-22 PROCEDURE — 302970 POC BREATHALIZER

## 2023-01-22 ASSESSMENT — FIBROSIS 4 INDEX
FIB4 SCORE: 1.395263150541595598
FIB4 SCORE: 1.395263150541595598

## 2023-01-22 NOTE — ED TRIAGE NOTES
"Chief Complaint   Patient presents with    Alcohol Intoxication     BIBA from [facility]  NP called EMS due to pt intoxication and inability to walk   Denies SI/HI, x1 vomit with EMS,      /78   Pulse 79   Temp 36.5 °C (97.7 °F)   Resp 16   Ht 1.727 m (5' 8\")   Wt 86.2 kg (190 lb)   SpO2 94%   BMI 28.89 kg/m²     "

## 2023-01-23 NOTE — ED NOTES
Pt aox4, vss, nad, ambulatory steady  Pt understood all dc info and when to seek medical care, no further questions  Pt did not want to wait for d/c paperwork to be printed   Bus pass given for transport

## 2023-01-23 NOTE — CONSULTS
[facility]   TRIAGE ASSESSMENT    Name: Gopal Ceja  MRN: [id number]  : 1985  Age: 37 y.o.  Date of assessment: 2023  PCP: Pcp Pt States None  Persons in attendance: Patient  Patient Location: [facility]    CHIEF COMPLAINT/PRESENTING ISSUE (as stated by Patient, ER RN, ERP):   Chief Complaint   Patient presents with    Suicidal Ideation     Pt endorsing SI, -HI    Detox     Pt wanting to detox from alcohol. Was seen in ER earlier today for alcohol intoxication. Endorses alcohol withdrawal complications. Last drink earlier today      Patient is a 38 y/o male, voluntary walk-in, second ER visit within 6 hours, intoxicated and now seeking detox. Breathalyzer upon arrival 0.149, endorsing suicidal ideation with a plan to jump off a bridge but has no intent,clearly goal oriented seeking assistance from [facility] with alcohol cessation. Extensive ER visit history for alcohol intoxication and withdrawal. Patient has a diagnose history of MDD, Anxiety, PTSD, ADHD     Patient able to safety plan with [facility] over to [facility] for alcohol detoxification.     CURRENT LIVING SITUATION/SOCIAL SUPPORT/FINANCIAL RESOURCES: EMR documents 13 yo son which he does not have custody of.     BEHAVIORAL HEALTH/SUBSTANCE USE TREATMENT HISTORY  Does patient/parent report a history of prior behavioral health/substance use treatment for patient?   Yes:    Dates Level of Care Facilty/Provider Diagnosis/  Problem Medications   10/2022 [facility] Psychosis  ETOH Detox                  2019 [facility]            2015 [facility]            Multiple admits ETOH Detox [facility]                       SAFETY ASSESSMENT - SELF  Does patient acknowledge current or past symptoms of dangerousness to self or is previous history noted? Yes; EMR documents Multiple Previous Attempts  Does parent/significant other report patient has current or past  "symptoms of dangerousness to self? N\A  Does presenting problem suggest symptoms of dangerousness to self? No    SAFETY ASSESSMENT - OTHERS  Does patient acknowledge current or past symptoms of aggressive behavior or risk to others or is previous history noted? no  Does parent/significant other report patient has current or past symptoms of aggressive behavior or risk to others?  N\A  Does presenting problem suggest symptoms of dangerousness to others? No    LEGAL HISTORY  Does patient acknowledge history of arrest/shelter/FDC or is previous history noted? no    Crisis Safety Plan completed and copy given to patient? N\A    ABUSE/NEGLECT SCREENING  Does patient report feeling “unsafe” in his/her home, or afraid of anyone?  no  Does patient report any history of physical, sexual, or emotional abuse?  yes  Does parent or significant other report any of the above? N\A  Is there evidence of neglect by self?  no  Is there evidence of neglect by a caregiver? no  Does the patient/parent report any history of CPS/APS/police involvement related to suspected abuse/neglect or domestic violence? no  Based on the information provided during the current assessment, is a mandated report of suspected abuse/neglect being made?  No    SUBSTANCE USE SCREENING  Yes:  Evan all substances used in the past 30 days:      Last Use Amount   [x]   Alcohol 1/22/23    []   Marijuana     []   Heroin     []   Prescription Opioids  (used without prescription, for    recreation, or in excess of prescribed amount)     []   Other Prescription  (used without prescription, for    recreation, or in excess of prescribed amount)     []   Cocaine      [x]   Methamphetamine 1/21/23    []   \"\" drugs (ectasy, MDMA)     []   Other substances        UDS results: pending  Breathalyzer results: 0.149,     What consequences does the patient associate with any of the above substance use and or addictive behaviors? Health problems: Alcohol addiction     Risk " factors for detox (check all that apply):  [x]  Seizures   []  Diaphoretic (sweating)   [x]  Tremors   []  Hallucinations   []  Increased blood pressure   []  Decreased blood pressure   []  Other   []  None      [x] Patient education on risk factors for detoxification and instructed to return to ER as needed.      Source:  [] Significant other present in person:   [] Significant other by telephone  [] [facility]   [x] [facility] Nursing Staff  [x] [facility] Record  [x] Other: ERP    [] Unable to complete full assessment due to:  [] Acute intoxication  [] Patient declined to participate/engage  [] Patient verbally unresponsive  [] Significant cognitive deficits  [] Significant perceptual distortions or behavioral disorganization  [x] Other: N/A     CLINICAL IMPRESSIONS:  Primary:  alcohol intoxication, depression   Secondary:  Alcohol Use, Homelessness       IDENTIFIED NEEDS/PLAN:  [Trigger DISPOSITION list for any items marked]    []  Imminent safety risk - self [] Imminent safety risk - others   []  Acute substance withdrawal []  Psychosis/Impaired reality testing   [x]  Mood/anxiety [x]  Substance use/Addictive behavior   []  Maladaptive behaviro []  Parent/child conflict   []  Family/Couples conflict []  Biomedical   [x]  Housing [x]  Financial   []   Legal  Occupational/Educational   []  Domestic violence []  Other:     Recommended Plan of Care:  Refer to [facility] T[facility]  With [facility]    Has the Recommended Plan of Care/Level of Observation been reviewed with the patient's assigned nurse? yes    Does patient/parent or guardian express agreement with the above plan? yes    Referral appointment(s) scheduled? N\A    Alert team only: Patient able to safety plan with [facility] over to [facility] for alcohol detoxification.   I have discussed findings and recommendations with Dr. Shelley who is in agreement with these recommendations.     Referral information sent to the following  outpatient community providers : -[facility]    Referral information sent to the following inpatient community providers : [facility]      Jenniffer Penny R.N.  1/22/2023

## 2023-01-23 NOTE — ED PROVIDER NOTES
ED Provider Note    CHIEF COMPLAINT  Chief Complaint   Patient presents with    Alcohol Intoxication     BIBA from [facility]  NP called EMS due to pt intoxication and inability to walk   Denies SI/HI, x1 vomit with EMS,        EXTERNAL RECORDS REVIEWED  Reviewed recent ER visits, outpatient evaluation and laboratory studies    HPI/ROS  LIMITATION TO HISTORY   None  OUTSIDE HISTORIAN(S):  None    Gopal Ceja is a 37 y.o. male who presents for evaluation of alcohol intoxication.  The patient is very well-known to this facility for recurrent alcohol intoxication.  The patient arrived he was apparently too intoxicated to ambulate.  He denies any injury or trauma to the head neck chest abdomen or pelvis.  No change in any medications no fevers chills or productive cough.  He denies any abdominal pain.    PAST MEDICAL HISTORY   has a past medical history of Acute anxiety, ADHD (attention deficit hyperactivity disorder), ADHD (attention deficit hyperactivity disorder), Alcohol abuse, Bipolar affective (HCC), Depression, Ectrodactyly-ectodermal dysplasia-clefting syndrome, ETOH abuse, and Psychiatric disorder.    SURGICAL HISTORY   has a past surgical history that includes other orthopedic surgery and exploratory of abdomen (N/A, 9/26/2019).    FAMILY HISTORY  Family History   Problem Relation Age of Onset    Hypertension Mother     Heart Disease Neg Hx     Hyperlipidemia Neg Hx        SOCIAL HISTORY  Social History     Tobacco Use    Smoking status: Never    Smokeless tobacco: Never    Tobacco comments:     Smokes couple of times a month   Vaping Use    Vaping Use: Never used   Substance and Sexual Activity    Alcohol use: Yes     Comment: last drink today    Drug use: Yes     Types: Inhaled     Comment: meth yesterday (smokes Meth)    Sexual activity: Not on file       CURRENT MEDICATIONS  Home Medications    **Home medications have not yet been reviewed for this encounter**     No current  "facility-administered medications for this encounter.    Current Outpatient Medications:     acetaminophen (TYLENOL) 325 MG Tab, Take 2 Tablets by mouth every 6 hours as needed for Fever or Mild Pain., Disp: 100 Tablet, Rfl: 0    thiamine (THIAMINE) 100 MG tablet, Take 1 Tablet by mouth every day., Disp: 30 Tablet, Rfl: 1    multivitamin Tab, Take 1 Tablet by mouth every day., Disp: 30 Tablet, Rfl: 1    folic acid (FOLVITE) 1 MG Tab, Take 1 Tablet by mouth every day., Disp: 30 Tablet, Rfl: 1    amphetamine-dextroamphetamine (ADDERALL XR) 25 MG XR capsule, Take 25 mg by mouth every day., Disp: , Rfl:     DULoxetine (CYMBALTA) 20 MG Cap DR Particles, Take 20 mg by mouth every day., Disp: , Rfl:     clonazePAM (KLONOPIN) 1 MG Tab, Take 1 mg by mouth 2 times a day as needed. Indications: Feeling Anxious, Disp: , Rfl:     propranolol (INDERAL) 20 MG Tab, Take 20 mg by mouth 2 times a day., Disp: , Rfl:       ALLERGIES  No Known Allergies    PHYSICAL EXAM  VITAL SIGNS: /76   Pulse 81   Temp 36.5 °C (97.7 °F)   Resp 16   Ht 1.727 m (5' 8\")   Wt 86.2 kg (190 lb)   SpO2 99%   BMI 28.89 kg/m²    Pulse ox interpretation: I interpret this pulse ox as normal.  Constitutional: Alert and oriented x 3, no Distress disheveled smells of alcohol  HEENT: Atraumatic normocephalic, pupils are equal round reactive to light extraocular movements are intact. The nares is clear, external ears are normal, mouth shows moist mucous membranes normal dentition for age  Neck: Supple, no JVD no tracheal deviation  Cardiovascular: Regular rate and rhythm no murmur rub or gallop 2+ pulses peripherally x4  Thorax & Lungs: No respiratory distress, no wheezes rales or rhonchi, No chest tenderness.   GI: Soft nontender nondistended positive bowel sounds, no peritoneal signs  Skin: Warm dry no acute rash or lesion  Musculoskeletal: Moving all extremities with full range and 5 of 5 strength no acute  deformity  Neurologic: Cranial nerves III " through XII are grossly intact no sensory deficit no cerebellar dysfunction   Psychiatric: Appropriate affect for situation at this time            LABS  None performed        COURSE & MEDICAL DECISION MAKING        INITIAL ASSESSMENT AND PLAN  Care Narrative: Patient was observed for around 2 hours.  I reviewed his records and I have seen this patient on several occasions in the past.  At approximately 1615 I reevaluated the patient.  He is alert and oriented x4.  He was ambulatory without any ataxia and clinically appears sober.  I felt it was appropriate for him to be discharged to his own care.    ADDITIONAL PROBLEM LIST AND DISPOSITION      FINAL DIAGNOSIS  1. Alcoholic intoxication without complication (HCC)                     Electronically signed by: Brian Champagne M.D., 1/22/2023 4:14 PM

## 2023-01-23 NOTE — ED NOTES
Patient given discharge instructions and verbalized understanding appropriately, denies any further questions or concerns at this time. Patient is alert and oriented and ambulates with a steady gait. Discharged to self with transport to [facility] for alcohol detox.

## 2023-01-23 NOTE — ED PROVIDER NOTES
ED Provider Note    CHIEF COMPLAINT  Chief Complaint   Patient presents with    Suicidal Ideation     Pt endorsing SI, -HI    Detox     Pt wanting to detox from alcohol. Was seen in ER earlier today for alcohol intoxication. Endorses alcohol withdrawal complications. Last drink earlier today       EXTERNAL RECORDS REVIEWED  Inpatient Notes multiple ER records; etoh     HPI/ROS  LIMITATION TO HISTORY   Select: Intoxication  OUTSIDE HISTORIAN(S):  none    Gopal Ceja is a 37 y.o. male who presents back to the emergency department for recurrent alcohol intoxication.  Patient was seen here earlier today as per patient record and also chart review.  Patient well-known to myself and to the ER for multiple recurrent evaluations for chronic recurrent depression, suicidality and alcohol intoxication.      He explains that earlier today he was here and at this point is hoping to get further assistance with alcohol detox.      While he does endorse suicidality this is chronic and no acute plan.    PAST MEDICAL HISTORY   has a past medical history of Acute anxiety, ADHD (attention deficit hyperactivity disorder), ADHD (attention deficit hyperactivity disorder), Alcohol abuse, Bipolar affective (HCC), Depression, Ectrodactyly-ectodermal dysplasia-clefting syndrome, ETOH abuse, and Psychiatric disorder.    SURGICAL HISTORY   has a past surgical history that includes other orthopedic surgery and exploratory of abdomen (N/A, 9/26/2019).    FAMILY HISTORY  Family History   Problem Relation Age of Onset    Hypertension Mother     Heart Disease Neg Hx     Hyperlipidemia Neg Hx        SOCIAL HISTORY  Social History     Tobacco Use    Smoking status: Never    Smokeless tobacco: Never    Tobacco comments:     Smokes couple of times a month   Vaping Use    Vaping Use: Never used   Substance and Sexual Activity    Alcohol use: Yes     Comment: last drink today    Drug use: Yes     Types: Inhaled     Comment: meth yesterday (smokes  "Meth)    Sexual activity: Not on file       CURRENT MEDICATIONS  Home Medications    **Home medications have not yet been reviewed for this encounter**         ALLERGIES  No Known Allergies    PHYSICAL EXAM  VITAL SIGNS: /84   Pulse 98   Temp 37 °C (98.6 °F)   Resp 16   Ht 1.727 m (5' 8\")   Wt 86.2 kg (190 lb 0.6 oz)   SpO2 96%   BMI 28.89 kg/m²        PHYSICAL EXAM  VITAL SIGNS: /84   Pulse 98   Temp 37 °C (98.6 °F)   Resp 16   Ht 1.727 m (5' 8\")   Wt 86.2 kg (190 lb 0.6 oz)   SpO2 96%   BMI 28.89 kg/m²  @STEPHON[[id number]::@   Pulse ox interpretation: I interpret this pulse ox as normal.  Constitutional: Alert in no apparent distress.  HENT: No signs of trauma, Bilateral external ears normal, Nose normal.   Eyes: Pupils are equal and reactive  Neck: Normal range of motion, No tenderness, Supple  Cardiovascular: Regular rate and rhythm, no murmurs.   Thorax & Lungs: Normal breath sounds, No respiratory distress, No wheezing, No chest tenderness.   Abdomen: Bowel sounds normal, Soft, No tenderness  Skin: Warm, Dry, No erythema, No rash.   Extremities: Intact distal pulses  Musculoskeletal: Good range of motion in all major joints. No tenderness to palpation or major deformities noted.   Neurologic: Alert , Normal motor function, Normal sensory function, sl slurred speech  Psychiatric: Affect is depressed. Chronically suicidal; no plan to harm self         DIAGNOSTIC STUDIES / PROCEDURES    LABS  Results for orders placed or performed during the hospital encounter of 01/22/23   POC BREATHALIZER   Result Value Ref Range    POC Breathalizer 0.149 (A) 0.00 - 0.01 Percent           COURSE & MEDICAL DECISION MAKING    ED Observation Status? No; Patient does not meet criteria for ED Observation.     INITIAL ASSESSMENT AND PLAN  Care Narrative: Patient presented back to the emerge department for persistent alcohol intoxication and desire for detox.  Patient has been evaluated by peers support and " behavioral services.  Patient will be transferred from the ER over to [facility] for outpatient alcohol cessation program.    ADDITIONAL PROBLEM LIST AND DISPOSITION  See above    Discussion of management with other HP or appropriate source(s):.  Peer recovery and behavioral health services    Escalation of care considered, and ultimately not performed:blood analysis and acute inpatient care management, however at this time, the patient is most appropriate for outpatient management    Barriers to care at this time, including but not limited to: Patient does not have established PCP and Patient is homeless.     Decision tools and prescription drugs considered including, but not limited to:  CIWA .          FINAL DIAGNOSIS  1. Alcoholic intoxication without complication (HCC)    2. Alcohol abuse    3. Chronic depression               Electronically signed by: Brian Shelley M.D., 1/22/2023 9:46 PM

## 2023-01-23 NOTE — ED NOTES
Pt's propranolol was found in room after discharge   Placed in pharmacy bag and walked down to pharmacy

## 2023-01-23 NOTE — ED TRIAGE NOTES
"Chief Complaint   Patient presents with    Suicidal Ideation     Pt endorsing SI, -HI    Detox     Pt wanting to detox from alcohol. Was seen in ER earlier today for alcohol intoxication. Endorses alcohol withdrawal complications. Last drink earlier today     /85   Pulse 96   Temp 36.6 °C (97.9 °F) (Temporal)   Resp 18   Ht 1.727 m (5' 8\")   Wt 86.2 kg (190 lb 0.6 oz)   SpO2 98%   BMI 28.89 kg/m²     Pt here for above cc's  Endorses a plan to jump off a building to hurt himself   Denies HI  Recent ED visit today for intoxication, multiple ED visits r/t alcohol intoxication  Also wants to detox, long hx of chronic ETOH    Charge RN notified   "

## 2023-01-27 ENCOUNTER — HOSPITAL ENCOUNTER (EMERGENCY)
Facility: MEDICAL CENTER | Age: 38
End: 2023-01-28
Attending: STUDENT IN AN ORGANIZED HEALTH CARE EDUCATION/TRAINING PROGRAM
Payer: MEDICARE

## 2023-01-27 PROCEDURE — 99284 EMERGENCY DEPT VISIT MOD MDM: CPT

## 2023-01-27 PROCEDURE — 700111 HCHG RX REV CODE 636 W/ 250 OVERRIDE (IP): Performed by: STUDENT IN AN ORGANIZED HEALTH CARE EDUCATION/TRAINING PROGRAM

## 2023-01-27 RX ORDER — ONDANSETRON 4 MG/1
4 TABLET, ORALLY DISINTEGRATING ORAL ONCE
Status: COMPLETED | OUTPATIENT
Start: 2023-01-27 | End: 2023-01-27

## 2023-01-27 RX ADMIN — ONDANSETRON 4 MG: 4 TABLET, ORALLY DISINTEGRATING ORAL at 21:00

## 2023-01-27 ASSESSMENT — FIBROSIS 4 INDEX: FIB4 SCORE: 1.395263150541595598

## 2023-01-28 VITALS
BODY MASS INDEX: 28.8 KG/M2 | TEMPERATURE: 98.2 F | SYSTOLIC BLOOD PRESSURE: 120 MMHG | WEIGHT: 190.04 LBS | HEART RATE: 95 BPM | DIASTOLIC BLOOD PRESSURE: 77 MMHG | HEIGHT: 68 IN | OXYGEN SATURATION: 91 % | RESPIRATION RATE: 16 BRPM

## 2023-01-28 NOTE — ED NOTES
Pt AOx4 and ready for education. All discharge instructions given to pt. Pt verbalized understanding of all discharge instructions. All lines removed prior to discharge. All questions answered. Pt to lobby via ambulation. Pt provided jacket

## 2023-01-28 NOTE — ED TRIAGE NOTES
Chief Complaint   Patient presents with    Alcohol Intoxication     BIB EMS for ETOH abuse, pt's friend called EMS after pt drank a fifth of liquor. EMS reports pt just got out of [facility] and has been seen here for similar complaint     Pt ambulatory to triage for above complaint. FSBG 112. Pt AOx3, not oriented to time     Pt is alert/oriented and follows commands. Pt speaking in full sentences and responds appropriately to questions. No acute distress noted in triage and respirations are even and unlabored.     Pt placed in lobby and educated on triage process. Pt encouraged to alert staff for any changes in condition.

## 2023-01-28 NOTE — ED PROVIDER NOTES
ED Provider Note    CHIEF COMPLAINT  Chief Complaint   Patient presents with    Alcohol Intoxication     BIB EMS for ETOH abuse, pt's friend called EMS after pt drank a fifth of liquor. [facility] and has been seen here for similar complaint       EXTERNAL RECORDS REVIEWED  EMS run sheet -    HPI/ROS  LIMITATION TO HISTORY   Select: Intoxication  OUTSIDE HISTORIAN(S):  EMS report history of alcohol intoxication    Gopal Ceja is a 37 y.o. male who presents was brought in for evaluation of alcohol intoxication.  Patient has a history of heavy alcohol use frequently seen in the emergency department for alcohol intoxication.  This evening he drank 1/5 of vodka had an episode of vomiting, he was brought here for evaluation.  Currently he is complaining of nausea andabdominal pain.  He has no other complaints    PAST MEDICAL HISTORY   has a past medical history of Acute anxiety, ADHD (attention deficit hyperactivity disorder), ADHD (attention deficit hyperactivity disorder), Alcohol abuse, Bipolar affective (HCC), Depression, Ectrodactyly-ectodermal dysplasia-clefting syndrome, ETOH abuse, and Psychiatric disorder.    SURGICAL HISTORY   has a past surgical history that includes other orthopedic surgery and exploratory of abdomen (N/A, 9/26/2019).    FAMILY HISTORY  Family History   Problem Relation Age of Onset    Hypertension Mother     Heart Disease Neg Hx     Hyperlipidemia Neg Hx        SOCIAL HISTORY  Social History     Tobacco Use    Smoking status: Never    Smokeless tobacco: Never    Tobacco comments:     Smokes couple of times a month   Vaping Use    Vaping Use: Never used   Substance and Sexual Activity    Alcohol use: Yes     Comment: last drink today    Drug use: Yes     Types: Inhaled     Comment: meth yesterday (smokes Meth)    Sexual activity: Not on file       CURRENT MEDICATIONS  Home Medications       Reviewed by Vikas Sepulveda R.N. (Registered Nurse) on 01/27/23 at  "2019  Med List Status: <None>     Medication Last Dose Status   acetaminophen (TYLENOL) 325 MG Tab  Active   amphetamine-dextroamphetamine (ADDERALL XR) 25 MG XR capsule  Active   clonazePAM (KLONOPIN) 1 MG Tab  Active   DULoxetine (CYMBALTA) 20 MG Cap DR Particles  Active   folic acid (FOLVITE) 1 MG Tab  Active   multivitamin Tab  Active   propranolol (INDERAL) 20 MG Tab  Active   thiamine (THIAMINE) 100 MG tablet  Active                    ALLERGIES  No Known Allergies    PHYSICAL EXAM  VITAL SIGNS: /77   Pulse (!) 106   Temp 36.7 °C (98.1 °F) (Temporal)   Resp 16   Ht 1.727 m (5' 8\")   Wt 86.2 kg (190 lb 0.6 oz)   SpO2 93%   BMI 28.90 kg/m²      Pulse ox interpretation: I interpret this pulse ox as normal.  VITALS - vital signs documented prior to this note have been reviewed and noted,  see EHR  GENERAL -disheveled poor hygiene otherwise awake and alert, no acute distress  HEENT - normocephalic, atraumatic, moist mucus membranes  CARDIOVASCULAR - regular rate and rhythm  PULMONARY - unlabored, no respiratory distress. No audible wheezing or  stridor.  GASTROINTESTINAL - non-tender, non-distended no rebound guarding or peritonitis  NEUROLOGIC - mental status normal, speech fluid, cognition normal  MUSCULOSKELETAL -no obvious deformity or swelling  DERMATOLOGIC - warm and dry, no visible rashes  PSYCHIATRIC - normal affect, normal concentration    DIAGNOSTIC STUDIES / PROCEDURES      COURSE & MEDICAL DECISION MAKING    ED Observation Status? Yes; I am placing the patient in to an observation status due to a diagnostic uncertainty as well as therapeutic intensity. Patient placed in observation status at 8:40 PM, 1/27/2023.     Observation plan is as follows: Observation for clinical sobriety.  Consult peer recovery    Reevaluation at 0 252 patient is now awake alert answering questions appropriately is ambulatory with a steady gait he is requesting to be discharged and does not wish to see peer " recovery    Upon Reevaluation, the patient's condition has: Improved; and will be discharged.    Patient discharged from ED Observation status at 0252 (Time) 1/28/23 (Date).     INITIAL ASSESSMENT, COURSE AND PLAN  Care Narrative:          Patient presented for evaluation of intoxication.  Differential includes not limited to alcohol intoxication alcohol withdrawal dehydration and then many other considerations patient is s awake alert answering questions appropriatel he is complaining of some nausea was given a dose of Zofran for this.  Benign abdominal exam.  Believe his nausea is likely secondary to his heavy alcohol ingestion. No signs of acute withdrawal on exam at this point history and physical exam seems consistent with acute uncomplicated alcohol intoxication.. Peer recovery was consulted was planning to evaluate once clinically sober/ the However after achieving clinical sobriety the patient did not want to be evaluated and was requesting to be discharged from the emergency department he was discharged in improved condition.  Patient physical exam does seem consistent with acute uncomplicated alcohol intoxication.          ADDITIONAL PROBLEM LIST  1 Alcohol intoxication.  Resources consulted  2.  Homelessness  #3 nausea vomiting Zofran 0 abdominal exams p.o. challenge  DISPOSITION AND DISCUSSIONS  I have discussed management of the patient with the following physicians and BRUNO's:  none    Discussion of management with other [facility] or appropriate source(s): Behavioral Health        Escalation of care considered, and ultimately not performed:IV fluids, blood analysis, and diagnostic imaging    Barriers to care at this time, including but not limited to: Patient is homeless.     Decision tools and prescription drugs considered including, but not limited to:  antiemetics .    FINAL DIAGNOSIS  #1 acute alcohol intoxication  2.  Nausea vomiting    Electronically signed by: Maco Yadav D.O., 1/27/2023 8:44  PM

## 2023-03-08 ENCOUNTER — HOSPITAL ENCOUNTER (EMERGENCY)
Facility: MEDICAL CENTER | Age: 38
End: 2023-03-09
Attending: EMERGENCY MEDICINE
Payer: MEDICARE

## 2023-03-08 ENCOUNTER — HOSPITAL ENCOUNTER (EMERGENCY)
Facility: MEDICAL CENTER | Age: 38
End: 2023-03-08
Attending: EMERGENCY MEDICINE
Payer: MEDICARE

## 2023-03-08 VITALS
OXYGEN SATURATION: 93 % | HEART RATE: 103 BPM | BODY MASS INDEX: 28.89 KG/M2 | SYSTOLIC BLOOD PRESSURE: 159 MMHG | WEIGHT: 190 LBS | TEMPERATURE: 98 F | DIASTOLIC BLOOD PRESSURE: 90 MMHG | RESPIRATION RATE: 16 BRPM

## 2023-03-08 DIAGNOSIS — E86.0 DEHYDRATION: ICD-10-CM

## 2023-03-08 DIAGNOSIS — F10.939 ALCOHOL WITHDRAWAL SYNDROME WITH COMPLICATION (HCC): Primary | ICD-10-CM

## 2023-03-08 DIAGNOSIS — F10.29 ALCOHOL DEPENDENCE WITH UNSPECIFIED ALCOHOL-INDUCED DISORDER (HCC): ICD-10-CM

## 2023-03-08 DIAGNOSIS — F10.10 ALCOHOL ABUSE: ICD-10-CM

## 2023-03-08 DIAGNOSIS — R74.01 TRANSAMINITIS: ICD-10-CM

## 2023-03-08 DIAGNOSIS — F15.10 METHAMPHETAMINE ABUSE (HCC): ICD-10-CM

## 2023-03-08 DIAGNOSIS — F10.920 ALCOHOLIC INTOXICATION WITHOUT COMPLICATION (HCC): ICD-10-CM

## 2023-03-08 LAB
ALBUMIN SERPL BCP-MCNC: 4.4 G/DL (ref 3.2–4.9)
ALBUMIN/GLOB SERPL: 1 G/DL
ALP SERPL-CCNC: 119 U/L (ref 30–99)
ALT SERPL-CCNC: 40 U/L (ref 2–50)
AMPHET UR QL SCN: NEGATIVE
ANION GAP SERPL CALC-SCNC: 21 MMOL/L (ref 7–16)
AST SERPL-CCNC: 69 U/L (ref 12–45)
BARBITURATES UR QL SCN: NEGATIVE
BASOPHILS # BLD AUTO: 0.3 % (ref 0–1.8)
BASOPHILS # BLD: 0.03 K/UL (ref 0–0.12)
BENZODIAZ UR QL SCN: NEGATIVE
BILIRUB SERPL-MCNC: 0.5 MG/DL (ref 0.1–1.5)
BUN SERPL-MCNC: 11 MG/DL (ref 8–22)
BZE UR QL SCN: NEGATIVE
CALCIUM ALBUM COR SERPL-MCNC: 8.8 MG/DL (ref 8.5–10.5)
CALCIUM SERPL-MCNC: 9.1 MG/DL (ref 8.4–10.2)
CANNABINOIDS UR QL SCN: NEGATIVE
CHLORIDE SERPL-SCNC: 93 MMOL/L (ref 96–112)
CO2 SERPL-SCNC: 23 MMOL/L (ref 20–33)
CREAT SERPL-MCNC: 0.82 MG/DL (ref 0.5–1.4)
EKG IMPRESSION: NORMAL
EOSINOPHIL # BLD AUTO: 0.03 K/UL (ref 0–0.51)
EOSINOPHIL NFR BLD: 0.3 % (ref 0–6.9)
ERYTHROCYTE [DISTWIDTH] IN BLOOD BY AUTOMATED COUNT: 43 FL (ref 35.9–50)
ETHANOL BLD-MCNC: 381.8 MG/DL
GFR SERPLBLD CREATININE-BSD FMLA CKD-EPI: 116 ML/MIN/1.73 M 2
GLOBULIN SER CALC-MCNC: 4.4 G/DL (ref 1.9–3.5)
GLUCOSE SERPL-MCNC: 99 MG/DL (ref 65–99)
HCT VFR BLD AUTO: 52.3 % (ref 42–52)
HGB BLD-MCNC: 17.9 G/DL (ref 14–18)
IMM GRANULOCYTES # BLD AUTO: 0.02 K/UL (ref 0–0.11)
IMM GRANULOCYTES NFR BLD AUTO: 0.2 % (ref 0–0.9)
LYMPHOCYTES # BLD AUTO: 2.23 K/UL (ref 1–4.8)
LYMPHOCYTES NFR BLD: 23.9 % (ref 22–41)
MCH RBC QN AUTO: 30.7 PG (ref 27–33)
MCHC RBC AUTO-ENTMCNC: 34.2 G/DL (ref 33.7–35.3)
MCV RBC AUTO: 89.7 FL (ref 81.4–97.8)
METHADONE UR QL SCN: NEGATIVE
MONOCYTES # BLD AUTO: 0.55 K/UL (ref 0–0.85)
MONOCYTES NFR BLD AUTO: 5.9 % (ref 0–13.4)
NEUTROPHILS # BLD AUTO: 6.49 K/UL (ref 1.82–7.42)
NEUTROPHILS NFR BLD: 69.4 % (ref 44–72)
NRBC # BLD AUTO: 0 K/UL
NRBC BLD-RTO: 0 /100 WBC
OPIATES UR QL SCN: NEGATIVE
OXYCODONE UR QL SCN: NEGATIVE
PCP UR QL SCN: NEGATIVE
PLATELET # BLD AUTO: 249 K/UL (ref 164–446)
PMV BLD AUTO: 10.3 FL (ref 9–12.9)
POC BREATHALIZER: 0.28 PERCENT (ref 0–0.01)
POTASSIUM SERPL-SCNC: 4.4 MMOL/L (ref 3.6–5.5)
PROPOXYPH UR QL SCN: NEGATIVE
PROT SERPL-MCNC: 8.8 G/DL (ref 6–8.2)
RBC # BLD AUTO: 5.83 M/UL (ref 4.7–6.1)
SODIUM SERPL-SCNC: 137 MMOL/L (ref 135–145)
WBC # BLD AUTO: 9.4 K/UL (ref 4.8–10.8)

## 2023-03-08 PROCEDURE — 80307 DRUG TEST PRSMV CHEM ANLYZR: CPT

## 2023-03-08 PROCEDURE — 36415 COLL VENOUS BLD VENIPUNCTURE: CPT

## 2023-03-08 PROCEDURE — 96372 THER/PROPH/DIAG INJ SC/IM: CPT

## 2023-03-08 PROCEDURE — 99284 EMERGENCY DEPT VISIT MOD MDM: CPT

## 2023-03-08 PROCEDURE — 80053 COMPREHEN METABOLIC PANEL: CPT

## 2023-03-08 PROCEDURE — 700111 HCHG RX REV CODE 636 W/ 250 OVERRIDE (IP): Performed by: EMERGENCY MEDICINE

## 2023-03-08 PROCEDURE — 85025 COMPLETE CBC W/AUTO DIFF WBC: CPT

## 2023-03-08 PROCEDURE — 99285 EMERGENCY DEPT VISIT HI MDM: CPT

## 2023-03-08 PROCEDURE — 302970 POC BREATHALIZER: Performed by: EMERGENCY MEDICINE

## 2023-03-08 PROCEDURE — 94760 N-INVAS EAR/PLS OXIMETRY 1: CPT

## 2023-03-08 PROCEDURE — 93005 ELECTROCARDIOGRAM TRACING: CPT | Performed by: EMERGENCY MEDICINE

## 2023-03-08 PROCEDURE — 82077 ASSAY SPEC XCP UR&BREATH IA: CPT

## 2023-03-08 RX ORDER — LORAZEPAM 2 MG/ML
2 INJECTION INTRAMUSCULAR ONCE
Status: COMPLETED | OUTPATIENT
Start: 2023-03-08 | End: 2023-03-08

## 2023-03-08 RX ADMIN — LORAZEPAM 2 MG: 2 INJECTION INTRAMUSCULAR; INTRAVENOUS at 21:16

## 2023-03-08 ASSESSMENT — FIBROSIS 4 INDEX
FIB4 SCORE: 1.395263150541595598
FIB4 SCORE: 1.395263150541595598

## 2023-03-08 NOTE — ED TRIAGE NOTES
Pt to rm g31 .  Chief Complaint   Patient presents with    Detox     Pt requesting help to detox from etoh/meth.

## 2023-03-08 NOTE — ED NOTES
[facility] ask [facility] with [facility] intervention however they state pt does not have any warrants at this time and to refer to other programs locally

## 2023-03-08 NOTE — ED PROVIDER NOTES
ER Provider Note    Scribed for Naeem Santana M.d. by Adri Tarango. 3/8/2023  11:33 AM    Primary Care Provider: Pcp Pt States None    CHIEF COMPLAINT  Chief Complaint   Patient presents with    Detox     Pt requesting help to detox from etoh/meth.      EXTERNAL RECORDS REVIEWED  Inpatient Notes show that the patient has been here on numerous occasions for alcohol-related complaints    HPI/ROS  LIMITATION TO HISTORY   Select: Poor historian  OUTSIDE HISTORIAN(S):  EMS      Gopal Ceja is a 37 y.o. male with a history of Psychiatric Disorder and Alcohol Abuse who presents to the ED via EMS for help detoxing. Patient states that he would like to detox from alcohol and methamphetamine. Patient is a poor historian, and is uncooperative during exam and history taking. No known drug allergies.     PAST MEDICAL HISTORY  Past Medical History:   Diagnosis Date    Acute anxiety     ADHD (attention deficit hyperactivity disorder)     ADHD (attention deficit hyperactivity disorder)     Alcohol abuse     Bipolar affective (HCC)     Depression     Ectrodactyly-ectodermal dysplasia-clefting syndrome     ETOH abuse     Psychiatric disorder     anxiety/panic disorder       SURGICAL HISTORY  Past Surgical History:   Procedure Laterality Date    HI EXPLORATORY OF ABDOMEN N/A 9/26/2019    Procedure: LAPAROTOMY, EXPLORATORY;  Surgeon: Jevon Llanes M.D.;  Location: SURGERY [facility];  Service: General    OTHER ORTHOPEDIC SURGERY      hands bilaterally r/t EEDC syndrome       FAMILY HISTORY  Family History   Problem Relation Age of Onset    Hypertension Mother     Heart Disease Neg Hx     Hyperlipidemia Neg Hx        SOCIAL HISTORY   reports that he has never smoked. He has never used smokeless tobacco. He reports current alcohol use. He reports current drug use. Drug: Inhaled.    CURRENT MEDICATIONS  Previous Medications    ACETAMINOPHEN (TYLENOL) 325 MG TAB    Take 2 Tablets by mouth every 6 hours as needed  for Fever or Mild Pain.    AMPHETAMINE-DEXTROAMPHETAMINE (ADDERALL XR) 25 MG XR CAPSULE    Take 25 mg by mouth every day.    CLONAZEPAM (KLONOPIN) 1 MG TAB    Take 1 mg by mouth 2 times a day as needed. Indications: Feeling Anxious    DULOXETINE (CYMBALTA) 20 MG CAP DR PARTICLES    Take 20 mg by mouth every day.    FOLIC ACID (FOLVITE) 1 MG TAB    Take 1 Tablet by mouth every day.    MULTIVITAMIN TAB    Take 1 Tablet by mouth every day.    PROPRANOLOL (INDERAL) 20 MG TAB    Take 20 mg by mouth 2 times a day.    THIAMINE (THIAMINE) 100 MG TABLET    Take 1 Tablet by mouth every day.       ALLERGIES  Patient has no known allergies.    PHYSICAL EXAM  BP (!) 155/89   Pulse (!) 109   Temp 36.7 °C (98 °F) (Temporal)   Resp 16   Wt 86.2 kg (190 lb)   SpO2 94%   BMI 28.89 kg/m²   Nursing note and vitals reviewed.   Constitutional: Well-developed and well-nourished. No distress.  Smells of alcohol.  Seems mildly intoxicated.  HENT: Head is normocephalic and atraumatic. Oropharynx is clear and moist without exudate or erythema.   Eyes: Pupils are equal, round, and reactive to light. Conjunctiva are normal.   Cardiovascular: Normal rate and regular rhythm. No murmur heard. Normal radial pulses.  Pulmonary/Chest: Breath sounds normal. No wheezes or rales.   Abdominal: Soft and non-tender. No distention.   Musculoskeletal: Extremities exhibit normal range of motion without edema or tenderness.  Congenital deformities to hands noted.  Neurological: Awake, alert and oriented to person, place, and time. No focal deficits noted.  Skin: Skin is warm and dry. No rash.   Psychiatric: Normal mood and affect. Appropriate for clinical situation.     DIAGNOSTIC STUDIES    Labs:   Results for orders placed or performed during the hospital encounter of 03/08/23   POC BREATHALIZER   Result Value Ref Range    POC Breathalizer 0.28 (A) 0.00 - 0.01 Percent          COURSE & MEDICAL DECISION MAKING     ED Observation Status? No; Patient does  not meet criteria for ED Observation.     INITIAL ASSESSMENT, COURSE AND PLAN  Care Narrative:     11:33 AM - Patient was evaluated at bedside. Patient is sleeping, and is difficult to arouse. He is uncooperative during exam and history taking. POC Breathalizer was ordered for further evaluation.     11:54 AM - Nurse informs me that patient went to bathroom with a steady gait, and stated that he does not want any help, and would like to leave.     12:12 PM - Patient was reevaluated at bedside. Patient is alert and oriented with a steady gait. Patient is stating that he would like to leave now. I then informed the patient of my plan for discharge, which includes strict return precautions for any new or worsening symptoms. Patient understands and verbalizes agreement to plan of care. Patient is comfortable going home at this time.      HTN/IDDM FOLLOW UP:  The patient is referred to a primary physician for blood pressure management, diabetic screening, and for all other preventive health concerns    ADDITIONAL PROBLEM LIST  Patient is homeless and addicted to multiple substances    DISPOSITION AND DISCUSSIONS    Escalation of care considered, and ultimately not performed: IV fluids, blood analysis, and acute inpatient care management, however at this time, the patient is most appropriate for outpatient management.    Barriers to care at this time, including but not limited to: Patient does not have established PCP, Patient is homeless, and Patient lacks financial resources.     The patient is a chronic inebriated.  Return to the emergency department with similar complaints.  He has been provided with substance abuse resources countless times in the past.  Continues to abuse substances.  Encouraged to seek treatment.    The patient ultimately eloped from the emergency department prior to discharge    FOLLOW UP:  [facility], Emergency Dept  [address] [address] [phone]    If  symptoms worsen      FINAL DIAGNOSIS  1. Alcohol abuse    2. Alcohol dependence with unspecified alcohol-induced disorder (HCC)    3. Alcoholic intoxication without complication (HCC)        The note accurately reflects work and decisions made by me.  Naeem Santana M.D.  3/8/2023  1:16 PM

## 2023-03-08 NOTE — ED NOTES
".Patient refused discharged instructions and states \"I dont want your help anymore\". All belongings accounted for.   "

## 2023-03-09 VITALS
BODY MASS INDEX: 24.22 KG/M2 | DIASTOLIC BLOOD PRESSURE: 91 MMHG | WEIGHT: 159.83 LBS | HEIGHT: 68 IN | RESPIRATION RATE: 18 BRPM | TEMPERATURE: 98.8 F | SYSTOLIC BLOOD PRESSURE: 154 MMHG | HEART RATE: 141 BPM | OXYGEN SATURATION: 96 %

## 2023-03-09 LAB — POC BREATHALIZER: 0.15 PERCENT (ref 0–0.01)

## 2023-03-09 PROCEDURE — 302970 POC BREATHALIZER: Performed by: EMERGENCY MEDICINE

## 2023-03-09 PROCEDURE — A9270 NON-COVERED ITEM OR SERVICE: HCPCS | Performed by: EMERGENCY MEDICINE

## 2023-03-09 PROCEDURE — 700102 HCHG RX REV CODE 250 W/ 637 OVERRIDE(OP): Performed by: EMERGENCY MEDICINE

## 2023-03-09 RX ORDER — LORAZEPAM 1 MG/1
1 TABLET ORAL ONCE
Status: COMPLETED | OUTPATIENT
Start: 2023-03-09 | End: 2023-03-09

## 2023-03-09 RX ADMIN — LORAZEPAM 1 MG: 1 TABLET ORAL at 05:08

## 2023-03-09 ASSESSMENT — LIFESTYLE VARIABLES
HEADACHE, FULLNESS IN HEAD: NOT PRESENT
TOTAL SCORE: 0
ORIENTATION AND CLOUDING OF SENSORIUM: ORIENTED AND CAN DO SERIAL ADDITIONS
PAROXYSMAL SWEATS: NO SWEAT VISIBLE
VISUAL DISTURBANCES: NOT PRESENT
AGITATION: NORMAL ACTIVITY
ANXIETY: NO ANXIETY (AT EASE)
NAUSEA AND VOMITING: NO NAUSEA AND NO VOMITING
AUDITORY DISTURBANCES: NOT PRESENT
TREMOR: NO TREMOR

## 2023-03-09 NOTE — ED NOTES
ERP will discharge the pt, medically cleared in the facility. Pt opted to go to [facility] fro alcohol detox. Provided taxi cab

## 2023-03-09 NOTE — DISCHARGE INSTRUCTIONS
We are providing you with a taxi [facility] where he wanted to check-in for alcohol detox.  You are medically cleared to do so.  If you have any concerns or difficulties, please return to the ED.  Thank you for coming in today.

## 2023-03-09 NOTE — ED NOTES
Pt rounding, pt is still asleep, not in any form of distress, tried to wake up but pt is still sleepy.

## 2023-03-09 NOTE — ED NOTES
Discharged pt, alert and oriented, still feeling nauseous but pt said he's ready to go and will wait in  the lobby. Health education  given and follow up apt instructed. Pt verbalized understanding of the information given. Accompanied pt to lobby area, taxi cab confirmed they will be here in 5-10 minutes.

## 2023-03-09 NOTE — ED PROVIDER NOTES
"ED Provider Note    CHIEF COMPLAINT  Chief Complaint   Patient presents with    Drug Ingestion    Drug Abuse     Used meth and alcohol sometime today  C/O body aches       EXTERNAL RECORDS REVIEWED  Reviewed emergency department note dated today by my partner Dr. Santana, patient was seen for concerns of alcohol abuse and methamphetamine abuse, he was ambulatory and eloped.    HPI/ROS  LIMITATION TO HISTORY   Select: : None  OUTSIDE HISTORIAN(S):  None available    Gopal Ceja is a 37 y.o. male who presents for evaluation of alcohol withdrawal.  Patient has an unfortunate history of alcohol abuse and dependence.  He has been seen multiple times at this facility for similar alcohol-related complaints, last of which was this morning.  Patient relates he drinks on a daily basis, he admits drinking alcohol and factors prior to arrival because he was starting to have withdrawal symptoms.  He notes he was feeling agitation, heart racing, and anxiety.  He was concerned about potentially going through \"DTs\", as such he came to be assessed.  He also notes history of methamphetamine abuse and relates he smoked methamphetamines some early in the morning.  Endorses no suicidal ideation, he is otherwise pleasant and cooperative at this point noting he is feeling somewhat better as he took a drink just before he got here.    PAST MEDICAL HISTORY   has a past medical history of Acute anxiety, ADHD (attention deficit hyperactivity disorder), ADHD (attention deficit hyperactivity disorder), Alcohol abuse, Bipolar affective (HCC), Depression, Drug abuse and dependence (HCC), Ectrodactyly-ectodermal dysplasia-clefting syndrome, ETOH abuse, and Psychiatric disorder.    SURGICAL HISTORY   has a past surgical history that includes other orthopedic surgery and exploratory of abdomen (N/A, 9/26/2019).    FAMILY HISTORY  Family History   Problem Relation Age of Onset    Hypertension Mother     Heart Disease Neg Hx     " "Hyperlipidemia Neg Hx        SOCIAL HISTORY  Social History     Tobacco Use    Smoking status: Never    Smokeless tobacco: Never    Tobacco comments:     Smokes couple of times a month   Vaping Use    Vaping Use: Never used   Substance and Sexual Activity    Alcohol use: Yes     Comment: last drink today    Drug use: Yes     Types: Inhaled     Comment: meth yesterday (smokes Meth)/THC    Sexual activity: Not on file       CURRENT MEDICATIONS  Home Medications    **Home medications have not yet been reviewed for this encounter**         ALLERGIES  No Known Allergies    PHYSICAL EXAM  VITAL SIGNS: BP (!) 149/105   Pulse (!) 143   Temp 37.1 °C (98.8 °F) (Temporal)   Resp 16   Ht 1.727 m (5' 8\")   Wt 72.5 kg (159 lb 13.3 oz)   SpO2 93%   BMI 24.30 kg/m²    General: Alert, no acute distress, disheveled, anxious  Skin: Warm, dry, normal for ethnicity  Head: Normocephalic, atraumatic  Neck: Trachea midline, no tenderness  Eye: PERRL,   ENMT: Oral mucosa pink and dry  Cardiovascular: S1, S2, moderately tachycardic, otherwise regular rate and rhythm, No murmur, Normal peripheral perfusion  Respiratory: Lungs CTA, respirations are non-labored, breath sounds are equal  Gastrointestinal: Soft, nontender, non distended  Musculoskeletal: No swelling, no deformity  Neurological: Alert and oriented to person, place, time, and situation  Lymphatics: No lymphadenopathy  Psychiatric: Cooperative, appropriate mood & affect     DIAGNOSTIC STUDIES / PROCEDURES  EKG  I have independently interpreted this EKG  EKG Interpretation    Interpreted by emergency department physician    Rhythm: sinus tachycardia  Rate: 132  Axis: normal  Ectopy: none  Conduction: normal  ST Segments: no acute change  T Waves: no acute change  Q Waves: none    Clinical Impression: no acute changes     LABS  Results for orders placed or performed during the hospital encounter of 03/08/23   CBC WITH DIFFERENTIAL   Result Value Ref Range    WBC 9.4 4.8 - 10.8 " K/uL    RBC 5.83 4.70 - 6.10 M/uL    Hemoglobin 17.9 14.0 - 18.0 g/dL    Hematocrit 52.3 (H) 42.0 - 52.0 %    MCV 89.7 81.4 - 97.8 fL    MCH 30.7 27.0 - 33.0 pg    MCHC 34.2 33.7 - 35.3 g/dL    RDW 43.0 35.9 - 50.0 fL    Platelet Count 249 164 - 446 K/uL    MPV 10.3 9.0 - 12.9 fL    Neutrophils-Polys 69.40 44.00 - 72.00 %    Lymphocytes 23.90 22.00 - 41.00 %    Monocytes 5.90 0.00 - 13.40 %    Eosinophils 0.30 0.00 - 6.90 %    Basophils 0.30 0.00 - 1.80 %    Immature Granulocytes 0.20 0.00 - 0.90 %    Nucleated RBC 0.00 /100 WBC    Neutrophils (Absolute) 6.49 1.82 - 7.42 K/uL    Lymphs (Absolute) 2.23 1.00 - 4.80 K/uL    Monos (Absolute) 0.55 0.00 - 0.85 K/uL    Eos (Absolute) 0.03 0.00 - 0.51 K/uL    Baso (Absolute) 0.03 0.00 - 0.12 K/uL    Immature Granulocytes (abs) 0.02 0.00 - 0.11 K/uL    NRBC (Absolute) 0.00 K/uL   COMP METABOLIC PANEL   Result Value Ref Range    Sodium 137 135 - 145 mmol/L    Potassium 4.4 3.6 - 5.5 mmol/L    Chloride 93 (L) 96 - 112 mmol/L    Co2 23 20 - 33 mmol/L    Anion Gap 21.0 (H) 7.0 - 16.0    Glucose 99 65 - 99 mg/dL    Bun 11 8 - 22 mg/dL    Creatinine 0.82 0.50 - 1.40 mg/dL    Calcium 9.1 8.4 - 10.2 mg/dL    AST(SGOT) 69 (H) 12 - 45 U/L    ALT(SGPT) 40 2 - 50 U/L    Alkaline Phosphatase 119 (H) 30 - 99 U/L    Total Bilirubin 0.5 0.1 - 1.5 mg/dL    Albumin 4.4 3.2 - 4.9 g/dL    Total Protein 8.8 (H) 6.0 - 8.2 g/dL    Globulin 4.4 (H) 1.9 - 3.5 g/dL    A-G Ratio 1.0 g/dL   URINE DRUG SCREEN (TRIAGE)   Result Value Ref Range    Amphetamines Urine Negative Negative    Barbiturates Negative Negative    Benzodiazepines Negative Negative    Cocaine Metabolite Negative Negative    Methadone Negative Negative    Opiates Negative Negative    Oxycodone Negative Negative    Phencyclidine -Pcp Negative Negative    Propoxyphene Negative Negative    Cannabinoid Metab Negative Negative   ETHYL ALCOHOL (BLOOD)   Result Value Ref Range    Diagnostic Alcohol 381.8 (H) <10.1 mg/dL   CORRECTED CALCIUM    Result Value Ref Range    Correct Calcium 8.8 8.5 - 10.5 mg/dL   ESTIMATED GFR   Result Value Ref Range    GFR (CKD-EPI) 116 >60 mL/min/1.73 m 2   EKG   Result Value Ref Range    Report       [facility] Emergency Dept.    Test Date:  2023  Pt Name:    CHANCE DIAZ                 Department: EDSM  MRN:        [id number]                      [facility]  Gender:     Male                         Technician: [id number]  :        1985                   Requested By:TIANA ZEPEDA  Order #:    [id number]                    Reading MD: TIANA ZEPEDA MD    Measurements  Intervals                                Axis  Rate:       132                          P:          77  OH:         137                          QRS:        68  QRSD:       80                           T:          54  QT:         309  QTc:        458    Interpretive Statements  Sinus tachycardia  Compared to ECG 2019 05:01:02  Prolonged QT interval no longer present  Electronically Signed On 3-8-2023 22:02:59 PST by TIANA ZEPEDA MD              COURSE & MEDICAL DECISION MAKING    ED Observation Status? Yes; I am placing the patient in to an observation status due to a diagnostic uncertainty as well as therapeutic intensity. Patient placed in observation status at 8:52 PM, 3/8/2023.     Observation plan is as follows: Patient medicated with lorazepam 2 mg IM.  Toxicologic work-up including metabolic studies as well as ethanol level and urine drug screen will be obtained.  He will be observed for clinical improvement while this is pending.  Differential diagnosis includes but is not restricted to alcohol intoxication, alcohol withdrawals, electrolyte disturbance, acute kidney injury, methamphetamine abuse, sinus tachycardia    : Patient very intoxicated both from the exam and from laboratory analysis.  We will continue to observe until clinically sober.    : Patient reassessed  and updated with work-up results.  Labs are consistent with dehydration, we will provide p.o. fluids for him and something to eat as well.  He is feeling better after lorazepam.  Patient updated with plan of care and notes he is feeling better.  He will need to be observed until clinically sober and safely ambulatory as he is rather profoundly intoxicated at this time.      INITIAL ASSESSMENT, COURSE AND PLAN  Care Narrative: 37-year-old male with history of chronic alcohol abuse and methamphetamine abuse presents for evaluation of concern for withdrawal.  Patient is tachycardic upon arrival, does appear to be volume depleted and as such she is given p.o. hydration.  Laboratory analyses other than evidence of dehydration are reassuring.  Only mildly elevated AST, consistent with alcohol abuse, otherwise reassuring studies.  Tachycardia is improving throughout observation.  No evidence suggestive of septicemia or cardiac dysrhythmia.  Patient treated for potential withdrawal symptoms initially with lorazepam 2 mg, he is feeling better.  As such no indication for medical management, patient will clearly need assistance with regard to his alcohol abuse in the outpatient setting and appropriate resources will be provided for him.  HTN/IDDM FOLLOW UP:  The patient has known hypertension and is being followed by their primary care doctor      ADDITIONAL PROBLEM LIST  Alcohol intoxication, sinus tachycardia  DISPOSITION AND DISCUSSIONS  I have discussed management of the patient with the following physicians and BRUNO's:  NA    Discussion of management with other QHP or appropriate source(s):  NA      Escalation of care considered, and ultimately not performed:IV fluids patient able to tolerate p.o., as such no indication for IV fluid resuscitation for his dehydration    Barriers to care at this time, including but not limited to: Patient does not have established PCP, Patient is homeless, Patient lacks transportation ,  Patient does not have insurance, Patient had difficult affording medications, and Patient lacks financial resources.     Decision tools and prescription drugs considered including, but not limited to:  CIWA score is 5 on initial assessment .    FINAL DIAGNOSIS  1. Alcohol withdrawal syndrome with complication (HCC)    2. Methamphetamine abuse (HCC)    3. Dehydration           Electronically signed by: Amanda Vega M.D., 3/8/2023 8:51 PM

## 2023-03-09 NOTE — ED NOTES
"Pt awake, states \"I'm hallucinating, I'm seeing patterns,  feeling lightheaded and dizzy, feeling nauseous too. I don't think I can get up.\" Tremors noted  "

## 2023-03-09 NOTE — DISCHARGE SUMMARY
"  ED Observation Discharge Summary    Patient:Gopal Ceja  Patient : 1985  Patient MRN: [id number]  Patient PCP: Pcp Pt States None    Admit Date: 3/8/2023  Discharge Date and Time: 23 5:04 AM  Discharge Diagnosis: Alcohol intoxication, alcohol withdrawal, methamphetamine abuse  Discharge Attending: Jimmy Alvarez  Discharge Service: ED Observation    ED Course  Gopal is a 37 y.o. male who was evaluated at [facility] for polysubstance abuse and toxic encephalopathy.  Patient is well-known to the emergency department here and surrounding area with frequent high utilization of the Emergency Department.  Seen by Dr. Baca earlier in the evening where he was too intoxicated for meaningful evaluation.  Labs were ordered and he was placed on ED observation for reevaluation.  His alcohol initially was over 300.  Thankfully his labs including CBC and CMP are fairly unremarkable other than slight AST predominant transaminitis, negative urine drug screen, benign EKG.  After 10-hour observation period, patient was reevaluated, multiple times, and on latest evaluation is alert, oriented, ambulating unassisted, tolerating a meal here and p.o. intake.  He denies any suicidal or homicidal ideation.  He states that he would like further help with detox and methamphetamine abuse.  He was provided with a taxi over to [facility] to be [facility] for detox.  He is stable at time of discharge.  He was given a dose of p.o. Ativan.  Discussed strict return precautions with him and outpatient follow-up plan and he is amenable.    Discharge Exam:  BP (!) 154/91   Pulse (!) 141   Temp 37.1 °C (98.8 °F) (Temporal)   Resp 18   Ht 1.727 m (5' 8\")   Wt 72.5 kg (159 lb 13.3 oz)   SpO2 96%   BMI 24.30 kg/m² .    Constitutional: Awake and alert. Nontoxic  HENT:  Grossly normal  Eyes: Grossly normal  Neck: Normal range of motion  Cardiovascular: Normal heart rate   Thorax & Lungs: No respiratory " distress  Abdomen: Nontender  Skin:  No pathologic rash.   Extremities: Well perfused  Psychiatric: Affect normal    Labs  Results for orders placed or performed during the hospital encounter of 03/08/23   CBC WITH DIFFERENTIAL   Result Value Ref Range    WBC 9.4 4.8 - 10.8 K/uL    RBC 5.83 4.70 - 6.10 M/uL    Hemoglobin 17.9 14.0 - 18.0 g/dL    Hematocrit 52.3 (H) 42.0 - 52.0 %    MCV 89.7 81.4 - 97.8 fL    MCH 30.7 27.0 - 33.0 pg    MCHC 34.2 33.7 - 35.3 g/dL    RDW 43.0 35.9 - 50.0 fL    Platelet Count 249 164 - 446 K/uL    MPV 10.3 9.0 - 12.9 fL    Neutrophils-Polys 69.40 44.00 - 72.00 %    Lymphocytes 23.90 22.00 - 41.00 %    Monocytes 5.90 0.00 - 13.40 %    Eosinophils 0.30 0.00 - 6.90 %    Basophils 0.30 0.00 - 1.80 %    Immature Granulocytes 0.20 0.00 - 0.90 %    Nucleated RBC 0.00 /100 WBC    Neutrophils (Absolute) 6.49 1.82 - 7.42 K/uL    Lymphs (Absolute) 2.23 1.00 - 4.80 K/uL    Monos (Absolute) 0.55 0.00 - 0.85 K/uL    Eos (Absolute) 0.03 0.00 - 0.51 K/uL    Baso (Absolute) 0.03 0.00 - 0.12 K/uL    Immature Granulocytes (abs) 0.02 0.00 - 0.11 K/uL    NRBC (Absolute) 0.00 K/uL   COMP METABOLIC PANEL   Result Value Ref Range    Sodium 137 135 - 145 mmol/L    Potassium 4.4 3.6 - 5.5 mmol/L    Chloride 93 (L) 96 - 112 mmol/L    Co2 23 20 - 33 mmol/L    Anion Gap 21.0 (H) 7.0 - 16.0    Glucose 99 65 - 99 mg/dL    Bun 11 8 - 22 mg/dL    Creatinine 0.82 0.50 - 1.40 mg/dL    Calcium 9.1 8.4 - 10.2 mg/dL    AST(SGOT) 69 (H) 12 - 45 U/L    ALT(SGPT) 40 2 - 50 U/L    Alkaline Phosphatase 119 (H) 30 - 99 U/L    Total Bilirubin 0.5 0.1 - 1.5 mg/dL    Albumin 4.4 3.2 - 4.9 g/dL    Total Protein 8.8 (H) 6.0 - 8.2 g/dL    Globulin 4.4 (H) 1.9 - 3.5 g/dL    A-G Ratio 1.0 g/dL   URINE DRUG SCREEN (TRIAGE)   Result Value Ref Range    Amphetamines Urine Negative Negative    Barbiturates Negative Negative    Benzodiazepines Negative Negative    Cocaine Metabolite Negative Negative    Methadone Negative Negative    Opiates  Negative Negative    Oxycodone Negative Negative    Phencyclidine -Pcp Negative Negative    Propoxyphene Negative Negative    Cannabinoid Metab Negative Negative   ETHYL ALCOHOL (BLOOD)   Result Value Ref Range    Diagnostic Alcohol 381.8 (H) <10.1 mg/dL   CORRECTED CALCIUM   Result Value Ref Range    Correct Calcium 8.8 8.5 - 10.5 mg/dL   ESTIMATED GFR   Result Value Ref Range    GFR (CKD-EPI) 116 >60 mL/min/1.73 m 2   EKG   Result Value Ref Range    Report       [facility] Emergency Dept.    Test Date:  2023  Pt Name:    CHANCE DIAZ                 Department: EDS  MRN:        [id number]                      [facility]  Gender:     Male                         Technician: [id number]  :        1985                   Requested By:TIANA ZEPEDA  Order #:    [id number]                    Reading MD: TIANA ZEPEDA MD    Measurements  Intervals                                Axis  Rate:       132                          P:          77  KS:         137                          QRS:        68  QRSD:       80                           T:          54  QT:         309  QTc:        458    Interpretive Statements  Sinus tachycardia  Compared to ECG 2019 05:01:02  Prolonged QT interval no longer present  Electronically Signed On 3-8-2023 22:02:59 PST by TIANA ZEPEDA MD       Radiology  No orders to display     Medications:   New Prescriptions    No medications on file     My final assessment includes reevaluation, Ativan medication, physical exam, vital signs evaluated    Upon Reevaluation, the patient's condition has: Improved; and will be discharged.    Patient discharged from ED Observation status at 5:05 AM (Time) 3/9/2023 (Date).     Total time spent on this ED Observation discharge encounter is > 30 Minutes    Electronically signed by: Jimmy Alvarez, 3/9/2023 5:04 AM

## 2023-03-09 NOTE — ED TRIAGE NOTES
Pt completely strung out Doing wild movements of his face and extremities   Poor historian  Diff. To get a history  Security on standby in triage as pt has hx aggressive behavior

## 2023-03-10 ENCOUNTER — HOSPITAL ENCOUNTER (INPATIENT)
Facility: MEDICAL CENTER | Age: 38
LOS: 4 days | DRG: 896 | End: 2023-03-15
Attending: EMERGENCY MEDICINE | Admitting: INTERNAL MEDICINE
Payer: MEDICARE

## 2023-03-10 DIAGNOSIS — B30.9 ACUTE VIRAL CONJUNCTIVITIS OF BOTH EYES: ICD-10-CM

## 2023-03-10 DIAGNOSIS — L03.116 CELLULITIS OF LEFT LOWER EXTREMITY: ICD-10-CM

## 2023-03-10 LAB
BASOPHILS # BLD AUTO: 0.4 % (ref 0–1.8)
BASOPHILS # BLD: 0.04 K/UL (ref 0–0.12)
EOSINOPHIL # BLD AUTO: 0.01 K/UL (ref 0–0.51)
EOSINOPHIL NFR BLD: 0.1 % (ref 0–6.9)
ERYTHROCYTE [DISTWIDTH] IN BLOOD BY AUTOMATED COUNT: 43 FL (ref 35.9–50)
HCT VFR BLD AUTO: 48.6 % (ref 42–52)
HGB BLD-MCNC: 17.2 G/DL (ref 14–18)
IMM GRANULOCYTES # BLD AUTO: 0.02 K/UL (ref 0–0.11)
IMM GRANULOCYTES NFR BLD AUTO: 0.2 % (ref 0–0.9)
LYMPHOCYTES # BLD AUTO: 2.37 K/UL (ref 1–4.8)
LYMPHOCYTES NFR BLD: 21.7 % (ref 22–41)
MCH RBC QN AUTO: 31 PG (ref 27–33)
MCHC RBC AUTO-ENTMCNC: 35.4 G/DL (ref 33.7–35.3)
MCV RBC AUTO: 87.6 FL (ref 81.4–97.8)
MONOCYTES # BLD AUTO: 0.74 K/UL (ref 0–0.85)
MONOCYTES NFR BLD AUTO: 6.8 % (ref 0–13.4)
NEUTROPHILS # BLD AUTO: 7.76 K/UL (ref 1.82–7.42)
NEUTROPHILS NFR BLD: 70.8 % (ref 44–72)
NRBC # BLD AUTO: 0 K/UL
NRBC BLD-RTO: 0 /100 WBC
PLATELET # BLD AUTO: 187 K/UL (ref 164–446)
PMV BLD AUTO: 10.4 FL (ref 9–12.9)
RBC # BLD AUTO: 5.55 M/UL (ref 4.7–6.1)
WBC # BLD AUTO: 10.9 K/UL (ref 4.8–10.8)

## 2023-03-10 PROCEDURE — 700102 HCHG RX REV CODE 250 W/ 637 OVERRIDE(OP): Performed by: EMERGENCY MEDICINE

## 2023-03-10 PROCEDURE — 99285 EMERGENCY DEPT VISIT HI MDM: CPT

## 2023-03-10 PROCEDURE — 80053 COMPREHEN METABOLIC PANEL: CPT

## 2023-03-10 PROCEDURE — 84100 ASSAY OF PHOSPHORUS: CPT

## 2023-03-10 PROCEDURE — 36415 COLL VENOUS BLD VENIPUNCTURE: CPT

## 2023-03-10 PROCEDURE — 85025 COMPLETE CBC W/AUTO DIFF WBC: CPT

## 2023-03-10 PROCEDURE — 700105 HCHG RX REV CODE 258: Performed by: EMERGENCY MEDICINE

## 2023-03-10 PROCEDURE — 96374 THER/PROPH/DIAG INJ IV PUSH: CPT

## 2023-03-10 PROCEDURE — 82077 ASSAY SPEC XCP UR&BREATH IA: CPT

## 2023-03-10 PROCEDURE — 700111 HCHG RX REV CODE 636 W/ 250 OVERRIDE (IP): Performed by: EMERGENCY MEDICINE

## 2023-03-10 PROCEDURE — 83735 ASSAY OF MAGNESIUM: CPT

## 2023-03-10 PROCEDURE — 83690 ASSAY OF LIPASE: CPT

## 2023-03-10 PROCEDURE — A9270 NON-COVERED ITEM OR SERVICE: HCPCS | Performed by: EMERGENCY MEDICINE

## 2023-03-10 RX ORDER — FOLIC ACID 1 MG/1
1 TABLET ORAL ONCE
Status: COMPLETED | OUTPATIENT
Start: 2023-03-11 | End: 2023-03-10

## 2023-03-10 RX ORDER — SODIUM CHLORIDE, SODIUM LACTATE, POTASSIUM CHLORIDE, CALCIUM CHLORIDE 600; 310; 30; 20 MG/100ML; MG/100ML; MG/100ML; MG/100ML
1000 INJECTION, SOLUTION INTRAVENOUS ONCE
Status: COMPLETED | OUTPATIENT
Start: 2023-03-11 | End: 2023-03-10

## 2023-03-10 RX ORDER — LORAZEPAM 2 MG/ML
2 INJECTION INTRAMUSCULAR ONCE
Status: COMPLETED | OUTPATIENT
Start: 2023-03-11 | End: 2023-03-10

## 2023-03-10 RX ORDER — GAUZE BANDAGE 2" X 2"
100 BANDAGE TOPICAL ONCE
Status: COMPLETED | OUTPATIENT
Start: 2023-03-11 | End: 2023-03-10

## 2023-03-10 RX ADMIN — FOLIC ACID 1 MG: 1 TABLET ORAL at 23:59

## 2023-03-10 RX ADMIN — THIAMINE HCL TAB 100 MG 100 MG: 100 TAB at 23:59

## 2023-03-10 RX ADMIN — LORAZEPAM 2 MG: 2 INJECTION INTRAMUSCULAR; INTRAVENOUS at 23:55

## 2023-03-10 RX ADMIN — SODIUM CHLORIDE, POTASSIUM CHLORIDE, SODIUM LACTATE AND CALCIUM CHLORIDE 1000 ML: 600; 310; 30; 20 INJECTION, SOLUTION INTRAVENOUS at 23:54

## 2023-03-10 ASSESSMENT — LIFESTYLE VARIABLES
AUDITORY DISTURBANCES: MILD HARSHNESS OR ABILITY TO FRIGHTEN
PAROXYSMAL SWEATS: NO SWEAT VISIBLE
VISUAL DISTURBANCES: MILD SENSITIVITY
TOTAL SCORE: 22
TREMOR: *
ORIENTATION AND CLOUDING OF SENSORIUM: ORIENTED AND CAN DO SERIAL ADDITIONS
AGITATION: MODERATELY FIDGETY AND RESTLESS
TACTILE DISTURBANCES: VERY MILD ITCHING, PINS AND NEEDLES SENSATION, BURNING OR NUMBNESS
HEADACHE, FULLNESS IN HEAD: MODERATE
NAUSEA AND VOMITING: MILD NAUSEA WITH NO VOMITING
ANXIETY: MODERATELY ANXIOUS OR GUARDED, SO ANXIETY IS INFERRED

## 2023-03-10 ASSESSMENT — FIBROSIS 4 INDEX: FIB4 SCORE: 1.58

## 2023-03-11 PROBLEM — E87.20 METABOLIC ACIDOSIS: Status: ACTIVE | Noted: 2023-03-11

## 2023-03-11 PROBLEM — K70.10 ALCOHOLIC HEPATITIS: Status: ACTIVE | Noted: 2019-01-12

## 2023-03-11 PROBLEM — F10.221 ALCOHOL INTOXICATION IN ACTIVE ALCOHOLIC WITH DELIRIUM (HCC): Status: ACTIVE | Noted: 2023-03-11

## 2023-03-11 LAB
ALBUMIN SERPL BCP-MCNC: 3.7 G/DL (ref 3.2–4.9)
ALBUMIN SERPL BCP-MCNC: 4.6 G/DL (ref 3.2–4.9)
ALBUMIN/GLOB SERPL: 1.1 G/DL
ALBUMIN/GLOB SERPL: 1.1 G/DL
ALP SERPL-CCNC: 115 U/L (ref 30–99)
ALP SERPL-CCNC: 93 U/L (ref 30–99)
ALT SERPL-CCNC: 80 U/L (ref 2–50)
ALT SERPL-CCNC: 99 U/L (ref 2–50)
ANION GAP SERPL CALC-SCNC: 13 MMOL/L (ref 7–16)
ANION GAP SERPL CALC-SCNC: 20 MMOL/L (ref 7–16)
AST SERPL-CCNC: 130 U/L (ref 12–45)
AST SERPL-CCNC: 164 U/L (ref 12–45)
BASOPHILS # BLD AUTO: 0.5 % (ref 0–1.8)
BASOPHILS # BLD: 0.04 K/UL (ref 0–0.12)
BILIRUB SERPL-MCNC: 0.6 MG/DL (ref 0.1–1.5)
BILIRUB SERPL-MCNC: 0.9 MG/DL (ref 0.1–1.5)
BUN SERPL-MCNC: 6 MG/DL (ref 8–22)
BUN SERPL-MCNC: 7 MG/DL (ref 8–22)
CALCIUM ALBUM COR SERPL-MCNC: 8.3 MG/DL (ref 8.5–10.5)
CALCIUM ALBUM COR SERPL-MCNC: 8.4 MG/DL (ref 8.5–10.5)
CALCIUM SERPL-MCNC: 8.2 MG/DL (ref 8.5–10.5)
CALCIUM SERPL-MCNC: 8.8 MG/DL (ref 8.5–10.5)
CHLORIDE SERPL-SCNC: 94 MMOL/L (ref 96–112)
CHLORIDE SERPL-SCNC: 99 MMOL/L (ref 96–112)
CO2 SERPL-SCNC: 22 MMOL/L (ref 20–33)
CO2 SERPL-SCNC: 24 MMOL/L (ref 20–33)
CREAT SERPL-MCNC: 0.56 MG/DL (ref 0.5–1.4)
CREAT SERPL-MCNC: 0.69 MG/DL (ref 0.5–1.4)
EOSINOPHIL # BLD AUTO: 0.02 K/UL (ref 0–0.51)
EOSINOPHIL NFR BLD: 0.2 % (ref 0–6.9)
ERYTHROCYTE [DISTWIDTH] IN BLOOD BY AUTOMATED COUNT: 46.4 FL (ref 35.9–50)
ETHANOL BLD-MCNC: 366.4 MG/DL
GFR SERPLBLD CREATININE-BSD FMLA CKD-EPI: 122 ML/MIN/1.73 M 2
GFR SERPLBLD CREATININE-BSD FMLA CKD-EPI: 130 ML/MIN/1.73 M 2
GLOBULIN SER CALC-MCNC: 3.4 G/DL (ref 1.9–3.5)
GLOBULIN SER CALC-MCNC: 4.2 G/DL (ref 1.9–3.5)
GLUCOSE SERPL-MCNC: 129 MG/DL (ref 65–99)
GLUCOSE SERPL-MCNC: 97 MG/DL (ref 65–99)
HCT VFR BLD AUTO: 45.1 % (ref 42–52)
HGB BLD-MCNC: 14.8 G/DL (ref 14–18)
IMM GRANULOCYTES # BLD AUTO: 0.03 K/UL (ref 0–0.11)
IMM GRANULOCYTES NFR BLD AUTO: 0.4 % (ref 0–0.9)
LIPASE SERPL-CCNC: 50 U/L (ref 11–82)
LYMPHOCYTES # BLD AUTO: 1.06 K/UL (ref 1–4.8)
LYMPHOCYTES NFR BLD: 13 % (ref 22–41)
MAGNESIUM SERPL-MCNC: 2.1 MG/DL (ref 1.5–2.5)
MAGNESIUM SERPL-MCNC: 2.3 MG/DL (ref 1.5–2.5)
MCH RBC QN AUTO: 30.6 PG (ref 27–33)
MCHC RBC AUTO-ENTMCNC: 32.8 G/DL (ref 33.7–35.3)
MCV RBC AUTO: 93.4 FL (ref 81.4–97.8)
MONOCYTES # BLD AUTO: 0.65 K/UL (ref 0–0.85)
MONOCYTES NFR BLD AUTO: 7.9 % (ref 0–13.4)
NEUTROPHILS # BLD AUTO: 6.38 K/UL (ref 1.82–7.42)
NEUTROPHILS NFR BLD: 78 % (ref 44–72)
NRBC # BLD AUTO: 0 K/UL
NRBC BLD-RTO: 0 /100 WBC
PHOSPHATE SERPL-MCNC: 2.7 MG/DL (ref 2.5–4.5)
PHOSPHATE SERPL-MCNC: 3.6 MG/DL (ref 2.5–4.5)
PLATELET # BLD AUTO: 136 K/UL (ref 164–446)
PMV BLD AUTO: 10.9 FL (ref 9–12.9)
POTASSIUM SERPL-SCNC: 3.4 MMOL/L (ref 3.6–5.5)
POTASSIUM SERPL-SCNC: 3.9 MMOL/L (ref 3.6–5.5)
PROT SERPL-MCNC: 7.1 G/DL (ref 6–8.2)
PROT SERPL-MCNC: 8.8 G/DL (ref 6–8.2)
RBC # BLD AUTO: 4.83 M/UL (ref 4.7–6.1)
SODIUM SERPL-SCNC: 136 MMOL/L (ref 135–145)
SODIUM SERPL-SCNC: 136 MMOL/L (ref 135–145)
WBC # BLD AUTO: 8.2 K/UL (ref 4.8–10.8)

## 2023-03-11 PROCEDURE — 85025 COMPLETE CBC W/AUTO DIFF WBC: CPT

## 2023-03-11 PROCEDURE — 770004 HCHG ROOM/CARE - ONCOLOGY PRIVATE *

## 2023-03-11 PROCEDURE — 700111 HCHG RX REV CODE 636 W/ 250 OVERRIDE (IP): Performed by: INTERNAL MEDICINE

## 2023-03-11 PROCEDURE — 36415 COLL VENOUS BLD VENIPUNCTURE: CPT

## 2023-03-11 PROCEDURE — HZ2ZZZZ DETOXIFICATION SERVICES FOR SUBSTANCE ABUSE TREATMENT: ICD-10-PCS | Performed by: INTERNAL MEDICINE

## 2023-03-11 PROCEDURE — 700105 HCHG RX REV CODE 258: Performed by: INTERNAL MEDICINE

## 2023-03-11 PROCEDURE — A9270 NON-COVERED ITEM OR SERVICE: HCPCS | Performed by: INTERNAL MEDICINE

## 2023-03-11 PROCEDURE — 84100 ASSAY OF PHOSPHORUS: CPT

## 2023-03-11 PROCEDURE — 700101 HCHG RX REV CODE 250: Performed by: INTERNAL MEDICINE

## 2023-03-11 PROCEDURE — 83735 ASSAY OF MAGNESIUM: CPT

## 2023-03-11 PROCEDURE — 80053 COMPREHEN METABOLIC PANEL: CPT

## 2023-03-11 PROCEDURE — 700102 HCHG RX REV CODE 250 W/ 637 OVERRIDE(OP): Performed by: INTERNAL MEDICINE

## 2023-03-11 PROCEDURE — 99223 1ST HOSP IP/OBS HIGH 75: CPT | Mod: AI | Performed by: INTERNAL MEDICINE

## 2023-03-11 RX ORDER — SODIUM CHLORIDE 9 MG/ML
INJECTION, SOLUTION INTRAVENOUS CONTINUOUS
Status: DISCONTINUED | OUTPATIENT
Start: 2023-03-11 | End: 2023-03-11

## 2023-03-11 RX ORDER — LORAZEPAM 2 MG/1
4 TABLET ORAL
Status: DISCONTINUED | OUTPATIENT
Start: 2023-03-11 | End: 2023-03-15 | Stop reason: HOSPADM

## 2023-03-11 RX ORDER — LORAZEPAM 2 MG/1
2 TABLET ORAL
Status: DISCONTINUED | OUTPATIENT
Start: 2023-03-11 | End: 2023-03-15 | Stop reason: HOSPADM

## 2023-03-11 RX ORDER — GAUZE BANDAGE 2" X 2"
100 BANDAGE TOPICAL DAILY
Status: COMPLETED | OUTPATIENT
Start: 2023-03-12 | End: 2023-03-15

## 2023-03-11 RX ORDER — BISACODYL 10 MG
10 SUPPOSITORY, RECTAL RECTAL
Status: DISCONTINUED | OUTPATIENT
Start: 2023-03-11 | End: 2023-03-15 | Stop reason: HOSPADM

## 2023-03-11 RX ORDER — LORAZEPAM 2 MG/ML
2 INJECTION INTRAMUSCULAR
Status: DISCONTINUED | OUTPATIENT
Start: 2023-03-11 | End: 2023-03-15 | Stop reason: HOSPADM

## 2023-03-11 RX ORDER — DEXTROAMPHETAMINE SACCHARATE, AMPHETAMINE ASPARTATE, DEXTROAMPHETAMINE SULFATE AND AMPHETAMINE SULFATE 3.75; 3.75; 3.75; 3.75 MG/1; MG/1; MG/1; MG/1
15 TABLET ORAL 2 TIMES DAILY
COMMUNITY

## 2023-03-11 RX ORDER — DULOXETIN HYDROCHLORIDE 20 MG/1
20 CAPSULE, DELAYED RELEASE ORAL DAILY
Status: DISCONTINUED | OUTPATIENT
Start: 2023-03-11 | End: 2023-03-15 | Stop reason: HOSPADM

## 2023-03-11 RX ORDER — CHLORDIAZEPOXIDE HYDROCHLORIDE 25 MG/1
25 CAPSULE, GELATIN COATED ORAL 2 TIMES DAILY
Status: COMPLETED | OUTPATIENT
Start: 2023-03-11 | End: 2023-03-12

## 2023-03-11 RX ORDER — LORAZEPAM 0.5 MG/1
0.5 TABLET ORAL EVERY 4 HOURS PRN
Status: DISCONTINUED | OUTPATIENT
Start: 2023-03-11 | End: 2023-03-15 | Stop reason: HOSPADM

## 2023-03-11 RX ORDER — LORAZEPAM 1 MG/1
1 TABLET ORAL EVERY 4 HOURS PRN
Status: DISCONTINUED | OUTPATIENT
Start: 2023-03-11 | End: 2023-03-15 | Stop reason: HOSPADM

## 2023-03-11 RX ORDER — POLYETHYLENE GLYCOL 3350 17 G/17G
1 POWDER, FOR SOLUTION ORAL
Status: DISCONTINUED | OUTPATIENT
Start: 2023-03-11 | End: 2023-03-15 | Stop reason: HOSPADM

## 2023-03-11 RX ORDER — CHLORDIAZEPOXIDE HYDROCHLORIDE 25 MG/1
25 CAPSULE, GELATIN COATED ORAL EVERY 8 HOURS
Status: DISCONTINUED | OUTPATIENT
Start: 2023-03-11 | End: 2023-03-11

## 2023-03-11 RX ORDER — LORAZEPAM 2 MG/ML
0.5 INJECTION INTRAMUSCULAR EVERY 4 HOURS PRN
Status: DISCONTINUED | OUTPATIENT
Start: 2023-03-11 | End: 2023-03-15 | Stop reason: HOSPADM

## 2023-03-11 RX ORDER — SODIUM CHLORIDE AND POTASSIUM CHLORIDE 300; 900 MG/100ML; MG/100ML
INJECTION, SOLUTION INTRAVENOUS CONTINUOUS
Status: DISCONTINUED | OUTPATIENT
Start: 2023-03-11 | End: 2023-03-13

## 2023-03-11 RX ORDER — LORAZEPAM 2 MG/ML
1 INJECTION INTRAMUSCULAR
Status: DISCONTINUED | OUTPATIENT
Start: 2023-03-11 | End: 2023-03-15 | Stop reason: HOSPADM

## 2023-03-11 RX ORDER — CLONIDINE HYDROCHLORIDE 0.1 MG/1
0.1 TABLET ORAL
Status: DISCONTINUED | OUTPATIENT
Start: 2023-03-11 | End: 2023-03-15 | Stop reason: HOSPADM

## 2023-03-11 RX ORDER — ENOXAPARIN SODIUM 100 MG/ML
40 INJECTION SUBCUTANEOUS DAILY
Status: DISCONTINUED | OUTPATIENT
Start: 2023-03-11 | End: 2023-03-15 | Stop reason: HOSPADM

## 2023-03-11 RX ORDER — LORAZEPAM 2 MG/ML
1.5 INJECTION INTRAMUSCULAR
Status: DISCONTINUED | OUTPATIENT
Start: 2023-03-11 | End: 2023-03-15 | Stop reason: HOSPADM

## 2023-03-11 RX ORDER — FOLIC ACID 1 MG/1
1 TABLET ORAL DAILY
Status: COMPLETED | OUTPATIENT
Start: 2023-03-12 | End: 2023-03-15

## 2023-03-11 RX ORDER — AMOXICILLIN 250 MG
2 CAPSULE ORAL 2 TIMES DAILY
Status: DISCONTINUED | OUTPATIENT
Start: 2023-03-11 | End: 2023-03-15 | Stop reason: HOSPADM

## 2023-03-11 RX ORDER — ACETAMINOPHEN 325 MG/1
650 TABLET ORAL EVERY 6 HOURS PRN
Status: DISCONTINUED | OUTPATIENT
Start: 2023-03-11 | End: 2023-03-15 | Stop reason: HOSPADM

## 2023-03-11 RX ADMIN — SODIUM CHLORIDE: 9 INJECTION, SOLUTION INTRAVENOUS at 08:13

## 2023-03-11 RX ADMIN — LORAZEPAM 4 MG: 2 TABLET ORAL at 08:42

## 2023-03-11 RX ADMIN — CHLORDIAZEPOXIDE HYDROCHLORIDE 25 MG: 25 CAPSULE ORAL at 05:39

## 2023-03-11 RX ADMIN — LORAZEPAM 1 MG: 1 TABLET ORAL at 21:03

## 2023-03-11 RX ADMIN — POTASSIUM CHLORIDE AND SODIUM CHLORIDE: 900; 300 INJECTION, SOLUTION INTRAVENOUS at 13:22

## 2023-03-11 RX ADMIN — CHLORDIAZEPOXIDE HYDROCHLORIDE 25 MG: 25 CAPSULE ORAL at 17:12

## 2023-03-11 RX ADMIN — THIAMINE HYDROCHLORIDE: 100 INJECTION, SOLUTION INTRAMUSCULAR; INTRAVENOUS at 04:05

## 2023-03-11 RX ADMIN — LORAZEPAM 2 MG: 2 TABLET ORAL at 15:59

## 2023-03-11 RX ADMIN — POTASSIUM CHLORIDE AND SODIUM CHLORIDE: 900; 300 INJECTION, SOLUTION INTRAVENOUS at 21:31

## 2023-03-11 RX ADMIN — LORAZEPAM 1 MG: 1 TABLET ORAL at 12:34

## 2023-03-11 RX ADMIN — CLONIDINE HYDROCHLORIDE 0.1 MG: 0.1 TABLET ORAL at 23:36

## 2023-03-11 RX ADMIN — LORAZEPAM 2 MG: 2 TABLET ORAL at 10:26

## 2023-03-11 RX ADMIN — LORAZEPAM 3 MG: 1 TABLET ORAL at 03:42

## 2023-03-11 RX ADMIN — ENOXAPARIN SODIUM 40 MG: 40 INJECTION SUBCUTANEOUS at 17:12

## 2023-03-11 ASSESSMENT — LIFESTYLE VARIABLES
TOTAL SCORE: 15
ANXIETY: *
TOTAL SCORE: 6
NAUSEA AND VOMITING: NO NAUSEA AND NO VOMITING
TOTAL SCORE: VERY MILD ITCHING, PINS AND NEEDLES SENSATION, BURNING OR NUMBNESS
AUDITORY DISTURBANCES: VERY MILD HARSHNESS OR ABILITY TO FRIGHTEN
TREMOR: TREMOR NOT VISIBLE BUT CAN BE FELT, FINGERTIP TO FINGERTIP
VISUAL DISTURBANCES: MODERATE SENSITIVITY
DOES PATIENT WANT TO TALK TO SOMEONE ABOUT QUITTING: YES
ORIENTATION AND CLOUDING OF SENSORIUM: DATE DISORIENTATION BY NO MORE THAN TWO CALENDAR DAYS
AGITATION: NORMAL ACTIVITY
HEADACHE, FULLNESS IN HEAD: MODERATELY SEVERE
VISUAL DISTURBANCES: VERY MILD SENSITIVITY
ANXIETY: MILDLY ANXIOUS
NAUSEA AND VOMITING: MILD NAUSEA WITH NO VOMITING
PAROXYSMAL SWEATS: BARELY PERCEPTIBLE SWEATING. PALMS MOIST
EVER FELT BAD OR GUILTY ABOUT YOUR DRINKING: YES
ALCOHOL_USE: YES
NAUSEA AND VOMITING: *
PAROXYSMAL SWEATS: NO SWEAT VISIBLE
HEADACHE, FULLNESS IN HEAD: MILD
ORIENTATION AND CLOUDING OF SENSORIUM: ORIENTED AND CAN DO SERIAL ADDITIONS
PAROXYSMAL SWEATS: NO SWEAT VISIBLE
ORIENTATION AND CLOUDING OF SENSORIUM: ORIENTED AND CAN DO SERIAL ADDITIONS
ORIENTATION AND CLOUDING OF SENSORIUM: ORIENTED AND CAN DO SERIAL ADDITIONS
PAROXYSMAL SWEATS: NO SWEAT VISIBLE
PAROXYSMAL SWEATS: BARELY PERCEPTIBLE SWEATING. PALMS MOIST
VISUAL DISTURBANCES: VERY MILD SENSITIVITY
ORIENTATION AND CLOUDING OF SENSORIUM: ORIENTED AND CAN DO SERIAL ADDITIONS
TOTAL SCORE: 10
ORIENTATION AND CLOUDING OF SENSORIUM: ORIENTED AND CAN DO SERIAL ADDITIONS
NAUSEA AND VOMITING: MILD NAUSEA WITH NO VOMITING
TREMOR: *
EVER HAD A DRINK FIRST THING IN THE MORNING TO STEADY YOUR NERVES TO GET RID OF A HANGOVER: YES
ANXIETY: MODERATELY ANXIOUS OR GUARDED, SO ANXIETY IS INFERRED
ANXIETY: *
TREMOR: TREMOR NOT VISIBLE BUT CAN BE FELT, FINGERTIP TO FINGERTIP
HEADACHE, FULLNESS IN HEAD: MODERATE
AUDITORY DISTURBANCES: VERY MILD HARSHNESS OR ABILITY TO FRIGHTEN
NAUSEA AND VOMITING: MILD NAUSEA WITH NO VOMITING
AGITATION: NORMAL ACTIVITY
TREMOR: *
PAROXYSMAL SWEATS: BARELY PERCEPTIBLE SWEATING. PALMS MOIST
ORIENTATION AND CLOUDING OF SENSORIUM: ORIENTED AND CAN DO SERIAL ADDITIONS
TOTAL SCORE: 11
ORIENTATION AND CLOUDING OF SENSORIUM: ORIENTED AND CAN DO SERIAL ADDITIONS
PAROXYSMAL SWEATS: NO SWEAT VISIBLE
TOTAL SCORE: VERY MILD ITCHING, PINS AND NEEDLES SENSATION, BURNING OR NUMBNESS
HEADACHE, FULLNESS IN HEAD: MODERATE
TOTAL SCORE: 11
ANXIETY: *
TOTAL SCORE: 4
AGITATION: SOMEWHAT MORE THAN NORMAL ACTIVITY
ANXIETY: *
TOTAL SCORE: 9
TOTAL SCORE: MODERATELY SEVERE HALLUCINATIONS
AUDITORY DISTURBANCES: MILD HARSHNESS OR ABILITY TO FRIGHTEN
VISUAL DISTURBANCES: MILD SENSITIVITY
HEADACHE, FULLNESS IN HEAD: MILD
HEADACHE, FULLNESS IN HEAD: MILD
NAUSEA AND VOMITING: MILD NAUSEA WITH NO VOMITING
VISUAL DISTURBANCES: MILD SENSITIVITY
TOTAL SCORE: VERY MILD ITCHING, PINS AND NEEDLES SENSATION, BURNING OR NUMBNESS
TOTAL SCORE: 4
TOTAL SCORE: VERY MILD ITCHING, PINS AND NEEDLES SENSATION, BURNING OR NUMBNESS
HOW MANY TIMES IN THE PAST YEAR HAVE YOU HAD 5 OR MORE DRINKS IN A DAY: 365
NAUSEA AND VOMITING: *
HAVE PEOPLE ANNOYED YOU BY CRITICIZING YOUR DRINKING: YES
VISUAL DISTURBANCES: MODERATE SENSITIVITY
VISUAL DISTURBANCES: MODERATE SENSITIVITY
PAROXYSMAL SWEATS: BARELY PERCEPTIBLE SWEATING. PALMS MOIST
VISUAL DISTURBANCES: VERY MILD SENSITIVITY
TOTAL SCORE: 8
ORIENTATION AND CLOUDING OF SENSORIUM: ORIENTED AND CAN DO SERIAL ADDITIONS
NAUSEA AND VOMITING: MILD NAUSEA WITH NO VOMITING
TREMOR: *
DOES PATIENT WANT TO STOP DRINKING: YES
TOTAL SCORE: VERY MILD ITCHING, PINS AND NEEDLES SENSATION, BURNING OR NUMBNESS
TREMOR: NO TREMOR
TOTAL SCORE: 19
PAROXYSMAL SWEATS: NO SWEAT VISIBLE
TOTAL SCORE: 4
AGITATION: NORMAL ACTIVITY
TOTAL SCORE: 19
ORIENTATION AND CLOUDING OF SENSORIUM: ORIENTED AND CAN DO SERIAL ADDITIONS
NAUSEA AND VOMITING: MILD NAUSEA WITH NO VOMITING
AUDITORY DISTURBANCES: VERY MILD HARSHNESS OR ABILITY TO FRIGHTEN
AGITATION: NORMAL ACTIVITY
AUDITORY DISTURBANCES: VERY MILD HARSHNESS OR ABILITY TO FRIGHTEN
TOTAL SCORE: VERY MILD ITCHING, PINS AND NEEDLES SENSATION, BURNING OR NUMBNESS
HEADACHE, FULLNESS IN HEAD: VERY MILD
HEADACHE, FULLNESS IN HEAD: MILD
ANXIETY: *
AGITATION: MODERATELY FIDGETY AND RESTLESS
AUDITORY DISTURBANCES: NOT PRESENT
AUDITORY DISTURBANCES: VERY MILD HARSHNESS OR ABILITY TO FRIGHTEN
VISUAL DISTURBANCES: MILD SENSITIVITY
NAUSEA AND VOMITING: MILD NAUSEA WITH NO VOMITING
TREMOR: MODERATE TREMOR WITH ARMS EXTENDED
AUDITORY DISTURBANCES: NOT PRESENT
PAROXYSMAL SWEATS: NO SWEAT VISIBLE
TOTAL SCORE: 29
TREMOR: NO TREMOR
ANXIETY: MILDLY ANXIOUS
TACTILE DISTURBANCES: VERY MILD ITCHING, PINS AND NEEDLES SENSATION, BURNING OR NUMBNESS
AUDITORY DISTURBANCES: MILD HARSHNESS OR ABILITY TO FRIGHTEN
TOTAL SCORE: MILD ITCHING, PINS AND NEEDLES SENSATION, BURNING OR NUMBNESS
ANXIETY: *
HEADACHE, FULLNESS IN HEAD: MILD
TREMOR: *
CONSUMPTION TOTAL: POSITIVE
ANXIETY: MILDLY ANXIOUS
AGITATION: NORMAL ACTIVITY
AGITATION: SOMEWHAT MORE THAN NORMAL ACTIVITY
AGITATION: NORMAL ACTIVITY
SUBSTANCE_ABUSE: 1
AUDITORY DISTURBANCES: VERY MILD HARSHNESS OR ABILITY TO FRIGHTEN
ON A TYPICAL DAY WHEN YOU DRINK ALCOHOL HOW MANY DRINKS DO YOU HAVE: 8
AGITATION: *
AVERAGE NUMBER OF DAYS PER WEEK YOU HAVE A DRINK CONTAINING ALCOHOL: 7
HAVE YOU EVER FELT YOU SHOULD CUT DOWN ON YOUR DRINKING: YES
TOTAL SCORE: VERY MILD ITCHING, PINS AND NEEDLES SENSATION, BURNING OR NUMBNESS
HEADACHE, FULLNESS IN HEAD: MODERATE
VISUAL DISTURBANCES: MILD SENSITIVITY
TREMOR: *

## 2023-03-11 ASSESSMENT — COGNITIVE AND FUNCTIONAL STATUS - GENERAL
TURNING FROM BACK TO SIDE WHILE IN FLAT BAD: A LITTLE
HELP NEEDED FOR BATHING: A LOT
DRESSING REGULAR UPPER BODY CLOTHING: A LOT
DAILY ACTIVITIY SCORE: 16
CLIMB 3 TO 5 STEPS WITH RAILING: A LOT
MOBILITY SCORE: 14
DRESSING REGULAR LOWER BODY CLOTHING: A LITTLE
MOVING TO AND FROM BED TO CHAIR: A LITTLE
SUGGESTED CMS G CODE MODIFIER DAILY ACTIVITY: CK
MOVING FROM LYING ON BACK TO SITTING ON SIDE OF FLAT BED: A LOT
WALKING IN HOSPITAL ROOM: A LOT
PERSONAL GROOMING: A LITTLE
STANDING UP FROM CHAIR USING ARMS: A LOT
TOILETING: A LOT
SUGGESTED CMS G CODE MODIFIER MOBILITY: CL

## 2023-03-11 ASSESSMENT — ENCOUNTER SYMPTOMS
HEMOPTYSIS: 0
DIZZINESS: 0
BLURRED VISION: 0
WEIGHT LOSS: 0
NERVOUS/ANXIOUS: 1
NAUSEA: 0
VOMITING: 0
CONSTIPATION: 0
MYALGIAS: 0
EYE REDNESS: 1
DIARRHEA: 0
DOUBLE VISION: 0
PALPITATIONS: 0
CHILLS: 0
WEAKNESS: 0
NECK PAIN: 0
DIZZINESS: 1
ORTHOPNEA: 0
SHORTNESS OF BREATH: 0
ABDOMINAL PAIN: 0
COUGH: 0
HALLUCINATIONS: 1
FEVER: 0
PHOTOPHOBIA: 0
CLAUDICATION: 0
SPEECH CHANGE: 0

## 2023-03-11 ASSESSMENT — FIBROSIS 4 INDEX: FIB4 SCORE: 3.17

## 2023-03-11 ASSESSMENT — PAIN DESCRIPTION - PAIN TYPE
TYPE: ACUTE PAIN
TYPE: ACUTE PAIN

## 2023-03-11 NOTE — ED PROVIDER NOTES
ED Provider Note    CHIEF COMPLAINT  Chief Complaint   Patient presents with    ETOH Withdrawal     Pt BIBA from [facility] for ETOH withdraw. Pt has long hx of chronic ETOH abuse, alcohol withdraw, substance abuse. Recent visit x2 days ago for same. Pt does not remember last drink but states he does not feel well and says ''I feel like I am dying and starting to withdraw''       EXTERNAL RECORDS REVIEWED  Outpatient Notes patient was seen here twice on 3/8 for intoxication with both alcohol and amphetamines    HPI/ROS  LIMITATION TO HISTORY   Select: : None      Gopal Ceja is a 37 y.o. male who presents to the emerged department chief complaint of feeling unwell.  The patient states he does have a history of alcohol dependence and states that his last drink was a few hours ago but he feels like he starting withdrawal he feels nauseated anxious he is got a headache he states that he is never gone through it this bad before he is never had seizures but he is afraid that he might.  He states he starting to see some wavy things in the air.  He has some nausea but no vomiting no chest pain or shortness of breath.  He also has been using amphetamines lately but denies any today    PAST MEDICAL HISTORY   has a past medical history of Acute anxiety, ADHD (attention deficit hyperactivity disorder), ADHD (attention deficit hyperactivity disorder), Alcohol abuse, Bipolar affective (HCC), Depression, Drug abuse and dependence (HCC), Ectrodactyly-ectodermal dysplasia-clefting syndrome, ETOH abuse, and Psychiatric disorder.    SURGICAL HISTORY   has a past surgical history that includes other orthopedic surgery and exploratory of abdomen (N/A, 9/26/2019).    FAMILY HISTORY  Family History   Problem Relation Age of Onset    Hypertension Mother     Heart Disease Neg Hx     Hyperlipidemia Neg Hx        SOCIAL HISTORY  Social History     Tobacco Use    Smoking status: Never    Smokeless tobacco: Never    Tobacco comments:  "    Smokes couple of times a month   Vaping Use    Vaping Use: Never used   Substance and Sexual Activity    Alcohol use: Yes     Comment: last drink today    Drug use: Yes     Types: Inhaled     Comment: meth yesterday (smokes Meth)/THC    Sexual activity: Not on file       CURRENT MEDICATIONS  Home Medications       Reviewed by Josefa Aguila R.N. (Registered Nurse) on 03/10/23 at 2207  Med List Status: Partial     Medication Last Dose Status   acetaminophen (TYLENOL) 325 MG Tab  Active   amphetamine-dextroamphetamine (ADDERALL XR) 25 MG XR capsule  Active   clonazePAM (KLONOPIN) 1 MG Tab  Active   DULoxetine (CYMBALTA) 20 MG Cap DR Particles  Active   folic acid (FOLVITE) 1 MG Tab  Active   multivitamin Tab  Active   propranolol (INDERAL) 20 MG Tab  Active   thiamine (THIAMINE) 100 MG tablet  Active                    ALLERGIES  No Known Allergies    PHYSICAL EXAM  VITAL SIGNS: BP (!) 166/106   Pulse (!) 107   Temp 36.4 °C (97.6 °F) (Temporal)   Resp 20   Ht 1.727 m (5' 8\")   Wt 72.5 kg (159 lb 13.3 oz)   SpO2 93%   BMI 24.30 kg/m²    Pulse Ox Interpretation:   Pulse Ox is within normal limits  Constitutional: Alert in no apparent distress.  HENT: Normocephalic atraumatic, dry mucous membranes with tongue fasciculations  Eyes: PER, Conjunctiva normal, Non-icteric.   Neck: Normal range of motion, No tenderness, Supple, No stridor.   Cardiovascular: Regular rate and rhythm, no murmurs.   Thorax & Lungs: Normal breath sounds, No respiratory distress, No wheezing, No chest tenderness.   Abdomen: Bowel sounds normal, Soft, No tenderness, No pulsatile masses. No peritoneal signs.  Skin: Warm, Dry, No erythema, No rash.   Back: No bony tenderness, No CVA tenderness.   Extremities/MSK: Intact equal distal pulses, No edema, bilateral hand tremors chronic deformities to both hands genetic fused fingers no tenderness, No cyanosis, no major deformities noted  Neurologic: Alert and oriented x3, No focal " deficits noted.       DIAGNOSTIC STUDIES / PROCEDURES  LABS  Labs Reviewed   CBC WITH DIFFERENTIAL - Abnormal; Notable for the following components:       Result Value    WBC 10.9 (*)     MCHC 35.4 (*)     Lymphocytes 21.70 (*)     Neutrophils (Absolute) 7.76 (*)     All other components within normal limits   COMP METABOLIC PANEL - Abnormal; Notable for the following components:    Chloride 94 (*)     Anion Gap 20.0 (*)     Bun 7 (*)     AST(SGOT) 164 (*)     ALT(SGPT) 99 (*)     Alkaline Phosphatase 115 (*)     Total Protein 8.8 (*)     Globulin 4.2 (*)     All other components within normal limits   DIAGNOSTIC ALCOHOL - Abnormal; Notable for the following components:    Diagnostic Alcohol 366.4 (*)     All other components within normal limits   CORRECTED CALCIUM - Abnormal; Notable for the following components:    Correct Calcium 8.3 (*)     All other components within normal limits   LIPASE   PHOSPHORUS   MAGNESIUM   ESTIMATED GFR         COURSE & MEDICAL DECISION MAKING    ED Observation Status? Yes; I am placing the patient in to an observation status due to a diagnostic uncertainty as well as therapeutic intensity. Patient placed in observation status at 11:27 PM, 3/10/2023.     Observation plan is as follows: Labs medication IV fluids repeat analysis    Upon Reevaluation, the patient's condition has: not improved; and will be escalated to hospitalization.    Patient discharged from ED Observation status at 1:10 AM  (Time) 03/11/23  (Date).     INITIAL ASSESSMENT, COURSE AND PLAN  Care Narrative: Patient presents to the hospital with known intoxication and amphetamine abuse.  Unfortunately he looks pretty uncomfortable I have a feeling his he was gone to be pretty high.  Plan is for resuscitation for alcohol withdrawal likely with intoxication.  Including laboratory analysis IV fluids antiemetics and benzodiazepine.    11:47 PM  Unfortunately patient CIWA score is 22 he will be treated with Ativan or still  pending laboratory analysis and IV fluids.      1:10 AM  Spoke w Dr Callahan for admission as the patient has a very high CIWA score with also an extremely elevated alcohol score and he is alert and oriented.  Is very concerning for poor course with alcohol withdrawal.  He has accepted the patient at this time.    HYDRATION: Based on the patient's presentation of CIWA the patient was given IV fluids. IV Hydration was used because oral hydration was not as rapid as required. Upon recheck following hydration, the patient was minimally improved.      ADDITIONAL PROBLEM LIST  Alcohol withdrawal with intoxication  Agitation anxiety  Mild headache    DISPOSITION AND DISCUSSIONS    Patient will be admitted in guarded condition because he is quite ill with an elevated CIWA score despite his extremely elevated alcohol and he is alert and oriented.  This could lead to a prolonged and difficult course of alcohol withdrawal.  He will be hospitalized for the management of this at this time.    I have discussed management of the patient with the following physicians and BRUNO's:  taj     Decision tools and prescription drugs considered including, but not limited to:  CIWA 22  .    FINAL DIAGNOSIS  Alcohol intoxication with withdrawal  Dehydration  Tachycardia  Alcoholic hepatitis       Electronically signed by: Kristi Cherry M.D., 3/10/2023 11:27 PM

## 2023-03-11 NOTE — ASSESSMENT & PLAN NOTE
With elevated liver enzymes AST more than ALT  Normal bilirubin  Labs daily  Multivitamins  LFTs improving

## 2023-03-11 NOTE — PROGRESS NOTES
Med rec completed per patient at bedside and per patient home pharmacy. Patient states he has not been taking medications for weeks as he has been drinking.    No abx in the last 30 days.    Patient has NKDA.    Home pharmacy is [facility].

## 2023-03-11 NOTE — ED NOTES
Pt transported to floor with all belongings and chart in stable condition with full O2 tank. VSS on 2L

## 2023-03-11 NOTE — ASSESSMENT & PLAN NOTE
History of heavy drinking  Clean wound with intoxication and level around 300  Complicated with delirium and alcoholic hepatitis  CIWA protocol with IV fluid and multivitamins  Librium twice daily since we anticipate severe withdrawal symptoms  Monitor electrolytes    CIWA scores downtrending however still having severe WD (hallucinations, anxiety, tachycardia, hypertensive, poor po intake)

## 2023-03-11 NOTE — H&P
Hospital Medicine History & Physical Note    Date of Service  3/11/2023    Primary Care Physician  Pcp Pt States None    Consultants  None    Code Status  Full Code    Chief Complaint  Chief Complaint   Patient presents with    ETOH Withdrawal     Pt BIBA from [facility] for ETOH withdraw. Pt has long hx of chronic ETOH abuse, alcohol withdraw, substance abuse. Recent visit x2 days ago for same. Pt does not remember last drink but states he does not feel well and says ''I feel like I am dying and starting to withdraw''       History of Presenting Illness    37-year-old male with history of alcoholism presented 3/11 with alcohol intoxication.  Patient has history of alcoholic abuse with multiple ED visits for alcohol withdrawal and intoxication.  Patient was given Ativan and he was very lethargic during my evaluation.  Patient had some nausea with no significant vomiting, no chest pain or shortness of breath.  Vital signs show tachycardia with hypertension and labs showed elevated liver enzymes with AST was 64 and ALT 99 and alcoholic liver 366.  Patient will be admitted to the hospital for alcoholic intoxication.    I discussed the plan of care with patient and bedside RN.    Review of Systems  Review of Systems   Constitutional:  Positive for malaise/fatigue. Negative for chills, fever and weight loss.   HENT:  Negative for ear pain, hearing loss and tinnitus.    Eyes:  Positive for redness. Negative for blurred vision, double vision and photophobia.   Respiratory:  Negative for cough and hemoptysis.    Cardiovascular:  Negative for chest pain, palpitations, orthopnea and claudication.   Gastrointestinal:  Negative for abdominal pain, constipation, diarrhea, nausea and vomiting.   Genitourinary:  Negative for dysuria, frequency and urgency.   Musculoskeletal:  Negative for myalgias and neck pain.   Skin:  Negative for rash.   Neurological:  Negative for dizziness, speech change and weakness.    Psychiatric/Behavioral:  Positive for hallucinations. The patient is nervous/anxious.      Past Medical History   has a past medical history of Acute anxiety, ADHD (attention deficit hyperactivity disorder), ADHD (attention deficit hyperactivity disorder), Alcohol abuse, Bipolar affective (HCC), Depression, Drug abuse and dependence (HCC), Ectrodactyly-ectodermal dysplasia-clefting syndrome, ETOH abuse, and Psychiatric disorder.    Surgical History   has a past surgical history that includes other orthopedic surgery and pr exploratory of abdomen (N/A, 9/26/2019).     Family History  family history includes Hypertension in his mother.   Family history reviewed with patient. There is no family history that is pertinent to the chief complaint.     Social History   reports that he has never smoked. He has never used smokeless tobacco. He reports current alcohol use. He reports current drug use. Drug: Inhaled.    Allergies  No Known Allergies    Medications  Prior to Admission Medications   Prescriptions Last Dose Informant Patient Reported? Taking?   DULoxetine (CYMBALTA) 20 MG Cap DR Particles  Patient Yes No   Sig: Take 20 mg by mouth every day.   acetaminophen (TYLENOL) 325 MG Tab   No No   Sig: Take 2 Tablets by mouth every 6 hours as needed for Fever or Mild Pain.   amphetamine-dextroamphetamine (ADDERALL XR) 25 MG XR capsule  Patient Yes No   Sig: Take 25 mg by mouth every day.   clonazePAM (KLONOPIN) 1 MG Tab  Patient Yes No   Sig: Take 1 mg by mouth 2 times a day as needed. Indications: Feeling Anxious   folic acid (FOLVITE) 1 MG Tab   No No   Sig: Take 1 Tablet by mouth every day.   multivitamin Tab   No No   Sig: Take 1 Tablet by mouth every day.   propranolol (INDERAL) 20 MG Tab  Patient Yes No   Sig: Take 20 mg by mouth 2 times a day.   thiamine (THIAMINE) 100 MG tablet   No No   Sig: Take 1 Tablet by mouth every day.      Facility-Administered Medications: None       Physical Exam  Temp:  [36.4 °C (97.6  °F)] 36.4 °C (97.6 °F)  Pulse:  [107-125] 109  Resp:  [19-20] 19  BP: (131-166)/() 131/63  SpO2:  [87 %-98 %] 98 %  Blood Pressure: (!) 148/90   Temperature: 36.4 °C (97.6 °F)   Pulse: (!) 110   Respiration: 20   Pulse Oximetry: 97 %       Physical Exam  Constitutional:       General: He is in acute distress.      Appearance: He is ill-appearing.      Comments: Disheveled    HENT:      Head: Normocephalic and atraumatic.   Eyes:      General: No scleral icterus.  Cardiovascular:      Rate and Rhythm: Tachycardia present.      Heart sounds: No murmur heard.  Pulmonary:      Effort: No respiratory distress.      Breath sounds: No wheezing.   Abdominal:      General: There is no distension.      Tenderness: There is no abdominal tenderness. There is no right CVA tenderness, left CVA tenderness or guarding.   Musculoskeletal:      Right lower leg: No edema.      Left lower leg: No edema.      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   Skin:     Coloration: Skin is not jaundiced.      Findings: No bruising, lesion or rash.   Neurological:      Mental Status: He is alert. He is disoriented.      Cranial Nerves: No cranial nerve deficit.      Sensory: No sensory deficit.      Motor: Weakness present.      Gait: Gait normal.      Comments: Not cooperative with exam       Laboratory:  Recent Labs     03/08/23  2130 03/10/23  2340   WBC 9.4 10.9*   RBC 5.83 5.55   HEMOGLOBIN 17.9 17.2   HEMATOCRIT 52.3* 48.6   MCV 89.7 87.6   MCH 30.7 31.0   MCHC 34.2 35.4*   RDW 43.0 43.0   PLATELETCT 249 187   MPV 10.3 10.4     Recent Labs     03/08/23  2130 03/10/23  2340   SODIUM 137 136   POTASSIUM 4.4 3.9   CHLORIDE 93* 94*   CO2 23 22   GLUCOSE 99 97   BUN 11 7*   CREATININE 0.82 0.69   CALCIUM 9.1 8.8     Recent Labs     03/08/23  2130 03/10/23  2340   ALTSGPT 40 99*   ASTSGOT 69* 164*   ALKPHOSPHAT 119* 115*   TBILIRUBIN 0.5 0.6   LIPASE  --  50   GLUCOSE 99 97          No results for input(s): NTPROBNP in the last 72 hours.      No results for input(s): TROPONINT in the last 72 hours.    Imaging:  No orders to display       X-Ray:  I have personally reviewed the images and compared with prior images.  EKG:  I have personally reviewed the images and compared with prior images.    Assessment/Plan:  Justification for Admission Status  I anticipate this patient will require at least two midnights for appropriate medical management, necessitating inpatient admission because alcoholic intoxication and withdrawal    Patient will need a Med/Surg bed on MEDICAL service .  The need is secondary to alcohol intoxication.    * Alcohol intoxication in active alcoholic with delirium (HCC)- (present on admission)  Assessment & Plan  History of heavy drinking  Clean wound with intoxication and level around 300  Complicated with delirium and alcoholic hepatitis  [facility] protocol with IV fluid and multivitamins  Librium twice daily since we anticipate severe withdrawal symptoms  Monitor electrolytes    Metabolic acidosis  Assessment & Plan  Due to dehydration  IV fluid with monitoring with labs    Psychiatric disorder- (present on admission)  Assessment & Plan  Follow-up with psychiatrist    Alcoholic hepatitis- (present on admission)  Assessment & Plan  With elevated liver enzymes AST more than ALT  Normal bilirubin  Labs daily  Multivitamins    Tachycardia- (present on admission)  Assessment & Plan  Due to withdrawal and dehydration  IV fluid    Methamphetamine addiction (HCC)- (present on admission)  Assessment & Plan  Urine drug screen was negative    Polysubstance abuse (HCC)- (present on admission)  Assessment & Plan  Urine drug screen is negative        VTE prophylaxis: SCDs/TEDs and enoxaparin ppx

## 2023-03-11 NOTE — CARE PLAN
The patient is Watcher - Medium risk of patient condition declining or worsening    Shift Goals  Clinical Goals: Monitor for seizures, medicate per [facility] protocol  Patient Goals: rest, decrease anxiety    Progress made toward(s) clinical / shift goals:    Problem: Knowledge Deficit - Standard  Goal: Patient and family/care givers will demonstrate understanding of plan of care, disease process/condition, diagnostic tests and medications  Outcome: Progressing  Note: Pt educated on plan of care, pt verbalizes understanding.      Problem: Seizure Precautions  Goal: Implementation of seizure precautions  Outcome: Progressing  Note: Appropriate seizure precautions in place at this time.        Patient is not progressing towards the following goals:

## 2023-03-11 NOTE — ED NOTES
"Break RN:  Report called to floor RN and transport requested.    Patient resting comfortably, resp even and unlabored on 2L/NC, NAD, and no needs at this time.     /63   Pulse (!) 109   Temp 36.4 °C (97.6 °F) (Temporal)   Resp 19   Ht 1.727 m (5' 8\")   Wt 72.5 kg (159 lb 13.3 oz)   SpO2 98%    "

## 2023-03-11 NOTE — ED TRIAGE NOTES
"Chief Complaint   Patient presents with    ETOH Withdrawal     Pt BIBA from [facility] for ETOH withdraw. Pt has long hx of chronic ETOH abuse, alcohol withdraw, substance abuse. Recent visit x2 days ago for same. Pt does not remember last drink but states he does not feel well and says ''I feel like I am dying and starting to withdraw''     BP (!) 161/115   Pulse (!) 125   Temp 36.4 °C (97.6 °F) (Temporal)   Resp 20   Ht 1.727 m (5' 8\")   Wt 72.5 kg (159 lb 13.3 oz)   SpO2 96%   BMI 24.30 kg/m²     Pt BIBA to triage from [facility] for above cc  Pt endorsing s/s of alcohol withdraw, cannot exactly remember last drink but states general unwell feeling, pain/N/V  Pt has significant hx r/t to same cc    Pt to lobby, educated on triage process  "

## 2023-03-11 NOTE — HOSPITAL COURSE
37-year-old male with history of alcoholism presented 3/11 with alcohol intoxication.  Patient has history of alcoholic abuse with multiple ED visits for alcohol withdrawal and intoxication.  Patient was given Ativan and he was very lethargic during my evaluation.  Patient had some nausea with no significant vomiting, no chest pain or shortness of breath.  Vital signs show tachycardia with hypertension and labs showed elevated liver enzymes with AST was 64 and ALT 99 and alcoholic liver 366.  Patient will be admitted to the hospital for alcoholic intoxication.    Hospitalization c/b cellulitis, conjunctivitis started on abx.

## 2023-03-11 NOTE — PROGRESS NOTES
[facility] Medicine Daily Progress Note    Date of Service  3/11/2023    Chief Complaint  Gopal Ceja is a 37 y.o. male admitted 3/10/2023 with ETOH WD.     Hospital Course  37-year-old male with history of alcoholism presented 3/11 with alcohol intoxication.  Patient has history of alcoholic abuse with multiple ED visits for alcohol withdrawal and intoxication.  Patient was given Ativan and he was very lethargic during my evaluation.  Patient had some nausea with no significant vomiting, no chest pain or shortness of breath.  Vital signs show tachycardia with hypertension and labs showed elevated liver enzymes with AST was 64 and ALT 99 and alcoholic liver 366.  Patient will be admitted to the hospital for alcoholic intoxication.    Interval Problem Update  - Admitted early this am  - High CIWA scores: 10-33 (last one was 10), does have h/o ICU stays and seizure related to WD  - Having anxiety and dizziness (vertigo)    I have discussed this patient's plan of care and discharge plan at IDT rounds today with Case Management, Nursing, Nursing leadership, and other members of the IDT team.    Consultants/Specialty  None    Code Status  Full Code    Disposition  Patient is not medically cleared for discharge.   Anticipate discharge to to home with close outpatient follow-up.  I have placed the appropriate orders for post-discharge needs.    Review of Systems  Review of Systems   Constitutional:  Positive for malaise/fatigue. Negative for chills and fever.   Respiratory:  Negative for shortness of breath.    Cardiovascular:  Negative for chest pain and leg swelling.   Gastrointestinal:  Negative for abdominal pain and vomiting.   Neurological:  Positive for dizziness.   Psychiatric/Behavioral:  Positive for substance abuse. The patient is nervous/anxious.       Physical Exam  Temp:  [36.4 °C (97.6 °F)-37 °C (98.6 °F)] 37 °C (98.6 °F)  Pulse:  [107-125] 119  Resp:  [16-20] 17  BP: (126-166)/() 131/81  SpO2:   [87 %-98 %] 93 %    Physical Exam  Constitutional:       General: He is not in acute distress.     Appearance: He is ill-appearing. He is not toxic-appearing or diaphoretic.   HENT:      Head: Normocephalic and atraumatic.      Nose: Nose normal.      Mouth/Throat:      Mouth: Mucous membranes are dry.   Eyes:      General: No scleral icterus.     Conjunctiva/sclera: Conjunctivae normal.   Cardiovascular:      Rate and Rhythm: Regular rhythm. Tachycardia present.      Heart sounds: No murmur heard.    No friction rub. No gallop.   Pulmonary:      Effort: Pulmonary effort is normal.      Breath sounds: Normal breath sounds.   Abdominal:      General: Bowel sounds are normal. There is no distension.      Palpations: Abdomen is soft.      Tenderness: There is no abdominal tenderness.   Musculoskeletal:      Right lower leg: No edema.      Left lower leg: No edema.      Comments: Deformities of upper and lower extremities    Skin:     Findings: Erythema (erythema around face (sunburn?)) present.   Neurological:      Mental Status: He is alert and oriented to person, place, and time.   Psychiatric:         Attention and Perception: Attention normal.         Mood and Affect: Mood is anxious.         Behavior: Behavior is cooperative.         Thought Content: Thought content normal.       Fluids  No intake or output data in the 24 hours ending 03/11/23 1234    Laboratory  Recent Labs     03/08/23  2130 03/10/23  2340 03/11/23  1147   WBC 9.4 10.9* 8.2   RBC 5.83 5.55 4.83   HEMOGLOBIN 17.9 17.2 14.8   HEMATOCRIT 52.3* 48.6 45.1   MCV 89.7 87.6 93.4   MCH 30.7 31.0 30.6   MCHC 34.2 35.4* 32.8*   RDW 43.0 43.0 46.4   PLATELETCT 249 187 136*   MPV 10.3 10.4 10.9     Recent Labs     03/08/23  2130 03/10/23  2340 03/11/23  1147   SODIUM 137 136 136   POTASSIUM 4.4 3.9 3.4*   CHLORIDE 93* 94* 99   CO2 23 22 24   GLUCOSE 99 97 129*   BUN 11 7* 6*   CREATININE 0.82 0.69 0.56   CALCIUM 9.1 8.8 8.2*             Recent Labs      03/08/23  2130 03/10/23  2340 03/11/23  1147   ASTSGOT 69* 164* 130*   ALTSGPT 40 99* 80*   TBILIRUBIN 0.5 0.6 0.9   GLOBULIN 4.4* 4.2* 3.4             Imaging  No orders to display        Assessment/Plan  * Alcohol intoxication in active alcoholic with delirium (HCC)- (present on admission)  Assessment & Plan  History of heavy drinking  Clean wound with intoxication and level around 300  Complicated with delirium and alcoholic hepatitis  CIWA protocol with IV fluid and multivitamins  Librium twice daily since we anticipate severe withdrawal symptoms  Monitor electrolytes    High CIWA scores, if continue to have scores consistently in 20s may need to transfer to ICU, does have h/o requiring ICU for WD and h/o seizures    Metabolic acidosis  Assessment & Plan  Due to dehydration  IV fluid with monitoring with labs    Psychiatric disorder- (present on admission)  Assessment & Plan  Follow-up with psychiatrist    Alcoholic hepatitis- (present on admission)  Assessment & Plan  With elevated liver enzymes AST more than ALT  Normal bilirubin  Labs daily  Multivitamins    Tachycardia- (present on admission)  Assessment & Plan  Due to withdrawal and dehydration  IV fluid    Methamphetamine addiction (HCC)- (present on admission)  Assessment & Plan  Urine drug screen was negative    Polysubstance abuse (HCC)- (present on admission)  Assessment & Plan  Urine drug screen is negative         VTE prophylaxis: SCDs/TEDs and enoxaparin ppx    I have performed a physical exam and reviewed and updated ROS and Plan today (3/11/2023). In review of yesterday's note (3/10/2023), there are no changes except as documented above.

## 2023-03-11 NOTE — ED NOTES
Pt ambulated to bathroom and back with RN standby. CIWA performed. 15. Pt states feeling a little bit better. Updated on POC. No other needs at this time.

## 2023-03-11 NOTE — PROGRESS NOTES
4 Eyes Skin Assessment Completed by Kristi FELIPE RN and Sabiha MADRIGAL RN.    Head WDL  Ears WDL  Nose WDL  Mouth Oral decay  Neck WDL  Breast/Chest Scar  Shoulder Blades WDL  Spine WDL  (R) Arm/Elbow/Hand WDL  (L) Arm/Elbow/Hand WDL  Abdomen Scar  Groin WDL  Scrotum/Coccyx/Buttocks WDL  (R) Leg WDL  (L) Leg WDL  (R) Heel/Foot/Toe Scar and Scab  (L) Heel/Foot/Toe Scar and Scab          Devices In Places Pulse Ox, L ankle monitor       Interventions In Place Gray Ear Foams, NC W/Ear Foams, Pillows, and Pressure Redistribution Mattress    Possible Skin Injury Yes    Pictures Uploaded Into Epic Yes  Wound Consult Placed N/A  RN Wound Prevention Protocol Ordered No

## 2023-03-11 NOTE — CARE PLAN
The patient is Unstable - High likelihood or risk of patient condition declining or worsening    Shift Goals  Clinical Goals: monitor for seizures, CIWA  Patient Goals: eat, decrease anxiety    Progress made toward(s) clinical / shift goals:      Problem: Knowledge Deficit - Standard  Goal: Patient and family/care givers will demonstrate understanding of plan of care, disease process/condition, diagnostic tests and medications  Outcome: Progressing     Problem: Optimal Care for Alcohol Withdrawal  Goal: Optimal Care for the alcohol withdrawal patient  Outcome: Progressing     Problem: Seizure Precautions  Goal: Implementation of seizure precautions  Outcome: Progressing     Problem: Lifestyle Changes  Goal: Patient's ability to identify lifestyle changes and available resources to help reduce recurrence of condition will improve  Outcome: Progressing     Problem: Psychosocial  Goal: Patient's level of anxiety will decrease  Outcome: Progressing     Problem: Skin Integrity  Goal: Skin integrity is maintained or improved  Outcome: Progressing     Problem: Fall Risk  Goal: Patient will remain free from falls  Outcome: Progressing       Patient is not progressing towards the following goals:

## 2023-03-12 LAB
ALBUMIN SERPL BCP-MCNC: 3.6 G/DL (ref 3.2–4.9)
ALBUMIN/GLOB SERPL: 1.1 G/DL
ALP SERPL-CCNC: 95 U/L (ref 30–99)
ALT SERPL-CCNC: 75 U/L (ref 2–50)
ANION GAP SERPL CALC-SCNC: 10 MMOL/L (ref 7–16)
AST SERPL-CCNC: 109 U/L (ref 12–45)
BILIRUB SERPL-MCNC: 1.3 MG/DL (ref 0.1–1.5)
BUN SERPL-MCNC: 7 MG/DL (ref 8–22)
CALCIUM ALBUM COR SERPL-MCNC: 9 MG/DL (ref 8.5–10.5)
CALCIUM SERPL-MCNC: 8.7 MG/DL (ref 8.5–10.5)
CHLORIDE SERPL-SCNC: 100 MMOL/L (ref 96–112)
CO2 SERPL-SCNC: 26 MMOL/L (ref 20–33)
CREAT SERPL-MCNC: 0.66 MG/DL (ref 0.5–1.4)
GFR SERPLBLD CREATININE-BSD FMLA CKD-EPI: 124 ML/MIN/1.73 M 2
GLOBULIN SER CALC-MCNC: 3.2 G/DL (ref 1.9–3.5)
GLUCOSE SERPL-MCNC: 116 MG/DL (ref 65–99)
MAGNESIUM SERPL-MCNC: 2 MG/DL (ref 1.5–2.5)
POTASSIUM SERPL-SCNC: 3.7 MMOL/L (ref 3.6–5.5)
PROT SERPL-MCNC: 6.8 G/DL (ref 6–8.2)
SODIUM SERPL-SCNC: 136 MMOL/L (ref 135–145)

## 2023-03-12 PROCEDURE — 83735 ASSAY OF MAGNESIUM: CPT

## 2023-03-12 PROCEDURE — A9270 NON-COVERED ITEM OR SERVICE: HCPCS | Performed by: INTERNAL MEDICINE

## 2023-03-12 PROCEDURE — 93005 ELECTROCARDIOGRAM TRACING: CPT

## 2023-03-12 PROCEDURE — 99232 SBSQ HOSP IP/OBS MODERATE 35: CPT | Performed by: INTERNAL MEDICINE

## 2023-03-12 PROCEDURE — 700102 HCHG RX REV CODE 250 W/ 637 OVERRIDE(OP): Performed by: INTERNAL MEDICINE

## 2023-03-12 PROCEDURE — 80053 COMPREHEN METABOLIC PANEL: CPT

## 2023-03-12 PROCEDURE — 36415 COLL VENOUS BLD VENIPUNCTURE: CPT

## 2023-03-12 PROCEDURE — 700101 HCHG RX REV CODE 250: Performed by: INTERNAL MEDICINE

## 2023-03-12 PROCEDURE — 770004 HCHG ROOM/CARE - ONCOLOGY PRIVATE *

## 2023-03-12 RX ORDER — ONDANSETRON 2 MG/ML
4 INJECTION INTRAMUSCULAR; INTRAVENOUS EVERY 4 HOURS PRN
Status: DISCONTINUED | OUTPATIENT
Start: 2023-03-12 | End: 2023-03-15 | Stop reason: HOSPADM

## 2023-03-12 RX ORDER — PROCHLORPERAZINE EDISYLATE 5 MG/ML
10 INJECTION INTRAMUSCULAR; INTRAVENOUS EVERY 6 HOURS PRN
Status: DISCONTINUED | OUTPATIENT
Start: 2023-03-12 | End: 2023-03-15 | Stop reason: HOSPADM

## 2023-03-12 RX ORDER — HYDRALAZINE HYDROCHLORIDE 20 MG/ML
20 INJECTION INTRAMUSCULAR; INTRAVENOUS EVERY 6 HOURS PRN
Status: DISCONTINUED | OUTPATIENT
Start: 2023-03-12 | End: 2023-03-15 | Stop reason: HOSPADM

## 2023-03-12 RX ADMIN — POTASSIUM CHLORIDE AND SODIUM CHLORIDE: 900; 300 INJECTION, SOLUTION INTRAVENOUS at 14:47

## 2023-03-12 RX ADMIN — CLONIDINE HYDROCHLORIDE 0.1 MG: 0.1 TABLET ORAL at 06:03

## 2023-03-12 RX ADMIN — LORAZEPAM 1 MG: 1 TABLET ORAL at 00:59

## 2023-03-12 RX ADMIN — LORAZEPAM 1 MG: 1 TABLET ORAL at 17:21

## 2023-03-12 RX ADMIN — LORAZEPAM 1 MG: 1 TABLET ORAL at 20:56

## 2023-03-12 RX ADMIN — CLONIDINE HYDROCHLORIDE 0.1 MG: 0.1 TABLET ORAL at 07:44

## 2023-03-12 RX ADMIN — CHLORDIAZEPOXIDE HYDROCHLORIDE 25 MG: 25 CAPSULE ORAL at 17:22

## 2023-03-12 RX ADMIN — CHLORDIAZEPOXIDE HYDROCHLORIDE 25 MG: 25 CAPSULE ORAL at 05:23

## 2023-03-12 RX ADMIN — THERA TABS 1 TABLET: TAB at 05:24

## 2023-03-12 RX ADMIN — POTASSIUM CHLORIDE AND SODIUM CHLORIDE: 900; 300 INJECTION, SOLUTION INTRAVENOUS at 23:08

## 2023-03-12 RX ADMIN — LORAZEPAM 1 MG: 1 TABLET ORAL at 13:16

## 2023-03-12 RX ADMIN — CLONIDINE HYDROCHLORIDE 0.1 MG: 0.1 TABLET ORAL at 05:22

## 2023-03-12 RX ADMIN — LORAZEPAM 1 MG: 1 TABLET ORAL at 05:22

## 2023-03-12 RX ADMIN — LORAZEPAM 2 MG: 2 TABLET ORAL at 23:43

## 2023-03-12 RX ADMIN — LORAZEPAM 1 MG: 1 TABLET ORAL at 09:13

## 2023-03-12 RX ADMIN — POTASSIUM CHLORIDE AND SODIUM CHLORIDE: 900; 300 INJECTION, SOLUTION INTRAVENOUS at 06:42

## 2023-03-12 RX ADMIN — FOLIC ACID 1 MG: 1 TABLET ORAL at 05:23

## 2023-03-12 RX ADMIN — THIAMINE HCL TAB 100 MG 100 MG: 100 TAB at 05:23

## 2023-03-12 ASSESSMENT — LIFESTYLE VARIABLES
AGITATION: NORMAL ACTIVITY
TOTAL SCORE: 10
PAROXYSMAL SWEATS: NO SWEAT VISIBLE
AGITATION: NORMAL ACTIVITY
VISUAL DISTURBANCES: MILD SENSITIVITY
ORIENTATION AND CLOUDING OF SENSORIUM: ORIENTED AND CAN DO SERIAL ADDITIONS
NAUSEA AND VOMITING: MILD NAUSEA WITH NO VOMITING
ORIENTATION AND CLOUDING OF SENSORIUM: ORIENTED AND CAN DO SERIAL ADDITIONS
TREMOR: *
PAROXYSMAL SWEATS: NO SWEAT VISIBLE
NAUSEA AND VOMITING: MILD NAUSEA WITH NO VOMITING
TREMOR: *
TREMOR: *
ORIENTATION AND CLOUDING OF SENSORIUM: ORIENTED AND CAN DO SERIAL ADDITIONS
HEADACHE, FULLNESS IN HEAD: MODERATE
VISUAL DISTURBANCES: VERY MILD SENSITIVITY
SUBSTANCE_ABUSE: 1
ANXIETY: MODERATELY ANXIOUS OR GUARDED, SO ANXIETY IS INFERRED
AGITATION: SOMEWHAT MORE THAN NORMAL ACTIVITY
ANXIETY: *
NAUSEA AND VOMITING: MILD NAUSEA WITH NO VOMITING
NAUSEA AND VOMITING: MILD NAUSEA WITH NO VOMITING
AGITATION: NORMAL ACTIVITY
AGITATION: NORMAL ACTIVITY
HEADACHE, FULLNESS IN HEAD: MODERATE
TOTAL SCORE: VERY MILD ITCHING, PINS AND NEEDLES SENSATION, BURNING OR NUMBNESS
TOTAL SCORE: VERY MILD ITCHING, PINS AND NEEDLES SENSATION, BURNING OR NUMBNESS
AUDITORY DISTURBANCES: NOT PRESENT
ANXIETY: MILDLY ANXIOUS
TREMOR: TREMOR NOT VISIBLE BUT CAN BE FELT, FINGERTIP TO FINGERTIP
TOTAL SCORE: VERY MILD ITCHING, PINS AND NEEDLES SENSATION, BURNING OR NUMBNESS
AGITATION: NORMAL ACTIVITY
ANXIETY: MILDLY ANXIOUS
TREMOR: *
HEADACHE, FULLNESS IN HEAD: VERY MILD
ORIENTATION AND CLOUDING OF SENSORIUM: ORIENTED AND CAN DO SERIAL ADDITIONS
ANXIETY: MODERATELY ANXIOUS OR GUARDED, SO ANXIETY IS INFERRED
HEADACHE, FULLNESS IN HEAD: VERY MILD
ORIENTATION AND CLOUDING OF SENSORIUM: ORIENTED AND CAN DO SERIAL ADDITIONS
TOTAL SCORE: 9
TOTAL SCORE: VERY MILD ITCHING, PINS AND NEEDLES SENSATION, BURNING OR NUMBNESS
NAUSEA AND VOMITING: MILD NAUSEA WITH NO VOMITING
AUDITORY DISTURBANCES: NOT PRESENT
AUDITORY DISTURBANCES: NOT PRESENT
VISUAL DISTURBANCES: VERY MILD SENSITIVITY
PAROXYSMAL SWEATS: NO SWEAT VISIBLE
PAROXYSMAL SWEATS: NO SWEAT VISIBLE
AUDITORY DISTURBANCES: NOT PRESENT
HEADACHE, FULLNESS IN HEAD: MODERATE
PAROXYSMAL SWEATS: NO SWEAT VISIBLE
TREMOR: *
HEADACHE, FULLNESS IN HEAD: MILD
TOTAL SCORE: 10
VISUAL DISTURBANCES: VERY MILD SENSITIVITY
VISUAL DISTURBANCES: VERY MILD SENSITIVITY
NAUSEA AND VOMITING: MILD NAUSEA WITH NO VOMITING
TOTAL SCORE: 10
TOTAL SCORE: 10
HEADACHE, FULLNESS IN HEAD: MILD
PAROXYSMAL SWEATS: NO SWEAT VISIBLE
TOTAL SCORE: VERY MILD ITCHING, PINS AND NEEDLES SENSATION, BURNING OR NUMBNESS
AUDITORY DISTURBANCES: NOT PRESENT
TOTAL SCORE: MILD ITCHING, PINS AND NEEDLES SENSATION, BURNING OR NUMBNESS
ORIENTATION AND CLOUDING OF SENSORIUM: ORIENTED AND CAN DO SERIAL ADDITIONS
TOTAL SCORE: 10
PAROXYSMAL SWEATS: BARELY PERCEPTIBLE SWEATING. PALMS MOIST
NAUSEA AND VOMITING: MILD NAUSEA WITH NO VOMITING
TREMOR: TREMOR NOT VISIBLE BUT CAN BE FELT, FINGERTIP TO FINGERTIP
VISUAL DISTURBANCES: VERY MILD SENSITIVITY
ORIENTATION AND CLOUDING OF SENSORIUM: ORIENTED AND CAN DO SERIAL ADDITIONS
ANXIETY: *
TOTAL SCORE: VERY MILD ITCHING, PINS AND NEEDLES SENSATION, BURNING OR NUMBNESS
AUDITORY DISTURBANCES: NOT PRESENT
AUDITORY DISTURBANCES: NOT PRESENT
AGITATION: NORMAL ACTIVITY
VISUAL DISTURBANCES: MILD SENSITIVITY
ANXIETY: MILDLY ANXIOUS
TOTAL SCORE: 12

## 2023-03-12 ASSESSMENT — ENCOUNTER SYMPTOMS
CHILLS: 0
FEVER: 0
NERVOUS/ANXIOUS: 1
VOMITING: 0
ABDOMINAL PAIN: 0
SHORTNESS OF BREATH: 0
DIZZINESS: 1
HALLUCINATIONS: 1

## 2023-03-12 ASSESSMENT — PAIN DESCRIPTION - PAIN TYPE: TYPE: ACUTE PAIN

## 2023-03-12 NOTE — CARE PLAN
The patient is Watcher - Medium risk of patient condition declining or worsening    Shift Goals  Clinical Goals: CIWA protocol, talibor BP  Patient Goals: rest    Progress made toward(s) clinical / shift goals:    Problem: Knowledge Deficit - Standard  Goal: Patient and family/care givers will demonstrate understanding of plan of care, disease process/condition, diagnostic tests and medications  Outcome: Progressing  Note: CIWA performed Q4h, denies pan and discomfort noted with poor appetite, ST on monitor, slept throughout the shift, on IVF.       Patient is not progressing towards the following goals:

## 2023-03-12 NOTE — CARE PLAN
The patient is Watcher - Medium risk of patient condition declining or worsening    Shift Goals  Clinical Goals: medicate per CIWA protocol, rest  Patient Goals: rest    Progress made toward(s) clinical / shift goals:    Problem: Knowledge Deficit - Standard  Goal: Patient and family/care givers will demonstrate understanding of plan of care, disease process/condition, diagnostic tests and medications  Outcome: Progressing  Note: Pt educated on plan of care, pt verbalizes understanding and agrees to comply.      Problem: Fall Risk  Goal: Patient will remain free from falls  Outcome: Progressing  Note: Bed is locked and in lowest position, bed alarm in place, call light within reach.        Patient is not progressing towards the following goals:

## 2023-03-12 NOTE — PROGRESS NOTES
[facility] Medicine Daily Progress Note    Date of Service  3/12/2023    Chief Complaint  Gopal Ceja is a 37 y.o. male admitted 3/10/2023 with ETOH WD.     Hospital Course  37-year-old male with history of alcoholism presented 3/11 with alcohol intoxication.  Patient has history of alcoholic abuse with multiple ED visits for alcohol withdrawal and intoxication.  Patient was given Ativan and he was very lethargic during my evaluation.  Patient had some nausea with no significant vomiting, no chest pain or shortness of breath.  Vital signs show tachycardia with hypertension and labs showed elevated liver enzymes with AST was 64 and ALT 99 and alcoholic liver 366.  Patient will be admitted to the hospital for alcoholic intoxication.    Interval Problem Update  - CIWA scores 6-10  - had hallucinations overnight, still having anxiety and dizziness today  - not eating, no n/v  - still tachycardic and hypertensive    I have discussed this patient's plan of care and discharge plan at IDT rounds today with Case Management, Nursing, Nursing leadership, and other members of the IDT team.    Consultants/Specialty  None    Code Status  Full Code    Disposition  Patient is not medically cleared for discharge.   Anticipate discharge to to home with close outpatient follow-up.  I have placed the appropriate orders for post-discharge needs.    Review of Systems  Review of Systems   Constitutional:  Positive for malaise/fatigue. Negative for chills and fever.   Respiratory:  Negative for shortness of breath.    Cardiovascular:  Negative for chest pain and leg swelling.   Gastrointestinal:  Negative for abdominal pain and vomiting.   Neurological:  Positive for dizziness.   Psychiatric/Behavioral:  Positive for hallucinations and substance abuse. The patient is nervous/anxious.       Physical Exam  Temp:  [36.4 °C (97.5 °F)-37 °C (98.6 °F)] 36.4 °C (97.5 °F)  Pulse:  [] 95  Resp:  [16-18] 16  BP: (131-163)/()  133/91  SpO2:  [91 %-95 %] 94 %    Physical Exam  Constitutional:       General: He is not in acute distress.     Appearance: He is ill-appearing. He is not toxic-appearing or diaphoretic.   HENT:      Head: Normocephalic and atraumatic.      Nose: Nose normal.      Mouth/Throat:      Mouth: Mucous membranes are dry.   Eyes:      General: No scleral icterus.     Conjunctiva/sclera: Conjunctivae normal.   Cardiovascular:      Rate and Rhythm: Regular rhythm. Tachycardia present.      Heart sounds: No murmur heard.    No friction rub. No gallop.   Pulmonary:      Effort: Pulmonary effort is normal.      Breath sounds: Normal breath sounds.   Abdominal:      General: Bowel sounds are normal. There is no distension.      Palpations: Abdomen is soft.      Tenderness: There is no abdominal tenderness.   Musculoskeletal:      Right lower leg: No edema.      Left lower leg: No edema.      Comments: Deformities of upper and lower extremities      Neurological:      Mental Status: He is alert and oriented to person, place, and time.   Psychiatric:         Attention and Perception: Attention normal.         Mood and Affect: Mood is anxious.         Behavior: Behavior is cooperative.         Thought Content: Thought content normal.       Fluids    Intake/Output Summary (Last 24 hours) at 3/12/2023 1012  Last data filed at 3/12/2023 0508  Gross per 24 hour   Intake --   Output 1000 ml   Net -1000 ml       Laboratory  Recent Labs     03/10/23  2340 03/11/23  1147   WBC 10.9* 8.2   RBC 5.55 4.83   HEMOGLOBIN 17.2 14.8   HEMATOCRIT 48.6 45.1   MCV 87.6 93.4   MCH 31.0 30.6   MCHC 35.4* 32.8*   RDW 43.0 46.4   PLATELETCT 187 136*   MPV 10.4 10.9       Recent Labs     03/10/23  2340 03/11/23  1147 03/12/23  0012   SODIUM 136 136 136   POTASSIUM 3.9 3.4* 3.7   CHLORIDE 94* 99 100   CO2 22 24 26   GLUCOSE 97 129* 116*   BUN 7* 6* 7*   CREATININE 0.69 0.56 0.66   CALCIUM 8.8 8.2* 8.7               Recent Labs     03/08/23  2130  03/10/23  2340 03/11/23  1147 03/12/23  0012   ASTSGOT 69* 164* 130* 109*   ALTSGPT 40 99* 80* 75*   TBILIRUBIN 0.5 0.6 0.9 1.3   GLOBULIN 4.4* 4.2* 3.4 3.2               Imaging  No orders to display          Assessment/Plan  * Alcohol intoxication in active alcoholic with delirium (HCC)- (present on admission)  Assessment & Plan  History of heavy drinking  Clean wound with intoxication and level around 300  Complicated with delirium and alcoholic hepatitis  CIWA protocol with IV fluid and multivitamins  Librium twice daily since we anticipate severe withdrawal symptoms  Monitor electrolytes    CIWA scores downtrending however still having severe WD (hallucinations, anxiety, tachycardia, hypertensive, poor po intake)    Metabolic acidosis  Assessment & Plan  Resolved    Psychiatric disorder- (present on admission)  Assessment & Plan  Follow-up with psychiatrist    Alcoholic hepatitis- (present on admission)  Assessment & Plan  With elevated liver enzymes AST more than ALT  Normal bilirubin  Labs daily  Multivitamins  LFTs improving    Tachycardia- (present on admission)  Assessment & Plan  Due to withdrawal and dehydration  IV fluid    Methamphetamine addiction (HCC)- (present on admission)  Assessment & Plan  Urine drug screen was negative    Polysubstance abuse (HCC)- (present on admission)  Assessment & Plan  Urine drug screen is negative         VTE prophylaxis: SCDs/TEDs and enoxaparin ppx    I have performed a physical exam and reviewed and updated ROS and Plan today (3/12/2023). In review of yesterday's note (3/11/2023), there are no changes except as documented above.

## 2023-03-12 NOTE — PROGRESS NOTES
Pt with elevated BP unrelieved by PO Clonidine on MAR. New orders from on call APRN for 20 mg IV hydralazine q6 hours prn.

## 2023-03-13 PROBLEM — L03.116 CELLULITIS OF LEFT LOWER EXTREMITY: Status: ACTIVE | Noted: 2023-03-13

## 2023-03-13 LAB
ANION GAP SERPL CALC-SCNC: 11 MMOL/L (ref 7–16)
BUN SERPL-MCNC: 9 MG/DL (ref 8–22)
CALCIUM SERPL-MCNC: 9.1 MG/DL (ref 8.5–10.5)
CHLORIDE SERPL-SCNC: 103 MMOL/L (ref 96–112)
CO2 SERPL-SCNC: 21 MMOL/L (ref 20–33)
CREAT SERPL-MCNC: 0.57 MG/DL (ref 0.5–1.4)
EKG IMPRESSION: NORMAL
GFR SERPLBLD CREATININE-BSD FMLA CKD-EPI: 130 ML/MIN/1.73 M 2
GLUCOSE SERPL-MCNC: 107 MG/DL (ref 65–99)
MAGNESIUM SERPL-MCNC: 2 MG/DL (ref 1.5–2.5)
POTASSIUM SERPL-SCNC: 4.7 MMOL/L (ref 3.6–5.5)
PROCALCITONIN SERPL-MCNC: 0.12 NG/ML
SCCMEC + MECA PNL NOSE NAA+PROBE: NEGATIVE
SCCMEC + MECA PNL NOSE NAA+PROBE: POSITIVE
SODIUM SERPL-SCNC: 135 MMOL/L (ref 135–145)

## 2023-03-13 PROCEDURE — 99232 SBSQ HOSP IP/OBS MODERATE 35: CPT | Performed by: INTERNAL MEDICINE

## 2023-03-13 PROCEDURE — 770004 HCHG ROOM/CARE - ONCOLOGY PRIVATE *

## 2023-03-13 PROCEDURE — 87640 STAPH A DNA AMP PROBE: CPT

## 2023-03-13 PROCEDURE — A9270 NON-COVERED ITEM OR SERVICE: HCPCS | Performed by: INTERNAL MEDICINE

## 2023-03-13 PROCEDURE — 83735 ASSAY OF MAGNESIUM: CPT

## 2023-03-13 PROCEDURE — 700111 HCHG RX REV CODE 636 W/ 250 OVERRIDE (IP): Performed by: INTERNAL MEDICINE

## 2023-03-13 PROCEDURE — 87070 CULTURE OTHR SPECIMN AEROBIC: CPT

## 2023-03-13 PROCEDURE — 700102 HCHG RX REV CODE 250 W/ 637 OVERRIDE(OP): Performed by: INTERNAL MEDICINE

## 2023-03-13 PROCEDURE — 87205 SMEAR GRAM STAIN: CPT

## 2023-03-13 PROCEDURE — 87641 MR-STAPH DNA AMP PROBE: CPT

## 2023-03-13 PROCEDURE — 84145 PROCALCITONIN (PCT): CPT

## 2023-03-13 PROCEDURE — 700105 HCHG RX REV CODE 258: Performed by: INTERNAL MEDICINE

## 2023-03-13 PROCEDURE — 700101 HCHG RX REV CODE 250: Performed by: INTERNAL MEDICINE

## 2023-03-13 PROCEDURE — 36415 COLL VENOUS BLD VENIPUNCTURE: CPT

## 2023-03-13 PROCEDURE — 87077 CULTURE AEROBIC IDENTIFY: CPT

## 2023-03-13 PROCEDURE — 93010 ELECTROCARDIOGRAM REPORT: CPT | Performed by: INTERNAL MEDICINE

## 2023-03-13 PROCEDURE — 80048 BASIC METABOLIC PNL TOTAL CA: CPT

## 2023-03-13 RX ORDER — ERYTHROMYCIN 5 MG/G
1 OINTMENT OPHTHALMIC EVERY 6 HOURS
Status: DISPENSED | OUTPATIENT
Start: 2023-03-13 | End: 2023-03-15

## 2023-03-13 RX ORDER — POLYMYXIN B SULFATE AND TRIMETHOPRIM 1; 10000 MG/ML; [USP'U]/ML
1 SOLUTION OPHTHALMIC EVERY 4 HOURS
Status: DISCONTINUED | OUTPATIENT
Start: 2023-03-13 | End: 2023-03-13

## 2023-03-13 RX ORDER — SODIUM CHLORIDE 9 MG/ML
INJECTION, SOLUTION INTRAVENOUS CONTINUOUS
Status: DISCONTINUED | OUTPATIENT
Start: 2023-03-13 | End: 2023-03-15 | Stop reason: HOSPADM

## 2023-03-13 RX ORDER — FEXOFENADINE HCL 60 MG/1
60 TABLET, FILM COATED ORAL EVERY 12 HOURS
Status: DISCONTINUED | OUTPATIENT
Start: 2023-03-13 | End: 2023-03-15 | Stop reason: HOSPADM

## 2023-03-13 RX ORDER — DOXYCYCLINE 100 MG/1
100 TABLET ORAL EVERY 12 HOURS
Status: DISCONTINUED | OUTPATIENT
Start: 2023-03-13 | End: 2023-03-13

## 2023-03-13 RX ORDER — AMOXICILLIN AND CLAVULANATE POTASSIUM 875; 125 MG/1; MG/1
1 TABLET, FILM COATED ORAL EVERY 12 HOURS
Status: DISCONTINUED | OUTPATIENT
Start: 2023-03-13 | End: 2023-03-13

## 2023-03-13 RX ADMIN — FEXOFENADINE HCL 60 MG: 60 TABLET, FILM COATED ORAL at 20:17

## 2023-03-13 RX ADMIN — LORAZEPAM 2 MG: 2 TABLET ORAL at 08:40

## 2023-03-13 RX ADMIN — LORAZEPAM 1 MG: 1 TABLET ORAL at 15:16

## 2023-03-13 RX ADMIN — LORAZEPAM 1 MG: 1 TABLET ORAL at 10:49

## 2023-03-13 RX ADMIN — THERA TABS 1 TABLET: TAB at 05:48

## 2023-03-13 RX ADMIN — FOLIC ACID 1 MG: 1 TABLET ORAL at 05:48

## 2023-03-13 RX ADMIN — ERYTHROMYCIN 1 APPLICATION: 5 OINTMENT OPHTHALMIC at 18:11

## 2023-03-13 RX ADMIN — LORAZEPAM 1 MG: 1 TABLET ORAL at 01:42

## 2023-03-13 RX ADMIN — LORAZEPAM 1 MG: 1 TABLET ORAL at 20:15

## 2023-03-13 RX ADMIN — VANCOMYCIN HYDROCHLORIDE 1750 MG: 500 INJECTION, POWDER, LYOPHILIZED, FOR SOLUTION INTRAVENOUS at 15:15

## 2023-03-13 RX ADMIN — SODIUM CHLORIDE: 9 INJECTION, SOLUTION INTRAVENOUS at 15:12

## 2023-03-13 RX ADMIN — ENOXAPARIN SODIUM 40 MG: 40 INJECTION SUBCUTANEOUS at 18:05

## 2023-03-13 RX ADMIN — POTASSIUM CHLORIDE AND SODIUM CHLORIDE: 900; 300 INJECTION, SOLUTION INTRAVENOUS at 08:25

## 2023-03-13 RX ADMIN — THIAMINE HCL TAB 100 MG 100 MG: 100 TAB at 05:48

## 2023-03-13 ASSESSMENT — LIFESTYLE VARIABLES
ORIENTATION AND CLOUDING OF SENSORIUM: CANNOT DO SERIAL ADDITIONS OR IS UNCERTAIN ABOUT DATE
ORIENTATION AND CLOUDING OF SENSORIUM: ORIENTED AND CAN DO SERIAL ADDITIONS
HEADACHE, FULLNESS IN HEAD: VERY MILD
AGITATION: SOMEWHAT MORE THAN NORMAL ACTIVITY
ORIENTATION AND CLOUDING OF SENSORIUM: ORIENTED AND CAN DO SERIAL ADDITIONS
AGITATION: NORMAL ACTIVITY
NAUSEA AND VOMITING: MILD NAUSEA WITH NO VOMITING
PAROXYSMAL SWEATS: NO SWEAT VISIBLE
TOTAL SCORE: VERY MILD ITCHING, PINS AND NEEDLES SENSATION, BURNING OR NUMBNESS
HEADACHE, FULLNESS IN HEAD: MILD
TREMOR: TREMOR NOT VISIBLE BUT CAN BE FELT, FINGERTIP TO FINGERTIP
ANXIETY: *
PAROXYSMAL SWEATS: BARELY PERCEPTIBLE SWEATING. PALMS MOIST
PAROXYSMAL SWEATS: NO SWEAT VISIBLE
AUDITORY DISTURBANCES: NOT PRESENT
TOTAL SCORE: 9
AUDITORY DISTURBANCES: VERY MILD HARSHNESS OR ABILITY TO FRIGHTEN
AUDITORY DISTURBANCES: NOT PRESENT
TREMOR: NO TREMOR
TOTAL SCORE: 8
VISUAL DISTURBANCES: MODERATE SENSITIVITY
NAUSEA AND VOMITING: MILD NAUSEA WITH NO VOMITING
ANXIETY: *
ANXIETY: MILDLY ANXIOUS
ORIENTATION AND CLOUDING OF SENSORIUM: ORIENTED AND CAN DO SERIAL ADDITIONS
NAUSEA AND VOMITING: MILD NAUSEA WITH NO VOMITING
HEADACHE, FULLNESS IN HEAD: MILD
AGITATION: NORMAL ACTIVITY
AGITATION: NORMAL ACTIVITY
TOTAL SCORE: 10
NAUSEA AND VOMITING: MILD NAUSEA WITH NO VOMITING
PAROXYSMAL SWEATS: NO SWEAT VISIBLE
HEADACHE, FULLNESS IN HEAD: VERY MILD
TOTAL SCORE: VERY MILD ITCHING, PINS AND NEEDLES SENSATION, BURNING OR NUMBNESS
VISUAL DISTURBANCES: VERY MILD SENSITIVITY
HEADACHE, FULLNESS IN HEAD: VERY MILD
TREMOR: TREMOR NOT VISIBLE BUT CAN BE FELT, FINGERTIP TO FINGERTIP
ANXIETY: MILDLY ANXIOUS
ORIENTATION AND CLOUDING OF SENSORIUM: ORIENTED AND CAN DO SERIAL ADDITIONS
HEADACHE, FULLNESS IN HEAD: VERY MILD
AUDITORY DISTURBANCES: VERY MILD HARSHNESS OR ABILITY TO FRIGHTEN
AGITATION: NORMAL ACTIVITY
TREMOR: NO TREMOR
AUDITORY DISTURBANCES: NOT PRESENT
ORIENTATION AND CLOUDING OF SENSORIUM: ORIENTED AND CAN DO SERIAL ADDITIONS
VISUAL DISTURBANCES: MODERATE SENSITIVITY
HEADACHE, FULLNESS IN HEAD: MODERATE
PAROXYSMAL SWEATS: NO SWEAT VISIBLE
ANXIETY: *
PAROXYSMAL SWEATS: NO SWEAT VISIBLE
PAROXYSMAL SWEATS: NO SWEAT VISIBLE
TOTAL SCORE: MILD ITCHING, PINS AND NEEDLES SENSATION, BURNING OR NUMBNESS
ANXIETY: *
NAUSEA AND VOMITING: NO NAUSEA AND NO VOMITING
TOTAL SCORE: 8
TOTAL SCORE: 8
VISUAL DISTURBANCES: VERY MILD SENSITIVITY
ORIENTATION AND CLOUDING OF SENSORIUM: CANNOT DO SERIAL ADDITIONS OR IS UNCERTAIN ABOUT DATE
TREMOR: *
NAUSEA AND VOMITING: INTERMITTENT NAUSEA WITH DRY HEAVES
VISUAL DISTURBANCES: MILD SENSITIVITY
TOTAL SCORE: 14
ANXIETY: NO ANXIETY (AT EASE)
SUBSTANCE_ABUSE: 1
TREMOR: NO TREMOR
NAUSEA AND VOMITING: NO NAUSEA AND NO VOMITING
VISUAL DISTURBANCES: VERY MILD SENSITIVITY
AGITATION: NORMAL ACTIVITY
VISUAL DISTURBANCES: MILD SENSITIVITY
AGITATION: NORMAL ACTIVITY
TOTAL SCORE: 4
AUDITORY DISTURBANCES: VERY MILD HARSHNESS OR ABILITY TO FRIGHTEN
TOTAL SCORE: VERY MILD ITCHING, PINS AND NEEDLES SENSATION, BURNING OR NUMBNESS
AUDITORY DISTURBANCES: VERY MILD HARSHNESS OR ABILITY TO FRIGHTEN
TREMOR: NO TREMOR
TOTAL SCORE: VERY MILD ITCHING, PINS AND NEEDLES SENSATION, BURNING OR NUMBNESS

## 2023-03-13 ASSESSMENT — ENCOUNTER SYMPTOMS
NERVOUS/ANXIOUS: 1
EYE DISCHARGE: 1
CHILLS: 0
FEVER: 0
HALLUCINATIONS: 1
VOMITING: 0
SHORTNESS OF BREATH: 0
ABDOMINAL PAIN: 0

## 2023-03-13 ASSESSMENT — PATIENT HEALTH QUESTIONNAIRE - PHQ9
2. FEELING DOWN, DEPRESSED, IRRITABLE, OR HOPELESS: NOT AT ALL
1. LITTLE INTEREST OR PLEASURE IN DOING THINGS: NOT AT ALL
SUM OF ALL RESPONSES TO PHQ9 QUESTIONS 1 AND 2: 0

## 2023-03-13 ASSESSMENT — PAIN DESCRIPTION - PAIN TYPE: TYPE: ACUTE PAIN

## 2023-03-13 NOTE — PROGRESS NOTES
Hospital Medicine Daily Progress Note    Date of Service  3/13/2023    Chief Complaint  Gopal Ceja is a 37 y.o. male admitted 3/10/2023 with ETOH WD.     Hospital Course  37-year-old male with history of alcoholism presented 3/11 with alcohol intoxication.  Patient has history of alcoholic abuse with multiple ED visits for alcohol withdrawal and intoxication.  Patient was given Ativan and he was very lethargic during my evaluation.  Patient had some nausea with no significant vomiting, no chest pain or shortness of breath.  Vital signs show tachycardia with hypertension and labs showed elevated liver enzymes with AST was 64 and ALT 99 and alcoholic liver 366.  Patient will be admitted to the hospital for alcoholic intoxication.    Hospitalization c/b cellulitis, conjunctivitis started on abx.     Interval Problem Update  - CIWA scores 12-->4, still having hallucinations but overall feels better  - reports left heel pain and wound, also having b/l eye drainage reports he normally takes allegra/allergy medicine which helps  - no chest pain or SOB    I have discussed this patient's plan of care and discharge plan at IDT rounds today with Case Management, Nursing, Nursing leadership, and other members of the IDT team.    Consultants/Specialty  None    Code Status  Full Code    Disposition  Patient is not medically cleared for discharge.   Anticipate discharge to to home with close outpatient follow-up.  I have placed the appropriate orders for post-discharge needs.    Review of Systems  Review of Systems   Constitutional:  Positive for malaise/fatigue. Negative for chills and fever.   Eyes:  Positive for discharge.   Respiratory:  Negative for shortness of breath.    Cardiovascular:  Negative for chest pain and leg swelling.   Gastrointestinal:  Negative for abdominal pain and vomiting.   Skin:         Wound on L heel with redness and pain   Psychiatric/Behavioral:  Positive for hallucinations and substance  abuse. The patient is nervous/anxious.       Physical Exam  Temp:  [36.2 °C (97.2 °F)-36.7 °C (98.1 °F)] 36.7 °C (98.1 °F)  Pulse:  [73-96] 90  Resp:  [16-18] 18  BP: (122-142)/(84-92) 130/90  SpO2:  [95 %-96 %] 96 %    Physical Exam  Constitutional:       General: He is not in acute distress.     Appearance: He is ill-appearing. He is not toxic-appearing or diaphoretic.   HENT:      Head: Normocephalic and atraumatic.      Nose: Nose normal.      Mouth/Throat:      Mouth: Mucous membranes are dry.   Eyes:      General: No scleral icterus.     Conjunctiva/sclera:      Right eye: Exudate present.      Left eye: Exudate present.   Cardiovascular:      Rate and Rhythm: Regular rhythm. Tachycardia present.      Heart sounds: No murmur heard.    No friction rub. No gallop.   Pulmonary:      Effort: Pulmonary effort is normal.      Breath sounds: Normal breath sounds.   Abdominal:      General: Bowel sounds are normal. There is no distension.      Palpations: Abdomen is soft.      Tenderness: There is no abdominal tenderness.   Musculoskeletal:      Right lower leg: No edema.      Left lower leg: No edema.      Comments: Deformities of upper and lower extremities      Skin:     Findings: Erythema (L heel wound with surrounding erythema, tenderness and purulence) present.   Neurological:      Mental Status: He is alert and oriented to person, place, and time.   Psychiatric:         Attention and Perception: Attention normal.         Mood and Affect: Mood is anxious.         Behavior: Behavior is cooperative.         Thought Content: Thought content normal.       Fluids    Intake/Output Summary (Last 24 hours) at 3/13/2023 1246  Last data filed at 3/13/2023 1228  Gross per 24 hour   Intake 370 ml   Output 1700 ml   Net -1330 ml         Laboratory  Recent Labs     03/10/23  2340 03/11/23  1147   WBC 10.9* 8.2   RBC 5.55 4.83   HEMOGLOBIN 17.2 14.8   HEMATOCRIT 48.6 45.1   MCV 87.6 93.4   MCH 31.0 30.6   MCHC 35.4* 32.8*    RDW 43.0 46.4   PLATELETCT 187 136*   MPV 10.4 10.9       Recent Labs     03/11/23  1147 03/12/23  0012 03/13/23  0149   SODIUM 136 136 135   POTASSIUM 3.4* 3.7 4.7   CHLORIDE 99 100 103   CO2 24 26 21   GLUCOSE 129* 116* 107*   BUN 6* 7* 9   CREATININE 0.56 0.66 0.57   CALCIUM 8.2* 8.7 9.1               Recent Labs     03/08/23  2130 03/10/23  2340 03/11/23  1147 03/12/23  0012   ASTSGOT 69* 164* 130* 109*   ALTSGPT 40 99* 80* 75*   TBILIRUBIN 0.5 0.6 0.9 1.3   GLOBULIN 4.4* 4.2* 3.4 3.2               Imaging  No orders to display          Assessment/Plan  * Alcohol intoxication in active alcoholic with delirium (HCC)- (present on admission)  Assessment & Plan  History of heavy drinking  Clean wound with intoxication and level around 300  Complicated with delirium and alcoholic hepatitis  CIWA protocol with IV fluid and multivitamins  Librium twice daily since we anticipate severe withdrawal symptoms  Monitor electrolytes    CIWA scores downtrending however still having severe WD (hallucinations, anxiety, tachycardia, hypertensive, poor po intake)    Cellulitis of left lower extremity  Assessment & Plan  L heel wound now with surrounding erythema, tenderness, purulent drainage at top part of wound  Wound cx  MRSA swab  vanc for now given h/o MRSA     Metabolic acidosis  Assessment & Plan  Resolved    Psychiatric disorder- (present on admission)  Assessment & Plan  Follow-up with psychiatrist    Acute bacterial conjunctivitis of both eyes- (present on admission)  Assessment & Plan  Developed b/l purulent eye drainage  Asked for allergy medicine: allegra  However will also start erythromycin x 7 days    Alcoholic hepatitis- (present on admission)  Assessment & Plan  With elevated liver enzymes AST more than ALT  Normal bilirubin  Labs daily  Multivitamins  LFTs improving    Tachycardia- (present on admission)  Assessment & Plan  Due to withdrawal and dehydration  IV fluid    Methamphetamine addiction (HCC)-  (present on admission)  Assessment & Plan  Urine drug screen was negative    Polysubstance abuse (HCC)- (present on admission)  Assessment & Plan  Urine drug screen is negative         VTE prophylaxis: SCDs/TEDs and enoxaparin ppx    I have performed a physical exam and reviewed and updated ROS and Plan today (3/13/2023). In review of yesterday's note (3/12/2023), there are no changes except as documented above.

## 2023-03-13 NOTE — CARE PLAN
The patient is Stable - Low risk of patient condition declining or worsening    Shift Goals  Clinical Goals: medicate per CIWA protocol, monitory BP  Patient Goals: rest, reduce anxiety    Progress made toward(s) clinical / shift goals:    Problem: Knowledge Deficit - Standard  Goal: Patient and family/care givers will demonstrate understanding of plan of care, disease process/condition, diagnostic tests and medications  Outcome: Progressing  Note: Pt educated on plan of care, pt verbalizes understanding and agrees to comply.      Problem: Fall Risk  Goal: Patient will remain free from falls  Outcome: Progressing  Note: Bed is locked and in lowest position, bed alarm in place, call light and personal belongings within reach. Pt agrees to call before getting up to use bathroom.        Patient is not progressing towards the following goals:

## 2023-03-13 NOTE — PROGRESS NOTES
ST elevation noted on monitor, pt laying in bed denies chest pain or SOB, MD made aware. Stat EKG ordered. MD made aware of results. No new orders at this time.

## 2023-03-13 NOTE — PROGRESS NOTES
Pharmacy Vancomycin Kinetics Note for 3/13/2023     37 y.o. male on Vancomycin Day # 1     Vancomycin Indication (AUC Dosing): Skin/skin structure infection    Provider specified end date:  (TBD)    Active Antibiotics (From admission, onward)      Ordered     Ordering Provider       Mon Mar 13, 2023 12:55 PM    03/13/23 1255  vancomycin (VANCOCIN) 1,750 mg in  mL IVPB  (vancomycin (VANCOCIN) IV (LD + Maintenance))  ONCE         Malina Michel M.D.       Mon Mar 13, 2023 12:46 PM    03/13/23 1246  MD Alert...Vancomycin per Pharmacy  PHARMACY TO DOSE        Question:  Indication(s) for vancomycin?  Answer:  Skin and soft tissue infection    Malina Michel M.D.            Dosing Weight: 72.5 kg (159 lb 13.3 oz)      Admission History: Admitted on 3/10/2023 for Alcohol intoxication in active alcoholic with delirium (HCC) [F10.221]  Pertinent history: 36 yo male admitted with Alcohol intoxication/withdrawal, wound noted on ankle and per Attending notes - L heel wound now with surrounding erythema, tenderness, purulent drainage at top part of wound. Empiric Vancomycin. MRSA nares & wound culture ordered.    Allergies:     Patient has no known allergies.     Pertinent cultures to date:     Results       Procedure Component Value Units Date/Time    MRSA By PCR (Amp) [[id number]]     Order Status: No result Specimen: Respirate from Nares     CULTURE WOUND W/ GRAM STAIN [[id number]]     Order Status: No result Specimen: Wound from Exudate     S. Aureus By PCR, Nasal Complete [[id number]]     Order Status: No result Specimen: Respirate from Respiratory             Labs:     Estimated Creatinine Clearance: 173.3 mL/min (by C-G formula based on SCr of 0.57 mg/dL).  Recent Labs     03/10/23  2340 03/11/23  1147   WBC 10.9* 8.2   NEUTSPOLYS 70.80 78.00*     Recent Labs     03/10/23  2340 03/11/23  1147 03/12/23  0012 03/13/23  0149   BUN 7* 6* 7* 9   CREATININE 0.69 0.56 0.66 0.57   ALBUMIN 4.6 3.7 3.6  --   "      Intake/Output Summary (Last 24 hours) at 3/13/2023 1259  Last data filed at 3/13/2023 1228  Gross per 24 hour   Intake 370 ml   Output 1700 ml   Net -1330 ml      BP (!) 130/90   Pulse 90   Temp 36.7 °C (98.1 °F) (Temporal)   Resp 18   Ht 1.727 m (5' 8\")   Wt 72.5 kg (159 lb 13.3 oz)   SpO2 96%  Temp (24hrs), Av.5 °C (97.7 °F), Min:36.2 °C (97.2 °F), Max:36.7 °C (98.1 °F)      List concerns for Vancomycin clearance:     None    Pharmacokinetics:     AUC kinetics:   Ke (hr ^-1): 0.1482 hr^-1  Half life: 4.68 hr  Clearance: 6.984  Estimated TDD: 3492  Estimated Dose: 975  Estimated interval: 6.7    A/P:     -  Vancomycin dose: Loading Dose 1,750 mg (25 mg/Kg) x one followed by 1,000 mg (13 mg/kg) IV q8h     -  Next vancomycin level(s):  TBD     -  Predicted vancomycin AUC from initial AUC test calculator: 430 mg·hr/L    -  Comments: Recommend de-escalation if MRSA Nares is negative.     Renetta Owen, PharmD    "

## 2023-03-13 NOTE — ASSESSMENT & PLAN NOTE
L heel wound now with surrounding erythema, tenderness, purulent drainage at top part of wound  Wound cx  vanc for now given h/o MRSA   MRSA nares was negative.  Wound culture grew beta-hemolytic group A strep.  De-escalate to penicillin G

## 2023-03-13 NOTE — ASSESSMENT & PLAN NOTE
Developed b/l purulent eye drainage  Asked for allergy medicine: allegra  However will also start erythromycin x 7 days

## 2023-03-13 NOTE — DOCUMENTATION QUERY
[facility]                                                                       Query Response Note      PATIENT:               CHANCE DIAZ  ACCT #:                  [id number]  MRN:                     [id number]  :                      1985  ADMIT DATE:       3/10/2023 10:44 PM  DISCH DATE:          RESPONDING  PROVIDER #:        [id number]           QUERY TEXT:    Based on your medical judgment of the clinical indicators outlined above, please clarify if you are treating this patient for a known or suspected:    NOTE:  If the appropriate response is not listed below, please respond with a new note.    Thank you.    The patient's Clinical Indicators include:  3/11 Hospitalist Note: Alcohol intoxication in active alcoholic with delirium.  3/11 H&P: Alcohol intoxication. Positive for hallucination.    Risk factor: Alcohol intoxication    Treatment: Frequent neuro-checks, ICU admission    Thank you,  Svitlana Becker  Clinical   Connect via [employer]  Options provided:   -- Acute toxic encephalopathy   -- Other encephalopathy, Please specify   -- Other explanation, Please specify   -- Unable to determine      Query created by: Svitlana Becker on 3/13/2023 9:10 AM    RESPONSE TEXT:    Acute toxic encephalopathy          Electronically signed by:  FORD HOLLIS MD 3/13/2023 2:18 PM

## 2023-03-14 LAB
ALBUMIN SERPL BCP-MCNC: 3.5 G/DL (ref 3.2–4.9)
ALBUMIN/GLOB SERPL: 0.9 G/DL
ALP SERPL-CCNC: 99 U/L (ref 30–99)
ALT SERPL-CCNC: 139 U/L (ref 2–50)
ANION GAP SERPL CALC-SCNC: 12 MMOL/L (ref 7–16)
AST SERPL-CCNC: 181 U/L (ref 12–45)
BASOPHILS # BLD AUTO: 0.2 % (ref 0–1.8)
BASOPHILS # BLD: 0.02 K/UL (ref 0–0.12)
BILIRUB SERPL-MCNC: 0.5 MG/DL (ref 0.1–1.5)
BUN SERPL-MCNC: 8 MG/DL (ref 8–22)
CALCIUM ALBUM COR SERPL-MCNC: 9.5 MG/DL (ref 8.5–10.5)
CALCIUM SERPL-MCNC: 9.1 MG/DL (ref 8.5–10.5)
CHLORIDE SERPL-SCNC: 103 MMOL/L (ref 96–112)
CO2 SERPL-SCNC: 22 MMOL/L (ref 20–33)
CREAT SERPL-MCNC: 0.65 MG/DL (ref 0.5–1.4)
EOSINOPHIL # BLD AUTO: 0.11 K/UL (ref 0–0.51)
EOSINOPHIL NFR BLD: 1.4 % (ref 0–6.9)
ERYTHROCYTE [DISTWIDTH] IN BLOOD BY AUTOMATED COUNT: 43.7 FL (ref 35.9–50)
GFR SERPLBLD CREATININE-BSD FMLA CKD-EPI: 125 ML/MIN/1.73 M 2
GLOBULIN SER CALC-MCNC: 3.7 G/DL (ref 1.9–3.5)
GLUCOSE SERPL-MCNC: 112 MG/DL (ref 65–99)
GRAM STN SPEC: NORMAL
HCT VFR BLD AUTO: 44.4 % (ref 42–52)
HGB BLD-MCNC: 15 G/DL (ref 14–18)
IMM GRANULOCYTES # BLD AUTO: 0.03 K/UL (ref 0–0.11)
IMM GRANULOCYTES NFR BLD AUTO: 0.4 % (ref 0–0.9)
LYMPHOCYTES # BLD AUTO: 1.5 K/UL (ref 1–4.8)
LYMPHOCYTES NFR BLD: 18.5 % (ref 22–41)
MCH RBC QN AUTO: 30.7 PG (ref 27–33)
MCHC RBC AUTO-ENTMCNC: 33.8 G/DL (ref 33.7–35.3)
MCV RBC AUTO: 91 FL (ref 81.4–97.8)
MONOCYTES # BLD AUTO: 0.71 K/UL (ref 0–0.85)
MONOCYTES NFR BLD AUTO: 8.8 % (ref 0–13.4)
NEUTROPHILS # BLD AUTO: 5.72 K/UL (ref 1.82–7.42)
NEUTROPHILS NFR BLD: 70.7 % (ref 44–72)
NRBC # BLD AUTO: 0 K/UL
NRBC BLD-RTO: 0 /100 WBC
PLATELET # BLD AUTO: 104 K/UL (ref 164–446)
PMV BLD AUTO: 12.4 FL (ref 9–12.9)
POTASSIUM SERPL-SCNC: 3.7 MMOL/L (ref 3.6–5.5)
PROT SERPL-MCNC: 7.2 G/DL (ref 6–8.2)
RBC # BLD AUTO: 4.88 M/UL (ref 4.7–6.1)
SIGNIFICANT IND 70042: NORMAL
SITE SITE: NORMAL
SODIUM SERPL-SCNC: 137 MMOL/L (ref 135–145)
SOURCE SOURCE: NORMAL
WBC # BLD AUTO: 8.1 K/UL (ref 4.8–10.8)

## 2023-03-14 PROCEDURE — 700111 HCHG RX REV CODE 636 W/ 250 OVERRIDE (IP): Performed by: INTERNAL MEDICINE

## 2023-03-14 PROCEDURE — 36415 COLL VENOUS BLD VENIPUNCTURE: CPT

## 2023-03-14 PROCEDURE — 99232 SBSQ HOSP IP/OBS MODERATE 35: CPT | Performed by: INTERNAL MEDICINE

## 2023-03-14 PROCEDURE — 80053 COMPREHEN METABOLIC PANEL: CPT

## 2023-03-14 PROCEDURE — 700102 HCHG RX REV CODE 250 W/ 637 OVERRIDE(OP): Performed by: INTERNAL MEDICINE

## 2023-03-14 PROCEDURE — 700105 HCHG RX REV CODE 258: Performed by: INTERNAL MEDICINE

## 2023-03-14 PROCEDURE — 770004 HCHG ROOM/CARE - ONCOLOGY PRIVATE *

## 2023-03-14 PROCEDURE — A9270 NON-COVERED ITEM OR SERVICE: HCPCS | Performed by: INTERNAL MEDICINE

## 2023-03-14 PROCEDURE — 85025 COMPLETE CBC W/AUTO DIFF WBC: CPT

## 2023-03-14 RX ADMIN — VANCOMYCIN HYDROCHLORIDE 1000 MG: 500 INJECTION, POWDER, LYOPHILIZED, FOR SOLUTION INTRAVENOUS at 08:05

## 2023-03-14 RX ADMIN — THERA TABS 1 TABLET: TAB at 05:39

## 2023-03-14 RX ADMIN — ERYTHROMYCIN 1 APPLICATION: 5 OINTMENT OPHTHALMIC at 16:21

## 2023-03-14 RX ADMIN — LORAZEPAM 0.5 MG: 0.5 TABLET ORAL at 01:39

## 2023-03-14 RX ADMIN — FEXOFENADINE HCL 60 MG: 60 TABLET, FILM COATED ORAL at 05:38

## 2023-03-14 RX ADMIN — ERYTHROMYCIN 1 APPLICATION: 5 OINTMENT OPHTHALMIC at 05:40

## 2023-03-14 RX ADMIN — DULOXETINE 20 MG: 20 CAPSULE, DELAYED RELEASE ORAL at 05:39

## 2023-03-14 RX ADMIN — SODIUM CHLORIDE 1 MILLION UNITS: 9 INJECTION, SOLUTION INTRAVENOUS at 16:21

## 2023-03-14 RX ADMIN — LORAZEPAM 3 MG: 1 TABLET ORAL at 00:30

## 2023-03-14 RX ADMIN — FOLIC ACID 1 MG: 1 TABLET ORAL at 05:38

## 2023-03-14 RX ADMIN — VANCOMYCIN HYDROCHLORIDE 1000 MG: 500 INJECTION, POWDER, LYOPHILIZED, FOR SOLUTION INTRAVENOUS at 00:31

## 2023-03-14 RX ADMIN — ERYTHROMYCIN 1 APPLICATION: 5 OINTMENT OPHTHALMIC at 00:31

## 2023-03-14 RX ADMIN — FEXOFENADINE HCL 60 MG: 60 TABLET, FILM COATED ORAL at 16:21

## 2023-03-14 RX ADMIN — ERYTHROMYCIN 1 APPLICATION: 5 OINTMENT OPHTHALMIC at 22:35

## 2023-03-14 RX ADMIN — LORAZEPAM 1 MG: 1 TABLET ORAL at 09:45

## 2023-03-14 RX ADMIN — LORAZEPAM 2 MG: 2 TABLET ORAL at 22:34

## 2023-03-14 RX ADMIN — THIAMINE HCL TAB 100 MG 100 MG: 100 TAB at 05:39

## 2023-03-14 RX ADMIN — LORAZEPAM 1 MG: 1 TABLET ORAL at 05:38

## 2023-03-14 RX ADMIN — LORAZEPAM 1 MG: 1 TABLET ORAL at 18:23

## 2023-03-14 RX ADMIN — ERYTHROMYCIN 1 APPLICATION: 5 OINTMENT OPHTHALMIC at 12:40

## 2023-03-14 RX ADMIN — ENOXAPARIN SODIUM 40 MG: 40 INJECTION SUBCUTANEOUS at 16:21

## 2023-03-14 RX ADMIN — LORAZEPAM 1 MG: 1 TABLET ORAL at 14:33

## 2023-03-14 RX ADMIN — SODIUM CHLORIDE 1 MILLION UNITS: 9 INJECTION, SOLUTION INTRAVENOUS at 22:34

## 2023-03-14 ASSESSMENT — LIFESTYLE VARIABLES
HEADACHE, FULLNESS IN HEAD: VERY MILD
TREMOR: *
ANXIETY: MILDLY ANXIOUS
ORIENTATION AND CLOUDING OF SENSORIUM: ORIENTED AND CAN DO SERIAL ADDITIONS
ANXIETY: MODERATELY ANXIOUS OR GUARDED, SO ANXIETY IS INFERRED
AUDITORY DISTURBANCES: VERY MILD HARSHNESS OR ABILITY TO FRIGHTEN
AGITATION: NORMAL ACTIVITY
VISUAL DISTURBANCES: VERY MILD SENSITIVITY
TOTAL SCORE: MILD ITCHING, PINS AND NEEDLES SENSATION, BURNING OR NUMBNESS
AGITATION: NORMAL ACTIVITY
TOTAL SCORE: 9
AGITATION: NORMAL ACTIVITY
ORIENTATION AND CLOUDING OF SENSORIUM: ORIENTED AND CAN DO SERIAL ADDITIONS
ANXIETY: *
TOTAL SCORE: 10
TOTAL SCORE: 15
TOTAL SCORE: 10
PAROXYSMAL SWEATS: BARELY PERCEPTIBLE SWEATING. PALMS MOIST
ORIENTATION AND CLOUDING OF SENSORIUM: ORIENTED AND CAN DO SERIAL ADDITIONS
AUDITORY DISTURBANCES: MILD HARSHNESS OR ABILITY TO FRIGHTEN
AUDITORY DISTURBANCES: VERY MILD HARSHNESS OR ABILITY TO FRIGHTEN
PAROXYSMAL SWEATS: NO SWEAT VISIBLE
HEADACHE, FULLNESS IN HEAD: MILD
VISUAL DISTURBANCES: VERY MILD SENSITIVITY
HEADACHE, FULLNESS IN HEAD: MILD
TOTAL SCORE: MILD ITCHING, PINS AND NEEDLES SENSATION, BURNING OR NUMBNESS
VISUAL DISTURBANCES: VERY MILD SENSITIVITY
PAROXYSMAL SWEATS: NO SWEAT VISIBLE
NAUSEA AND VOMITING: MILD NAUSEA WITH NO VOMITING
TOTAL SCORE: 6
VISUAL DISTURBANCES: VERY MILD SENSITIVITY
NAUSEA AND VOMITING: MILD NAUSEA WITH NO VOMITING
AGITATION: NORMAL ACTIVITY
AGITATION: NORMAL ACTIVITY
TREMOR: NO TREMOR
TREMOR: NO TREMOR
NAUSEA AND VOMITING: MILD NAUSEA WITH NO VOMITING
PAROXYSMAL SWEATS: NO SWEAT VISIBLE
ANXIETY: *
ORIENTATION AND CLOUDING OF SENSORIUM: ORIENTED AND CAN DO SERIAL ADDITIONS
AGITATION: SOMEWHAT MORE THAN NORMAL ACTIVITY
NAUSEA AND VOMITING: *
VISUAL DISTURBANCES: VERY MILD SENSITIVITY
ORIENTATION AND CLOUDING OF SENSORIUM: ORIENTED AND CAN DO SERIAL ADDITIONS
ANXIETY: MODERATELY ANXIOUS OR GUARDED, SO ANXIETY IS INFERRED
PAROXYSMAL SWEATS: BARELY PERCEPTIBLE SWEATING. PALMS MOIST
TREMOR: NO TREMOR
NAUSEA AND VOMITING: MILD NAUSEA WITH NO VOMITING
TREMOR: NO TREMOR
VISUAL DISTURBANCES: VERY MILD SENSITIVITY
HEADACHE, FULLNESS IN HEAD: VERY MILD
TOTAL SCORE: VERY MILD ITCHING, PINS AND NEEDLES SENSATION, BURNING OR NUMBNESS
TOTAL SCORE: 12
TREMOR: NO TREMOR
AGITATION: NORMAL ACTIVITY
TOTAL SCORE: VERY MILD ITCHING, PINS AND NEEDLES SENSATION, BURNING OR NUMBNESS
PAROXYSMAL SWEATS: BARELY PERCEPTIBLE SWEATING. PALMS MOIST
TOTAL SCORE: MILD ITCHING, PINS AND NEEDLES SENSATION, BURNING OR NUMBNESS
NAUSEA AND VOMITING: MILD NAUSEA WITH NO VOMITING
ANXIETY: *
AUDITORY DISTURBANCES: VERY MILD HARSHNESS OR ABILITY TO FRIGHTEN
TOTAL SCORE: 10
PAROXYSMAL SWEATS: NO SWEAT VISIBLE
VISUAL DISTURBANCES: VERY MILD SENSITIVITY
TOTAL SCORE: MILD ITCHING, PINS AND NEEDLES SENSATION, BURNING OR NUMBNESS
TREMOR: NO TREMOR
HEADACHE, FULLNESS IN HEAD: MILD
HEADACHE, FULLNESS IN HEAD: MODERATE
ORIENTATION AND CLOUDING OF SENSORIUM: ORIENTED AND CAN DO SERIAL ADDITIONS
NAUSEA AND VOMITING: MILD NAUSEA WITH NO VOMITING
SUBSTANCE_ABUSE: 1
AUDITORY DISTURBANCES: VERY MILD HARSHNESS OR ABILITY TO FRIGHTEN
HEADACHE, FULLNESS IN HEAD: MILD
AUDITORY DISTURBANCES: VERY MILD HARSHNESS OR ABILITY TO FRIGHTEN
ANXIETY: *
AUDITORY DISTURBANCES: VERY MILD HARSHNESS OR ABILITY TO FRIGHTEN
ORIENTATION AND CLOUDING OF SENSORIUM: ORIENTED AND CAN DO SERIAL ADDITIONS

## 2023-03-14 ASSESSMENT — PAIN DESCRIPTION - PAIN TYPE
TYPE: ACUTE PAIN
TYPE: ACUTE PAIN

## 2023-03-14 ASSESSMENT — ENCOUNTER SYMPTOMS
HALLUCINATIONS: 1
ABDOMINAL PAIN: 0
NERVOUS/ANXIOUS: 1
VOMITING: 0
FEVER: 0
SHORTNESS OF BREATH: 0
CHILLS: 0
EYE DISCHARGE: 1
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
## 2023-03-14 NOTE — CARE PLAN
The patient is Watcher - Medium risk of patient condition declining or worsening    Shift Goals  Clinical Goals: Monitor patient for CIWA, Medicate patient for CIWA  Patient Goals: Reduce anxiety due to detox, sleep    Progress made toward(s) clinical / shift goals:    Problem: Knowledge Deficit - Standard  Goal: Patient and family/care givers will demonstrate understanding of plan of care, disease process/condition, diagnostic tests and medications  Description: Target End Date:  1-3 days or as soon as patient condition allows    Document in Patient Education    1.  Patient and family/caregiver oriented to unit, equipment, visitation policy and means for communicating concern  2.  Complete/review Learning Assessment  3.  Assess knowledge level of disease process/condition, treatment plan, diagnostic tests and medications  4.  Explain disease process/condition, treatment plan, diagnostic tests and medications  Outcome: Progressing  Note: Patient understands the continued monitoring per CIWA protocol.      Problem: Optimal Care for Alcohol Withdrawal  Goal: Optimal Care for the alcohol withdrawal patient  Description: Target End Date:  1 to 3 days    1.  Alcohol history screening done on admission  2.  CIWA-AR score assessment (includes assessment of nausea/vomiting, tremor, sweats, anxiety, agitation, tactile, visual and auditory disturbances, headache and orientation/sensorium).  Document on CIWA flowsheet.  3.  Follow CIWA-AR score protocol  4.  Frequent reorientation  5.  Monitor for fluid and electrolyte imbalance.  6.  Assess for respiratory depression due to sedation (pulse ox)  7.  Consider thiamine, multivitamins, folic acid and magnesium sulfate per provider order  8.  Collaborate with Social Workers/Case Management  9.  Collaborate with mental health  Outcome: Progressing  Note: Patient will report accurate findings from alcohol withdrawal questioner.

## 2023-03-14 NOTE — PROGRESS NOTES
Monitor room contacted this RN stating they were unable to get a good read on patient. Tele leads were changed. Monitor room was still not able to get a good read. Went in to fix leads. This time patient is refusing for leads to be fixed. Patient also states refusing tele box. Updated on call hospitalist Kristi Doyle.

## 2023-03-14 NOTE — PROGRESS NOTES
Hospital Medicine Daily Progress Note    Date of Service  3/14/2023    Chief Complaint  Gopal Ceja is a 37 y.o. male admitted 3/10/2023 with ETOH WD.     Hospital Course  37-year-old male with history of alcoholism presented 3/11 with alcohol intoxication.  Patient has history of alcoholic abuse with multiple ED visits for alcohol withdrawal and intoxication.  Patient was given Ativan and he was very lethargic during my evaluation.  Patient had some nausea with no significant vomiting, no chest pain or shortness of breath.  Vital signs show tachycardia with hypertension and labs showed elevated liver enzymes with AST was 64 and ALT 99 and alcoholic liver 366.  Patient will be admitted to the hospital for alcoholic intoxication.    Hospitalization complicated by left heel cellulitis, conjunctivitis started on antibiotics.  MRSA nares was negative.  Left heel wound grew beta-hemolytic group A strep.    Interval Problem Update  Patient was seen and examined at bedside.  I have personally reviewed and interpreted vitals, labs, and imaging.    3/14.  Afebrile.  Episode tachycardia has resolved.  Slightly hypertensive.  Denies fever, chills, chest pains, shortness of breath.  Patient reports he wants to stop drinking.  Still reports some shakes, tremors.  He does not know when his last drink was.  Discussed left heel wound culture with pharmacy.  De-escalated to penicillin G.  Continue to monitor on CIWA    I have discussed this patient's plan of care and discharge plan at IDT rounds today with Case Management, Nursing, Nursing leadership, and other members of the IDT team.    Consultants/Specialty  None    Code Status  Full Code    Disposition  Patient is not medically cleared for discharge.   Anticipate discharge to to home with close outpatient follow-up.  I have placed the appropriate orders for post-discharge needs.    Review of Systems  Review of Systems   Constitutional:  Positive for malaise/fatigue.  Negative for chills and fever.   Eyes:  Positive for discharge.   Respiratory:  Negative for shortness of breath.    Cardiovascular:  Negative for chest pain and leg swelling.   Gastrointestinal:  Negative for abdominal pain and vomiting.   Skin:         Wound on L heel with redness and pain   Psychiatric/Behavioral:  Positive for hallucinations and substance abuse. The patient is nervous/anxious.       Physical Exam  Temp:  [36.1 °C (97 °F)-36.8 °C (98.3 °F)] 36.2 °C (97.2 °F)  Pulse:  [] 87  Resp:  [16-20] 18  BP: (124-142)/(88-96) 142/92  SpO2:  [94 %-96 %] 94 %    Physical Exam  Constitutional:       General: He is not in acute distress.     Appearance: He is ill-appearing. He is not toxic-appearing or diaphoretic.   HENT:      Head: Normocephalic and atraumatic.      Nose: Nose normal.      Mouth/Throat:      Mouth: Mucous membranes are dry.   Eyes:      Extraocular Movements: Extraocular movements intact.      Conjunctiva/sclera:      Right eye: Exudate present.      Left eye: Exudate present.   Cardiovascular:      Rate and Rhythm: Regular rhythm. Tachycardia present.      Heart sounds: No murmur heard.    No friction rub. No gallop.   Pulmonary:      Effort: Pulmonary effort is normal.      Breath sounds: Normal breath sounds.   Abdominal:      General: Bowel sounds are normal. There is no distension.      Palpations: Abdomen is soft.      Tenderness: There is no abdominal tenderness.   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   Skin:     Findings: Erythema (L heel wound with surrounding erythema, tenderness and purulence) present.   Neurological:      Mental Status: He is alert and oriented to person, place, and time.   Psychiatric:         Attention and Perception: Attention normal.         Mood and Affect: Mood is anxious.         Behavior: Behavior is cooperative.          Thought Content: Thought content normal.       Fluids    Intake/Output Summary (Last 24 hours) at 3/14/2023 0848  Last data filed at 3/14/2023 0414  Gross per 24 hour   Intake 610 ml   Output 1200 ml   Net -590 ml         Laboratory  Recent Labs     03/11/23  1147 03/14/23  0024   WBC 8.2 8.1   RBC 4.83 4.88   HEMOGLOBIN 14.8 15.0   HEMATOCRIT 45.1 44.4   MCV 93.4 91.0   MCH 30.6 30.7   MCHC 32.8* 33.8   RDW 46.4 43.7   PLATELETCT 136* 104*   MPV 10.9 12.4       Recent Labs     03/12/23  0012 03/13/23  0149 03/14/23  0024   SODIUM 136 135 137   POTASSIUM 3.7 4.7 3.7   CHLORIDE 100 103 103   CO2 26 21 22   GLUCOSE 116* 107* 112*   BUN 7* 9 8   CREATININE 0.66 0.57 0.65   CALCIUM 8.7 9.1 9.1               Recent Labs     03/08/23  2130 03/10/23  2340 03/11/23  1147 03/12/23  0012 03/14/23  0024   ASTSGOT 69* 164* 130* 109* 181*   ALTSGPT 40 99* 80* 75* 139*   TBILIRUBIN 0.5 0.6 0.9 1.3 0.5   GLOBULIN 4.4* 4.2* 3.4 3.2 3.7*               Imaging  No orders to display          Assessment/Plan  * Alcohol intoxication in active alcoholic with delirium (HCC)- (present on admission)  Assessment & Plan  History of heavy drinking  Clean wound with intoxication and level around 300  Complicated with delirium and alcoholic hepatitis  CIWA protocol with IV fluid and multivitamins  Librium twice daily since we anticipate severe withdrawal symptoms  Monitor electrolytes    CIWA scores downtrending however still having severe WD (hallucinations, anxiety, tachycardia, hypertensive, poor po intake)    Cellulitis of left lower extremity  Assessment & Plan  L heel wound now with surrounding erythema, tenderness, purulent drainage at top part of wound  Wound cx  vanc for now given h/o MRSA   MRSA nares was negative.  Wound culture grew beta-hemolytic group A strep.  De-escalate to penicillin G    Metabolic acidosis  Assessment & Plan  Resolved    Psychiatric disorder- (present on admission)  Assessment & Plan  Follow-up with  psychiatrist    Acute bacterial conjunctivitis of both eyes- (present on admission)  Assessment & Plan  Developed b/l purulent eye drainage  Asked for allergy medicine: allegra  However will also start erythromycin x 7 days    Alcoholic hepatitis- (present on admission)  Assessment & Plan  With elevated liver enzymes AST more than ALT  Normal bilirubin  Labs daily  Multivitamins  LFTs improving    Tachycardia- (present on admission)  Assessment & Plan  Due to withdrawal and dehydration  IV fluid    Methamphetamine addiction (HCC)- (present on admission)  Assessment & Plan  Urine drug screen was negative    Polysubstance abuse (HCC)- (present on admission)  Assessment & Plan  Urine drug screen is negative         VTE prophylaxis: SCDs/TEDs and enoxaparin ppx    I have performed a physical exam and reviewed and updated ROS and Plan today (3/14/2023). In review of yesterday's note (3/13/2023), there are no changes except as documented above.

## 2023-03-14 NOTE — CARE PLAN
The patient is Watcher - Medium risk of patient condition declining or worsening    Shift Goals  Clinical Goals: CIWA, medicate per protocol, safety, reduce anxiety, IV abx  Patient Goals: reduce anxiety, rest    Progress made toward(s) clinical / shift goals:      Problem: Knowledge Deficit - Standard  Goal: Patient and family/care givers will demonstrate understanding of plan of care, disease process/condition, diagnostic tests and medications  Outcome: Progressing     Problem: Optimal Care for Alcohol Withdrawal  Goal: Optimal Care for the alcohol withdrawal patient  Outcome: Progressing     Problem: Seizure Precautions  Goal: Implementation of seizure precautions  Outcome: Progressing     Problem: Psychosocial  Goal: Patient's level of anxiety will decrease  Outcome: Progressing       Patient is not progressing towards the following goals:

## 2023-03-14 NOTE — CARE PLAN
The patient is Watcher - Medium risk of patient condition declining or worsening    Shift Goals  Clinical Goals: monitor CIWA, medicate per protocol, safety  Patient Goals: reduce anxiety, rest    Progress made toward(s) clinical / shift goals:    Problem: Knowledge Deficit - Standard  Goal: Patient and family/care givers will demonstrate understanding of plan of care, disease process/condition, diagnostic tests and medications  Outcome: Progressing     Problem: Skin Integrity  Goal: Skin integrity is maintained or improved  Outcome: Progressing     Problem: Fall Risk  Goal: Patient will remain free from falls  Outcome: Progressing  Patient is AxO x4 and understands plan of care, all questions answered at this time.  Call light and personal belongings are within reach. Pt calls appropriately for nursing needs. Frequent rounding in place.  Bed is locked and in lowest position. Bed alarm is on and sounding. CIWA protocol in place, patient medicated per CIWA.     Patient is not progressing towards the following goals: N/A

## 2023-03-14 NOTE — PROGRESS NOTES
from Lab called with critical result of pt L Heel wound positive for group A strep at 0915. Critical lab result read back to .   Dr. Jewell notified of critical lab result at 0923.  Critical lab result read back by Dr. Jewell.

## 2023-03-15 ENCOUNTER — PHARMACY VISIT (OUTPATIENT)
Dept: PHARMACY | Facility: MEDICAL CENTER | Age: 38
End: 2023-03-15
Payer: COMMERCIAL

## 2023-03-15 VITALS
DIASTOLIC BLOOD PRESSURE: 82 MMHG | HEIGHT: 68 IN | SYSTOLIC BLOOD PRESSURE: 117 MMHG | RESPIRATION RATE: 16 BRPM | OXYGEN SATURATION: 95 % | TEMPERATURE: 98.4 F | WEIGHT: 159.83 LBS | BODY MASS INDEX: 24.22 KG/M2 | HEART RATE: 106 BPM

## 2023-03-15 PROBLEM — E87.20 METABOLIC ACIDOSIS: Status: RESOLVED | Noted: 2023-03-11 | Resolved: 2023-03-15

## 2023-03-15 PROBLEM — F10.221 ALCOHOL INTOXICATION IN ACTIVE ALCOHOLIC WITH DELIRIUM (HCC): Status: RESOLVED | Noted: 2023-03-11 | Resolved: 2023-03-15

## 2023-03-15 PROBLEM — R00.0 TACHYCARDIA: Status: RESOLVED | Noted: 2018-10-30 | Resolved: 2023-03-15

## 2023-03-15 LAB
ANION GAP SERPL CALC-SCNC: 14 MMOL/L (ref 7–16)
BACTERIA WND AEROBE CULT: ABNORMAL
BACTERIA WND AEROBE CULT: ABNORMAL
BASOPHILS # BLD AUTO: 0.2 % (ref 0–1.8)
BASOPHILS # BLD: 0.02 K/UL (ref 0–0.12)
BUN SERPL-MCNC: 7 MG/DL (ref 8–22)
CALCIUM SERPL-MCNC: 8.9 MG/DL (ref 8.5–10.5)
CHLORIDE SERPL-SCNC: 101 MMOL/L (ref 96–112)
CO2 SERPL-SCNC: 21 MMOL/L (ref 20–33)
CREAT SERPL-MCNC: 0.57 MG/DL (ref 0.5–1.4)
EOSINOPHIL # BLD AUTO: 0.14 K/UL (ref 0–0.51)
EOSINOPHIL NFR BLD: 1.7 % (ref 0–6.9)
ERYTHROCYTE [DISTWIDTH] IN BLOOD BY AUTOMATED COUNT: 44.1 FL (ref 35.9–50)
GFR SERPLBLD CREATININE-BSD FMLA CKD-EPI: 130 ML/MIN/1.73 M 2
GLUCOSE SERPL-MCNC: 125 MG/DL (ref 65–99)
GRAM STN SPEC: ABNORMAL
HCT VFR BLD AUTO: 44.4 % (ref 42–52)
HGB BLD-MCNC: 14.7 G/DL (ref 14–18)
IMM GRANULOCYTES # BLD AUTO: 0.03 K/UL (ref 0–0.11)
IMM GRANULOCYTES NFR BLD AUTO: 0.4 % (ref 0–0.9)
LYMPHOCYTES # BLD AUTO: 1.69 K/UL (ref 1–4.8)
LYMPHOCYTES NFR BLD: 21 % (ref 22–41)
MAGNESIUM SERPL-MCNC: 1.8 MG/DL (ref 1.5–2.5)
MCH RBC QN AUTO: 30.4 PG (ref 27–33)
MCHC RBC AUTO-ENTMCNC: 33.1 G/DL (ref 33.7–35.3)
MCV RBC AUTO: 91.9 FL (ref 81.4–97.8)
MONOCYTES # BLD AUTO: 0.9 K/UL (ref 0–0.85)
MONOCYTES NFR BLD AUTO: 11.2 % (ref 0–13.4)
NEUTROPHILS # BLD AUTO: 5.26 K/UL (ref 1.82–7.42)
NEUTROPHILS NFR BLD: 65.5 % (ref 44–72)
NRBC # BLD AUTO: 0 K/UL
NRBC BLD-RTO: 0 /100 WBC
PHOSPHATE SERPL-MCNC: 3.7 MG/DL (ref 2.5–4.5)
PLATELET # BLD AUTO: 113 K/UL (ref 164–446)
PMV BLD AUTO: 12.5 FL (ref 9–12.9)
POTASSIUM SERPL-SCNC: 3.6 MMOL/L (ref 3.6–5.5)
RBC # BLD AUTO: 4.83 M/UL (ref 4.7–6.1)
SIGNIFICANT IND 70042: ABNORMAL
SITE SITE: ABNORMAL
SODIUM SERPL-SCNC: 136 MMOL/L (ref 135–145)
SOURCE SOURCE: ABNORMAL
WBC # BLD AUTO: 8 K/UL (ref 4.8–10.8)

## 2023-03-15 PROCEDURE — A9270 NON-COVERED ITEM OR SERVICE: HCPCS | Performed by: INTERNAL MEDICINE

## 2023-03-15 PROCEDURE — 83735 ASSAY OF MAGNESIUM: CPT

## 2023-03-15 PROCEDURE — 36415 COLL VENOUS BLD VENIPUNCTURE: CPT

## 2023-03-15 PROCEDURE — 99239 HOSP IP/OBS DSCHRG MGMT >30: CPT | Performed by: INTERNAL MEDICINE

## 2023-03-15 PROCEDURE — 700102 HCHG RX REV CODE 250 W/ 637 OVERRIDE(OP): Performed by: INTERNAL MEDICINE

## 2023-03-15 PROCEDURE — 700111 HCHG RX REV CODE 636 W/ 250 OVERRIDE (IP): Performed by: INTERNAL MEDICINE

## 2023-03-15 PROCEDURE — 700105 HCHG RX REV CODE 258: Performed by: INTERNAL MEDICINE

## 2023-03-15 PROCEDURE — RXMED WILLOW AMBULATORY MEDICATION CHARGE: Performed by: INTERNAL MEDICINE

## 2023-03-15 PROCEDURE — 80048 BASIC METABOLIC PNL TOTAL CA: CPT

## 2023-03-15 PROCEDURE — 84100 ASSAY OF PHOSPHORUS: CPT

## 2023-03-15 PROCEDURE — 85025 COMPLETE CBC W/AUTO DIFF WBC: CPT

## 2023-03-15 RX ORDER — LANOLIN ALCOHOL/MO/W.PET/CERES
400 CREAM (GRAM) TOPICAL 2 TIMES DAILY
Status: DISCONTINUED | OUTPATIENT
Start: 2023-03-15 | End: 2023-03-15 | Stop reason: HOSPADM

## 2023-03-15 RX ORDER — AMOXICILLIN 500 MG/1
500 CAPSULE ORAL 3 TIMES DAILY
Status: DISCONTINUED | OUTPATIENT
Start: 2023-03-15 | End: 2023-03-15 | Stop reason: HOSPADM

## 2023-03-15 RX ORDER — POTASSIUM CHLORIDE 20 MEQ/1
40 TABLET, EXTENDED RELEASE ORAL ONCE
Status: COMPLETED | OUTPATIENT
Start: 2023-03-15 | End: 2023-03-15

## 2023-03-15 RX ORDER — AMLODIPINE BESYLATE 5 MG/1
5 TABLET ORAL
Status: DISCONTINUED | OUTPATIENT
Start: 2023-03-15 | End: 2023-03-15 | Stop reason: HOSPADM

## 2023-03-15 RX ORDER — FEXOFENADINE HCL 60 MG/1
60 TABLET, FILM COATED ORAL EVERY 12 HOURS
Qty: 14 TABLET | Refills: 0 | Status: SHIPPED | OUTPATIENT
Start: 2023-03-15 | End: 2023-03-22

## 2023-03-15 RX ORDER — AMOXICILLIN 500 MG/1
500 CAPSULE ORAL 3 TIMES DAILY
Qty: 30 CAPSULE | Refills: 0 | Status: ACTIVE | OUTPATIENT
Start: 2023-03-15 | End: 2023-03-25

## 2023-03-15 RX ADMIN — AMOXICILLIN 500 MG: 500 CAPSULE ORAL at 10:01

## 2023-03-15 RX ADMIN — DULOXETINE 20 MG: 20 CAPSULE, DELAYED RELEASE ORAL at 04:47

## 2023-03-15 RX ADMIN — LORAZEPAM 1 MG: 1 TABLET ORAL at 08:47

## 2023-03-15 RX ADMIN — THERA TABS 1 TABLET: TAB at 04:47

## 2023-03-15 RX ADMIN — FEXOFENADINE HCL 60 MG: 60 TABLET, FILM COATED ORAL at 04:46

## 2023-03-15 RX ADMIN — FOLIC ACID 1 MG: 1 TABLET ORAL at 04:47

## 2023-03-15 RX ADMIN — THIAMINE HCL TAB 100 MG 100 MG: 100 TAB at 04:47

## 2023-03-15 RX ADMIN — LORAZEPAM 1 MG: 1 TABLET ORAL at 04:46

## 2023-03-15 RX ADMIN — POTASSIUM CHLORIDE 40 MEQ: 1500 TABLET, EXTENDED RELEASE ORAL at 08:43

## 2023-03-15 RX ADMIN — LORAZEPAM 0.5 MG: 0.5 TABLET ORAL at 00:36

## 2023-03-15 RX ADMIN — SODIUM CHLORIDE 1 MILLION UNITS: 9 INJECTION, SOLUTION INTRAVENOUS at 04:50

## 2023-03-15 RX ADMIN — AMLODIPINE BESYLATE 5 MG: 5 TABLET ORAL at 08:43

## 2023-03-15 RX ADMIN — SODIUM CHLORIDE 980 ML: 9 INJECTION, SOLUTION INTRAVENOUS at 04:48

## 2023-03-15 RX ADMIN — Medication 400 MG: at 08:43

## 2023-03-15 ASSESSMENT — LIFESTYLE VARIABLES
TOTAL SCORE: 9
ANXIETY: *
AGITATION: NORMAL ACTIVITY
HEADACHE, FULLNESS IN HEAD: MILD
NAUSEA AND VOMITING: *
ANXIETY: MILDLY ANXIOUS
TOTAL SCORE: 6
TREMOR: NO TREMOR
AUDITORY DISTURBANCES: NOT PRESENT
PAROXYSMAL SWEATS: NO SWEAT VISIBLE
AUDITORY DISTURBANCES: NOT PRESENT
AGITATION: NORMAL ACTIVITY
TOTAL SCORE: VERY MILD ITCHING, PINS AND NEEDLES SENSATION, BURNING OR NUMBNESS
TREMOR: NO TREMOR
NAUSEA AND VOMITING: MILD NAUSEA WITH NO VOMITING
TOTAL SCORE: 10
ORIENTATION AND CLOUDING OF SENSORIUM: ORIENTED AND CAN DO SERIAL ADDITIONS
HEADACHE, FULLNESS IN HEAD: NOT PRESENT
ORIENTATION AND CLOUDING OF SENSORIUM: ORIENTED AND CAN DO SERIAL ADDITIONS
VISUAL DISTURBANCES: VERY MILD SENSITIVITY
TREMOR: NO TREMOR
PAROXYSMAL SWEATS: *
PAROXYSMAL SWEATS: BARELY PERCEPTIBLE SWEATING. PALMS MOIST
HEADACHE, FULLNESS IN HEAD: MILD
VISUAL DISTURBANCES: VERY MILD SENSITIVITY
AGITATION: SOMEWHAT MORE THAN NORMAL ACTIVITY
AUDITORY DISTURBANCES: NOT PRESENT
ANXIETY: *
VISUAL DISTURBANCES: VERY MILD SENSITIVITY
TOTAL SCORE: VERY MILD ITCHING, PINS AND NEEDLES SENSATION, BURNING OR NUMBNESS
ORIENTATION AND CLOUDING OF SENSORIUM: ORIENTED AND CAN DO SERIAL ADDITIONS
TOTAL SCORE: VERY MILD ITCHING, PINS AND NEEDLES SENSATION, BURNING OR NUMBNESS
NAUSEA AND VOMITING: MILD NAUSEA WITH NO VOMITING

## 2023-03-15 ASSESSMENT — PAIN DESCRIPTION - PAIN TYPE: TYPE: ACUTE PAIN

## 2023-03-15 NOTE — CARE PLAN
The patient is Stable - Low risk of patient condition declining or worsening    Shift Goals  Clinical Goals: CIWA, IV abx, discharge  Patient Goals: discharge    Progress made toward(s) clinical / shift goals:      Problem: Optimal Care for Alcohol Withdrawal  Goal: Optimal Care for the alcohol withdrawal patient  Outcome: Progressing     Problem: Seizure Precautions  Goal: Implementation of seizure precautions  Outcome: Progressing     Problem: Lifestyle Changes  Goal: Patient's ability to identify lifestyle changes and available resources to help reduce recurrence of condition will improve  Outcome: Progressing       Patient is not progressing towards the following goals:

## 2023-03-15 NOTE — DISCHARGE INSTRUCTIONS
Discharge Instructions    Discharged to other by self with self. Discharged via walking, hospital escort: Refused.  Special equipment needed: Not Applicable    Be sure to schedule a follow-up appointment with your primary care doctor or any specialists as instructed.     Discharge Plan:   Diet Plan: Discussed  Activity Level: Discussed  Confirmed Follow up Appointment: Patient to Call and Schedule Appointment  Confirmed Symptoms Management: Discussed  Medication Reconciliation Updated: Yes  Influenza Vaccine Indication: Patient Refuses    I understand that a diet low in cholesterol, fat, and sodium is recommended for good health. Unless I have been given specific instructions below for another diet, I accept this instruction as my diet prescription.   Other diet:     Special Instructions: None    -Is this patient being discharged with medication to prevent blood clots?  No    Is patient discharged on Warfarin / Coumadin?   No     Antibiotic Medicine, Adult    Antibiotic medicines treat infections caused by a type of germ called bacteria. They work by killing the bacteria that make you sick.  When do I need to take antibiotics?  You often need these medicines to treat bacterial infections, such as:  A urinary tract infection (UTI).  Strep throat.  Meningitis. This affects the spinal cord and brain.  A bad lung infection.  You may start the medicines while your doctor waits for tests to come back. When the tests come back, your doctor may change or stop your medicine.  When are antibiotics not needed?  You do not need these medicines for most common illnesses, such as:  A cold.  The flu.  A sore throat.  Antibiotics are not always needed for all infections caused by bacteria. Do not ask for these medicines, or take them, when they are not needed.  What are the risks of taking antibiotics?  Most antibiotics can cause an infection called Clostridioides difficile (C. diff). This causes watery poop (diarrhea). Let your  doctor know right away if:  You have watery poop while taking an antibiotic.  You have watery poop after you stop taking an antibiotic. The illness can happen weeks after you stop the medicine.  You also have a risk of getting an infection in the future that antibiotics cannot treat (antibiotic-resistant infection). This type of infection can be dangerous.  What else should I know about taking antibiotics?    You need to take the entire prescription.  Take the medicine for as long as told by your doctor.  Do not stop taking it even if you start to feel better.  Try not to miss any doses. If you miss a dose, call your doctor.  Birth control pills may not work. If you take birth control pills:  Keep on taking them.  Use a second form of birth control, such as a condom. Do this for as long as told by your doctor.  Ask your doctor:  How long to wait in between doses.  If you should take the medicine with food.  If there is anything you should stay away from while taking the antibiotic, such as:  Food.  Drinks.  Medicines.  If there are any side effects you should watch for.  Only take the medicines that your doctor told you to take. Do not take medicines that were given to someone else.  Drink a large glass of water with the medicine.  Ask the pharmacist for a tool to measure the medicine, such as:  A syringe.  A cup.  A spoon.  Throw away any extra medicine.  Contact a doctor if:  You get worse.  You have new joint pain or muscle aches after starting the medicine.  You have side effects from the medicine, such as:  Stomach pain.  Watery poop.  Feeling sick to your stomach (nausea).  Get help right away if:  You have signs of a very bad allergic reaction. If this happens, stop taking the medicine right away. Signs may include:  Hives. These are raised, itchy, red bumps on the skin.  Skin rash.  Trouble breathing.  Wheezing.  Swelling.  Feeling dizzy.  Throwing up (vomiting).  Your pee (urine) is dark, or is the color of  blood.  Your skin turns yellow.  You bruise easily.  You bleed easily.  You have very bad watery poop and cramps in your belly.  You have a very bad headache.  Summary  Antibiotics are often used to treat infections caused by bacteria.  Only take these medicines when needed.  Let your doctor know if you have watery poop while taking an antibiotic.  You need to take the entire prescription.  This information is not intended to replace advice given to you by your health care provider. Make sure you discuss any questions you have with your health care provider.  Document Released: 09/26/2009 Document Revised: 01/24/2020 Document Reviewed: 12/20/2017  [employer] Patient Education © 2020 [employer]    Amoxicillin capsules or tablets  What is this medicine?  AMOXICILLIN (a mox i VALERY in) is a penicillin antibiotic. It is used to treat certain kinds of bacterial infections. It will not work for colds, flu, or other viral infections.  This medicine may be used for other purposes; ask your health care provider or pharmacist if you have questions.  COMMON BRAND NAME(S): Amoxil, Moxilin, Sumox, Trimox  What should I tell my health care provider before I take this medicine?  They need to know if you have any of these conditions:  kidney disease  an unusual or allergic reaction to amoxicillin, other penicillins, cephalosporin antibiotics, other medicines, foods, dyes, or preservatives  pregnant or trying to get pregnant  breast-feeding  How should I use this medicine?  Take this medicine by mouth with a glass of water. Follow the directions on your prescription label. You can take it with or without food. If it upsets your stomach, take it with food. Take your medicine at regular intervals. Do not take your medicine more often than directed. Take all of your medicine as directed even if you think you are better. Do not skip doses or stop your medicine early.  Talk to your pediatrician regarding the use of this medicine in  children. While this drug may be prescribed for selected conditions, precautions do apply.  Overdosage: If you think you have taken too much of this medicine contact a poison control center or emergency room at once.  NOTE: This medicine is only for you. Do not share this medicine with others.  What if I miss a dose?  If you miss a dose, take it as soon as you can. If it is almost time for your next dose, take only that dose. Do not take double or extra doses.  What may interact with this medicine?  allopurinol  birth control pills  certain antibiotics like chloramphenicol, erythromycin, sulfamethoxazole, tetracycline  certain medicines that treat or prevent blood clots like warfarin  This list may not describe all possible interactions. Give your health care provider a list of all the medicines, herbs, non-prescription drugs, or dietary supplements you use. Also tell them if you smoke, drink alcohol, or use illegal drugs. Some items may interact with your medicine.  What should I watch for while using this medicine?  Tell your health care professional if your symptoms do not start to get better or if they get worse.  Do not treat diarrhea with over the counter products. Contact your health care professional if you have diarrhea that lasts more than 2 days or if it is severe and watery.  If you have diabetes, you may get a false-positive result for sugar in your urine. Check with your health care professional.  Birth control may not work properly while you are taking this medicine. Talk to your health care professional about using an extra method of birth control.  This medicine may cause serious skin reactions. They can happen weeks to months after starting the medicine. Contact your health care provider right away if you notice fevers or flu-like symptoms with a rash. The rash may be red or purple and then turn into blisters or peeling of the skin. Or, you might notice a red rash with swelling of the face, lips or  lymph nodes in your neck or under your arms.  What side effects may I notice from receiving this medicine?  Side effects that you should report to your doctor or health care professional as soon as possible:  allergic reactions like skin rash, itching or hives, swelling of the face, lips, or tongue  bloody or watery diarrhea  breathing problems  feeling faint; lightheaded, falls  fever  redness, blistering, peeling or loosening of the skin, including inside the mouth  seizures  signs and symptoms of kidney injury like trouble passing urine or change in the amount of urine  signs and symptoms of liver injury like dark yellow or brown urine; general ill feeling or flu-like symptoms; light-colored stools; loss of appetite; nausea; right upper belly pain; unusually weak or tired; yellowing of the eyes or skin  unusual bleeding or bruising  unusually weak or tired  Side effects that usually do not require medical attention (report to your doctor or health care professional if they continue or are bothersome):  anxious  confusion  diarrhea  dizziness  headache  nausea, vomiting  stomach upset  trouble sleeping  This list may not describe all possible side effects. Call your doctor for medical advice about side effects. You may report side effects to FDA at 4-005-FDA-4054.  Where should I keep my medicine?  Keep out of the reach of children.  Store at room temperature between 20 and 25 degrees C (68 and 77 degrees F). Throw away any unused medicine after the expiration date.  NOTE: This sheet is a summary. It may not cover all possible information. If you have questions about this medicine, talk to your doctor, pharmacist, or health care provider.  © 2020 [employer] (2020-02-28 14:32:29)

## 2023-03-15 NOTE — WOUND TEAM
[facility]  Inpatient Services  Initial Wound and Skin Care Evaluation    Admission Date: 3/10/2023     Last order of IP CONSULT TO WOUND CARE was found on 3/13/2023 from Hospital Encounter on 3/10/2023     HPI, PMH, SH: Reviewed    Past Surgical History:   Procedure Laterality Date    MS EXPLORATORY OF ABDOMEN N/A 9/26/2019    Procedure: LAPAROTOMY, EXPLORATORY;  Surgeon: Jevon Llanes M.D.;  Location: SURGERY [facility];  Service: General    OTHER ORTHOPEDIC SURGERY      hands bilaterally r/t EEDC syndrome     Social History     Tobacco Use    Smoking status: Never    Smokeless tobacco: Never    Tobacco comments:     Smokes couple of times a month   Substance Use Topics    Alcohol use: Yes     Comment: last drink today     Chief Complaint   Patient presents with    ETOH Withdrawal     Pt BIBA from [facility] for ETOH withdraw. Pt has long hx of chronic ETOH abuse, alcohol withdraw, substance abuse. Recent visit x2 days ago for same. Pt does not remember last drink but states he does not feel well and says ''I feel like I am dying and starting to withdraw''     Diagnosis: Alcohol intoxication in active alcoholic with delirium (HCC) [F10.221]    Unit where seen by Wound Team: [id number]     WOUND CONSULT/FOLLOW UP RELATED TO:  L ankle/heel     WOUND HISTORY:  pt admitted with wound    WOUND ASSESSMENT/LDA  Wound 03/11/23 Partial Thickness Wound Ankle Left (Active)      03/11/23 0320   Site Assessment Dry;Red;Yellow    Periwound Assessment Pink    Margins Defined edges;Attached edges    Closure Secondary intention    Drainage Amount None    Drainage Description Yellow    Treatments Cleansed    Wound Cleansing Normal Saline Irrigation    Periwound Protectant Not Applicable    Dressing Cleansing/Solutions Not Applicable    Dressing Options Hydrocolloid Thin;Silicone Adhesive Foam;Tubigrip    Dressing Changed Changed    Dressing Status Intact    Dressing Change/Treatment Frequency Every 72 hrs, and  As Needed    NEXT Dressing Change/Treatment Date 03/17/23    NEXT Weekly Photo (Inpatient Only) 03/17/23    Non-staged Wound Description Partial thickness 03/14/23 1630   Wound Length (cm) 3.5 cm 03/14/23 1630   Wound Width (cm) 2.5 cm 03/14/23 1630   Wound Surface Area (cm^2) 8.75 cm^2 03/14/23 1630   Shape irregular    Wound Odor None    Pulses 2+;DP    Exposed Structures None    Number of days: 3       Vascular:    KEMAR:   No results found.    Lab Values:    Lab Results   Component Value Date/Time    WBC 8.1 03/14/2023 12:24 AM    RBC 4.88 03/14/2023 12:24 AM    HEMOGLOBIN 15.0 03/14/2023 12:24 AM    HEMATOCRIT 44.4 03/14/2023 12:24 AM    HBA1C 5.9 (H) 10/29/2018 07:02 PM        Culture Results show:  No results found for this or any previous visit (from the past 720 hour(s)).    Pain Level/Medicated:  None, Tolerated without pain medication       INTERVENTIONS BY WOUND TEAM:  Chart and images reviewed. Discussed with bedside RN. All areas of concern (based on picture review, LDA review and discussion with bedside RN) have been thoroughly assessed. Documentation of areas based on significant findings. This RN in to assess patient. Performed standard wound care which includes appropriate positioning, dressing removal and non-selective debridement. Pictures and measurements obtained weekly if/when required.  Preparation for Dressing removal: Dressing soaked with Open to air  Non-selectively Debrided with:  Normal Saline and Gauze   Sharp debridement: NA  Nancy wound: Cleansed with Normal Saline and Gauze, Prepped with NA  Primary Dressing: Hydrocolloid Thin  Secondary (Outer) Dressing: Adhesive foam and Tubigrip E    Advanced Wound Care Discharge Planning  Number of Clinicians necessary to complete wound care: 1  Is patient requiring IV pain medications for dressing changes: no  Length of time for dressing change 30 min. (This does not include chart review, pre-medication time, set up, clean up or time spent  charting.)    Interdisciplinary consultation: Patient, Bedside RN  ,     EVALUATION / RATIONALE FOR TREATMENT:  Most Recent Date:  3/14: Pt has abraded skin to posterior L ankle/heel from electronic bracelet. Hydrocolloid applied to provide occlusive drsg to help promote autolytic debridement. It will also promote a moisture-balanced environment conducive to wound healing.  Adhesive foam for drainage and Tubi  E to help secure drsg and protect skin from friction.    R medial ankle with red scar tissue from previous injury. It is DEE DEE.        Goals: Steady decrease in wound area and depth weekly.    WOUND TEAM PLAN OF CARE ([X] for frequency of wound follow up,):   Nursing to follow dressing orders written for wound care. Contact wound team if area fails to progress, deteriorates or with any questions/concerns if something comes up before next scheduled follow up (See below as to whether wound is following and frequency of wound follow up)  Dressing changes by wound team:                   Follow up 3 times weekly:                NPWT change 3 times weekly:     Follow up 1-2 times weekly:      Follow up Bi-Monthly:           Follow up Monthly (High Risk):                        Follow up as needed:  PRN   Other (explain):     NURSING PLAN OF CARE ORDERS (X):  Dressing changes: See Dressing Care orders: x  Skin care: See Skin Care orders:   RN Prevention Protocol:   Rectal tube care: See Rectal Tube Care orders:   Other orders:    RSKIN:   CURRENTLY IN PLACE (X), APPLIED THIS VISIT (A), ORDERED (O):   Q shift Jarod:  X  Q shift pressure point assessments:  X    Surface/Positioning   Standard Mattress/Trauma Bed    x       Low Airloss          ICU Low Airloss   Bariatric [place]     Waffle cushion        Waffle Overlay          Reposition q 2 hours      TAPs Turning system     Z Russ Pillow     Offloading/Redistribution not assessed  Sacral Offloading Dressing (Silicone dressing)     Heel Offloading Dressing (Silicone  dressing)         Heel float boots (Prevalon boot)             Float Heels off Bed with Pillows           Respiratory  RA  Silicone O2 tubing         Gray Foam Ear protectors     Cannula fixation Device (Tender )          High flow offloading Clip    Elastic head band offloading device      Anchorfast                                                         Trach with Optifoam split foam             Containment/Moisture Prevention bathroom    Rectal tube or BMS    Purwick/Condom Cath        Cabrera Catheter    Barrier wipes           Barrier paste       Antifungal tx      Interdry        Mobilization       Up to chair        Ambulate   x   PT/OT      Nutrition       Dietician        Diabetes Education      PO   x  TF     TPN     NPO   # days     Other        Anticipated discharge plans: TBD may need wound care L ankle  LTACH:        SNF/Rehab:                  Home Health Care:           Outpatient Wound Center:            Self/Family Care:        Other:                  Vac Discharge Needs:   Vac Discharge plan is purely a recommendation from wound team and not a requirement for discharge unless otherwise stated by physician.  Not Applicable Pt not on a wound vac:  x     Regular Vac while inpatient, alternative dressing at DC:        Regular Vac in use and continued at DC:            Reg. Vac w/ Skin Sub/Biologic in use. Will need to be changed 2x wkly:      Veraflo Vac while inpatient, ok to transition to Regular Vac on Discharge (Bedside RN to Clamp small instillation tubing at time of DC):           Veraflo Vac while inpatient, would benefit from remaining on Veraflo Vac upon discharge:

## 2023-03-15 NOTE — CARE PLAN
The patient is Watcher - Medium risk of patient condition declining or worsening    Shift Goals  Clinical Goals: CIWA, medicate per protocol, safety, IV ABX  Patient Goals: reduce anxiety    Progress made toward(s) clinical / shift goals:    Problem: Knowledge Deficit - Standard  Goal: Patient and family/care givers will demonstrate understanding of plan of care, disease process/condition, diagnostic tests and medications  Outcome: Progressing     Problem: Fall Risk  Goal: Patient will remain free from falls  Outcome: Progressing  Patient is AxO x4 and understands plan of care, all questions answered at this time.  Call light and personal belongings are within reach. Pt calls appropriately for nursing needs. Frequent rounding in place.  Bed is locked and in lowest position. CIWA protocol in place. IV abx administered per MAR. Bed alarm is on and sounding.     Patient is not progressing towards the following goals: NA

## 2023-03-16 NOTE — DISCHARGE SUMMARY
Discharge Summary    CHIEF COMPLAINT ON ADMISSION  Chief Complaint   Patient presents with    ETOH Withdrawal     Pt BIBA from [facility] for ETOH withdraw. Pt has long hx of chronic ETOH abuse, alcohol withdraw, substance abuse. Recent visit x2 days ago for same. Pt does not remember last drink but states he does not feel well and says ''I feel like I am dying and starting to withdraw''       Reason for Admission  ETOH     Admission Date  3/10/2023    CODE STATUS  FULL    HPI & HOSPITAL COURSE  37-year-old male with history of alcoholism presented 3/11 with alcohol intoxication.  Patient has history of alcoholic abuse with multiple ED visits for alcohol withdrawal and intoxication.  Patient was given Ativan and he was very lethargic during my evaluation.  Patient had some nausea with no significant vomiting, no chest pain or shortness of breath.  Vital signs show tachycardia with hypertension and labs showed elevated liver enzymes with AST was 64 and ALT 99 and alcoholic liver 366.  Patient will be admitted to the hospital for alcoholic intoxication.  Patient was monitored on CIWA protocol.  Patient reported intention to stop drinking alcohol as well as methamphetamines and other illicit drugs.  Counseled extensively about lifestyle modifications.  He was still requiring minimal lorazepam per CIWA protocol but had been days outside of window for alcohol withdrawal.     Hospitalization complicated by left heel cellulitis, conjunctivitis started on ophthalmic erythromycin.  MRSA nares was negative.  Left heel wound grew beta-hemolytic group A strep.  Patient was transitioned to oral amoxicillin.    Medically stable to discharge home.  Complete course of antibiotics as outpatient.  Follow-up with primary care as outpatient.  Counseled extensively about lifestyle changes, alcohol and drug cessation.    Therefore, he is discharged in fair and stable condition to home with close outpatient follow-up.    The patient met  2-midnight criteria for an inpatient stay at the time of discharge.    Discharge Date  3/15/2023    FOLLOW UP ITEMS POST DISCHARGE  3/15/2023    DISCHARGE DIAGNOSES  Principal Problem (Resolved):    Alcohol intoxication in active alcoholic with delirium (HCC) POA: Yes  Active Problems:    Polysubstance abuse (HCC) POA: Yes    Methamphetamine addiction (HCC) POA: Yes    Alcoholic hepatitis POA: Yes    Acute bacterial conjunctivitis of both eyes POA: Yes    Primary hypertension POA: Yes    Psychiatric disorder POA: Yes    Cellulitis of left lower extremity POA: Yes  Resolved Problems:    Tachycardia POA: Yes    Metabolic acidosis POA: Yes      FOLLOW UP  Follow-up with primary care as outpatient      MEDICATIONS ON DISCHARGE     Medication List        START taking these medications        Instructions   amoxicillin 500 MG Caps  Commonly known as: AMOXIL   Take 1 Capsule by mouth in the morning, at noon, and at bedtime for 10 days.  Dose: 500 mg     fexofenadine 60 MG Tabs  Commonly known as: ALLEGRA   Take 1 Tablet by mouth every 12 hours for 7 days.  Dose: 60 mg            CONTINUE taking these medications        Instructions   amphetamine-dextroamphetamine 12.5 MG tablet  Commonly known as: ADDERALL   Take 12.5 mg by mouth 2 times a day.  Dose: 12.5 mg     clonazePAM 1 MG Tabs  Commonly known as: KLONOPIN   Take 1 mg by mouth 2 times a day as needed. Indications: Feeling Anxious  Dose: 1 mg     DULoxetine 20 MG Cpep  Commonly known as: CYMBALTA   Take 20 mg by mouth every day.  Dose: 20 mg     propranolol 20 MG Tabs  Commonly known as: INDERAL   Take 20 mg by mouth 2 times a day.  Dose: 20 mg              Allergies  No Known Allergies    DIET  No orders of the defined types were placed in this encounter.      ACTIVITY  As tolerated.  Weight bearing as tolerated    CONSULTATIONS  None    PROCEDURES  None    LABORATORY  Lab Results   Component Value Date    SODIUM 136 03/15/2023    POTASSIUM 3.6 03/15/2023    CHLORIDE  101 03/15/2023    CO2 21 03/15/2023    GLUCOSE 125 (H) 03/15/2023    BUN 7 (L) 03/15/2023    CREATININE 0.57 03/15/2023    GLOMRATE 98 11/01/2022        Lab Results   Component Value Date    WBC 8.0 03/15/2023    HEMOGLOBIN 14.7 03/15/2023    HEMATOCRIT 44.4 03/15/2023    PLATELETCT 113 (L) 03/15/2023        I discussed medications and side effects with the patient.  I discussed prognosis and importance of medical compliance with the patient.  I counseled the patient about diet, exercise, weight loss, smoking cessation, and life style modifications.  All questions and concerns have been addressed.  Total time of the discharge process was 39 minutes.

## 2023-10-27 ENCOUNTER — HOSPITAL ENCOUNTER (EMERGENCY)
Facility: MEDICAL CENTER | Age: 38
End: 2023-10-27
Attending: EMERGENCY MEDICINE
Payer: MEDICARE

## 2023-10-27 ENCOUNTER — HOSPITAL ENCOUNTER (INPATIENT)
Facility: MEDICAL CENTER | Age: 38
LOS: 3 days | DRG: 894 | End: 2023-10-31
Attending: STUDENT IN AN ORGANIZED HEALTH CARE EDUCATION/TRAINING PROGRAM | Admitting: STUDENT IN AN ORGANIZED HEALTH CARE EDUCATION/TRAINING PROGRAM
Payer: MEDICARE

## 2023-10-27 VITALS
DIASTOLIC BLOOD PRESSURE: 93 MMHG | HEART RATE: 110 BPM | SYSTOLIC BLOOD PRESSURE: 142 MMHG | BODY MASS INDEX: 24.1 KG/M2 | HEIGHT: 68 IN | WEIGHT: 159 LBS | TEMPERATURE: 97.8 F | RESPIRATION RATE: 18 BRPM | OXYGEN SATURATION: 95 %

## 2023-10-27 DIAGNOSIS — F10.920 ALCOHOLIC INTOXICATION WITHOUT COMPLICATION (HCC): ICD-10-CM

## 2023-10-27 DIAGNOSIS — F10.239 ALCOHOL DEPENDENCE WITH WITHDRAWAL WITH COMPLICATION (HCC): ICD-10-CM

## 2023-10-27 LAB — GLUCOSE BLD STRIP.AUTO-MCNC: 100 MG/DL (ref 65–99)

## 2023-10-27 PROCEDURE — 82962 GLUCOSE BLOOD TEST: CPT

## 2023-10-27 PROCEDURE — 99285 EMERGENCY DEPT VISIT HI MDM: CPT

## 2023-10-27 ASSESSMENT — FIBROSIS 4 INDEX: FIB4 SCORE: 5.03

## 2023-10-27 ASSESSMENT — LIFESTYLE VARIABLES: DO YOU DRINK ALCOHOL: YES

## 2023-10-27 NOTE — ED NOTES
Pt connected to monitor upon arrival via EMS. Pt O2 saturation found to be at 82% on RA. Placed on 4Lpm via nasal cannula. Pt O2 then increased to 93%. Pt then increased to 100%, O2 decreased to 2Lpm. Pt O2 saturation with 2L of O2 is 97%.

## 2023-10-27 NOTE — ED NOTES
Pt BIB EMS.  Pt appears intoxicated  and unable to ambulate.  Pt . Pt awaking to voice but refusing to answer questions.  ERP to see.

## 2023-10-27 NOTE — ED PROVIDER NOTES
ED Provider Note    CHIEF COMPLAINT  Chief Complaint   Patient presents with    Alcohol Intoxication       EXTERNAL RECORDS REVIEWED  3/15/2023, inpatient discharge summary with alcohol withdrawal    HPI/ROS  LIMITATION TO HISTORY   Select: Intoxication  OUTSIDE HISTORIAN(S):  EMS      Gopal Ceja is a 38 y.o. male who presents via EMS for alcohol intoxication.  Well-known to this department.  EMS reports he was hypoxic upon their arrival requiring supplemental oxygen.  He arrives very drowsy.  He is arousable, mumbles some words but does not provide any history otherwise whatsoever.    PAST MEDICAL HISTORY   has a past medical history of Acute anxiety, ADHD (attention deficit hyperactivity disorder), ADHD (attention deficit hyperactivity disorder), Alcohol abuse, Bipolar affective (HCC), Depression, Drug abuse and dependence (HCC), Ectrodactyly-ectodermal dysplasia-clefting syndrome, ETOH abuse, and Psychiatric disorder.    SURGICAL HISTORY   has a past surgical history that includes other orthopedic surgery and exploratory of abdomen (N/A, 9/26/2019).    FAMILY HISTORY  Family History   Problem Relation Age of Onset    Hypertension Mother     Heart Disease Neg Hx     Hyperlipidemia Neg Hx        SOCIAL HISTORY  Social History     Tobacco Use    Smoking status: Never    Smokeless tobacco: Never    Tobacco comments:     Smokes couple of times a month   Vaping Use    Vaping Use: Never used   Substance and Sexual Activity    Alcohol use: Yes     Comment: last drink today    Drug use: Yes     Types: Inhaled     Comment: meth yesterday (smokes Meth)/THC    Sexual activity: Not on file       CURRENT MEDICATIONS  Home Medications       Reviewed by Shirley Hughes R.N. (Registered Nurse) on 10/27/23 at 1428  Med List Status: Partial     Medication Last Dose Status   amphetamine-dextroamphetamine (ADDERALL) 12.5 MG tablet  Active   clonazePAM (KLONOPIN) 1 MG Tab  Active   DULoxetine (CYMBALTA) 20 MG Cap   "Particles  Active   propranolol (INDERAL) 20 MG Tab  Active                    ALLERGIES  No Known Allergies    PHYSICAL EXAM  VITAL SIGNS: /73   Pulse (!) 101   Temp 36.4 °C (97.6 °F) (Temporal)   Resp 16   Ht 1.727 m (5' 8\")   Wt 72.1 kg (159 lb)   SpO2 97%   BMI 24.18 kg/m²    Constitutional: Sleepy, arouses to verbal stimulation, does not speak rather just mumbles.    HENT: No obvious evidence of acute trauma.  Eyes: No scleral icterus. Normal conjunctiva .  When the eyes are pried open his pupils are equal and reactive.  Thorax & Lungs: Normal nonlabored respirations. I appreciate no wheezing, rhonchi or rales. There is normal air movement.  Upon cardiac ascultation I appreciate a regular heart rhythm and a normal rate.   Abdomen: The abdomen is not visibly distended. Upon palpation, I find it to be without tenderness.  No mass appreciated.  Skin: The exposed portions of skin reveal no obvious rash or other abnormalities.  Extremities/Musculoskeletal: Chronic deformities of the hands      COURSE & MEDICAL DECISION MAKING    ED Observation Status? Yes; I am placing the patient in to an observation status due to a diagnostic uncertainty as well as therapeutic intensity. Patient placed in observation status at 2:25 PM, 10/27/2023.     Observation plan is as follows: Close observation until sober for reevaluation and decision on disposition    Upon Reevaluation, the patient's condition has: Improved; and will be discharged.    Patient discharged from ED Observation status at 6:23 PM (Time) 10/27/2023 (Date).     INITIAL ASSESSMENT, COURSE AND PLAN  Care Narrative: This is a 38-year-old male well-known to this department with longstanding alcohol abuse, comes in very drowsy but is arousable, no focal deficits appreciated, no obvious evidence of trauma.  He will be watched very closely here in the emergency department under close observation until he reaches a point of sobriety for reevaluation and " disposition    6:23 PM patient is up and about, urinated in the corner of the room, ambulating now independently.  Discharged home in stable condition    DISPOSITION AND DISCUSSIONS    Escalation of care considered, and ultimately not performed: I did consider escalation inpatient management especially given his history of withdrawal but at this point he appears appropriate for discharge      FINAL DIAGNOSIS  1. Alcoholic intoxication without complication (HCC)           Electronically signed by: Tolu Crandall M.D., 10/27/2023 2:52 PM

## 2023-10-28 PROBLEM — F10.139 ALCOHOL ABUSE WITH WITHDRAWAL (HCC): Status: ACTIVE | Noted: 2023-10-28

## 2023-10-28 LAB
ALBUMIN SERPL BCP-MCNC: 4.3 G/DL (ref 3.2–4.9)
ALBUMIN/GLOB SERPL: 1.2 G/DL
ALP SERPL-CCNC: 116 U/L (ref 30–99)
ALT SERPL-CCNC: 16 U/L (ref 2–50)
ANION GAP SERPL CALC-SCNC: 19 MMOL/L (ref 7–16)
AST SERPL-CCNC: 29 U/L (ref 12–45)
BASOPHILS # BLD AUTO: 0.3 % (ref 0–1.8)
BASOPHILS # BLD: 0.03 K/UL (ref 0–0.12)
BILIRUB SERPL-MCNC: 0.4 MG/DL (ref 0.1–1.5)
BUN SERPL-MCNC: 12 MG/DL (ref 8–22)
CALCIUM ALBUM COR SERPL-MCNC: 8.5 MG/DL (ref 8.5–10.5)
CALCIUM SERPL-MCNC: 8.7 MG/DL (ref 8.5–10.5)
CHLORIDE SERPL-SCNC: 98 MMOL/L (ref 96–112)
CO2 SERPL-SCNC: 22 MMOL/L (ref 20–33)
CREAT SERPL-MCNC: 0.79 MG/DL (ref 0.5–1.4)
EOSINOPHIL # BLD AUTO: 0.04 K/UL (ref 0–0.51)
EOSINOPHIL NFR BLD: 0.4 % (ref 0–6.9)
ERYTHROCYTE [DISTWIDTH] IN BLOOD BY AUTOMATED COUNT: 42.1 FL (ref 35.9–50)
ETHANOL BLD-MCNC: 179.6 MG/DL
GFR SERPLBLD CREATININE-BSD FMLA CKD-EPI: 117 ML/MIN/1.73 M 2
GLOBULIN SER CALC-MCNC: 3.7 G/DL (ref 1.9–3.5)
GLUCOSE SERPL-MCNC: 85 MG/DL (ref 65–99)
HCT VFR BLD AUTO: 49 % (ref 42–52)
HGB BLD-MCNC: 16.6 G/DL (ref 14–18)
IMM GRANULOCYTES # BLD AUTO: 0.02 K/UL (ref 0–0.11)
IMM GRANULOCYTES NFR BLD AUTO: 0.2 % (ref 0–0.9)
LYMPHOCYTES # BLD AUTO: 3 K/UL (ref 1–4.8)
LYMPHOCYTES NFR BLD: 27.4 % (ref 22–41)
MAGNESIUM SERPL-MCNC: 2.3 MG/DL (ref 1.5–2.5)
MCH RBC QN AUTO: 30.3 PG (ref 27–33)
MCHC RBC AUTO-ENTMCNC: 33.9 G/DL (ref 32.3–36.5)
MCV RBC AUTO: 89.6 FL (ref 81.4–97.8)
MONOCYTES # BLD AUTO: 1.03 K/UL (ref 0–0.85)
MONOCYTES NFR BLD AUTO: 9.4 % (ref 0–13.4)
NEUTROPHILS # BLD AUTO: 6.84 K/UL (ref 1.82–7.42)
NEUTROPHILS NFR BLD: 62.3 % (ref 44–72)
NRBC # BLD AUTO: 0 K/UL
NRBC BLD-RTO: 0 /100 WBC (ref 0–0.2)
PLATELET # BLD AUTO: 328 K/UL (ref 164–446)
PMV BLD AUTO: 10.8 FL (ref 9–12.9)
POC BREATHALIZER: 0.13 PERCENT (ref 0–0.01)
POC BREATHALIZER: 0.19 PERCENT (ref 0–0.01)
POTASSIUM SERPL-SCNC: 4.1 MMOL/L (ref 3.6–5.5)
PROT SERPL-MCNC: 8 G/DL (ref 6–8.2)
RBC # BLD AUTO: 5.47 M/UL (ref 4.7–6.1)
SODIUM SERPL-SCNC: 139 MMOL/L (ref 135–145)
WBC # BLD AUTO: 11 K/UL (ref 4.8–10.8)

## 2023-10-28 PROCEDURE — 770006 HCHG ROOM/CARE - MED/SURG/GYN SEMI*

## 2023-10-28 PROCEDURE — 700105 HCHG RX REV CODE 258: Performed by: STUDENT IN AN ORGANIZED HEALTH CARE EDUCATION/TRAINING PROGRAM

## 2023-10-28 PROCEDURE — HZ2ZZZZ DETOXIFICATION SERVICES FOR SUBSTANCE ABUSE TREATMENT: ICD-10-PCS | Performed by: STUDENT IN AN ORGANIZED HEALTH CARE EDUCATION/TRAINING PROGRAM

## 2023-10-28 PROCEDURE — 93005 ELECTROCARDIOGRAM TRACING: CPT | Performed by: STUDENT IN AN ORGANIZED HEALTH CARE EDUCATION/TRAINING PROGRAM

## 2023-10-28 PROCEDURE — 83735 ASSAY OF MAGNESIUM: CPT

## 2023-10-28 PROCEDURE — 96365 THER/PROPH/DIAG IV INF INIT: CPT

## 2023-10-28 PROCEDURE — 36415 COLL VENOUS BLD VENIPUNCTURE: CPT

## 2023-10-28 PROCEDURE — 96375 TX/PRO/DX INJ NEW DRUG ADDON: CPT

## 2023-10-28 PROCEDURE — A9270 NON-COVERED ITEM OR SERVICE: HCPCS | Performed by: STUDENT IN AN ORGANIZED HEALTH CARE EDUCATION/TRAINING PROGRAM

## 2023-10-28 PROCEDURE — 96366 THER/PROPH/DIAG IV INF ADDON: CPT

## 2023-10-28 PROCEDURE — 700111 HCHG RX REV CODE 636 W/ 250 OVERRIDE (IP): Performed by: STUDENT IN AN ORGANIZED HEALTH CARE EDUCATION/TRAINING PROGRAM

## 2023-10-28 PROCEDURE — 85025 COMPLETE CBC W/AUTO DIFF WBC: CPT

## 2023-10-28 PROCEDURE — 302970 POC BREATHALIZER

## 2023-10-28 PROCEDURE — 80053 COMPREHEN METABOLIC PANEL: CPT

## 2023-10-28 PROCEDURE — 99223 1ST HOSP IP/OBS HIGH 75: CPT | Mod: AI | Performed by: STUDENT IN AN ORGANIZED HEALTH CARE EDUCATION/TRAINING PROGRAM

## 2023-10-28 PROCEDURE — 700101 HCHG RX REV CODE 250: Performed by: STUDENT IN AN ORGANIZED HEALTH CARE EDUCATION/TRAINING PROGRAM

## 2023-10-28 PROCEDURE — 700102 HCHG RX REV CODE 250 W/ 637 OVERRIDE(OP): Performed by: STUDENT IN AN ORGANIZED HEALTH CARE EDUCATION/TRAINING PROGRAM

## 2023-10-28 PROCEDURE — 82077 ASSAY SPEC XCP UR&BREATH IA: CPT

## 2023-10-28 RX ORDER — LORAZEPAM 2 MG/ML
2 INJECTION INTRAMUSCULAR
Status: DISCONTINUED | OUTPATIENT
Start: 2023-10-28 | End: 2023-10-31 | Stop reason: HOSPADM

## 2023-10-28 RX ORDER — GAUZE BANDAGE 2" X 2"
100 BANDAGE TOPICAL DAILY
Status: DISCONTINUED | OUTPATIENT
Start: 2023-10-29 | End: 2023-10-31 | Stop reason: HOSPADM

## 2023-10-28 RX ORDER — LORAZEPAM 2 MG/ML
2 INJECTION INTRAMUSCULAR ONCE
Status: COMPLETED | OUTPATIENT
Start: 2023-10-28 | End: 2023-10-28

## 2023-10-28 RX ORDER — LORAZEPAM 1 MG/1
4 TABLET ORAL
Status: DISCONTINUED | OUTPATIENT
Start: 2023-10-28 | End: 2023-10-31 | Stop reason: HOSPADM

## 2023-10-28 RX ORDER — CHLORPHENIRAMINE MALEATE 4 MG/1
4 TABLET ORAL 3 TIMES DAILY
COMMUNITY

## 2023-10-28 RX ORDER — CLONIDINE HYDROCHLORIDE 0.1 MG/1
0.1 TABLET ORAL
Status: DISCONTINUED | OUTPATIENT
Start: 2023-10-28 | End: 2023-10-31 | Stop reason: HOSPADM

## 2023-10-28 RX ORDER — LORAZEPAM 2 MG/ML
1.5 INJECTION INTRAMUSCULAR
Status: DISCONTINUED | OUTPATIENT
Start: 2023-10-28 | End: 2023-10-31 | Stop reason: HOSPADM

## 2023-10-28 RX ORDER — LORAZEPAM 1 MG/1
3 TABLET ORAL
Status: DISCONTINUED | OUTPATIENT
Start: 2023-10-28 | End: 2023-10-31 | Stop reason: HOSPADM

## 2023-10-28 RX ORDER — LORAZEPAM 0.5 MG/1
0.5 TABLET ORAL EVERY 4 HOURS PRN
Status: DISCONTINUED | OUTPATIENT
Start: 2023-10-28 | End: 2023-10-31 | Stop reason: HOSPADM

## 2023-10-28 RX ORDER — ENOXAPARIN SODIUM 100 MG/ML
40 INJECTION SUBCUTANEOUS DAILY
Status: DISCONTINUED | OUTPATIENT
Start: 2023-10-28 | End: 2023-10-31 | Stop reason: HOSPADM

## 2023-10-28 RX ORDER — FOLIC ACID 1 MG/1
1 TABLET ORAL DAILY
Status: DISCONTINUED | OUTPATIENT
Start: 2023-10-29 | End: 2023-10-31 | Stop reason: HOSPADM

## 2023-10-28 RX ORDER — LORAZEPAM 1 MG/1
2 TABLET ORAL
Status: DISCONTINUED | OUTPATIENT
Start: 2023-10-28 | End: 2023-10-31 | Stop reason: HOSPADM

## 2023-10-28 RX ORDER — LORAZEPAM 2 MG/ML
1 INJECTION INTRAMUSCULAR
Status: DISCONTINUED | OUTPATIENT
Start: 2023-10-28 | End: 2023-10-31 | Stop reason: HOSPADM

## 2023-10-28 RX ORDER — LORAZEPAM 1 MG/1
1 TABLET ORAL EVERY 4 HOURS PRN
Status: DISCONTINUED | OUTPATIENT
Start: 2023-10-28 | End: 2023-10-31 | Stop reason: HOSPADM

## 2023-10-28 RX ORDER — SODIUM CHLORIDE, SODIUM LACTATE, POTASSIUM CHLORIDE, CALCIUM CHLORIDE 600; 310; 30; 20 MG/100ML; MG/100ML; MG/100ML; MG/100ML
INJECTION, SOLUTION INTRAVENOUS CONTINUOUS
Status: ACTIVE | OUTPATIENT
Start: 2023-10-28 | End: 2023-10-28

## 2023-10-28 RX ORDER — LORAZEPAM 2 MG/ML
0.5 INJECTION INTRAMUSCULAR EVERY 4 HOURS PRN
Status: DISCONTINUED | OUTPATIENT
Start: 2023-10-28 | End: 2023-10-31 | Stop reason: HOSPADM

## 2023-10-28 RX ADMIN — LORAZEPAM 0.5 MG: 0.5 TABLET ORAL at 22:28

## 2023-10-28 RX ADMIN — SODIUM CHLORIDE, POTASSIUM CHLORIDE, SODIUM LACTATE AND CALCIUM CHLORIDE: 600; 310; 30; 20 INJECTION, SOLUTION INTRAVENOUS at 09:51

## 2023-10-28 RX ADMIN — FOLIC ACID: 5 INJECTION, SOLUTION INTRAMUSCULAR; INTRAVENOUS; SUBCUTANEOUS at 05:36

## 2023-10-28 RX ADMIN — LORAZEPAM 3 MG: 1 TABLET ORAL at 16:49

## 2023-10-28 RX ADMIN — LORAZEPAM 2 MG: 2 INJECTION INTRAMUSCULAR; INTRAVENOUS at 05:30

## 2023-10-28 RX ADMIN — SODIUM CHLORIDE, POTASSIUM CHLORIDE, SODIUM LACTATE AND CALCIUM CHLORIDE: 600; 310; 30; 20 INJECTION, SOLUTION INTRAVENOUS at 09:55

## 2023-10-28 RX ADMIN — LORAZEPAM 2 MG: 1 TABLET ORAL at 12:06

## 2023-10-28 ASSESSMENT — LIFESTYLE VARIABLES
TOTAL SCORE: 0
AGITATION: MODERATELY FIDGETY AND RESTLESS
HEADACHE, FULLNESS IN HEAD: MILD
NAUSEA AND VOMITING: NO NAUSEA AND NO VOMITING
TREMOR: TREMOR NOT VISIBLE BUT CAN BE FELT, FINGERTIP TO FINGERTIP
TREMOR: NO TREMOR
TREMOR: TREMOR NOT VISIBLE BUT CAN BE FELT, FINGERTIP TO FINGERTIP
TOTAL SCORE: 3
TOTAL SCORE: 25
ANXIETY: *
AGITATION: NORMAL ACTIVITY
VISUAL DISTURBANCES: MODERATE SENSITIVITY
AUDITORY DISTURBANCES: NOT PRESENT
HEADACHE, FULLNESS IN HEAD: MILD
ORIENTATION AND CLOUDING OF SENSORIUM: ORIENTED AND CAN DO SERIAL ADDITIONS
TOTAL SCORE: 3
AGITATION: NORMAL ACTIVITY
NAUSEA AND VOMITING: NO NAUSEA AND NO VOMITING
DOES PATIENT WANT TO TALK TO SOMEONE ABOUT QUITTING: NO
TOTAL SCORE: 6
ANXIETY: *
HEADACHE, FULLNESS IN HEAD: MILD
TOTAL SCORE: 14
PAROXYSMAL SWEATS: NO SWEAT VISIBLE
ANXIETY: NO ANXIETY (AT EASE)
AUDITORY DISTURBANCES: NOT PRESENT
NAUSEA AND VOMITING: NO NAUSEA AND NO VOMITING
ORIENTATION AND CLOUDING OF SENSORIUM: ORIENTED AND CAN DO SERIAL ADDITIONS
ANXIETY: MILDLY ANXIOUS
NAUSEA AND VOMITING: NO NAUSEA AND NO VOMITING
HAVE YOU EVER FELT YOU SHOULD CUT DOWN ON YOUR DRINKING: NO
PAROXYSMAL SWEATS: NO SWEAT VISIBLE
ANXIETY: *
AUDITORY DISTURBANCES: NOT PRESENT
ORIENTATION AND CLOUDING OF SENSORIUM: ORIENTED AND CAN DO SERIAL ADDITIONS
VISUAL DISTURBANCES: MODERATE SENSITIVITY
HOW MANY TIMES IN THE PAST YEAR HAVE YOU HAD 5 OR MORE DRINKS IN A DAY: 3
VISUAL DISTURBANCES: MODERATE SENSITIVITY
ORIENTATION AND CLOUDING OF SENSORIUM: ORIENTED AND CAN DO SERIAL ADDITIONS
EVER FELT BAD OR GUILTY ABOUT YOUR DRINKING: NO
VISUAL DISTURBANCES: NOT PRESENT
CONSUMPTION TOTAL: POSITIVE
TREMOR: *
ANXIETY: *
TOTAL SCORE: MODERATE ITCHING, PINS AND NEEDLES SENSATION, BURNING OR NUMBNESS
HEADACHE, FULLNESS IN HEAD: MODERATE
AVERAGE NUMBER OF DAYS PER WEEK YOU HAVE A DRINK CONTAINING ALCOHOL: 3
PAROXYSMAL SWEATS: NO SWEAT VISIBLE
AGITATION: SOMEWHAT MORE THAN NORMAL ACTIVITY
NAUSEA AND VOMITING: INTERMITTENT NAUSEA WITH DRY HEAVES
TREMOR: *
TOTAL SCORE: 16
AUDITORY DISTURBANCES: NOT PRESENT
PAROXYSMAL SWEATS: BARELY PERCEPTIBLE SWEATING. PALMS MOIST
HEADACHE, FULLNESS IN HEAD: MODERATELY SEVERE
AGITATION: NORMAL ACTIVITY
NAUSEA AND VOMITING: NO NAUSEA AND NO VOMITING
NAUSEA AND VOMITING: NO NAUSEA AND NO VOMITING
PAROXYSMAL SWEATS: NO SWEAT VISIBLE
ANXIETY: NO ANXIETY (AT EASE)
DOES PATIENT WANT TO STOP DRINKING: YES
VISUAL DISTURBANCES: NOT PRESENT
EVER HAD A DRINK FIRST THING IN THE MORNING TO STEADY YOUR NERVES TO GET RID OF A HANGOVER: NO
HEADACHE, FULLNESS IN HEAD: VERY MILD
ORIENTATION AND CLOUDING OF SENSORIUM: ORIENTED AND CAN DO SERIAL ADDITIONS
TOTAL SCORE: MODERATE ITCHING, PINS AND NEEDLES SENSATION, BURNING OR NUMBNESS
AGITATION: NORMAL ACTIVITY
TREMOR: TREMOR NOT VISIBLE BUT CAN BE FELT, FINGERTIP TO FINGERTIP
PAROXYSMAL SWEATS: NO SWEAT VISIBLE
TOTAL SCORE: 0
TOTAL SCORE: 4
ORIENTATION AND CLOUDING OF SENSORIUM: ORIENTED AND CAN DO SERIAL ADDITIONS
TOTAL SCORE: 0
HAVE PEOPLE ANNOYED YOU BY CRITICIZING YOUR DRINKING: NO
ON A TYPICAL DAY WHEN YOU DRINK ALCOHOL HOW MANY DRINKS DO YOU HAVE: 3
AGITATION: NORMAL ACTIVITY
AUDITORY DISTURBANCES: NOT PRESENT
TACTILE DISTURBANCES: MILD ITCHING, PINS AND NEEDLES SENSATION, BURNING OR NUMBNESS
ALCOHOL_USE: YES
HEADACHE, FULLNESS IN HEAD: MILD
PAROXYSMAL SWEATS: NO SWEAT VISIBLE
VISUAL DISTURBANCES: NOT PRESENT
VISUAL DISTURBANCES: NOT PRESENT
ANXIETY: MILDLY ANXIOUS
AUDITORY DISTURBANCES: NOT PRESENT
ORIENTATION AND CLOUDING OF SENSORIUM: ORIENTED AND CAN DO SERIAL ADDITIONS
ORIENTATION AND CLOUDING OF SENSORIUM: ORIENTED AND CAN DO SERIAL ADDITIONS
AUDITORY DISTURBANCES: NOT PRESENT
AUDITORY DISTURBANCES: NOT PRESENT
VISUAL DISTURBANCES: NOT PRESENT
TREMOR: MODERATE TREMOR WITH ARMS EXTENDED
PAROXYSMAL SWEATS: NO SWEAT VISIBLE
TOTAL SCORE: 3
AGITATION: NORMAL ACTIVITY
TREMOR: TREMOR NOT VISIBLE BUT CAN BE FELT, FINGERTIP TO FINGERTIP
NAUSEA AND VOMITING: MILD NAUSEA WITH NO VOMITING
HEADACHE, FULLNESS IN HEAD: MILD

## 2023-10-28 ASSESSMENT — ENCOUNTER SYMPTOMS
CHILLS: 0
SORE THROAT: 0
NAUSEA: 0
SHORTNESS OF BREATH: 0
FEVER: 0
DIZZINESS: 0
ABDOMINAL PAIN: 0
COUGH: 0
PALPITATIONS: 0
HEADACHES: 0

## 2023-10-28 ASSESSMENT — COGNITIVE AND FUNCTIONAL STATUS - GENERAL
SUGGESTED CMS G CODE MODIFIER DAILY ACTIVITY: CH
DAILY ACTIVITIY SCORE: 24

## 2023-10-28 ASSESSMENT — FIBROSIS 4 INDEX
FIB4 SCORE: 0.82
FIB4 SCORE: 0.82

## 2023-10-28 ASSESSMENT — PAIN DESCRIPTION - PAIN TYPE: TYPE: ACUTE PAIN

## 2023-10-28 NOTE — ED NOTES
Checked on bed, connected to monitor,  with unlabored respirations. Vital signs is stable.   Sleeping No current needs identified.  Gurney in low position, side rail up for pt safety. Call light within reach.

## 2023-10-28 NOTE — ED NOTES
Checked on bed, connected to monitor,  with unlabored respirations. Vital signs is stable.   Sleeping. No current needs identified.  Gurney in low position, side rail up for pt safety. Call light within reach.

## 2023-10-28 NOTE — ED NOTES
Bedside report given to the next shift JORDY Gonzalez for continuity of care and management.  Provided opportunity to asks questions.  Pt connected to monitor  All pt belongings at bedside    Contraptions: None  Alert and Oriented: x 4  Ambulatory: yes with unsteady gait  Oxygen: No  Pending: MTF

## 2023-10-28 NOTE — ED NOTES
"When discharging pt the pt states \"well now I want to kill myself.\" MD aware, alert team aware.   "

## 2023-10-28 NOTE — ED NOTES
Taken patient from triage waiting room, via wheelchair alert/ sleepy on wheelchair.Verified patient identification.  Assumed patient care.   Placed on assigned bed in hallway. Changed clothes to hospital gown. Connected to monitor.   Bed on lowest position, side rails up, breaks locked. Awaiting for ERP.

## 2023-10-28 NOTE — ED NOTES
Bedside report received from off going RN/tech: Anselmo assumed care of patient.  POC discussed with patient. Call light within reach, all needs addressed at this time.       Fall risk interventions in place: Move the patient closer to the nurse's station, Patient's personal possessions are with in their safe reach, Place socks on patient, Place fall risk sign on patient's door, Give patient urinal if applicable, Keep floor surfaces clean and dry, Accompanied to restroom, and Bed Alarm in use (all applicable per [place] Fall risk assessment)   Continuous monitoring: Cardiac Leads, Pulse Ox, or Blood Pressure  IVF/IV medications: Infusion per MAR (List Med(s)) Rally bag  Oxygen: How many liters 2L

## 2023-10-28 NOTE — ED NOTES
Pt moved to GRN 32, all belongings placed in bags and security is aware of belongings check. Pt assigned locker #1. 1:1 Sitter in place. All belongings to locker #1.

## 2023-10-28 NOTE — DISCHARGE SUMMARY
ED Observation Discharge Summary    Patient:Gopal Ceja  Patient : 1985  Patient MRN: [id number]  Patient PCP: Pcp Pt States None    Admit Date: 10/27/2023  Discharge Date and Time: 10/28/23 5:36 AM  Discharge Diagnosis: Alcohol withdrawal  Discharge Attending: Brittany Bonner M.D.  Discharge Service: ED Observation    ED Course  Gopal is a 38 y.o. male who was evaluated at [facility] for alcohol intoxication.  The patient was seen by my colleague, Dr. Fabian, who performed full history and physical.  I was asked to observe the patient until clinical sobriety.    2:42 AM Patient has eaten a sandwich and is ambulatory to the bathroom.  I reevaluated the patient discharge plan.  When discussing discharge, the patient states that he is suicidal.  I asked the patient having times he had been to the emergency room today and he said twice.  I asked him if he had voices concern with previous ER physicians however he said no.  When asked why, he states that he did not think that it was important.  His plan is to jump off a parking garage.  Unfortunately the patient is not legally sober therefore alert team cannot make assessment.  We will continue to observe patient until legal sobriety and then will have alert team evaluate patient.    4:30 AM Patient became tremulous and tachycardic to 124, suspect likely alcohol withdrawal.  He has history of alcohol withdrawal in the past.  CIWA score 25.  An IV was established, he was given 2 mg of Ativan.  We will continue observe.      7:09 AM Patient reevaluated bedside.  He is still tachycardic and tremulous.  Will admit to the hospital for alcohol withdrawal.  He no longer reports suicidal ideation    7:30 AM I discussed with the hospitalist, Dr. Seth, who agreed to admit the patient to the hospital.     Discharge Exam:  BP (!) 152/86   Pulse (!) 124   Temp 36.1 °C (97 °F) (Temporal)   Resp (!) 22   SpO2 96% .    Constitutional: Awake and alert.   HENT:   Grossly normal, tongue fasciculations present  Eyes: Grossly normal  Neck: Normal range of motion  Cardiovascular: Tachycardic, normal rhythm  Thorax & Lungs: No respiratory distress  Abdomen: Nontender  Skin:  No pathologic rash.   Extremities: Well perfused  Psychiatric: Reports SI    Labs  Results for orders placed or performed during the hospital encounter of 10/27/23   CBC WITH DIFFERENTIAL   Result Value Ref Range    WBC 11.0 (H) 4.8 - 10.8 K/uL    RBC 5.47 4.70 - 6.10 M/uL    Hemoglobin 16.6 14.0 - 18.0 g/dL    Hematocrit 49.0 42.0 - 52.0 %    MCV 89.6 81.4 - 97.8 fL    MCH 30.3 27.0 - 33.0 pg    MCHC 33.9 32.3 - 36.5 g/dL    RDW 42.1 35.9 - 50.0 fL    Platelet Count 328 164 - 446 K/uL    MPV 10.8 9.0 - 12.9 fL    Neutrophils-Polys 62.30 44.00 - 72.00 %    Lymphocytes 27.40 22.00 - 41.00 %    Monocytes 9.40 0.00 - 13.40 %    Eosinophils 0.40 0.00 - 6.90 %    Basophils 0.30 0.00 - 1.80 %    Immature Granulocytes 0.20 0.00 - 0.90 %    Nucleated RBC 0.00 0.00 - 0.20 /100 WBC    Neutrophils (Absolute) 6.84 1.82 - 7.42 K/uL    Lymphs (Absolute) 3.00 1.00 - 4.80 K/uL    Monos (Absolute) 1.03 (H) 0.00 - 0.85 K/uL    Eos (Absolute) 0.04 0.00 - 0.51 K/uL    Baso (Absolute) 0.03 0.00 - 0.12 K/uL    Immature Granulocytes (abs) 0.02 0.00 - 0.11 K/uL    NRBC (Absolute) 0.00 K/uL   COMP METABOLIC PANEL   Result Value Ref Range    Sodium 139 135 - 145 mmol/L    Potassium 4.1 3.6 - 5.5 mmol/L    Chloride 98 96 - 112 mmol/L    Co2 22 20 - 33 mmol/L    Anion Gap 19.0 (H) 7.0 - 16.0    Glucose 85 65 - 99 mg/dL    Bun 12 8 - 22 mg/dL    Creatinine 0.79 0.50 - 1.40 mg/dL    Calcium 8.7 8.5 - 10.5 mg/dL    Correct Calcium 8.5 8.5 - 10.5 mg/dL    AST(SGOT) 29 12 - 45 U/L    ALT(SGPT) 16 2 - 50 U/L    Alkaline Phosphatase 116 (H) 30 - 99 U/L    Total Bilirubin 0.4 0.1 - 1.5 mg/dL    Albumin 4.3 3.2 - 4.9 g/dL    Total Protein 8.0 6.0 - 8.2 g/dL    Globulin 3.7 (H) 1.9 - 3.5 g/dL    A-G Ratio 1.2 g/dL   DIAGNOSTIC ALCOHOL   Result  Value Ref Range    Diagnostic Alcohol 179.6 (H) <10.1 mg/dL   ESTIMATED GFR   Result Value Ref Range    GFR (CKD-EPI) 117 >60 mL/min/1.73 m 2   MAGNESIUM   Result Value Ref Range    Magnesium 2.3 1.5 - 2.5 mg/dL   POC BREATHALIZER   Result Value Ref Range    POC Breathalizer 0.19 (A) 0.00 - 0.01 Percent   POC BREATHALIZER   Result Value Ref Range    POC Breathalizer 0.126 (A) 0.00 - 0.01 Percent   POCT glucose device results   Result Value Ref Range    POC Glucose, Blood 100 (H) 65 - 99 mg/dL       Radiology  No orders to display       Medications:   New Prescriptions    No medications on file       My final assessment includes alcohol use disorder, alcohol withdrawal  Upon Reevaluation, the patient's condition has: not improved; and will be escalated to hospitalization.    Patient discharged from ED Observation status at 709 (Time) 10/28/2023 (Date).     Patient admitted to hospitalist, Dr. Seth, in guarded condition.     Total time spent on this ED Observation discharge encounter is > 30 Minutes    Electronically signed by: Brittany Bonner M.D., 10/28/2023 5:36 AM

## 2023-10-28 NOTE — H&P
Hospital Medicine History & Physical Note    Date of Service  10/28/2023    Primary Care Physician  Pcp Pt States None    Code Status  Full Code    Chief Complaint  Chief Complaint   Patient presents with    Alcohol Intoxication     GCS 15. Pt BIB EMS. Admits to ETOH. Denies trauma, no trauma noted.        History of Presenting Illness  Gopal Ceja is a 38 y.o. male who presented 10/27/2023 with alcohol intoxication.  Patient has a history of alcohol abuse and previous episodes of alcohol withdrawal requiring hospitalization, as well as frequent emergency department visits.  He presented yesterday with complaints of alcohol withdrawal, was brought in by EMS.  He was watched in the ED until he reached a point of sobriety and then discharged.  He then returned in the evening intoxicated again and was monitored.  He was going to be discharged but then made a comment about suicidal ideations, still had a blood alcohol of 180 so was unavailable to be evaluated by behavioral health, .  Then as he was being observed in the ED he began experiencing withdrawals.  He denies any suicidal ideation.  He was tremulous and tachycardic and was requiring Ativan for CIWA scores in the 20s.  At time of my evaluation he reports feeling tired and is not very interested in having much of a conversation.  He denies any pain, infectious symptoms, again denies SI, says he just did not want to leave.  Legal hold was not initiated in the ED as patient subsequently denied SI and was set to discharge however began exhibiting signs of alcohol withdrawal.  Patient very clearly denies any SI and reports that it was not sincere earlier, he is agreeable to stay for treatment of alcohol withdrawal.    I discussed the plan of care with patient.    Review of Systems  Review of Systems   Reason unable to perform ROS: Limited by patient participation.   Constitutional:  Negative for chills and fever.   HENT:  Negative for congestion and sore  throat.    Respiratory:  Negative for cough and shortness of breath.    Cardiovascular:  Negative for chest pain and palpitations.   Gastrointestinal:  Negative for abdominal pain and nausea.   Neurological:  Negative for dizziness and headaches.       Past Medical History   has a past medical history of Acute anxiety, ADHD (attention deficit hyperactivity disorder), ADHD (attention deficit hyperactivity disorder), Alcohol abuse, Bipolar affective (HCC), Depression, Drug abuse and dependence (HCC), Ectrodactyly-ectodermal dysplasia-clefting syndrome, ETOH abuse, and Psychiatric disorder.    Surgical History   has a past surgical history that includes other orthopedic surgery and pr exploratory of abdomen (N/A, 9/26/2019).     Family History  family history includes Hypertension in his mother.   Family history reviewed with patient. There is no family history that is pertinent to the chief complaint.     Social History   reports that he has never smoked. He has never used smokeless tobacco. He reports current alcohol use. He reports current drug use. Drug: Inhaled.    Allergies  No Known Allergies    Medications  Prior to Admission Medications   Prescriptions Last Dose Informant Patient Reported? Taking?   DULoxetine (CYMBALTA) 20 MG Cap DR Particles  Patient, Patient's Home Pharmacy Yes No   Sig: Take 20 mg by mouth every day.   amphetamine-dextroamphetamine (ADDERALL) 12.5 MG tablet  Patient, Patient's Home Pharmacy Yes No   Sig: Take 12.5 mg by mouth 2 times a day.   clonazePAM (KLONOPIN) 1 MG Tab  Patient, Patient's Home Pharmacy Yes No   Sig: Take 1 mg by mouth 2 times a day as needed. Indications: Feeling Anxious   propranolol (INDERAL) 20 MG Tab  Patient's Home Pharmacy Yes No   Sig: Take 20 mg by mouth 2 times a day.      Facility-Administered Medications: None       Physical Exam  Temp:  [36.1 °C (97 °F)-36.6 °C (97.8 °F)] 36.1 °C (97 °F)  Pulse:  [] 107  Resp:  [14-22] 18  BP: (113-159)/()  "127/85  SpO2:  [91 %-99 %] 93 %  Blood Pressure: 135/80   Temperature: 36.1 °C (97 °F)   Pulse: (!) 104   Respiration: 18   Pulse Oximetry: 95 %       Physical Exam  Constitutional:       General: He is not in acute distress.     Appearance: He is not toxic-appearing.   HENT:      Head: Normocephalic and atraumatic.      Nose: Nose normal.      Mouth/Throat:      Mouth: Mucous membranes are dry.      Pharynx: Oropharynx is clear.   Eyes:      Extraocular Movements: Extraocular movements intact.      Conjunctiva/sclera: Conjunctivae normal.   Cardiovascular:      Rate and Rhythm: Regular rhythm. Tachycardia present.      Pulses: Normal pulses.      Heart sounds: Normal heart sounds.   Pulmonary:      Effort: Pulmonary effort is normal.      Breath sounds: Normal breath sounds.   Abdominal:      General: There is no distension.      Tenderness: There is no abdominal tenderness.   Musculoskeletal:         General: No swelling or deformity.      Cervical back: Neck supple. No rigidity.   Skin:     General: Skin is warm and dry.   Neurological:      Mental Status: He is oriented to person, place, and time.      Cranial Nerves: No cranial nerve deficit.         Laboratory:  Recent Labs     10/28/23  0530   WBC 11.0*   RBC 5.47   HEMOGLOBIN 16.6   HEMATOCRIT 49.0   MCV 89.6   MCH 30.3   MCHC 33.9   RDW 42.1   PLATELETCT 328   MPV 10.8     Recent Labs     10/28/23  0530   SODIUM 139   POTASSIUM 4.1   CHLORIDE 98   CO2 22   GLUCOSE 85   BUN 12   CREATININE 0.79   CALCIUM 8.7     Recent Labs     10/28/23  0530   ALTSGPT 16   ASTSGOT 29   ALKPHOSPHAT 116*   TBILIRUBIN 0.4   GLUCOSE 85         No results for input(s): \"NTPROBNP\" in the last 72 hours.      No results for input(s): \"TROPONINT\" in the last 72 hours.    Imaging:  No orders to display       Assessment/Plan:  Justification for Admission Status  I anticipate this patient will require at least two midnights for appropriate medical management, necessitating inpatient " admission because alcohol withdrawal and need for mental health assessment    Patient will need a Med/Surg bed on EMERGENCY service .  The need is secondary to above.    * Alcohol abuse with withdrawal (HCC)- (present on admission)  Assessment & Plan  CIWA in 20's in ED.  Patient will require admission for alcohol withdrawal.  Received rally bag and Ativan  Continue with CIWA monitoring and Ativan  Multivitamin, received rally bag in ED  Continued counseling on quitting alcohol  Optimize electrolytes, check magnesium        VTE prophylaxis: SCDs/TEDs and enoxaparin ppx

## 2023-10-28 NOTE — ED NOTES
"Pt awake, reports nausea and states \"I think I am going through DTs, I am shaking.\" Pt moved to GRN 32 and placed on cardiac monitor.   "

## 2023-10-28 NOTE — ED NOTES
Pt coming out of room requesting to go to the bathroom.  Pt ambulated with steady gait to restroom.

## 2023-10-28 NOTE — ED NOTES
Pt repositioned in bed, Pt stirred awake and immediately fell asleep. Equal chest rise and fall noted.

## 2023-10-28 NOTE — ED TRIAGE NOTES
Chief Complaint   Patient presents with    Alcohol Intoxication     GCS 15. Pt BIB EMS. Admits to ETOH. Denies trauma, no trauma noted.         BP (!) 155/81   Pulse (!) 101   Temp 36.4 °C (97.6 °F) (Temporal)   Resp 18   SpO2 94%

## 2023-10-28 NOTE — ED NOTES
Med rec completed per patient.  Allergies reviewed with patient.  NO Noted home antibiotics in past 30 days   No Noted anticoagulants/antiplatelets in past 14 days   Patient preferred pharmacy: [facility] [place]   Kelsey Sanchez, Pharmacy Intern

## 2023-10-28 NOTE — ED PROVIDER NOTES
ED Provider Note    CHIEF COMPLAINT  Chief Complaint   Patient presents with    Alcohol Intoxication     GCS 15. Pt BIB EMS. Admits to ETOH. Denies trauma, no trauma noted.        EXTERNAL RECORDS REVIEWED  Patient was seen here earlier today for alcohol intoxication.  He was admitted in March 2023 for alcohol intoxication.  He has a history of chronic alcohol use and substance use.    HPI/ROS  LIMITATION TO HISTORY   Intoxication  OUTSIDE HISTORIAN(S):  None    Gopal Ceja is a 38 y.o. male who presents acute alcohol intoxication.  Patient does not participate in history with me.  He is sleeping comfortably.  He denied any trauma in triage.  Further history is limited as patient is intoxicated and sleeping.    PAST MEDICAL HISTORY   has a past medical history of Acute anxiety, ADHD (attention deficit hyperactivity disorder), ADHD (attention deficit hyperactivity disorder), Alcohol abuse, Bipolar affective (HCC), Depression, Drug abuse and dependence (HCC), Ectrodactyly-ectodermal dysplasia-clefting syndrome, ETOH abuse, and Psychiatric disorder.    SURGICAL HISTORY   has a past surgical history that includes other orthopedic surgery and exploratory of abdomen (N/A, 9/26/2019).    FAMILY HISTORY  Family History   Problem Relation Age of Onset    Hypertension Mother     Heart Disease Neg Hx     Hyperlipidemia Neg Hx        SOCIAL HISTORY  Social History     Tobacco Use    Smoking status: Never    Smokeless tobacco: Never    Tobacco comments:     Smokes couple of times a month   Vaping Use    Vaping Use: Never used   Substance and Sexual Activity    Alcohol use: Yes     Comment: last drink today    Drug use: Yes     Types: Inhaled     Comment: meth yesterday (smokes Meth)/THC    Sexual activity: Not on file       CURRENT MEDICATIONS  Home Medications    **Home medications have not yet been reviewed for this encounter**         ALLERGIES  No Known Allergies    PHYSICAL EXAM  VITAL SIGNS: BP (!) 140/96   Pulse  90   Temp 36.3 °C (97.3 °F) (Temporal)   Resp 18   SpO2 96%    Constitutional: Sleeping but arousable.  Non toxic  HENT:  Normocephalic atraumatic  Eyes: Grossly normal  Neck: Normal range of motion  Cardiovascular: Normal heart rate   Thorax & Lungs: No respiratory distress  Abdomen: Nontender  Skin:  No pathologic rash.   Extremities: Well perfused  Neuro: Arousable to sternal rub.  Moving all extremities symmetrically   Psychiatric: Unable to assess      DIAGNOSTIC STUDIES / PROCEDURES      LABS  Results for orders placed or performed during the hospital encounter of 10/27/23   POCT glucose device results   Result Value Ref Range    POC Glucose, Blood 100 (H) 65 - 99 mg/dL         COURSE & MEDICAL DECISION MAKING    ED Observation Status? Yes; I am placing the patient in to an observation status due to a diagnostic uncertainty as well as therapeutic intensity. Patient placed in observation status at 9:35 PM, 10/27/2023.     Observation plan is as follows: Serial Exams, Sober Evaluation    INITIAL ASSESSMENT, COURSE AND PLAN  Care Narrative: This is a 38-year-old with history of chronic alcohol use and multiple visits to the ER in the setting of alcohol intoxication.  Patient presents with alcohol intoxication, endorsing alcohol use in triage.  Blood glucose normal.  No evidence of coingestion or trauma by history or exam.  I do not see any indication to obtain advanced imaging or further lab work. Patient was monitored in the ER while he metabolized his alcohol.     12:27 AM  He remains intoxicated at this time. Patient endorsed to my colleague, Dr. Bonner for reevaluation once clinically sober.  I anticipate he will be able to discharge home, once sober and at baseline mental status.           DISPOSITION AND DISCUSSIONS  I have discussed management of the patient with the following physicians and BRUNO's:  Dr. Bonner    Discussion of management with other [facility] or appropriate source(s): None      Escalation of care considered, and ultimately not performed:diagnostic imaging    Decision tools and prescription drugs considered including, but not limited to:  None .    FINAL DIAGNOSIS  1. Alcoholic intoxication without complication (HCC)           Electronically signed by: Devika Fabian M.D., 10/27/2023 9:03 PM

## 2023-10-28 NOTE — ASSESSMENT & PLAN NOTE
CIWA in 20's in ED.  Patient will require admission for alcohol withdrawal.  Received rally bag and Ativan  Continue with CIWA protocol with IV ativan  Multivitamin, received rally bag in ED  Continued counseling on quitting alcohol  Optimize electrolytes, check magnesium

## 2023-10-28 NOTE — ED NOTES
Patient placed on 2L via nasal cannula after desaturating to 87% while sleeping. Patient now @96% on 2L.

## 2023-10-29 PROBLEM — I10 HYPERTENSION: Status: ACTIVE | Noted: 2023-10-29

## 2023-10-29 LAB
ALBUMIN SERPL BCP-MCNC: 3.7 G/DL (ref 3.2–4.9)
ALBUMIN/GLOB SERPL: 1.2 G/DL
ALP SERPL-CCNC: 103 U/L (ref 30–99)
ALT SERPL-CCNC: 16 U/L (ref 2–50)
ANION GAP SERPL CALC-SCNC: 7 MMOL/L (ref 7–16)
AST SERPL-CCNC: 26 U/L (ref 12–45)
BILIRUB SERPL-MCNC: 0.6 MG/DL (ref 0.1–1.5)
BUN SERPL-MCNC: 11 MG/DL (ref 8–22)
CALCIUM ALBUM COR SERPL-MCNC: 8.8 MG/DL (ref 8.5–10.5)
CALCIUM SERPL-MCNC: 8.6 MG/DL (ref 8.5–10.5)
CHLORIDE SERPL-SCNC: 102 MMOL/L (ref 96–112)
CO2 SERPL-SCNC: 28 MMOL/L (ref 20–33)
CREAT SERPL-MCNC: 0.71 MG/DL (ref 0.5–1.4)
EKG IMPRESSION: NORMAL
GFR SERPLBLD CREATININE-BSD FMLA CKD-EPI: 121 ML/MIN/1.73 M 2
GLOBULIN SER CALC-MCNC: 3 G/DL (ref 1.9–3.5)
GLUCOSE SERPL-MCNC: 103 MG/DL (ref 65–99)
POTASSIUM SERPL-SCNC: 4 MMOL/L (ref 3.6–5.5)
PROT SERPL-MCNC: 6.7 G/DL (ref 6–8.2)
SODIUM SERPL-SCNC: 137 MMOL/L (ref 135–145)

## 2023-10-29 PROCEDURE — 700111 HCHG RX REV CODE 636 W/ 250 OVERRIDE (IP): Mod: JZ | Performed by: STUDENT IN AN ORGANIZED HEALTH CARE EDUCATION/TRAINING PROGRAM

## 2023-10-29 PROCEDURE — A9270 NON-COVERED ITEM OR SERVICE: HCPCS | Performed by: STUDENT IN AN ORGANIZED HEALTH CARE EDUCATION/TRAINING PROGRAM

## 2023-10-29 PROCEDURE — 770006 HCHG ROOM/CARE - MED/SURG/GYN SEMI*

## 2023-10-29 PROCEDURE — 700102 HCHG RX REV CODE 250 W/ 637 OVERRIDE(OP): Performed by: STUDENT IN AN ORGANIZED HEALTH CARE EDUCATION/TRAINING PROGRAM

## 2023-10-29 PROCEDURE — 99233 SBSQ HOSP IP/OBS HIGH 50: CPT | Performed by: INTERNAL MEDICINE

## 2023-10-29 PROCEDURE — 93010 ELECTROCARDIOGRAM REPORT: CPT | Performed by: INTERNAL MEDICINE

## 2023-10-29 PROCEDURE — 80053 COMPREHEN METABOLIC PANEL: CPT

## 2023-10-29 PROCEDURE — 36415 COLL VENOUS BLD VENIPUNCTURE: CPT

## 2023-10-29 RX ORDER — DULOXETIN HYDROCHLORIDE 20 MG/1
20 CAPSULE, DELAYED RELEASE ORAL DAILY
Status: DISCONTINUED | OUTPATIENT
Start: 2023-10-29 | End: 2023-10-31 | Stop reason: HOSPADM

## 2023-10-29 RX ORDER — PROPRANOLOL HYDROCHLORIDE 10 MG/1
20 TABLET ORAL 2 TIMES DAILY
Status: DISCONTINUED | OUTPATIENT
Start: 2023-10-29 | End: 2023-10-31 | Stop reason: HOSPADM

## 2023-10-29 RX ADMIN — LORAZEPAM 3 MG: 1 TABLET ORAL at 18:01

## 2023-10-29 RX ADMIN — FOLIC ACID 1 MG: 1 TABLET ORAL at 05:43

## 2023-10-29 RX ADMIN — LORAZEPAM 2 MG: 1 TABLET ORAL at 01:05

## 2023-10-29 RX ADMIN — LORAZEPAM 1 MG: 1 TABLET ORAL at 06:28

## 2023-10-29 RX ADMIN — ENOXAPARIN SODIUM 40 MG: 100 INJECTION SUBCUTANEOUS at 18:00

## 2023-10-29 RX ADMIN — Medication 100 MG: at 05:43

## 2023-10-29 RX ADMIN — LORAZEPAM 3 MG: 1 TABLET ORAL at 13:27

## 2023-10-29 RX ADMIN — THERA TABS 1 TABLET: TAB at 05:43

## 2023-10-29 ASSESSMENT — LIFESTYLE VARIABLES
PAROXYSMAL SWEATS: NO SWEAT VISIBLE
ORIENTATION AND CLOUDING OF SENSORIUM: CANNOT DO SERIAL ADDITIONS OR IS UNCERTAIN ABOUT DATE
ORIENTATION AND CLOUDING OF SENSORIUM: ORIENTED AND CAN DO SERIAL ADDITIONS
TREMOR: MODERATE TREMOR WITH ARMS EXTENDED
AUDITORY DISTURBANCES: NOT PRESENT
NAUSEA AND VOMITING: NO NAUSEA AND NO VOMITING
VISUAL DISTURBANCES: NOT PRESENT
AGITATION: NORMAL ACTIVITY
AUDITORY DISTURBANCES: VERY MILD HARSHNESS OR ABILITY TO FRIGHTEN
TOTAL SCORE: VERY MILD ITCHING, PINS AND NEEDLES SENSATION, BURNING OR NUMBNESS
HEADACHE, FULLNESS IN HEAD: NOT PRESENT
AUDITORY DISTURBANCES: NOT PRESENT
ORIENTATION AND CLOUDING OF SENSORIUM: CANNOT DO SERIAL ADDITIONS OR IS UNCERTAIN ABOUT DATE
TREMOR: TREMOR NOT VISIBLE BUT CAN BE FELT, FINGERTIP TO FINGERTIP
PAROXYSMAL SWEATS: BARELY PERCEPTIBLE SWEATING. PALMS MOIST
VISUAL DISTURBANCES: NOT PRESENT
VISUAL DISTURBANCES: NOT PRESENT
TREMOR: *
VISUAL DISTURBANCES: MILD SENSITIVITY
VISUAL DISTURBANCES: NOT PRESENT
AUDITORY DISTURBANCES: NOT PRESENT
ANXIETY: NO ANXIETY (AT EASE)
ANXIETY: *
NAUSEA AND VOMITING: NO NAUSEA AND NO VOMITING
VISUAL DISTURBANCES: NOT PRESENT
HEADACHE, FULLNESS IN HEAD: NOT PRESENT
TOTAL SCORE: 1
TOTAL SCORE: 8
AGITATION: MODERATELY FIDGETY AND RESTLESS
PAROXYSMAL SWEATS: NO SWEAT VISIBLE
TREMOR: *
HEADACHE, FULLNESS IN HEAD: MODERATELY SEVERE
PAROXYSMAL SWEATS: BARELY PERCEPTIBLE SWEATING. PALMS MOIST
NAUSEA AND VOMITING: NO NAUSEA AND NO VOMITING
ANXIETY: NO ANXIETY (AT EASE)
HEADACHE, FULLNESS IN HEAD: MILD
TREMOR: NO TREMOR
AGITATION: *
TOTAL SCORE: MODERATE ITCHING, PINS AND NEEDLES SENSATION, BURNING OR NUMBNESS
TREMOR: NO TREMOR
TREMOR: NO TREMOR
ANXIETY: MILDLY ANXIOUS
HEADACHE, FULLNESS IN HEAD: NOT PRESENT
TOTAL SCORE: 17
PAROXYSMAL SWEATS: BARELY PERCEPTIBLE SWEATING. PALMS MOIST
AGITATION: SOMEWHAT MORE THAN NORMAL ACTIVITY
NAUSEA AND VOMITING: NO NAUSEA AND NO VOMITING
AGITATION: NORMAL ACTIVITY
ANXIETY: NO ANXIETY (AT EASE)
NAUSEA AND VOMITING: MILD NAUSEA WITH NO VOMITING
ORIENTATION AND CLOUDING OF SENSORIUM: ORIENTED AND CAN DO SERIAL ADDITIONS
PAROXYSMAL SWEATS: *
AUDITORY DISTURBANCES: MILD HARSHNESS OR ABILITY TO FRIGHTEN
AUDITORY DISTURBANCES: NOT PRESENT
NAUSEA AND VOMITING: MILD NAUSEA WITH NO VOMITING
ANXIETY: MILDLY ANXIOUS
PAROXYSMAL SWEATS: BARELY PERCEPTIBLE SWEATING. PALMS MOIST
TOTAL SCORE: 1
TOTAL SCORE: MODERATE ITCHING, PINS AND NEEDLES SENSATION, BURNING OR NUMBNESS
AGITATION: NORMAL ACTIVITY
TOTAL SCORE: 0
AUDITORY DISTURBANCES: NOT PRESENT
TOTAL SCORE: 25
AGITATION: SOMEWHAT MORE THAN NORMAL ACTIVITY
ORIENTATION AND CLOUDING OF SENSORIUM: ORIENTED AND CAN DO SERIAL ADDITIONS
NAUSEA AND VOMITING: MILD NAUSEA WITH NO VOMITING
TOTAL SCORE: VERY MILD ITCHING, PINS AND NEEDLES SENSATION, BURNING OR NUMBNESS
ANXIETY: *
TOTAL SCORE: MILD ITCHING, PINS AND NEEDLES SENSATION, BURNING OR NUMBNESS
HEADACHE, FULLNESS IN HEAD: NOT PRESENT
TOTAL SCORE: 25
ORIENTATION AND CLOUDING OF SENSORIUM: CANNOT DO SERIAL ADDITIONS OR IS UNCERTAIN ABOUT DATE
VISUAL DISTURBANCES: MILD SENSITIVITY
VISUAL DISTURBANCES: MODERATE SENSITIVITY
PAROXYSMAL SWEATS: NO SWEAT VISIBLE
ORIENTATION AND CLOUDING OF SENSORIUM: ORIENTED AND CAN DO SERIAL ADDITIONS
TOTAL SCORE: 4
SUBSTANCE_ABUSE: 1
AUDITORY DISTURBANCES: MILD HARSHNESS OR ABILITY TO FRIGHTEN
AGITATION: NORMAL ACTIVITY
ANXIETY: MILDLY ANXIOUS
ORIENTATION AND CLOUDING OF SENSORIUM: ORIENTED AND CAN DO SERIAL ADDITIONS
NAUSEA AND VOMITING: *
HEADACHE, FULLNESS IN HEAD: MODERATELY SEVERE
TREMOR: *
HEADACHE, FULLNESS IN HEAD: MODERATELY SEVERE

## 2023-10-29 ASSESSMENT — ENCOUNTER SYMPTOMS: NERVOUS/ANXIOUS: 1

## 2023-10-29 NOTE — PROGRESS NOTES
[facility] Medicine Daily Progress Note    Date of Service  10/29/2023    Chief Complaint  Gopal Ceja is a 38 y.o. male admitted 10/27/2023 with alcohol withdrawal    Hospital Course  Gopal Ceja is a 38 y.o. male who presented 10/27/2023 with alcohol intoxication.  Patient has a history of alcohol abuse, methamphetamine abuse, depression, anxiety and previous episodes of alcohol withdrawal requiring hospitalization, as well as frequent emergency department visits.  He presented yesterday with complaints of alcohol withdrawal, was brought in by EMS.  He was watched in the ED until he reached a point of sobriety and then discharged.  He then returned in the evening intoxicated again and was monitored.  He was going to be discharged but then made a comment about suicidal ideations, still had a blood alcohol of 180 so was unavailable to be evaluated by behavioral health, .  Then as he was being observed in the ED he began experiencing withdrawals.  He denies any suicidal ideation.  He was tremulous and tachycardic and was requiring Ativan for CIWA scores in the 20s.  He denies any pain, infectious symptoms, again denies SI, says he just did not want to leave.  Legal hold was not initiated in the ED as patient subsequently denied SI and was set to discharge however began exhibiting signs of alcohol withdrawal.     Interval Problem Update  Patient seen and evaluated bedside.  Patient remains tachycardic and tremulous.  Continue CIWA protocol.  Patient denies any active suicidal ideation.  Continue propanolol and Cymbalta.    I have discussed this patient's plan of care and discharge plan at IDT rounds today with Case Management, Nursing, Nursing leadership, and other members of the IDT team.    Consultants/Specialty  none    Code Status  Full Code    Disposition  The patient is not medically cleared for discharge to home or a post-acute facility.  Anticipate discharge to: home with close outpatient  follow-up    I have placed the appropriate orders for post-discharge needs.    Review of Systems  Review of Systems   Constitutional:  Positive for malaise/fatigue.   Psychiatric/Behavioral:  Positive for substance abuse. The patient is nervous/anxious.         Physical Exam  Temp:  [36.1 °C (97 °F)-36.8 °C (98.2 °F)] 36.2 °C (97.2 °F)  Pulse:  [] 98  Resp:  [16-18] 17  BP: (144-174)/(87-94) 144/93  SpO2:  [92 %-98 %] 96 %    Physical Exam  Vitals and nursing note reviewed.   Constitutional:       General: He is not in acute distress.  HENT:      Head: Normocephalic.      Mouth/Throat:      Mouth: Mucous membranes are moist.   Eyes:      Pupils: Pupils are equal, round, and reactive to light.   Cardiovascular:      Rate and Rhythm: Normal rate and regular rhythm.      Pulses: Normal pulses.      Heart sounds: Normal heart sounds.   Pulmonary:      Effort: Pulmonary effort is normal.      Breath sounds: Normal breath sounds.   Abdominal:      Palpations: Abdomen is soft.      Tenderness: There is no abdominal tenderness.   Musculoskeletal:         General: No swelling.      Cervical back: Neck supple.   Skin:     General: Skin is warm.      Coloration: Skin is not jaundiced.   Neurological:      General: No focal deficit present.      Mental Status: He is alert and oriented to person, place, and time.   Psychiatric:         Mood and Affect: Mood normal.         Behavior: Behavior normal.         Fluids    Intake/Output Summary (Last 24 hours) at 10/29/2023 1218  Last data filed at 10/29/2023 0631  Gross per 24 hour   Intake 1210 ml   Output 500 ml   Net 710 ml       Laboratory  Recent Labs     10/28/23  0530   WBC 11.0*   RBC 5.47   HEMOGLOBIN 16.6   HEMATOCRIT 49.0   MCV 89.6   MCH 30.3   MCHC 33.9   RDW 42.1   PLATELETCT 328   MPV 10.8     Recent Labs     10/28/23  0530 10/29/23  0438   SODIUM 139 137   POTASSIUM 4.1 4.0   CHLORIDE 98 102   CO2 22 28   GLUCOSE 85 103*   BUN 12 11   CREATININE 0.79 0.71    CALCIUM 8.7 8.6                   Imaging  No orders to display        Assessment/Plan  * Alcohol abuse with withdrawal (HCC)- (present on admission)  Assessment & Plan  CIWA in 20's in ED.  Patient will require admission for alcohol withdrawal.  Received rally bag and Ativan  Continue with CIWA monitoring and Ativan  Multivitamin, received rally bag in ED  Continued counseling on quitting alcohol  Optimize electrolytes, check magnesium    Hypertension  Assessment & Plan  Continue Inderal  Clonidine as needed    Homelessness- (present on admission)  Assessment & Plan  h/o    Anxiety disorder- (present on admission)  Assessment & Plan  Continue Cymbalta         VTE prophylaxis: lovenox    I have performed a physical exam and reviewed and updated ROS and Plan today (10/29/2023). In review of yesterday's note (10/28/2023), there are no changes except as documented above.    I spent 51 minutes, reviewing the chart, obtaining and/or reviewing separately obtained history. Performing a medically appropriate examination and evaluation.  Counseling and educating the patient. Ordering and reviewing medications, tests, or procedures. Documenting clinical information in EPIC. Independently interpreting results and communicating results to patient. Discussing future disposition of care with patient, RN and case management.

## 2023-10-29 NOTE — CARE PLAN
The patient is Watcher - Medium risk of patient condition declining or worsening    Shift Goals  Clinical Goals: CIWA  Patient Goals: rest    Progress made toward(s) clinical / shift goals:  Patient CIWA score recorded , PRN Ativan given. See MAR.    Patient is not progressing towards the following goals:      Problem: Knowledge Deficit - Standard  Goal: Patient and family/care givers will demonstrate understanding of plan of care, disease process/condition, diagnostic tests and medications  Outcome: Not Met     Problem: Pain - Standard  Goal: Alleviation of pain or a reduction in pain to the patient’s comfort goal  Outcome: Not Met

## 2023-10-29 NOTE — PROGRESS NOTES
Assumed care of Patient from NOC RN. Patient is sleeping in bed. CIWA at 1000 is a 0 as patient is sleeping. All needs are met at this time. Will reassess if needs change.

## 2023-10-29 NOTE — PROGRESS NOTES
4 Eyes Skin Assessment Completed by JORDY Godfrey and JORDY Saunders.    Head WDL  Ears WDL  Nose WDL  Mouth WDL  Neck WDL  Breast/Chest WDL  Shoulder Blades WDL  Spine WDL  (R) Arm/Elbow/Hand WDL  (L) Arm/Elbow/Hand WDL  Abdomen WDL  Groin WDL  Scrotum/Coccyx/Buttocks WDL  (R) Leg WDL  (L) Leg WDL  (R) Heel/Foot/Toe WDL  (L) Heel/Foot/Toe WDL          Devices In Places SCD's      Interventions In Place Pillows    Possible Skin Injury No    Pictures Uploaded Into Epic N/A  Wound Consult Placed N/A  RN Wound Prevention Protocol Ordered No

## 2023-10-30 LAB
ANION GAP SERPL CALC-SCNC: 10 MMOL/L (ref 7–16)
BUN SERPL-MCNC: 10 MG/DL (ref 8–22)
CALCIUM SERPL-MCNC: 9.1 MG/DL (ref 8.5–10.5)
CHLORIDE SERPL-SCNC: 102 MMOL/L (ref 96–112)
CO2 SERPL-SCNC: 25 MMOL/L (ref 20–33)
CREAT SERPL-MCNC: 0.63 MG/DL (ref 0.5–1.4)
ERYTHROCYTE [DISTWIDTH] IN BLOOD BY AUTOMATED COUNT: 42.5 FL (ref 35.9–50)
GFR SERPLBLD CREATININE-BSD FMLA CKD-EPI: 125 ML/MIN/1.73 M 2
GLUCOSE SERPL-MCNC: 105 MG/DL (ref 65–99)
HCT VFR BLD AUTO: 46.9 % (ref 42–52)
HGB BLD-MCNC: 15.5 G/DL (ref 14–18)
MCH RBC QN AUTO: 30.2 PG (ref 27–33)
MCHC RBC AUTO-ENTMCNC: 33 G/DL (ref 32.3–36.5)
MCV RBC AUTO: 91.2 FL (ref 81.4–97.8)
PLATELET # BLD AUTO: 229 K/UL (ref 164–446)
PMV BLD AUTO: 11.1 FL (ref 9–12.9)
POTASSIUM SERPL-SCNC: 3.7 MMOL/L (ref 3.6–5.5)
RBC # BLD AUTO: 5.14 M/UL (ref 4.7–6.1)
SODIUM SERPL-SCNC: 137 MMOL/L (ref 135–145)
WBC # BLD AUTO: 7.2 K/UL (ref 4.8–10.8)

## 2023-10-30 PROCEDURE — 99233 SBSQ HOSP IP/OBS HIGH 50: CPT | Performed by: INTERNAL MEDICINE

## 2023-10-30 PROCEDURE — 85027 COMPLETE CBC AUTOMATED: CPT

## 2023-10-30 PROCEDURE — A9270 NON-COVERED ITEM OR SERVICE: HCPCS | Performed by: STUDENT IN AN ORGANIZED HEALTH CARE EDUCATION/TRAINING PROGRAM

## 2023-10-30 PROCEDURE — 700102 HCHG RX REV CODE 250 W/ 637 OVERRIDE(OP): Performed by: STUDENT IN AN ORGANIZED HEALTH CARE EDUCATION/TRAINING PROGRAM

## 2023-10-30 PROCEDURE — 80048 BASIC METABOLIC PNL TOTAL CA: CPT

## 2023-10-30 PROCEDURE — 770006 HCHG ROOM/CARE - MED/SURG/GYN SEMI*

## 2023-10-30 PROCEDURE — 36415 COLL VENOUS BLD VENIPUNCTURE: CPT

## 2023-10-30 PROCEDURE — 700111 HCHG RX REV CODE 636 W/ 250 OVERRIDE (IP): Mod: JZ | Performed by: STUDENT IN AN ORGANIZED HEALTH CARE EDUCATION/TRAINING PROGRAM

## 2023-10-30 RX ADMIN — Medication 100 MG: at 04:52

## 2023-10-30 RX ADMIN — LORAZEPAM 3 MG: 1 TABLET ORAL at 13:03

## 2023-10-30 RX ADMIN — ENOXAPARIN SODIUM 40 MG: 100 INJECTION SUBCUTANEOUS at 18:38

## 2023-10-30 RX ADMIN — LORAZEPAM 3 MG: 1 TABLET ORAL at 18:37

## 2023-10-30 RX ADMIN — THERA TABS 1 TABLET: TAB at 04:52

## 2023-10-30 RX ADMIN — FOLIC ACID 1 MG: 1 TABLET ORAL at 06:20

## 2023-10-30 RX ADMIN — LORAZEPAM 4 MG: 1 TABLET ORAL at 22:25

## 2023-10-30 RX ADMIN — LORAZEPAM 3 MG: 1 TABLET ORAL at 04:44

## 2023-10-30 ASSESSMENT — LIFESTYLE VARIABLES
HEADACHE, FULLNESS IN HEAD: NOT PRESENT
PAROXYSMAL SWEATS: NO SWEAT VISIBLE
TOTAL SCORE: MODERATE ITCHING, PINS AND NEEDLES SENSATION, BURNING OR NUMBNESS
TOTAL SCORE: 25
TREMOR: NO TREMOR
ANXIETY: *
NAUSEA AND VOMITING: MILD NAUSEA WITH NO VOMITING
VISUAL DISTURBANCES: MILD SENSITIVITY
TREMOR: TREMOR NOT VISIBLE BUT CAN BE FELT, FINGERTIP TO FINGERTIP
TREMOR: NO TREMOR
ORIENTATION AND CLOUDING OF SENSORIUM: ORIENTED AND CAN DO SERIAL ADDITIONS
NAUSEA AND VOMITING: NO NAUSEA AND NO VOMITING
TREMOR: MODERATE TREMOR WITH ARMS EXTENDED
TREMOR: *
TOTAL SCORE: MODERATE ITCHING, PINS AND NEEDLES SENSATION, BURNING OR NUMBNESS
AGITATION: *
AGITATION: SOMEWHAT MORE THAN NORMAL ACTIVITY
TOTAL SCORE: 29
PAROXYSMAL SWEATS: BARELY PERCEPTIBLE SWEATING. PALMS MOIST
AUDITORY DISTURBANCES: NOT PRESENT
PAROXYSMAL SWEATS: BARELY PERCEPTIBLE SWEATING. PALMS MOIST
TOTAL SCORE: 0
ORIENTATION AND CLOUDING OF SENSORIUM: CANNOT DO SERIAL ADDITIONS OR IS UNCERTAIN ABOUT DATE
TOTAL SCORE: VERY MILD ITCHING, PINS AND NEEDLES SENSATION, BURNING OR NUMBNESS
ORIENTATION AND CLOUDING OF SENSORIUM: CANNOT DO SERIAL ADDITIONS OR IS UNCERTAIN ABOUT DATE
ORIENTATION AND CLOUDING OF SENSORIUM: CANNOT DO SERIAL ADDITIONS OR IS UNCERTAIN ABOUT DATE
TREMOR: *
ANXIETY: MILDLY ANXIOUS
TOTAL SCORE: 25
TOTAL SCORE: MODERATE ITCHING, PINS AND NEEDLES SENSATION, BURNING OR NUMBNESS
HEADACHE, FULLNESS IN HEAD: VERY MILD
SUBSTANCE_ABUSE: 1
VISUAL DISTURBANCES: MILD SENSITIVITY
ORIENTATION AND CLOUDING OF SENSORIUM: CANNOT DO SERIAL ADDITIONS OR IS UNCERTAIN ABOUT DATE
TREMOR: NO TREMOR
ORIENTATION AND CLOUDING OF SENSORIUM: ORIENTED AND CAN DO SERIAL ADDITIONS
TOTAL SCORE: 25
TOTAL SCORE: 7
PAROXYSMAL SWEATS: BARELY PERCEPTIBLE SWEATING. PALMS MOIST
HEADACHE, FULLNESS IN HEAD: VERY MILD
TOTAL SCORE: 4
NAUSEA AND VOMITING: INTERMITTENT NAUSEA WITH DRY HEAVES
TOTAL SCORE: 4
ANXIETY: *
TOTAL SCORE: MODERATE ITCHING, PINS AND NEEDLES SENSATION, BURNING OR NUMBNESS
HEADACHE, FULLNESS IN HEAD: MODERATELY SEVERE
AGITATION: *
NAUSEA AND VOMITING: *
AGITATION: *
AUDITORY DISTURBANCES: MODERATE HARSHNESS OR ABILITY TO FRIGHTEN
PAROXYSMAL SWEATS: *
HEADACHE, FULLNESS IN HEAD: MODERATE
TREMOR: *
ANXIETY: *
ANXIETY: NO ANXIETY (AT EASE)
AGITATION: SOMEWHAT MORE THAN NORMAL ACTIVITY
AUDITORY DISTURBANCES: NOT PRESENT
PAROXYSMAL SWEATS: BARELY PERCEPTIBLE SWEATING. PALMS MOIST
PAROXYSMAL SWEATS: NO SWEAT VISIBLE
NAUSEA AND VOMITING: NO NAUSEA AND NO VOMITING
PAROXYSMAL SWEATS: NO SWEAT VISIBLE
AUDITORY DISTURBANCES: NOT PRESENT
AUDITORY DISTURBANCES: NOT PRESENT
TREMOR: *
ORIENTATION AND CLOUDING OF SENSORIUM: ORIENTED AND CAN DO SERIAL ADDITIONS
TOTAL SCORE: 25
VISUAL DISTURBANCES: NOT PRESENT
VISUAL DISTURBANCES: MODERATE SENSITIVITY
ANXIETY: *
ORIENTATION AND CLOUDING OF SENSORIUM: ORIENTED AND CAN DO SERIAL ADDITIONS
NAUSEA AND VOMITING: *
AUDITORY DISTURBANCES: MODERATE HARSHNESS OR ABILITY TO FRIGHTEN
ANXIETY: MODERATELY ANXIOUS OR GUARDED, SO ANXIETY IS INFERRED
AGITATION: NORMAL ACTIVITY
NAUSEA AND VOMITING: *
AUDITORY DISTURBANCES: MODERATE HARSHNESS OR ABILITY TO FRIGHTEN
AUDITORY DISTURBANCES: MILD HARSHNESS OR ABILITY TO FRIGHTEN
HEADACHE, FULLNESS IN HEAD: MODERATELY SEVERE
ORIENTATION AND CLOUDING OF SENSORIUM: CANNOT DO SERIAL ADDITIONS OR IS UNCERTAIN ABOUT DATE
NAUSEA AND VOMITING: NO NAUSEA AND NO VOMITING
HEADACHE, FULLNESS IN HEAD: VERY MILD
AGITATION: NORMAL ACTIVITY
TOTAL SCORE: VERY MILD ITCHING, PINS AND NEEDLES SENSATION, BURNING OR NUMBNESS
VISUAL DISTURBANCES: NOT PRESENT
HEADACHE, FULLNESS IN HEAD: NOT PRESENT
AGITATION: *
TOTAL SCORE: 0
AUDITORY DISTURBANCES: VERY MILD HARSHNESS OR ABILITY TO FRIGHTEN
HEADACHE, FULLNESS IN HEAD: MODERATELY SEVERE
AUDITORY DISTURBANCES: NOT PRESENT
PAROXYSMAL SWEATS: BARELY PERCEPTIBLE SWEATING. PALMS MOIST
ORIENTATION AND CLOUDING OF SENSORIUM: ORIENTED AND CAN DO SERIAL ADDITIONS
TOTAL SCORE: MODERATE ITCHING, PINS AND NEEDLES SENSATION, BURNING OR NUMBNESS
ANXIETY: NO ANXIETY (AT EASE)
AGITATION: NORMAL ACTIVITY
ANXIETY: NO ANXIETY (AT EASE)
NAUSEA AND VOMITING: MILD NAUSEA WITH NO VOMITING
TREMOR: TREMOR NOT VISIBLE BUT CAN BE FELT, FINGERTIP TO FINGERTIP
PAROXYSMAL SWEATS: NO SWEAT VISIBLE
VISUAL DISTURBANCES: VERY MILD SENSITIVITY
NAUSEA AND VOMITING: NO NAUSEA AND NO VOMITING
AGITATION: *
VISUAL DISTURBANCES: MILD SENSITIVITY
ANXIETY: NO ANXIETY (AT EASE)
HEADACHE, FULLNESS IN HEAD: MODERATELY SEVERE
VISUAL DISTURBANCES: MILD SENSITIVITY

## 2023-10-30 ASSESSMENT — PAIN DESCRIPTION - PAIN TYPE: TYPE: ACUTE PAIN

## 2023-10-30 ASSESSMENT — ENCOUNTER SYMPTOMS: NERVOUS/ANXIOUS: 1

## 2023-10-30 NOTE — PROGRESS NOTES
[facility] Medicine Daily Progress Note    Date of Service  10/30/2023    Chief Complaint  Gopal Ceja is a 38 y.o. male admitted 10/27/2023 with alcohol withdrawal    Hospital Course  Gopal Ceja is a 38 y.o. male who presented 10/27/2023 with alcohol intoxication.  Patient has a history of alcohol abuse, methamphetamine abuse, depression, anxiety and previous episodes of alcohol withdrawal requiring hospitalization, as well as frequent emergency department visits.  He presented yesterday with complaints of alcohol withdrawal, was brought in by EMS.  He was watched in the ED until he reached a point of sobriety and then discharged.  He then returned in the evening intoxicated again and was monitored.  He was going to be discharged but then made a comment about suicidal ideations, still had a blood alcohol of 180 so was unavailable to be evaluated by behavioral health, .  Then as he was being observed in the ED he began experiencing withdrawals.  He denies any suicidal ideation.  He was tremulous and tachycardic and was requiring Ativan for CIWA scores in the 20s.  He denies any pain, infectious symptoms, again denies SI, says he just did not want to leave.  Legal hold was not initiated in the ED as patient subsequently denied SI and was set to discharge however began exhibiting signs of alcohol withdrawal.     Interval Problem Update  Patient seen and evaluated bedside.  Patient remains tachycardic, tremulous and anxious.  Continue CIWA protocol.  Patient denies any active suicidal ideation.  Continue propanolol and Cymbalta.    I have discussed this patient's plan of care and discharge plan at IDT rounds today with Case Management, Nursing, Nursing leadership, and other members of the IDT team.    Consultants/Specialty  none    Code Status  Full Code    Disposition  Medically Cleared  I have placed the appropriate orders for post-discharge needs.    Review of Systems  Review of Systems    Constitutional:  Positive for malaise/fatigue.   Psychiatric/Behavioral:  Positive for substance abuse. The patient is nervous/anxious.         Physical Exam  Temp:  [36.4 °C (97.5 °F)-36.9 °C (98.4 °F)] 36.9 °C (98.4 °F)  Pulse:  [] 85  Resp:  [16-20] 16  BP: (134-153)/(82-94) 134/82  SpO2:  [95 %-100 %] 100 %    Physical Exam  Vitals and nursing note reviewed.   Constitutional:       General: He is not in acute distress.  HENT:      Head: Normocephalic.      Mouth/Throat:      Mouth: Mucous membranes are moist.   Eyes:      Pupils: Pupils are equal, round, and reactive to light.   Cardiovascular:      Rate and Rhythm: Normal rate and regular rhythm.      Pulses: Normal pulses.      Heart sounds: Normal heart sounds.   Pulmonary:      Effort: Pulmonary effort is normal.      Breath sounds: Normal breath sounds.   Abdominal:      Palpations: Abdomen is soft.      Tenderness: There is no abdominal tenderness.   Musculoskeletal:         General: No swelling.      Cervical back: Neck supple.   Skin:     General: Skin is warm.      Coloration: Skin is not jaundiced.   Neurological:      General: No focal deficit present.      Mental Status: He is alert and oriented to person, place, and time.   Psychiatric:         Mood and Affect: Mood normal.         Behavior: Behavior normal.         Fluids    Intake/Output Summary (Last 24 hours) at 10/30/2023 1229  Last data filed at 10/29/2023 2010  Gross per 24 hour   Intake 480 ml   Output 1500 ml   Net -1020 ml         Laboratory  Recent Labs     10/28/23  0530 10/30/23  0434   WBC 11.0* 7.2   RBC 5.47 5.14   HEMOGLOBIN 16.6 15.5   HEMATOCRIT 49.0 46.9   MCV 89.6 91.2   MCH 30.3 30.2   MCHC 33.9 33.0   RDW 42.1 42.5   PLATELETCT 328 229   MPV 10.8 11.1       Recent Labs     10/28/23  0530 10/29/23  0438 10/30/23  0434   SODIUM 139 137 137   POTASSIUM 4.1 4.0 3.7   CHLORIDE 98 102 102   CO2 22 28 25   GLUCOSE 85 103* 105*   BUN 12 11 10   CREATININE 0.79 0.71 0.63    CALCIUM 8.7 8.6 9.1                     Imaging  No orders to display        Assessment/Plan  * Alcohol abuse with withdrawal (HCC)- (present on admission)  Assessment & Plan  CIWA in 20's in ED.  Patient will require admission for alcohol withdrawal.  Received rally bag and Ativan  Continue with CIWA protocol with IV ativan  Multivitamin, received rally bag in ED  Continued counseling on quitting alcohol  Optimize electrolytes, check magnesium    Hypertension  Assessment & Plan  Continue Inderal  Clonidine as needed    Homelessness- (present on admission)  Assessment & Plan  h/o    Anxiety disorder- (present on admission)  Assessment & Plan  Continue Cymbalta         VTE prophylaxis: lovenox    I have performed a physical exam and reviewed and updated ROS and Plan today (10/30/2023). In review of yesterday's note (10/29/2023), there are no changes except as documented above.

## 2023-10-30 NOTE — CARE PLAN
The patient is Stable - Low risk of patient condition declining or worsening    Shift Goals  Clinical Goals: ETOH detox management  Patient Goals: get better    Progress made toward(s) clinical / shift goals:      Medicated per MAR, see CIWA scores. No seizures for this shift, compliant with care. HR is better from 100s-110s last night this morning was 80s-90s.     Problem: Knowledge Deficit - Standard  Goal: Patient and family/care givers will demonstrate understanding of plan of care, disease process/condition, diagnostic tests and medications  Outcome: Progressing     Problem: Optimal Care for Alcohol Withdrawal  Goal: Optimal Care for the alcohol withdrawal patient  Outcome: Progressing     Problem: Seizure Precautions  Goal: Implementation of seizure precautions  Outcome: Progressing     Problem: Pain - Standard  Goal: Alleviation of pain or a reduction in pain to the patient’s comfort goal  Outcome: Progressing           Patient is not progressing towards the following goals:

## 2023-10-30 NOTE — CARE PLAN
The patient is Stable - Low risk of patient condition declining or worsening    Shift Goals  Clinical Goals: ETOH detox  Patient Goals: decreased symptoms from detox    Progress made toward(s) clinical / shift goals:    Problem: Knowledge Deficit - Standard  Goal: Patient and family/care givers will demonstrate understanding of plan of care, disease process/condition, diagnostic tests and medications  Outcome: Progressing     Problem: Optimal Care for Alcohol Withdrawal  Goal: Optimal Care for the alcohol withdrawal patient  Outcome: Progressing     Problem: Lifestyle Changes  Goal: Patient's ability to identify lifestyle changes and available resources to help reduce recurrence of condition will improve  Outcome: Progressing     Problem: Psychosocial  Goal: Patient's level of anxiety will decrease  Outcome: Progressing  Note: Patient has continued anxiety but appears to be improving.        Patient is not progressing towards the following goals:

## 2023-10-30 NOTE — PROGRESS NOTES
Assumed care of Patient from [facility] RN. Patient is sleeping in bed with ear plugs and an eye mask, all needs are met. Call light is within reach.

## 2023-10-31 VITALS
HEART RATE: 90 BPM | WEIGHT: 187.17 LBS | TEMPERATURE: 97.4 F | DIASTOLIC BLOOD PRESSURE: 86 MMHG | OXYGEN SATURATION: 98 % | SYSTOLIC BLOOD PRESSURE: 125 MMHG | RESPIRATION RATE: 16 BRPM | BODY MASS INDEX: 28.46 KG/M2

## 2023-10-31 PROCEDURE — A9270 NON-COVERED ITEM OR SERVICE: HCPCS | Performed by: STUDENT IN AN ORGANIZED HEALTH CARE EDUCATION/TRAINING PROGRAM

## 2023-10-31 PROCEDURE — 99239 HOSP IP/OBS DSCHRG MGMT >30: CPT | Performed by: STUDENT IN AN ORGANIZED HEALTH CARE EDUCATION/TRAINING PROGRAM

## 2023-10-31 PROCEDURE — 700102 HCHG RX REV CODE 250 W/ 637 OVERRIDE(OP): Performed by: STUDENT IN AN ORGANIZED HEALTH CARE EDUCATION/TRAINING PROGRAM

## 2023-10-31 RX ORDER — FOLIC ACID 1 MG/1
1 TABLET ORAL DAILY
Qty: 30 TABLET | Refills: 0 | Status: SHIPPED | OUTPATIENT
Start: 2023-11-01 | End: 2023-12-01

## 2023-10-31 RX ORDER — LANOLIN ALCOHOL/MO/W.PET/CERES
100 CREAM (GRAM) TOPICAL DAILY
Qty: 30 TABLET | Refills: 0 | Status: SHIPPED | OUTPATIENT
Start: 2023-11-01 | End: 2023-12-01

## 2023-10-31 RX ADMIN — LORAZEPAM 3 MG: 1 TABLET ORAL at 08:01

## 2023-10-31 RX ADMIN — LORAZEPAM 3 MG: 1 TABLET ORAL at 02:33

## 2023-10-31 ASSESSMENT — LIFESTYLE VARIABLES
PAROXYSMAL SWEATS: NO SWEAT VISIBLE
AUDITORY DISTURBANCES: NOT PRESENT
TOTAL SCORE: VERY MILD ITCHING, PINS AND NEEDLES SENSATION, BURNING OR NUMBNESS
NAUSEA AND VOMITING: MILD NAUSEA WITH NO VOMITING
TOTAL SCORE: 16
TOTAL SCORE: MILD ITCHING, PINS AND NEEDLES SENSATION, BURNING OR NUMBNESS
TREMOR: *
NAUSEA AND VOMITING: NO NAUSEA AND NO VOMITING
ANXIETY: MODERATELY ANXIOUS OR GUARDED, SO ANXIETY IS INFERRED
TOTAL SCORE: 20
ORIENTATION AND CLOUDING OF SENSORIUM: ORIENTED AND CAN DO SERIAL ADDITIONS
VISUAL DISTURBANCES: VERY MILD SENSITIVITY
ANXIETY: NO ANXIETY (AT EASE)
ORIENTATION AND CLOUDING OF SENSORIUM: ORIENTED AND CAN DO SERIAL ADDITIONS
TOTAL SCORE: MILD ITCHING, PINS AND NEEDLES SENSATION, BURNING OR NUMBNESS
ANXIETY: MODERATELY ANXIOUS OR GUARDED, SO ANXIETY IS INFERRED
TREMOR: NO TREMOR
AGITATION: MODERATELY FIDGETY AND RESTLESS
NAUSEA AND VOMITING: NO NAUSEA AND NO VOMITING
HEADACHE, FULLNESS IN HEAD: VERY MILD
PAROXYSMAL SWEATS: *
TOTAL SCORE: 3
VISUAL DISTURBANCES: NOT PRESENT
HEADACHE, FULLNESS IN HEAD: MODERATELY SEVERE
NAUSEA AND VOMITING: NO NAUSEA AND NO VOMITING
AGITATION: *
TREMOR: NO TREMOR
HEADACHE, FULLNESS IN HEAD: VERY MILD
AUDITORY DISTURBANCES: NOT PRESENT
TREMOR: TREMOR NOT VISIBLE BUT CAN BE FELT, FINGERTIP TO FINGERTIP
ORIENTATION AND CLOUDING OF SENSORIUM: ORIENTED AND CAN DO SERIAL ADDITIONS
PAROXYSMAL SWEATS: BARELY PERCEPTIBLE SWEATING. PALMS MOIST
ANXIETY: *
AGITATION: SOMEWHAT MORE THAN NORMAL ACTIVITY
AGITATION: NORMAL ACTIVITY
AUDITORY DISTURBANCES: MODERATE HARSHNESS OR ABILITY TO FRIGHTEN
VISUAL DISTURBANCES: MILD SENSITIVITY
PAROXYSMAL SWEATS: NO SWEAT VISIBLE

## 2023-10-31 NOTE — DISCHARGE SUMMARY
Discharge Summary    CHIEF COMPLAINT ON ADMISSION  Chief Complaint   Patient presents with    Alcohol Intoxication     GCS 15. Pt BIB EMS. Admits to ETOH. Denies trauma, no trauma noted.        Reason for Admission  ETOH     Admission Date  10/27/2023    CODE STATUS  Full Code    HPI & HOSPITAL COURSE  Gopal Ceja is a 38 y.o. male who viwyriaf48/27/2023 with alcohol intoxication and withdrawal.  Patient has a history of alcohol abuse, methamphetamine abuse, depression, anxiety and previous episodes of alcohol withdrawal requiring hospitalization, as well as frequent emergency department visits.  Patient has been monitored and treated per [facility] protocol.  He is able to sometimes have been improved.  He was anxious to go home.  I prescribed him multivitamin, folic acid and thiamine.  Alcohol cessation education was given at bedside.  He denies SI HI . Stable mood during the hospitalization.        Therefore, he is discharged in good and stable condition to home with close outpatient follow-up.    The patient met 2-midnight criteria for an inpatient stay at the time of discharge.    Discharge Date  10/31/2023    FOLLOW UP ITEMS POST DISCHARGE  - Follow up with primary care physician in 1 week.   - Please take the medications as instructed    - Go to the local Emergency Department if you have any worsening condition.       DISCHARGE DIAGNOSES  Principal Problem:    Alcohol abuse with withdrawal (HCC) (POA: Yes)  Active Problems:    Anxiety disorder (POA: Yes)    Homelessness (POA: Yes)    Hypertension (POA: Unknown)  Resolved Problems:    * No resolved hospital problems. *      FOLLOW UP  No future appointments.  Your primary care doctor          [facility], Emergency Dept  [address] [id number] [phone]          MEDICATIONS ON DISCHARGE     Medication List        START taking these medications        Instructions   folic acid 1 MG Tabs  Start taking on: November 1,  2023  Commonly known as: Folvite   Take 1 Tablet by mouth every day for 30 days.  Dose: 1 mg     multivitamin Tabs  Start taking on: November 1, 2023   Take 1 Tablet by mouth every day for 30 days.  Dose: 1 Tablet     thiamine 100 MG tablet  Start taking on: November 1, 2023  Commonly known as: Thiamine   Take 1 Tablet by mouth every day for 30 days.  Dose: 100 mg            CONTINUE taking these medications        Instructions   amphetamine-dextroamphetamine 15 MG tablet  Commonly known as: Adderall   Take 15 mg by mouth 2 times a day.  Dose: 15 mg     chlorpheniramine 4 MG Tabs  Commonly known as: Chlor-Trimetron   Take 4 mg by mouth in the morning, at noon, and at bedtime.  Dose: 4 mg     clonazePAM 1 MG Tabs  Commonly known as: KlonoPIN   Take 1 mg by mouth 2 times a day as needed. Indications: Feeling Anxious  Dose: 1 mg     DULoxetine 20 MG Cpep  Commonly known as: Cymbalta   Take 20 mg by mouth every day.  Dose: 20 mg     propranolol 20 MG Tabs  Commonly known as: Inderal   Take 20 mg by mouth 2 times a day.  Dose: 20 mg              Allergies  No Known Allergies    DIET  Orders Placed This Encounter   Procedures    Diet Order Diet: Regular     Standing Status:   Standing     Number of Occurrences:   1     Order Specific Question:   Diet:     Answer:   Regular [1]       ACTIVITY  As tolerated.  Weight bearing as tolerated    CONSULTATIONS      PROCEDURES      LABORATORY  Lab Results   Component Value Date    SODIUM 137 10/30/2023    POTASSIUM 3.7 10/30/2023    CHLORIDE 102 10/30/2023    CO2 25 10/30/2023    GLUCOSE 105 (H) 10/30/2023    BUN 10 10/30/2023    CREATININE 0.63 10/30/2023    GLOMRATE 98 11/01/2022        Lab Results   Component Value Date    WBC 7.2 10/30/2023    HEMOGLOBIN 15.5 10/30/2023    HEMATOCRIT 46.9 10/30/2023    PLATELETCT 229 10/30/2023        Total time of the discharge process exceeds 32 minutes.

## 2023-10-31 NOTE — CARE PLAN
The patient is Stable - Low risk of patient condition declining or worsening    Shift Goals  Clinical Goals: withdraw safely  Patient Goals: manage withdrawal    Progress made toward(s) clinical / shift goals:    Problem: Optimal Care for Alcohol Withdrawal  Goal: Optimal Care for the alcohol withdrawal patient  Outcome: Progressing     Patient aaox4 - when I checked on patient in the beginning of the shift he was comfortably resting, an hour later he scored a CIWA of 38 requiring 4 mg oral ativan. Patient has been educated on the CIWA scale. Patient checked on post administration of ativan and he was sleeping comfortably. Encouraged to use call light for assistance

## 2023-10-31 NOTE — CARE PLAN
The patient is Watcher - Medium risk of patient condition declining or worsening    Shift Goals  Clinical Goals: ETOH detox  Patient Goals: less symptoms    Progress made toward(s) clinical / shift goals:    Problem: Knowledge Deficit - Standard  Goal: Patient and family/care givers will demonstrate understanding of plan of care, disease process/condition, diagnostic tests and medications  Outcome: Progressing     Problem: Optimal Care for Alcohol Withdrawal  Goal: Optimal Care for the alcohol withdrawal patient  Outcome: Progressing     Problem: Psychosocial  Goal: Patient's level of anxiety will decrease  Outcome: Progressing       Patient is not progressing towards the following goals:

## 2023-11-04 ENCOUNTER — HOSPITAL ENCOUNTER (EMERGENCY)
Facility: MEDICAL CENTER | Age: 38
End: 2023-11-04
Attending: STUDENT IN AN ORGANIZED HEALTH CARE EDUCATION/TRAINING PROGRAM
Payer: MEDICARE

## 2023-11-04 VITALS
WEIGHT: 187.17 LBS | DIASTOLIC BLOOD PRESSURE: 70 MMHG | RESPIRATION RATE: 18 BRPM | HEIGHT: 70 IN | HEART RATE: 78 BPM | TEMPERATURE: 98.2 F | OXYGEN SATURATION: 98 % | SYSTOLIC BLOOD PRESSURE: 138 MMHG | BODY MASS INDEX: 26.8 KG/M2

## 2023-11-04 DIAGNOSIS — F10.920 ALCOHOLIC INTOXICATION WITHOUT COMPLICATION (HCC): ICD-10-CM

## 2023-11-04 PROCEDURE — 99284 EMERGENCY DEPT VISIT MOD MDM: CPT

## 2023-11-04 ASSESSMENT — FIBROSIS 4 INDEX: FIB4 SCORE: 1.05

## 2023-11-04 NOTE — ED NOTES
requested to arrange a cab voucher. Patient refused to wait and walked out leaving the speaker and bag behind. Handed over to security.

## 2023-11-04 NOTE — ED PROVIDER NOTES
ED Provider Note    CHIEF COMPLAINT  Chief Complaint   Patient presents with    Alcohol Intoxication       EXTERNAL RECORDS REVIEWED  Outpatient Notes patient was seen in the emergency department 10/27/2023 with alcohol intoxication.  He was allowed to metabolize in the emergency department and ultimately was discharged.    HPI/ROS  LIMITATION TO HISTORY   Select: Intoxication      Gopal Ceja is a 38 y.o. male who presents to the emergency department for evaluation of alcohol tox occasion.  Patient was transported to the hospital by EMS after he called [facility] stating that he drank too much today.  Currently he reports that his entire body hurts and cannot remember when this started.  Blood sugar obtained by EMS was 138.  No report of traumatic injury to them.  Patient is otherwise heavily intoxicated and not providing further history.    PAST MEDICAL HISTORY   has a past medical history of Acute anxiety, ADHD (attention deficit hyperactivity disorder), ADHD (attention deficit hyperactivity disorder), Alcohol abuse, Bipolar affective (HCC), Depression, Drug abuse and dependence (HCC), Ectrodactyly-ectodermal dysplasia-clefting syndrome, ETOH abuse, and Psychiatric disorder.    SURGICAL HISTORY   has a past surgical history that includes other orthopedic surgery and exploratory of abdomen (N/A, 9/26/2019).    FAMILY HISTORY  Family History   Problem Relation Age of Onset    Hypertension Mother     Heart Disease Neg Hx     Hyperlipidemia Neg Hx        SOCIAL HISTORY  Social History     Tobacco Use    Smoking status: Never    Smokeless tobacco: Never    Tobacco comments:     Smokes couple of times a month   Vaping Use    Vaping Use: Never used   Substance and Sexual Activity    Alcohol use: Yes     Comment: last drink today    Drug use: Yes     Types: Inhaled     Comment: meth yesterday (smokes Meth)/THC    Sexual activity: Not on file       CURRENT MEDICATIONS  Home Medications    **Home  "medications have not yet been reviewed for this encounter**         ALLERGIES  No Known Allergies    PHYSICAL EXAM  VITAL SIGNS: BP (!) 159/86   Pulse (!) 103   Temp 36.8 °C (98.2 °F) (Oral)   Resp 18   Ht 1.768 m (5' 9.6\")   Wt 84.9 kg (187 lb 2.7 oz)   SpO2 98%   BMI 27.17 kg/m²    Constitutional: Sluggish, slurring words, clinically intoxicated  HENT: Normocephalic, Atraumatic  Eyes: Pupils are equal and reactive.  Heart: Regular rate and rythm,   Lungs: No respiratory distress  GI: Soft nontender nondistended, midline abdominal surgical incision  Musculoskeletal: Chronic developmental deformities to bilateral hands  Skin: Warm, Dry, No erythema, No rash.   Neurologic: Alert and oriented x 1, clinically intoxicated, moving all extremities  Psychiatric: Appropriate affect for situation      COURSE & MEDICAL DECISION MAKING    ED Observation Status? Yes; I am placing the patient in to an observation status due to a diagnostic uncertainty as well as therapeutic intensity. Patient placed in observation status at 2:22 AM, 11/4/2023.     Observation plan is as follows: Metabolism of intoxicant with mental status reevaluation    Upon Reevaluation, the patient's condition has: Improved; and will be discharged.    Patient discharged from ED Observation status at 3:46 AM (Time) 11/4/2023 (Date).     INITIAL ASSESSMENT, COURSE AND PLAN  Care Narrative:     Patient with a well-known history of alcohol abuse disorder presents heavily intoxicated via EMS.  Is quite intoxicated at my initial evaluation.  No evidence of traumatic injury on exam.  Not able to provide significant history other than that he drank heavily prior to arrival.  Blood sugar with EMS was normal.  We will plan to allow the patient to metabolize and monitored for any development of alcohol withdrawal but none currently present.    On recheck patient is much more clinically sober.  He is ambulatory at baseline, has tolerated p.o. with little " difficulty and is requesting discharge.  He is clear and linear thinking with no clinical evidence of ongoing intoxication.  I feel he is stable for discharge.  I encouraged him to follow-up with her primary care doctor and provided a phone number for such.  Return precautions discussed and all questions answered and he was discharged in stable condition.      ADDITIONAL PROBLEM LIST  Alcohol abuse disorder    DISPOSITION AND DISCUSSIONS  I have discussed management of the patient with the following physicians and BRUNO's: None    Discussion of management with other HP or appropriate source(s): None     Escalation of care considered, and ultimately not performed:blood analysis and diagnostic imaging    Barriers to care at this time, including but not limited to: Patient does not have established PCP, Patient is homeless, and Patient had difficult affording medications.       FINAL IMPRESSION  1. Alcoholic intoxication without complication (HCC)        PRESCRIPTIONS  New Prescriptions    No medications on file       FOLLOW UP  [facility], Emergency Dept  [address] [id number] [phone]    As needed, If symptoms worsen    To establish a primary care provider within our system, please call [phone]    Schedule an appointment as soon as possible for a visit           -DISCHARGE-    Electronically signed by: Zaire Herrera M.D., 11/4/2023 2:15 AM

## 2023-11-04 NOTE — ED TRIAGE NOTES
"Chief Complaint   Patient presents with    Alcohol Intoxication     BIB EMS to [facility] picked up from [facility], as per ems pt asked security to call 911 as he was not feeling well,  pt endorses \"I drink too much today\".     Pt AAO X 4, on room air.  FSBG by ems 138mg/dl.     Medications given en route: none     BP (!) 159/86   Pulse (!) 103   Temp 36.8 °C (98.2 °F) (Oral)   Resp 18   Ht 1.768 m (5' 9.6\")   Wt 84.9 kg (187 lb 2.7 oz)   SpO2 98%   BMI 27.17 kg/m²     "

## 2023-11-04 NOTE — ED NOTES
Pt discharged . GCS 15. Pt in possession of belongings. Pt provided discharge education and information pertaining to medications and follow up appointments. Pt received copy of discharge instructions and verbalized understanding.     Vitals:    11/04/23 0315   BP: 138/70   Pulse: 78   Resp: 18   Temp: 36.8 °C (98.2 °F)   SpO2: 98%

## 2023-11-04 NOTE — DISCHARGE INSTRUCTIONS
Please return the emergency department if you develop chest pain, trouble breathing, dizziness, headedness, if you pass out or otherwise feeling worse.

## 2023-11-05 ENCOUNTER — HOSPITAL ENCOUNTER (EMERGENCY)
Facility: MEDICAL CENTER | Age: 38
End: 2023-11-05
Attending: EMERGENCY MEDICINE
Payer: MEDICARE

## 2023-11-05 VITALS
HEART RATE: 119 BPM | BODY MASS INDEX: 26.52 KG/M2 | DIASTOLIC BLOOD PRESSURE: 82 MMHG | OXYGEN SATURATION: 96 % | WEIGHT: 175 LBS | HEIGHT: 68 IN | SYSTOLIC BLOOD PRESSURE: 137 MMHG | RESPIRATION RATE: 54 BRPM | TEMPERATURE: 98.8 F

## 2023-11-05 DIAGNOSIS — F10.10 ALCOHOL ABUSE: ICD-10-CM

## 2023-11-05 DIAGNOSIS — F10.920 ALCOHOLIC INTOXICATION WITHOUT COMPLICATION (HCC): ICD-10-CM

## 2023-11-05 LAB
ALBUMIN SERPL BCP-MCNC: 4.4 G/DL (ref 3.2–4.9)
ALBUMIN/GLOB SERPL: 1.3 G/DL
ALP SERPL-CCNC: 123 U/L (ref 30–99)
ALT SERPL-CCNC: 57 U/L (ref 2–50)
ANION GAP SERPL CALC-SCNC: 19 MMOL/L (ref 7–16)
APTT PPP: 33.8 SEC (ref 24.7–36)
AST SERPL-CCNC: 70 U/L (ref 12–45)
BASOPHILS # BLD AUTO: 0.3 % (ref 0–1.8)
BASOPHILS # BLD: 0.03 K/UL (ref 0–0.12)
BILIRUB SERPL-MCNC: 0.5 MG/DL (ref 0.1–1.5)
BUN SERPL-MCNC: 9 MG/DL (ref 8–22)
CALCIUM ALBUM COR SERPL-MCNC: 8 MG/DL (ref 8.5–10.5)
CALCIUM SERPL-MCNC: 8.3 MG/DL (ref 8.5–10.5)
CHLORIDE SERPL-SCNC: 99 MMOL/L (ref 96–112)
CO2 SERPL-SCNC: 23 MMOL/L (ref 20–33)
CREAT SERPL-MCNC: 0.82 MG/DL (ref 0.5–1.4)
EOSINOPHIL # BLD AUTO: 0 K/UL (ref 0–0.51)
EOSINOPHIL NFR BLD: 0 % (ref 0–6.9)
ERYTHROCYTE [DISTWIDTH] IN BLOOD BY AUTOMATED COUNT: 42.7 FL (ref 35.9–50)
ETHANOL BLD-MCNC: 357.4 MG/DL
GFR SERPLBLD CREATININE-BSD FMLA CKD-EPI: 116 ML/MIN/1.73 M 2
GLOBULIN SER CALC-MCNC: 3.5 G/DL (ref 1.9–3.5)
GLUCOSE SERPL-MCNC: 110 MG/DL (ref 65–99)
HCT VFR BLD AUTO: 44.9 % (ref 42–52)
HGB BLD-MCNC: 15.1 G/DL (ref 14–18)
IMM GRANULOCYTES # BLD AUTO: 0.04 K/UL (ref 0–0.11)
IMM GRANULOCYTES NFR BLD AUTO: 0.4 % (ref 0–0.9)
INR PPP: 1.05 (ref 0.87–1.13)
LYMPHOCYTES # BLD AUTO: 2.32 K/UL (ref 1–4.8)
LYMPHOCYTES NFR BLD: 22.5 % (ref 22–41)
MCH RBC QN AUTO: 29.9 PG (ref 27–33)
MCHC RBC AUTO-ENTMCNC: 33.6 G/DL (ref 32.3–36.5)
MCV RBC AUTO: 88.9 FL (ref 81.4–97.8)
MONOCYTES # BLD AUTO: 0.98 K/UL (ref 0–0.85)
MONOCYTES NFR BLD AUTO: 9.5 % (ref 0–13.4)
NEUTROPHILS # BLD AUTO: 6.94 K/UL (ref 1.82–7.42)
NEUTROPHILS NFR BLD: 67.3 % (ref 44–72)
NRBC # BLD AUTO: 0 K/UL
NRBC BLD-RTO: 0 /100 WBC (ref 0–0.2)
PLATELET # BLD AUTO: 258 K/UL (ref 164–446)
PMV BLD AUTO: 10.3 FL (ref 9–12.9)
POTASSIUM SERPL-SCNC: 3.7 MMOL/L (ref 3.6–5.5)
PROT SERPL-MCNC: 7.9 G/DL (ref 6–8.2)
PROTHROMBIN TIME: 13.8 SEC (ref 12–14.6)
RBC # BLD AUTO: 5.05 M/UL (ref 4.7–6.1)
SODIUM SERPL-SCNC: 141 MMOL/L (ref 135–145)
WBC # BLD AUTO: 10.3 K/UL (ref 4.8–10.8)

## 2023-11-05 PROCEDURE — 700111 HCHG RX REV CODE 636 W/ 250 OVERRIDE (IP): Performed by: EMERGENCY MEDICINE

## 2023-11-05 PROCEDURE — 85730 THROMBOPLASTIN TIME PARTIAL: CPT

## 2023-11-05 PROCEDURE — 96366 THER/PROPH/DIAG IV INF ADDON: CPT

## 2023-11-05 PROCEDURE — 700101 HCHG RX REV CODE 250: Performed by: EMERGENCY MEDICINE

## 2023-11-05 PROCEDURE — 85610 PROTHROMBIN TIME: CPT

## 2023-11-05 PROCEDURE — 96365 THER/PROPH/DIAG IV INF INIT: CPT

## 2023-11-05 PROCEDURE — 82077 ASSAY SPEC XCP UR&BREATH IA: CPT

## 2023-11-05 PROCEDURE — 99285 EMERGENCY DEPT VISIT HI MDM: CPT

## 2023-11-05 PROCEDURE — 700105 HCHG RX REV CODE 258: Performed by: EMERGENCY MEDICINE

## 2023-11-05 PROCEDURE — 36415 COLL VENOUS BLD VENIPUNCTURE: CPT

## 2023-11-05 PROCEDURE — 80053 COMPREHEN METABOLIC PANEL: CPT

## 2023-11-05 PROCEDURE — 85025 COMPLETE CBC W/AUTO DIFF WBC: CPT

## 2023-11-05 RX ADMIN — FOLIC ACID: 5 INJECTION, SOLUTION INTRAMUSCULAR; INTRAVENOUS; SUBCUTANEOUS at 17:30

## 2023-11-05 ASSESSMENT — FIBROSIS 4 INDEX: FIB4 SCORE: 1.05

## 2023-11-06 NOTE — ED PROVIDER NOTES
ED Provider Note    CHIEF COMPLAINT  Chief Complaint   Patient presents with    Detox     Pt biba [facility] from [facility], pt states he needs help with detox, pt states he drinks 8 earthquakes per day, last drink this afternoon, pt also states he smoked one hit of fentanyl.  Pt hallucinating, states his whole body hurts, pt states he is seeing snowflakes coming down.  Pt states he does not take his psych meds that he is supposed to be on.         EXTERNAL RECORDS REVIEWED  Inpatient Notes previous hospitalization and Outpatient Notes previous ER notes    HPI/ROS  LIMITATION TO HISTORY   Select: : None and Intoxication    Gopal Ceja is a 38 y.o. male who presents for evaluation of altered mental status.  The patient was brought in by EMS.  The patient has a history of alcohol abuse and polysubstance abuse.  He also has history of anxiety, ADD bipolar disorder.  The patient admits to imbibing large amount of alcohol.  Upon my initial evaluation the patient had no complaints.  He was a poor historian but at that time denied: Fever, URI symptoms, cardiorespiratory symptoms, gastrointestinal symptoms.  No other complaints.    PAST MEDICAL HISTORY   has a past medical history of Acute anxiety, ADHD (attention deficit hyperactivity disorder), ADHD (attention deficit hyperactivity disorder), Alcohol abuse, Bipolar affective (HCC), Depression, Drug abuse and dependence (HCC), Ectrodactyly-ectodermal dysplasia-clefting syndrome, ETOH abuse, and Psychiatric disorder.    SURGICAL HISTORY   has a past surgical history that includes other orthopedic surgery and exploratory of abdomen (N/A, 9/26/2019).    FAMILY HISTORY  Family History   Problem Relation Age of Onset    Hypertension Mother     Heart Disease Neg Hx     Hyperlipidemia Neg Hx        SOCIAL HISTORY  Social History     Tobacco Use    Smoking status: Never    Smokeless tobacco: Never    Tobacco comments:     Smokes couple of times a month   Vaping Use    Vaping  "Use: Never used   Substance and Sexual Activity    Alcohol use: Yes     Comment: last drink today    Drug use: Yes     Types: Inhaled     Comment: meth yesterday (smokes Meth)/THC    Sexual activity: Not on file       CURRENT MEDICATIONS  Home Medications       Reviewed by Ricky Polanco R.N. (Registered Nurse) on 11/05/23 at 1637  Med List Status: Not Addressed     Medication Last Dose Status   amphetamine-dextroamphetamine (ADDERALL) 15 MG tablet  Active   chlorpheniramine (CHLOR-TRIMETRON) 4 MG Tab  Active   clonazePAM (KLONOPIN) 1 MG Tab  Active   DULoxetine (CYMBALTA) 20 MG Cap DR Particles  Active   folic acid (FOLVITE) 1 MG Tab  Active   multivitamin Tab  Active   propranolol (INDERAL) 20 MG Tab  Active   thiamine (THIAMINE) 100 MG tablet  Active                    ALLERGIES  No Known Allergies    PHYSICAL EXAM  VITAL SIGNS: /87   Pulse 100   Temp 37.1 °C (98.8 °F) (Temporal)   Resp 20   Ht 1.727 m (5' 8\")   Wt 79.4 kg (175 lb)   SpO2 97%   BMI 26.61 kg/m²      Constitutional: Unkempt, disheveled, 38-year-old male, groggy, slurred speech, arousable and will follow commands and answer questions, is actually oriented x3  HENT: Normocephalic, Atraumatic, Nares:Clear, Oropharynx: Mildly dry, posterior pharynx:clear   Eyes: PERRL, EOMI, Conjunctiva normal, No discharge.   Neck: Normal range of motion, No tenderness, Supple, No stridor.   Lymphatic: No lymphadenopathy noted.   Cardiovascular: Regular rate and rhythm without mumurs, gallups, rubs   Thorax & Lungs: Normal Equal breath sounds, No respiratory distress, No wheezing, no stridor, no rales. No chest tenderness.   Abdomen: Soft, nontender, nondistended, no organomegaly, positive bowel sounds normal in quality. No guarding or rebound.  Skin: Decreased skin turgor, pink, warm, dry. No rashes, petechiae, purpura. Normal capillary refill.   Back: No tenderness, No CVA tenderness.   Extremities: Intact distal pulses, No edema, No tenderness, No " cyanosis,  Vascular: Pulses are 2+, symmetric in the upper and lower extremities.  Musculoskeletal: Good range of motion in all major joints. No tenderness to palpation or major deformities noted.   Neurologic: Groggy, slow to answer questions, will follow commands, no unilateral focal neurological deficits  Psychiatric: Affect normal, Judgment normal, Mood not indicating any suicidal or homicidal ideation      DIAGNOSTIC STUDIES / PROCEDURES    LABS  CBC and differential within normal limits; CMP shows a glucose of 110, anion gap 19, calcium 8.3, AST 70, ALT 57, alkaline phosphatase 123, otherwise within normal limits; diagnostic alcohol 357: Coags normal    COURSE & MEDICAL DECISION MAKING    ED Observation Status? Yes; I am placing the patient in to an observation status due to a diagnostic uncertainty as well as therapeutic intensity. Patient placed in observation status at 4:40 PM, 11/5/2023.     Observation plan is as follows: This time, the patient presents here for evaluation of altered mental status.  Patient was placed on monitor and will undergo treatment IV fluid therapy and laboratory evaluation.  The patient will be observed and reexamined and appropriate disposition will be made.    Upon Reevaluation, the patient's condition has: Improved; and will be discharged.    Patient discharged from ED Observation status at 7:50 PM (Time) 11/5/2023 (Date).     INITIAL ASSESSMENT, COURSE AND PLAN  Care Narrative: At this time, the patient presents for evaluation of altered mental status.  The patient underwent observation and reexaminations work-up and treatment.  The patient's mental status improved and I reassessed several times and again at 1735.  At that time the patient was sitting up he was conversant with normal speech.  Repeat neurologic exam revealed no focal neurological deficits with an NIH of 1 and a GCS of 15 the patient indicated he felt like he was going through withdrawal.  This seems highly  unlikely with such an elevated blood alcohol in his system.  It could be expected for him to withdraw at lower levels.  I reviewed previous ER visits and hospitalizations.  At this time, the patient demanded to leave.  The patient was ambulated and he was able to walk in a straight line without any assistance.  As given, his neurological exam was normal.  At this time, I have no reason to hold the patient under legal 2000.  I have encouraged the patient to follow-up with primary care facilities.  He is to consider going to [facility] and  where he has been referral several times in the past.  Patient was discharged in stable condition.    HYDRATION: Based on the patient's presentation of Dehydration the patient was given IV fluids. IV Hydration was used because oral hydration was not adequate alone. Upon recheck following hydration, the patient was improved.      ADDITIONAL PROBLEM LIST  1.  Chronic alcohol abuse  2.  History of psychiatric disorders    DISPOSITION AND DISCUSSIONS  I have discussed management of the patient with the following physicians and BRUNO's:  none    Discussion of management with other HP or appropriate source(s): None     Escalation of care considered, and ultimately not performed:acute inpatient care management, however at this time, the patient is most appropriate for outpatient management    Barriers to care at this time, including but not limited to: Patient does not have established PCP and Patient is homeless.     Decision tools and prescription drugs considered including, but not limited to:  Analgesics considered but not indicated .    PLAN:  1.  Appropriate discharge instructions given  2.   Follow-up with primary care    FINAL DIAGNOSIS  1. Alcoholic intoxication without complication (HCC)    2. Alcohol abuse             Electronically signed by: Guy G Gansert, M.D., 11/5/2023 5:01 PM

## 2023-11-06 NOTE — ED NOTES
.Patient discharged in stable condition per orders. IV access removed - bandage applied. Wristband removed per protocol. Patient verbalized understanding of all discharge instructions. All belongings accounted for. Patient left before discharge papers could be signed

## 2023-11-06 NOTE — ED NOTES
.Bedside report received from off going RN/tech: JORDY García, assumed care of patient.  POC discussed with patient. Call light within reach, all needs addressed at this time.       Fall risk interventions in place: Move the patient closer to the nurse's station, Patient's personal possessions are with in their safe reach, Place socks on patient, Place fall risk sign on patient's door, Give patient urinal if applicable, Keep floor surfaces clean and dry, and Accompanied to restroom (all applicable per [place] Fall risk assessment)   Continuous monitoring: Not Applicable   IVF/IV medications: Not Applicable   Oxygen: Room Air  Bedside sitter: Not Applicable   Isolation: Not Applicable

## 2023-11-06 NOTE — ED TRIAGE NOTES
"Chief Complaint   Patient presents with    Detox     Pt biba [facility] from [facility], pt states he needs help with detox, pt states he drinks 8 earthquakes per day, last drink this afternoon, pt also states he smoked one hit of fentanyl.  Pt hallucinating, states his whole body hurts, pt states he is seeing snowflakes coming down.  Pt states he does not take his psych meds that he is supposed to be on.       BP (!) 163/100   Pulse (!) 116   Temp 37.1 °C (98.8 °F) (Temporal)   Resp 16   Ht 1.727 m (5' 8\")   Wt 79.4 kg (175 lb)   SpO2 93%   BMI 26.61 kg/m²     "

## 2023-11-06 NOTE — ED NOTES
Patient desated while sleeping to 82% on RA. 2L NC O2 placed on patient. O2 sat increased to 98%.

## 2023-11-20 ENCOUNTER — HOSPITAL ENCOUNTER (EMERGENCY)
Facility: MEDICAL CENTER | Age: 38
End: 2023-11-20
Attending: STUDENT IN AN ORGANIZED HEALTH CARE EDUCATION/TRAINING PROGRAM
Payer: MEDICARE

## 2023-11-20 VITALS
DIASTOLIC BLOOD PRESSURE: 65 MMHG | OXYGEN SATURATION: 97 % | WEIGHT: 180 LBS | BODY MASS INDEX: 27.28 KG/M2 | SYSTOLIC BLOOD PRESSURE: 109 MMHG | HEIGHT: 68 IN | HEART RATE: 100 BPM | TEMPERATURE: 98.2 F | RESPIRATION RATE: 20 BRPM

## 2023-11-20 DIAGNOSIS — F10.920 ALCOHOLIC INTOXICATION WITHOUT COMPLICATION (HCC): ICD-10-CM

## 2023-11-20 LAB
AMPHET UR QL SCN: NEGATIVE
BARBITURATES UR QL SCN: NEGATIVE
BENZODIAZ UR QL SCN: NEGATIVE
BZE UR QL SCN: NEGATIVE
CANNABINOIDS UR QL SCN: NEGATIVE
FENTANYL UR QL: NEGATIVE
METHADONE UR QL SCN: NEGATIVE
OPIATES UR QL SCN: NEGATIVE
OXYCODONE UR QL SCN: NEGATIVE
PCP UR QL SCN: NEGATIVE
POC BREATHALIZER: 0.21 PERCENT (ref 0–0.01)
PROPOXYPH UR QL SCN: NEGATIVE

## 2023-11-20 PROCEDURE — 302970 POC BREATHALIZER: Performed by: STUDENT IN AN ORGANIZED HEALTH CARE EDUCATION/TRAINING PROGRAM

## 2023-11-20 PROCEDURE — 99285 EMERGENCY DEPT VISIT HI MDM: CPT

## 2023-11-20 PROCEDURE — 80307 DRUG TEST PRSMV CHEM ANLYZR: CPT

## 2023-11-20 ASSESSMENT — FIBROSIS 4 INDEX: FIB4 SCORE: 1.68

## 2023-11-21 NOTE — ED NOTES
Rounded on patient, vitals stable, attempted to re evaluate patient, patient either uncooperative or remains asleep, no distress, visible chest rise and fall.
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
## 2023-11-21 NOTE — ED TRIAGE NOTES
"Chief Complaint   Patient presents with    Alcohol Intoxication     \"I drank too much\"    Suicidal Ideation     Report plan to \"drink myself to sleep\" or \"jump off a bridge\"       [facility], pt on monitor and in gown, labs drawn and sent.   Pt Reports he drank too much and endorses SI.   Pt arrives [facility], ambulatory with assistance.  All belongings removed from pt. Pt in paper gown and all potentially harmful equipment removed from room.   Charge notified of need for sitter    Medications given en route:none    Vitals:    11/20/23 1643   BP: 132/71   Pulse: 82   Resp: 18   Temp: 36.7 °C (98 °F)   SpO2: 98%       "

## 2023-11-21 NOTE — ED PROVIDER NOTES
"CHIEF COMPLAINT  Chief Complaint   Patient presents with    Alcohol Intoxication     \"I drank too much\"    Suicidal Ideation     Report plan to \"drink myself to sleep\" or \"jump off a bridge\"       LIMITATION TO HISTORY   Select: Alcohol tox occasion    HPI    Gopal Ceja is a 38 y.o. male who presents to the Emergency Department for evaluation of suicidal ideations.  Patient states he drank \"a lot\" of alcohol and began feeling depressed with a thought to jump off a bridge.  Patient is grossly intoxicated denies any other complaints.        OUTSIDE HISTORIAN(S):  Select: EMS reports patient was complaining of suicidal ideations and alcohol tox occasion    EXTERNAL RECORDS REVIEWED  Select: Other blood glucose within normal limits      PAST MEDICAL HISTORY  Past Medical History:   Diagnosis Date    Acute anxiety     ADHD (attention deficit hyperactivity disorder)     ADHD (attention deficit hyperactivity disorder)     Alcohol abuse     Bipolar affective (HCC)     Depression     Drug abuse and dependence (HCC)     Ectrodactyly-ectodermal dysplasia-clefting syndrome     ETOH abuse     Psychiatric disorder     anxiety/panic disorder     .    SURGICAL HISTORY  Past Surgical History:   Procedure Laterality Date    [place] EXPLORATORY OF ABDOMEN N/A 9/26/2019    Procedure: LAPAROTOMY, EXPLORATORY;  Surgeon: Jevon Llanes M.D.;  Location: SURGERY [facility];  Service: General    OTHER ORTHOPEDIC SURGERY      hands bilaterally r/t EEDC syndrome         FAMILY HISTORY  Family History   Problem Relation Age of Onset    Hypertension Mother     Heart Disease Neg Hx     Hyperlipidemia Neg Hx           SOCIAL HISTORY  Social History     Socioeconomic History    Marital status: Single     Spouse name: Not on file    Number of children: Not on file    Years of education: Not on file    Highest education level: Not on file   Occupational History    Not on file   Tobacco Use    Smoking status: Never    Smokeless tobacco: Never " "   Tobacco comments:     Smokes couple of times a month   Vaping Use    Vaping Use: Never used   Substance and Sexual Activity    Alcohol use: Yes     Comment: last drink today    Drug use: Yes     Types: Inhaled     Comment: meth yesterday (smokes Meth)/THC    Sexual activity: Not on file   Other Topics Concern    Not on file   Social History Narrative    ** Merged History Encounter **         ** Merged History Encounter **          Social Determinants of Health     Financial Resource Strain: Not on file   Food Insecurity: Not on file   Transportation Needs: Not on file   Physical Activity: Not on file   Stress: Not on file   Social Connections: Not on file   Intimate Partner Violence: Not on file   Housing Stability: Not on file         CURRENT MEDICATIONS  No current facility-administered medications on file prior to encounter.     Current Outpatient Medications on File Prior to Encounter   Medication Sig Dispense Refill    thiamine (THIAMINE) 100 MG tablet Take 1 Tablet by mouth every day for 30 days. 30 Tablet 0    multivitamin Tab Take 1 Tablet by mouth every day for 30 days. 30 Tablet 0    folic acid (FOLVITE) 1 MG Tab Take 1 Tablet by mouth every day for 30 days. 30 Tablet 0    chlorpheniramine (CHLOR-TRIMETRON) 4 MG Tab Take 4 mg by mouth in the morning, at noon, and at bedtime.      amphetamine-dextroamphetamine (ADDERALL) 15 MG tablet Take 15 mg by mouth 2 times a day.      DULoxetine (CYMBALTA) 20 MG Cap DR Particles Take 20 mg by mouth every day.      clonazePAM (KLONOPIN) 1 MG Tab Take 1 mg by mouth 2 times a day as needed. Indications: Feeling Anxious      propranolol (INDERAL) 20 MG Tab Take 20 mg by mouth 2 times a day.             ALLERGIES  No Known Allergies    PHYSICAL EXAM  VITAL SIGNS:/65   Pulse 100   Temp 36.8 °C (98.2 °F) (Temporal)   Resp 20   Ht 1.727 m (5' 8\")   Wt 81.6 kg (180 lb)   SpO2 97%   BMI 27.37 kg/m²       VITALS - vital signs documented prior to this note have " been reviewed and noted,  GENERAL - somnolent arousable smells of consumed alcoholic beverages a disheveled poor dental hygiene  HEENT - normocephalic, atraumatic, pupils equal, sclera anicteric, mucus  membranes moist  NECK - supple, no meningismus, full active range of motion, trachea midline  CARDIOVASCULAR - regular rate/rhythm, no murmurs/gallops/rubs  PULMONARY - no respiratory distress, speaking in full sentences, clear to  auscultation bilaterally, no wheezing/ronchi/rales, no accessory muscle use  GASTROINTESTINAL - soft, non-tender, non-distended, no rebound, guarding,  or peritonitis  GENITOURINARY - Deferred  NEUROLOGIC - somnolent arousable alert to person place moving all extremities pupils are 2 and reactive  MUSCULOSKELETAL - no obvious asymmetry or deformities present  EXTREMITIES - warm, well-perfused, no cyanosis or significant edema  DERMATOLOGIC - warm, dry, no rashes, no jaundice  PSYCHIATRIC - normal affect, normal insight, normal concentration    DIAGNOSTIC STUDIES / PROCEDURES      LABS  Labs Reviewed   POC BREATHALIZER - Abnormal; Notable for the following components:       Result Value    POC Breathalizer 0.210 (*)     All other components within normal limits   URINE DRUG SCREEN           Radiologist interpretation:   No orders to display        COURSE & MEDICAL DECISION MAKING    ED COURSE:    ED Observation Status? Yes;I am placing the patient in to an observation status due to a diagnostic uncertainty as well as therapeutic intensity. Patient placed in observation status at 5:11 PM 11/20/2023    Observation plan is as follows: Observation for clinical sobriety, reevaluation of suicidal ideations when sober    INTERVENTIONS BY ME:  Medications - No data to display    Reevaluation at 2130 patient is now awake alert answering questions appropriately he has achieved clinical sobriety and adamantly denies any suicidal ideation or other complaints      Patient discharged from ED observation  at 9:29 PM 11/20/23  .        INITIAL ASSESSMENT, COURSE AND PLAN  Care Narrative: Patient presented for evaluation of suicidal ideations and alcohol intoxication.  Patient was intoxicated, and complaining of vague suicidal ideations, he was placed in ED observation and observed for 5 hours on reevaluation he had achieved clinical sobriety was awake alert answering questions appropriately.  Adamantly denied any suicidal or homicidal ideation stated that he was feeling much better, and then was requesting to be discharged.  Outpatient detox resources were discussed as return precautions patient was discharged in stable condition           ADDITIONAL PROBLEM LIST    DISPOSITION AND DISCUSSIONS    Escalation of care considered, and ultimately not performed:blood analysis and diagnostic imaging    Barriers to care at this time, including but not limited to: Patient does not have established PCP.     Decision tools and prescription drugs considered including, but not limited to:  CIWA prior to discharge is 0 .    FINAL DIAGNOSIS  1. Alcoholic intoxication without complication (HCC)    2 substance abuse  3.  Suicidal ideations resolved         Electronically signed by: Maco Maldonado DO ,9:29 PM 11/20/23

## 2023-11-21 NOTE — ED NOTES
Pt resting in gurney with even and unlabored chest rise and fall, awakes easily to stimuli. 1:1 sitter in place. Care will continue as ordered.    Report to

## 2023-11-21 NOTE — ED NOTES
Patient suddenly awake, ambulatory to hallway, states he is ready to leave, Marked ready for re-eval

## 2023-11-21 NOTE — ED NOTES
Report received from Malathi SPENCE, assumed care of patient, patient is currently asleep at this time, nad, visible chest rise and fall.

## 2023-11-21 NOTE — ED NOTES
Rounded on patient, remains asleep at this time, nad, visible chest rise and fall, 1:1 sitter at bedside

## 2023-12-04 ENCOUNTER — HOSPITAL ENCOUNTER (EMERGENCY)
Facility: MEDICAL CENTER | Age: 38
End: 2023-12-04
Attending: EMERGENCY MEDICINE
Payer: MEDICARE

## 2023-12-04 VITALS
WEIGHT: 170 LBS | DIASTOLIC BLOOD PRESSURE: 74 MMHG | HEART RATE: 108 BPM | RESPIRATION RATE: 14 BRPM | OXYGEN SATURATION: 95 % | TEMPERATURE: 98.6 F | SYSTOLIC BLOOD PRESSURE: 133 MMHG | BODY MASS INDEX: 25.85 KG/M2

## 2023-12-04 DIAGNOSIS — F10.930 ALCOHOL WITHDRAWAL SYNDROME WITHOUT COMPLICATION (HCC): ICD-10-CM

## 2023-12-04 DIAGNOSIS — F10.10 ALCOHOL ABUSE: ICD-10-CM

## 2023-12-04 LAB — POC BREATHALIZER: 0.15 PERCENT (ref 0–0.01)

## 2023-12-04 PROCEDURE — A9270 NON-COVERED ITEM OR SERVICE: HCPCS | Performed by: EMERGENCY MEDICINE

## 2023-12-04 PROCEDURE — 302970 POC BREATHALIZER: Performed by: EMERGENCY MEDICINE

## 2023-12-04 PROCEDURE — 99284 EMERGENCY DEPT VISIT MOD MDM: CPT

## 2023-12-04 PROCEDURE — 700102 HCHG RX REV CODE 250 W/ 637 OVERRIDE(OP): Performed by: EMERGENCY MEDICINE

## 2023-12-04 RX ORDER — CHLORDIAZEPOXIDE HYDROCHLORIDE 25 MG/1
50 CAPSULE, GELATIN COATED ORAL ONCE
Status: COMPLETED | OUTPATIENT
Start: 2023-12-04 | End: 2023-12-04

## 2023-12-04 RX ADMIN — CHLORDIAZEPOXIDE HYDROCHLORIDE 50 MG: 25 CAPSULE ORAL at 23:21

## 2023-12-04 ASSESSMENT — FIBROSIS 4 INDEX: FIB4 SCORE: 1.68

## 2023-12-05 NOTE — ED PROVIDER NOTES
ED Provider Note    CHIEF COMPLAINT  Chief Complaint   Patient presents with    Detox     Pt want to detox from etoh, reports last drink yesterday, he is trying to get into [facility], states he needs to be medically cleared first, reports he is concerned about DT's       EXTERNAL RECORDS REVIEWED  Multiple prior encounters and evaluations for alcohol intoxication.  Additional assessments for intermittent persistent suicidal ideations.  Seen on 11/20/2023.  Alcohol level at that time was 210.    HPI/ROS  LIMITATION TO HISTORY   Select: Intoxication  OUTSIDE HISTORIAN(S):  none ,     Gopal Ceja is a 38 y.o. male who presents emergency room concerned about having some withdrawal.  He is a regular alcohol user, believes he last drank about 8 hours ago and started having some shaking and some palpitations skin irritation.  He feels slightly nauseous with no active vomiting, no diarrheal illness, he is denying any recent trauma and says that he would like to go to [facility] in the morning but notes that he needs to have some elements of an assessment before going.  He is denying any active hallucinations, no auditory or visual abnormalities and denies any active suicidal ideations.  He would like to get help and believes that this is the best way of doing so.    PAST MEDICAL HISTORY   has a past medical history of Acute anxiety, ADHD (attention deficit hyperactivity disorder), ADHD (attention deficit hyperactivity disorder), Alcohol abuse, Bipolar affective (HCC), Depression, Drug abuse and dependence (HCC), Ectrodactyly-ectodermal dysplasia-clefting syndrome, ETOH abuse, and Psychiatric disorder.    SURGICAL HISTORY   has a past surgical history that includes other orthopedic surgery and exploratory of abdomen (N/A, 9/26/2019).    FAMILY HISTORY  Family History   Problem Relation Age of Onset    Hypertension Mother     Heart Disease Neg Hx     Hyperlipidemia Neg Hx        SOCIAL HISTORY  Social History      Tobacco Use    Smoking status: Never    Smokeless tobacco: Never    Tobacco comments:     Smokes couple of times a month   Vaping Use    Vaping Use: Never used   Substance and Sexual Activity    Alcohol use: Yes     Comment: last drink today    Drug use: Yes     Types: Inhaled     Comment: meth yesterday (smokes Meth)/THC    Sexual activity: Not on file       CURRENT MEDICATIONS  Home Medications    **Home medications have not yet been reviewed for this encounter**         ALLERGIES  No Known Allergies    PHYSICAL EXAM  VITAL SIGNS: /74   Pulse (!) 108   Temp 37 °C (98.6 °F)   Resp 14   Wt 77.1 kg (170 lb)   SpO2 95%   BMI 25.85 kg/m²    Genl: disheveled M sitting in chair comfortably, speaking slightly slurred, tremulous, appears otherwise in no acute distress   Head: NC/AT   ENT: Mucous membranes moist, posterior pharynx clear, uvula midline, nares patent bilaterally   Pulmonary: Lungs are clear to auscultation bilaterally  Chest: No TTP  CV:  tachycardia, no murmur appreciated, pulses 2+ in both upper and lower extremities,  Abdomen: soft, no epigastric pain, no right upper quadrant discomfort, no pain with ambulation.  NT/ND; no rebound/guarding, no masses palpated, no HSM   Musculoskeletal: Pain free ROM of the neck. Moving upper and lower extremities in spontaneous and coordinated fashion  Neuro: A&Ox4 (person, place, time, situation), speech fluent, gait steady, no focal deficits appreciated  Skin: No rash or lesions.  No pallor or jaundice.  No cyanosis.  Warm and dry.     DIAGNOSTIC STUDIES / PROCEDURES    LABS  Labs Reviewed   POC BREATHALIZER - Abnormal; Notable for the following components:       Result Value    POC Breathalizer 0.151 (*)     All other components within normal limits     COURSE & MEDICAL DECISION MAKING    ED Observation Status? No; Patient does not meet criteria for ED Observation.     INITIAL ASSESSMENT, COURSE AND PLAN  Care Narrative: Presents emergency room for  symptoms as described above.  The patient has tachycardia, has an alcoholic, has been here in the past and has been seen for both psychiatric illness, suicidal ideations plan he has been unable to get housing and treatment for his alcoholism.  He is not having any active emesis, has tremulousness, skin irritations and overall calculated CIWA score at the time my assessment is 8.  He has tolerated benzodiazepines in the past well.  Does not have a focal area of pain or discomfort and does not have hemodynamic instability, new rashes or lesions or signs of trauma.    Alcohol level was around 110.  He showed signs of withdrawal and was administered Librium.  Subsequent reevaluation 30 minutes shows the patient has tachycardia is improved down to 90, he has lack of gait instability, no tremulousness, feels improved with no nausea or vomiting and mental status changes.  He plans on following up with the outpatient mental health services for alcohol detox tomorrow.  Discharged home in stable condition.    DISPOSITION AND DISCUSSIONS  I have discussed management of the patient with the following physicians and BRUNO's:  none    Discussion of management with other QHP or appropriate source(s): None     Escalation of care considered, and ultimately not performed:IV fluids, blood analysis, and diagnostic imaging    Barriers to care at this time, including but not limited to: Patient does not have established PCP and Patient is homeless.     FINAL DIAGNOSIS  1. Alcohol abuse    2. Alcohol withdrawal syndrome without complication (HCC)         Electronically signed by: Edilberto Hardwick M.D., 12/4/2023 10:54 PM

## 2023-12-05 NOTE — ED NOTES
Pt given discharge instructions; verbalized understanding of instructions.  Ambulated out with  escort.

## 2023-12-05 NOTE — ED TRIAGE NOTES
Chief Complaint   Patient presents with    Detox     Pt want to detox from etoh, reports last drink yesterday, he is trying to get into [facility], states he needs to be medically cleared first, reports he is concerned about DT's     BP (!) 147/99   Pulse (!) 113   Temp 37 °C (98.6 °F) (Temporal)   Resp 16   Wt 77.1 kg (170 lb)   SpO2 95%   BMI 25.85 kg/m²

## 2024-01-10 ENCOUNTER — HOSPITAL ENCOUNTER (EMERGENCY)
Facility: MEDICAL CENTER | Age: 39
End: 2024-01-10
Attending: EMERGENCY MEDICINE
Payer: MEDICARE

## 2024-01-10 VITALS
RESPIRATION RATE: 18 BRPM | OXYGEN SATURATION: 95 % | DIASTOLIC BLOOD PRESSURE: 79 MMHG | TEMPERATURE: 97.1 F | WEIGHT: 168.87 LBS | SYSTOLIC BLOOD PRESSURE: 115 MMHG | BODY MASS INDEX: 25.59 KG/M2 | HEART RATE: 88 BPM | HEIGHT: 68 IN

## 2024-01-10 DIAGNOSIS — F10.90 ALCOHOL USE DISORDER: ICD-10-CM

## 2024-01-10 PROCEDURE — A9270 NON-COVERED ITEM OR SERVICE: HCPCS | Performed by: EMERGENCY MEDICINE

## 2024-01-10 PROCEDURE — 99284 EMERGENCY DEPT VISIT MOD MDM: CPT

## 2024-01-10 PROCEDURE — 700102 HCHG RX REV CODE 250 W/ 637 OVERRIDE(OP): Performed by: EMERGENCY MEDICINE

## 2024-01-10 RX ORDER — CHLORDIAZEPOXIDE HYDROCHLORIDE 25 MG/1
25 CAPSULE, GELATIN COATED ORAL ONCE
Status: COMPLETED | OUTPATIENT
Start: 2024-01-10 | End: 2024-01-10

## 2024-01-10 RX ADMIN — CHLORDIAZEPOXIDE HYDROCHLORIDE 25 MG: 25 CAPSULE ORAL at 08:37

## 2024-01-10 ASSESSMENT — FIBROSIS 4 INDEX: FIB4 SCORE: 1.68

## 2024-01-10 NOTE — ED NOTES
Is This A New Presentation, Or A Follow-Up?: Skin Lesions Pt able to ambulate with steady gait around pod and back without any assistance from staff needed.   What Type Of Note Output Would You Prefer (Optional)?: Bullet Format How Severe Is Your Skin Lesion?: moderate Has Your Skin Lesion Been Treated?: not been treated

## 2024-01-10 NOTE — ED NOTES
DC home with written and verbal instructions regarding f/u, activity.  Verbalized understanding, ambulated out with steady gait and bus pass.

## 2024-01-10 NOTE — ED TRIAGE NOTES
"Chief Complaint   Patient presents with    Alcohol Intoxication     Pt withdrawing from alcohol. Last drink was a few hours ago. Pt states that he is hallucinating.     Detox     Pt states that he is detoxing from meth.    Pt not answering all triage questions at this time. Pt just keeps stating \"I dont feel good\"    Pt is alert and oriented, speaking in full sentences, follows commands and responds appropriately to questions. Resperations are even and unlabored.      Pt placed in lobby. Pt educated on triage process. Pt encouraged to alert staff for any changes.    /84   Pulse 77   Temp 36.4 °C (97.5 °F) (Temporal)   Resp 16   Ht 1.727 m (5' 8\")   Wt 76.6 kg (168 lb 14 oz)   SpO2 100%    "

## 2024-01-10 NOTE — ED NOTES
Bedside report received from off going RN/tech: Mariama , assumed care of patient.  POC discussed with patient. Call light within reach, all needs addressed at this time.       Fall risk interventions in place: Move the patient closer to the nurse's station, Patient's personal possessions are with in their safe reach, Give patient urinal if applicable, Keep floor surfaces clean and dry, and Accompanied to restroom (all applicable per [place] Fall risk assessment)   Continuous monitoring: Cardiac Leads, Pulse Ox, or Blood Pressure  IVF/IV medications: Not Applicable   Oxygen: Room Air  Bedside sitter: Not Applicable   Isolation: Not Applicable

## 2024-01-10 NOTE — ED PROVIDER NOTES
ER Provider Note    Scribed for Dr. Dalton Enamorado M.D. by Christa Moy. 1/10/2024  7:20 AM    Primary Care Provider: Pcp Pt States None    CHIEF COMPLAINT  Chief Complaint   Patient presents with    Alcohol Intoxication     Pt withdrawing from alcohol. Last drink was a few hours ago. Pt states that he is hallucinating.     Detox     Pt states that he is detoxing from meth.        EXTERNAL RECORDS REVIEWED  Inpatient Notes The patient was seen October 27th for alcohol intoxication.    HPI/ROS    Gopal Ceja is a 38 y.o. male who presents to the ED for alcohol intoxication and drug detoxification onset earlier today. The patient reports that he is going through withdrawals. He explains that his last drink of alcohol was yesterday. The patient also notes that he was smoking methamphetamines today. He has associated dizziness when walking and hallucinations. He notes he hasn't eaten or drank any fluids recently. There are no known alleviating or exacerbating factors. The patient does not have any known allergies to medications.    PAST MEDICAL HISTORY  Past Medical History:   Diagnosis Date    Acute anxiety     ADHD (attention deficit hyperactivity disorder)     ADHD (attention deficit hyperactivity disorder)     Alcohol abuse     Bipolar affective (HCC)     Depression     Drug abuse and dependence (HCC)     Ectrodactyly-ectodermal dysplasia-clefting syndrome     ETOH abuse     Psychiatric disorder     anxiety/panic disorder       SURGICAL HISTORY  Past Surgical History:   Procedure Laterality Date    LA EXPLORATORY OF ABDOMEN N/A 9/26/2019    Procedure: LAPAROTOMY, EXPLORATORY;  Surgeon: Jevon Llanes M.D.;  Location: SURGERY [facility];  Service: General    OTHER ORTHOPEDIC SURGERY      hands bilaterally r/t EEDC syndrome       FAMILY HISTORY  Family History   Problem Relation Age of Onset    Hypertension Mother     Heart Disease Neg Hx     Hyperlipidemia Neg Hx        SOCIAL HISTORY   reports that  "he has never smoked. He has never used smokeless tobacco. He reports current alcohol use. He reports current drug use. Drug: Inhaled.    CURRENT MEDICATIONS  Current Outpatient Medications   Medication Instructions    amphetamine-dextroamphetamine (ADDERALL) 15 MG tablet 15 mg, Oral, 2 TIMES DAILY    chlorpheniramine (CHLOR-TRIMETRON) 4 mg, Oral, 3 times daily    clonazePAM (KLONOPIN) 1 mg, Oral, 2 TIMES DAILY PRN    DULoxetine (CYMBALTA) 20 mg, Oral, DAILY    propranolol (INDERAL) 20 mg, Oral, 2 TIMES DAILY      ALLERGIES  Patient has no known allergies.    PHYSICAL EXAM  /76   Pulse 78   Temp 36.4 °C (97.5 °F) (Temporal)   Resp 17   Ht 1.727 m (5' 8\")   Wt 76.6 kg (168 lb 14 oz)   SpO2 97%   BMI 25.68 kg/m²   Constitutional: Alert in no apparent distress.  HENT: No signs of trauma, Bilateral external ears normal, Nose normal.   Eyes: Pupils are equal and reactive, Conjunctiva normal, Non-icteric.   Neck: Normal range of motion, No tenderness, Supple,   Cardiovascular: Regular rate and rhythm, no murmurs.   Thorax & Lungs: Normal breath sounds, No respiratory distress, No wheezing, No chest tenderness.   Abdomen: Bowel sounds normal, Soft, No tenderness to palpation, No masses, No pulsatile masses. No peritoneal signs.  Skin: Warm, Dry, No erythema, No rash.   Back: No bony tenderness, No CVA tenderness.   Extremities: Intact distal pulses, No edema, No tenderness, No cyanosis, no tenderness  Neurologic: Alert, no shakiness, no tongue fasciculations, Normal motor function, Normal sensory function, No focal deficits noted.   Psychiatric: Affect normal     COURSE & MEDICAL DECISION MAKING    ED Observation Status? No; Patient does not meet criteria for ED Observation.     INITIAL ASSESSMENT AND PLAN  Care Narrative:       7:20 AM - Patient seen and evaluated at bedside. Patient agrees to plan of care. Patient will be treated with 25 mg Librium for his symptoms.     9:01 AM - Patient was reevaluated at " bedside. Patient is feeling improved. Discussed plan for discharge, including plan for follow-up, and informed them to return to the [facility] ED with any new or worsening symptoms. Patient was given the opportunity for questions, and I addressed all questions or concerns. He is stable for discharge at this time. Patient verbalizes understanding and support with my plan for discharge.    ADDITIONAL PROBLEM LIST AND DISPOSITION  Patient at this time is not in acute alcohol drawl.  He does have some significant social issues.  We will give him Librium to prevent withdrawal.  At this time the patient is eating and drinking normally.  He is nontoxic in.  We will give him medication and have him follow-up with detox.               DISPOSITION AND DISCUSSIONS  I have discussed management of the patient with the following physicians and BRUNO's: None    Discussion of management with other QHP or appropriate source(s): None     Escalation of care considered, and ultimately not performed: blood analysis.    Barriers to care at this time, including but not limited to: Patient does not have established PCP.     Decision tools and prescription drugs considered including, but not limited to: Medication modification will defer any medications to detox.  We will send him over there with a bus pass .    The patient will return for new or worsening symptoms and is stable at the time of discharge.    The patient is referred to a primary physician for blood pressure management, diabetic screening, and for all other preventative health concerns.    We had a long discussion about alcohol cessation.    DISPOSITION:  Patient will be discharged home in stable condition.    FOLLOW UP:  [facility]  [address] [facility] [address]  [phone]        FINAL IMPRESSION   1. Alcohol use disorder         I, Christa Moy (Scribe), am scribing for, and in the presence of, Dalton Enamorado M.D..    Electronically signed by:  Christa Moy (Scribe), 1/10/2024    IDalton M.D. personally performed the services described in this documentation, as scribed by Christa Moy in my presence, and it is both accurate and complete.    The note accurately reflects work and decisions made by me.  Dalton Enamorado M.D.  1/10/2024  1:40 PM

## 2024-01-10 NOTE — ED NOTES
Bedside report received from off going RN/tech: Irving RN, assumed care of patient.  POC discussed with patient. Call light within reach, all needs addressed at this time.     Pt placed on bed alarm at this time.    Fall risk interventions in place: Place fall risk sign on patient's door, Give patient urinal if applicable, Keep floor surfaces clean and dry, and Bed Alarm in use (all applicable per [place] Fall risk assessment)   Continuous monitoring: Cardiac Leads, Pulse Ox, or Blood Pressure  IVF/IV medications: Not Applicable   Oxygen: Room Air  Bedside sitter: Not Applicable   Isolation: Not Applicable

## 2024-01-10 NOTE — ED NOTES
Pt alert and oriented x4, reports wanting to check into detox facility.  RN provided community resources and bus pass for pt transport.

## 2024-01-27 ENCOUNTER — HOSPITAL ENCOUNTER (EMERGENCY)
Facility: MEDICAL CENTER | Age: 39
End: 2024-01-28
Attending: STUDENT IN AN ORGANIZED HEALTH CARE EDUCATION/TRAINING PROGRAM
Payer: MEDICARE

## 2024-01-27 DIAGNOSIS — F10.920 ALCOHOLIC INTOXICATION WITHOUT COMPLICATION (HCC): ICD-10-CM

## 2024-01-27 LAB — GLUCOSE BLD STRIP.AUTO-MCNC: 103 MG/DL (ref 65–99)

## 2024-01-27 PROCEDURE — 99284 EMERGENCY DEPT VISIT MOD MDM: CPT

## 2024-01-27 PROCEDURE — 82962 GLUCOSE BLOOD TEST: CPT

## 2024-01-27 ASSESSMENT — FIBROSIS 4 INDEX: FIB4 SCORE: 0.48

## 2024-01-28 ENCOUNTER — HOSPITAL ENCOUNTER (EMERGENCY)
Facility: MEDICAL CENTER | Age: 39
End: 2024-01-28
Attending: EMERGENCY MEDICINE
Payer: MEDICARE

## 2024-01-28 VITALS
HEIGHT: 71 IN | SYSTOLIC BLOOD PRESSURE: 111 MMHG | TEMPERATURE: 98 F | RESPIRATION RATE: 17 BRPM | BODY MASS INDEX: 23.8 KG/M2 | OXYGEN SATURATION: 92 % | WEIGHT: 170 LBS | DIASTOLIC BLOOD PRESSURE: 69 MMHG | HEART RATE: 75 BPM

## 2024-01-28 VITALS
SYSTOLIC BLOOD PRESSURE: 104 MMHG | HEIGHT: 71 IN | HEART RATE: 103 BPM | TEMPERATURE: 98.5 F | DIASTOLIC BLOOD PRESSURE: 55 MMHG | OXYGEN SATURATION: 90 % | RESPIRATION RATE: 18 BRPM | WEIGHT: 170 LBS | BODY MASS INDEX: 23.8 KG/M2

## 2024-01-28 DIAGNOSIS — F10.920 ALCOHOLIC INTOXICATION WITHOUT COMPLICATION (HCC): ICD-10-CM

## 2024-01-28 PROCEDURE — 99284 EMERGENCY DEPT VISIT MOD MDM: CPT

## 2024-01-28 ASSESSMENT — FIBROSIS 4 INDEX: FIB4 SCORE: 0.48

## 2024-01-28 ASSESSMENT — LIFESTYLE VARIABLES: DO YOU DRINK ALCOHOL: YES

## 2024-01-28 NOTE — ED NOTES
Checked on bed, connected to monitor, asleep with unlabored respirations.. No current needs identified.  Gurney in low position, side rail up for pt safety. Call light within reach. On bed alarm

## 2024-01-28 NOTE — DISCHARGE INSTRUCTIONS
As we discussed you should gradually cut back on your drinking.  You can follow-up with your primary care doctor.

## 2024-01-28 NOTE — DISCHARGE SUMMARY
"  ED Observation Discharge Summary    Patient:Gopal Ceja  Patient : 1985  Patient MRN: [id number]  Patient PCP: Pcp Pt States None    Admit Date: 2024  Discharge Date and Time: 24 12:03 AM  Discharge Diagnosis: Alcohol intoxication  Discharge Attending: Dylan Bonds D.O.  Discharge Service: ED Observation    ED Course  Gopal is a 38 y.o. male who was evaluated at [facility] for alcohol intoxication reported to primary provider that he was drinking vodka throughout the day.    Reviewed medical history, many presentations over the years for alcohol abuse, alcohol withdrawal    Discharge Exam:  /70   Pulse 77   Temp 36.7 °C (98 °F) (Tympanic)   Resp 16   Ht 1.803 m (5' 11\")   Wt 77.1 kg (170 lb)   SpO2 91%   BMI 23.71 kg/m² .    Constitutional: Awake and alert. Nontoxic  HENT:  Grossly normal.  No tongue fasciculations  Eyes: Grossly normal  Neck: Normal range of motion  Cardiovascular: Normal heart rate   Thorax & Lungs: No respiratory distress  Abdomen: Nontender  Skin:  No pathologic rash.   Extremities: Well perfused, no hand tremors  Psychiatric: Affect normal, not agitated or aggressive    Labs  Results for orders placed or performed during the hospital encounter of 24   POCT glucose device results   Result Value Ref Range    POC Glucose, Blood 103 (H) 65 - 99 mg/dL       Radiology  No orders to display       Medications:   New Prescriptions    No medications on file       My final assessment includes alcohol intoxication, no complication  Upon Reevaluation, the patient's condition has: Improved; and will be discharged.    Patient discharged from ED Observation status at 4:20 PM (Time) 2024    Total time spent on this ED Observation discharge encounter is < 30 Minutes    Electronically signed by: Dylan Bonds D.O., 2024 12:03 AM       "

## 2024-01-28 NOTE — ED NOTES
Pt sat up and stated that he was ready to go. Pt asked about jacket. Spoke with RN that checked pt in, pt did not have a jacket on when he arrived. Updated pt. Pt ambulated out of ER with steady gait. Pt refused discharge paperwork.

## 2024-01-28 NOTE — ED TRIAGE NOTES
"Chief Complaint   Patient presents with    Alcohol Intoxication     BIB EMS from a gas station; patient called 911 stating he needs help, detox and rehab.      Patient able to roll to  [facility]; unable to walk due to intoxication, unsteady balance.     /77   Pulse 79   Temp 36.7 °C (98 °F) (Tympanic)   Resp 18   Ht 1.803 m (5' 11\")   Wt 77.1 kg (170 lb)   SpO2 93%   BMI 23.71 kg/m²    "

## 2024-01-28 NOTE — ED NOTES
Pt discharged to home. Discharge paperwork provided. Education provided by ERP. Reinforced discharge instructions.  Pt was given follow up instructions.   Pt verbalized understanding of all instructions for discharge.   Pt refused to sign discharge paper work giving verbal consent.   Patient went out of the ER ambulatory with steady gait., alert and oriented x 4, with all belongings.

## 2024-01-28 NOTE — ED NOTES
Checked on bed  with unlabored respirations. Denied any new complaints. No current needs identified.  Gurney in low position, side rail up for pt safety. Call light within reach.

## 2024-01-28 NOTE — ED PROVIDER NOTES
ED Provider Note    CHIEF COMPLAINT  Chief Complaint   Patient presents with    Alcohol Intoxication     BIB EMS from a gas station; patient called 911 stating he needs help, detox and rehab.        EXTERNAL RECORDS REVIEWED  Outpatient Notes ED visit from 1/10/2024 patient seen for alcohol intoxication treated with Librium and discharged    HPI/ROS  LIMITATION TO HISTORY   Select: Intoxication  OUTSIDE HISTORIAN(S):  EMS report patient was picked up at a gas station requesting home for alcohol use    Gopal Ceja is a 38 y.o. male who presents with alcohol intoxication.  Patient reports that he was drinking vodka throughout the day.  Patient denies recent illness or trauma.  Patient denies intentional ingestion or drug use.  Limited history from patient given suspected intoxication.    PAST MEDICAL HISTORY   has a past medical history of Acute anxiety, ADHD (attention deficit hyperactivity disorder), ADHD (attention deficit hyperactivity disorder), Alcohol abuse, Bipolar affective (HCC), Depression, Drug abuse and dependence (HCC), Ectrodactyly-ectodermal dysplasia-clefting syndrome, ETOH abuse, and Psychiatric disorder.    SURGICAL HISTORY   has a past surgical history that includes other orthopedic surgery and exploratory of abdomen (N/A, 9/26/2019).    FAMILY HISTORY  Family History   Problem Relation Age of Onset    Hypertension Mother     Heart Disease Neg Hx     Hyperlipidemia Neg Hx        SOCIAL HISTORY  Social History     Tobacco Use    Smoking status: Never    Smokeless tobacco: Never    Tobacco comments:     Smokes couple of times a month   Vaping Use    Vaping Use: Never used   Substance and Sexual Activity    Alcohol use: Yes     Comment: last drink today    Drug use: Yes     Types: Inhaled     Comment: meth yesterday (smokes Meth)/THC    Sexual activity: Not on file       CURRENT MEDICATIONS  Home Medications       Reviewed by Ary Patino R.N. (Registered Nurse) on 01/27/24 at 2146  Med  "List Status: Not Addressed     Medication Last Dose Status   amphetamine-dextroamphetamine (ADDERALL) 15 MG tablet  Active   chlorpheniramine (CHLOR-TRIMETRON) 4 MG Tab  Active   clonazePAM (KLONOPIN) 1 MG Tab  Active   DULoxetine (CYMBALTA) 20 MG Cap DR Particles  Active   propranolol (INDERAL) 20 MG Tab  Active                    ALLERGIES  No Known Allergies    PHYSICAL EXAM  VITAL SIGNS: /70   Pulse 77   Temp 36.7 °C (98 °F) (Tympanic)   Resp 16   Ht 1.803 m (5' 11\")   Wt 77.1 kg (170 lb)   SpO2 91%   BMI 23.71 kg/m²    Constitutional: Alert in no apparent distress.  HENT: No signs of trauma, Bilateral external ears normal, Nose normal.   Eyes: Pupils are equal and reactive, Conjunctiva normal, Non-icteric.   Neck: Normal range of motion, No tenderness, Supple, No stridor.   Cardiovascular: Regular rate and rhythm, no murmurs.   Thorax & Lungs: Normal breath sounds, No respiratory distress, No wheezing, No chest tenderness.   Abdomen: Bowel sounds normal, Soft, No tenderness, No masses, No pulsatile masses.   Skin: Warm, Dry, No erythema, No rash.   Back: No bony tenderness, No CVA tenderness.   Extremities: Intact distal pulses, No edema, No tenderness, No cyanosis  Musculoskeletal: Good range of motion in all major joints. No tenderness to palpation or major deformities noted.   Neurologic: Somnolent, normal motor function, Normal sensory function, No focal deficits noted.       DIAGNOSTIC STUDIES / PROCEDURES  EKG      LABS  Labs Reviewed   POCT GLUCOSE DEVICE RESULTS - Abnormal; Notable for the following components:       Result Value    POC Glucose, Blood 103 (*)     All other components within normal limits   POCT GLUCOSE         RADIOLOGY      COURSE & MEDICAL DECISION MAKING    ED Observation Status? Yes; I am placing the patient in to an observation status due to a diagnostic uncertainty as well as therapeutic intensity. Patient placed in observation status at 10:04 PM, 1/27/2024. "     Observation plan is as follows: Monitor for clinical sobriety      INITIAL ASSESSMENT, COURSE AND PLAN  Care Narrative: On arrival vital signs within normal limits.  Patient with reported heavy alcohol use today is somnolent with dysarthric speech.  Patient has no signs of trauma.  Patient has presented numerous times to the emergency room for alcohol intoxication and withdrawal.  Will check blood glucose.  Will monitor patient for clinical sobriety and assess need for treatment of alcohol withdrawal.     Patient was able to ambulate at the room with assistance on my reassessment he still significantly somnolent easily arousable again nonfocal neurologic exam.  Signed out to oncoming ERP with plan for reassessment of clinical sobriety.        ADDITIONAL PROBLEM LIST    DISPOSITION AND DISCUSSIONS  Barriers to care at this time, including but not limited to: Patient lacks financial resources.       FINAL DIAGNOSIS  1. Alcoholic intoxication without complication (HCC)           Electronically signed by: Trey Giraldo D.O., 1/27/2024 9:52 PM

## 2024-01-28 NOTE — ED NOTES
Pt got out of bed and disconnected himself from monitor. Pt ambulated to bathroom x 1 assist, unsteady gait. Provided pt with warm meal and apple juice. Instructed pt to use call light prior to getting out of bed. Call light in reach, pt on monitor.

## 2024-01-28 NOTE — ED PROVIDER NOTES
"ED Provider Note    Primary care provider: Pcp Pt States None    CHIEF COMPLAINT  Chief Complaint   Patient presents with    Alcohol Intoxication     BIB EMS from the streets after he called saying he was detoxing, however admitted to drinking \"a lot\" this morning.   Arrives intoxicated and sleeping.   B   D/c'd from here at 0418 this morning.      Limitation to History:  Select: Intoxication    HPI  Gopal Ceja is a 38 y.o. male who presents to the Emergency Department for alcohol intoxication.  The patient is rather intoxicated, responds to painful stimuli but quickly falls back asleep.      External Record Review: Well-known to this facility and many other emergency department in the area.  Patient presented to [facility] on  with alcohol intoxication and reporting pain all over.  He underwent laboratory evaluation, was observed in the emergency department for several hours and eloped.  He came to our emergency department yesterday, was observed for about 6 hours as he was severely intoxicated, discharged early this morning.    REVIEW OF SYSTEMS  See HPI.     PAST MEDICAL HISTORY   has a past medical history of Acute anxiety, ADHD (attention deficit hyperactivity disorder), ADHD (attention deficit hyperactivity disorder), Alcohol abuse, Bipolar affective (HCC), Depression, Drug abuse and dependence (HCC), Ectrodactyly-ectodermal dysplasia-clefting syndrome, ETOH abuse, and Psychiatric disorder.    SURGICAL HISTORY   has a past surgical history that includes other orthopedic surgery and exploratory of abdomen (N/A, 2019).    SOCIAL HISTORY  Social History     Tobacco Use    Smoking status: Never    Smokeless tobacco: Never    Tobacco comments:     Smokes couple of times a month   Vaping Use    Vaping Use: Never used   Substance Use Topics    Alcohol use: Yes     Comment: last drink today    Drug use: Yes     Types: Inhaled     Comment: meth yesterday (smokes Meth)/THC      Social History " "    Substance and Sexual Activity   Drug Use Yes    Types: Inhaled    Comment: meth yesterday (smokes Meth)/THC       FAMILY HISTORY  Family History   Problem Relation Age of Onset    Hypertension Mother     Heart Disease Neg Hx     Hyperlipidemia Neg Hx        CURRENT MEDICATIONS  Reviewed.  See Encounter Summary.     ALLERGIES  No Known Allergies    PHYSICAL EXAM  VITAL SIGNS: /60   Pulse 100   Temp 36.4 °C (97.6 °F) (Temporal)   Resp 14   Ht 1.803 m (5' 11\")   Wt 77.1 kg (170 lb)   SpO2 91%   BMI 23.71 kg/m²   Constitutional: Intoxicated, hypersomnolent  HENT: Normocephalic, atraumatic.  Bilateral external ears normal. Nose normal.   Eyes: Conjunctiva normal, non-icteric, EOMI.    Thorax & Lungs: Easy unlabored respirations, Clear to ascultation bilaterally.  Cardiovascular: Regular rate, Regular rhythm, No murmurs, rubs or gallops. Bilateral pulses symmetrical.   Abdomen:  Soft, nontender, nondistended, normal active bowel sounds.   :    Skin: Visualized skin is  Dry, No erythema, No rash.   Musculoskeletal:   No cyanosis, clubbing or edema. No leg asymmetry.   Neurologic: Intoxicated   psychiatric: Unable to assess lymphatic:            COURSE & MEDICAL DECISION MAKING  Pertinent Labs & Imaging studies reviewed. (See chart for details)    COURSE & MEDICAL DECISION MAKING  Pertinent Labs & Imaging studies reviewed. (See chart for details)    Differential diagnoses include but are not limited to: Alcohol intoxication    11:58 AM - Nursing notes reviewed, patient seen and examined at bedside.    Escalation of care considered, and ultimately not performed: blood analysis, diagnostic imaging, and acute inpatient care management, however at this time, the patient is most appropriate for outpatient management.    Barriers to care at this time, including but not limited to: Patient does not have established PCP.     Decision tools and prescription drugs considered including, but not limited to: CIWA " score 0    Decision Making:  This is a pleasant 38 y.o. year old male who presents with severe alcohol intoxication.  The patient is here quite frequently.  This is consistent with his prior presentations.  He typically comes in intoxicated, sleeps in the ER for several hours and is then discharged.  At this time I do not feel that he needs any additional resources.  He unfortunately has high risk for recidivism with regards to alcohol abuse.    Pending sobriety the patient will be discharged.    Discharge Medications:  New Prescriptions    No medications on file       FINAL IMPRESSION  1. Alcoholic intoxication without complication (HCC)

## 2024-01-28 NOTE — ED NOTES
Bedside report received from off going RN Ary, assumed care of patient.  POC discussed with patient. Call light within reach, all needs addressed at this time.       Fall risk interventions in place: Move the patient closer to the nurse's station, Patient's personal possessions are with in their safe reach, Place fall risk sign on patient's door, and Keep floor surfaces clean and dry (all applicable per [facility] Fall risk assessment)   Continuous monitoring: Not Applicable   IVF/IV medications: Not Applicable   Oxygen: Room Air  Bedside sitter: Not Applicable   Isolation: Not Applicable

## 2024-01-28 NOTE — ED NOTES
Pt sleeping comfortably in bed, breathing is easy and unlabored. Bed in lowest position, blankets provided for comfort. No s/s of distress noted. No new needs identified.  Will continue to monitor.

## 2024-01-28 NOTE — ED TRIAGE NOTES
"Chief Complaint   Patient presents with    Alcohol Intoxication     BIB EMS from the streets after he called saying he was detoxing, however admitted to drinking \"a lot\" this morning.   Arrives intoxicated and sleeping.   B   D/c'd from here at 0418 this morning.      Provided urinal. Call light within reach.     /75   Pulse 82   Temp 36.4 °C (97.6 °F) (Temporal)   Resp 14   Ht 1.803 m (5' 11\")   Wt 77.1 kg (170 lb)   SpO2 96%   BMI 23.71 kg/m²     "

## 2024-01-29 ENCOUNTER — HOSPITAL ENCOUNTER (EMERGENCY)
Facility: MEDICAL CENTER | Age: 39
End: 2024-01-30
Attending: STUDENT IN AN ORGANIZED HEALTH CARE EDUCATION/TRAINING PROGRAM
Payer: MEDICARE

## 2024-01-29 ENCOUNTER — HOSPITAL ENCOUNTER (EMERGENCY)
Facility: MEDICAL CENTER | Age: 39
End: 2024-01-29
Attending: EMERGENCY MEDICINE
Payer: MEDICARE

## 2024-01-29 VITALS
TEMPERATURE: 98.4 F | WEIGHT: 169.97 LBS | SYSTOLIC BLOOD PRESSURE: 135 MMHG | RESPIRATION RATE: 15 BRPM | HEART RATE: 107 BPM | BODY MASS INDEX: 23.71 KG/M2 | OXYGEN SATURATION: 91 % | DIASTOLIC BLOOD PRESSURE: 89 MMHG

## 2024-01-29 DIAGNOSIS — F10.920 ALCOHOLIC INTOXICATION WITHOUT COMPLICATION (HCC): ICD-10-CM

## 2024-01-29 DIAGNOSIS — Z59.00 HOMELESSNESS: ICD-10-CM

## 2024-01-29 PROCEDURE — 99284 EMERGENCY DEPT VISIT MOD MDM: CPT

## 2024-01-29 PROCEDURE — 99285 EMERGENCY DEPT VISIT HI MDM: CPT

## 2024-01-29 SDOH — ECONOMIC STABILITY - HOUSING INSECURITY: HOMELESSNESS UNSPECIFIED: Z59.00

## 2024-01-29 ASSESSMENT — FIBROSIS 4 INDEX
FIB4 SCORE: 0.38
FIB4 SCORE: 0.38

## 2024-01-29 NOTE — ED TRIAGE NOTES
Chief Complaint   Patient presents with    Alcohol Intoxication     BIB [facility] for alcohol intoxication. Patient found stumbling around outside gas station. Endorse alcohol and meth use today.      Vitals:    01/29/24 1452   BP: 135/89   Pulse: (!) 105   Resp: 16   Temp: 36.9 °C (98.4 °F)   SpO2: 96%

## 2024-01-29 NOTE — ED PROVIDER NOTES
"  ER Provider Note    Scribed for Charles Cope M.d. by Christa Moy. 1/29/2024  3:11 PM    Primary Care Provider: Pcp Pt States None    CHIEF COMPLAINT  Chief Complaint   Patient presents with    Alcohol Intoxication     BIB [facility] for alcohol intoxication. Patient found stumbling around outside gas station. Endorse alcohol and meth use today.      EXTERNAL RECORDS REVIEWED  Inpatient Notes The patient has a history of frequent ER visits for alcohol intoxication with the last visit being to [place] ED earlier today. He had a blood alcohol level of 297 at 11 AM and then he eloped.      HPI/ROS  LIMITATION TO HISTORY   Select: Altered mental status / Confusion  OUTSIDE HISTORIAN(S):  None    Gopal Ceja is a 38 y.o. male who presents to the ED complaining of alcohol intoxification onset earlier today. The patient was seen at [place] earlier today for alcohol intoxication. His blood alcohol was found to be 297 at 11 AM and he reportedly eloped at around 1 PM. The patient was also seen in the [facility] ER twice yesterday morning and eloped. The patient was brought into the ER today by EMS after he was found stumbling outside of a gas station. When asked how much he drank he states \"a lot. \" He also confirms methamphetamine use.   PAST MEDICAL HISTORY  Past Medical History:   Diagnosis Date    Acute anxiety     ADHD (attention deficit hyperactivity disorder)     ADHD (attention deficit hyperactivity disorder)     Alcohol abuse     Bipolar affective (HCC)     Depression     Drug abuse and dependence (HCC)     Ectrodactyly-ectodermal dysplasia-clefting syndrome     ETOH abuse     Psychiatric disorder     anxiety/panic disorder       SURGICAL HISTORY  Past Surgical History:   Procedure Laterality Date    DE EXPLORATORY OF ABDOMEN N/A 9/26/2019    Procedure: LAPAROTOMY, EXPLORATORY;  Surgeon: Jevon Llanes M.D.;  Location: SURGERY [facility];  Service: General    OTHER ORTHOPEDIC SURGERY      " hands bilaterally r/t EEDC syndrome       FAMILY HISTORY  Family History   Problem Relation Age of Onset    Hypertension Mother     Heart Disease Neg Hx     Hyperlipidemia Neg Hx        SOCIAL HISTORY   reports that he has never smoked. He has never used smokeless tobacco. He reports current alcohol use. He reports current drug use. Drug: Inhaled.    CURRENT MEDICATIONS  Discharge Medication List as of 1/29/2024  9:52 PM        CONTINUE these medications which have NOT CHANGED    Details   chlorpheniramine (CHLOR-TRIMETRON) 4 MG Tab Take 4 mg by mouth in the morning, at noon, and at bedtime., Historical Med      amphetamine-dextroamphetamine (ADDERALL) 15 MG tablet Take 15 mg by mouth 2 times a day., Historical Med      DULoxetine (CYMBALTA) 20 MG Cap DR Particles Take 20 mg by mouth every day., Historical Med      clonazePAM (KLONOPIN) 1 MG Tab Take 1 mg by mouth 2 times a day as needed. Indications: Feeling Anxious, Historical Med      propranolol (INDERAL) 20 MG Tab Take 20 mg by mouth 2 times a day., Historical Med             ALLERGIES  Patient has no known allergies.    PHYSICAL EXAM  /89   Pulse (!) 105   Temp 36.9 °C (98.4 °F) (Temporal)   Resp 16   Wt 77.1 kg (169 lb 15.6 oz)   SpO2 96%   BMI 23.71 kg/m²     Constitutional: Alert in mild distress. Slurred speech, intoxicated appearance.  HENT: Normocephalic,  Bilateral external ears normal. Nose normal. Steri strip over old wound on right eye with no surrounding erythema.  No other new injury is found  Eyes: Pupils are equal and reactive 3 mm.. Conjunctiva normal, non-icteric.   Heart: Tachycardic rate and regular rhythm, no murmurs.    Abdomen: Soft and not tender.  Lungs: Clear to auscultation bilaterally.  Skin: Warm, Dry, No erythema, No rash.   Neurologic: Alert, Grossly non-focal.   Psychiatric: Patient appears to be significantly intoxicated    COURSE & MEDICAL DECISION MAKING     ED Observation Status? no    INITIAL ASSESSMENT, COURSE  AND PLAN  Care Narrative:     9:35 PM Patient presents to the ED with alcohol intoxication. Will monitor symptoms.       945 patient ambulated to the bathroom without difficulty.  He is asking to be discharged he has no other complaints     PROBLEM LIST  Problem #1 alcohol intoxication.  Patient is well-known to our department with multiple visits for alcohol intoxication.  At this point time patient has been observed and now is able to ambulate without difficulty.  He is asking to be discharged home he has no other medical complaints at this time.  He was seen earlier at outside emergency department and unremarkable labs except for alcohol intoxication.  Patient shows no signs of alcohol withdrawal at this point time we will go ahead and discharge him.      DISPOSITION AND DISCUSSIONS  I have discussed management of the patient with the following physicians and BRUNO's:  None    Discussion of management with other QHP or appropriate source(s): None     Barriers to care at this time, including but not limited to: Patient does not have established PCP.    The patient will return for new or worsening symptoms and is stable at the time of discharge.    The patient is referred to a primary physician for blood pressure management, diabetic screening, and for all other preventative health concerns.        DISPOSITION:  Patient will be discharged home in stable condition.    FOLLOW UP:  [facility]  [address] [place] [address]  [phone]    If you need a doctor    [facility]  [address] [address]  [phone]    If you need a doctor      OUTPATIENT MEDICATIONS:  Discharge Medication List as of 1/29/2024  9:52 PM          FINAL DIAGNOSIS  1. Alcoholic intoxication without complication (HCC)         I, Christa Moy (Janee), am scribing for, and in the presence of, ALLA Tolentino*.    Electronically signed by: Christa Moy (Janee),  1/29/2024    ICharles M.* personally performed the services described in this documentation, as scribed by Christa Moy in my presence, and it is both accurate and complete.      The note accurately reflects work and decisions made by me.  Charles Cope M.D.  1/29/2024  11:32 PM

## 2024-01-30 ENCOUNTER — HOSPITAL ENCOUNTER (EMERGENCY)
Facility: MEDICAL CENTER | Age: 39
End: 2024-01-30
Attending: EMERGENCY MEDICINE
Payer: MEDICARE

## 2024-01-30 VITALS
WEIGHT: 169 LBS | TEMPERATURE: 98.3 F | OXYGEN SATURATION: 95 % | HEART RATE: 91 BPM | DIASTOLIC BLOOD PRESSURE: 85 MMHG | HEIGHT: 70 IN | SYSTOLIC BLOOD PRESSURE: 126 MMHG | RESPIRATION RATE: 18 BRPM | BODY MASS INDEX: 24.2 KG/M2

## 2024-01-30 VITALS
WEIGHT: 169.97 LBS | HEIGHT: 71 IN | DIASTOLIC BLOOD PRESSURE: 64 MMHG | RESPIRATION RATE: 16 BRPM | BODY MASS INDEX: 23.8 KG/M2 | OXYGEN SATURATION: 100 % | SYSTOLIC BLOOD PRESSURE: 124 MMHG | TEMPERATURE: 97.2 F | HEART RATE: 94 BPM

## 2024-01-30 DIAGNOSIS — F10.20 ALCOHOLISM (HCC): ICD-10-CM

## 2024-01-30 DIAGNOSIS — F10.10 ALCOHOL ABUSE: ICD-10-CM

## 2024-01-30 LAB — POC BREATHALIZER: 0.22 PERCENT (ref 0–0.01)

## 2024-01-30 PROCEDURE — 302970 POC BREATHALIZER: Performed by: STUDENT IN AN ORGANIZED HEALTH CARE EDUCATION/TRAINING PROGRAM

## 2024-01-30 PROCEDURE — 99284 EMERGENCY DEPT VISIT MOD MDM: CPT

## 2024-01-30 ASSESSMENT — FIBROSIS 4 INDEX: FIB4 SCORE: 0.38

## 2024-01-30 NOTE — ED NOTES
Pt up, awake, cooperative. Pt provided food/fluids. Agrees to speak to Alert team about detox center.

## 2024-01-30 NOTE — ED NOTES
BS report from Jenna SPENCE  Pt currently sleeping in NAD, VSS, on room air  Breathing even and unlabored    Pt is A&O x3, and not ambulatory d/t ETOH    Pt on the bed alarm, on and armed, fall risk precautions in place. Rails up, bed in low and locked position  Call light in reach, urinal in reach   Pt is MTF at this time    Frequent rounds being made

## 2024-01-30 NOTE — ED NOTES
Discharge instructions reviewed with patient/ family. Patient verbalizes understanding of follow up care, medication management, and reasons to return to ER. PIV removed with no complications. Pt bracelet removed.

## 2024-01-30 NOTE — ED NOTES
Pt not wanting d/c papers, just wanting to leave. Pt is A&O x4, and ambulatory, denies needing anything else. Pt ambulated out of the dept steadily w/ all  belongings to lobby exit, in stable condition

## 2024-01-30 NOTE — ED TRIAGE NOTES
Chief Complaint   Patient presents with    Detox     Pt BIB EMS, report that pt is seeking detox. Cannot remember when last drink was.

## 2024-01-30 NOTE — DISCHARGE PLANNING
No detox beds available at [facility]. Referred pt  to [facility] [place]. Bus schedules printed. SW will provide a bus pass. Pt verbalizes understanding of plan.

## 2024-01-30 NOTE — ED NOTES
Pt remains asleep in NAD, VSS  Breathing even and unlabored  Bed alarm on and armed, fall risk precautions in place  Pt pending MTF

## 2024-01-30 NOTE — ED PROVIDER NOTES
ED Provider Note    CHIEF COMPLAINT  Chief Complaint   Patient presents with    Alcohol Intoxication    Other     Pt states he wants to detox.        EXTERNAL RECORDS REVIEWED  External ED Note ED visit today from this facility alcohol intoxication.  ED visit from outside hospital from earlier today for toxic encephalopathy and alcohol intoxication.  Patient has additional visits for the past 4 days at either this hospital or an outside hospital.    HPI/ROS  LIMITATION TO HISTORY   Select: Intoxication  OUTSIDE HISTORIAN(S):  EMS [place].    Gopal Ceja is a 38 y.o. male who presents by EMS for alcohol intoxication.  He has had several visits over the past few days to this facility in outside facilities for alcohol intoxication.  He does have a somewhat recent manage to the scalp.  From outside hospital record from earlier today this appears to be from a fall that was on 1/28.  He reports no subsequent falls since that injury.  No further history obtained due to intoxication and noncompliance.    PAST MEDICAL HISTORY   has a past medical history of Acute anxiety, ADHD (attention deficit hyperactivity disorder), ADHD (attention deficit hyperactivity disorder), Alcohol abuse, Bipolar affective (HCC), Depression, Drug abuse and dependence (HCC), Ectrodactyly-ectodermal dysplasia-clefting syndrome, ETOH abuse, and Psychiatric disorder.    SURGICAL HISTORY   has a past surgical history that includes other orthopedic surgery and exploratory of abdomen (N/A, 9/26/2019).    FAMILY HISTORY  Family History   Problem Relation Age of Onset    Hypertension Mother     Heart Disease Neg Hx     Hyperlipidemia Neg Hx        SOCIAL HISTORY  Social History     Tobacco Use    Smoking status: Never    Smokeless tobacco: Never    Tobacco comments:     Smokes couple of times a month   Vaping Use    Vaping Use: Never used   Substance and Sexual Activity    Alcohol use: Yes      "Comment: last drink today    Drug use: Yes     Types: Inhaled     Comment: meth yesterday (smokes Meth)/THC    Sexual activity: Not on file       CURRENT MEDICATIONS  Home Medications       Reviewed by Angi Giraldo R.N. (Registered Nurse) on 01/29/24 at 2343  Med List Status: Partial     Medication Last Dose Status   amphetamine-dextroamphetamine (ADDERALL) 15 MG tablet  Active   chlorpheniramine (CHLOR-TRIMETRON) 4 MG Tab  Active   clonazePAM (KLONOPIN) 1 MG Tab  Active   DULoxetine (CYMBALTA) 20 MG Cap DR Particles  Active   propranolol (INDERAL) 20 MG Tab  Active                    ALLERGIES  No Known Allergies    PHYSICAL EXAM  VITAL SIGNS: /64   Pulse 94   Temp 36.2 °C (97.2 °F) (Temporal)   Resp 16   Ht 1.803 m (5' 10.98\")   Wt 77.1 kg (169 lb 15.6 oz)   SpO2 100%   BMI 23.72 kg/m²    Constitutional: Awake and alert. No acute distress.  Head: Normocephalic.  Patient has several day old Steri-Strips over laceration to the right eyebrow.  HEENT: Normal Conjunctiva. PERRLA.  Neck: Grossly normal range of motion. Airway midline.  Cardiovascular: Normal heart rate, Normal rhythm.  Thorax & Lungs: No respiratory distress. Clear to Auscultation bilaterally.  Abdomen: Normal inspection. Nontender. Nondistended  Skin: No obvious rash.  Back: No tenderness, No CVA tenderness.   Musculoskeletal: No obvious deformity. Moves all extremities Well.  Neurologic: A&Ox3.  Ambulates with unsteady gait due to alcohol.  Psychiatric: Mood and affect are appropriate for situation.    LABS  Results for orders placed or performed during the hospital encounter of 01/29/24   POC BREATHALIZER   Result Value Ref Range    POC Breathalizer 0.223 (A) 0.00 - 0.01 Percent       COURSE & MEDICAL DECISION MAKING    ED Observation Status? Yes; I am placing the patient in to an observation status due to a diagnostic uncertainty as well as therapeutic intensity. Patient placed in observation status at 11:50 PM, 1/29/2024. "     Observation plan is as follows: Will observe for clinical sobriety and discharge when appropriate.    Upon Reevaluation, the patient's condition has: Improved; and will be discharged.    Patient discharged from ED Observation status at 06:29 (Time) 1/30/2024 (Date).     INITIAL ASSESSMENT, COURSE AND PLAN  Care Narrative:   38-year-old male with frequent ED visits for alcohol intoxication presents by EMS for intoxication.  Afebrile and reassuring vitals  On exam clinically intoxicated, no outward signs of new trauma.  He does have a laceration over the right eyebrow that has Steri-Strips that are a few days old.  He will answer simple questions and endorses no complaints.  Breathing is unlabored.  Breathalyzed with 0.2-3.  Will observe for clinical sobriety.  Over the course of his ED evaluation he was ambulatory to the bathroom twice.  The first time he required assistance due to intoxication, second time he ambulated without assistance.  At 06: 00 he tolerated food and requested discharge.  Again his gait is steady, voices no new complaints and was discharged.      ADDITIONAL PROBLEM LIST  None  DISPOSITION AND DISCUSSIONS  I have discussed management of the patient with the following physicians and BRUNO's:  None    Discussion of management with other QHP or appropriate source(s): None     Escalation of care considered, and ultimately not performed:acute inpatient care management, however at this time, the patient is most appropriate for outpatient management    Barriers to care at this time, including but not limited to: Patient does not have established PCP and Patient is homeless.     Decision tools and prescription drugs considered including, but not limited to:  None .    FINAL DIAGNOSIS  1. Alcoholic intoxication without complication (HCC)    2. Homelessness           Electronically signed by: Therese Proctor D.O., 1/29/2024 11:50 PM

## 2024-01-30 NOTE — ED NOTES
Pt lying right lateral side in front of RN station, sleeping. On pulse oximeter. Active chest rise and fall noted.

## 2024-01-30 NOTE — ED NOTES
Pt lying right lateral side, sleeping. Active chest rise and fall noted. Within view of RN station.

## 2024-01-30 NOTE — ED TRIAGE NOTES
"Chief Complaint   Patient presents with    Alcohol Intoxication    Other     Pt states he wants to detox.      BIB EMS. [place]. Pt told EMS he fell and hit his head, unknown when. Reports he wants to detox. VS: , 123/89, HR 94, Resp 16, GCS 15. Report pt is A & O x 4 and was ambulatory n scene with unsteady gait.     Pt is alert to name. States he want to detox. Slurred speech. + CSM all extremities, follows commands. Pt has previous hospital band and clean bandaid/ steri strips to right forehead. Unknown last drink or how much alcohol he drank today.     /84   Pulse 86   Temp 36 °C (96.8 °F) (Temporal)   Resp 16   Ht 1.803 m (5' 10.98\")   Wt 77.1 kg (169 lb 15.6 oz)   SpO2 88%   BMI 23.72 kg/m²     "

## 2024-01-30 NOTE — DISCHARGE INSTRUCTIONS
Please do not drink so much.  Return emergency department if you have persistent rapid heart rate, severe shakiness, OR seizure.

## 2024-01-30 NOTE — ED NOTES
"Alert team at bedside. Provided pt with resources to [facility]. Pt agrees to POC. Attempt to provided pt with bus pass to [place]. Pt stating that he can \"buy his own bus pass\". Pt ambulatory in room, steady gait.   " Rhombic Flap Text: The defect edges were debeveled with a #15 scalpel blade.  Given the location of the defect and the proximity to free margins a rhombic flap was deemed most appropriate.  Using a sterile surgical marker, an appropriate rhombic flap was drawn incorporating the defect.    The area thus outlined was incised deep to adipose tissue with a #15 scalpel blade.  The skin margins were undermined to an appropriate distance in all directions utilizing iris scissors.

## 2024-01-30 NOTE — ED NOTES
Pt given discharge instructions/ home care instructions explained, pt verbalized understanding of instructions given/pt understands the importance of follow up, pt ambulatory to ER lobby, steady gait.

## 2024-01-30 NOTE — ED PROVIDER NOTES
ED Provider Note    CHIEF COMPLAINT  Chief Complaint   Patient presents with    Detox     Pt BIB EMS, report that pt is seeking detox. Cannot remember when last drink was.        EXTERNAL RECORDS REVIEWED  Patient visit for alcohol intoxication 3 times yesterday, seen at [facility] originally, and then our facility twice patient also seen the 26, 27th and 28th for alcohol intoxication.    HPI/ROS  LIMITATION TO HISTORY   Select: Intoxication      Gopal Ceja is a 38 y.o. male who presents for alcohol detox.  Patient seen regularly in the emergency department for alcohol problems.  Patient most recent seen yesterday for alcohol intoxication.  Patient states he is here for detox. Patient states he was last in rehab approximately a month ago.. He is unable to elaborate on this as he is significantly intoxicated.  He has no other complaints at this point.    PAST MEDICAL HISTORY   has a past medical history of Acute anxiety, ADHD (attention deficit hyperactivity disorder), ADHD (attention deficit hyperactivity disorder), Alcohol abuse, Bipolar affective (HCC), Depression, Drug abuse and dependence (HCC), Ectrodactyly-ectodermal dysplasia-clefting syndrome, ETOH abuse, and Psychiatric disorder.    SURGICAL HISTORY   has a past surgical history that includes other orthopedic surgery and exploratory of abdomen (N/A, 9/26/2019).    FAMILY HISTORY  Family History   Problem Relation Age of Onset    Hypertension Mother     Heart Disease Neg Hx     Hyperlipidemia Neg Hx        SOCIAL HISTORY  Social History     Tobacco Use    Smoking status: Never    Smokeless tobacco: Never    Tobacco comments:     Smokes couple of times a month   Vaping Use    Vaping Use: Never used   Substance and Sexual Activity    Alcohol use: Yes     Comment: last drink today    Drug use: Yes     Types: Inhaled     Comment: meth yesterday (smokes Meth)/THC    Sexual activity: Not on file       CURRENT MEDICATIONS  Home Medications    **Home  "medications have not yet been reviewed for this encounter**         ALLERGIES  No Known Allergies    PHYSICAL EXAM  VITAL SIGNS: BP (!) 152/100   Pulse 100   Temp 36.7 °C (98 °F) (Temporal)   Resp 20   Ht 1.778 m (5' 10\")   Wt 76.7 kg (169 lb)   SpO2 95%   BMI 24.25 kg/m²    Physical Exam  Constitutional:       Comments: Smells of alcohol, slurring his speech, unkempt    HENT:      Head: Normocephalic and atraumatic.      Nose: Nose normal.   Cardiovascular:      Rate and Rhythm: Normal rate and regular rhythm.   Pulmonary:      Effort: Pulmonary effort is normal.      Breath sounds: Normal breath sounds.   Abdominal:      General: Abdomen is flat.      Palpations: Abdomen is soft.      Tenderness: There is no abdominal tenderness.   Musculoskeletal:         General: No swelling or tenderness.      Cervical back: Normal range of motion.   Skin:     General: Skin is warm.      Capillary Refill: Capillary refill takes less than 2 seconds.           DIAGNOSTIC STUDIES / PROCEDURES      LABS  Results for orders placed or performed during the hospital encounter of 01/29/24   POC BREATHALIZER   Result Value Ref Range    POC Breathalizer 0.223 (A) 0.00 - 0.01 Percent         COURSE & MEDICAL DECISION MAKING        INITIAL ASSESSMENT, COURSE AND PLAN  Care Narrative: Patient here with significant alcohol intoxication requesting detox.  Will discuss the case with our alert team and see if patient could be placed in a detox facility.  At this point he is far too intoxicated to leave the emergency department regardless, he will be observed here.    On reassessment patient remains intoxicated, easily wakes without issue    On reassessment patient is now clinically sober.  I discussed the case with the alert team.  They have given multiple resources and a plan to follow-up with inpatient rehabilitation center.  Have provided patient with options for transportation there.  Return precautions discussed.        "     DISPOSITION AND DISCUSSIONS      Discussion of management with other QHP or appropriate source(s): Case discussed with alert team see above for further details      Barriers to care at this time, including but not limited to: Patient does not have established PCP and Patient is homeless.       FINAL DIAGNOSIS  1. Alcohol abuse    2. Alcoholism (HCC)

## 2024-01-30 NOTE — ED NOTES
Pt sleeping right lateral side in view of RN station. On pulse oximeter. Active chest rise and fall noted.

## 2024-01-30 NOTE — ED NOTES
Pt refuses to use urinal. Pt walked to restroom with two RNs, standby assist. Pt has unsteady gait, slurred speech. Back to bed without incident.

## 2024-05-28 NOTE — ED PROVIDER NOTES
ED Provider Note    Scribed for No att. providers found by Miki Garner M.D.. 4/24/2022,  4:00 PM.    CHIEF COMPLAINT  Chief Complaint   Patient presents with   • Alcohol Intoxication     Pt reports he has been drinking 8-10 earthquakes since being released from intermediate. Also admitted to using meth yesterday. Pt tearful, asking for detox. Denies SI/HI.       HPI  Gopal Ceja is a 36 y.o. male who presents to the Emergency Department requesting assistance with alcohol and methamphetamine recovery.  He says that he has been drinking since being released from intermediate about a month ago.  This patient is well-known to this and other local hospitals, for too numerous to count emergency department visits related to dual diagnosis mental health and substance abuse issues.  He is tearful and anxious, but awake and alert and cooperative, without evidence of acute intoxication.  He reports his last methamphetamine abuse was yesterday, and that he has been self tapering his alcohol from 8-10 earthquakes per day, down to 2.    REVIEW OF SYSTEMS  See HPI for further details. All other systems are negative.     PAST MEDICAL HISTORY   has a past medical history of Acute anxiety, ADHD (attention deficit hyperactivity disorder), ADHD (attention deficit hyperactivity disorder), Alcohol abuse, Bipolar affective (HCC), Depression, Ectrodactyly-ectodermal dysplasia-clefting syndrome, ETOH abuse, and Psychiatric disorder.    SOCIAL HISTORY  Social History     Tobacco Use   • Smoking status: Never Smoker   • Smokeless tobacco: Never Used   • Tobacco comment: Smokes couple of times a month   Vaping Use   • Vaping Use: Never used   Substance and Sexual Activity   • Alcohol use: Yes     Alcohol/week: 0.0 oz     Comment: 8-10 earthquakes - last drink today   • Drug use: Yes     Comment: meth yesterday   • Sexual activity: Not on file     Social History     Substance and Sexual Activity   Drug Use Yes    Comment: meth yesterday  Juliette Thomas is calling to request a refill on the following medication(s):    Medication Request:  Requested Prescriptions     Pending Prescriptions Disp Refills    RYBELSUS 3 MG TABS [Pharmacy Med Name: Rybelsus 3 MG Oral Tablet] 30 tablet 0     Sig: Take 1 tablet by mouth once daily       Last Visit Date (If Applicable):  5/23/2024    Next Visit Date:    Visit date not found    "      SURGICAL HISTORY   has a past surgical history that includes other orthopedic surgery and exploratory of abdomen (N/A, 9/26/2019).    CURRENT MEDICATIONS  Home Medications     Reviewed by Christina Rendon R.N. (Registered Nurse) on 04/24/22 at 1544  Med List Status: <None>   Medication Last Dose Status   acetaminophen (TYLENOL) 325 MG Tab  Active   amphetamine-dextroamphetamine (ADDERALL) 20 MG Tab  Active   baclofen (LIORESAL) 10 MG Tab  Active   busPIRone (BUSPAR) 10 MG Tab tablet  Active   docusate sodium 100 MG Cap  Active   ibuprofen (MOTRIN) 800 MG Tab  Active   metoprolol (LOPRESSOR) 50 MG Tab  Active   omeprazole (PRILOSEC) 40 MG delayed-release capsule  Active   propranolol (INDERAL) 20 MG Tab  Active                ALLERGIES  No Known Allergies    PHYSICAL EXAM  VITAL SIGNS: /97   Pulse (!) 109   Temp 36.5 °C (97.7 °F) (Temporal)   Resp 20   Ht 1.727 m (5' 8\")   Wt 73.4 kg (161 lb 13.1 oz)   SpO2 93%   BMI 24.60 kg/m²   Pulse ox interpretation: I interpret this pulse ox as normal.  Constitutional: Alert in some emotional distress.  HENT: No signs of trauma, Bilateral external ears normal, Nose normal.   Eyes: Pupils are equal and reactive, Conjunctiva normal, Non-icteric.   Neck: Normal range of motion, Supple, No stridor.    Cardiovascular: Normal peripheral perfusion  Thorax & Lungs: Unlabored respirations, equal chest expansion, no accessory muscle use  Abdomen: Non-distended  Skin:  No erythema, No rash.   Back: Normal alignment and ROM  Extremities: No gross deformity, within the limits of the patient's known polysyndactyly.  Musculoskeletal: Good range of motion in all major joints.   Neurologic: Alert, Normal motor function, No focal deficits noted.   Psychiatric: Affect normal, Judgment normal, Mood normal.      DIAGNOSTIC STUDIES / PROCEDURES        COURSE & MEDICAL DECISION MAKING  Nursing notes, VS, PMSFHx reviewed in chart.     4:00 PM Patient seen and examined at bedside.  " He has a chronic history of mental health and substance abuse problems, but relatively speaking, he actually was quite put together, compared to most presentations in the past.  He does not show signs of alcohol withdrawal/DTs.  He is requesting some assistance with recovery services.  We had a long and candid discussion that true sobriety requires commitment and hard work, and that the emergency department does not have any quick fixes to offer.  However, I asked the peers support Track B team to come to the bedside.  They had a long discussion with the patient, and provided appropriate resources, for which she accepted gratitude.  He is safe and appropriate for discharge.     The patient will return for new or worsening symptoms and is stable at the time of discharge.    The patient is referred to a primary physician for blood pressure management, diabetic screening, and for all other preventative health concerns.      DISPOSITION:  Patient will be discharged home in stable condition.    FOLLOW UP:  No follow-up provider specified. [facility] resources provided and discussed    OUTPATIENT MEDICATIONS:  Discharge Medication List as of 4/24/2022  5:42 PM            FINAL IMPRESSION  1. Alcohol abuse    2. Methamphetamine abuse (HCC)

## 2024-07-02 NOTE — PROGRESS NOTES
-- DO NOT REPLY / DO NOT REPLY ALL --  -- This inbox is not monitored  -- Message is from Engagement Center Operations (ECO) --      Message Type:  Refill Medication   Refill request for Pended medication named: alprazolam   Preferred pharmacy verified, and selected.   [id number] - [place], [place] - [address]    Is the patient OUT of Medication?  No        Message: Patient has 5 days left                     Report received from JORDY Quinteros. Line and gtt verified. Assumed care of patient. Transported pt on monitor with all belongings to [id number].  Arrived on unit at 1027.  Plan of care discussed with patient at bedside. All questions and concerns addressed. Safety measures in place.

## (undated) DEVICE — TOWELS CLOTH SURGICAL - (4/PK 20PK/CA)

## (undated) DEVICE — LACTATED RINGERS INJ 1000 ML - (14EA/CA 60CA/PF)

## (undated) DEVICE — SUTURE 0 VICRYL PLUS CT-1 - 36 INCH (36/BX)

## (undated) DEVICE — DRAPE MAYO STAND - (30/CA)

## (undated) DEVICE — BOVIE  BLADE 6 EXTENDED - (50/PK)

## (undated) DEVICE — SYSTEM PREVENA DRESSING CUSTOMIZABLE (5EA/CA)

## (undated) DEVICE — GOWN SURGEONS X-LARGE - DISP. (30/CA)

## (undated) DEVICE — STAPLER 60MM ENDO SHORT ARTICULATING (3EA/BX)

## (undated) DEVICE — SET EXTENSION WITH 2 PORTS (48EA/CA) ***PART #2C8610 IS A SUBSTITUTE*****

## (undated) DEVICE — GLOVE BIOGEL SZ 7 SURGICAL PF LTX - (50PR/BX 4BX/CA)

## (undated) DEVICE — SLEEVE, VASO, THIGH, MED

## (undated) DEVICE — KIT ANESTHESIA W/CIRCUIT & 3/LT BAG W/FILTER (20EA/CA)

## (undated) DEVICE — GOWN SURGICAL X-LARGE ULTRA - FILM-REINFORCED (20/CA)

## (undated) DEVICE — GLOVE BIOGEL INDICATOR SZ 7SURGICAL PF LTX - (50/BX 4BX/CA)

## (undated) DEVICE — SUTURE 3-0 VICRYL PLUS SH - 8X 18 INCH (12/BX)

## (undated) DEVICE — SET LEADWIRE 5 LEAD BEDSIDE DISPOSABLE ECG (1SET OF 5/EA)

## (undated) DEVICE — MASK ANESTHESIA ADULT  - (100/CA)

## (undated) DEVICE — GRAFT MESH SEPRAFILM PRO PACK - 5/BX CONTAINS 6 3X5 PIECES

## (undated) DEVICE — STAPLER SKIN DISP - (6/BX 10BX/CA) VISISTAT

## (undated) DEVICE — SENSOR SPO2 NEO LNCS ADHESIVE (20/BX) SEE USER NOTES

## (undated) DEVICE — PROTECTOR ULNA NERVE - (36PR/CA)

## (undated) DEVICE — SUCTION INSTRUMENT YANKAUER BULBOUS TIP W/O VENT (50EA/CA)

## (undated) DEVICE — SYRINGE ASEPTO - (50EA/CA

## (undated) DEVICE — STAPLE 60MM WHTE 2.6MM - (12/BX) WAS PART #ECR60W

## (undated) DEVICE — CLIP MED INTNL HRZN TI ESCP - (25/BX)

## (undated) DEVICE — SUTURE 3-0 VICRYL PLUS SH - 27 INCH (36/BX)

## (undated) DEVICE — GLOVE BIOGEL INDICATOR SZ 7.5 SURGICAL PF LTX - (50PR/BX 4BX/CA)

## (undated) DEVICE — SUTURE 1 VICRYL PLUS CTX - 36 INCH (36/BX)

## (undated) DEVICE — PACK MAJOR BASIN - (2EA/CA)

## (undated) DEVICE — GLOVE SZ 7.5 BIOGEL PI MICRO - PF LF (50PR/BX)

## (undated) DEVICE — SODIUM CHL IRRIGATION 0.9% 1000ML (12EA/CA)

## (undated) DEVICE — GLOVE BIOGEL PI INDICATOR SZ 8.0 SURGICAL PF LF -(50/BX 4BX/CA)

## (undated) DEVICE — SUTURE 2-0 SILK SH C/R ETHICON (12PK/BX)

## (undated) DEVICE — ELECTRODE DUAL RETURN W/ CORD - (50/PK)

## (undated) DEVICE — TUBING CLEARLINK DUO-VENT - C-FLO (48EA/CA)

## (undated) DEVICE — HEAD HOLDER JUNIOR/ADULT

## (undated) DEVICE — KIT ROOM DECONTAMINATION

## (undated) DEVICE — TRAY MULTI-LUMEN 7FR PRESSURE W/MAX BARRIER AND BIOPATCH - (5/CA)

## (undated) DEVICE — CLIP LG INTNL HRZN TI ESCP LGT - (20/BX)

## (undated) DEVICE — SPONGE GAUZESTER 4 X 4 4PLY - (128PK/CA)

## (undated) DEVICE — TUBE CONNECT SUCTION CLEAR 120 X 1/4" (50EA/CA)"

## (undated) DEVICE — CANISTER SUCTION 3000ML MECHANICAL FILTER AUTO SHUTOFF MEDI-VAC NONSTERILE LF DISP  (40EA/CA)

## (undated) DEVICE — CLIP MED LG INTNL HRZN TI ESCP - (20/BX)

## (undated) DEVICE — CHLORAPREP 26 ML APPLICATOR - ORANGE TINT(25/CA)

## (undated) DEVICE — RELOAD WITH GRIPPING SURFACE TECHNOLOGY BLUE 60MM (12EA/BX)

## (undated) DEVICE — SUTURE GENERAL

## (undated) DEVICE — LIGASURE TISSUE FUSION  - SINGLE USE (6/CA)

## (undated) DEVICE — NEPTUNE 4 PORT MANIFOLD - (20/PK)

## (undated) DEVICE — DRAPE LAPAROTOMY T SHEET - (12EA/CA)

## (undated) DEVICE — ELECTRODE 850 FOAM ADHESIVE - HYDROGEL RADIOTRNSPRNT (50/PK)

## (undated) DEVICE — GLOVE BIOGEL SZ 7.5 SURGICAL PF LTX - (50PR/BX 4BX/CA)

## (undated) DEVICE — GLOVE BIOGEL SZ 8 SURGICAL PF LTX - (50PR/BX 4BX/CA)

## (undated) DEVICE — PAD LAP STERILE 18 X 18 - (5/PK 40PK/CA)

## (undated) DEVICE — GOWN WARMING STANDARD FLEX - (30/CA)